# Patient Record
Sex: FEMALE | Race: WHITE | NOT HISPANIC OR LATINO | Employment: OTHER | URBAN - METROPOLITAN AREA
[De-identification: names, ages, dates, MRNs, and addresses within clinical notes are randomized per-mention and may not be internally consistent; named-entity substitution may affect disease eponyms.]

---

## 2017-02-07 ENCOUNTER — ALLSCRIPTS OFFICE VISIT (OUTPATIENT)
Dept: OTHER | Facility: OTHER | Age: 62
End: 2017-02-07

## 2017-02-07 DIAGNOSIS — F25.9 SCHIZOAFFECTIVE DISORDER (HCC): ICD-10-CM

## 2017-02-07 DIAGNOSIS — E03.9 HYPOTHYROIDISM: ICD-10-CM

## 2017-02-07 DIAGNOSIS — F41.8 OTHER SPECIFIED ANXIETY DISORDERS: ICD-10-CM

## 2017-02-07 DIAGNOSIS — E78.5 HYPERLIPIDEMIA: ICD-10-CM

## 2017-02-18 ENCOUNTER — APPOINTMENT (OUTPATIENT)
Dept: LAB | Age: 62
End: 2017-02-18
Payer: MEDICARE

## 2017-02-18 ENCOUNTER — TRANSCRIBE ORDERS (OUTPATIENT)
Dept: ADMINISTRATIVE | Age: 62
End: 2017-02-18

## 2017-02-18 DIAGNOSIS — E78.5 HYPERLIPIDEMIA: ICD-10-CM

## 2017-02-18 DIAGNOSIS — E78.2 MIXED HYPERLIPIDEMIA: Primary | ICD-10-CM

## 2017-02-18 DIAGNOSIS — F25.9 SCHIZOAFFECTIVE DISORDER (HCC): ICD-10-CM

## 2017-02-18 DIAGNOSIS — E03.9 HYPOTHYROIDISM: ICD-10-CM

## 2017-02-18 DIAGNOSIS — F41.8 OTHER SPECIFIED ANXIETY DISORDERS: ICD-10-CM

## 2017-02-18 DIAGNOSIS — E78.2 MIXED HYPERLIPIDEMIA: ICD-10-CM

## 2017-02-18 LAB
ALBUMIN SERPL BCP-MCNC: 3.5 G/DL (ref 3.5–5)
ALP SERPL-CCNC: 62 U/L (ref 46–116)
ALT SERPL W P-5'-P-CCNC: 17 U/L (ref 12–78)
ANION GAP SERPL CALCULATED.3IONS-SCNC: 7 MMOL/L (ref 4–13)
AST SERPL W P-5'-P-CCNC: 10 U/L (ref 5–45)
BILIRUB SERPL-MCNC: 0.27 MG/DL (ref 0.2–1)
BUN SERPL-MCNC: 12 MG/DL (ref 5–25)
CALCIUM SERPL-MCNC: 8.8 MG/DL (ref 8.3–10.1)
CHLORIDE SERPL-SCNC: 99 MMOL/L (ref 100–108)
CHOLEST SERPL-MCNC: 179 MG/DL (ref 50–200)
CO2 SERPL-SCNC: 30 MMOL/L (ref 21–32)
CREAT SERPL-MCNC: 0.86 MG/DL (ref 0.6–1.3)
ERYTHROCYTE [DISTWIDTH] IN BLOOD BY AUTOMATED COUNT: 12.8 % (ref 11.6–15.1)
GFR SERPL CREATININE-BSD FRML MDRD: >60 ML/MIN/1.73SQ M
GLUCOSE SERPL-MCNC: 94 MG/DL (ref 65–140)
HCT VFR BLD AUTO: 38.5 % (ref 34.8–46.1)
HDLC SERPL-MCNC: 83 MG/DL (ref 40–60)
HGB BLD-MCNC: 13.3 G/DL (ref 11.5–15.4)
LDLC SERPL CALC-MCNC: 70 MG/DL (ref 0–100)
MCH RBC QN AUTO: 33.6 PG (ref 26.8–34.3)
MCHC RBC AUTO-ENTMCNC: 34.5 G/DL (ref 31.4–37.4)
MCV RBC AUTO: 97 FL (ref 82–98)
PLATELET # BLD AUTO: 328 THOUSANDS/UL (ref 149–390)
PMV BLD AUTO: 9.8 FL (ref 8.9–12.7)
POTASSIUM SERPL-SCNC: 4.4 MMOL/L (ref 3.5–5.3)
PROT SERPL-MCNC: 7.4 G/DL (ref 6.4–8.2)
RBC # BLD AUTO: 3.96 MILLION/UL (ref 3.81–5.12)
SODIUM SERPL-SCNC: 136 MMOL/L (ref 136–145)
TRIGL SERPL-MCNC: 130 MG/DL
TSH SERPL DL<=0.05 MIU/L-ACNC: 1.6 UIU/ML (ref 0.36–3.74)
WBC # BLD AUTO: 4.72 THOUSAND/UL (ref 4.31–10.16)

## 2017-02-18 PROCEDURE — 85027 COMPLETE CBC AUTOMATED: CPT

## 2017-02-18 PROCEDURE — 84443 ASSAY THYROID STIM HORMONE: CPT

## 2017-02-18 PROCEDURE — 36415 COLL VENOUS BLD VENIPUNCTURE: CPT

## 2017-02-18 PROCEDURE — 80053 COMPREHEN METABOLIC PANEL: CPT

## 2017-02-18 PROCEDURE — 80061 LIPID PANEL: CPT

## 2017-03-07 ENCOUNTER — TRANSCRIBE ORDERS (OUTPATIENT)
Dept: LAB | Facility: HOSPITAL | Age: 62
End: 2017-03-07

## 2017-03-07 ENCOUNTER — APPOINTMENT (OUTPATIENT)
Dept: LAB | Facility: HOSPITAL | Age: 62
End: 2017-03-07
Payer: MEDICARE

## 2017-03-07 DIAGNOSIS — F25.0 SCHIZOAFFECTIVE DISORDER, BIPOLAR TYPE (HCC): Primary | ICD-10-CM

## 2017-03-07 DIAGNOSIS — E03.9 UNSPECIFIED HYPOTHYROIDISM: ICD-10-CM

## 2017-03-07 DIAGNOSIS — Z79.899 ENCOUNTER FOR LONG-TERM (CURRENT) USE OF OTHER MEDICATIONS: ICD-10-CM

## 2017-03-07 DIAGNOSIS — F25.0 SCHIZOAFFECTIVE DISORDER, BIPOLAR TYPE (HCC): ICD-10-CM

## 2017-03-07 LAB
ALBUMIN SERPL BCP-MCNC: 3.5 G/DL (ref 3.5–5)
ALP SERPL-CCNC: 65 U/L (ref 46–116)
ALT SERPL W P-5'-P-CCNC: 18 U/L (ref 12–78)
AMMONIA PLAS-SCNC: 17 UMOL/L (ref 11–35)
ANION GAP SERPL CALCULATED.3IONS-SCNC: 8 MMOL/L (ref 4–13)
AST SERPL W P-5'-P-CCNC: 12 U/L (ref 5–45)
BASOPHILS # BLD AUTO: 0 THOUSANDS/ΜL (ref 0–0.1)
BASOPHILS NFR BLD AUTO: 0 % (ref 0–1)
BILIRUB SERPL-MCNC: 0.28 MG/DL (ref 0.2–1)
BUN SERPL-MCNC: 9 MG/DL (ref 5–25)
CALCIUM SERPL-MCNC: 8.7 MG/DL (ref 8.3–10.1)
CHLORIDE SERPL-SCNC: 97 MMOL/L (ref 100–108)
CHOLEST SERPL-MCNC: 186 MG/DL (ref 50–200)
CO2 SERPL-SCNC: 28 MMOL/L (ref 21–32)
CREAT SERPL-MCNC: 0.92 MG/DL (ref 0.6–1.3)
EOSINOPHIL # BLD AUTO: 0 THOUSAND/ΜL (ref 0–0.61)
EOSINOPHIL NFR BLD AUTO: 0 % (ref 0–6)
ERYTHROCYTE [DISTWIDTH] IN BLOOD BY AUTOMATED COUNT: 12.7 % (ref 11.6–15.1)
EST. AVERAGE GLUCOSE BLD GHB EST-MCNC: 114 MG/DL
GFR SERPL CREATININE-BSD FRML MDRD: >60 ML/MIN/1.73SQ M
GLUCOSE SERPL-MCNC: 98 MG/DL (ref 65–140)
HBA1C MFR BLD: 5.6 % (ref 4.2–6.3)
HCT VFR BLD AUTO: 38.9 % (ref 34.8–46.1)
HDLC SERPL-MCNC: 95 MG/DL (ref 40–60)
HGB BLD-MCNC: 13.4 G/DL (ref 11.5–15.4)
LDLC SERPL CALC-MCNC: 72 MG/DL (ref 0–100)
LYMPHOCYTES # BLD AUTO: 1.64 THOUSANDS/ΜL (ref 0.6–4.47)
LYMPHOCYTES NFR BLD AUTO: 38 % (ref 14–44)
MCH RBC QN AUTO: 33 PG (ref 26.8–34.3)
MCHC RBC AUTO-ENTMCNC: 34.4 G/DL (ref 31.4–37.4)
MCV RBC AUTO: 96 FL (ref 82–98)
MONOCYTES # BLD AUTO: 0.42 THOUSAND/ΜL (ref 0.17–1.22)
MONOCYTES NFR BLD AUTO: 10 % (ref 4–12)
NEUTROPHILS # BLD AUTO: 2.24 THOUSANDS/ΜL (ref 1.85–7.62)
NEUTS SEG NFR BLD AUTO: 52 % (ref 43–75)
NRBC BLD AUTO-RTO: 0 /100 WBCS
PLATELET # BLD AUTO: 243 THOUSANDS/UL (ref 149–390)
PMV BLD AUTO: 9.3 FL (ref 8.9–12.7)
POTASSIUM SERPL-SCNC: 4.3 MMOL/L (ref 3.5–5.3)
PROT SERPL-MCNC: 7.2 G/DL (ref 6.4–8.2)
RBC # BLD AUTO: 4.06 MILLION/UL (ref 3.81–5.12)
SODIUM SERPL-SCNC: 133 MMOL/L (ref 136–145)
T4 FREE SERPL-MCNC: 0.91 NG/DL (ref 0.76–1.46)
TRIGL SERPL-MCNC: 94 MG/DL
TSH SERPL DL<=0.05 MIU/L-ACNC: 1.49 UIU/ML (ref 0.36–3.74)
VALPROATE SERPL-MCNC: 53 UG/ML (ref 50–100)
WBC # BLD AUTO: 4.3 THOUSAND/UL (ref 4.31–10.16)

## 2017-03-07 PROCEDURE — 36415 COLL VENOUS BLD VENIPUNCTURE: CPT

## 2017-03-07 PROCEDURE — 80053 COMPREHEN METABOLIC PANEL: CPT

## 2017-03-07 PROCEDURE — 84439 ASSAY OF FREE THYROXINE: CPT

## 2017-03-07 PROCEDURE — 83036 HEMOGLOBIN GLYCOSYLATED A1C: CPT

## 2017-03-07 PROCEDURE — 84443 ASSAY THYROID STIM HORMONE: CPT

## 2017-03-07 PROCEDURE — 85025 COMPLETE CBC W/AUTO DIFF WBC: CPT

## 2017-03-07 PROCEDURE — 80061 LIPID PANEL: CPT

## 2017-03-07 PROCEDURE — 82140 ASSAY OF AMMONIA: CPT

## 2017-03-07 PROCEDURE — 80164 ASSAY DIPROPYLACETIC ACD TOT: CPT

## 2017-03-20 ENCOUNTER — TRANSCRIBE ORDERS (OUTPATIENT)
Dept: ADMINISTRATIVE | Age: 62
End: 2017-03-20

## 2017-03-20 ENCOUNTER — LAB (OUTPATIENT)
Dept: LAB | Age: 62
End: 2017-03-20
Payer: MEDICARE

## 2017-03-20 DIAGNOSIS — G40.909 EPILEPSY WITHOUT STATUS EPILEPTICUS, NOT INTRACTABLE (HCC): ICD-10-CM

## 2017-03-20 DIAGNOSIS — G40.909 UNSPECIFIED EPILEPSY WITHOUT MENTION OF INTRACTABLE EPILEPSY: Primary | ICD-10-CM

## 2017-03-20 DIAGNOSIS — G40.909 UNSPECIFIED EPILEPSY WITHOUT MENTION OF INTRACTABLE EPILEPSY: ICD-10-CM

## 2017-03-20 LAB
ALBUMIN SERPL BCP-MCNC: 3.7 G/DL (ref 3.5–5)
ALP SERPL-CCNC: 58 U/L (ref 46–116)
ALT SERPL W P-5'-P-CCNC: 24 U/L (ref 12–78)
AMMONIA PLAS-SCNC: 15 UMOL/L (ref 11–35)
ANION GAP SERPL CALCULATED.3IONS-SCNC: 7 MMOL/L (ref 4–13)
AST SERPL W P-5'-P-CCNC: 11 U/L (ref 5–45)
BILIRUB SERPL-MCNC: 0.22 MG/DL (ref 0.2–1)
BUN SERPL-MCNC: 9 MG/DL (ref 5–25)
CALCIUM SERPL-MCNC: 8.6 MG/DL (ref 8.3–10.1)
CHLORIDE SERPL-SCNC: 98 MMOL/L (ref 100–108)
CO2 SERPL-SCNC: 29 MMOL/L (ref 21–32)
CREAT SERPL-MCNC: 0.95 MG/DL (ref 0.6–1.3)
ERYTHROCYTE [DISTWIDTH] IN BLOOD BY AUTOMATED COUNT: 12.9 % (ref 11.6–15.1)
GFR SERPL CREATININE-BSD FRML MDRD: 59.4 ML/MIN/1.73SQ M
GLUCOSE P FAST SERPL-MCNC: 106 MG/DL (ref 65–99)
HCT VFR BLD AUTO: 39.4 % (ref 34.8–46.1)
HGB BLD-MCNC: 13.4 G/DL (ref 11.5–15.4)
MCH RBC QN AUTO: 32.9 PG (ref 26.8–34.3)
MCHC RBC AUTO-ENTMCNC: 34 G/DL (ref 31.4–37.4)
MCV RBC AUTO: 97 FL (ref 82–98)
PHENOBARB SERPL-MCNC: 14.8 UG/ML (ref 15–40)
PLATELET # BLD AUTO: 279 THOUSANDS/UL (ref 149–390)
PMV BLD AUTO: 10.2 FL (ref 8.9–12.7)
POTASSIUM SERPL-SCNC: 4.4 MMOL/L (ref 3.5–5.3)
PROT SERPL-MCNC: 7.3 G/DL (ref 6.4–8.2)
RBC # BLD AUTO: 4.07 MILLION/UL (ref 3.81–5.12)
SODIUM SERPL-SCNC: 134 MMOL/L (ref 136–145)
WBC # BLD AUTO: 4.2 THOUSAND/UL (ref 4.31–10.16)

## 2017-03-20 PROCEDURE — 80184 ASSAY OF PHENOBARBITAL: CPT

## 2017-03-20 PROCEDURE — 80053 COMPREHEN METABOLIC PANEL: CPT

## 2017-03-20 PROCEDURE — 82140 ASSAY OF AMMONIA: CPT

## 2017-03-20 PROCEDURE — 36415 COLL VENOUS BLD VENIPUNCTURE: CPT

## 2017-03-20 PROCEDURE — 85027 COMPLETE CBC AUTOMATED: CPT

## 2017-03-20 PROCEDURE — 80165 DIPROPYLACETIC ACID FREE: CPT

## 2017-03-21 ENCOUNTER — TRANSCRIBE ORDERS (OUTPATIENT)
Dept: ADMINISTRATIVE | Facility: HOSPITAL | Age: 62
End: 2017-03-21

## 2017-03-21 DIAGNOSIS — M81.0 OSTEOPOROSIS: Primary | ICD-10-CM

## 2017-03-22 LAB — VALPROATE FREE SERPL-MCNC: 7.1 UG/ML (ref 6–22)

## 2017-03-28 ENCOUNTER — ALLSCRIPTS OFFICE VISIT (OUTPATIENT)
Dept: OTHER | Facility: OTHER | Age: 62
End: 2017-03-28

## 2017-04-25 ENCOUNTER — TRANSCRIBE ORDERS (OUTPATIENT)
Dept: LAB | Facility: HOSPITAL | Age: 62
End: 2017-04-25

## 2017-04-25 ENCOUNTER — APPOINTMENT (OUTPATIENT)
Dept: LAB | Facility: HOSPITAL | Age: 62
End: 2017-04-25
Payer: MEDICARE

## 2017-04-25 DIAGNOSIS — I10 ESSENTIAL HYPERTENSION, MALIGNANT: ICD-10-CM

## 2017-04-25 DIAGNOSIS — E03.9 UNSPECIFIED HYPOTHYROIDISM: ICD-10-CM

## 2017-04-25 DIAGNOSIS — F25.0 SCHIZOAFFECTIVE DISORDER, BIPOLAR TYPE (HCC): ICD-10-CM

## 2017-04-25 DIAGNOSIS — Z12.31 ENCOUNTER FOR SCREENING MAMMOGRAM FOR MALIGNANT NEOPLASM OF BREAST: ICD-10-CM

## 2017-04-25 DIAGNOSIS — F25.0 SCHIZOAFFECTIVE DISORDER, BIPOLAR TYPE (HCC): Primary | ICD-10-CM

## 2017-04-25 DIAGNOSIS — E66.9 OBESITY, UNSPECIFIED: ICD-10-CM

## 2017-04-25 DIAGNOSIS — Z79.899 ENCOUNTER FOR LONG-TERM (CURRENT) USE OF OTHER MEDICATIONS: ICD-10-CM

## 2017-04-25 DIAGNOSIS — F41.8 OTHER SPECIFIED ANXIETY DISORDERS: ICD-10-CM

## 2017-04-25 LAB
ALBUMIN SERPL BCP-MCNC: 3.6 G/DL (ref 3.5–5)
ALP SERPL-CCNC: 63 U/L (ref 46–116)
ALT SERPL W P-5'-P-CCNC: 21 U/L (ref 12–78)
AMMONIA PLAS-SCNC: 25 UMOL/L (ref 11–35)
ANION GAP SERPL CALCULATED.3IONS-SCNC: 6 MMOL/L (ref 4–13)
AST SERPL W P-5'-P-CCNC: 11 U/L (ref 5–45)
BASOPHILS # BLD AUTO: 0 THOUSANDS/ΜL (ref 0–0.1)
BASOPHILS NFR BLD AUTO: 0 % (ref 0–1)
BILIRUB SERPL-MCNC: 0.23 MG/DL (ref 0.2–1)
BUN SERPL-MCNC: 6 MG/DL (ref 5–25)
CALCIUM SERPL-MCNC: 9.1 MG/DL (ref 8.3–10.1)
CHLORIDE SERPL-SCNC: 100 MMOL/L (ref 100–108)
CHOLEST SERPL-MCNC: 203 MG/DL (ref 50–200)
CO2 SERPL-SCNC: 29 MMOL/L (ref 21–32)
CREAT SERPL-MCNC: 0.85 MG/DL (ref 0.6–1.3)
EOSINOPHIL # BLD AUTO: 0 THOUSAND/ΜL (ref 0–0.61)
EOSINOPHIL NFR BLD AUTO: 0 % (ref 0–6)
ERYTHROCYTE [DISTWIDTH] IN BLOOD BY AUTOMATED COUNT: 12.9 % (ref 11.6–15.1)
EST. AVERAGE GLUCOSE BLD GHB EST-MCNC: 114 MG/DL
GFR SERPL CREATININE-BSD FRML MDRD: >60 ML/MIN/1.73SQ M
GLUCOSE P FAST SERPL-MCNC: 100 MG/DL (ref 65–99)
HBA1C MFR BLD: 5.6 % (ref 4.2–6.3)
HCT VFR BLD AUTO: 40.2 % (ref 34.8–46.1)
HDLC SERPL-MCNC: 95 MG/DL (ref 40–60)
HGB BLD-MCNC: 13.7 G/DL (ref 11.5–15.4)
LDLC SERPL CALC-MCNC: 88 MG/DL (ref 0–100)
LYMPHOCYTES # BLD AUTO: 1.85 THOUSANDS/ΜL (ref 0.6–4.47)
LYMPHOCYTES NFR BLD AUTO: 43 % (ref 14–44)
MCH RBC QN AUTO: 33 PG (ref 26.8–34.3)
MCHC RBC AUTO-ENTMCNC: 34.1 G/DL (ref 31.4–37.4)
MCV RBC AUTO: 97 FL (ref 82–98)
MONOCYTES # BLD AUTO: 0.35 THOUSAND/ΜL (ref 0.17–1.22)
MONOCYTES NFR BLD AUTO: 8 % (ref 4–12)
NEUTROPHILS # BLD AUTO: 2.06 THOUSANDS/ΜL (ref 1.85–7.62)
NEUTS SEG NFR BLD AUTO: 49 % (ref 43–75)
NRBC BLD AUTO-RTO: 0 /100 WBCS
PLATELET # BLD AUTO: 254 THOUSANDS/UL (ref 149–390)
PMV BLD AUTO: 9.5 FL (ref 8.9–12.7)
POTASSIUM SERPL-SCNC: 4.6 MMOL/L (ref 3.5–5.3)
PROT SERPL-MCNC: 7.5 G/DL (ref 6.4–8.2)
RBC # BLD AUTO: 4.15 MILLION/UL (ref 3.81–5.12)
SODIUM SERPL-SCNC: 135 MMOL/L (ref 136–145)
T4 FREE SERPL-MCNC: 0.82 NG/DL (ref 0.76–1.46)
TRIGL SERPL-MCNC: 102 MG/DL
TSH SERPL DL<=0.05 MIU/L-ACNC: 1.97 UIU/ML (ref 0.36–3.74)
WBC # BLD AUTO: 4.27 THOUSAND/UL (ref 4.31–10.16)

## 2017-04-25 PROCEDURE — 83036 HEMOGLOBIN GLYCOSYLATED A1C: CPT

## 2017-04-25 PROCEDURE — 82140 ASSAY OF AMMONIA: CPT

## 2017-04-25 PROCEDURE — 36415 COLL VENOUS BLD VENIPUNCTURE: CPT

## 2017-04-25 PROCEDURE — 80165 DIPROPYLACETIC ACID FREE: CPT

## 2017-04-25 PROCEDURE — 84439 ASSAY OF FREE THYROXINE: CPT

## 2017-04-25 PROCEDURE — 80061 LIPID PANEL: CPT

## 2017-04-25 PROCEDURE — 80053 COMPREHEN METABOLIC PANEL: CPT

## 2017-04-25 PROCEDURE — 85025 COMPLETE CBC W/AUTO DIFF WBC: CPT

## 2017-04-25 PROCEDURE — 84443 ASSAY THYROID STIM HORMONE: CPT

## 2017-04-26 ENCOUNTER — GENERIC CONVERSION - ENCOUNTER (OUTPATIENT)
Dept: OTHER | Facility: OTHER | Age: 62
End: 2017-04-26

## 2017-04-26 ENCOUNTER — HOSPITAL ENCOUNTER (OUTPATIENT)
Dept: RADIOLOGY | Age: 62
Discharge: HOME | End: 2017-04-26
Payer: MEDICARE

## 2017-04-26 DIAGNOSIS — M81.0 OSTEOPOROSIS: ICD-10-CM

## 2017-04-26 PROCEDURE — 77080 DXA BONE DENSITY AXIAL: CPT

## 2017-04-27 LAB — VALPROATE FREE SERPL-MCNC: 9.9 UG/ML (ref 6–22)

## 2017-04-30 ENCOUNTER — GENERIC CONVERSION - ENCOUNTER (OUTPATIENT)
Dept: OTHER | Facility: OTHER | Age: 62
End: 2017-04-30

## 2017-05-01 ENCOUNTER — GENERIC CONVERSION - ENCOUNTER (OUTPATIENT)
Dept: OTHER | Facility: OTHER | Age: 62
End: 2017-05-01

## 2017-05-11 ENCOUNTER — GENERIC CONVERSION - ENCOUNTER (OUTPATIENT)
Dept: OTHER | Facility: OTHER | Age: 62
End: 2017-05-11

## 2017-05-18 ENCOUNTER — GENERIC CONVERSION - ENCOUNTER (OUTPATIENT)
Dept: OTHER | Facility: OTHER | Age: 62
End: 2017-05-18

## 2017-06-26 ENCOUNTER — ALLSCRIPTS OFFICE VISIT (OUTPATIENT)
Dept: OTHER | Facility: OTHER | Age: 62
End: 2017-06-26

## 2017-06-26 DIAGNOSIS — E03.9 HYPOTHYROIDISM: ICD-10-CM

## 2017-06-26 DIAGNOSIS — Z12.31 ENCOUNTER FOR SCREENING MAMMOGRAM FOR MALIGNANT NEOPLASM OF BREAST: ICD-10-CM

## 2017-06-30 ENCOUNTER — GENERIC CONVERSION - ENCOUNTER (OUTPATIENT)
Dept: OTHER | Facility: OTHER | Age: 62
End: 2017-06-30

## 2017-06-30 ENCOUNTER — HOSPITAL ENCOUNTER (OUTPATIENT)
Dept: RADIOLOGY | Age: 62
Discharge: HOME | End: 2017-06-30
Payer: MEDICARE

## 2017-06-30 DIAGNOSIS — Z12.31 ENCOUNTER FOR SCREENING MAMMOGRAM FOR MALIGNANT NEOPLASM OF BREAST: ICD-10-CM

## 2017-06-30 PROCEDURE — G0202 SCR MAMMO BI INCL CAD: HCPCS

## 2017-10-03 ENCOUNTER — TRANSCRIBE ORDERS (OUTPATIENT)
Dept: LAB | Facility: HOSPITAL | Age: 62
End: 2017-10-03

## 2017-10-03 ENCOUNTER — ALLSCRIPTS OFFICE VISIT (OUTPATIENT)
Dept: OTHER | Facility: OTHER | Age: 62
End: 2017-10-03

## 2017-10-03 ENCOUNTER — APPOINTMENT (OUTPATIENT)
Dept: LAB | Facility: HOSPITAL | Age: 62
End: 2017-10-03
Payer: MEDICARE

## 2017-10-03 DIAGNOSIS — Z79.899 ENCOUNTER FOR LONG-TERM (CURRENT) USE OF OTHER MEDICATIONS: ICD-10-CM

## 2017-10-03 DIAGNOSIS — R26.81 UNSTEADINESS ON FEET: ICD-10-CM

## 2017-10-03 DIAGNOSIS — G40.909 EPILEPSY WITHOUT STATUS EPILEPTICUS, NOT INTRACTABLE (HCC): ICD-10-CM

## 2017-10-03 DIAGNOSIS — F25.0 SCHIZOAFFECTIVE DISORDER, BIPOLAR TYPE (HCC): ICD-10-CM

## 2017-10-03 DIAGNOSIS — E03.9 UNSPECIFIED HYPOTHYROIDISM: ICD-10-CM

## 2017-10-03 DIAGNOSIS — F25.0 SCHIZOAFFECTIVE DISORDER, BIPOLAR TYPE (HCC): Primary | ICD-10-CM

## 2017-10-03 LAB
ALBUMIN SERPL BCP-MCNC: 3.5 G/DL (ref 3.5–5)
ALP SERPL-CCNC: 61 U/L (ref 46–116)
ALT SERPL W P-5'-P-CCNC: 21 U/L (ref 12–78)
AMMONIA PLAS-SCNC: 21 UMOL/L (ref 11–35)
ANION GAP SERPL CALCULATED.3IONS-SCNC: 6 MMOL/L (ref 4–13)
AST SERPL W P-5'-P-CCNC: 13 U/L (ref 5–45)
BASOPHILS # BLD AUTO: 0 THOUSANDS/ΜL (ref 0–0.1)
BASOPHILS NFR BLD AUTO: 0 % (ref 0–1)
BILIRUB SERPL-MCNC: 0.24 MG/DL (ref 0.2–1)
BUN SERPL-MCNC: 8 MG/DL (ref 5–25)
CALCIUM SERPL-MCNC: 8.9 MG/DL (ref 8.3–10.1)
CHLORIDE SERPL-SCNC: 100 MMOL/L (ref 100–108)
CO2 SERPL-SCNC: 29 MMOL/L (ref 21–32)
CREAT SERPL-MCNC: 0.91 MG/DL (ref 0.6–1.3)
EOSINOPHIL # BLD AUTO: 0 THOUSAND/ΜL (ref 0–0.61)
EOSINOPHIL NFR BLD AUTO: 0 % (ref 0–6)
ERYTHROCYTE [DISTWIDTH] IN BLOOD BY AUTOMATED COUNT: 12.9 % (ref 11.6–15.1)
GFR SERPL CREATININE-BSD FRML MDRD: 67 ML/MIN/1.73SQ M
GLUCOSE P FAST SERPL-MCNC: 97 MG/DL (ref 65–99)
HCT VFR BLD AUTO: 38.5 % (ref 34.8–46.1)
HGB BLD-MCNC: 13.4 G/DL (ref 11.5–15.4)
LYMPHOCYTES # BLD AUTO: 1.3 THOUSANDS/ΜL (ref 0.6–4.47)
LYMPHOCYTES NFR BLD AUTO: 32 % (ref 14–44)
MCH RBC QN AUTO: 33.5 PG (ref 26.8–34.3)
MCHC RBC AUTO-ENTMCNC: 34.8 G/DL (ref 31.4–37.4)
MCV RBC AUTO: 96 FL (ref 82–98)
MONOCYTES # BLD AUTO: 0.41 THOUSAND/ΜL (ref 0.17–1.22)
MONOCYTES NFR BLD AUTO: 10 % (ref 4–12)
NEUTROPHILS # BLD AUTO: 2.31 THOUSANDS/ΜL (ref 1.85–7.62)
NEUTS SEG NFR BLD AUTO: 58 % (ref 43–75)
NRBC BLD AUTO-RTO: 0 /100 WBCS
PHENOBARB SERPL-MCNC: 21.1 UG/ML (ref 15–40)
PLATELET # BLD AUTO: 265 THOUSANDS/UL (ref 149–390)
PMV BLD AUTO: 9.5 FL (ref 8.9–12.7)
POTASSIUM SERPL-SCNC: 4.5 MMOL/L (ref 3.5–5.3)
PROT SERPL-MCNC: 7.4 G/DL (ref 6.4–8.2)
RBC # BLD AUTO: 4 MILLION/UL (ref 3.81–5.12)
SODIUM SERPL-SCNC: 135 MMOL/L (ref 136–145)
T4 FREE SERPL-MCNC: 0.89 NG/DL (ref 0.76–1.46)
TSH SERPL DL<=0.05 MIU/L-ACNC: 0.9 UIU/ML (ref 0.36–3.74)
VALPROATE SERPL-MCNC: 52 UG/ML (ref 50–100)
WBC # BLD AUTO: 4.03 THOUSAND/UL (ref 4.31–10.16)

## 2017-10-03 PROCEDURE — 84439 ASSAY OF FREE THYROXINE: CPT

## 2017-10-03 PROCEDURE — 36415 COLL VENOUS BLD VENIPUNCTURE: CPT

## 2017-10-03 PROCEDURE — 80184 ASSAY OF PHENOBARBITAL: CPT

## 2017-10-03 PROCEDURE — 80053 COMPREHEN METABOLIC PANEL: CPT

## 2017-10-03 PROCEDURE — 82140 ASSAY OF AMMONIA: CPT

## 2017-10-03 PROCEDURE — 84443 ASSAY THYROID STIM HORMONE: CPT

## 2017-10-03 PROCEDURE — 85025 COMPLETE CBC W/AUTO DIFF WBC: CPT

## 2017-10-03 PROCEDURE — 80164 ASSAY DIPROPYLACETIC ACD TOT: CPT

## 2017-10-06 ENCOUNTER — APPOINTMENT (OUTPATIENT)
Dept: LAB | Age: 62
End: 2017-10-06
Payer: MEDICARE

## 2017-10-06 ENCOUNTER — TRANSCRIBE ORDERS (OUTPATIENT)
Dept: ADMINISTRATIVE | Age: 62
End: 2017-10-06

## 2017-10-06 DIAGNOSIS — G40.909 EPILEPSY WITHOUT STATUS EPILEPTICUS, NOT INTRACTABLE (HCC): ICD-10-CM

## 2017-10-06 LAB
PHENOBARB SERPL-MCNC: 20.6 UG/ML (ref 15–40)
VALPROATE SERPL-MCNC: 111 UG/ML (ref 50–100)

## 2017-10-06 PROCEDURE — 80184 ASSAY OF PHENOBARBITAL: CPT

## 2017-10-06 PROCEDURE — 80164 ASSAY DIPROPYLACETIC ACD TOT: CPT

## 2017-10-06 PROCEDURE — 36415 COLL VENOUS BLD VENIPUNCTURE: CPT

## 2017-10-11 ENCOUNTER — APPOINTMENT (OUTPATIENT)
Dept: PHYSICAL THERAPY | Facility: REHABILITATION | Age: 62
End: 2017-10-11
Payer: MEDICARE

## 2017-10-11 DIAGNOSIS — R26.81 UNSTEADINESS ON FEET: ICD-10-CM

## 2017-10-11 PROCEDURE — 97162 PT EVAL MOD COMPLEX 30 MIN: CPT

## 2017-10-11 PROCEDURE — G8991 OTHER PT/OT GOAL STATUS: HCPCS

## 2017-10-11 PROCEDURE — G8990 OTHER PT/OT CURRENT STATUS: HCPCS

## 2017-10-12 ENCOUNTER — GENERIC CONVERSION - ENCOUNTER (OUTPATIENT)
Dept: OTHER | Facility: OTHER | Age: 62
End: 2017-10-12

## 2017-10-20 ENCOUNTER — APPOINTMENT (OUTPATIENT)
Dept: PHYSICAL THERAPY | Facility: REHABILITATION | Age: 62
End: 2017-10-20
Payer: MEDICARE

## 2017-10-20 PROCEDURE — 97110 THERAPEUTIC EXERCISES: CPT

## 2017-10-20 PROCEDURE — 97112 NEUROMUSCULAR REEDUCATION: CPT

## 2017-10-24 ENCOUNTER — APPOINTMENT (OUTPATIENT)
Dept: PHYSICAL THERAPY | Facility: REHABILITATION | Age: 62
End: 2017-10-24
Payer: MEDICARE

## 2017-10-24 PROCEDURE — 97110 THERAPEUTIC EXERCISES: CPT

## 2017-10-24 PROCEDURE — 97112 NEUROMUSCULAR REEDUCATION: CPT

## 2017-10-25 ENCOUNTER — APPOINTMENT (OUTPATIENT)
Dept: PHYSICAL THERAPY | Facility: REHABILITATION | Age: 62
End: 2017-10-25
Payer: MEDICARE

## 2017-10-27 ENCOUNTER — TRANSCRIBE ORDERS (OUTPATIENT)
Dept: ADMINISTRATIVE | Age: 62
End: 2017-10-27

## 2017-10-27 ENCOUNTER — APPOINTMENT (OUTPATIENT)
Dept: PHYSICAL THERAPY | Facility: REHABILITATION | Age: 62
End: 2017-10-27
Payer: MEDICARE

## 2017-10-27 ENCOUNTER — APPOINTMENT (OUTPATIENT)
Dept: LAB | Age: 62
End: 2017-10-27
Payer: MEDICARE

## 2017-10-27 DIAGNOSIS — I51.9 MYXEDEMA HEART DISEASE: ICD-10-CM

## 2017-10-27 DIAGNOSIS — I51.9 MYXEDEMA HEART DISEASE: Primary | ICD-10-CM

## 2017-10-27 DIAGNOSIS — E03.9 MYXEDEMA HEART DISEASE: ICD-10-CM

## 2017-10-27 DIAGNOSIS — E03.9 HYPOTHYROIDISM: ICD-10-CM

## 2017-10-27 DIAGNOSIS — E03.9 MYXEDEMA HEART DISEASE: Primary | ICD-10-CM

## 2017-10-27 LAB
ALT SERPL W P-5'-P-CCNC: 18 U/L (ref 12–78)
ANION GAP SERPL CALCULATED.3IONS-SCNC: 7 MMOL/L (ref 4–13)
AST SERPL W P-5'-P-CCNC: 13 U/L (ref 5–45)
BUN SERPL-MCNC: 10 MG/DL (ref 5–25)
CALCIUM SERPL-MCNC: 8.9 MG/DL (ref 8.3–10.1)
CHLORIDE SERPL-SCNC: 98 MMOL/L (ref 100–108)
CHOLEST SERPL-MCNC: 174 MG/DL (ref 50–200)
CO2 SERPL-SCNC: 29 MMOL/L (ref 21–32)
CREAT SERPL-MCNC: 0.93 MG/DL (ref 0.6–1.3)
ERYTHROCYTE [DISTWIDTH] IN BLOOD BY AUTOMATED COUNT: 12.6 % (ref 11.6–15.1)
GFR SERPL CREATININE-BSD FRML MDRD: 65 ML/MIN/1.73SQ M
GLUCOSE P FAST SERPL-MCNC: 90 MG/DL (ref 65–99)
HCT VFR BLD AUTO: 38.8 % (ref 34.8–46.1)
HDLC SERPL-MCNC: 89 MG/DL (ref 40–60)
HGB BLD-MCNC: 13.3 G/DL (ref 11.5–15.4)
LDLC SERPL CALC-MCNC: 63 MG/DL (ref 0–100)
MCH RBC QN AUTO: 33 PG (ref 26.8–34.3)
MCHC RBC AUTO-ENTMCNC: 34.3 G/DL (ref 31.4–37.4)
MCV RBC AUTO: 96 FL (ref 82–98)
PLATELET # BLD AUTO: 263 THOUSANDS/UL (ref 149–390)
PMV BLD AUTO: 9.4 FL (ref 8.9–12.7)
POTASSIUM SERPL-SCNC: 4.3 MMOL/L (ref 3.5–5.3)
RBC # BLD AUTO: 4.03 MILLION/UL (ref 3.81–5.12)
SODIUM SERPL-SCNC: 134 MMOL/L (ref 136–145)
TRIGL SERPL-MCNC: 111 MG/DL
TSH SERPL DL<=0.05 MIU/L-ACNC: 0.99 UIU/ML (ref 0.36–3.74)
WBC # BLD AUTO: 4.14 THOUSAND/UL (ref 4.31–10.16)

## 2017-10-27 PROCEDURE — 84450 TRANSFERASE (AST) (SGOT): CPT

## 2017-10-27 PROCEDURE — 36415 COLL VENOUS BLD VENIPUNCTURE: CPT

## 2017-10-27 PROCEDURE — 84443 ASSAY THYROID STIM HORMONE: CPT

## 2017-10-27 PROCEDURE — 80061 LIPID PANEL: CPT

## 2017-10-27 PROCEDURE — 97112 NEUROMUSCULAR REEDUCATION: CPT

## 2017-10-27 PROCEDURE — 84460 ALANINE AMINO (ALT) (SGPT): CPT

## 2017-10-27 PROCEDURE — 80048 BASIC METABOLIC PNL TOTAL CA: CPT

## 2017-10-27 PROCEDURE — 85027 COMPLETE CBC AUTOMATED: CPT

## 2017-10-27 NOTE — PROGRESS NOTES
Assessment  1  Seizure disorder (345 90) (G40 909)   2  Unsteady gait (781 2) (R26 81)   3  Osteoporosis (733 00) (M81 0)   4  Multiple falls (V15 88) (R29 6)   5  Medication side effects (995 20) (T88 7XXA)    Plan  Seizure disorder    · PHENobarbital 64 8 MG Oral Tablet; TAKE 1 TABLET TWICE DAILY FOR  SEIZURES   Rx By: Earlene Ascencio; Dispense: 30 Days ; #:60 Tablet; Refill: 5;For: Seizure disorder; KELTON = N; Print Rx   · (1) PHENOBARBITAL; Status:Active; Requested BZW:[id number];    Perform:[facility]; FTM:35MNZ6316; Ordered; For:Seizure disorder; Ordered By:Deidre Donovan;   · (1) VALPROIC ACID (DEPAKOTE); Status:Active; Requested TKY:[id number];    Perform:[facility]; GRU:08NPF5372; Ordered; For:Seizure disorder; Ordered By:Deidre Donovan;   · Follow-up visit in 6 months Evaluation and Treatment  Follow-up, 30 min  Status: Hold  For - Scheduling  Requested for: 28ZSP0228   Ordered; For: Seizure disorder; Ordered By: Earlene Ascencio Performed:  Due: 40YYL3391  Unsteady gait    · *1 - [facility]  gait problems and falls  multifactorial   Status:  Active  Requested for: 28NWN8647   Ordered; For: Unsteady gait; Ordered By: Earlene Ascencio Performed:  Due: 04JPY1975  Care Summary provided  : Yes    Discussion/Summary  57year old woman with unclear epilepsy syndrome and schizophrenia  She reported 9 years of seizure freedom until in 12/2014 she had an event a few days after stopping phenobarbital  Multiple nights of insomnia preceded the event  The event itself was poorly characterized, but consisted of fear/panic that is apparently typical of her seizures  It is possible she had a focal seizure, but anxiety/psychosis related to sleep deprivation and withdrawal of phenobarbital is another explanation that better fits her prolonged symptoms and associated hallucinations   She has had similar reactions when other medications have been changed suggesting it was most likely related to anxiety  She feels things improved after she went back on phenobarbital  Her EEG in early 2015 was normal  Her osteoporosis may be related to phenobarbital use, but at this point her seizures are controlled and things are going well, so she will continue phenobarbital  She is also on gabapentin  Valproate is prescribed by psychiatry and is not specifically prescribed for seizure  We again discussed bone health today  Ca and vitamin D doses were increased by her PCP after the DEXA  She is now on bisphosphonate therapy  Her most recent DEXA was essentially stable with some improvement at the hip  The event on Saturday was not a seizure and was likely related to anxiety  She was comforted to hear this explanation today and that her phenobarbital dose has not been changed  There have been 2 recent falls without explanation  Her gait is mildly wide and somewhat unsteady  She is impulsive at times  We discussed the need to evaluation/treatment by physical therapy  She may be able to do exercises on her own if shown what to do by physical therapy  She does go to the gym weekly  Labs from this morning are pending  We called the lab to add on a phenobarbital level  Valproate was already pending as is ammonia, cmp and cbc  Discussion Summary:   Today's Plan: Continue seizure medications unchanged  Schedule physical therapy  Use caution when walking  Return in about 6 months  Counseling Documentation With Imm: The patient, patient's caretaker was counseled regarding diagnostic results,-- instructions for management,-- risk factor reductions,-- prognosis,-- patient and family education,-- impressions,-- risks and benefits of treatment options,-- importance of compliance with treatment  Chief Complaint  Chief Complaint Free Text Note Form: patient present for follow up regarding seizures disorder and osteoporosis  History of Present Illness  The patient returns for follow up regarding epilepsy   She arrives with a staff member from her group home  There have been no events concerning for seizures since last visit  She has osteoporosis  She is now on bisphosphonate therapy  Mood has been stable (anxiety, delusions)  She felt faint on saturday night while doing a very hard crossword puzzle and listening to a loud symphony on her headphones  She felt anxious and stressed  She thought it meant a seizure might be coming, but she stopped the music and used her relaxation techniques and symptoms improved  She wonders if someone lowered her phenobarbital      Last attempt at decreasing phenobarbital was unsuccessful - sleeplessness, agitation, anxiety, delusions  Last DEXA scan essentially stable, still shows osteoporosis of the lumbar spine  She has had at least 2 recent falls without explanation  Her gait seems worse lately  3 Hour Video EEG 1/15/15: normal     Current AEDs:  PB 64 8 mg tab, 1 tab bid   mg bid   mg tid (from psychiatry)    Past AEDs:  Carbamazepine - ?stopped, but worked? Lamotrigine - ?stopped, Dr  discontinued, no reason given? Valproate - ?water retention? Other medications include:  Ca/Vit D   Alendronate    SH: Lives in group home  A full 10 system review was performed and is negative except for what is mentioned in the ROS section or in the HPI  Review of Systems  Neurological ROS:   Constitutional: recent weight gain-- and-- appetite changes  HEENT: sinus problems  Hematologic/Lymphatic: a tendency for easy bruising  Neurological General: snoring  Neurological Coordination: balance difficulties  ROS Reviewed:   ROS reviewed  Active Problems  1  Anemia (285 9) (D64 9)   2  Anxiety (300 00) (F41 9)   3  Benign essential HTN (401 1) (I10)   4  Constipation (564 00) (K59 00)   5  Depression with anxiety (300 4) (F41 8)   6  Encounter for screening mammogram for breast cancer (V76 12) (Z12 31)   7  Folic acid deficiency (266 2) (E53 8)   8  Hyperlipidemia (272 4) (E78 5)   9  Hyponatremia (276 1) (E87 1)   10  Hypothyroidism (244 9) (E03 9)   11  Insomnia (780 52) (G47 00)   12  Need for TD vaccine (V06 5) (Z23)   13  Obesity (278 00) (E66 9)   14  Osteoporosis (733 00) (M81 0)   15  Psychosis, bipolar affective (296 80) (F31 9)   16  Schizoaffective disorder (295 70) (F25 9)   17  Seizure disorder (345 90) (G40 909)    Past Medical History  1  History of Benign essential HTN (401 1) (I10)   2  History of Fracture of fifth metatarsal bone of right foot with delayed healing (V54 19)   (S92 351G)   3  History of constipation (V12 79) (Z87 19)   4  History of headache (V13 89) (Z87 898)   5  History of insomnia (V13 89) (Z87 898)   6  History of nasal congestion (V12 69) (Z87 09)   7  History of obesity (V13 89) (Z86 39)   8  History of seizure disorder (V12 49) (Z86 69)   9  Hyperlipidemia (272 4) (E78 5)   10  Hypothyroidism (244 9) (E03 9)   11  Schizoaffective disorder (295 70) (F25 9)  Active Problems And Past Medical History Reviewed: The active problems and past medical history were reviewed and updated today  Surgical History  1  History of Complete Colonoscopy   2  Denied: History Of Prior Surgery    Family History  Mother    1  No pertinent family history  Father    2  No pertinent family history  Other    3  Family history of Mesothelioma    Social History   · Never A Smoker   · No alcohol use   · No illicit drug use  Social History Reviewed: The social history was reviewed and updated today  Current Meds   1  Acetaminophen 325 MG Oral Tablet; TAKE 1 TO 2 TABLETS EVERY 6 HOURS AS   NEEDED FOR PAIN OR FEVER; Therapy: (Recorded:05Jun2017) to Recorded   2  Alendronate Sodium 70 MG Oral Tablet; TAKE 1 TABLET ON FRIDAYS, EMPTY   STOMACH, DON'T LIE DOWN/FOOD/MEDS FOR 30 MIN;   Therapy: (Recorded:05Jun2017) to Recorded   3  Aspirin 81 MG Oral Tablet Chewable; CHEW 1 TABLET DAILY IN THE MORNING;    Therapy: 27STZ2135 to ([facility])  Requested for: 35NVA4269; Last   Rx:05Jun2017 Ordered   4  Atorvastatin Calcium 20 MG Oral Tablet; TAKE 1 TABLET Bedtime for cholesterol; Therapy: 27CKJ7861 to ([facility])  Requested for: 74PRZ6210; Last   Rx:05Jun2017 Ordered   5  BusPIRone HCl - 15 MG Oral Tablet; TAKE 1 TABLET AT 8AM & 4PM FOR ANXIETY; Last   Rx:05Jun2017 Ordered   6  Daily Rahel Oral Tablet; TAKE 1 TABLET BY MOUTH DAILY (VITAMIN SUPPLEMENT); Therapy: 25HUX8833 to (Evaluate:88Cnv9362)  Requested for: 28Mar2017; Last   Rx:27Dxs4051 Ordered   7  Deep Sea Nasal Spray 0 65 % Nasal Solution; AS DIRECTED UP TO 6 TIMES DAILY AS   NEEDED FOR NASAL DRYNESS; Therapy: (Jewelene Devoid) to Recorded   8  Delsym 30 MG/5ML LQCR; GIVE 10 ML TWICE DAILY AS NEEDED FOR COUGH; Therapy: (Jewelene Devoid) to Recorded   9  DiphenhydrAMINE HCl - 50 MG Oral Capsule; TAKE 1 CAPSULE AT BEDTIME AS   NEEDED FOR CONGESTION; Therapy: (Recorded:05Jun2017) to Recorded   10  Divalproex Sodium 500 MG Oral Tablet Delayed Release; TAKE 2 TABLETS (1000 MG)    TWICE DAILY  Requested for: 29JCG4703; Last Rx:05Jun2017 Ordered   11  Docusate Sodium 100 MG Oral Capsule; TAKE 1 CAPSULE TWICE DAILY as needed for    constipation; Therapy: 40TGL9052 to (Jaspreet Guillen)  Requested for: 47HYD5464; Last    Rx:05Jun2017 Ordered   12  Fluticasone Propionate 50 MCG/ACT Nasal Suspension; USE 1 SPRAY IN EACH    NOSTRIL DAILY IN THE MORNING; Therapy: 73QWX0884 to ([facility])  Requested for: 46ZCV0318; Last    Rx:05Jun2017 Ordered   13  Gabapentin 100 MG Oral Capsule; TAKE 2 CAPSULES BY MOUTH TWICE DAILY    (MOOD); Therapy: 05YFI3154 to (HZZFVOCY:66FGY1608)  Requested for: 28Mar2017; Last    Rx:28Mar2017 Ordered   14  Levothyroxine Sodium 100 MCG Oral Tablet; TAKE 1 TABLET DAILY AT 7AM FOR    THYROID;    Therapy: 61Rxq2154 to (Evaluate:01Qqz2824)  Requested for: 83TKR6219; Last    Rx:05Jun2017 Ordered   15   LORazepam 1 MG Oral Tablet; Take 1 tablet twice daily; Last Rx:05Jun2017; Status:    ACTIVE - Renewal Voided Ordered   16  Loxapine Succinate 50 MG Oral Capsule; TAKE 2 CAPSULES (100 MG) TWICE DAILY;    TAKE 1 CAPSULE AT NOON FOR MOOD; Therapy: (Recorded:05Jun2017) to Recorded   17  L-Theanine 100 MG Oral Capsule; TAKE 2 CAPSULES (200 MG) AT 12 NOON & 4PM FOR    RELAXATION; Therapy: (Recorded:05Jun2017) to Recorded   18  Melatonin 5 MG Oral Tablet; Take 1 tablet daily; Therapy: 24PTC6160 to ([facility])  Requested for: 03XFV4605; Last    Rx:05Jun2017 Ordered   19  Oyster Shell Calcium/D 500-200 MG-UNIT Oral Tablet; TAKE 1 TABLET TWICE DAILY    FOR SUPPLEMENT; Therapy: 76OZG7163 to ([facility])  Requested for: 52LJD7291; Last    Rx:05Jun2017 Ordered   20  PHENobarbital 64 8 MG Oral Tablet; TAKE 1 TABLET TWICE DAILY FOR SEIZURES; Last    Rx:05Jun2017 Ordered   21  Polyethylene Glycol 3350 Oral Powder; 1 SCOOP IN GLASS OF WATER IN MORNING     Requested for: 11Aug2017; Last Rx:11Aug2017 Ordered   22  Saphris 10 MG Sublingual Tablet Sublingual; TAKE 1 TABLET UNDER THE TONGUE    TWICE DAILY; Therapy: 45VBP5907 to ([facility])  Requested for: 98RMJ2394; Last    Rx:05Jun2017 Ordered   23  Tetanus-Diphtheria Toxoids Td 2-2 LF/0 5ML Intramuscular Suspension; INJECT 0 5  ML    IM IN ARM OF CHOICE; Therapy: 12GMH7362 to (Evaluate:98Ehf2345); Last CQ:78DBQ1152 Ordered   24  TraZODone HCl - 150 MG Oral Tablet; TAKE 1 TABLET AT BEDTIME; Therapy: 72EQW6858 to ([facility])  Requested for: 56LEO3390; Last    Rx:05Jun2017 Ordered   25  Trihexyphenidyl HCl - 2 MG Oral Tablet; Take 1 tablet twice daily; Therapy: 00MGN2977 to ([facility])  Requested for: 15PEU0480; Last    Rx:05Jun2017 Ordered  Medication List Reviewed: The medication list was reviewed and updated today  Allergies  1  Prolixin TABS   2  Clozaril TABS   3  Haldol SOLN   4  Lithium Carbonate CAPS  5   No Known Environmental Allergies   6  No Known Food Allergies    Physical Exam      GENERAL EXAM  General: well developed, no acute distress  NEUROLOGICAL EXAM  Mental Status: Oriented to date, situation and president  Aware of recent events  Language: fluency and comprehension normal       Cranial Nerves: Pupils equal   Visual fields full to confrontation  Saccadic intrusions  Otherwise, extraocular motions intact with full versions, no nystagmus  Facial strength full and symmetric  Speech clear without notable dysarthria  Motor: Normal tone  No pronator drift  Strength is 5/5 proximally and distally in all 4 extremities  No involuntary movements  Sensory: Sensation intact to light touch distally in the arms  Coordination: Normal finger-nose-finger testing  Station and gait: Casual gait mildly wide based, a bit impulsive at times (walks quickly, leaning forward, attempting to show me how fast she can walk)  Casual walk out of the exam room is mildly wide and slightly unsteady, but does not require assistance  Reflexes: Not tested  Results/Data  * DXA BONE DENSITY SPINE HIP AND PELVIS 26Apr2017 09:48AM EPIC, Provider   Test ordered by: Rolando Rockwell     Test Name Result Flag Reference   DXA BONE DENSITY SPINE HIP AND PELVIS (Report)     CENTRAL DXA SCAN     CLINICAL HISTORY:  59year old post-menopausal  female risk factors include previous smoking  Seizures  Hypothyroidism  TECHNIQUE: Bone densitometry was performed using a H[employer] Horizon A bone densitometer  Regions of interest appear properly placed  There are no obvious fractures or other confounding variables which could limit the study  Degenerative changes of the    lumbar spine and hip  This will falsely elevate the bone mineral densities in these regions        COMPARISON: April 24, 2015     RESULTS:    LUMBAR SPINE: L1-L4:   BMD 0 750 gm/cm2   T-score -2 7   Z-score -1 0       LEFT TOTAL HIP:   BMD 1 0 92 gm/cm2   T-score 1 2   Z-score 2 4     LEFT FEMORAL NECK:   BMD 0 719 gm/cm2   T-score -1 2   Z-score 0 3             IMPRESSION:   1  Based on the [facility] classification, the T-score of -2 7 in the lumbar spine is consistent with osteoporosis  2  When compared to the prior examination, there has been NO SIGNIFICANT CHANGE in the total bone mineral density of the lumbar spine, when allowing for the least significant change  There has however been a 9% INCREASE in the total bone mineral    density of the left hip  3  According to the [address] [place] [address], prescription therapy is recommended with a T-score of -2 5 or less in the spine or hip after appropriate evaluation to exclude secondary causes  4  A daily intake of at least 1200 mg Calcium and 800 to 1000 IU of Vitamin D, as well as weight bearing and muscle strengthening exercise, fall prevention and avoidance of tobacco and excessive alcohol intake as basic preventive measures are    suggested  5  Repeat DXA in 18 - 24 months, on the same machine, as clinically indicated  The 10 year risk of hip fracture is 1%, with the 10 year risk of major osteoporotic fracture being 7%, as calculated by the [facility] fracture risk assessment tool (FRAX)  The current NOF guidelines recommend treating patients with FRAX 10 year risk score of    >3% for hip fracture and >20% for major osteoporotic fracture        WHO CLASSIFICATION:   Normal (a T-score of -1 0 or higher)   Low bone mineral density (a T-score of less than -1 0 but higher than -2 5)   Osteoporosis (a T-score of -2 5 or less)   Severe osteoporosis (a T-score of -2 5 or less with a fragility fracture)             Workstation performed: [id number]     Signed by:   Paty Marks DO   4/26/17     Future Appointments    Date/Time Provider Specialty Site   10/23/2017 10:15 AM Rory Sullivan MD Internal Medicine [address] [place] [address]     Signatures   Electronically signed by : JENNIFER Sifuentes ; Oct  3 2017 11:31AM EST                       (Author)

## 2017-10-31 ENCOUNTER — APPOINTMENT (OUTPATIENT)
Dept: PHYSICAL THERAPY | Facility: REHABILITATION | Age: 62
End: 2017-10-31
Payer: MEDICARE

## 2017-10-31 ENCOUNTER — GENERIC CONVERSION - ENCOUNTER (OUTPATIENT)
Dept: OTHER | Facility: OTHER | Age: 62
End: 2017-10-31

## 2017-11-01 ENCOUNTER — APPOINTMENT (OUTPATIENT)
Dept: PHYSICAL THERAPY | Facility: REHABILITATION | Age: 62
End: 2017-11-01
Payer: MEDICARE

## 2017-11-01 PROCEDURE — 97112 NEUROMUSCULAR REEDUCATION: CPT

## 2017-11-02 ENCOUNTER — APPOINTMENT (OUTPATIENT)
Dept: PHYSICAL THERAPY | Facility: REHABILITATION | Age: 62
End: 2017-11-02
Payer: MEDICARE

## 2017-11-03 ENCOUNTER — APPOINTMENT (OUTPATIENT)
Dept: PHYSICAL THERAPY | Facility: REHABILITATION | Age: 62
End: 2017-11-03
Payer: MEDICARE

## 2017-11-03 PROCEDURE — 97110 THERAPEUTIC EXERCISES: CPT

## 2017-11-03 PROCEDURE — 97112 NEUROMUSCULAR REEDUCATION: CPT

## 2017-11-10 ENCOUNTER — APPOINTMENT (OUTPATIENT)
Dept: PHYSICAL THERAPY | Facility: REHABILITATION | Age: 62
End: 2017-11-10
Payer: MEDICARE

## 2017-11-14 ENCOUNTER — APPOINTMENT (OUTPATIENT)
Dept: PHYSICAL THERAPY | Facility: REHABILITATION | Age: 62
End: 2017-11-14
Payer: MEDICARE

## 2017-11-14 PROCEDURE — 97112 NEUROMUSCULAR REEDUCATION: CPT

## 2017-11-17 ENCOUNTER — APPOINTMENT (OUTPATIENT)
Dept: PHYSICAL THERAPY | Facility: REHABILITATION | Age: 62
End: 2017-11-17
Payer: MEDICARE

## 2017-11-17 PROCEDURE — 97112 NEUROMUSCULAR REEDUCATION: CPT

## 2017-11-27 ENCOUNTER — APPOINTMENT (OUTPATIENT)
Dept: PHYSICAL THERAPY | Facility: REHABILITATION | Age: 62
End: 2017-11-27
Payer: MEDICARE

## 2017-11-27 PROCEDURE — 97112 NEUROMUSCULAR REEDUCATION: CPT

## 2017-11-27 PROCEDURE — 97110 THERAPEUTIC EXERCISES: CPT

## 2017-11-28 ENCOUNTER — ALLSCRIPTS OFFICE VISIT (OUTPATIENT)
Dept: OTHER | Facility: OTHER | Age: 62
End: 2017-11-28

## 2017-11-28 ENCOUNTER — APPOINTMENT (OUTPATIENT)
Dept: PHYSICAL THERAPY | Facility: REHABILITATION | Age: 62
End: 2017-11-28
Payer: MEDICARE

## 2017-11-28 DIAGNOSIS — E03.9 HYPOTHYROIDISM: ICD-10-CM

## 2017-11-28 DIAGNOSIS — F31.9 BIPOLAR DISORDER (HCC): ICD-10-CM

## 2017-11-28 DIAGNOSIS — I10 ESSENTIAL (PRIMARY) HYPERTENSION: ICD-10-CM

## 2017-11-28 DIAGNOSIS — E78.5 HYPERLIPIDEMIA: ICD-10-CM

## 2017-11-28 DIAGNOSIS — F41.8 OTHER SPECIFIED ANXIETY DISORDERS: ICD-10-CM

## 2017-11-28 DIAGNOSIS — E87.1 HYPO-OSMOLALITY AND HYPONATREMIA (CODE): ICD-10-CM

## 2017-11-29 ENCOUNTER — APPOINTMENT (OUTPATIENT)
Dept: LAB | Age: 62
End: 2017-11-29
Payer: MEDICARE

## 2017-11-29 ENCOUNTER — TRANSCRIBE ORDERS (OUTPATIENT)
Dept: ADMINISTRATIVE | Age: 62
End: 2017-11-29

## 2017-11-29 DIAGNOSIS — F41.8 OTHER SPECIFIED ANXIETY DISORDERS: ICD-10-CM

## 2017-11-29 DIAGNOSIS — F31.9 BIPOLAR DISORDER (HCC): ICD-10-CM

## 2017-11-29 DIAGNOSIS — E78.5 HYPERLIPIDEMIA: ICD-10-CM

## 2017-11-29 DIAGNOSIS — E03.9 HYPOTHYROIDISM: ICD-10-CM

## 2017-11-29 DIAGNOSIS — I10 ESSENTIAL (PRIMARY) HYPERTENSION: ICD-10-CM

## 2017-11-29 LAB
ANION GAP SERPL CALCULATED.3IONS-SCNC: 8 MMOL/L (ref 4–13)
BUN SERPL-MCNC: 8 MG/DL (ref 5–25)
CALCIUM SERPL-MCNC: 9 MG/DL (ref 8.3–10.1)
CHLORIDE SERPL-SCNC: 95 MMOL/L (ref 100–108)
CO2 SERPL-SCNC: 26 MMOL/L (ref 21–32)
CREAT SERPL-MCNC: 0.9 MG/DL (ref 0.6–1.3)
GFR SERPL CREATININE-BSD FRML MDRD: 68 ML/MIN/1.73SQ M
GLUCOSE SERPL-MCNC: 108 MG/DL (ref 65–140)
POTASSIUM SERPL-SCNC: 4.3 MMOL/L (ref 3.5–5.3)
SODIUM SERPL-SCNC: 129 MMOL/L (ref 136–145)
TSH SERPL DL<=0.05 MIU/L-ACNC: 1.38 UIU/ML (ref 0.36–3.74)

## 2017-11-29 PROCEDURE — 84443 ASSAY THYROID STIM HORMONE: CPT

## 2017-11-29 PROCEDURE — 80048 BASIC METABOLIC PNL TOTAL CA: CPT

## 2017-11-29 PROCEDURE — 86803 HEPATITIS C AB TEST: CPT

## 2017-11-29 PROCEDURE — 36415 COLL VENOUS BLD VENIPUNCTURE: CPT

## 2017-11-30 LAB — HCV AB SER QL: NORMAL

## 2017-12-01 ENCOUNTER — APPOINTMENT (OUTPATIENT)
Dept: PHYSICAL THERAPY | Facility: REHABILITATION | Age: 62
End: 2017-12-01
Payer: MEDICARE

## 2017-12-01 PROCEDURE — 97112 NEUROMUSCULAR REEDUCATION: CPT

## 2017-12-04 ENCOUNTER — APPOINTMENT (OUTPATIENT)
Dept: PHYSICAL THERAPY | Facility: REHABILITATION | Age: 62
End: 2017-12-04
Payer: MEDICARE

## 2017-12-04 PROCEDURE — 97110 THERAPEUTIC EXERCISES: CPT

## 2017-12-04 PROCEDURE — 97750 PHYSICAL PERFORMANCE TEST: CPT

## 2017-12-04 PROCEDURE — G8991 OTHER PT/OT GOAL STATUS: HCPCS

## 2017-12-04 PROCEDURE — G8990 OTHER PT/OT CURRENT STATUS: HCPCS

## 2017-12-06 NOTE — PROGRESS NOTES
Assessment    1  Benign essential HTN (401 1) (I10)   2  Hyperlipidemia (272 4) (E78 5)   3  Hypothyroidism (244 9) (E03 9)   4  Depression with anxiety (300 4) (F41 8)   5  Psychosis, bipolar affective (296 80) (F31 9)    Plan  Benign essential HTN, Depression with anxiety, Hyperlipidemia, Hypothyroidism, Psychosis, bipolaraffective    · Follow-up visit in 4 Months Evaluation and Treatment  Follow-up  Status: Hold For - Scheduling Requested for: 61PPJ3015  Need for TD vaccine    · Td    Discussion/Summary   Female, Adult 50+: health maintenance visit Currently, she eats an adequate diet  the risks and benefits of cervical cancer screening were discussed cervical cancer screening is current Breast cancer screening: the risks and benefits of breast cancer screening were discussed and mammogram is needed every two years  Colorectal cancer screening: the risks and benefits of colorectal cancer screening were discussed and colorectal cancer screening is current  Osteoporosis screening: the risks and benefits of osteoporosis screening were discussed  Screening lab work includes hemoglobin, glucose, lipid profile and thyroid function testing  The risks and benefits of immunizations were discussed and immunizations are needed  She was advised to be evaluated by an ophthalmologist  Advice and education were given regarding nutrition, weight loss and vitamin D supplements  Hepatitis C Screening:  The patient agrees to Hepatitis C screening  Chief Complaint  Chief Complaint Free Text Note Form: Pt is here for yearly physical       History of Present Illness  Hypothyroidism (Follow-Up): The patient is being seen for follow-up of hypothyroidism of undetermined etiology  The patient reports doing well  Interval symptoms:  new onset of fatigue,-- stable fatigue,-- denies weakness,-- denies constipation-- and-- denies menorrhagia  Associated symptoms: depression, but-- no paresthesias    Medications:  the patient is adherent to her medication regimen, but-- she denies medication side effects  Due for: thyroid stimulating hormone and free T4  Hyperlipidemia (Follow-Up): The patient states her hyperlipidemia has been stable since the last visit  Comorbid Illnesses: hypertension  She has no significant interval events  Symptoms: The patient is currently asymptomatic  The patient is due for a lipid panel  Bipolar Disorder (Brief): The patient is currently asymptomatic  Review of Systems  Complete-Female:  Constitutional: feeling tired  ENT: no earache-- and-- no nasal discharge  Cardiovascular: No complaints of slow heart rate, no fast heart rate, no chest pain, no palpitations, no leg claudication, no lower extremity edema  Respiratory: No complaints of shortness of breath, no wheezing, no cough, no SOB on exertion, no orthopnea, no PND  Gastrointestinal: No complaints of abdominal pain, no constipation, no nausea or vomiting, no diarrhea, no bloody stools  Musculoskeletal: joint stiffness  Neurological: No complaints of headache, no confusion, no convulsions, no numbness, no dizziness or fainting, no tingling, no limb weakness, no difficulty walking  Active Problems  1  Anemia (285 9) (D64 9)   2  Anxiety (300 00) (F41 9)   3  Benign essential HTN (401 1) (I10)   4  Constipation (564 00) (K59 00)   5  Depression with anxiety (300 4) (F41 8)   6  Folic acid deficiency (266 2) (E53 8)   7  Hyperlipidemia (272 4) (E78 5)   8  Hyponatremia (276 1) (E87 1)   9  Hypothyroidism (244 9) (E03 9)   10  Insomnia (780 52) (G47 00)   11  Medication side effects (995 20) (T88 7XXA)   12  Multiple falls (V15 88) (R29 6)   13  Need for TD vaccine (V06 5) (Z23)   14  Obesity (278 00) (E66 9)   15  Osteoporosis (733 00) (M81 0)   16  Psychosis, bipolar affective (296 80) (F31 9)   17  Schizoaffective disorder (295 70) (F25 9)   18  Seizure disorder (345 90) (G40 909)   19   Unsteady gait (781 2) (R26 81)    Past Medical History  1  History of Benign essential HTN (401 1) (I10)   2  History of Fracture of fifth metatarsal bone of right foot with delayed healing (V54 19) (S92 351G)   3  History of constipation (V12 79) (Z87 19)   4  History of headache (V13 89) (Z87 898)   5  History of insomnia (V13 89) (Z87 898)   6  History of nasal congestion (V12 69) (Z87 09)   7  History of obesity (V12 29) (Z86 39)   8  History of seizure disorder (V12 49) (Z86 69)   9  Hyperlipidemia (272 4) (E78 5)   10  Hypothyroidism (244 9) (E03 9)   11  Schizoaffective disorder (295 70) (F25 9)    Surgical History  1  History of Complete Colonoscopy   2  Denied: History Of Prior Surgery    Family History  Mother    1  No pertinent family history  Father    2  No pertinent family history  Other    3  Family history of Mesothelioma    Social History     · Never A Smoker   · No alcohol use   · No illicit drug use    Current Meds   1  Acetaminophen 325 MG Oral Tablet; TAKE 1 TO 2 TABLETS EVERY 6 HOURS AS NEEDED FOR PAIN OR FEVER; Therapy: (Recorded:19Oct2017) to Recorded   2  Alendronate Sodium 70 MG Oral Tablet; TAKE 1 TABLET ON FRIDAYS, EMPTY STOMACH, DON'T LIE DOWN/FOOD/MEDS FOR 30 MIN; Therapy: (Recorded:05Jun2017) to Recorded   3  Aspirin 81 MG Oral Tablet Chewable; CHEW 1 TABLET DAILY IN THE MORNING; Therapy: 01BCE6149 to (Gilberto Kruger)  Requested for: 10WMF6813; Last Rx:05Jun2017 Ordered   4  Atorvastatin Calcium 20 MG Oral Tablet; TAKE 1 TABLET Bedtime for cholesterol; Therapy: 92STC1167 to (Gilberto Kruger)  Requested for: 05UQY5282; Last Rx:05Jun2017 Ordered   5  BusPIRone HCl - 15 MG Oral Tablet; TAKE 1 TABLET AT 8AM & 4PM FOR ANXIETY; Last Rx:05Jun2017 Ordered   6  Daily Rahel Oral Tablet; TAKE 1 TABLET BY MOUTH DAILY (VITAMIN SUPPLEMENT); Therapy: 38AEV1156 to (Evaluate:07Zwm5021)  Requested for: 28Mar2017; Last Rx:33Rux5807 Ordered   7   Deep Sea Nasal Spray 0 65 % Nasal Solution; AS DIRECTED UP TO 6 TIMES DAILY AS NEEDED FOR NASAL DRYNESS; Therapy: (Rudi Dubin) to Recorded   8  Delsym 30 MG/5ML LQCR; GIVE 10 ML TWICE DAILY AS NEEDED FOR COUGH; Therapy: (Rudi Dubin) to Recorded   9  DiphenhydrAMINE HCl - 50 MG Oral Capsule; TAKE 1 CAPSULE AT BEDTIME AS NEEDED FOR CONGESTION; Therapy: (Recorded:05Jun2017) to Recorded   10  Divalproex Sodium 500 MG Oral Tablet Delayed Release; TAKE 2 TABLETS (1000 MG) TWICE DAILY   Requested for: 58VLX7889; Last Rx:05Jun2017 Ordered   11  Docusate Sodium 100 MG Oral Capsule; TAKE 1 CAPSULE TWICE DAILY as needed for constipation; Therapy: 23AMZ5694 to (Chan Kaur)  Requested for: 92HPV7945; Last Rx:05Jun2017  Ordered   12  Fluticasone Propionate 50 MCG/ACT Nasal Suspension; USE 1 SPRAY IN EACH NOSTRIL DAILY IN  THE MORNING; Therapy: 99BQF2306 to (Pacheco Hewitt)  Requested for: 06IFL8718; Last Rx:05Jun2017  Ordered   13  Gabapentin 100 MG Oral Capsule; TAKE 2 CAPSULES BY MOUTH TWICE DAILY (MOOD); Therapy: 45KNC8559 to (OLMHOFOD:52GHS3352)  Requested for: 28Mar2017; Last Rx:28Mar2017  Ordered   14  Levothyroxine Sodium 100 MCG Oral Tablet; TAKE 1 TABLET DAILY AT 7AM FOR THYROID;  Therapy: 08Bgl5915 to (Evaluate:47Tlg3951)  Requested for: 74ILS6975; Last Rx:05Jun2017  Ordered   15  LORazepam 1 MG Oral Tablet; Take 1 tablet twice daily; Last Rx:05Jun2017; Status: ACTIVE -  Renewal Voided Ordered   16  Loxapine Succinate 50 MG Oral Capsule; TAKE 2 CAPSULES (100 MG) TWICE DAILY; TAKE 1  CAPSULE AT NOON FOR MOOD; Therapy: (Recorded:05Jun2017) to Recorded   17  L-Theanine 100 MG Oral Capsule; TAKE 2 CAPSULES (200 MG) AT 12 NOON & 4PM FOR  RELAXATION; Therapy: (Recorded:05Jun2017) to Recorded   18  Melatonin 5 MG Oral Tablet; Take 1 tablet daily; Therapy: 54LMH6346 to (Pacheco Hewitt)  Requested for: 91PZG9775; Last Rx:05Jun2017  Ordered   19  Oyster Shell Calcium/D 500-200 MG-UNIT Oral Tablet; TAKE 1 TABLET TWICE DAILY FOR  SUPPLEMENT;   Therapy: 38OQR7791 to (Pacheco Hewitt) Requested for: 16LCF5707; Last Rx:05Jun2017  Ordered   20  PHENobarbital 64 8 MG Oral Tablet; TAKE 1 TABLET TWICE DAILY FOR SEIZURES; Last  Rx:03Oct2017 Ordered   21  Polyethylene Glycol 3350 Oral Powder; 1 SCOOP IN GLASS OF WATER IN MORNING  Requested for:  11Aug2017; Last Rx:11Aug2017 Ordered   22  Saphris 10 MG Sublingual Tablet Sublingual; TAKE 1 TABLET UNDER THE TONGUE TWICE DAILY; Therapy: 74ZHG2674 to (Lucretia Goncalves)  Requested for: 86AMF7633; Last Rx:05Jun2017  Ordered   23  Tetanus-Diphtheria Toxoids Td 2-2 LF/0 5ML Intramuscular Suspension; INJECT 0 5  ML IM IN ARM OF  CHOICE; Therapy: 00QUD3530 to (Evaluate:04Ixp1592); Last LH:44ELY5800 Ordered   24  TraZODone HCl - 150 MG Oral Tablet; TAKE 1 TABLET AT BEDTIME; Therapy: 58CHA0107 to (Lucretia Goncalves)  Requested for: 68WFI9757; Last Rx:05Jun2017  Ordered   25  Trihexyphenidyl HCl - 2 MG Oral Tablet; Take 1 tablet twice daily; Therapy: 88EXZ6645 to (Lucretia Goncalves)  Requested for: 85EBW6962; Last Rx:05Jun2017  Ordered    Allergies  1  Prolixin TABS   2  Clozaril TABS   3  Haldol SOLN   4  Lithium Carbonate CAPS  5  No Known Environmental Allergies   6  No Known Food Allergies    Vitals  Vital Signs    Recorded: 96FRV2764 02:10PM   Temperature 97 4 F, Oral   Heart Rate 92   Systolic 935, RUE, Sitting   Diastolic 78, RUE, Sitting   BP CUFF SIZE Large   Height 5 ft 5 in   Weight 221 lb    BMI Calculated 36 78   BSA Calculated 2 06   O2 Saturation 98, RA       Physical Exam   Constitutional  General appearance: Abnormal   overweight  Eyes  Conjunctiva and lids: No swelling, erythema or discharge  Ears, Nose, Mouth, and Throat  Otoscopic examination: Tympanic membranes translucent with normal light reflex  Canals patent without erythema  Oropharynx: Normal with no erythema, edema, exudate or lesions  Pulmonary  Auscultation of lungs: Clear to auscultation     Cardiovascular  Auscultation of heart: Normal rate and rhythm, normal S1 and S2, without murmurs  Examination of extremities for edema and/or varicosities: Normal    Carotid pulses: Normal    Abdomen  Abdomen: Non-tender, no masses  Liver and spleen: No hepatomegaly or splenomegaly  Musculoskeletal  Gait and station: Normal    Neurologic  Cranial nerves: Cranial nerves 2-12 intact  Psychiatric  Orientation to person, place, and time: Normal        Constitutional  General appearance: Abnormal   obese  Head and Face  Head and face: Normal    Eyes  Conjunctiva and lids: No swelling, erythema or discharge  Ophthalmoscopic examination: Normal fundi and optic discs  Ears, Nose, Mouth, and Throat  Otoscopic examination: Tympanic membranes translucent with normal light reflex  Canals patent without erythema  Hearing: Normal    Oropharynx: Normal with no erythema, edema, exudate or lesions  Neck  Neck: Supple, symmetric, trachea midline, no masses  Thyroid: Normal, no thyromegaly  Pulmonary  Auscultation of lungs: Clear to auscultation  Cardiovascular  Auscultation of heart: Normal rate and rhythm, normal S1 and S2, no murmurs  Pedal pulses: 2+ bilaterally  Peripheral vascular exam: Normal    Examination of extremities for edema and/or varicosities: Normal    Chest  Chest: Normal    Abdomen  Abdomen: Non-tender, no masses  -- OBESE  Liver and spleen: No hepatomegaly or splenomegaly  Lymphatic  Palpation of lymph nodes in neck: No lymphadenopathy  Musculoskeletal  Gait and station: Normal    Digits and nails: Normal without clubbing or cyanosis  Neurologic  Cranial nerves: Cranial nerves II-XII intact  Psychiatric  Judgment and insight: Normal        Future Appointments    Date/Time Provider Specialty Site   04/17/2018 10:15 AM Keke Mtz MD Internal Medicine [facility]       Signatures   Electronically signed by :  Jed Lundborg, MD; Dec  5 2017  3:16PM EST                       (Author)

## 2017-12-08 ENCOUNTER — APPOINTMENT (OUTPATIENT)
Dept: PHYSICAL THERAPY | Facility: REHABILITATION | Age: 62
End: 2017-12-08
Payer: MEDICARE

## 2017-12-08 PROCEDURE — 97112 NEUROMUSCULAR REEDUCATION: CPT

## 2017-12-11 ENCOUNTER — GENERIC CONVERSION - ENCOUNTER (OUTPATIENT)
Dept: NEUROLOGY | Facility: AMBULATORY SURGERY CENTER | Age: 62
End: 2017-12-11

## 2017-12-11 ENCOUNTER — APPOINTMENT (OUTPATIENT)
Dept: PHYSICAL THERAPY | Facility: REHABILITATION | Age: 62
End: 2017-12-11
Payer: MEDICARE

## 2017-12-11 PROCEDURE — 97112 NEUROMUSCULAR REEDUCATION: CPT

## 2017-12-11 PROCEDURE — 97110 THERAPEUTIC EXERCISES: CPT

## 2017-12-12 ENCOUNTER — TRANSCRIBE ORDERS (OUTPATIENT)
Dept: ADMINISTRATIVE | Age: 62
End: 2017-12-12

## 2017-12-12 ENCOUNTER — APPOINTMENT (OUTPATIENT)
Dept: LAB | Age: 62
End: 2017-12-12
Payer: MEDICARE

## 2017-12-12 DIAGNOSIS — E87.1 HYPO-OSMOLALITY AND HYPONATREMIA (CODE): ICD-10-CM

## 2017-12-12 LAB
ANION GAP SERPL CALCULATED.3IONS-SCNC: 7 MMOL/L (ref 4–13)
BUN SERPL-MCNC: 11 MG/DL (ref 5–25)
CALCIUM SERPL-MCNC: 8.8 MG/DL (ref 8.3–10.1)
CHLORIDE SERPL-SCNC: 96 MMOL/L (ref 100–108)
CO2 SERPL-SCNC: 29 MMOL/L (ref 21–32)
CREAT SERPL-MCNC: 0.88 MG/DL (ref 0.6–1.3)
GFR SERPL CREATININE-BSD FRML MDRD: 70 ML/MIN/1.73SQ M
GLUCOSE SERPL-MCNC: 108 MG/DL (ref 65–140)
OSMOLALITY UR/SERPL-RTO: 283 MMOL/KG (ref 282–298)
POTASSIUM SERPL-SCNC: 4.1 MMOL/L (ref 3.5–5.3)
SODIUM SERPL-SCNC: 132 MMOL/L (ref 136–145)

## 2017-12-12 PROCEDURE — 83930 ASSAY OF BLOOD OSMOLALITY: CPT

## 2017-12-12 PROCEDURE — 80048 BASIC METABOLIC PNL TOTAL CA: CPT

## 2017-12-12 PROCEDURE — 36415 COLL VENOUS BLD VENIPUNCTURE: CPT

## 2017-12-15 ENCOUNTER — APPOINTMENT (OUTPATIENT)
Dept: PHYSICAL THERAPY | Facility: REHABILITATION | Age: 62
End: 2017-12-15
Payer: MEDICARE

## 2017-12-18 ENCOUNTER — APPOINTMENT (OUTPATIENT)
Dept: PHYSICAL THERAPY | Facility: REHABILITATION | Age: 62
End: 2017-12-18
Payer: MEDICARE

## 2017-12-18 PROCEDURE — 97112 NEUROMUSCULAR REEDUCATION: CPT

## 2017-12-18 PROCEDURE — 97110 THERAPEUTIC EXERCISES: CPT

## 2017-12-19 ENCOUNTER — APPOINTMENT (OUTPATIENT)
Dept: LAB | Facility: HOSPITAL | Age: 62
End: 2017-12-19
Payer: MEDICARE

## 2017-12-19 ENCOUNTER — TRANSCRIBE ORDERS (OUTPATIENT)
Dept: LAB | Facility: HOSPITAL | Age: 62
End: 2017-12-19

## 2017-12-19 DIAGNOSIS — F25.0 SCHIZOAFFECTIVE DISORDER, BIPOLAR TYPE (HCC): ICD-10-CM

## 2017-12-19 DIAGNOSIS — E03.9 MYXEDEMA HEART DISEASE: ICD-10-CM

## 2017-12-19 DIAGNOSIS — Z79.899 NEED FOR PROPHYLACTIC CHEMOTHERAPY: ICD-10-CM

## 2017-12-19 DIAGNOSIS — I51.9 MYXEDEMA HEART DISEASE: ICD-10-CM

## 2017-12-19 DIAGNOSIS — E03.9 MYXEDEMA HEART DISEASE: Primary | ICD-10-CM

## 2017-12-19 DIAGNOSIS — I51.9 MYXEDEMA HEART DISEASE: Primary | ICD-10-CM

## 2017-12-19 LAB
ALBUMIN SERPL BCP-MCNC: 3.6 G/DL (ref 3.5–5)
ALP SERPL-CCNC: 54 U/L (ref 46–116)
ALT SERPL W P-5'-P-CCNC: 20 U/L (ref 12–78)
AMMONIA PLAS-SCNC: 15 UMOL/L (ref 11–35)
ANION GAP SERPL CALCULATED.3IONS-SCNC: 5 MMOL/L (ref 4–13)
AST SERPL W P-5'-P-CCNC: 14 U/L (ref 5–45)
BASOPHILS # BLD AUTO: 0 THOUSANDS/ΜL (ref 0–0.1)
BASOPHILS NFR BLD AUTO: 0 % (ref 0–1)
BILIRUB SERPL-MCNC: 0.23 MG/DL (ref 0.2–1)
BUN SERPL-MCNC: 6 MG/DL (ref 5–25)
CALCIUM SERPL-MCNC: 9 MG/DL (ref 8.3–10.1)
CHLORIDE SERPL-SCNC: 98 MMOL/L (ref 100–108)
CHOLEST SERPL-MCNC: 167 MG/DL (ref 50–200)
CO2 SERPL-SCNC: 28 MMOL/L (ref 21–32)
CREAT SERPL-MCNC: 0.86 MG/DL (ref 0.6–1.3)
EOSINOPHIL # BLD AUTO: 0 THOUSAND/ΜL (ref 0–0.61)
EOSINOPHIL NFR BLD AUTO: 0 % (ref 0–6)
ERYTHROCYTE [DISTWIDTH] IN BLOOD BY AUTOMATED COUNT: 12.9 % (ref 11.6–15.1)
GFR SERPL CREATININE-BSD FRML MDRD: 72 ML/MIN/1.73SQ M
GLUCOSE P FAST SERPL-MCNC: 100 MG/DL (ref 65–99)
HCT VFR BLD AUTO: 38.1 % (ref 34.8–46.1)
HDLC SERPL-MCNC: 85 MG/DL (ref 40–60)
HGB BLD-MCNC: 13.1 G/DL (ref 11.5–15.4)
LDLC SERPL CALC-MCNC: 61 MG/DL (ref 0–100)
LYMPHOCYTES # BLD AUTO: 1.2 THOUSANDS/ΜL (ref 0.6–4.47)
LYMPHOCYTES NFR BLD AUTO: 34 % (ref 14–44)
MCH RBC QN AUTO: 33.1 PG (ref 26.8–34.3)
MCHC RBC AUTO-ENTMCNC: 34.4 G/DL (ref 31.4–37.4)
MCV RBC AUTO: 96 FL (ref 82–98)
MONOCYTES # BLD AUTO: 0.41 THOUSAND/ΜL (ref 0.17–1.22)
MONOCYTES NFR BLD AUTO: 12 % (ref 4–12)
NEUTROPHILS # BLD AUTO: 1.93 THOUSANDS/ΜL (ref 1.85–7.62)
NEUTS SEG NFR BLD AUTO: 54 % (ref 43–75)
NRBC BLD AUTO-RTO: 0 /100 WBCS
PLATELET # BLD AUTO: 267 THOUSANDS/UL (ref 149–390)
PMV BLD AUTO: 9.6 FL (ref 8.9–12.7)
POTASSIUM SERPL-SCNC: 4.6 MMOL/L (ref 3.5–5.3)
PROT SERPL-MCNC: 7.3 G/DL (ref 6.4–8.2)
RBC # BLD AUTO: 3.96 MILLION/UL (ref 3.81–5.12)
SODIUM SERPL-SCNC: 131 MMOL/L (ref 136–145)
T4 FREE SERPL-MCNC: 0.92 NG/DL (ref 0.76–1.46)
TRIGL SERPL-MCNC: 104 MG/DL
TSH SERPL DL<=0.05 MIU/L-ACNC: 1.36 UIU/ML (ref 0.36–3.74)
WBC # BLD AUTO: 3.54 THOUSAND/UL (ref 4.31–10.16)

## 2017-12-19 PROCEDURE — 80165 DIPROPYLACETIC ACID FREE: CPT

## 2017-12-19 PROCEDURE — 82140 ASSAY OF AMMONIA: CPT

## 2017-12-19 PROCEDURE — 84439 ASSAY OF FREE THYROXINE: CPT

## 2017-12-19 PROCEDURE — 84443 ASSAY THYROID STIM HORMONE: CPT

## 2017-12-19 PROCEDURE — 80053 COMPREHEN METABOLIC PANEL: CPT

## 2017-12-19 PROCEDURE — 80061 LIPID PANEL: CPT

## 2017-12-19 PROCEDURE — 36415 COLL VENOUS BLD VENIPUNCTURE: CPT

## 2017-12-19 PROCEDURE — 85025 COMPLETE CBC W/AUTO DIFF WBC: CPT

## 2017-12-21 LAB — VALPROATE FREE SERPL-MCNC: 8.5 UG/ML (ref 6–22)

## 2017-12-22 ENCOUNTER — APPOINTMENT (OUTPATIENT)
Dept: PHYSICAL THERAPY | Facility: REHABILITATION | Age: 62
End: 2017-12-22
Payer: MEDICARE

## 2017-12-22 PROCEDURE — 97112 NEUROMUSCULAR REEDUCATION: CPT

## 2017-12-29 ENCOUNTER — APPOINTMENT (OUTPATIENT)
Dept: PHYSICAL THERAPY | Facility: REHABILITATION | Age: 62
End: 2017-12-29
Payer: MEDICARE

## 2017-12-29 PROCEDURE — 97110 THERAPEUTIC EXERCISES: CPT

## 2017-12-29 PROCEDURE — 97112 NEUROMUSCULAR REEDUCATION: CPT

## 2018-01-09 NOTE — RESULT NOTES
Verified Results  * MAMMO SCREENING BILATERAL W CAD 30Jun2017 09:03AM Idania Dexter Order Number: [id number]    - Patient Instructions: To schedule this appointment, please contact Central Scheduling at [phone]  Do not wear any perfume, powder, lotion or deodorant on breast or underarm area  Please bring your doctors order, referral (if needed) and insurance information with you on the day of the test  Failure to bring this information may result in this test being rescheduled  Arrive 15 minutes prior to your appointment time to register  On the day of your test, please bring any prior mammogram or breast studies with you that were not performed at a [facility]  Failure to bring prior exams may result in your test needing to be rescheduled  Test Name Result Flag Reference   MAMMO SCREENING BILATERAL W CAD (Report)     Patient History:   Patient is postmenopausal and is nulliparous  No known family history of cancer  No Hormone Replacement Therapy   Patient has never smoked  Patient's BMI is 35 8  Reason for exam: screening, asymptomatic  Mammo Screening Bilateral W CAD: June 30, 2017 - Check In #:    [de-identified]   Bilateral MLO and CC view(s) were taken  Technologist: NIVIA Ledesma (NIVIA)(M)   Prior study comparison: June 24, 2016, mammo screening bilateral    W CAD performed at [address] [place] [address]  February 11, 2015, bilateral digital screening mammogram, performed at [place] [address]  February 7, 2014,    bilateral digital screening mammogram, performed at [place] [address]  April 12, 2005, bilateral    screening mammogram w/CAD, performed at [address] [place] [address]  March 4, 2004, bilateral mammogram, performed at    [facility]  There are scattered fibroglandular densities       No dominant soft tissue mass, architectural distortion or    suspicious calcifications are noted in either breast  The skin    and nipple contours are within normal limits  No evidence of malignancy  No significant changes when compared with prior studies  ACR BI-RADSï¾® Assessments: BiRad:1 - Negative     Recommendation:   Routine screening mammogram of both breasts in 1 year  A    reminder letter will be scheduled  Analyzed by CAD     The patient is scheduled in a reminder system  8-10% of cancers will be missed on mammography  Management of a    palpable abnormality must be based on clinical grounds  Patients   will be notified of their results via letter from our facility  Accredited by [employer] of [employer] and FDA       Transcription Location: [facility] 98: [id number]     Risk Value(s):   Tyrer-Cuzick 10 Year: 4 000%, Tyrer-Cuzick Lifetime: 8 700%,    Myriad Table: 1 5%, JESUS 5 Year: 1 8%, NCI Lifetime: 7 2%   Signed by:   Madi Arizmendi MD   6/30/17       Signatures   Electronically signed by : Michelle Arizmendi; Jul 5 2017 12:18PM EST                       (Review)

## 2018-01-12 VITALS
HEART RATE: 92 BPM | DIASTOLIC BLOOD PRESSURE: 78 MMHG | HEIGHT: 65 IN | WEIGHT: 221 LBS | SYSTOLIC BLOOD PRESSURE: 112 MMHG | OXYGEN SATURATION: 98 % | BODY MASS INDEX: 36.82 KG/M2 | TEMPERATURE: 97.4 F

## 2018-01-13 VITALS
SYSTOLIC BLOOD PRESSURE: 104 MMHG | DIASTOLIC BLOOD PRESSURE: 78 MMHG | WEIGHT: 219 LBS | HEART RATE: 94 BPM | OXYGEN SATURATION: 97 % | HEIGHT: 65 IN | TEMPERATURE: 98.6 F | BODY MASS INDEX: 36.49 KG/M2

## 2018-01-13 VITALS
BODY MASS INDEX: 35.38 KG/M2 | WEIGHT: 212.38 LBS | SYSTOLIC BLOOD PRESSURE: 108 MMHG | HEIGHT: 65 IN | HEART RATE: 81 BPM | TEMPERATURE: 97.7 F | DIASTOLIC BLOOD PRESSURE: 82 MMHG | OXYGEN SATURATION: 97 %

## 2018-01-14 VITALS
OXYGEN SATURATION: 99 % | HEART RATE: 86 BPM | WEIGHT: 245.56 LBS | DIASTOLIC BLOOD PRESSURE: 72 MMHG | SYSTOLIC BLOOD PRESSURE: 124 MMHG | BODY MASS INDEX: 40.86 KG/M2

## 2018-01-14 NOTE — MISCELLANEOUS
Signatures   Electronically signed by :  Duran Jacobsen MD; May 15 2017  8:49AM EST                       (Author)

## 2018-01-15 NOTE — PROGRESS NOTES
Assessment    1  Hypothyroidism (244 9) (E03 9)   2  Hyperlipidemia (272 4) (E78 5)    Plan  Depression with anxiety, Encounter for screening mammogram for breast cancer    · (1) TSH; Status:Active; Requested YGO:99TRC6359;   Encounter for screening mammogram for breast cancer    · (1) CBC/ PLT (NO DIFF); Status:Active; Requested TEETEE:71GFW7817;    · (1) COMPREHENSIVE METABOLIC PANEL; Status:Active; Requested CHV:16GTO9141;    · Follow-up visit in 4 Months Evaluation and Treatment  Follow-up  Status: Hold For -  Scheduling  Requested for: 18CTC1795  Encounter for screening mammogram for breast cancer, Hyperlipidemia,  Hypothyroidism    · * MAMMO SCREENING BILATERAL W CAD; Status:Hold For - Scheduling; Requested  for:15Jun2016;     Discussion/Summary  Currently, she eats a healthy diet  the risks and benefits of cervical cancer screening were discussed Breast cancer screening: the risks and benefits of breast cancer screening were discussed and mammogram has been ordered  Colorectal cancer screening: the risks and benefits of colorectal cancer screening were discussed and colorectal cancer screening is current  Osteoporosis screening: the risks and benefits of osteoporosis screening were discussed and bone mineral density testing is current  The risks and benefits of immunizations were discussed  MEDS  REVIEWED  LABS ORDERED  MAMMOGRAM ORDERED  The patient was counseled regarding diagnostic results, instructions for management, prognosis, impressions  Chief Complaint  New pt -physical, establishing care  pt  stated no complaints      History of Present Illness  Hypothyroidism (Follow-Up): The patient is being seen for follow-up of hypothyroidism of undetermined etiology  The patient reports no change in the condition  Interval symptoms:  stable fatigue, denies weakness, denies constipation and denies menorrhagia  Associated symptoms: depression, but no paresthesias     Medications:  the patient is adherent to her medication regimen, but she denies medication side effects  Due for: thyroid stimulating hormone  Review of Systems    Constitutional: feeling tired  ENT: no complaints of earache, no loss of hearing, no nose bleeds, no nasal discharge, no sore throat, no hoarseness  Cardiovascular: No complaints of slow heart rate, no fast heart rate, no chest pain, no palpitations, no leg claudication, no lower extremity edema  Respiratory: No complaints of shortness of breath, no wheezing, no cough, no SOB on exertion, no orthopnea, no PND  Gastrointestinal: No complaints of abdominal pain, no constipation, no nausea or vomiting, no diarrhea, no bloody stools  Musculoskeletal: joint stiffness  Neurological: No complaints of headache, no confusion, no convulsions, no numbness, no dizziness or fainting, no tingling, no limb weakness, no difficulty walking  Active Problems    1  Bipolar disorder (manic depression) (296 80) (F31 9)   2  Folic acid deficiency (266 2) (E53 8)   3  Hyperlipidemia (272 4) (E78 5)   4  Hyponatremia (276 1) (E87 1)   5  Hypothyroidism (244 9) (E03 9)   6  Osteoporosis (733 00) (M81 0)   7  Schizoaffective disorder (295 70) (F25 9)   8   Seizure disorder (345 90) (G40 909)    Past Medical History    · History of Benign essential HTN (401 1) (I10)   · History of Fracture of fifth metatarsal bone of right foot with delayed healing (V54 19)  (S92 351G)   · History of bipolar disorder (V11 1) (Z86 59)   · History of constipation (V12 79) (Z87 19)   · History of insomnia (V13 89) (Q75 893)   · History of obesity (V13 89) (Z86 39)   · Hyperlipidemia (272 4) (E78 5)   · Hypothyroidism (244 9) (E03 9)   · History of Nasal congestion (478 19) (R09 81)   · Schizoaffective disorder (295 70) (F25 9)    Surgical History    · History of Complete Colonoscopy   · Denied: History Of Prior Surgery    Social History     · Denied: History of Drug Use   · Never A Smoker    Never Drank Alcohol          Current Meds   1  Artane 5 MG TABS; Therapy: (Riana Sprfaizan) to Recorded   2  Aspirin EC Low Dose 81 MG Oral Tablet Delayed Release; Take 1 tablet daily; Therapy: 56TWT4616 to (Evaluate:57Ilu4478)  Requested for: 33AIY7278; Last   Rx:16Xax6480 Ordered   3  Ativan 1 MG Oral Tablet; Therapy: (Riana Spry) to Recorded   4  Benadryl 25 MG Oral Capsule; TAKE 1 CAPSULE AT BEDTIME; Therapy: (Riana Spry) to Recorded   5  BuSpar 15 MG TABS; Therapy: (Riana Spry) to Recorded   6  Calcium 500 +D 500-400 MG-UNIT Oral Tablet; Take one tablet 3 times daily; Therapy: 19BJX1531 to (Evaluate:65Fol3905)  Requested for: 69CFM9825; Last   Rx:20Jvx2447 Ordered   7  Daily Rahel Oral Tablet; TAKE 1 TABLET BY MOUTH DAILY (VITAMIN SUPPLEMENT); Therapy: 53NJM6830 to (Evaluate:65Dab8887)  Requested for: 78ZLS8059; Last   Rx:83Yjo7734 Ordered   8  Depakote 500 MG Oral Tablet Delayed Release; Therapy: (Smiley Flurry) to Recorded   9  Docusate Sodium 100 MG Oral Capsule; TAKE 1 CAPSULE TWICE DAILY as needed for   constipation; Therapy: 24KOE1739 to (Last Rx:49Eih0221)  Requested for: 75Qqa3275 Ordered   10  Fluticasone Propionate 50 MCG/ACT Nasal Suspension; USE 2 SPRAYS IN EACH    NOSTRIL ONCE DAILY AT 8PM;    Therapy: 79YYB2588 to (Evaluate:66Kor6912)  Requested for: 66Wvy2174; Last    Rx:83Mkw9628 Ordered   11  Gabapentin 100 MG Oral Capsule; TAKE 2 CAPSULES BY MOUTH TWICE DAILY    (MOOD); Therapy: 67UYY2310 to (Last WA:08WDG9455)  Requested for: 39HIO8893 Ordered   12  Levothyroxine Sodium 100 MCG Oral Tablet; TAKE 1 TABLET BY MOUTH ONCE DAILY    AT 7AM (THYROID); Therapy: 05Wef6678 to (Evaluate:75Bld4999)  Requested for: 18GRI2374; Last    Rx:77Zib5277 Ordered   13  Loxapine Succinate 50 MG Oral Capsule; Therapy: (Riana Spry) to Recorded   14  L-Theanine 100 MG Oral Capsule; Therapy: (Riana Hutchins) to Recorded   15   Mapap 325 MG Oral Tablet; TAKE 1 OR 2 TABLETS BY MOUTH EVERY 6 HOURS AS    NEEDED FOR PAIN OR FEVER; Therapy: 77BZD0829 to (Daya Loco)  Requested for: 73DKR9851; Last    Rx:26Feb2016 Ordered   16  Oyster Shell Calcium/D 500-200 MG-UNIT Oral Tablet; TAKE 1 TABLET BY MOUTH    THREE TIMES DAILY (SUPPLEMENT); Therapy: 44SUZ4836 to (Last Rx:12Nov2015)  Requested for: 86LNS4706 Ordered   17  PHENobarbital 64 8 MG Oral Tablet; take one tablet twice daily; Last Rx:23Mar2016    Ordered   18  Polyethylene Glycol 3350 Oral Powder; MIX 1 CAPFUL (17GM) IN 8 OUNCES OF    WATER, JUICE, OR TEA AND DRINK ANTONETTE Y (CONSTIPATION); Therapy: 09KNW7091 to (Last Rx:26Feb2016)  Requested for: 26Feb2016 Ordered   19  Saphris 10 MG Sublingual Tablet Sublingual;    Therapy: (Recorded:21Jan2014) to Recorded   20  Saphris 5 MG Sublingual Tablet Sublingual;    Therapy: (Recorded:21Jan2014) to Recorded   21  Simvastatin 40 MG Oral Tablet; TAKE ONE TABLET BY MOUTH AT BEDTIME    (CHOLESTEROL); Therapy: 30NOJ6742 to (Evaluate:62Hsc4250)  Requested for: 00QYI6786; Last    Rx:26Feb2016 Ordered   22  TraZODone HCl - 300 MG Oral Tablet; Therapy: (Stephanie Litten) to Recorded   23  Tylenol TABS; Therapy: (Byron Parra) to Recorded    Allergies    1  Prolixin TABS   2  Clozaril TABS   3  Haldol SOLN   4  Lithium Carbonate CAPS    Vitals   Recorded: 33BPW0953 01:17PM   Temperature 98 F   Heart Rate 106   Respiration 18   Systolic 839   Diastolic 74   Height 5 ft 5 in   Weight 219 lb    BMI Calculated 36 44   BSA Calculated 2 06   O2 Saturation 97     Physical Exam    Constitutional   General appearance: Abnormal   obese  Head and Face   Head and face: Normal     Eyes   Conjunctiva and lids: No swelling, erythema or discharge  Ophthalmoscopic examination: Normal fundi and optic discs  Ears, Nose, Mouth, and Throat   Otoscopic examination: Tympanic membranes translucent with normal light reflex  Canals patent without erythema      Hearing: Normal  Oropharynx: Normal with no erythema, edema, exudate or lesions  Neck   Neck: Supple, symmetric, trachea midline, no masses  Thyroid: Normal, no thyromegaly  Pulmonary   Auscultation of lungs: Clear to auscultation  Cardiovascular   Auscultation of heart: Normal rate and rhythm, normal S1 and S2, no murmurs  Pedal pulses: 2+ bilaterally  Peripheral vascular exam: Normal     Examination of extremities for edema and/or varicosities: Normal     Chest   Chest: Normal     Abdomen   Abdomen: Non-tender, no masses  OBESE  Liver and spleen: No hepatomegaly or splenomegaly  Musculoskeletal   Gait and station: Normal     Digits and nails: Normal without clubbing or cyanosis  Future Appointments    Date/Time Provider Specialty Site   09/27/2016 10:00 AM JENNIFER Beth  Neurology [facility]     Signatures   Electronically signed by :  Fortino Roy MD; Sandip 15 2016  1:40PM EST                       (Author)

## 2018-01-22 VITALS
HEIGHT: 65 IN | TEMPERATURE: 98.2 F | HEART RATE: 107 BPM | DIASTOLIC BLOOD PRESSURE: 84 MMHG | BODY MASS INDEX: 37.01 KG/M2 | WEIGHT: 222.13 LBS | OXYGEN SATURATION: 97 % | SYSTOLIC BLOOD PRESSURE: 118 MMHG

## 2018-03-12 ENCOUNTER — TELEPHONE (OUTPATIENT)
Dept: NEUROLOGY | Facility: CLINIC | Age: 63
End: 2018-03-12

## 2018-03-12 DIAGNOSIS — R56.9 SEIZURE (HCC): Primary | ICD-10-CM

## 2018-03-12 RX ORDER — PHENOBARBITAL 64.8 MG/1
64.8 TABLET ORAL 2 TIMES DAILY
COMMUNITY
End: 2018-03-12 | Stop reason: SDUPTHER

## 2018-03-12 RX ORDER — PHENOBARBITAL 64.8 MG/1
64.8 TABLET ORAL 2 TIMES DAILY
Qty: 60 TABLET | Refills: 5 | OUTPATIENT
Start: 2018-03-12 | End: 2018-04-30 | Stop reason: SDUPTHER

## 2018-03-12 NOTE — TELEPHONE ENCOUNTER
Last rx 10/3/17 w/ 5 refills  Not due to be refilled until 4/3/18  [facility] pharmacy filled qty of 46 for 23 days in October and then 56 per 28 days for next 5 mo as they run on a 28 day cycle  Pt has a surplus of approx 30 tabs but cannot use them as they are only allowed to fill an rx 6x  Need new rx  If agreeable please enter and we will call in

## 2018-04-16 RX ORDER — BUSPIRONE HYDROCHLORIDE 15 MG/1
15 TABLET ORAL 2 TIMES DAILY
COMMUNITY

## 2018-04-16 RX ORDER — GABAPENTIN 100 MG/1
200 CAPSULE ORAL 2 TIMES DAILY
COMMUNITY
Start: 2011-11-29

## 2018-04-16 RX ORDER — LOXAPINE SUCCINATE 50 MG/1
50 TABLET ORAL 2 TIMES DAILY
COMMUNITY
End: 2021-03-17

## 2018-04-16 RX ORDER — TRIHEXYPHENIDYL HYDROCHLORIDE 5 MG/1
1 TABLET ORAL 2 TIMES DAILY WITH MEALS
COMMUNITY
Start: 2017-06-05 | End: 2018-11-14 | Stop reason: HOSPADM

## 2018-04-16 RX ORDER — FLUTICASONE PROPIONATE 50 MCG
2 SPRAY, SUSPENSION (ML) NASAL DAILY
COMMUNITY
Start: 2017-06-05 | End: 2022-02-25 | Stop reason: SDUPTHER

## 2018-04-16 RX ORDER — LEVOTHYROXINE SODIUM 0.1 MG/1
100 TABLET ORAL DAILY
COMMUNITY
Start: 2012-04-19 | End: 2019-08-07 | Stop reason: SDUPTHER

## 2018-04-16 RX ORDER — CHOLECALCIFEROL (VITAMIN D3) 125 MCG
1 CAPSULE ORAL DAILY
COMMUNITY
Start: 2017-06-05 | End: 2018-11-14 | Stop reason: CLARIF

## 2018-04-16 RX ORDER — DOCUSATE SODIUM 100 MG/1
1 CAPSULE, LIQUID FILLED ORAL 2 TIMES DAILY PRN
COMMUNITY
Start: 2015-03-10 | End: 2018-11-14 | Stop reason: CLARIF

## 2018-04-16 RX ORDER — DEXTROMETHORPHAN POLISTIREX 30 MG/5ML
SUSPENSION ORAL
COMMUNITY
End: 2018-11-14 | Stop reason: CLARIF

## 2018-04-16 RX ORDER — LORAZEPAM 1 MG/1
1 TABLET ORAL 3 TIMES DAILY
COMMUNITY

## 2018-04-16 RX ORDER — ASPIRIN 81 MG/1
81 TABLET, CHEWABLE ORAL DAILY
COMMUNITY
Start: 2017-06-05 | End: 2018-11-14 | Stop reason: CLARIF

## 2018-04-16 RX ORDER — TRAZODONE HYDROCHLORIDE 150 MG/1
1 TABLET ORAL
COMMUNITY
Start: 2017-06-05

## 2018-04-16 RX ORDER — B-COMPLEX WITH VITAMIN C
1 TABLET ORAL 2 TIMES DAILY WITH MEALS
COMMUNITY
Start: 2015-11-12 | End: 2022-01-27 | Stop reason: SDUPTHER

## 2018-04-16 RX ORDER — DIPHENHYDRAMINE HCL 50 MG
CAPSULE ORAL
COMMUNITY
End: 2018-11-14 | Stop reason: CLARIF

## 2018-04-16 RX ORDER — ACETAMINOPHEN 325 MG/1
TABLET ORAL
COMMUNITY
End: 2018-11-14 | Stop reason: ALTCHOICE

## 2018-04-16 RX ORDER — ALENDRONATE SODIUM 70 MG/1
TABLET ORAL
COMMUNITY
End: 2022-02-10 | Stop reason: SDUPTHER

## 2018-04-16 RX ORDER — DIVALPROEX SODIUM 500 MG/1
TABLET, DELAYED RELEASE ORAL
COMMUNITY

## 2018-04-16 RX ORDER — ECHINACEA PURPUREA EXTRACT 125 MG
TABLET ORAL
COMMUNITY
End: 2018-11-14 | Stop reason: CLARIF

## 2018-04-16 RX ORDER — ASENAPINE 10 MG/1
10 TABLET SUBLINGUAL 2 TIMES DAILY
COMMUNITY
Start: 2017-06-05 | End: 2022-02-14 | Stop reason: SDUPTHER

## 2018-04-16 RX ORDER — TETANUS AND DIPHTHERIA TOXOIDS ADSORBED 2; 2 [LF]/.5ML; [LF]/.5ML
INJECTION INTRAMUSCULAR
COMMUNITY
Start: 2017-06-26 | End: 2018-11-14 | Stop reason: CLARIF

## 2018-04-16 RX ORDER — THEANINE 100 MG
200 CAPSULE ORAL 2 TIMES DAILY
COMMUNITY

## 2018-04-16 RX ORDER — ATORVASTATIN CALCIUM 20 MG/1
TABLET, FILM COATED ORAL
Status: ON HOLD | COMMUNITY
Start: 2017-06-05 | End: 2018-08-30 | Stop reason: CLARIF

## 2018-04-17 ENCOUNTER — OFFICE VISIT (OUTPATIENT)
Dept: INTERNAL MEDICINE CLINIC | Facility: CLINIC | Age: 63
End: 2018-04-17
Payer: MEDICARE

## 2018-04-17 VITALS
HEIGHT: 65 IN | BODY MASS INDEX: 37.95 KG/M2 | HEART RATE: 93 BPM | TEMPERATURE: 97.9 F | SYSTOLIC BLOOD PRESSURE: 108 MMHG | OXYGEN SATURATION: 98 % | DIASTOLIC BLOOD PRESSURE: 80 MMHG | WEIGHT: 227.8 LBS

## 2018-04-17 DIAGNOSIS — F41.8 DEPRESSION WITH ANXIETY: ICD-10-CM

## 2018-04-17 DIAGNOSIS — E78.5 HYPERLIPIDEMIA, UNSPECIFIED HYPERLIPIDEMIA TYPE: ICD-10-CM

## 2018-04-17 DIAGNOSIS — Z12.11 SCREENING FOR COLON CANCER: ICD-10-CM

## 2018-04-17 DIAGNOSIS — E87.1 HYPONATREMIA: ICD-10-CM

## 2018-04-17 DIAGNOSIS — E03.9 HYPOTHYROIDISM, UNSPECIFIED TYPE: Primary | ICD-10-CM

## 2018-04-17 PROCEDURE — 99214 OFFICE O/P EST MOD 30 MIN: CPT | Performed by: INTERNAL MEDICINE

## 2018-04-17 NOTE — PROGRESS NOTES
Assessment/Plan:      1  Hypothyroidism   TSH is within normal limit  Will continue same dose of levothyroxine     2  Hyperlipidemia   lipid profile is acceptable  Will continue same dose of simvastatin     3  Mild hyponatremia   stable will continue to monitor   4  Depression with anxiety   being managed by a psychiatrist     Diagnoses and all orders for this visit:    Hypothyroidism, unspecified type    Screening for colon cancer  -     Ambulatory referral to Gastroenterology; Future    Depression with anxiety    Hyperlipidemia, unspecified hyperlipidemia type    Hyponatremia  -     Basic metabolic panel; Future    Other orders  -     acetaminophen (TYLENOL) 325 mg tablet; Take by mouth  -     alendronate (FOSAMAX) 70 mg tablet; Take by mouth  -     aspirin 81 mg chewable tablet; Chew  -     atorvastatin (LIPITOR) 20 mg tablet; Take by mouth  -     busPIRone (BUSPAR) 15 mg tablet; Take by mouth  -     Multiple Vitamins-Iron (DAILY-KACI/IRON) TABS; Take by mouth  -     sodium chloride (DEEP SEA NASAL SPRAY) 0 65 % nasal spray; into each nostril  -     Dextromethorphan Polistirex ER (DELSYM) 30 MG/5ML SUER; Take by mouth  -     diphenhydrAMINE (BENADRYL) 50 mg capsule; Take by mouth  -     divalproex sodium (DEPAKOTE) 500 mg EC tablet; Take by mouth  -     docusate sodium (COLACE) 100 mg capsule; Take 1 capsule by mouth 2 (two) times a day as needed  -     fluticasone (FLONASE) 50 mcg/act nasal spray; into each nostril  -     gabapentin (NEURONTIN) 100 mg capsule; Take by mouth  -     levothyroxine 100 mcg tablet; Take by mouth  -     LORazepam (ATIVAN) 1 mg tablet; Take 1 tablet by mouth 2 (two) times a day  -     loxapine (LOXITANE) 50 MG capsule; Take by mouth  -     L-Theanine 100 MG CAPS; Take by mouth  -     Melatonin 5 MG TABS; Take 1 tablet by mouth daily  -     calcium carbonate-vitamin D (OSCAL-D) 500 mg-200 units per tablet;  Take by mouth  -     Polyethylene Glycol 3350-GRX POWD; Take by mouth  - asenapine (SAPHRIS) 10 mg SL tablet; Place under the tongue  -     tetanus-diphtheria toxoids (DECAVAC 2-2) injection; Inject into the shoulder, thigh, or buttocks  -     traZODone (DESYREL) 150 mg tablet; Take 1 tablet by mouth  -     trihexyphenidyl (ARTANE) 2 mg tablet; Take 1 tablet by mouth 2 (two) times a day          Subjective:          Patient ID: Becky Plummer is a 57 y o  female  Patient is here for regular follow-up in the office  She denies any new complaint  The following portions of the patient's history were reviewed and updated as appropriate: allergies, current medications, past family history, past medical history, past social history, past surgical history and problem list     Review of Systems   Constitutional: Negative for fatigue and fever  HENT: Negative for congestion, ear discharge, ear pain, postnasal drip, sinus pressure, sore throat, tinnitus and trouble swallowing  Eyes: Negative for discharge, itching and visual disturbance  Respiratory: Negative for cough and shortness of breath  Cardiovascular: Negative for chest pain and palpitations  Gastrointestinal: Negative for abdominal pain, diarrhea, nausea and vomiting  Endocrine: Negative for cold intolerance and polyuria  Genitourinary: Negative for difficulty urinating, dysuria and urgency  Musculoskeletal: Negative for arthralgias and neck pain  Skin: Negative for rash  Allergic/Immunologic: Negative for environmental allergies  Neurological: Negative for dizziness, weakness and headaches  Psychiatric/Behavioral: Negative for agitation  The patient is not nervous/anxious            Past Medical History:   Diagnosis Date    Benign essential hypertension     resolved; 06/15/16    Fracture of fifth metatarsal bone of right foot with delayed healing     last assessed 04/12/16; fracture of metatarsal bone, right, closed, initial encounter    Hyperlipidemia     last assessed 11/28/17    Hypothyroidism last assessed 11/28/2017    Insomnia     Obesity     [facility]     last assessed 05/18/2017    [facility] disorder ([facility] )     last assessed 03/28/17         Current Outpatient Prescriptions:     acetaminophen (TYLENOL) 325 mg tablet, Take by mouth, Disp: , Rfl:     alendronate (FOSAMAX) 70 mg tablet, Take by mouth, Disp: , Rfl:     asenapine (SAPHRIS) 10 mg SL tablet, Place under the tongue, Disp: , Rfl:     aspirin 81 mg chewable tablet, Chew, Disp: , Rfl:     atorvastatin (LIPITOR) 20 mg tablet, Take by mouth, Disp: , Rfl:     busPIRone (BUSPAR) 15 mg tablet, Take by mouth, Disp: , Rfl:     calcium carbonate-vitamin D (OSCAL-D) 500 mg-200 units per tablet, Take by mouth, Disp: , Rfl:     Dextromethorphan Polistirex ER (DELSYM) 30 MG/5ML SUER, Take by mouth, Disp: , Rfl:     diphenhydrAMINE (BENADRYL) 50 mg capsule, Take by mouth, Disp: , Rfl:     divalproex sodium (DEPAKOTE) 500 mg EC tablet, Take by mouth, Disp: , Rfl:     docusate sodium (COLACE) 100 mg capsule, Take 1 capsule by mouth 2 (two) times a day as needed, Disp: , Rfl:     fluticasone (FLONASE) 50 mcg/act nasal spray, into each nostril, Disp: , Rfl:     gabapentin (NEURONTIN) 100 mg capsule, Take by mouth, Disp: , Rfl:     L-Theanine 100 MG CAPS, Take by mouth, Disp: , Rfl:     levothyroxine 100 mcg tablet, Take by mouth, Disp: , Rfl:     LORazepam (ATIVAN) 1 mg tablet, Take 1 tablet by mouth 2 (two) times a day, Disp: , Rfl:     loxapine (LOXITANE) 50 MG capsule, Take by mouth, Disp: , Rfl:     Melatonin 5 MG TABS, Take 1 tablet by mouth daily, Disp: , Rfl:     Multiple Vitamins-Iron (DAILY-KACI/IRON) TABS, Take by mouth, Disp: , Rfl:     PHENobarbital 64 8 mg tablet, Take 1 tablet (64 8 mg total) by mouth 2 (two) times a day, Disp: 60 tablet, Rfl: 5    Polyethylene Glycol 3350-GRX POWD, Take by mouth, Disp: , Rfl:     sodium chloride (DEEP SEA NASAL SPRAY) 0 65 % nasal spray, into each nostril, Disp: , Rfl:     tetanus-diphtheria toxoids (DECAVAC 2-2) injection, Inject into the shoulder, thigh, or buttocks, Disp: , Rfl:     traZODone (DESYREL) 150 mg tablet, Take 1 tablet by mouth, Disp: , Rfl:     trihexyphenidyl (ARTANE) 2 mg tablet, Take 1 tablet by mouth 2 (two) times a day, Disp: , Rfl:     Allergies   Allergen Reactions    Clozapine      Other reaction(s): Other (See Comments)  low white blood count    Fluphenazine Hyperactivity     EPS, Hyperactivity    Haloperidol      Other reaction(s): Other (See Comments)  EPS    Lithium Other (See Comments)     Toxicity    Valproic Acid Confusion     "Mental confusion"       Social History   Past Surgical History:   Procedure Laterality Date    COLONOSCOPY      complete     Family History   Problem Relation Age of Onset    No Known Problems Mother     No Known Problems Father     Lung disease Other      mesothelioma       Objective:  /80 (BP Location: Left arm, Patient Position: Sitting, Cuff Size: Standard)   Pulse 93   Temp 97 9 °F (36 6 °C) (Oral)   Ht 5' 5" (1 651 m)   Wt 103 kg (227 lb 12 8 oz)   SpO2 98%   BMI 37 91 kg/m²   Body mass index is 37 91 kg/m²  Physical Exam   Constitutional: She appears well-developed  HENT:   Head: Normocephalic  Right Ear: External ear normal    Left Ear: External ear normal    Mouth/Throat: Oropharynx is clear and moist    Poor dental hygiene   Eyes: Pupils are equal, round, and reactive to light  No scleral icterus  Neck: Normal range of motion  Neck supple  No tracheal deviation present  No thyromegaly present  Cardiovascular: Normal rate, regular rhythm and normal heart sounds  No murmur heard  Pulmonary/Chest: Effort normal and breath sounds normal  No respiratory distress  She exhibits no tenderness  Abdominal: Soft  Bowel sounds are normal  She exhibits no mass  There is no tenderness  Musculoskeletal: Normal range of motion  She exhibits no edema     Lymphadenopathy:     She has no cervical adenopathy  Neurological: She is alert  No cranial nerve deficit  Skin: Skin is warm and dry  Psychiatric: She has a normal mood and affect   Her behavior is normal

## 2018-04-30 ENCOUNTER — OFFICE VISIT (OUTPATIENT)
Dept: NEUROLOGY | Facility: CLINIC | Age: 63
End: 2018-04-30
Payer: MEDICARE

## 2018-04-30 VITALS
SYSTOLIC BLOOD PRESSURE: 123 MMHG | WEIGHT: 225.5 LBS | BODY MASS INDEX: 37.53 KG/M2 | HEART RATE: 90 BPM | DIASTOLIC BLOOD PRESSURE: 69 MMHG

## 2018-04-30 DIAGNOSIS — R56.9 SEIZURE (HCC): ICD-10-CM

## 2018-04-30 PROCEDURE — 99213 OFFICE O/P EST LOW 20 MIN: CPT | Performed by: PSYCHIATRY & NEUROLOGY

## 2018-04-30 RX ORDER — PHENOBARBITAL 64.8 MG/1
64.8 TABLET ORAL 2 TIMES DAILY
Qty: 60 TABLET | Refills: 5 | Status: SHIPPED | OUTPATIENT
Start: 2018-04-30 | End: 2018-10-24 | Stop reason: SDUPTHER

## 2018-04-30 RX ORDER — SIMVASTATIN 40 MG
40 TABLET ORAL
COMMUNITY
End: 2019-08-07 | Stop reason: HOSPADM

## 2018-04-30 NOTE — PROGRESS NOTES
[place] [address] [facility]  Follow Up Visit    Impression/Plan    Ms Shereen Mora is a 57 y o  female with unclear epilepsy syndrome and schizophrenia  She reported 9 years of seizure freedom until in 12/2014 she had an event a few days after stopping phenobarbital  Multiple nights of insomnia preceded the event  The event itself was poorly characterized, but consisted of fear/panic that is apparently typical of her seizures  It is possible she had a focal seizure, but anxiety/psychosis related to sleep deprivation and withdrawal of phenobarbital is another explanation that better fits her prolonged symptoms and associated hallucinations  She has had similar reactions when other medications have been changed suggesting it was most likely related to anxiety  She feels things improved after she went back on phenobarbital  Her EEG in early 2015 was normal  Her osteoporosis may be related to phenobarbital use, but at this point her seizures are controlled and things are going well, so she will continue phenobarbital  She is also on gabapentin  Valproate is prescribed by psychiatry and is not specifically prescribed for seizure  We again discussed bone health today  She should remain on calcium and vitamin-D  She is on bisphosphonate therapy  Her most recent DEXA was essentially stable with some improvement at the hip  Patient Instructions   1  Continue phenobarbital 64 8 mg twice daily  2  Continue divalproex  mg three times daily  3  Continue gabapentin 200 mg twice daily  4  Return in about 6 months  5  Let us know if there are seizures  Diagnoses and all orders for this visit:    Seizure ([address] )  -     PHENobarbital 64 8 mg tablet; Take 1 tablet (64 8 mg total) by mouth 2 (two) times a day    Other orders  -     simvastatin (ZOCOR) 40 mg tablet; Take 40 mg by mouth        Marvel Abraham is returning to the [place] [address] [facility] for follow up   She arrives with a staff member from her group home  She has osteoporosis  She is now on bisphosphonate therapy  Mood has been stable (anxiety, delusions)  Last attempt at decreasing phenobarbital was unsuccessful - sleeplessness, agitation, anxiety, delusions  Last DEXA scan essentially stable, still shows osteoporosis of the lumbar spine  Interval Events:   Seizures since last visit: None  Hospitalizations: no    Current AEDs:  PB 64 8 mg tab, 1 tab bid   mg bid   mg tid (from psychiatry)  Medication side effects: None  Medication adherence: Yes    Event/Seizure semiology:  Unknown    Special Features  Status epilepticus: unknown  Self Injury Seizures: unknown  Precipitating Factors: unknown    Prior AEDs:  Carbamazepine - ?stopped, but worked? Lamotrigine - ?stopped, Dr  discontinued, no reason given? Valproate - ?water retention? Prior Evaluation:  3 Hour Video EEG 1/15/15: normal     History Reviewed: The following were reviewed and updated as appropriate: allergies, current medications, past medical history, past social history and problem list    Psychiatric History:  Schizophrenia     Social History:   Driving: No  Lives Alone: No  Occupation: on permanent disability    ROS:  Review of Systems  Constitutional: Negative  HENT: Negative  Eyes: Negative  Respiratory: Negative  Cardiovascular: Negative  Gastrointestinal: Negative  Endocrine: Negative  Genitourinary: Negative  Musculoskeletal: Negative  Skin: Negative  Allergic/Immunologic: Negative  Neurological: Negative  Hematological: Negative  Psychiatric/Behavioral: Negative  Ten systems were reviewed and negative except for what is documented separately or in the HPI  Objective    /69 (BP Location: Right arm, Patient Position: Sitting, Cuff Size: Standard)   Pulse 90   Wt 102 kg (225 lb 8 oz)   BMI 37 53 kg/m²      General Exam  No acute distress      Neurologic Exam  Mental Status:  Alert and oriented and interactive  Logical though process  Language: normal fluency and comprehension  Gait: Normal casual gait

## 2018-04-30 NOTE — PROGRESS NOTES
Patient ID: Autumn Cunningham is a 57 y o  female  Assessment/Plan:    No problem-specific Assessment & Plan notes found for this encounter  {Assess/PlanSmartLinks:49306}       Subjective:    HPI    {[facility] HPI texts:01788}    {Common ambulatory SmartLinks:24387}         Objective: There were no vitals taken for this visit  Physical Exam    Neurological Exam      ROS:    Review of Systems   Constitutional: Negative  HENT: Negative  Eyes: Negative  Respiratory: Negative  Cardiovascular: Negative  Gastrointestinal: Negative  Endocrine: Negative  Genitourinary: Negative  Musculoskeletal: Negative  Skin: Negative  Allergic/Immunologic: Negative  Neurological: Negative  Hematological: Negative  Psychiatric/Behavioral: Negative

## 2018-04-30 NOTE — PATIENT INSTRUCTIONS
1  Continue phenobarbital 64 8 mg twice daily  2  Continue divalproex  mg three times daily  3  Continue gabapentin 200 mg twice daily  4  Return in about 6 months  5  Let us know if there are seizures

## 2018-05-11 ENCOUNTER — TELEPHONE (OUTPATIENT)
Dept: INTERNAL MEDICINE CLINIC | Facility: CLINIC | Age: 63
End: 2018-05-11

## 2018-05-11 NOTE — TELEPHONE ENCOUNTER
Called number provided  A woman answered with a thick accent and states she is a "recovery counselor "  She was requesting information stating that she handles all of Laine's medical needs  I asked her to have Laine call myself back to review below information as it states Laine called  The woman caller insisted on more information, I explained I would not provide until I spk with Cheyenne Molina as I did not understand the medical dynamic and relating to hippa laws  I informed that it was regarding a nonurgent medical manor that did not apply to recovery or psychiatric concerns  I told her that I would call back and attempt to speak with Cheyenne Molina at a later date  She was not happy with my decision and wanted more information  I asked for her name again and she would not provide  Will re-attempt at later time to spk directly with patient

## 2018-05-11 NOTE — TELEPHONE ENCOUNTER
Patient called and needed an annual hearing test done, she stated that she has gone to [address] before to get this done  She wanted a script written up and put in the chart to get done  If someone can please look into this and let Elin Innocent know, the best phone number to reach her at is [phone], thank you

## 2018-05-14 NOTE — TELEPHONE ENCOUNTER
Return call back to [place] Toribio, a woman with a thick accent  the phone, and asked if this is in regards to  I asked to speak with [place] Toribio was placed on the phone and I asked her if she needed an order for an annual hearing screening, and she stated she really had a hearing screening appointment scheduled at [facility] on Friday  She stated that she gets an annual hearing screening done every year  I asked if she needs a script written and she said she already had one  Then a male with a thick accent came on the phone and asked what this was in regards to, no information was given to him  To protect patient's confidentiality  At this point since patient states she already has an appointment scheduled and has a script no further orders needed to be placed

## 2018-05-16 ENCOUNTER — OFFICE VISIT (OUTPATIENT)
Dept: GASTROENTEROLOGY | Facility: MEDICAL CENTER | Age: 63
End: 2018-05-16
Payer: MEDICARE

## 2018-05-16 VITALS
HEIGHT: 65 IN | BODY MASS INDEX: 37.49 KG/M2 | HEART RATE: 72 BPM | WEIGHT: 225 LBS | DIASTOLIC BLOOD PRESSURE: 70 MMHG | TEMPERATURE: 97.8 F | SYSTOLIC BLOOD PRESSURE: 120 MMHG

## 2018-05-16 DIAGNOSIS — R14.3 FLATULENCE: Primary | ICD-10-CM

## 2018-05-16 DIAGNOSIS — Z12.11 SCREENING FOR COLON CANCER: ICD-10-CM

## 2018-05-16 PROCEDURE — 99203 OFFICE O/P NEW LOW 30 MIN: CPT | Performed by: INTERNAL MEDICINE

## 2018-05-16 NOTE — PROGRESS NOTES
[facility] - Outpatient Consultation  Bowen Ace 57 y o  female MRN: [id number]  Encounter: [id number]          ASSESSMENT AND PLAN:      1  Colon cancer screening  -  Her last colonoscopy was 10-12 years ago, no polyps were found at that time  She also has no family history of colon cancer  I will schedule a routine colonoscopy  - I discussed with her/him the risks of the procedure including bleeding, perforation, and missed polyp detection rate  - Bowel preparation intructions were given  2  Flatulence    - she presents with complaints of gas  She denies constipation and has no alarm symptoms  I gave her information on dietary modifications, including fodmap diet, that can relief her symptoms  - continue taking gasX  - I will order labs to check for H pylori and celiac disease  She will follow up in 2-3 months if symptoms persist    ______________________________________________________________________    HPI:      Bowen Ace is a 57 y o  female who presents with complaint of excessive gas  She takes colace, and has 1 bowel movement daily  Her last colonoscopy was 10-12 years ago, no polyps were found at that time  She denies family history of colon cancer  She denies abdominal pain, change in stool caliber, melena, hematochezia, rectal bleeding, tenesmus, change in appetite, nausea, vomiting, diarrhea, GERD, dysphagia, odynophagia and intentional weight loss  She is a nonsmoker, and denies alcohol use  She reports that her last seizer was 2 years ago  REVIEW OF SYSTEMS:    CONSTITUTIONAL: Denies any fever, chills, rigors, and weight loss  HEENT: No earache or tinnitus  Denies hearing loss or visual disturbances  CARDIOVASCULAR: No chest pain or palpitations  RESPIRATORY: Denies any cough, hemoptysis, shortness of breath or dyspnea on exertion  GASTROINTESTINAL: As noted in the History of Present Illness  GENITOURINARY: No problems with urination  Denies any hematuria or dysuria  NEUROLOGIC: No dizziness or vertigo, denies headaches  MUSCULOSKELETAL: Denies any muscle or joint pain  SKIN: Denies skin rashes or itching  ENDOCRINE: Denies excessive thirst  Denies intolerance to heat or cold  PSYCHOSOCIAL: Denies depression or anxiety  Denies any recent memory loss         Historical Information   Past Medical History:   Diagnosis Date    Benign essential hypertension     resolved; 06/15/16    Fracture of fifth metatarsal bone of right foot with delayed healing     last assessed 04/12/16; fracture of metatarsal bone, right, closed, initial encounter    Hyperlipidemia     last assessed 11/28/17    Hypothyroidism     last assessed 11/28/2017    Insomnia     Obesity     [facility]     last assessed 05/18/2017    [facility]     last assessed 03/28/17     Past Surgical History:   Procedure Laterality Date    COLONOSCOPY      complete     Social History   History   Alcohol Use No     History   Drug Use No     History   Smoking Status    Never Smoker   Smokeless Tobacco    Never Used     Family History   Problem Relation Age of Onset    No Known Problems Mother     No Known Problems Father     Lung disease Other      mesothelioma       Meds/Allergies       Current Outpatient Prescriptions:     acetaminophen (TYLENOL) 325 mg tablet    alendronate (FOSAMAX) 70 mg tablet    asenapine (SAPHRIS) 10 mg SL tablet    aspirin 81 mg chewable tablet    atorvastatin (LIPITOR) 20 mg tablet    busPIRone (BUSPAR) 15 mg tablet    calcium carbonate-vitamin D (OSCAL-D) 500 mg-200 units per tablet    Dextromethorphan Polistirex ER (DELSYM) 30 MG/5ML SUER    diphenhydrAMINE (BENADRYL) 50 mg capsule    divalproex sodium (DEPAKOTE) 500 mg EC tablet    docusate sodium (COLACE) 100 mg capsule    fluticasone (FLONASE) 50 mcg/act nasal spray    gabapentin (NEURONTIN) 100 mg capsule    L-Theanine 100 MG CAPS    levothyroxine 100 mcg tablet    LORazepam (ATIVAN) 1 mg tablet    loxapine (LOXITANE) 50 MG capsule    Melatonin 5 MG TABS    Multiple Vitamins-Iron (DAILY-KACI/IRON) TABS    PHENobarbital 64 8 mg tablet    Polyethylene Glycol 3350-GRX POWD    simvastatin (ZOCOR) 40 mg tablet    sodium chloride (DEEP SEA NASAL SPRAY) 0 65 % nasal spray    tetanus-diphtheria toxoids (DECAVAC 2-2) injection    traZODone (DESYREL) 150 mg tablet    trihexyphenidyl (ARTANE) 2 mg tablet    Allergies   Allergen Reactions    Clozapine      Other reaction(s): Other (See Comments)  low white blood count    Fluphenazine Hyperactivity     EPS, Hyperactivity    Haloperidol      Other reaction(s): Other (See Comments)  EPS    Lithium Other (See Comments)     Toxicity    Valproic Acid Confusion     "Mental confusion"           Objective     Blood pressure 120/70, pulse 72, temperature 97 8 °F (36 6 °C), temperature source Tympanic, height 5' 5" (1 651 m), weight 102 kg (225 lb)  Body mass index is 37 44 kg/m²  PHYSICAL EXAM:      General Appearance:   Alert, cooperative, no distress, obese  HEENT:   Normocephalic, atraumatic, anicteric      Neck:  Supple, symmetrical, trachea midline   Lungs:   Clear to auscultation bilaterally; no rales, rhonchi or wheezing; respirations unlabored    Heart[de-identified]   Regular rate and rhythm; no murmur, rub, or gallop  Abdomen:   Soft, non-tender, non-distended; normal bowel sounds; no masses, no organomegaly    Genitalia:   Deferred    Rectal:   Deferred    Extremities:  No cyanosis, clubbing or edema    Pulses:  2+ and symmetric    Skin:  No jaundice, rashes, or lesions    Lymph nodes:  No palpable cervical lymphadenopathy        Lab Results:   No visits with results within 1 Day(s) from this visit     Latest known visit with results is:   Appointment on 12/19/2017   Component Date Value    Ammonia 12/19/2017 15          Attestation:   By signing my name below, Nandini Ballard, attest that this documentation has been prepared under the direction and in the presence of Bogdan Chinchilla MD  Electronically Signed: Celena Lombard, Scribe  05/16/2018  Jana Mcdaniel, personally performed the services described in this documentation  All medical record entries made by the scribe were at my direction and in my presence  I have reviewed the chart and discharge instructions and agree that the record reflects my personal performance and is accurate and complete  Bogdan Chinchilla MD  05/16/2018

## 2018-05-16 NOTE — LETTER
May 16, 2018     Vernell Capellan MD  [address]  [facility]  [place] [address]    Patient: Sarah Lancaster   YOB: 1955   Date of Visit: 5/16/2018       Dear Dr Brooke Mcdonnell: Thank you for referring Sarah Lancaster to me for evaluation  Below are my notes for this consultation  If you have questions, please do not hesitate to call me  I look forward to following your patient along with you  Sincerely,        Nahomy Cox MD        CC: MD Shanta Stroud Scribe  5/16/2018 12:31 PM  Sign at close encounter  [facility] - Outpatient Consultation  Sarah Lancaster 57 y o  female MRN: [id number]  Encounter: [id number]          ASSESSMENT AND PLAN:      1  Colon cancer screening  -  Her last colonoscopy was 10-12 years ago, no polyps were found at that time  She also has no family history of colon cancer  I will schedule a routine colonoscopy  - I discussed with her/him the risks of the procedure including bleeding, perforation, and missed polyp detection rate  - Bowel preparation intructions were given  2  Flatulence    - she presents with complaints of gas  She denies constipation and has no alarm symptoms  I gave her information on dietary modifications, including fodmap diet, that can relief her symptoms  - continue taking gasX  - I will order labs to check for H pylori and celiac disease  She will follow up in 2-3 months if symptoms persist    ______________________________________________________________________    HPI:      Sarah Lancaster is a 57 y o  female who presents with complaint of excessive gas  She takes colace, and has 1 bowel movement daily  Her last colonoscopy was 10-12 years ago, no polyps were found at that time  She denies family history of colon cancer   She denies abdominal pain, change in stool caliber, melena, hematochezia, rectal bleeding, tenesmus, change in appetite, nausea, vomiting, diarrhea, GERD, dysphagia, odynophagia and intentional weight loss  She is a nonsmoker, and denies alcohol use  She reports that her last seizer was 2 years ago  REVIEW OF SYSTEMS:    CONSTITUTIONAL: Denies any fever, chills, rigors, and weight loss  HEENT: No earache or tinnitus  Denies hearing loss or visual disturbances  CARDIOVASCULAR: No chest pain or palpitations  RESPIRATORY: Denies any cough, hemoptysis, shortness of breath or dyspnea on exertion  GASTROINTESTINAL: As noted in the History of Present Illness  GENITOURINARY: No problems with urination  Denies any hematuria or dysuria  NEUROLOGIC: No dizziness or vertigo, denies headaches  MUSCULOSKELETAL: Denies any muscle or joint pain  SKIN: Denies skin rashes or itching  ENDOCRINE: Denies excessive thirst  Denies intolerance to heat or cold  PSYCHOSOCIAL: Denies depression or anxiety  Denies any recent memory loss         Historical Information   Past Medical History:   Diagnosis Date    Benign essential hypertension     resolved; 06/15/16    Fracture of fifth metatarsal bone of right foot with delayed healing     last assessed 04/12/16; fracture of metatarsal bone, right, closed, initial encounter    Hyperlipidemia     last assessed 11/28/17    Hypothyroidism     last assessed 11/28/2017    Insomnia     Obesity     Sch[facility]oaffective disorder ([facility] )     last assessed 05/18/2017    Seizure disorder ([facility] )     last assessed 03/28/17     Past Surgical History:   Procedure Laterality Date    COLONOSCOPY      complete     Social History   History   Alcohol Use No     History   Drug Use No     History   Smoking Status    Never Smoker   Smokeless Tobacco    Never Used     Family History   Problem Relation Age of Onset    No Known Problems Mother     No Known Problems Father     Lung disease Other      mesothelioma       Meds/Allergies       Current Outpatient Prescriptions:     acetaminophen (TYLENOL) 325 mg tablet    alendronate (FOSAMAX) 70 mg tablet   asenapine (SAPHRIS) 10 mg SL tablet    aspirin 81 mg chewable tablet    atorvastatin (LIPITOR) 20 mg tablet    busPIRone (BUSPAR) 15 mg tablet    calcium carbonate-vitamin D (OSCAL-D) 500 mg-200 units per tablet    Dextromethorphan Polistirex ER (DELSYM) 30 MG/5ML SUER    diphenhydrAMINE (BENADRYL) 50 mg capsule    divalproex sodium (DEPAKOTE) 500 mg EC tablet    docusate sodium (COLACE) 100 mg capsule    fluticasone (FLONASE) 50 mcg/act nasal spray    gabapentin (NEURONTIN) 100 mg capsule    L-Theanine 100 MG CAPS    levothyroxine 100 mcg tablet    LORazepam (ATIVAN) 1 mg tablet    loxapine (LOXITANE) 50 MG capsule    Melatonin 5 MG TABS    Multiple Vitamins-Iron (DAILY-KACI/IRON) TABS    PHENobarbital 64 8 mg tablet    Polyethylene Glycol 3350-GRX POWD    simvastatin (ZOCOR) 40 mg tablet    sodium chloride (DEEP SEA NASAL SPRAY) 0 65 % nasal spray    tetanus-diphtheria toxoids (DECAVAC 2-2) injection    traZODone (DESYREL) 150 mg tablet    trihexyphenidyl (ARTANE) 2 mg tablet    Allergies   Allergen Reactions    Clozapine      Other reaction(s): Other (See Comments)  low white blood count    Fluphenazine Hyperactivity     EPS, Hyperactivity    Haloperidol      Other reaction(s): Other (See Comments)  EPS    Lithium Other (See Comments)     Toxicity    Valproic Acid Confusion     "Mental confusion"           Objective     Blood pressure 120/70, pulse 72, temperature 97 8 °F (36 6 °C), temperature source Tympanic, height 5' 5" (1 651 m), weight 102 kg (225 lb)  Body mass index is 37 44 kg/m²  PHYSICAL EXAM:      General Appearance:   Alert, cooperative, no distress, obese  HEENT:   Normocephalic, atraumatic, anicteric      Neck:  Supple, symmetrical, trachea midline   Lungs:   Clear to auscultation bilaterally; no rales, rhonchi or wheezing; respirations unlabored    Heart[de-identified]   Regular rate and rhythm; no murmur, rub, or gallop     Abdomen:   Soft, non-tender, non-distended; normal bowel sounds; no masses, no organomegaly    Genitalia:   Deferred    Rectal:   Deferred    Extremities:  No cyanosis, clubbing or edema    Pulses:  2+ and symmetric    Skin:  No jaundice, rashes, or lesions    Lymph nodes:  No palpable cervical lymphadenopathy        Lab Results:   No visits with results within 1 Day(s) from this visit  Latest known visit with results is:   Appointment on 12/19/2017   Component Date Value    Ammonia 12/19/2017 15          Attestation:   By signing my name below, Klever Bryant, attest that this documentation has been prepared under the direction and in the presence of Juan Manuel Delcid MD  Electronically Signed: Sylwia Marquez  05/16/2018  Melanie Rodriguez, personally performed the services described in this documentation  All medical record entries made by the sylwia were at my direction and in my presence  I have reviewed the chart and discharge instructions and agree that the record reflects my personal performance and is accurate and complete  Juan Manuel Delcid MD  05/16/2018

## 2018-05-16 NOTE — PATIENT INSTRUCTIONS
Colonoscopy   AMBULATORY CARE:   What you need to know about a colonoscopy:  A colonoscopy is a procedure to examine the inside of your colon (intestine) with a scope  A scope is a flexible tube with a small light and camera on the end  Polyps or tissue growths may be removed during your colonoscopy  What you need to do the week before your colonoscopy: You will need to stop taking medicines that contain aspirin or iron for 7 days before your colonoscopy  If you take anticoagulants, such as warfarin, ask when you should stop taking it  Make plans for someone to drive you home after your procedure  How to prepare for your colonoscopy: Your healthcare provider will have you prepare your bowels before your procedure  Your bowels will need to be empty before your procedure to allow him to clearly see your colon  You will need to do the following the day before your procedure:  · Have only clear liquids  for the entire day before your colonoscopy  Clear liquid diet includes clear fruit juices and broths, clear flavored gelatin, and hard candy  It also includes coffee, tea, carbonated beverages, and clear sports drinks  · Follow your bowel prep as directed  There are many different preparations that can be given before a colonoscopy  Some are given over 2 hours and others over 6 hours  Some are given earlier in the afternoon the day before the colonoscopy  Others are given the day before and then the morning of the colonoscopy  With any bowel prep, stay close to the bathroom  This liquid will cause your bowels to move frequently  · An enema  may be needed  Your healthcare provider may tell you to use an enema to help clean out your bowels  · Do not eat or drink anything after midnight  This will help prevent problems that can happen if you vomit while under anesthesia  What will happen during your colonoscopy:   · You will be given medicine to help you relax   You will lie on your left side and raise one or both knees toward your chest  Your healthcare provider will examine your anus and use a finger to check your rectum  You may need another enema if your bowel is not empty  The scope will be lubricated and gently placed into your anus  It will then be passed through your rectum and into your colon  Water or air will be put into your colon to help clean or expand it  This is done so your healthcare provider can see your colon clearly  · Tissue samples may be taken from the walls of your bowel and sent to a lab for tests  If you have a polyp, your healthcare provider will pass a wire loop through the scope and use it to hold the polyp  The polyp is then burned or cut off the wall of your colon  Removed polyps are sent to a lab for tests  Pictures of your colon may be taken during the procedure  The scope will be removed when the procedure is done  What will happen after your colonoscopy:   · Rest after your procedure  You may feel bloated, have some gas and abdominal discomfort  You may need to lie on your right side with a heating pad on your abdomen  You may need to take short walks to help move the gas out  Eat small meals, if you feel bloated  Do not drive or make important decisions until the day after your procedure  · You may have polyps removed  Do not take aspirin or go on long car trips for 7 days after your procedure  Ask your healthcare provider about any other limits after your procedure  Risks of a colonoscopy: You may have pain or bleeding after the scope or polyps are removed  You may also have a slow heartbeat, decreased blood pressure, or increased sweating  Your colon may tear due to the increased pressure from the scope and other instruments  This may cause bowel contents to leak out of your colon and into your abdomen  If this happens, you will need to stay in the hospital and have surgery on your colon     Seek care immediately if:   · You have a large amount of bright red blood in your bowel movements  · Your abdomen is hard and firm and you have severe pain  · You have sudden trouble breathing  Contact your healthcare provider if:   · You develop a rash or hives  · You have a fever within 24 hours of your procedure  · You have not had a bowel movement for 3 days after your procedure  · You have questions or concerns about your condition or care  Activity:   · Do not lift, strain, or run  for 3 days after your procedure  · Rest after your procedure  You have been given medicine to relax you  Do not  drive or make important decisions until the day after your procedure  Return to your normal activity as directed  · Relieve gas and discomfort from bloating  by lying on your right side with a heating pad on your abdomen  You may need to take short walks to help the gas move out  Eat small meals until bloating is relieved  If you had polyps removed: For 7 days after your procedure:  · Do not  take aspirin  · Do not  go on long car rides  Help prevent constipation:   · Eat a variety of healthy foods  Healthy foods include fruit, vegetables, whole-grain breads, low-fat dairy products, beans, lean meat, and fish  Ask if you need to be on a special diet  Your healthcare provider may recommend that you eat high-fiber foods such as cooked beans  Fiber helps you have regular bowel movements  · Drink liquids as directed  Adults should drink between 9 and 13 eight-ounce cups of liquid every day  Ask what amount is best for you  For most people, good liquids to drink are water, juice, and milk  · Exercise as directed  Talk to your healthcare provider about the best exercise plan for you  Exercise can help prevent constipation, decrease your blood pressure and improve your health  Follow up with your healthcare provider as directed:  Write down your questions so you remember to ask them during your visits     © 2017 [employer] Information is for End User's use only and may not be sold, redistributed or otherwise used for commercial purposes  All illustrations and images included in CareNotes® are the copyrighted property of A D A M , Inc  or Kimani Sorenson  The above information is an  only  It is not intended as medical advice for individual conditions or treatments  Talk to your doctor, nurse or pharmacist before following any medical regimen to see if it is safe and effective for you

## 2018-05-18 ENCOUNTER — OFFICE VISIT (OUTPATIENT)
Dept: AUDIOLOGY | Age: 63
End: 2018-05-18
Payer: MEDICARE

## 2018-05-18 DIAGNOSIS — H90.3 SENSORY HEARING LOSS, BILATERAL: Primary | ICD-10-CM

## 2018-05-18 DIAGNOSIS — H90.3 SENSORINEURAL HEARING LOSS, BILATERAL: Primary | ICD-10-CM

## 2018-05-18 PROCEDURE — 92567 TYMPANOMETRY: CPT | Performed by: AUDIOLOGIST

## 2018-05-18 PROCEDURE — 92557 COMPREHENSIVE HEARING TEST: CPT | Performed by: AUDIOLOGIST

## 2018-05-18 NOTE — LETTER
May 18, 2018     Renata Layton, [address] [facility]    Patient: Rita Ye   YOB: 1955   Date of Visit: 2018       Dear Dr Zackery Kocher: Thank you for referring Rita Ye to me for evaluation  Below are my notes for this consultation  If you have questions, please do not hesitate to call me  I look forward to following your patient along with you  Sincerely,        Severa Liberty        CC: No Recipients  Severbeverly Ashley  2018  1:59 PM  Sign at close encounter  [facility] EVALUATION      Name:  Rita Ye  :  1955  Age:  57 y o  Date of Evaluation: 18     History: Annual HE  Reason for visit: Rita Ye is being seen today at the request of Dr Zackery Kocher for an evaluation of hearing  Patient reports no issues or concerns for hearing sensitivity  Patient denies otalgia, otorrhea, dizziness, fullness, and tinnitus  Patient denies a family history of hearing loss and noise exposure  EVALUATION:    Otoscopic Evaluation:   Right Ear: Occluded, Could not visualize tympanic membrane   Left Ear: Clear and healthy ear canal and tympanic membrane    Tympanometry:   Right: Type C - Negative pressue Volume: 0 7  Pressure: -330  Compliance: 0 5    Left: Type A Volume: 0 9  Pressure: -90  Compliance: 0 8     Audiogram Results:  Pure tone testing revealed a mild sloping to moderate mixed hearing loss in the  right ear and a normal sloping to moderate sensorineural hearing loss in the  left  ear  SRT and PTA are in agreement indicating good test reliability  Word recognition scores were excellent bilaterally  *see attached audiogram      RECOMMENDATIONS:  1 Year Hearing Eval, Consult ENT, Return to [facility]  for F/U, Ear CLeaning and Copy to Patient/Caregiver    PATIENT EDUCATION:   Discussed results and recommendations with patient and caregiver    Questions were addressed and the patient was encouraged to contact our department should concerns arise        Divine Bush CCC-A  Clinical Audiologist

## 2018-05-18 NOTE — PROGRESS NOTES
AUDIOLOGY AUDIOMETRIC EVALUATION      Name:  Douglas Briggs  :  1955  Age:  57 y o  Date of Evaluation: 18     History: Annual HE  Reason for visit: Douglas Briggs is being seen today at the request of Dr Burgess Cevallos for an evaluation of hearing  Patient reports no issues or concerns for hearing sensitivity  Patient denies otalgia, otorrhea, dizziness, fullness, and tinnitus  Patient denies a family history of hearing loss and noise exposure  EVALUATION:    Otoscopic Evaluation:   Right Ear: Occluded, Could not visualize tympanic membrane   Left Ear: Clear and healthy ear canal and tympanic membrane    Tympanometry:   Right: Type C - Negative pressue Volume: 0 7  Pressure: -330  Compliance: 0 5    Left: Type A Volume: 0 9  Pressure: -90  Compliance: 0 8     Audiogram Results:  Pure tone testing revealed a mild sloping to moderate mixed hearing loss in the  right ear and a normal sloping to moderate sensorineural hearing loss in the  left  ear  SRT and PTA are in agreement indicating good test reliability  Word recognition scores were excellent bilaterally  *see attached audiogram      RECOMMENDATIONS:  1 Year Hearing Eval, Consult ENT, Return to [facility]  for F/U, Ear CLeaning and Copy to Patient/Caregiver    PATIENT EDUCATION:   Discussed results and recommendations with patient and caregiver  Questions were addressed and the patient was encouraged to contact our department should concerns arise        Divine Rod , [facility]-A  Clinical Audiologist

## 2018-05-22 ENCOUNTER — APPOINTMENT (OUTPATIENT)
Dept: LAB | Age: 63
End: 2018-05-22
Payer: MEDICARE

## 2018-05-22 DIAGNOSIS — R14.3 FLATULENCE: ICD-10-CM

## 2018-05-22 PROCEDURE — 36415 COLL VENOUS BLD VENIPUNCTURE: CPT

## 2018-05-22 PROCEDURE — 86255 FLUORESCENT ANTIBODY SCREEN: CPT

## 2018-05-22 PROCEDURE — 82784 ASSAY IGA/IGD/IGG/IGM EACH: CPT

## 2018-05-22 PROCEDURE — 83516 IMMUNOASSAY NONANTIBODY: CPT

## 2018-05-23 ENCOUNTER — TRANSCRIBE ORDERS (OUTPATIENT)
Dept: ADMINISTRATIVE | Age: 63
End: 2018-05-23

## 2018-05-23 ENCOUNTER — APPOINTMENT (OUTPATIENT)
Dept: LAB | Age: 63
End: 2018-05-23
Payer: MEDICARE

## 2018-05-23 DIAGNOSIS — R14.3 FLATULENCE: ICD-10-CM

## 2018-05-23 LAB
ENDOMYSIUM IGA SER QL: NEGATIVE
GLIADIN PEPTIDE IGA SER-ACNC: 4 UNITS (ref 0–19)
GLIADIN PEPTIDE IGG SER-ACNC: 3 UNITS (ref 0–19)
IGA SERPL-MCNC: 157 MG/DL (ref 87–352)
TTG IGA SER-ACNC: <2 U/ML (ref 0–3)
TTG IGA SER-ACNC: <2 U/ML (ref 0–3)
TTG IGG SER-ACNC: <2 U/ML (ref 0–5)
TTG IGG SER-ACNC: <2 U/ML (ref 0–5)

## 2018-05-23 PROCEDURE — 87338 HPYLORI STOOL AG IA: CPT

## 2018-05-24 ENCOUNTER — TELEPHONE (OUTPATIENT)
Dept: GASTROENTEROLOGY | Facility: MEDICAL CENTER | Age: 63
End: 2018-05-24

## 2018-05-24 LAB — H PYLORI AG STL QL IA: NEGATIVE

## 2018-06-04 ENCOUNTER — OFFICE VISIT (OUTPATIENT)
Dept: INTERNAL MEDICINE CLINIC | Facility: CLINIC | Age: 63
End: 2018-06-04
Payer: MEDICARE

## 2018-06-04 VITALS
DIASTOLIC BLOOD PRESSURE: 68 MMHG | SYSTOLIC BLOOD PRESSURE: 120 MMHG | HEART RATE: 92 BPM | WEIGHT: 218.4 LBS | BODY MASS INDEX: 36.39 KG/M2 | HEIGHT: 65 IN | OXYGEN SATURATION: 98 % | TEMPERATURE: 99.3 F

## 2018-06-04 DIAGNOSIS — E78.5 HYPERLIPIDEMIA, UNSPECIFIED HYPERLIPIDEMIA TYPE: ICD-10-CM

## 2018-06-04 DIAGNOSIS — H61.21 EXCESSIVE EAR WAX, RIGHT: Primary | ICD-10-CM

## 2018-06-04 PROCEDURE — 99214 OFFICE O/P EST MOD 30 MIN: CPT | Performed by: INTERNAL MEDICINE

## 2018-06-08 ENCOUNTER — OFFICE VISIT (OUTPATIENT)
Dept: INTERNAL MEDICINE CLINIC | Facility: CLINIC | Age: 63
End: 2018-06-08
Payer: MEDICARE

## 2018-06-08 VITALS
HEIGHT: 65 IN | TEMPERATURE: 97.8 F | WEIGHT: 223.4 LBS | HEART RATE: 100 BPM | BODY MASS INDEX: 37.22 KG/M2 | SYSTOLIC BLOOD PRESSURE: 104 MMHG | DIASTOLIC BLOOD PRESSURE: 80 MMHG | OXYGEN SATURATION: 97 %

## 2018-06-08 DIAGNOSIS — H61.21 IMPACTED CERUMEN OF RIGHT EAR: Primary | ICD-10-CM

## 2018-06-08 DIAGNOSIS — L03.311 CELLULITIS OF ABDOMINAL WALL: ICD-10-CM

## 2018-06-08 PROBLEM — L03.90 CELLULITIS: Status: ACTIVE | Noted: 2018-06-08

## 2018-06-08 PROCEDURE — 99213 OFFICE O/P EST LOW 20 MIN: CPT | Performed by: INTERNAL MEDICINE

## 2018-06-08 RX ORDER — CEPHALEXIN 500 MG/1
500 CAPSULE ORAL EVERY 6 HOURS SCHEDULED
Qty: 40 CAPSULE | Refills: 0 | Status: SHIPPED | OUTPATIENT
Start: 2018-06-08 | End: 2018-06-18

## 2018-06-08 NOTE — PROGRESS NOTES
Assessment/Plan:     1  Impacted wax right ear   right ear lavage done with the water irrigation and all of the packs removed  Tympanic membrane visualized and appears normal   Patient tolerated the procedure very well     2  Cellulitis right upper abdominal wall   will start on Keflex 500 mg q I d  For 10 days  Also advised for Neosporin application and a bandage for 5 days     Diagnoses and all orders for this visit:    Impacted cerumen of right ear    Cellulitis of abdominal wall          Subjective:         Patient ID: Ananda Campbell is a 57 y o  female  Patient came to the office with Complaining of excessive wax in right ear and difficulty in hearing  Also noticed redness over the left upper abdominal wall along with some drainage from the area  Denied any fever chills        The following portions of the patient's history were reviewed and updated as appropriate: allergies, current medications, past family history, past medical history, past social history, past surgical history and problem list     Review of Systems   Constitutional: Negative for fatigue and fever  HENT: Positive for hearing loss  Negative for congestion, ear discharge, ear pain, postnasal drip, sinus pressure, sore throat, tinnitus and trouble swallowing  Eyes: Negative for discharge, itching and visual disturbance  Respiratory: Negative for cough and shortness of breath  Cardiovascular: Negative for chest pain and palpitations  Gastrointestinal: Negative for abdominal pain, diarrhea, nausea and vomiting  Endocrine: Negative for cold intolerance and polyuria  Genitourinary: Negative for difficulty urinating, dysuria and urgency  Musculoskeletal: Negative for arthralgias and neck pain  Skin: Positive for rash  Allergic/Immunologic: Negative for environmental allergies  Neurological: Negative for dizziness, weakness and headaches  Psychiatric/Behavioral: Negative for agitation   The patient is not nervous/anxious            Past Medical History:   Diagnosis Date    Benign essential hypertension     resolved; 06/15/16    Fracture of fifth metatarsal bone of right foot with delayed healing     last assessed 04/12/16; fracture of metatarsal bone, right, closed, initial encounter    Hyperlipidemia     last assessed 11/28/17    Hypothyroidism     last assessed 11/28/2017    Insomnia     Obesity     [facility] )     last assessed 05/18/2017    Seizure disorder ([facility] )     last assessed 03/28/17         Current Outpatient Prescriptions:     acetaminophen (TYLENOL) 325 mg tablet, Take by mouth, Disp: , Rfl:     alendronate (FOSAMAX) 70 mg tablet, Take by mouth, Disp: , Rfl:     asenapine (SAPHRIS) 10 mg SL tablet, Place 10 mg under the tongue Take 10 mg tablets BID and 5 MG Tablets BID , Disp: , Rfl:     aspirin 81 mg chewable tablet, Chew, Disp: , Rfl:     atorvastatin (LIPITOR) 20 mg tablet, Take by mouth, Disp: , Rfl:     busPIRone (BUSPAR) 15 mg tablet, Take by mouth, Disp: , Rfl:     calcium carbonate-vitamin D (OSCAL-D) 500 mg-200 units per tablet, Take by mouth, Disp: , Rfl:     carbamide peroxide (DEBROX) 6 5 % otic solution, Administer 10 drops to the right ear 2 (two) times a day, Disp: 15 mL, Rfl: 0    Dextromethorphan Polistirex ER (DELSYM) 30 MG/5ML SUER, Take by mouth, Disp: , Rfl:     diphenhydrAMINE (BENADRYL) 50 mg capsule, Take by mouth, Disp: , Rfl:     divalproex sodium (DEPAKOTE) 500 mg EC tablet, Take by mouth, Disp: , Rfl:     docusate sodium (COLACE) 100 mg capsule, Take 1 capsule by mouth 2 (two) times a day as needed, Disp: , Rfl:     fluticasone (FLONASE) 50 mcg/act nasal spray, into each nostril, Disp: , Rfl:     gabapentin (NEURONTIN) 100 mg capsule, Take by mouth, Disp: , Rfl:     L-Theanine 100 MG CAPS, Take by mouth, Disp: , Rfl:     levothyroxine 100 mcg tablet, Take by mouth, Disp: , Rfl:     LORazepam (ATIVAN) 1 mg tablet, Take 1 tablet by mouth 2 (two) times a day, Disp: , Rfl:     loxapine (LOXITANE) 50 MG capsule, Take by mouth, Disp: , Rfl:     Melatonin 5 MG TABS, Take 1 tablet by mouth daily, Disp: , Rfl:     Multiple Vitamins-Iron (DAILY-KACI/IRON) TABS, Take by mouth, Disp: , Rfl:     PHENobarbital 64 8 mg tablet, Take 1 tablet (64 8 mg total) by mouth 2 (two) times a day, Disp: 60 tablet, Rfl: 5    Polyethylene Glycol 3350-GRX POWD, Take by mouth, Disp: , Rfl:     simvastatin (ZOCOR) 40 mg tablet, Take 40 mg by mouth, Disp: , Rfl:     sodium chloride (DEEP SEA NASAL SPRAY) 0 65 % nasal spray, into each nostril, Disp: , Rfl:     tetanus-diphtheria toxoids (DECAVAC 2-2) injection, Inject into the shoulder, thigh, or buttocks, Disp: , Rfl:     traZODone (DESYREL) 150 mg tablet, Take 1 tablet by mouth, Disp: , Rfl:     trihexyphenidyl (ARTANE) 2 mg tablet, Take 1 tablet by mouth 2 (two) times a day, Disp: , Rfl:     polyethylene glycol (GOLYTELY) 4000 mL solution, Take 4,000 mL by mouth once for 1 dose, Disp: 4000 mL, Rfl: 0    Allergies   Allergen Reactions    Clozapine      Other reaction(s): Other (See Comments)  low white blood count    Fluphenazine Hyperactivity     EPS, Hyperactivity    Haloperidol      Other reaction(s): Other (See Comments)  EPS    Lithium Other (See Comments)     Toxicity    Valproic Acid Confusion     "Mental confusion"       Social History   Past Surgical History:   Procedure Laterality Date    COLONOSCOPY      complete     Family History   Problem Relation Age of Onset    No Known Problems Mother     No Known Problems Father     Lung disease Other      mesothelioma       Objective:  /80 (BP Location: Left arm, Patient Position: Sitting, Cuff Size: Large)   Pulse 100   Temp 97 8 °F (36 6 °C) (Oral)   Ht 5' 5" (1 651 m)   Wt 101 kg (223 lb 6 4 oz)   SpO2 97%   BMI 37 18 kg/m²   Body mass index is 37 18 kg/m²  Physical Exam   Constitutional: She appears well-developed     HENT:   Head: Normocephalic  Mouth/Throat: Oropharynx is clear and moist    Excessive wax noted in right ear   Eyes: Pupils are equal, round, and reactive to light  No scleral icterus  Neck: Normal range of motion  Neck supple  No tracheal deviation present  No thyromegaly present  Cardiovascular: Normal rate, regular rhythm and normal heart sounds  Pulmonary/Chest: Effort normal and breath sounds normal  No respiratory distress  She exhibits no tenderness  Abdominal: Soft  Bowel sounds are normal  She exhibits no mass  There is no tenderness  Musculoskeletal: Normal range of motion  Lymphadenopathy:     She has no cervical adenopathy  Neurological: She is alert  No cranial nerve deficit  Skin: Skin is warm  Rash noted  Small area of hyperemia and induration noted over left upper abdominal wall with mild drainage   Psychiatric: She has a normal mood and affect

## 2018-07-03 ENCOUNTER — ANESTHESIA EVENT (OUTPATIENT)
Dept: GASTROENTEROLOGY | Facility: HOSPITAL | Age: 63
End: 2018-07-03

## 2018-07-05 ENCOUNTER — ANESTHESIA (OUTPATIENT)
Dept: GASTROENTEROLOGY | Facility: HOSPITAL | Age: 63
End: 2018-07-05

## 2018-07-10 ENCOUNTER — TELEPHONE (OUTPATIENT)
Dept: GASTROENTEROLOGY | Facility: MEDICAL CENTER | Age: 63
End: 2018-07-10

## 2018-07-10 PROBLEM — Z12.11 SPECIAL SCREENING FOR MALIGNANT NEOPLASMS, COLON: Status: ACTIVE | Noted: 2018-07-10

## 2018-07-10 NOTE — TELEPHONE ENCOUNTER
Pt's Nurse called to reschedule pt colon with Dr Elle Olea  She is scheduled at [address] [place] [address] on 8/30/18  The nurse is requesting new instructions  They will be mailed out

## 2018-07-17 ENCOUNTER — TELEPHONE (OUTPATIENT)
Dept: INTERNAL MEDICINE CLINIC | Age: 63
End: 2018-07-17

## 2018-07-17 ENCOUNTER — TRANSCRIBE ORDERS (OUTPATIENT)
Dept: ADMINISTRATIVE | Facility: HOSPITAL | Age: 63
End: 2018-07-17

## 2018-07-17 DIAGNOSIS — Z12.39 SCREENING BREAST EXAMINATION: Primary | ICD-10-CM

## 2018-07-20 ENCOUNTER — HOSPITAL ENCOUNTER (OUTPATIENT)
Dept: RADIOLOGY | Age: 63
Discharge: HOME | End: 2018-07-20
Payer: MEDICARE

## 2018-07-20 DIAGNOSIS — Z12.39 SCREENING FOR BREAST CANCER: Primary | ICD-10-CM

## 2018-07-20 DIAGNOSIS — Z12.39 SCREENING BREAST EXAMINATION: ICD-10-CM

## 2018-07-20 DIAGNOSIS — Z12.39 SCREENING FOR BREAST CANCER: ICD-10-CM

## 2018-07-20 PROCEDURE — 77067 SCR MAMMO BI INCL CAD: CPT

## 2018-07-31 ENCOUNTER — OFFICE VISIT (OUTPATIENT)
Dept: INTERNAL MEDICINE CLINIC | Facility: CLINIC | Age: 63
End: 2018-07-31
Payer: MEDICARE

## 2018-07-31 VITALS
TEMPERATURE: 98 F | OXYGEN SATURATION: 95 % | BODY MASS INDEX: 38.39 KG/M2 | WEIGHT: 230.4 LBS | HEIGHT: 65 IN | SYSTOLIC BLOOD PRESSURE: 110 MMHG | DIASTOLIC BLOOD PRESSURE: 62 MMHG | HEART RATE: 90 BPM

## 2018-07-31 DIAGNOSIS — E78.5 HYPERLIPIDEMIA, UNSPECIFIED HYPERLIPIDEMIA TYPE: Primary | ICD-10-CM

## 2018-07-31 DIAGNOSIS — E03.9 HYPOTHYROIDISM, UNSPECIFIED TYPE: ICD-10-CM

## 2018-07-31 PROCEDURE — 99214 OFFICE O/P EST MOD 30 MIN: CPT | Performed by: INTERNAL MEDICINE

## 2018-07-31 NOTE — PROGRESS NOTES
Assessment/Plan:    1  Hypothyroidism   will check TSH before next visit  Continue present dose of levothyroxine     2  Hyperlipidemia   continue with Lipitor  Will check lipid profile before next visit   3  Schizophrenia   being managed by psychiatrist     Diagnoses and all orders for this visit:    Hyperlipidemia, unspecified hyperlipidemia type  -     Basic metabolic panel; Future  -     Lipid Panel with Direct LDL reflex; Future  -     ALT; Future  -     AST; Future    Hypothyroidism, unspecified type  -     CBC; Future  -     Basic metabolic panel; Future  -     TSH, 3rd generation with Free T4 reflex; Future          Subjective:          Patient ID: Verito Dempsey is a 70 y o  female  Patient is here in the office for annual physical   She denied any new complaints  She is being followed by psychiatrist on a regular basis  No hospitalization over the last 1 year  She is up-to-date in her vaccination except pneumonia vaccination   Thyroid Problem   Presents for follow-up visit  Patient reports no anxiety, constipation, diarrhea, fatigue, hair loss, palpitations, weight gain or weight loss  The symptoms have been stable  The following portions of the patient's history were reviewed and updated as appropriate: allergies, current medications, past family history, past medical history, past social history, past surgical history and problem list     Review of Systems   Constitutional: Negative for fatigue, fever, weight gain and weight loss  HENT: Negative for congestion, ear discharge, ear pain, postnasal drip, sinus pressure, sore throat, tinnitus and trouble swallowing  Eyes: Negative for pain, discharge, itching and visual disturbance  Respiratory: Negative for cough and shortness of breath  Cardiovascular: Negative for chest pain and palpitations  Gastrointestinal: Negative for abdominal pain, constipation, diarrhea, nausea and vomiting  Endocrine: Negative for polyuria  Genitourinary: Negative for difficulty urinating, dysuria and urgency  Musculoskeletal: Negative for arthralgias and neck pain  Skin: Negative for rash  Allergic/Immunologic: Negative for environmental allergies  Neurological: Negative for dizziness, weakness and headaches  Psychiatric/Behavioral: Negative for agitation and confusion  The patient is not nervous/anxious            Past Medical History:   Diagnosis Date    Benign essential hypertension     resolved; 06/15/16    Fracture of fifth metatarsal bone of right foot with delayed healing     last assessed 04/12/16; fracture of metatarsal bone, right, closed, initial encounter    Hyperlipidemia     last assessed 11/28/17    Hypothyroidism     last assessed 11/28/2017    Insomnia     Obesity     Schizoaffective disorder ([facility] [address] )     last assessed 05/18/2017    Seizure disorder ([facility] )     last assessed 03/28/17         Current Outpatient Prescriptions:     acetaminophen (TYLENOL) 325 mg tablet, Take by mouth, Disp: , Rfl:     alendronate (FOSAMAX) 70 mg tablet, Take by mouth, Disp: , Rfl:     asenapine (SAPHRIS) 10 mg SL tablet, Place 10 mg under the tongue Take 10 mg tablets BID and 5 MG Tablets BID , Disp: , Rfl:     aspirin 81 mg chewable tablet, Chew, Disp: , Rfl:     atorvastatin (LIPITOR) 20 mg tablet, Take by mouth, Disp: , Rfl:     busPIRone (BUSPAR) 15 mg tablet, Take by mouth, Disp: , Rfl:     calcium carbonate-vitamin D (OSCAL-D) 500 mg-200 units per tablet, Take by mouth, Disp: , Rfl:     carbamide peroxide (DEBROX) 6 5 % otic solution, Administer 10 drops to the right ear 2 (two) times a day, Disp: 15 mL, Rfl: 0    Dextromethorphan Polistirex ER (DELSYM) 30 MG/5ML SUER, Take by mouth, Disp: , Rfl:     diphenhydrAMINE (BENADRYL) 50 mg capsule, Take by mouth, Disp: , Rfl:     divalproex sodium (DEPAKOTE) 500 mg EC tablet, Take by mouth, Disp: , Rfl:     docusate sodium (COLACE) 100 mg capsule, Take 1 capsule by mouth 2 (two) times a day as needed, Disp: , Rfl:     fluticasone (FLONASE) 50 mcg/act nasal spray, into each nostril, Disp: , Rfl:     gabapentin (NEURONTIN) 100 mg capsule, Take by mouth, Disp: , Rfl:     L-Theanine 100 MG CAPS, Take by mouth, Disp: , Rfl:     levothyroxine 100 mcg tablet, Take by mouth, Disp: , Rfl:     LORazepam (ATIVAN) 1 mg tablet, Take 1 tablet by mouth 2 (two) times a day, Disp: , Rfl:     loxapine (LOXITANE) 50 MG capsule, Take by mouth, Disp: , Rfl:     Melatonin 5 MG TABS, Take 1 tablet by mouth daily, Disp: , Rfl:     Multiple Vitamins-Iron (DAILY-KACI/IRON) TABS, Take by mouth, Disp: , Rfl:     PHENobarbital 64 8 mg tablet, Take 1 tablet (64 8 mg total) by mouth 2 (two) times a day, Disp: 60 tablet, Rfl: 5    Polyethylene Glycol 3350-GRX POWD, Take by mouth, Disp: , Rfl:     simvastatin (ZOCOR) 40 mg tablet, Take 40 mg by mouth, Disp: , Rfl:     sodium chloride (DEEP SEA NASAL SPRAY) 0 65 % nasal spray, into each nostril, Disp: , Rfl:     tetanus-diphtheria toxoids (DECAVAC 2-2) injection, Inject into the shoulder, thigh, or buttocks, Disp: , Rfl:     traZODone (DESYREL) 150 mg tablet, Take 1 tablet by mouth, Disp: , Rfl:     trihexyphenidyl (ARTANE) 2 mg tablet, Take 1 tablet by mouth 2 (two) times a day, Disp: , Rfl:     polyethylene glycol (GOLYTELY) 4000 mL solution, Take 4,000 mL by mouth once for 1 dose, Disp: 4000 mL, Rfl: 0    Allergies   Allergen Reactions    Clozapine Other (See Comments)     low white blood count  Other reaction(s): Other (See Comments)  low white blood count    Fluphenazine Hyperactivity and Other (See Comments)     EPS, Hyperactivity  EPS, Hyperactivity    Haloperidol Other (See Comments)     EPS  Other reaction(s):  Other (See Comments)  EPS    Lithium Other (See Comments)     Toxicity    Valproic Acid Confusion and Other (See Comments)     "Mental confusion"  "Mental confusion"       Social History   Past Surgical History:   Procedure Laterality Date    COLONOSCOPY      complete     Family History   Problem Relation Age of Onset    No Known Problems Mother     No Known Problems Father     Lung disease Other         mesothelioma       Objective:  /62 (BP Location: Left arm, Patient Position: Sitting, Cuff Size: Adult)   Pulse 90   Temp 98 °F (36 7 °C) (Oral)   Ht 5' 5" (1 651 m)   Wt 105 kg (230 lb 6 4 oz)   SpO2 95%   BMI 38 34 kg/m²   Body mass index is 38 34 kg/m²  Physical Exam   Constitutional: She appears well-developed  HENT:   Head: Normocephalic  Right Ear: External ear normal    Left Ear: External ear normal    Mouth/Throat: Oropharynx is clear and moist    Eyes: Pupils are equal, round, and reactive to light  No scleral icterus  Neck: Normal range of motion  Neck supple  No tracheal deviation present  No thyromegaly present  Cardiovascular: Normal rate, regular rhythm and normal heart sounds  Pulmonary/Chest: Effort normal and breath sounds normal  No respiratory distress  She exhibits no tenderness  Abdominal: Soft  Bowel sounds are normal  She exhibits no mass  There is no tenderness  Musculoskeletal: Normal range of motion  She exhibits no edema  Lymphadenopathy:     She has no cervical adenopathy  Neurological: She is alert  No cranial nerve deficit  Skin: Skin is warm  No erythema  Psychiatric: She has a normal mood and affect

## 2018-08-07 ENCOUNTER — HOSPITAL ENCOUNTER (OUTPATIENT)
Dept: ULTRASOUND IMAGING | Facility: CLINIC | Age: 63
Discharge: HOME | End: 2018-08-07
Payer: MEDICARE

## 2018-08-07 ENCOUNTER — HOSPITAL ENCOUNTER (OUTPATIENT)
Dept: MAMMOGRAPHY | Facility: CLINIC | Age: 63
Discharge: HOME | End: 2018-08-07
Payer: MEDICARE

## 2018-08-07 DIAGNOSIS — R92.8 ABNORMAL MAMMOGRAM: ICD-10-CM

## 2018-08-07 PROCEDURE — 76642 ULTRASOUND BREAST LIMITED: CPT

## 2018-08-07 PROCEDURE — G0279 TOMOSYNTHESIS, MAMMO: HCPCS

## 2018-08-07 PROCEDURE — 77065 DX MAMMO INCL CAD UNI: CPT

## 2018-08-08 ENCOUNTER — CLINICAL SUPPORT (OUTPATIENT)
Dept: INTERNAL MEDICINE CLINIC | Facility: CLINIC | Age: 63
End: 2018-08-08
Payer: MEDICARE

## 2018-08-08 DIAGNOSIS — Z11.1 SCREENING FOR TUBERCULOSIS: Primary | ICD-10-CM

## 2018-08-08 PROCEDURE — 86580 TB INTRADERMAL TEST: CPT

## 2018-08-10 ENCOUNTER — CLINICAL SUPPORT (OUTPATIENT)
Dept: INTERNAL MEDICINE CLINIC | Facility: CLINIC | Age: 63
End: 2018-08-10

## 2018-08-10 DIAGNOSIS — Z11.1 ENCOUNTER FOR PPD SKIN TEST READING: Primary | ICD-10-CM

## 2018-08-10 LAB
INDURATION: NORMAL MM
TB SKIN TEST: NEGATIVE

## 2018-08-22 ENCOUNTER — TELEPHONE (OUTPATIENT)
Dept: GASTROENTEROLOGY | Facility: CLINIC | Age: 63
End: 2018-08-22

## 2018-08-22 DIAGNOSIS — Z12.11 SPECIAL SCREENING FOR MALIGNANT NEOPLASMS, COLON: Primary | ICD-10-CM

## 2018-08-22 NOTE — TELEPHONE ENCOUNTER
ASIF PATIENT      SHE HAS COLON ON 8-30-18 AND NEEDS HER PREP CALLED INTO [facility]  [phone], [id number] [phone]

## 2018-08-29 ENCOUNTER — ANESTHESIA EVENT (OUTPATIENT)
Dept: GASTROENTEROLOGY | Facility: HOSPITAL | Age: 63
End: 2018-08-29
Payer: MEDICARE

## 2018-08-30 ENCOUNTER — ANESTHESIA (OUTPATIENT)
Dept: GASTROENTEROLOGY | Facility: HOSPITAL | Age: 63
End: 2018-08-30
Payer: MEDICARE

## 2018-08-30 ENCOUNTER — HOSPITAL ENCOUNTER (OUTPATIENT)
Facility: HOSPITAL | Age: 63
Setting detail: OUTPATIENT SURGERY
Discharge: HOME | End: 2018-08-30
Attending: INTERNAL MEDICINE | Admitting: INTERNAL MEDICINE
Payer: MEDICARE

## 2018-08-30 VITALS
WEIGHT: 225 LBS | SYSTOLIC BLOOD PRESSURE: 133 MMHG | BODY MASS INDEX: 38.41 KG/M2 | OXYGEN SATURATION: 99 % | HEART RATE: 77 BPM | RESPIRATION RATE: 18 BRPM | HEIGHT: 64 IN | TEMPERATURE: 99 F | DIASTOLIC BLOOD PRESSURE: 75 MMHG

## 2018-08-30 DIAGNOSIS — Z12.11 SPECIAL SCREENING FOR MALIGNANT NEOPLASMS, COLON: ICD-10-CM

## 2018-08-30 PROCEDURE — 88305 TISSUE EXAM BY PATHOLOGIST: CPT | Performed by: PATHOLOGY

## 2018-08-30 PROCEDURE — 45385 COLONOSCOPY W/LESION REMOVAL: CPT | Performed by: INTERNAL MEDICINE

## 2018-08-30 RX ORDER — MIDAZOLAM HYDROCHLORIDE 1 MG/ML
INJECTION INTRAMUSCULAR; INTRAVENOUS AS NEEDED
Status: DISCONTINUED | OUTPATIENT
Start: 2018-08-30 | End: 2018-08-30 | Stop reason: SURG

## 2018-08-30 RX ORDER — PROPOFOL 10 MG/ML
INJECTION, EMULSION INTRAVENOUS AS NEEDED
Status: DISCONTINUED | OUTPATIENT
Start: 2018-08-30 | End: 2018-08-30 | Stop reason: SURG

## 2018-08-30 RX ORDER — SODIUM CHLORIDE 9 MG/ML
125 INJECTION, SOLUTION INTRAVENOUS CONTINUOUS
Status: DISCONTINUED | OUTPATIENT
Start: 2018-08-30 | End: 2018-08-30 | Stop reason: HOSPADM

## 2018-08-30 RX ADMIN — PROPOFOL 50 MG: 10 INJECTION, EMULSION INTRAVENOUS at 09:23

## 2018-08-30 RX ADMIN — PROPOFOL 30 MG: 10 INJECTION, EMULSION INTRAVENOUS at 09:38

## 2018-08-30 RX ADMIN — MIDAZOLAM 2 MG: 1 INJECTION INTRAMUSCULAR; INTRAVENOUS at 09:18

## 2018-08-30 RX ADMIN — PROPOFOL 150 MG: 10 INJECTION, EMULSION INTRAVENOUS at 09:18

## 2018-08-30 RX ADMIN — PROPOFOL 50 MG: 10 INJECTION, EMULSION INTRAVENOUS at 09:40

## 2018-08-30 RX ADMIN — SODIUM CHLORIDE 125 ML/HR: 0.9 INJECTION, SOLUTION INTRAVENOUS at 09:02

## 2018-08-30 RX ADMIN — LIDOCAINE HYDROCHLORIDE 50 MG: 20 INJECTION, SOLUTION INTRAVENOUS at 09:18

## 2018-08-30 RX ADMIN — PROPOFOL 40 MG: 10 INJECTION, EMULSION INTRAVENOUS at 09:27

## 2018-08-30 RX ADMIN — PROPOFOL 30 MG: 10 INJECTION, EMULSION INTRAVENOUS at 09:33

## 2018-08-30 NOTE — PROGRESS NOTES
D/C instructions given to Pt and caretaker who verbalize understanding  OOB to BR gait steDY DENIES DIZZINESS  Reports voided

## 2018-08-30 NOTE — ANESTHESIA PREPROCEDURE EVALUATION
Review of Systems/Medical History  Patient summary reviewed  Chart reviewed  No history of anesthetic complications     Cardiovascular  Hyperlipidemia, Hypertension controlled,    Pulmonary  Smoker ex-smoker  ,        GI/Hepatic    Bowel prep       Negative  ROS        Endo/Other  History of thyroid disease , hypothyroidism,   Obesity    GYN  Negative gynecology ROS          Hematology  Negative hematology ROS      Musculoskeletal  Negative musculoskeletal ROS        Neurology  Seizures well controlled,     Psychology   Anxiety, Depression , bipolar disorder, Schizophrenia             Physical Exam    Airway    Mallampati score: III  TM Distance: >3 FB  Neck ROM: full     Dental   No notable dental hx     Cardiovascular  Rhythm: regular, Rate: normal, Cardiovascular exam normal    Pulmonary  Pulmonary exam normal Breath sounds clear to auscultation,     Other Findings        Anesthesia Plan  ASA Score- 3     Anesthesia Type- IV sedation with anesthesia with ASA Monitors  Additional Monitors:   Airway Plan:         Plan Factors-Patient not instructed to abstain from smoking on day of procedure  Patient did not smoke on day of surgery  Induction- intravenous  Postoperative Plan-     Informed Consent- Anesthetic plan and risks discussed with patient  I personally reviewed this patient with the CRNA  Discussed and agreed on the Anesthesia Plan with the CRNA  Aylin Griffin

## 2018-08-30 NOTE — DISCHARGE INSTRUCTIONS
Findings:  6-12 mm polyp seen in transverse colon x3 removed with cold snare polypectomy  Colonic polyp measuring 5 mm seen in the transverse colon removed by cold snare polypectomy  Diverticulosis of the descending and sigmoid colon  Melanosis coli throughout the colon  Small internal hemorrhoids           Complications: None; patient tolerated the procedure well      Impression:    Multiple colonic polyps  Diverticulosis  Melanosis coli  Small internal hemorrhoids     Recommendations:  Repeat colonoscopy in 3 years if polyp is an adenoma                            Colorectal Polyps   WHAT YOU NEED TO KNOW:   Colorectal polyps are small growths of tissue in the lining of the colon and rectum  Most polyps are hyperplastic polyps and are usually benign (noncancerous)  Certain types of polyps, called adenomatous polyps, may turn into cancer  DISCHARGE INSTRUCTIONS:   Follow up with your healthcare provider or gastroenterologist as directed: You may need to return for more tests, such as another colonoscopy  Write down your questions so you remember to ask them during your visits  Reduce your risk for colorectal polyps:   · Eat a variety of healthy foods:  Healthy foods include fruit, vegetables, whole-grain breads, low-fat dairy products, beans, lean meat, and fish  Ask if you need to be on a special diet  · Maintain a healthy weight:  Ask your healthcare provider if you need to lose weight and how much you need to lose  Ask for help with a weight loss program     · Exercise:  Begin to exercise slowly and do more as you get stronger  Talk with your healthcare provider before you start an exercise program      · Limit alcohol:  Your risk for polyps increases the more you drink  · Do not smoke: If you smoke, it is never too late to quit  Ask for information about how to stop    For support and more information:   · Sandeep Crawford ([employer])  [address] [place] [address]  Phone: [phone]  Web Address: www digestive  niddk nih gov  Contact your healthcare provider or gastroenterologist if:   · You have a fever  · You have chills, a cough, or feel weak and achy  · You have abdominal pain that does not go away or gets worse after you take medicine  · Your abdomen is swollen  · You are losing weight without trying  · You have questions or concerns about your condition or care  Seek care immediately or call 911 if:   · You have sudden shortness of breath  · You have a fast heart rate, fast breathing, or are too dizzy to stand up  · You have severe abdominal pain  · You see blood in your bowel movement  © 2017 [address] [facility] Information is for End User's use only and may not be sold, redistributed or otherwise used for commercial purposes  All illustrations and images included in CareNotes® are the copyrighted property of A [employer] A M [employer] Inc  or Kimani Sorenson  The above information is an  only  It is not intended as medical advice for individual conditions or treatments  Talk to your doctor, nurse or pharmacist before following any medical regimen to see if it is safe and effective for you  Colonoscopy   WHAT YOU NEED TO KNOW:   A colonoscopy is a procedure to examine the inside of your colon (intestine) with a scope  Polyps or tissue growths may have been removed during your colonoscopy  It is normal to feel bloated and to have some abdominal discomfort  You should be passing gas  If you have hemorrhoids or you had polyps removed, you may have a small amount of bleeding  DISCHARGE INSTRUCTIONS:   Seek care immediately if:   · You have a large amount of bright red blood in your bowel movements  · Your abdomen is hard and firm and you have severe pain  · You have sudden trouble breathing  Contact your healthcare provider if:   · You develop a rash or hives      · You have a fever within 24 hours of your procedure  · You have not had a bowel movement for 3 days after your procedure  · You have questions or concerns about your condition or care  Activity:   · Do not lift, strain, or run  for 3 days after your procedure  · Rest after your procedure  You have been given medicine to relax you  Do not  drive or make important decisions until the day after your procedure  Return to your normal activity as directed  · Relieve gas and discomfort from bloating  by lying on your right side with a heating pad on your abdomen  You may need to take short walks to help the gas move out  Eat small meals until bloating is relieved  If you had polyps removed: For 7 days after your procedure:  · Do not  take aspirin  · Do not  go on long car rides  Help prevent constipation:   · Eat a variety of healthy foods  Healthy foods include fruit, vegetables, whole-grain breads, low-fat dairy products, beans, lean meat, and fish  Ask if you need to be on a special diet  Your healthcare provider may recommend that you eat high-fiber foods such as cooked beans  Fiber helps you have regular bowel movements  · Drink liquids as directed  Adults should drink between 9 and 13 eight-ounce cups of liquid every day  Ask what amount is best for you  For most people, good liquids to drink are water, juice, and milk  · Exercise as directed  Talk to your healthcare provider about the best exercise plan for you  Exercise can help prevent constipation, decrease your blood pressure and improve your health  Follow up with your healthcare provider as directed:  Write down your questions so you remember to ask them during your visits  © 2017 [address] [facility] Information is for End User's use only and may not be sold, redistributed or otherwise used for commercial purposes  All illustrations and images included in CareNotes® are the copyrighted property of A D A M [employer] Inc  or Kimani Sorenson    The above information is an  only  It is not intended as medical advice for individual conditions or treatments  Talk to your doctor, nurse or pharmacist before following any medical regimen to see if it is safe and effective for you  Diverticulosis   WHAT YOU NEED TO KNOW:   Diverticulosis is a condition that causes small pockets called diverticula to form in your intestine  These pockets make it difficult for bowel movements to pass through your digestive system  DISCHARGE INSTRUCTIONS:   Seek care immediately if:   · You have severe pain on the left side of your lower abdomen  · Your bowel movements are bright or dark red  Contact your healthcare provider if:   · You have a fever and chills  · You feel dizzy or lightheaded  · You have nausea, or you are vomiting  · You have a change in your bowel movements  · You have questions or concerns about your condition or care  Medicines:   · Medicines  to soften your bowel movements may be given  You may also need medicines to treat symptoms such as bloating and pain  · Take your medicine as directed  Contact your healthcare provider if you think your medicine is not helping or if you have side effects  Tell him or her if you are allergic to any medicine  Keep a list of the medicines, vitamins, and herbs you take  Include the amounts, and when and why you take them  Bring the list or the pill bottles to follow-up visits  Carry your medicine list with you in case of an emergency  Self-care: The goal of treatment is to manage any symptoms you have and prevent other problems such as diverticulitis  Diverticulitis is swelling or infection of the diverticula  Your healthcare provider may recommend any of the following:  · Eat a variety of high-fiber foods  High-fiber foods help you have regular bowel movements  High-fiber foods include cooked beans, fruits, vegetables, and some cereals   Most adults need 25 to 35 grams of fiber each day  Your healthcare provider may recommend that you have more  Ask your healthcare provider how much fiber you need  Increase fiber slowly  You may have abdominal discomfort, bloating, and gas if you add fiber to your diet too quickly  You may need to take a fiber supplement if you are not getting enough fiber from food  · Drink liquids as directed  You may need to drink 2 to 3 liters (8 to 12 cups) of liquids every day  Ask your healthcare provider how much liquid to drink each day and which liquids are best for you  · Apply heat  on your abdomen for 20 to 30 minutes every 2 hours for as many days as directed  Heat helps decrease pain and muscle spasms  Help prevent diverticulitis or other symptoms: The following may help decrease your risk for diverticulitis or symptoms, such as bleeding  Talk to your provider about these or other things you can do to prevent problems that may occur with diverticulosis  · Exercise regularly  Ask your healthcare provider about the best exercise plan for you  Exercise can help you have regular bowel movements  Get 30 minutes of exercise on most days of the week  · Maintain a healthy weight  Ask your healthcare provider how much you should weigh  Ask him or her to help you create a weight loss plan if you are overweight  · Do not smoke  Nicotine and other chemicals in cigarettes increase your risk for diverticulitis  Ask your healthcare provider for information if you currently smoke and need help to quit  E-cigarettes or smokeless tobacco still contain nicotine  Talk to your healthcare provider before you use these products  · Ask your healthcare provider if it is safe to take NSAIDs  NSAIDs may increase your risk of diverticulitis  Follow up with your healthcare provider as directed:  Write down your questions so you remember to ask them during your visits     © 2017 [address] [employer] Information is for End User's use only and may not be sold, redistributed or otherwise used for commercial purposes  All illustrations and images included in CareNotes® are the copyrighted property of A [employer] A M [employer] Inc  or Kimani Sorenson  The above information is an  only  It is not intended as medical advice for individual conditions or treatments  Talk to your doctor, nurse or pharmacist before following any medical regimen to see if it is safe and effective for you  Diverticulosis Diet   WHAT YOU NEED TO KNOW:   What is a diverticulosis diet? A diverticulosis diet includes high-fiber foods  High-fiber foods help you have regular bowel movements  Extra fiber may decrease your risk of forming new diverticula (small pockets) in your intestine  A high-fiber diet may also help prevent diverticulitis  Diverticulitis is a painful condition that occurs when diverticula become inflamed or infected  You do not need to avoid nuts, seeds, corn, or popcorn while you are on a diverticulosis diet  How much fiber do I need? You may need 25 to 35 grams of fiber each day  Ask your dietitian or healthcare provider how much fiber you should have  Increase your intake of fiber slowly  When you eat more fiber, you may have gas and feel bloated  You may need to take a fiber supplement if you do not get enough fiber from food  Drink plenty of liquids as you increase the fiber in your diet  Your dietitian or healthcare provider may recommend 8 eight-ounce cups or more each day  Ask which liquids are best for you  Which foods are high in fiber?    · Foods with at least 4 grams of fiber per serving:      ¨ ? to ½ cup of high-fiber cereal (check the nutrition label on the box)    ¨ ½ cup of blackberries or raspberries    ¨ 4 dried prunes    ¨ 1 cooked artichoke    ¨ ½ cup of cooked legumes, such as lentils, or red, kidney, and cantrell beans    · Foods with 1 to 3 grams of fiber per serving:      ¨ 1 slice of whole-wheat, pumpernickel, or rye bread    ¨ 4 whole-wheat crackers    ¨ ½ cup of cereal with 1 to 3 grams of fiber per serving (check the nutrition label on the box)    ¨ 1 piece of fruit, such as an apple, banana, pear, kiwi, or orange    ¨ 3 dates    ¨ ½ cup of canned apricots, fruit cocktail, peaches, or pears    ¨ ½ cup of raw or cooked vegetables, such as carrots, cauliflower, cabbage, spinach, squash, or corn  When should I contact my healthcare provider? · You have questions about a high-fiber diet  · You have a change in your bowel movements  · You have an upset stomach  · You have a fever  · You have pain in your lower abdomen on the left side  · You have questions about your condition or care  CARE AGREEMENT:   You have the right to help plan your care  Learn about your health condition and how it may be treated  Discuss treatment options with your caregivers to decide what care you want to receive  You always have the right to refuse treatment  The above information is an  only  It is not intended as medical advice for individual conditions or treatments  Talk to your doctor, nurse or pharmacist before following any medical regimen to see if it is safe and effective for you  © 2017 [facility] Information is for End User's use only and may not be sold, redistributed or otherwise used for commercial purposes  All illustrations and images included in CareNotes® are the copyrighted property of A D A M , Inc  or Kimani Sorenson  Hemorrhoids   WHAT YOU NEED TO KNOW:   Hemorrhoids are swollen blood vessels inside your rectum (internal hemorrhoids) or on your anus (external hemorrhoids)  Sometimes a hemorrhoid may prolapse  This means it extends out of your anus  DISCHARGE INSTRUCTIONS:   Seek care immediately if:   · You have severe pain in your rectum or around your anus  · You have severe pain in your abdomen and you are vomiting       · You have bleeding from your anus that soaks through your underwear  Contact your healthcare provider if:   · You have frequent and painful bowel movements  · Your hemorrhoid looks or feels more swollen than usual      · You do not have a bowel movement for 2 days or more  · You see or feel tissue coming through your anus  · You have questions or concerns about your condition or care  Medicines: You may  need any of the following:  · Medicine  may be given to decrease pain, swelling, and itching  The medicine may come as a pad, cream, or ointment  · Stool softeners  help treat or prevent constipation  · NSAIDs , such as ibuprofen, help decrease swelling, pain, and fever  NSAIDs can cause stomach bleeding or kidney problems in certain people  If you take blood thinner medicine, always ask your healthcare provider if NSAIDs are safe for you  Always read the medicine label and follow directions  · Take your medicine as directed  Contact your healthcare provider if you think your medicine is not helping or if you have side effects  Tell him or her if you are allergic to any medicine  Keep a list of the medicines, vitamins, and herbs you take  Include the amounts, and when and why you take them  Bring the list or the pill bottles to follow-up visits  Carry your medicine list with you in case of an emergency  Manage your symptoms:   · Apply ice on your anus for 15 to 20 minutes every hour or as directed  Use an ice pack, or put crushed ice in a plastic bag  Cover it with a towel before you apply it to your anus  Ice helps prevent tissue damage and decreases swelling and pain  · Take a sitz bath  Fill a bathtub with 4 to 6 inches of warm water  You may also use a sitz bath pan that fits inside a toilet bowl  Sit in the sitz bath for 15 minutes  Do this 3 times a day, and after each bowel movement  The warm water can help decrease pain and swelling  · Keep your anal area clean  Gently wash the area with warm water daily   Soap may irritate the area  After a bowel movement, wipe with moist towelettes or wet toilet paper  Dry toilet paper can irritate the area  Prevent hemorrhoids:   · Do not strain to have a bowel movement  Do not sit on the toilet too long  These actions can increase pressure on the tissues in your rectum and anus  · Drink plenty of liquids  Liquids can help prevent constipation  Ask how much liquid to drink each day and which liquids are best for you  · Eat a variety of high-fiber foods  Examples include fruits, vegetables, and whole grains  Ask your healthcare provider how much fiber you need each day  You may need to take a fiber supplement  · Exercise as directed  Exercise, such as walking, may make it easier to have a bowel movement  Ask your healthcare provider to help you create an exercise plan  · Do not have anal sex  Anal sex can weaken the skin around your rectum and anus  · Avoid heavy lifting  This can cause straining and increase your risk for another hemorrhoid  Follow up with your healthcare provider as directed:  Write down your questions so you remember to ask them during your visits  © 2017 [address] [facility] Information is for End User's use only and may not be sold, redistributed or otherwise used for commercial purposes  All illustrations and images included in CareNotes® are the copyrighted property of A [employer] A M [employer] Inc  or Kimani Sorenson  The above information is an  only  It is not intended as medical advice for individual conditions or treatments  Talk to your doctor, nurse or pharmacist before following any medical regimen to see if it is safe and effective for you  Melanosis Coli   WHAT YOU NEED TO KNOW:   Melanosis coli is a condition that causes tissues in your colon to become darker  The tissues are normally pink  The color change happens after you use certain laxatives for several years to help you have a bowel movement   The change is found during a routine colonoscopy used to check for cancer  It is important to prevent constipation so you will not need to use laxatives to help you have a bowel movement  DISCHARGE INSTRUCTIONS:   Contact your healthcare provider if:   · You have been taking large amounts of laxatives for many years and need help to stop  · You have constipation that does not get better or gets worse  · You have questions or concerns about your condition or care  Prevent constipation:  Your colon may start to depend on laxatives if you have used laxatives for a long time  The following can help prevent constipation so you will not need to use laxatives to have a bowel movement:  · Drink liquids as directed  Adults should drink between 9 and 13 eight-ounce cups of liquid every day  Ask your healthcare provider which liquids to drink and how much to drink each day  · Eat high-fiber foods  This may help decrease constipation by adding bulk to your bowel movements  High-fiber foods include fruit, vegetables, whole-grain breads and cereals, and beans  Your healthcare provider or dietitian can help you create a high-fiber meal plan  · Exercise regularly  Regular physical activity can help stimulate your intestines  Ask which exercises are best for you  · Schedule a time each day to have a bowel movement  This may help train your body to have regular bowel movements  Bend forward while you are on the toilet to help move the bowel movement out  Sit on the toilet for at least 10 minutes, even if you do not have a bowel movement  Follow up with your healthcare provider as directed:  Write down your questions so you remember to ask them during your visits  © 2017 [address] [employer] Information is for End User's use only and may not be sold, redistributed or otherwise used for commercial purposes   All illustrations and images included in CareNotes® are the copyrighted property of A  [employer] M [employer]  or Kimani Sorenson  The above information is an  only  It is not intended as medical advice for individual conditions or treatments  Talk to your doctor, nurse or pharmacist before following any medical regimen to see if it is safe and effective for you

## 2018-08-30 NOTE — PROCEDURES
Colonoscopy Procedure Note    Procedure: Colonoscopy    Sedation: Monitored anesthesia care, check anesthesia records      ASA Class: 3    INDICATIONS:   Screening    POST-OP DIAGNOSIS: See the impression below    Procedure Details     Prior colonoscopy: More than 10 years ago  Informed consent was obtained for the procedure, including sedation  Risks of perforation, hemorrhage, adverse drug reaction and aspiration were discussed  The patient was placed in the left lateral decubitus position  Based on the pre-procedure assessment, including review of the patient's medical history, medications, allergies, and review of systems, she had been deemed to be an appropriate candidate for conscious sedation; she was therefore sedated with the medications listed below  The patient was monitored continuously with telemetry, pulse oximetry, blood pressure monitoring, and direct observations  A rectal examination was performed  The colonoscope was inserted into the rectum and advanced under direct vision to the cecum, which was identified by the ileocecal valve and appendiceal orifice  The quality of the colonic preparation was good  A careful inspection was made as the colonoscope was withdrawn, including a retroflexed view of the rectum; findings and interventions are described below  Findings:  6-12 mm polyp seen in transverse colon x3 removed with cold snare polypectomy  Colonic polyp measuring 5 mm seen in the transverse colon removed by cold snare polypectomy  Diverticulosis of the descending and sigmoid colon  Melanosis coli throughout the colon  Small internal hemorrhoids           Complications: None; patient tolerated the procedure well  Impression:    Multiple colonic polyps  Diverticulosis  Melanosis coli  Small internal hemorrhoids    Recommendations:  Repeat colonoscopy in 3 years if polyp is an adenoma

## 2018-09-12 ENCOUNTER — OFFICE VISIT (OUTPATIENT)
Dept: INTERNAL MEDICINE CLINIC | Facility: CLINIC | Age: 63
End: 2018-09-12
Payer: MEDICARE

## 2018-09-12 VITALS
TEMPERATURE: 99 F | WEIGHT: 231 LBS | HEIGHT: 64 IN | DIASTOLIC BLOOD PRESSURE: 84 MMHG | HEART RATE: 89 BPM | OXYGEN SATURATION: 98 % | SYSTOLIC BLOOD PRESSURE: 126 MMHG | BODY MASS INDEX: 39.44 KG/M2
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
## 2018-09-12 DIAGNOSIS — S89.91XA RIGHT KNEE INJURY, INITIAL ENCOUNTER: ICD-10-CM

## 2018-09-12 DIAGNOSIS — S99.921A INJURY OF RIGHT TOE, INITIAL ENCOUNTER: ICD-10-CM

## 2018-09-12 PROCEDURE — 99213 OFFICE O/P EST LOW 20 MIN: CPT | Performed by: INTERNAL MEDICINE

## 2018-09-12 NOTE — PROGRESS NOTES
Assessment/Plan:    Right knee injury, initial encounter   Will order an x-ray of the right knee for further evaluation  No need for Intra-articular injection at this time  Continue with cold compress as well as as needed Tylenol for pain  Will also refer to physical therapy for evaluation treatment  Injury of right toe, initial encounter   Will obtain x-rays of the right great toe for evaluation of fracture  Diagnoses and all orders for this visit:    Fall, initial encounter  -     XR knee 4+ vw right injury; Future  -     XR toe right great min 2 view; Future  -     Ambulatory referral to Physical Therapy; Future    Right knee injury, initial encounter  -     XR knee 4+ vw right injury; Future  -     Ambulatory referral to Physical Therapy; Future    Injury of right toe, initial encounter  -     XR toe right great min 2 view; Future  -     Ambulatory referral to Physical Therapy; Future          Subjective:      Patient ID: Princess Owusu is a 70 y o  female  Ms Victoria Dumont is in the office today for evaluation of knee pain  She went to  a piece of paper and stubbed her toe on a pair of sneakers, causing her to fall to the ground hitting her right knee  This occurred 3 days ago  She is currently in no pain and says she is able to wear sneakers again because the swelling has decreased  She has been able to ambulate, but states that the pain in the toe made it difficult  There is still a considerable contusion around the right knee and some on the first metatarsal on the right foot  The left leg was not affected at all  The patient stated she never put ice on the knee or toes  She denies any loss of consciousness/syncope  She denies any head trauma          The following portions of the patient's history were reviewed and updated as appropriate: allergies, current medications, past family history, past medical history, past social history, past surgical history and problem list     Review of Systems   Constitutional: Negative for activity change, appetite change, chills, diaphoresis, fatigue and fever  HENT: Negative for congestion, postnasal drip, rhinorrhea, sinus pain, sinus pressure, sneezing and sore throat  Eyes: Negative for visual disturbance  Respiratory: Negative for apnea, cough, choking, chest tightness, shortness of breath and wheezing  Cardiovascular: Negative for chest pain, palpitations and leg swelling  Gastrointestinal: Negative for abdominal distention, abdominal pain, anal bleeding, blood in stool, constipation, diarrhea, nausea and vomiting  Endocrine: Negative for cold intolerance and heat intolerance  Genitourinary: Negative for difficulty urinating, dysuria and hematuria  Musculoskeletal: Positive for arthralgias (Tenderness with ambulation of right great toe )  Skin: Positive for color change (Ecchymosis of right great toe and right knee)  Neurological: Negative for dizziness, weakness, light-headedness, numbness and headaches  Hematological: Negative for adenopathy  Psychiatric/Behavioral: Negative for agitation, sleep disturbance and suicidal ideas  All other systems reviewed and are negative  Objective:      /84 (BP Location: Left arm, Patient Position: Sitting, Cuff Size: Standard)   Pulse 89   Temp 99 °F (37 2 °C) (Oral)   Ht 5' 4" (1 626 m)   Wt 105 kg (231 lb)   SpO2 98%   BMI 39 65 kg/m²          Physical Exam   Constitutional: She is oriented to person, place, and time  She appears well-developed and well-nourished  No distress  HENT:   Head: Normocephalic and atraumatic  Eyes: Conjunctivae and EOM are normal  Pupils are equal, round, and reactive to light  Right eye exhibits no discharge  Left eye exhibits no discharge  Neck: Normal range of motion  Neck supple  No JVD present  No thyromegaly present  Cardiovascular: Normal rate, regular rhythm, normal heart sounds and intact distal pulses    Exam reveals no gallop and no friction rub  No murmur heard  Pulmonary/Chest: Effort normal and breath sounds normal  No respiratory distress  She has no wheezes  She has no rales  She exhibits no tenderness  Abdominal: Soft  She exhibits no distension  There is no tenderness  Musculoskeletal: Normal range of motion  She exhibits no edema, tenderness or deformity  Lymphadenopathy:     She has no cervical adenopathy  Neurological: She is alert and oriented to person, place, and time  No cranial nerve deficit  Coordination normal    Skin: Skin is warm and dry  No rash noted  She is not diaphoretic  No erythema  No pallor  Psychiatric: She has a normal mood and affect  Her behavior is normal  Judgment and thought content normal    Nursing note and vitals reviewed

## 2018-09-12 NOTE — ASSESSMENT & PLAN NOTE
Will order an x-ray of the right knee for further evaluation  No need for Intra-articular injection at this time  Continue with cold compress as well as as needed Tylenol for pain  Will also refer to physical therapy for evaluation treatment

## 2018-09-15 ENCOUNTER — APPOINTMENT (OUTPATIENT)
Dept: RADIOLOGY | Age: 63
End: 2018-09-15
Payer: MEDICARE

## 2018-09-15 DIAGNOSIS — S89.91XA RIGHT KNEE INJURY, INITIAL ENCOUNTER: ICD-10-CM

## 2018-09-15 DIAGNOSIS — S99.921A INJURY OF RIGHT TOE, INITIAL ENCOUNTER: ICD-10-CM
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
## 2018-09-15 PROCEDURE — 73564 X-RAY EXAM KNEE 4 OR MORE: CPT

## 2018-09-15 PROCEDURE — 73660 X-RAY EXAM OF TOE(S): CPT

## 2018-09-20 DIAGNOSIS — S92.311A CLOSED DISPLACED FRACTURE OF FIRST METATARSAL BONE OF RIGHT FOOT, INITIAL ENCOUNTER: Primary | ICD-10-CM

## 2018-09-20 NOTE — PROGRESS NOTES
Contacted patient at nursing facility, spoke to nurse's aide  Informed the nurses aide that the patient has a right 1st metatarsal displaced fracture and she will need further evaluation by Podiatry for treatment  Referral for podiatry submitted  Facility will call [facility] central scheduling to set up appointment

## 2018-10-15 ENCOUNTER — IMMUNIZATION (OUTPATIENT)
Dept: INTERNAL MEDICINE CLINIC | Facility: CLINIC | Age: 63
End: 2018-10-15
Payer: MEDICARE

## 2018-10-15 DIAGNOSIS — Z23 ENCOUNTER FOR IMMUNIZATION: ICD-10-CM

## 2018-10-15 PROCEDURE — G0008 ADMIN INFLUENZA VIRUS VAC: HCPCS

## 2018-10-15 PROCEDURE — 90662 IIV NO PRSV INCREASED AG IM: CPT

## 2018-10-24 ENCOUNTER — OFFICE VISIT (OUTPATIENT)
Dept: NEUROLOGY | Facility: CLINIC | Age: 63
End: 2018-10-24
Payer: MEDICARE

## 2018-10-24 VITALS
WEIGHT: 235.4 LBS | HEIGHT: 64 IN | HEART RATE: 89 BPM | SYSTOLIC BLOOD PRESSURE: 126 MMHG | DIASTOLIC BLOOD PRESSURE: 74 MMHG | BODY MASS INDEX: 40.19 KG/M2

## 2018-10-24 DIAGNOSIS — G40.909 SEIZURE DISORDER (HCC): Primary | ICD-10-CM

## 2018-10-24 DIAGNOSIS — R56.9 SEIZURE (HCC): ICD-10-CM

## 2018-10-24 PROCEDURE — 99214 OFFICE O/P EST MOD 30 MIN: CPT | Performed by: PSYCHIATRY & NEUROLOGY

## 2018-10-24 RX ORDER — PHENOBARBITAL 64.8 MG/1
64.8 TABLET ORAL 2 TIMES DAILY
Qty: 60 TABLET | Refills: 5 | Status: SHIPPED | OUTPATIENT
Start: 2018-10-24 | End: 2019-05-06

## 2018-10-24 NOTE — PROGRESS NOTES
[place] [address] [facility] [address]  Follow Up Visit    Impression/Plan    Ms  Stiven Membreno is a 70 y o  female with unclear epilepsy syndrome and schizophrenia  She reported 9 years of seizure freedom until in 12/2014 she had an event a few days after stopping phenobarbital  Multiple nights of insomnia preceded the event  The event itself was poorly characterized, but consisted of fear/panic that is apparently typical of her seizures  It is possible she had a focal seizure, but anxiety/psychosis related to sleep deprivation and withdrawal of phenobarbital is another explanation that better fits her prolonged symptoms and associated hallucinations  She has had similar reactions when other medications have been changed suggesting it was most likely related to anxiety  She feels things improved after she went back on phenobarbital  Her EEG in early 2015 was normal  Her osteoporosis may be related to phenobarbital use, but at this point her seizures are controlled and things are going well, so she will continue phenobarbital  She is also on gabapentin  Valproate is prescribed by psychiatry and is not specifically prescribed for seizure  She is on bisphosphonate therapy  Her most recent DEXA was essentially stable with some improvement at the hip  Patient Instructions   1  Continue medications unchanged  2  Return in about 1 year  Diagnoses and all orders for this visit:    Seizure ([address] )  -     PHENobarbital 64 8 mg tablet; Take 1 tablet (64 8 mg total) by mouth 2 (two) times a day  -     Phenobarbital level; Future  -     Valproic acid level, total; Future        Subjective    Kenji Free is returning to the [facility] [address] [facility] for follow up  She arrives with a staff member from her group home  She has osteoporosis  She is now on bisphosphonate therapy  Mood has been stable (anxiety, delusions)   Last attempt at decreasing phenobarbital was unsuccessful - sleeplessness, agitation, anxiety, delusions  Last DEXA scan essentially stable, still shows osteoporosis of the lumbar spine  Interval Events:   Seizures since last visit: None  Hospitalizations: no    No new complaints  Arrives with staff from her facility  Mood stable  Current AEDs:  PB 64 8 mg tab, 1 tab bid   mg bid   mg tid (from psychiatry)  Medication side effects: None  Medication adherence: Yes    Event/Seizure semiology:  Unknown    Special Features  Status epilepticus: unknown  Self Injury Seizures: unknown  Precipitating Factors: unknown    Prior AEDs:  Carbamazepine - ?stopped, but worked? Lamotrigine - ?stopped, Dr  discontinued, no reason given? Valproate - ?water retention? Prior Evaluation:  3 Hour Video EEG 1/15/15: normal     History Reviewed: The following were reviewed and updated as appropriate: allergies, current medications, past medical history, past social history and problem list    Psychiatric History:  Schizophrenia     Social History:   Driving: No  Lives Alone: No  Occupation: on permanent disability    ROS:  Review of Systems  Constitutional: Negative  Negative for appetite change and fever  HENT: Negative  Negative for hearing loss, tinnitus, trouble swallowing and voice change  Eyes: Negative  Negative for photophobia and pain  Respiratory: Negative  Negative for shortness of breath  Cardiovascular: Negative  Negative for palpitations  Gastrointestinal: Negative  Negative for nausea and vomiting  Endocrine: Negative  Negative for cold intolerance and heat intolerance  Genitourinary: Negative  Negative for dysuria, frequency and urgency  Musculoskeletal: Negative  Negative for myalgias and neck pain  Skin: Negative  Negative for rash  Neurological: Positive for headaches  Negative for dizziness, tremors, seizures, syncope, facial asymmetry, speech difficulty, weakness, light-headedness and numbness  Hematological: Negative    Does not bruise/bleed easily  Psychiatric/Behavioral: Negative  Negative for confusion, hallucinations and sleep disturbance  Objective    /74 (BP Location: Right arm, Patient Position: Sitting, Cuff Size: Adult)   Pulse 89   Ht 5' 4" (1 626 m)   Wt 107 kg (235 lb 6 4 oz)   BMI 40 41 kg/m²      General Exam  No acute distress  Neurologic Exam  Mental Status:  Alert and oriented and interactive  Logical thought process  Cranial nerves: EOMI, VFFTC, face symmetric  Language: normal fluency and comprehension  Motor: no pronator drift  5/5 t/o  Coordination: FTN with mild action tremor, o/w normal    Gait: Normal casual gait

## 2018-10-25 PROBLEM — G40.909 SEIZURE DISORDER (HCC): Status: ACTIVE | Noted: 2017-05-15

## 2018-11-11 NOTE — TELEPHONE ENCOUNTER
Elyse Marks, care giver at [facility] called  Patient was taking Trihexyphen 2mg PO daily  Patient has no medication left but there is also a question of whether the medication was discontinued or not  [facility] is asking that if patient is to continue medication, to please send over a refill order  If medication is to be discontinued, please send over order discontinuing medication     Fax number to the [facility] is [phone]

## 2018-11-12 ENCOUNTER — TELEPHONE (OUTPATIENT)
Dept: INTERNAL MEDICINE CLINIC | Facility: CLINIC | Age: 63
End: 2018-11-12

## 2018-11-12 NOTE — TELEPHONE ENCOUNTER
11/12/18 Pierre Del Rosario, the care giver is wondering if the patient is still taking the [address]  It was last filled in 2017  What would you like me to do?  Lacy Dunham MA

## 2018-11-14 ENCOUNTER — OFFICE VISIT (OUTPATIENT)
Dept: INTERNAL MEDICINE CLINIC | Facility: CLINIC | Age: 63
End: 2018-11-14
Payer: MEDICARE

## 2018-11-14 VITALS
HEART RATE: 100 BPM | OXYGEN SATURATION: 97 % | WEIGHT: 228.8 LBS | SYSTOLIC BLOOD PRESSURE: 132 MMHG | TEMPERATURE: 98.7 F | BODY MASS INDEX: 39.06 KG/M2 | HEIGHT: 64 IN | DIASTOLIC BLOOD PRESSURE: 88 MMHG

## 2018-11-14 DIAGNOSIS — E03.9 HYPOTHYROIDISM, UNSPECIFIED TYPE: Primary | ICD-10-CM

## 2018-11-14 DIAGNOSIS — E78.5 HYPERLIPIDEMIA, UNSPECIFIED HYPERLIPIDEMIA TYPE: ICD-10-CM

## 2018-11-14 DIAGNOSIS — R73.9 HYPERGLYCEMIA: ICD-10-CM

## 2018-11-14 PROCEDURE — 99214 OFFICE O/P EST MOD 30 MIN: CPT | Performed by: INTERNAL MEDICINE

## 2018-11-14 RX ORDER — ASPIRIN 81 MG/1
81 TABLET ORAL DAILY
COMMUNITY
End: 2020-07-17

## 2018-11-14 RX ORDER — TRIHEXYPHENIDYL HYDROCHLORIDE 5 MG/1
5 TABLET ORAL 2 TIMES DAILY
COMMUNITY

## 2018-11-14 RX ORDER — ASENAPINE 5 MG/1
10 TABLET SUBLINGUAL 2 TIMES DAILY
COMMUNITY
End: 2020-07-17 | Stop reason: SDUPTHER

## 2018-11-14 NOTE — PROGRESS NOTES
Assessment/Plan:    1  Prediabetes  Her last hemoglobin A1c is 5 6  She is on metformin 1000 mg b i d     Will discontinue it  Will check hemoglobin A1c before next visit and then will decide whether she needs any metformin or not  2  Hyperlipidemia  Will continue with simvastatin  Will check lipid profile before next visit  3  Hypothyroidism  Will continue with present dose of levothyroxine  Will check TSH before next visit  4  Seizure disorder  Being managed by a neurologist     Diagnoses and all orders for this visit:    Hypothyroidism, unspecified type  -     TSH, 3rd generation with Free T4 reflex; Future    Hyperlipidemia, unspecified hyperlipidemia type  -     Lipid Panel with Direct LDL reflex; Future    Hyperglycemia  -     CBC; Future  -     Comprehensive metabolic panel; Future  -     Lipid Panel with Direct LDL reflex; Future  -     Hemoglobin A1C; Future  -     Microalbumin / creatinine urine ratio          Subjective:          Patient ID: Latasha Mercedes is a 70 y o  female  Diabetes   She presents for her follow-up diabetic visit  She has type 2 diabetes mellitus  Her disease course has been stable  Hypoglycemia symptoms include mood changes  Pertinent negatives for hypoglycemia include no dizziness, headaches or nervousness/anxiousness  There are no diabetic associated symptoms  Pertinent negatives for diabetes include no chest pain, no fatigue, no polyuria and no weakness  There are no hypoglycemic complications  Symptoms are stable  Risk factors for coronary artery disease include diabetes mellitus, dyslipidemia, obesity and post-menopausal  Current diabetic treatment includes oral agent (monotherapy)  Her weight is stable  She is following a diabetic diet  Meal planning includes avoidance of concentrated sweets  She has not had a previous visit with a dietitian  She never participates in exercise  There is no change in her home blood glucose trend   An ACE inhibitor/angiotensin II receptor blocker is not being taken  She does not see a podiatrist Eye exam is current  The following portions of the patient's history were reviewed and updated as appropriate: allergies, current medications, past family history, past medical history, past social history, past surgical history and problem list     Review of Systems   Constitutional: Negative for fatigue and fever  HENT: Negative for congestion, ear discharge, ear pain, postnasal drip, sinus pressure, sore throat, tinnitus and trouble swallowing  Eyes: Negative for discharge, itching and visual disturbance  Respiratory: Negative for cough and shortness of breath  Cardiovascular: Negative for chest pain and palpitations  Gastrointestinal: Negative for abdominal pain, diarrhea, nausea and vomiting  Endocrine: Negative for cold intolerance and polyuria  Genitourinary: Negative for difficulty urinating, dysuria and urgency  Musculoskeletal: Negative for arthralgias and neck pain  Skin: Negative for rash  Allergic/Immunologic: Negative for environmental allergies  Neurological: Negative for dizziness, weakness and headaches  Psychiatric/Behavioral: Negative for agitation and behavioral problems  The patient is not nervous/anxious            Past Medical History:   Diagnosis Date    Benign essential hypertension     resolved; 06/15/16    Depression with anxiety     Fracture of fifth metatarsal bone of right foot with delayed healing     last assessed 04/12/16; fracture of metatarsal bone, right, closed, initial encounter    Hyperlipidemia     last assessed 11/28/17    Hypothyroidism     last assessed 11/28/2017    Insomnia     Obesity     Schizoaffective disorder ([facility] )     last assessed 05/18/2017    Seizure disorder ([facility] )     last assessed 03/28/17    Seizure disorder ([facility] ) 5/15/2017         Current Outpatient Prescriptions:     acetaminophen (TYLENOL) 325 mg tablet, Take by mouth, Disp: , Rfl:     alendronate (FOSAMAX) 70 mg tablet, Take by mouth TAKE 1 TABLET BY MOUTH DAILY ON FRIDAY , Disp: , Rfl:     asenapine (SAPHRIS) 10 mg SL tablet, Place 10 mg under the tongue Take 10 mg tablets BID and 5 MG Tablets BID , Disp: , Rfl:     aspirin 81 mg chewable tablet, Chew 81 mg daily  , Disp: , Rfl:     busPIRone (BUSPAR) 15 mg tablet, Take 15 mg by mouth 2 (two) times a day  , Disp: , Rfl:     calcium carbonate-vitamin D (OSCAL-D) 500 mg-200 units per tablet, Take 1 tablet by mouth 3 (three) times a day with meals  , Disp: , Rfl:     Dextromethorphan Polistirex ER (DELSYM) 30 MG/5ML SUER, Take by mouth, Disp: , Rfl:     diphenhydrAMINE (BENADRYL) 50 mg capsule, Take by mouth, Disp: , Rfl:     divalproex sodium (DEPAKOTE) 500 mg EC tablet, Take 500 mg by mouth 3 (three) times a day  , Disp: , Rfl:     docusate sodium (COLACE) 100 mg capsule, Take 1 capsule by mouth 2 (two) times a day as needed, Disp: , Rfl:     fluticasone (FLONASE) 50 mcg/act nasal spray, 2 sprays into each nostril daily  , Disp: , Rfl:     gabapentin (NEURONTIN) 100 mg capsule, Take 200 mg by mouth 2 (two) times a day  , Disp: , Rfl:     L-Theanine 100 MG CAPS, Take 200 mg by mouth 2 (two) times a day  , Disp: , Rfl:     levothyroxine 100 mcg tablet, Take 100 mcg by mouth daily  , Disp: , Rfl:     LORazepam (ATIVAN) 1 mg tablet, Take 0 5 mg by mouth 2 (two) times a day  , Disp: , Rfl:     loxapine (LOXITANE) 50 MG capsule, Take 100 mg by mouth 2 (two) times a day  , Disp: , Rfl:     Melatonin 5 MG TABS, Take 1 tablet by mouth daily, Disp: , Rfl:     Multiple Vitamins-Iron (DAILY-KACI/IRON) TABS, Take 1 tablet by mouth daily  , Disp: , Rfl:     PHENobarbital 64 8 mg tablet, Take 1 tablet (64 8 mg total) by mouth 2 (two) times a day, Disp: 60 tablet, Rfl: 5    polyethylene glycol (GOLYTELY) 4000 mL solution, Take 4,000 mL by mouth once for 1 dose (Patient not taking: Reported on 10/24/2018 ), Disp: 4000 mL, Rfl: 0    Polyethylene Glycol 3350-GRX POWD, Take by mouth MIX 1 CAPFUL (17mg) IN 8 OUNCES OF WATER, JUICE OR TEA AND DRINK DAILY , Disp: , Rfl:     simvastatin (ZOCOR) 40 mg tablet, Take 40 mg by mouth daily at bedtime  , Disp: , Rfl:     sodium chloride (DEEP SEA NASAL SPRAY) 0 65 % nasal spray, into each nostril, Disp: , Rfl:     tetanus-diphtheria toxoids (DECAVAC 2-2) injection, Inject into the shoulder, thigh, or buttocks, Disp: , Rfl:     traZODone (DESYREL) 150 mg tablet, Take 2 tablets by mouth daily at bedtime  , Disp: , Rfl:     Allergies   Allergen Reactions    Clozapine Other (See Comments)     low white blood count  Other reaction(s): Other (See Comments)  low white blood count    Fluphenazine Hyperactivity and Other (See Comments)     EPS, Hyperactivity  EPS, Hyperactivity    Haloperidol Other (See Comments)     EPS  Other reaction(s): Other (See Comments)  EPS    Lithium Other (See Comments)     Toxicity    Valproic Acid Confusion and Other (See Comments)     "Mental confusion"  "Mental confusion"       Social History   Past Surgical History:   Procedure Laterality Date    COLONOSCOPY      complete    MT COLONOSCOPY FLX DX W/COLLJ SPEC WHEN PFRMD N/A 8/30/2018    Procedure: COLONOSCOPY with polypectomies;  Surgeon: Sherry Sethi MD;  Location: [facility]; Service: Gastroenterology     Family History   Problem Relation Age of Onset    No Known Problems Mother     No Known Problems Father     Lung disease Other         mesothelioma       Objective:  /88 (BP Location: Left arm, Patient Position: Sitting, Cuff Size: Large)   Pulse 100   Temp 98 7 °F (37 1 °C) (Oral)   Ht 5' 4" (1 626 m)   Wt 104 kg (228 lb 12 8 oz)   SpO2 97%   BMI 39 27 kg/m²   Body mass index is 39 27 kg/m²  Physical Exam   Constitutional: She appears well-developed  HENT:   Head: Normocephalic  Mouth/Throat: Oropharynx is clear and moist    Eyes: Pupils are equal, round, and reactive to light  No scleral icterus  Neck: Normal range of motion  Neck supple  No tracheal deviation present  No thyromegaly present  Cardiovascular: Normal rate, regular rhythm and normal heart sounds  Pulmonary/Chest: Effort normal and breath sounds normal  No respiratory distress  She exhibits no tenderness  Abdominal: Soft  Bowel sounds are normal  She exhibits no mass  There is no tenderness  Musculoskeletal: Normal range of motion  She exhibits no edema  Lymphadenopathy:     She has no cervical adenopathy  Neurological: She is alert  No cranial nerve deficit  Skin: Skin is warm  Psychiatric: She has a normal mood and affect   Her behavior is normal

## 2018-11-15 ENCOUNTER — APPOINTMENT (OUTPATIENT)
Dept: LAB | Age: 63
End: 2018-11-15
Payer: MEDICARE

## 2018-11-15 DIAGNOSIS — E78.5 HYPERLIPIDEMIA, UNSPECIFIED HYPERLIPIDEMIA TYPE: ICD-10-CM

## 2018-11-15 DIAGNOSIS — R73.9 HYPERGLYCEMIA: ICD-10-CM

## 2018-11-15 DIAGNOSIS — E03.9 HYPOTHYROIDISM, UNSPECIFIED TYPE: ICD-10-CM

## 2018-11-15 LAB
ALBUMIN SERPL BCP-MCNC: 4 G/DL (ref 3.5–5)
ALP SERPL-CCNC: 64 U/L (ref 46–116)
ALT SERPL W P-5'-P-CCNC: 22 U/L (ref 12–78)
ANION GAP SERPL CALCULATED.3IONS-SCNC: 7 MMOL/L (ref 4–13)
AST SERPL W P-5'-P-CCNC: 15 U/L (ref 5–45)
BILIRUB SERPL-MCNC: 0.25 MG/DL (ref 0.2–1)
BUN SERPL-MCNC: 7 MG/DL (ref 5–25)
CALCIUM SERPL-MCNC: 8.8 MG/DL (ref 8.3–10.1)
CHLORIDE SERPL-SCNC: 98 MMOL/L (ref 100–108)
CHOLEST SERPL-MCNC: 189 MG/DL (ref 50–200)
CO2 SERPL-SCNC: 28 MMOL/L (ref 21–32)
CREAT SERPL-MCNC: 0.9 MG/DL (ref 0.6–1.3)
CREAT UR-MCNC: 16.1 MG/DL
ERYTHROCYTE [DISTWIDTH] IN BLOOD BY AUTOMATED COUNT: 12.2 % (ref 11.6–15.1)
EST. AVERAGE GLUCOSE BLD GHB EST-MCNC: 126 MG/DL
GFR SERPL CREATININE-BSD FRML MDRD: 67 ML/MIN/1.73SQ M
GLUCOSE P FAST SERPL-MCNC: 116 MG/DL (ref 65–99)
HBA1C MFR BLD: 6 % (ref 4.2–6.3)
HCT VFR BLD AUTO: 41.7 % (ref 34.8–46.1)
HDLC SERPL-MCNC: 94 MG/DL (ref 40–60)
HGB BLD-MCNC: 13.9 G/DL (ref 11.5–15.4)
LDLC SERPL CALC-MCNC: 74 MG/DL (ref 0–100)
MCH RBC QN AUTO: 33.2 PG (ref 26.8–34.3)
MCHC RBC AUTO-ENTMCNC: 33.3 G/DL (ref 31.4–37.4)
MCV RBC AUTO: 100 FL (ref 82–98)
MICROALBUMIN UR-MCNC: 6.3 MG/L (ref 0–20)
MICROALBUMIN/CREAT 24H UR: 39 MG/G CREATININE (ref 0–30)
PLATELET # BLD AUTO: 267 THOUSANDS/UL (ref 149–390)
PMV BLD AUTO: 9.6 FL (ref 8.9–12.7)
POTASSIUM SERPL-SCNC: 4.1 MMOL/L (ref 3.5–5.3)
PROT SERPL-MCNC: 7.7 G/DL (ref 6.4–8.2)
RBC # BLD AUTO: 4.19 MILLION/UL (ref 3.81–5.12)
SODIUM SERPL-SCNC: 133 MMOL/L (ref 136–145)
TRIGL SERPL-MCNC: 104 MG/DL
TSH SERPL DL<=0.05 MIU/L-ACNC: 1.61 UIU/ML (ref 0.36–3.74)
WBC # BLD AUTO: 4.9 THOUSAND/UL (ref 4.31–10.16)

## 2018-11-15 PROCEDURE — 36415 COLL VENOUS BLD VENIPUNCTURE: CPT

## 2018-11-15 PROCEDURE — 83036 HEMOGLOBIN GLYCOSYLATED A1C: CPT

## 2018-11-15 PROCEDURE — 84443 ASSAY THYROID STIM HORMONE: CPT

## 2018-11-15 PROCEDURE — 80053 COMPREHEN METABOLIC PANEL: CPT

## 2018-11-15 PROCEDURE — 85027 COMPLETE CBC AUTOMATED: CPT

## 2018-11-15 PROCEDURE — 82043 UR ALBUMIN QUANTITATIVE: CPT | Performed by: INTERNAL MEDICINE

## 2018-11-15 PROCEDURE — 82570 ASSAY OF URINE CREATININE: CPT | Performed by: INTERNAL MEDICINE

## 2018-11-15 PROCEDURE — 80061 LIPID PANEL: CPT

## 2018-11-16 ENCOUNTER — DOCUMENTATION (OUTPATIENT)
Dept: INTERNAL MEDICINE CLINIC | Facility: CLINIC | Age: 63
End: 2018-11-16

## 2018-11-16 NOTE — TELEPHONE ENCOUNTER
Per Dr Brittany Leija they should find out who prescribed medication to the pt, b/c it was not prescribed by our office  It may have been psychiatry  Thank you!

## 2018-11-16 NOTE — TELEPHONE ENCOUNTER
Saurabh Juarez returned my call and stated that she called [facility] and they told her that the pt psychiatrist was the one who manages and refills the pt's medication  She is now taking it 5 mg qhs      Updated pt's med list to current dose and instructions

## 2018-12-06 ENCOUNTER — OFFICE VISIT (OUTPATIENT)
Dept: INTERNAL MEDICINE CLINIC | Facility: CLINIC | Age: 63
End: 2018-12-06
Payer: MEDICARE

## 2018-12-06 ENCOUNTER — TELEPHONE (OUTPATIENT)
Dept: INTERNAL MEDICINE CLINIC | Facility: CLINIC | Age: 63
End: 2018-12-06

## 2018-12-06 VITALS
HEART RATE: 95 BPM | WEIGHT: 235.4 LBS | DIASTOLIC BLOOD PRESSURE: 72 MMHG | SYSTOLIC BLOOD PRESSURE: 120 MMHG | HEIGHT: 65 IN | TEMPERATURE: 98.8 F | OXYGEN SATURATION: 93 % | BODY MASS INDEX: 39.22 KG/M2

## 2018-12-06 DIAGNOSIS — J06.9 VIRAL UPPER RESPIRATORY TRACT INFECTION: Primary | ICD-10-CM

## 2018-12-06 DIAGNOSIS — R07.89 CHEST TIGHTNESS: ICD-10-CM

## 2018-12-06 DIAGNOSIS — R05.9 COUGH: ICD-10-CM

## 2018-12-06 PROCEDURE — 99213 OFFICE O/P EST LOW 20 MIN: CPT | Performed by: INTERNAL MEDICINE

## 2018-12-06 RX ORDER — FLUTICASONE PROPIONATE 110 UG/1
2 AEROSOL, METERED RESPIRATORY (INHALATION) 2 TIMES DAILY
Qty: 1 INHALER | Refills: 0 | Status: SHIPPED | OUTPATIENT
Start: 2018-12-06 | End: 2019-08-07 | Stop reason: SDUPTHER

## 2018-12-06 NOTE — PATIENT INSTRUCTIONS
1  She should continue using her Fluticasone nasal spray  She should also use Flovent 110 2 puffs BID and she should gargle after use  She should also take Claritin for her URI  2  She can follow up with us as needed

## 2018-12-06 NOTE — PROGRESS NOTES
Assessment/Plan:    1  Viral URI  She woke up yesterday with a dry hacking cough and feeling fatigued  There is no sputum with the cough  She is having chest tightness but no SOB, wheezing  No fevers and chills  She has taken Delsym and that provided little relief  She should continue using her Fluticasone nasal spray  She should also use Flovent 110 2 puffs BID and she should gargle after use  She should also take Claritin for her URI  She can follow up with us as needed  Diagnoses and all orders for this visit:    Viral upper respiratory tract infection  -     fluticasone (FLOVENT HFA) 110 MCG/ACT inhaler; Inhale 2 puffs 2 (two) times a day Rinse mouth after use  -     loratadine (CLARITIN REDITABS) 10 MG dissolvable tablet; Take 1 tablet (10 mg total) by mouth daily    Chest tightness    Cough  -     fluticasone (FLOVENT HFA) 110 MCG/ACT inhaler; Inhale 2 puffs 2 (two) times a day Rinse mouth after use  -     loratadine (CLARITIN REDITABS) 10 MG dissolvable tablet; Take 1 tablet (10 mg total) by mouth daily          Subjective:      Patient ID: Selena Rivera is a 70 y o  female  Selena Rivera is a 70year old female who presents as a same day patient for evaluation of coughing and sneezing  She states she woke up yesterday with a dry hacking cough and feeling fatigued  She notes she had a slight fever but denies sneezing or nosebleeds  There is no sputum with the cough  She is having chest tightness but no SOB, wheezing  She reports she has some rhinorrhea in the morning  She states she has a "dry, hacking cough that comes from the bottom of her gut"  She has been taking Tylenol and Delsym cough syrup for this               The following portions of the patient's history were reviewed and updated as appropriate: allergies, current medications, past family history, past medical history, past social history, past surgical history and problem list     Review of Systems   Constitutional: Positive for fatigue  Negative for appetite change, chills, diaphoresis and fever  HENT: Positive for rhinorrhea  Negative for congestion, drooling, ear discharge, ear pain, facial swelling, hearing loss, mouth sores, nosebleeds, postnasal drip, sinus pain, sinus pressure, sneezing, sore throat, tinnitus, trouble swallowing and voice change  Eyes: Negative for pain, discharge, redness, itching and visual disturbance  Respiratory: Positive for cough and chest tightness  Negative for shortness of breath, wheezing and stridor  Cardiovascular: Negative for chest pain, palpitations and leg swelling  Gastrointestinal: Negative for abdominal pain, constipation, diarrhea, nausea and vomiting  Genitourinary: Negative for difficulty urinating, dysuria, flank pain and urgency  Musculoskeletal: Negative for arthralgias, joint swelling, myalgias, neck pain and neck stiffness  Skin: Negative for rash  Allergic/Immunologic: Negative for environmental allergies and food allergies  Neurological: Negative for dizziness, tremors, seizures, syncope, light-headedness and headaches  Objective:      /72 (BP Location: Left arm, Patient Position: Sitting, Cuff Size: Adult)   Pulse 95   Temp 98 8 °F (37 1 °C) (Oral)   Ht 5' 5" (1 651 m)   Wt 107 kg (235 lb 6 4 oz)   SpO2 93%   BMI 39 17 kg/m²          Physical Exam   Constitutional: She appears well-developed and well-nourished  HENT:   Head: Normocephalic  Right Ear: External ear normal    Left Ear: External ear normal    Eyes: Pupils are equal, round, and reactive to light  Conjunctivae are normal  Right eye exhibits no discharge  Left eye exhibits no discharge  Neck: No thyromegaly present  Cardiovascular: Normal rate, regular rhythm, normal heart sounds and intact distal pulses  Exam reveals no gallop and no friction rub  No murmur heard  Pulmonary/Chest: Effort normal and breath sounds normal  No respiratory distress  She has no wheezes   She has no rales  She exhibits no tenderness  Abdominal: Soft  Bowel sounds are normal  She exhibits no distension  There is no tenderness  There is no rebound  Skin: No rash noted  No pallor  Psychiatric:   Mentally challenged         Scribe Signature and Attestation:  By signing my name below, I, Candia Gowers attest that this documentation has been prepared under the direction and in the presence of Dr Karo Concepcion MD  Electronically signed: Candia Gowers, Scribe  12/6/18    Provider Attestation:  I, Dr Karo Concepcion, personally performed the services described in this documentation  All medical record entries made by the sylwia were at my direction and in my presence  I have reviewed the chart and discharge instructions (if applicable) and agree that the record reflects my personal performance and is accurate and complete  Dr Karo Concepcion MD  12/6/18

## 2018-12-06 NOTE — TELEPHONE ENCOUNTER
This patient was prescribed Claritin dissolvable tablets which the pharmacy does not carry  They need a new script for Claritin for this patient  The phone # to the pharmacy is [phone]

## 2018-12-17 DIAGNOSIS — J06.9 VIRAL UPPER RESPIRATORY TRACT INFECTION: ICD-10-CM

## 2018-12-17 DIAGNOSIS — R05.9 COUGH: ICD-10-CM

## 2018-12-18 ENCOUNTER — OFFICE VISIT (OUTPATIENT)
Dept: INTERNAL MEDICINE CLINIC | Facility: CLINIC | Age: 63
End: 2018-12-18
Payer: MEDICARE

## 2018-12-18 VITALS
HEIGHT: 65 IN | TEMPERATURE: 97.7 F | WEIGHT: 231.6 LBS | DIASTOLIC BLOOD PRESSURE: 62 MMHG | HEART RATE: 92 BPM | OXYGEN SATURATION: 94 % | BODY MASS INDEX: 38.59 KG/M2 | SYSTOLIC BLOOD PRESSURE: 124 MMHG

## 2018-12-18 DIAGNOSIS — K52.9 GASTROENTERITIS: Primary | ICD-10-CM

## 2018-12-18 PROCEDURE — 99213 OFFICE O/P EST LOW 20 MIN: CPT | Performed by: NURSE PRACTITIONER

## 2018-12-18 RX ORDER — LORATADINE 10 MG/1
TABLET ORAL
Qty: 10 TABLET | Refills: 4 | Status: SHIPPED | OUTPATIENT
Start: 2018-12-18 | End: 2019-03-20

## 2018-12-18 RX ORDER — BISMUTH SUBSALICYLATE 262 MG/1
524 TABLET, CHEWABLE ORAL EVERY 6 HOURS PRN
Qty: 30 TABLET | Refills: 0 | Status: SHIPPED | OUTPATIENT
Start: 2018-12-18 | End: 2021-03-17 | Stop reason: SDUPTHER

## 2018-12-18 NOTE — PROGRESS NOTES
Assessment/Plan:     Diagnoses and all orders for this visit:    Gastroenteritis  -     bismuth subsalicylate (PEPTO BISMOL) 262 MG chewable tablet; Chew 2 tablets (524 mg total) every 6 (six) hours as needed for diarrhea    Good handwashing  Stop miralax until diarrhea is resolved and pt is having formed bowel movements  Can give pepto bismol 2 tablets ever 6 hours as needed for upset stomach or diarrhea  I recommend a bland diet, bananas, applesauce, rice and toast until symptoms improve, then advance as tolerated  Avoid dairy for 48-72 hours  Follow up if symptoms worsen or she develops a fever > 100 4   Drink plenty of fluids to stay hydrated    Subjective:      Patient ID: Margarette Gray is a 70 y o  female  Diarrhea    This is a new problem  The current episode started yesterday  The problem occurs 2 to 4 times per day  The problem has been gradually improving  The stool consistency is described as watery  The patient states that diarrhea does not awaken her from sleep  Associated symptoms include abdominal pain (lower abdominal, cramping pain), bloating, headaches, increased flatus and sweats ("a little bit")  Pertinent negatives include no chills, coughing, fever, myalgias, URI (resolved ) or vomiting  Nothing aggravates the symptoms  There are no known risk factors  She has tried nothing (pt is on miralax daily) for the symptoms  There is no history of bowel resection, inflammatory bowel disease, irritable bowel syndrome or a recent abdominal surgery  She has 1 true episode of diarrhea today, then another episode with a small amount  No formed bowel movements in the last 2 days  She has been eating drinking a normal diet  She states the diarrhea came after her coffee this morning  Patient is here today accompanied by her counselor Nadia Mc from her group home  Patient states that today she has fatigue and diarrhea  Onset of symptoms was 24 hours ago  She is getting over a bad chest cold  She was seen in our office on 12/6  She states that after using the flovent and claritin she felt much better  She states that she is "all cured of the chest cold"    The following portions of the patient's history were reviewed and updated as appropriate: allergies, current medications, past family history, past medical history, past social history, past surgical history and problem list     Review of Systems   Constitutional: Positive for fatigue  Negative for chills and fever  Respiratory: Negative for cough  Gastrointestinal: Positive for abdominal pain (lower abdominal, cramping pain), bloating, diarrhea and flatus  Negative for vomiting  Musculoskeletal: Negative for myalgias  Neurological: Positive for headaches           Past Medical History:   Diagnosis Date    Benign essential hypertension     resolved; 06/15/16    Depression with anxiety     Fracture of fifth metatarsal bone of right foot with delayed healing     last assessed 04/12/16; fracture of metatarsal bone, right, closed, initial encounter    Hyperlipidemia     last assessed 11/28/17    Hypothyroidism     last assessed 11/28/2017    Insomnia     Obesity     [facility]     last assessed 05/18/2017    Seizure disorder ([facility] )     last assessed 03/28/17    Seizure disorder ([facility] ) 5/15/2017         Current Outpatient Prescriptions:     alendronate (FOSAMAX) 70 mg tablet, Take by mouth TAKE 1 TABLET BY MOUTH DAILY ON FRIDAY , Disp: , Rfl:     asenapine (SAPHRIS) 10 mg SL tablet, Place 10 mg under the tongue 2 (two) times a day  , Disp: , Rfl:     asenapine (SAPHRIS) SL tablet, Place 5 mg under the tongue 2 (two) times a day, Disp: , Rfl:     aspirin (ECOTRIN LOW STRENGTH) 81 mg EC tablet, Take 81 mg by mouth daily, Disp: , Rfl:     busPIRone (BUSPAR) 15 mg tablet, Take 15 mg by mouth 2 (two) times a day  , Disp: , Rfl:     calcium carbonate-vitamin D (OSCAL-D) 500 mg-200 units per tablet, Take 1 tablet by mouth 3 (three) times a day with meals  , Disp: , Rfl:     divalproex sodium (DEPAKOTE) 500 mg EC tablet, Take 500 mg by mouth 3 (three) times a day  , Disp: , Rfl:     fluticasone (FLONASE) 50 mcg/act nasal spray, 2 sprays into each nostril daily  , Disp: , Rfl:     fluticasone (FLOVENT HFA) 110 MCG/ACT inhaler, Inhale 2 puffs 2 (two) times a day Rinse mouth after use , Disp: 1 Inhaler, Rfl: 0    gabapentin (NEURONTIN) 100 mg capsule, Take 200 mg by mouth 2 (two) times a day  , Disp: , Rfl:     L-Theanine 100 MG CAPS, Take 200 mg by mouth 2 (two) times a day  , Disp: , Rfl:     levothyroxine 100 mcg tablet, Take 100 mcg by mouth daily  , Disp: , Rfl:     loratadine (CLARITIN) 10 mg tablet, TAKE 1 TABLET BY MOUTH DAILY, Disp: 10 tablet, Rfl: 4    LORazepam (ATIVAN) 1 mg tablet, Take 0 5 mg by mouth 2 (two) times a day  , Disp: , Rfl:     loxapine (LOXITANE) 50 MG capsule, Take 2 capsules (100 mg) by mouth twice daily & 1 capsule (50 mg) at noon , Disp: , Rfl:     Multiple Vitamins-Iron (DAILY-KACI/IRON) TABS, Take 1 tablet by mouth daily  , Disp: , Rfl:     PHENobarbital 64 8 mg tablet, Take 1 tablet (64 8 mg total) by mouth 2 (two) times a day, Disp: 60 tablet, Rfl: 5    Polyethylene Glycol 3350-GRX POWD, Take by mouth MIX 1 CAPFUL (17mg) IN 8 OUNCES OF WATER, JUICE OR TEA AND DRINK DAILY , Disp: , Rfl:     simvastatin (ZOCOR) 40 mg tablet, Take 40 mg by mouth daily at bedtime  , Disp: , Rfl:     traZODone (DESYREL) 150 mg tablet, Take 2 tablets by mouth daily at bedtime  , Disp: , Rfl:     trihexyphenidyl (ARTANE) 5 mg tablet, Take 5 mg by mouth daily at bedtime  , Disp: , Rfl:     Allergies   Allergen Reactions    Clozapine Other (See Comments)     low white blood count  Other reaction(s): Other (See Comments)  low white blood count    Fluphenazine Hyperactivity and Other (See Comments)     EPS, Hyperactivity  EPS, Hyperactivity    Haloperidol Other (See Comments)     EPS  Other reaction(s):  Other (See Comments)  EPS    Lithium Other (See Comments)     Toxicity    Valproic Acid Confusion and Other (See Comments)     "Mental confusion"  "Mental confusion"       Social History   Past Surgical History:   Procedure Laterality Date    COLONOSCOPY      complete    MA COLONOSCOPY FLX DX W/COLLJ SPEC WHEN PFRMD N/A 8/30/2018    Procedure: COLONOSCOPY with polypectomies;  Surgeon: Thierry Gomez MD;  Location: [facility]; Service: Gastroenterology     Family History   Problem Relation Age of Onset    No Known Problems Mother     No Known Problems Father     Lung disease Other         mesothelioma       Objective:  /62 (BP Location: Left arm, Patient Position: Sitting, Cuff Size: Adult)   Pulse 92   Temp 97 7 °F (36 5 °C) (Oral)   Ht 5' 5" (1 651 m)   Wt 105 kg (231 lb 9 6 oz)   SpO2 94%   BMI 38 54 kg/m²      Physical Exam   Constitutional: She appears well-developed and well-nourished  No distress  obese   HENT:   Head: Normocephalic and atraumatic  Eyes: Pupils are equal, round, and reactive to light  Conjunctivae and EOM are normal    Neck: Normal range of motion  Neck supple  Cardiovascular: Normal rate, regular rhythm and normal heart sounds  No murmur heard  Pulmonary/Chest: Effort normal and breath sounds normal  No respiratory distress  She has no decreased breath sounds  She has no wheezes  She has no rhonchi  She has no rales  Abdominal: Soft  Normal appearance  She exhibits no distension and no mass  Bowel sounds are increased  There is no tenderness  Neurological: She is alert  Skin: Skin is warm and dry  She is not diaphoretic  Psychiatric: She has a normal mood and affect  Her behavior is normal    Vitals reviewed

## 2018-12-18 NOTE — PATIENT INSTRUCTIONS
Viral gastroenteritis    Good handwashing  Stop miralax until diarrhea is resolved and pt is having formed bowel movements  Can give pepto bismol 2 tablets ever 6 hours as needed for upset stomach or diarrhea  I recommend a bland diet, bananas, applesauce, rice and toast until symptoms improve, then advance as tolerated  Avoid dairy for 48-72 hours       Follow up if symptoms worsen or she develops a fever > 100 4   Drink plenty of fluids to stay hydrated

## 2019-02-20 ENCOUNTER — OFFICE VISIT (OUTPATIENT)
Dept: INTERNAL MEDICINE CLINIC | Facility: CLINIC | Age: 64
End: 2019-02-20
Payer: MEDICARE

## 2019-02-20 VITALS
BODY MASS INDEX: 39.12 KG/M2 | HEIGHT: 65 IN | TEMPERATURE: 98.3 F | DIASTOLIC BLOOD PRESSURE: 80 MMHG | SYSTOLIC BLOOD PRESSURE: 122 MMHG | WEIGHT: 234.8 LBS | OXYGEN SATURATION: 98 % | HEART RATE: 99 BPM

## 2019-02-20 DIAGNOSIS — I10 BENIGN ESSENTIAL HYPERTENSION: ICD-10-CM

## 2019-02-20 DIAGNOSIS — R09.89 CHEST CONGESTION: ICD-10-CM

## 2019-02-20 DIAGNOSIS — G40.909 SEIZURE DISORDER (HCC): ICD-10-CM

## 2019-02-20 DIAGNOSIS — K59.09 OTHER CONSTIPATION: ICD-10-CM

## 2019-02-20 DIAGNOSIS — F25.0 SCHIZOAFFECTIVE DISORDER, BIPOLAR TYPE (HCC): ICD-10-CM

## 2019-02-20 DIAGNOSIS — G44.209 ACUTE NON INTRACTABLE TENSION-TYPE HEADACHE: ICD-10-CM

## 2019-02-20 DIAGNOSIS — R73.03 PRE-DIABETES: Primary | ICD-10-CM

## 2019-02-20 DIAGNOSIS — R56.9 SEIZURE (HCC): ICD-10-CM

## 2019-02-20 DIAGNOSIS — E03.9 ACQUIRED HYPOTHYROIDISM: ICD-10-CM

## 2019-02-20 DIAGNOSIS — M81.0 OSTEOPOROSIS, UNSPECIFIED OSTEOPOROSIS TYPE, UNSPECIFIED PATHOLOGICAL FRACTURE PRESENCE: ICD-10-CM

## 2019-02-20 DIAGNOSIS — G47.00 INSOMNIA, UNSPECIFIED TYPE: ICD-10-CM

## 2019-02-20 DIAGNOSIS — E78.2 MIXED HYPERLIPIDEMIA: ICD-10-CM

## 2019-02-20 PROBLEM — S92.311A CLOSED DISPLACED FRACTURE OF FIRST METATARSAL BONE OF RIGHT FOOT: Status: RESOLVED | Noted: 2018-09-20 | Resolved: 2019-02-20

## 2019-02-20 PROBLEM — S89.91XA RIGHT KNEE INJURY, INITIAL ENCOUNTER: Status: RESOLVED | Noted: 2018-09-12 | Resolved: 2019-02-20

## 2019-02-20 PROBLEM — L03.90 CELLULITIS: Status: RESOLVED | Noted: 2018-06-08 | Resolved: 2019-02-20

## 2019-02-20 PROBLEM — Z12.11 SPECIAL SCREENING FOR MALIGNANT NEOPLASMS, COLON: Status: RESOLVED | Noted: 2018-07-10 | Resolved: 2019-02-20

## 2019-02-20 PROBLEM — H61.21 IMPACTED CERUMEN OF RIGHT EAR: Status: RESOLVED | Noted: 2018-06-08 | Resolved: 2019-02-20

## 2019-02-20 PROBLEM — R26.81 UNSTEADY GAIT: Status: ACTIVE | Noted: 2017-10-03

## 2019-02-20 PROBLEM — K52.9 GASTROENTERITIS: Status: RESOLVED | Noted: 2018-12-18 | Resolved: 2019-02-20

## 2019-02-20 PROBLEM — D64.9 ANEMIA: Status: ACTIVE | Noted: 2017-05-15
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
## 2019-02-20 PROBLEM — Z12.11 SCREENING FOR COLON CANCER: Status: RESOLVED | Noted: 2018-05-16 | Resolved: 2019-02-20

## 2019-02-20 PROCEDURE — 99214 OFFICE O/P EST MOD 30 MIN: CPT | Performed by: NURSE PRACTITIONER

## 2019-02-20 RX ORDER — DOCUSATE SODIUM 100 MG/1
100 CAPSULE, LIQUID FILLED ORAL 2 TIMES DAILY PRN
COMMUNITY
End: 2019-02-20 | Stop reason: SDUPTHER

## 2019-02-20 RX ORDER — DIPHENHYDRAMINE HCL 50 MG
50 CAPSULE ORAL
COMMUNITY
End: 2019-02-20 | Stop reason: SDUPTHER

## 2019-02-20 RX ORDER — ACETAMINOPHEN 325 MG/1
650 TABLET ORAL EVERY 6 HOURS PRN
Qty: 30 TABLET | Refills: 0 | Status: SHIPPED | OUTPATIENT
Start: 2019-02-20 | End: 2019-04-11 | Stop reason: SDUPTHER

## 2019-02-20 RX ORDER — ACETAMINOPHEN 325 MG/1
650 TABLET ORAL EVERY 6 HOURS PRN
COMMUNITY
End: 2019-02-20 | Stop reason: SDUPTHER

## 2019-02-20 RX ORDER — DIPHENHYDRAMINE HCL 25 MG
25 CAPSULE ORAL
Qty: 30 CAPSULE | Refills: 0 | Status: SHIPPED | OUTPATIENT
Start: 2019-02-20 | End: 2019-07-16 | Stop reason: SDUPTHER

## 2019-02-20 RX ORDER — DEXTROMETHORPHAN POLISTIREX 30 MG/5ML
30 SUSPENSION ORAL 2 TIMES DAILY PRN
Qty: 148 ML | Refills: 0 | Status: SHIPPED | OUTPATIENT
Start: 2019-02-20 | End: 2019-09-30 | Stop reason: SDUPTHER

## 2019-02-20 RX ORDER — DEXTROMETHORPHAN POLISTIREX 30 MG/5ML
10 SUSPENSION ORAL 2 TIMES DAILY PRN
COMMUNITY
End: 2019-02-20

## 2019-02-20 RX ORDER — DOCUSATE SODIUM 100 MG/1
100 CAPSULE, LIQUID FILLED ORAL 2 TIMES DAILY PRN
Qty: 30 CAPSULE | Refills: 0 | Status: SHIPPED | OUTPATIENT
Start: 2019-02-20 | End: 2019-08-07 | Stop reason: SDUPTHER

## 2019-02-20 NOTE — PROGRESS NOTES
Assessment/Plan:    DM  - metformin d/c 11/2018 for A1c 5 6  - due for new A1c  - continue with lifestyle modifications - health diet and exercise    HLD  - stable  - continue simvastatin 40mg daily  - last lipids 02/2019    Hypothyroid  - stable  - continue levothyroxine 100mcg  - TSH WNL 02/2019    Osteoporosis  - continue fosamax  - last DEXA 04/2017    Seizure  - followed by neurology    BMI Counseling: Body mass index is 39 07 kg/m²  Discussed the patient's BMI with her  The BMI is above average  BMI counseling and education was provided to the patient  Nutrition recommendations include decreasing overall calorie intake, consuming healthier snacks and moderation in carbohydrate intake  Exercise recommendations include exercising 3-5 times per week  Pt lives in group home - accompanied by staff today  No med changes  Forms completed for group home  Prn OTC meds ordered    Pt to follow-up in 4 months, labs prior to follow-up appt     Diagnoses and all orders for this visit:    Benign essential hypertension    Pre-diabetes  -     CBC and differential; Future  -     Comprehensive metabolic panel; Future  -     Hemoglobin A1C; Future    Mixed hyperlipidemia  -     TSH, 3rd generation with Free T4 reflex; Future    Osteoporosis, unspecified osteoporosis type, unspecified pathological fracture presence    Acute non intractable tension-type headache  -     acetaminophen (TYLENOL) 325 mg tablet; Take 2 tablets (650 mg total) by mouth every 6 (six) hours as needed for mild pain or headaches    Other constipation  -     docusate sodium (COLACE) 100 mg capsule; Take 1 capsule (100 mg total) by mouth 2 (two) times a day as needed for constipation    Insomnia, unspecified type  -     diphenhydrAMINE (BENADRYL) 25 mg capsule;  Take 1 capsule (25 mg total) by mouth daily at bedtime as needed for sleep    Schizoaffective disorder, bipolar type ([facility] [address] )    Seizure (HCC)    Chest congestion  -     Dextromethorphan Polistirex ER 30 MG/5ML SUER; Take 5 mL (30 mg total) by mouth 2 (two) times a day as needed (congsetion)    Acquired hypothyroidism    Seizure disorder ([facility] [address] )    Other orders  -     Discontinue: acetaminophen (TYLENOL) 325 mg tablet; Take 650 mg by mouth every 6 (six) hours as needed  -     Discontinue: docusate sodium (COLACE) 100 mg capsule; Take 100 mg by mouth 2 (two) times a day as needed  -     Discontinue: diphenhydrAMINE (BENADRYL) 50 mg capsule; Take 50 mg by mouth daily at bedtime as needed  -     Discontinue: Dextromethorphan Polistirex ER 30 MG/5ML SUER; Take 10 mL by mouth 2 (two) times a day as needed        Subjective:      Patient ID: Piedad Cockayne is a 70 y o  female  Pt presents for follow-up  She is doing well with no concerns  She does need refills of prn medication - OTC, but she reports her insurance does cover  Pt metformin was stopped 11/2018 with an A1c of 5 6  No new A1c to compare however    Diabetes   She presents for her follow-up diabetic visit  She has type 2 diabetes mellitus  Hypoglycemia symptoms include seizures  Pertinent negatives for hypoglycemia include no dizziness, headaches or nervousness/anxiousness  There are no diabetic associated symptoms  Pertinent negatives for diabetes include no chest pain, no fatigue and no weight loss  Risk factors for coronary artery disease include post-menopausal and dyslipidemia  When asked about current treatments, none were reported  She is following a generally healthy diet  She participates in exercise three times a week  An ACE inhibitor/angiotensin II receptor blocker is not being taken  Thyroid Problem   Presents for follow-up visit  Patient reports no anxiety, cold intolerance, constipation, depressed mood, diarrhea, dry skin, fatigue, hair loss, heat intolerance, palpitations, weight gain or weight loss  The symptoms have been stable  Hyperlipidemia:  Pt is here for follow-up hyperlipidemia  Pt is doing well with no concerns    Pt is currently taking simvastatin 40mg daily  Tolerating well  Side effects of medication include none  Pt diet consists of overall healthy  The pt does exercise on a routine basis - walking  Last lipid panel was 2019: Total 189  T  HDL: 80  LDL:  109  Osteoporosis  Current on fosamax  Last dexa 2017    Seizure  Followed by neurology    The following portions of the patient's history were reviewed and updated as appropriate: allergies, current medications, past family history, past medical history, past social history, past surgical history and problem list     Review of Systems   Constitutional: Negative for activity change, appetite change, chills, fatigue, fever, unexpected weight change, weight gain and weight loss  HENT: Negative for congestion, postnasal drip, sinus pressure, sinus pain and sore throat  Respiratory: Negative for cough, shortness of breath and wheezing  Cardiovascular: Negative for chest pain and palpitations  Gastrointestinal: Negative for abdominal pain, constipation, diarrhea, nausea and vomiting  Endocrine: Negative for cold intolerance and heat intolerance  Musculoskeletal: Negative for arthralgias and back pain  Neurological: Positive for seizures  Negative for dizziness, syncope, light-headedness and headaches  Psychiatric/Behavioral: Negative for dysphoric mood and sleep disturbance  The patient is not nervous/anxious            Past Medical History:   Diagnosis Date    Benign essential hypertension     resolved; 06/15/16    Depression with anxiety     Fracture of fifth metatarsal bone of right foot with delayed healing     last assessed 16; fracture of metatarsal bone, right, closed, initial encounter    Hyperlipidemia     last assessed 17    Hypothyroidism     last assessed 2017    Insomnia     Obesity     [facility]oaffect[facility] disorder ([facility] [address] )     last assessed 2017    [facility]     last assessed 17  Seizure disorder ([facility] [address] ) 5/15/2017         Current Outpatient Medications:     acetaminophen (TYLENOL) 325 mg tablet, Take 2 tablets (650 mg total) by mouth every 6 (six) hours as needed for mild pain or headaches, Disp: 30 tablet, Rfl: 0    alendronate (FOSAMAX) 70 mg tablet, Take by mouth TAKE 1 TABLET BY MOUTH DAILY ON FRIDAY , Disp: , Rfl:     asenapine (SAPHRIS) 10 mg SL tablet, Place 10 mg under the tongue 2 (two) times a day  , Disp: , Rfl:     asenapine (SAPHRIS) SL tablet, Place 5 mg under the tongue 2 (two) times a day, Disp: , Rfl:     aspirin (ECOTRIN LOW STRENGTH) 81 mg EC tablet, Take 81 mg by mouth daily, Disp: , Rfl:     bismuth subsalicylate (PEPTO BISMOL) 262 MG chewable tablet, Chew 2 tablets (524 mg total) every 6 (six) hours as needed for diarrhea, Disp: 30 tablet, Rfl: 0    busPIRone (BUSPAR) 15 mg tablet, Take 15 mg by mouth 2 (two) times a day  , Disp: , Rfl:     calcium carbonate-vitamin D (OSCAL-D) 500 mg-200 units per tablet, Take 1 tablet by mouth 3 (three) times a day with meals  , Disp: , Rfl:     diphenhydrAMINE (BENADRYL) 25 mg capsule, Take 1 capsule (25 mg total) by mouth daily at bedtime as needed for sleep, Disp: 30 capsule, Rfl: 0    divalproex sodium (DEPAKOTE) 500 mg EC tablet, Take 500 mg by mouth 3 (three) times a day  , Disp: , Rfl:     docusate sodium (COLACE) 100 mg capsule, Take 1 capsule (100 mg total) by mouth 2 (two) times a day as needed for constipation, Disp: 30 capsule, Rfl: 0    fluticasone (FLONASE) 50 mcg/act nasal spray, 2 sprays into each nostril daily  , Disp: , Rfl:     fluticasone (FLOVENT HFA) 110 MCG/ACT inhaler, Inhale 2 puffs 2 (two) times a day Rinse mouth after use , Disp: 1 Inhaler, Rfl: 0    gabapentin (NEURONTIN) 100 mg capsule, Take 200 mg by mouth 2 (two) times a day  , Disp: , Rfl:     L-Theanine 100 MG CAPS, Take 200 mg by mouth 2 (two) times a day  , Disp: , Rfl:     levothyroxine 100 mcg tablet, Take 100 mcg by mouth daily , Disp: , Rfl:     loratadine (CLARITIN) 10 mg tablet, TAKE 1 TABLET BY MOUTH DAILY (Patient taking differently: TAKE 1 TABLET BY MOUTH DAILY PRN), Disp: 10 tablet, Rfl: 4    loxapine (LOXITANE) 50 MG capsule, Take 2 capsules (100 mg) by mouth twice daily & 1 capsule (50 mg) at noon , Disp: , Rfl:     Multiple Vitamins-Iron (DAILY-KACI/IRON) TABS, Take 1 tablet by mouth daily  , Disp: , Rfl:     Polyethylene Glycol 3350-GRX POWD, Take by mouth MIX 1 CAPFUL (17mg) IN 8 OUNCES OF WATER, JUICE OR TEA AND DRINK DAILY , Disp: , Rfl:     simvastatin (ZOCOR) 40 mg tablet, Take 40 mg by mouth daily at bedtime  , Disp: , Rfl:     traZODone (DESYREL) 150 mg tablet, Take 2 tablets by mouth daily at bedtime  , Disp: , Rfl:     trihexyphenidyl (ARTANE) 5 mg tablet, Take 5 mg by mouth daily at bedtime  , Disp: , Rfl:     Dextromethorphan Polistirex ER 30 MG/5ML SUER, Take 5 mL (30 mg total) by mouth 2 (two) times a day as needed (congsetion), Disp: 148 mL, Rfl: 0    LORazepam (ATIVAN) 1 mg tablet, Take 0 5 mg by mouth 2 (two) times a day  , Disp: , Rfl:     PHENobarbital 64 8 mg tablet, Take 1 tablet (64 8 mg total) by mouth 2 (two) times a day, Disp: 60 tablet, Rfl: 5    Allergies   Allergen Reactions    Clozapine Other (See Comments)     low white blood count  Other reaction(s): Other (See Comments)  low white blood count    Fluphenazine Hyperactivity and Other (See Comments)     EPS, Hyperactivity  EPS, Hyperactivity    Haloperidol Other (See Comments)     EPS  Other reaction(s): Other (See Comments)  EPS    Lithium Other (See Comments)     Toxicity    Valproic Acid Confusion and Other (See Comments)     "Mental confusion"  "Mental confusion"       Social History   Past Surgical History:   Procedure Laterality Date    COLONOSCOPY      complete    HI COLONOSCOPY FLX DX W/COLLJ SPEC WHEN PFRMD N/A 8/30/2018    Procedure: COLONOSCOPY with polypectomies;  Surgeon: Phani Tavares MD;  Location: [facility];   Service: Gastroenterology     Family History   Problem Relation Age of Onset    No Known Problems Mother     No Known Problems Father     Lung disease Other         mesothelioma       Objective:  /80 (BP Location: Left arm, Patient Position: Sitting, Cuff Size: Large)   Pulse 99   Temp 98 3 °F (36 8 °C) (Oral)   Ht 5' 5" (1 651 m)   Wt 107 kg (234 lb 12 8 oz)   SpO2 98%   BMI 39 07 kg/m²      Physical Exam   Constitutional: She is oriented to person, place, and time  She appears well-developed and well-nourished  No distress  obese   Neck: No thyromegaly present  Cardiovascular: Normal rate, regular rhythm and normal heart sounds  No murmur heard  No pedal edema   Pulmonary/Chest: Effort normal and breath sounds normal  No respiratory distress  She has no wheezes  Lymphadenopathy:     She has no cervical adenopathy  Neurological: She is alert and oriented to person, place, and time  No focal deficits   Skin: Skin is warm and dry  Psychiatric: She has a normal mood and affect  Her behavior is normal  Judgment and thought content normal    Nursing note and vitals reviewed

## 2019-02-20 NOTE — PATIENT INSTRUCTIONS
You are due for blood work prior to next appt  No medication changes    Continue with healthy diet and exercise on routine basis      Follow-u pin 4 months, sooner if needed

## 2019-03-20 ENCOUNTER — OFFICE VISIT (OUTPATIENT)
Dept: INTERNAL MEDICINE CLINIC | Facility: CLINIC | Age: 64
End: 2019-03-20
Payer: MEDICARE

## 2019-03-20 VITALS
DIASTOLIC BLOOD PRESSURE: 74 MMHG | HEIGHT: 65 IN | TEMPERATURE: 98.1 F | OXYGEN SATURATION: 97 % | WEIGHT: 233.8 LBS | HEART RATE: 97 BPM | SYSTOLIC BLOOD PRESSURE: 118 MMHG | BODY MASS INDEX: 38.95 KG/M2

## 2019-03-20 DIAGNOSIS — Z23 ENCOUNTER FOR IMMUNIZATION: ICD-10-CM

## 2019-03-20 DIAGNOSIS — Z00.00 MEDICARE ANNUAL WELLNESS VISIT, INITIAL: ICD-10-CM

## 2019-03-20 PROCEDURE — 90670 PCV13 VACCINE IM: CPT

## 2019-03-20 PROCEDURE — G0009 ADMIN PNEUMOCOCCAL VACCINE: HCPCS

## 2019-03-20 PROCEDURE — G0438 PPPS, INITIAL VISIT: HCPCS | Performed by: NURSE PRACTITIONER

## 2019-03-20 NOTE — PATIENT INSTRUCTIONS
You were given prevnar 13 today  Continue with healthy diet  Continue with exercise    No med changes

## 2019-03-20 NOTE — PROGRESS NOTES
Assessment and Plan:    HM  - mammo:  UTD  - colonoscopy:  UTD  - DEXA:  Scheduled    - influenza:  UTD  - PNA:  Given prevnar 13 today  - shingrix:  D/w pt to get on waiting list @ pharm    - hep c screening:  UTD    No med changes  Forms completed for group home    Problem List Items Addressed This Visit     None      Visit Diagnoses     Medicare annual wellness visit, initial        Encounter for immunization        Relevant Orders    PNEUMOCOCCAL CONJUGATE VACCINE 13-VALENT GREATER THAN 6 MONTHS (Completed)        Health Maintenance Due   Topic Date Due    Medicare Annual Wellness Visit (AWV)  1955    DTaP,Tdap,and Td Vaccines (1 - Tdap) 11/29/2017     HPI:  James Pina is a 70 y o  female here for her Initial Wellness Visit      Patient Active Problem List   Diagnosis    Depression with anxiety    Hyperlipidemia    Hyponatremia    Hypothyroidism    Flatulence    Injury of right toe, initial encounter    Seizure disorder ([facility] [address] )    Hyperglycemia    Benign essential hypertension    Schizoaffective disorder ([facility] [address] )    Unsteady gait    Psychosis, bipolar affective ([facility] [address] )    Osteoporosis    Insomnia    Folic acid deficiency    Anemia    Pre-diabetes     Past Medical History:   Diagnosis Date    Benign essential hypertension     resolved; 06/15/16    Depression with anxiety     Fracture of fifth metatarsal bone of right foot with delayed healing     last assessed 04/12/16; fracture of metatarsal bone, right, closed, initial encounter    Hyperlipidemia     last assessed 11/28/17    Hypothyroidism     last assessed 11/28/2017    Insomnia     Obesity     Schizoaffective disorder ([facility] [address] )     last assessed 05/18/2017    Seizure disorder ([facility] [address] )     last assessed 03/28/17    Seizure disorder ([facility] [address] ) 5/15/2017     Past Surgical History:   Procedure Laterality Date    COLONOSCOPY      complete    OK COLONOSCOPY FLX DX W/COLLJ SPEC WHEN PFRMD N/A 8/30/2018    Procedure: COLONOSCOPY with polypectomies;  Surgeon: Milagro Velasquez MD;  Location: [facility];   Service: Gastroenterology     Family History   Problem Relation Age of Onset    No Known Problems Mother     No Known Problems Father     Lung disease Other         mesothelioma     Social History     Tobacco Use   Smoking Status Former Smoker   Smokeless Tobacco Never Used     Social History     Substance and Sexual Activity   Alcohol Use No      Social History     Substance and Sexual Activity   Drug Use No       Current Outpatient Medications   Medication Sig Dispense Refill    acetaminophen (TYLENOL) 325 mg tablet Take 2 tablets (650 mg total) by mouth every 6 (six) hours as needed for mild pain or headaches 30 tablet 0    alendronate (FOSAMAX) 70 mg tablet Take by mouth TAKE 1 TABLET BY MOUTH DAILY ON FRIDAY       asenapine (SAPHRIS) 10 mg SL tablet Place 10 mg under the tongue 2 (two) times a day        asenapine (SAPHRIS) SL tablet Place 5 mg under the tongue 2 (two) times a day      aspirin (ECOTRIN LOW STRENGTH) 81 mg EC tablet Take 81 mg by mouth daily      bismuth subsalicylate (PEPTO BISMOL) 262 MG chewable tablet Chew 2 tablets (524 mg total) every 6 (six) hours as needed for diarrhea 30 tablet 0    busPIRone (BUSPAR) 15 mg tablet Take 15 mg by mouth 2 (two) times a day        calcium carbonate-vitamin D (OSCAL-D) 500 mg-200 units per tablet Take 1 tablet by mouth 3 (three) times a day with meals        Dextromethorphan Polistirex ER 30 MG/5ML SUER Take 5 mL (30 mg total) by mouth 2 (two) times a day as needed (congsetion) 148 mL 0    diphenhydrAMINE (BENADRYL) 25 mg capsule Take 1 capsule (25 mg total) by mouth daily at bedtime as needed for sleep 30 capsule 0    divalproex sodium (DEPAKOTE) 500 mg EC tablet Take 500 mg by mouth 3 (three) times a day        docusate sodium (COLACE) 100 mg capsule Take 1 capsule (100 mg total) by mouth 2 (two) times a day as needed for constipation 30 capsule 0    fluticasone (FLONASE) 50 mcg/act nasal spray 2 sprays into each nostril daily        gabapentin (NEURONTIN) 100 mg capsule Take 200 mg by mouth 2 (two) times a day        L-Theanine 100 MG CAPS Take 200 mg by mouth 2 (two) times a day        levothyroxine 100 mcg tablet Take 100 mcg by mouth daily        LORazepam (ATIVAN) 1 mg tablet Take 0 5 mg by mouth 2 (two) times a day        loxapine (LOXITANE) 50 MG capsule Take 2 capsules (100 mg) by mouth twice daily & 1 capsule (50 mg) at noon       Multiple Vitamins-Iron (DAILY-KACI/IRON) TABS Take 1 tablet by mouth daily        PHENobarbital 64 8 mg tablet Take 1 tablet (64 8 mg total) by mouth 2 (two) times a day 60 tablet 5    Polyethylene Glycol 3350-GRX POWD Take by mouth MIX 1 CAPFUL (17mg) IN 8 OUNCES OF WATER, JUICE OR TEA AND DRINK DAILY       simvastatin (ZOCOR) 40 mg tablet Take 40 mg by mouth daily at bedtime        traZODone (DESYREL) 150 mg tablet Take 2 tablets by mouth daily at bedtime        trihexyphenidyl (ARTANE) 5 mg tablet Take 5 mg by mouth daily at bedtime        fluticasone (FLOVENT HFA) 110 MCG/ACT inhaler Inhale 2 puffs 2 (two) times a day Rinse mouth after use  (Patient not taking: Reported on 3/20/2019) 1 Inhaler 0     No current facility-administered medications for this visit  Allergies   Allergen Reactions    Clozapine Other (See Comments)     low white blood count  Other reaction(s): Other (See Comments)  low white blood count    Fluphenazine Hyperactivity and Other (See Comments)     EPS, Hyperactivity  EPS, Hyperactivity    Haloperidol Other (See Comments)     EPS  Other reaction(s):  Other (See Comments)  EPS    Lithium Other (See Comments)     Toxicity    Valproic Acid Confusion and Other (See Comments)     "Mental confusion"  "Mental confusion"     Immunization History   Administered Date(s) Administered     Influenza (IM) Preservative Free 10/21/2013    H1N1, All Formulations 01/07/2010    INFLUENZA 09/18/2012, 10/07/2013, 10/12/2016, 10/23/2017    Influenza Quadrivalent Preservative Free 3 years and older IM 10/23/2017    Influenza Quadrivalent, 6-35 Months IM 10/12/2016    Influenza TIV (IM) 10/16/2014    Influenza, high dose seasonal 0 5 mL 10/15/2018    Pneumococcal Conjugate 13-Valent 03/20/2019    Td (adult), adsorbed 11/28/2017    Tuberculin Skin Test-PPD Intradermal 08/08/2018       Patient Care Team:  Carmen Crabtree MD as PCP - General    Medicare Screening Tests and Risk Assessments:  Reena Sheehan is here for her Subsequent Wellness visit  Last Medicare Wellness visit information reviewed, patient interviewed and updates made to the record today  Health Risk Assessment:  Patient rates overall health as good  Patient feels that their physical health rating is Much better  Eyesight was rated as Same  Hearing was rated as Same  Patient feels that their emotional and mental health rating is Much better  Pain experienced by patient in the last 7 days has been None  Patient states that she has experienced no weight loss or gain in last 6 months  Emotional/Mental Health:  Patient has been feeling nervous/anxious  PHQ-9 Depression Screening:    Frequency of the following problems over the past two weeks:      1  Little interest or pleasure in doing things: 0 - not at all      2  Feeling down, depressed, or hopeless: 0 - not at all  PHQ-2 Score: 0          Broken Bones/Falls: Fall Risk Assessment:    In the past year, patient has experienced: No history of falling in past year          Bladder/Bowel:  Patient has not leaked urine accidently in the last six months  Patient reports no loss of bowel control  Immunizations:  Patient has had a flu vaccination within the last year  Patient has received a pneumonia shot  Patient has not received a shingles shot  Home Safety:  Patient does not have trouble with stairs inside or outside of their home     Patient currently reports that there are no safety hazards present in home, working smoke alarms, working carbon monoxide detectors  Preventative Screenings:   Breast cancer screening performed, colon cancer screen completed, cholesterol screen completed, glaucoma eye exam completed,     Nutrition:  Current diet: Regular and No Added Salt with servings of the following:    Medications:  Patient is currently taking over-the-counter supplements  Patient is able to manage medications  Lifestyle Choices:  Patient reports no tobacco use  Patient has not smoked or used tobacco in the past   Patient reports no alcohol use  Patient does not drive a vehicle  Patient wears seat belt  Current level of exercise of physical activity described by patient as: good  Activities of Daily Living:  Can get out of bed by his or her self, able to dress self, able to make own meals, able to do own shopping, able to bathe self, can do own laundry/housekeeping, can manage own money, pay bills and track expenses    Previous Hospitalizations:  No hospitalization or ED visit in past 12 months        Advanced Directives:  Patient has decided on a power of   Patient has spoken to designated power of   Patient has completed advanced directive          Preventative Screening/Counseling:      Cardiovascular:      General: Risks and Benefits Discussed and Screening Current      Counseling: Healthy Weight and Healthy Diet          Diabetes:      General: Risks and Benefits Discussed and Screening Current      Counseling: Healthy Diet and Healthy Weight          Colorectal Cancer:      General: Risks and Benefits Discussed and Screening Current      Counseling: high fiber diet          Breast Cancer:      General: Risks and Benefits Discussed and Screening Current          Cervical Cancer:      General: Risks and Benefits Discussed and Screening Current          Osteoporosis:      General: Risks and Benefits Discussed      Counseling: Calcium and Vitamin D Intake and Regular Weightbearing Exercise      Due for studies: DXA Appendicular      Comments: Has DEX scheduled for June        AAA:      General: Screening Not Indicated          Glaucoma:      General: Risks and Benefits Discussed and Screening Current      Comments: Followed by opthamology        HIV:      General: Screening Not Indicated          Hepatitis C:      General: Risks and Benefits Discussed and Screening Current        Advanced Directives:   Patient has living will for healthcare, has durable POA for healthcare, patient has an advanced directive  Information on ACP and/or AD provided  Additional Comments: POA- sister    Immunizations:      Influenza: Risks & Benefits Discussed and Influenza UTD This Year      Pneumococcal: Risks & Benefits Discussed and Pneumococcal Due Today      Shingrix: Risks & Benefits Discussed and Shingrix Vaccine Needed Today      Hepatitis B (Low risk patients): Series Not Indicated      TD: Td Vaccine UTD      Other Preventative Counseling (Non-Medicare):   Increase physical activity, Nutrition Counseling and Weight reduction discussed

## 2019-03-29 DIAGNOSIS — G44.209 ACUTE NON INTRACTABLE TENSION-TYPE HEADACHE: ICD-10-CM

## 2019-03-29 RX ORDER — ACETAMINOPHEN 325 MG/1
TABLET ORAL
Refills: 4 | OUTPATIENT
Start: 2019-03-29

## 2019-04-11 ENCOUNTER — TELEPHONE (OUTPATIENT)
Dept: INTERNAL MEDICINE CLINIC | Facility: CLINIC | Age: 64
End: 2019-04-11

## 2019-04-11 DIAGNOSIS — G44.209 ACUTE NON INTRACTABLE TENSION-TYPE HEADACHE: ICD-10-CM

## 2019-04-11 RX ORDER — ACETAMINOPHEN 325 MG/1
650 TABLET ORAL EVERY 6 HOURS PRN
Qty: 30 TABLET | Refills: 1 | Status: SHIPPED | OUTPATIENT
Start: 2019-04-11 | End: 2019-08-07 | Stop reason: HOSPADM

## 2019-04-30 ENCOUNTER — OFFICE VISIT (OUTPATIENT)
Dept: INTERNAL MEDICINE CLINIC | Facility: CLINIC | Age: 64
End: 2019-04-30
Payer: MEDICARE

## 2019-04-30 VITALS
SYSTOLIC BLOOD PRESSURE: 120 MMHG | DIASTOLIC BLOOD PRESSURE: 82 MMHG | BODY MASS INDEX: 40.63 KG/M2 | HEIGHT: 64 IN | OXYGEN SATURATION: 97 % | HEART RATE: 98 BPM | WEIGHT: 238 LBS | TEMPERATURE: 99 F

## 2019-04-30 DIAGNOSIS — J30.2 SEASONAL ALLERGIES: Primary | ICD-10-CM

## 2019-04-30 PROCEDURE — 99213 OFFICE O/P EST LOW 20 MIN: CPT | Performed by: NURSE PRACTITIONER

## 2019-04-30 RX ORDER — LORATADINE 10 MG/1
10 TABLET ORAL DAILY
Qty: 30 TABLET | Refills: 1 | Status: SHIPPED | OUTPATIENT
Start: 2019-04-30 | End: 2020-04-13 | Stop reason: SDUPTHER

## 2019-05-05 DIAGNOSIS — R56.9 SEIZURE (HCC): ICD-10-CM

## 2019-05-06 ENCOUNTER — TELEPHONE (OUTPATIENT)
Dept: NEUROLOGY | Facility: CLINIC | Age: 64
End: 2019-05-06

## 2019-05-06 RX ORDER — PHENOBARBITAL 64.8 MG/1
TABLET ORAL
Qty: 34 TABLET | Refills: 4 | Status: SHIPPED | OUTPATIENT
Start: 2019-05-06 | End: 2019-07-29 | Stop reason: SDUPTHER

## 2019-05-21 ENCOUNTER — TELEPHONE (OUTPATIENT)
Dept: INTERNAL MEDICINE CLINIC | Facility: CLINIC | Age: 64
End: 2019-05-21

## 2019-05-22 ENCOUNTER — TELEPHONE (OUTPATIENT)
Dept: INTERNAL MEDICINE CLINIC | Facility: CLINIC | Age: 64
End: 2019-05-22

## 2019-05-23 DIAGNOSIS — G47.00 INSOMNIA, UNSPECIFIED TYPE: Primary | ICD-10-CM

## 2019-05-23 RX ORDER — LANOLIN ALCOHOL/MO/W.PET/CERES
3 CREAM (GRAM) TOPICAL
Qty: 30 TABLET | Refills: 2 | Status: SHIPPED | OUTPATIENT
Start: 2019-05-23

## 2019-05-28 ENCOUNTER — TELEPHONE (OUTPATIENT)
Dept: INTERNAL MEDICINE CLINIC | Facility: CLINIC | Age: 64
End: 2019-05-28

## 2019-05-28 DIAGNOSIS — H91.93 DECREASED HEARING OF BOTH EARS: Primary | ICD-10-CM

## 2019-05-30 DIAGNOSIS — G44.209 ACUTE NON INTRACTABLE TENSION-TYPE HEADACHE: ICD-10-CM

## 2019-05-31 RX ORDER — ACETAMINOPHEN 325 MG/1
TABLET ORAL
Qty: 30 TABLET | Refills: 3 | Status: SHIPPED | OUTPATIENT
Start: 2019-05-31 | End: 2019-08-07 | Stop reason: HOSPADM

## 2019-06-12 ENCOUNTER — TELEPHONE (OUTPATIENT)
Dept: INTERNAL MEDICINE CLINIC | Age: 64
End: 2019-06-12

## 2019-06-12 ENCOUNTER — TRANSCRIBE ORDERS (OUTPATIENT)
Dept: ADMINISTRATIVE | Facility: HOSPITAL | Age: 64
End: 2019-06-12

## 2019-06-12 DIAGNOSIS — Z12.39 BREAST SCREENING, UNSPECIFIED: Primary | ICD-10-CM

## 2019-06-12 DIAGNOSIS — Z12.39 SCREENING FOR MALIGNANT NEOPLASM OF BREAST: Primary | ICD-10-CM

## 2019-07-16 DIAGNOSIS — G47.00 INSOMNIA, UNSPECIFIED TYPE: ICD-10-CM

## 2019-07-16 RX ORDER — DIPHENHYDRAMINE HCL 25 MG
25 CAPSULE ORAL
Qty: 30 CAPSULE | Refills: 5 | Status: SHIPPED | OUTPATIENT
Start: 2019-07-16 | End: 2019-08-07 | Stop reason: SDUPTHER

## 2019-07-16 NOTE — TELEPHONE ENCOUNTER
Caretaker called and needs refills of the following medications to be sent [facility]     diphenhydrAMINE (BENADRYL) 25 mg capsule     Thank you

## 2019-07-26 ENCOUNTER — HOSPITAL ENCOUNTER (OUTPATIENT)
Dept: RADIOLOGY | Age: 64
Discharge: HOME | End: 2019-07-26
Payer: MEDICARE

## 2019-07-26 VITALS — WEIGHT: 238 LBS | BODY MASS INDEX: 40.63 KG/M2 | HEIGHT: 64 IN

## 2019-07-26 DIAGNOSIS — Z12.39 SCREENING FOR MALIGNANT NEOPLASM OF BREAST: ICD-10-CM

## 2019-07-26 PROCEDURE — 77067 SCR MAMMO BI INCL CAD: CPT

## 2019-07-29 DIAGNOSIS — R56.9 SEIZURE (HCC): ICD-10-CM

## 2019-07-29 RX ORDER — PHENOBARBITAL 64.8 MG/1
64.8 TABLET ORAL 2 TIMES DAILY
Qty: 60 TABLET | Refills: 5 | Status: SHIPPED | OUTPATIENT
Start: 2019-07-29 | End: 2019-11-04 | Stop reason: SDUPTHER

## 2019-07-29 NOTE — TELEPHONE ENCOUNTER
Addendum to original message  I also advised her that they can call the on call dr regarding refills if pt is out of meds after 5pm and on weekends

## 2019-07-29 NOTE — TELEPHONE ENCOUNTER
facility called and states that we need to send refill for phenobarbital   last script was sent for qty of 34, pt takes bid  I called pharm and verified that refill was needed  New script entered for auth  They sent out a 3 day supply  But facility says that they did not receive this yet  Her last dose was yesterday morning  The meds may arrive today  Please advise on dosing for today      [phone]

## 2019-07-29 NOTE — TELEPHONE ENCOUNTER
Facility made aware  She is asking if medication is received tonight around 7, should they give am dose and then give 8pm dose  I spoke to dr Sharif Callahan on the phone and he states that it is ok for pt to receive dose later this evening and still get 8pm dose, she may just be sleepy  Facility made aware      I called pharm and she states that med will be delivered tonight between 7p-8p

## 2019-08-07 ENCOUNTER — OFFICE VISIT (OUTPATIENT)
Dept: INTERNAL MEDICINE CLINIC | Age: 64
End: 2019-08-07
Payer: MEDICARE

## 2019-08-07 VITALS
OXYGEN SATURATION: 96 % | HEIGHT: 64 IN | HEART RATE: 95 BPM | WEIGHT: 237.2 LBS | TEMPERATURE: 98.3 F | BODY MASS INDEX: 40.49 KG/M2 | DIASTOLIC BLOOD PRESSURE: 90 MMHG | SYSTOLIC BLOOD PRESSURE: 126 MMHG

## 2019-08-07 DIAGNOSIS — E78.2 MIXED HYPERLIPIDEMIA: ICD-10-CM

## 2019-08-07 DIAGNOSIS — J06.9 VIRAL UPPER RESPIRATORY TRACT INFECTION: ICD-10-CM

## 2019-08-07 DIAGNOSIS — K59.09 OTHER CONSTIPATION: ICD-10-CM

## 2019-08-07 DIAGNOSIS — R73.03 PRE-DIABETES: ICD-10-CM

## 2019-08-07 DIAGNOSIS — M81.0 OSTEOPOROSIS, UNSPECIFIED OSTEOPOROSIS TYPE, UNSPECIFIED PATHOLOGICAL FRACTURE PRESENCE: ICD-10-CM

## 2019-08-07 DIAGNOSIS — G47.00 INSOMNIA, UNSPECIFIED TYPE: ICD-10-CM

## 2019-08-07 DIAGNOSIS — E03.9 ACQUIRED HYPOTHYROIDISM: Primary | ICD-10-CM

## 2019-08-07 DIAGNOSIS — R09.89 CHEST CONGESTION: ICD-10-CM

## 2019-08-07 PROCEDURE — 99214 OFFICE O/P EST MOD 30 MIN: CPT | Performed by: INTERNAL MEDICINE

## 2019-08-07 RX ORDER — ACETAMINOPHEN 500 MG
1000 TABLET ORAL 3 TIMES DAILY PRN
Qty: 90 TABLET | Refills: 3 | Status: SHIPPED | OUTPATIENT
Start: 2019-08-07 | End: 2019-09-30 | Stop reason: SDUPTHER

## 2019-08-07 RX ORDER — ATORVASTATIN CALCIUM 20 MG/1
20 TABLET, FILM COATED ORAL DAILY
Qty: 90 TABLET | Refills: 1 | Status: SHIPPED | OUTPATIENT
Start: 2019-08-07 | End: 2020-11-16 | Stop reason: SDUPTHER

## 2019-08-07 RX ORDER — LEVOTHYROXINE SODIUM 0.1 MG/1
100 TABLET ORAL DAILY
Qty: 90 TABLET | Refills: 1 | Status: SHIPPED | OUTPATIENT
Start: 2019-08-07 | End: 2020-11-16 | Stop reason: SDUPTHER

## 2019-08-07 RX ORDER — DIPHENHYDRAMINE HCL 50 MG
50 CAPSULE ORAL
Qty: 90 CAPSULE | Refills: 2 | Status: SHIPPED | OUTPATIENT
Start: 2019-08-07

## 2019-08-07 RX ORDER — DOCUSATE SODIUM 100 MG/1
100 CAPSULE, LIQUID FILLED ORAL 2 TIMES DAILY PRN
Qty: 180 CAPSULE | Refills: 1 | Status: SHIPPED | OUTPATIENT
Start: 2019-08-07

## 2019-08-07 RX ORDER — ASENAPINE 10 MG/1
10 TABLET SUBLINGUAL 2 TIMES DAILY
Qty: 180 TABLET | Refills: 1 | Status: CANCELLED | OUTPATIENT
Start: 2019-08-07

## 2019-08-07 RX ORDER — FLUTICASONE PROPIONATE 110 UG/1
2 AEROSOL, METERED RESPIRATORY (INHALATION) DAILY
Qty: 12 G | Refills: 5 | Status: SHIPPED | OUTPATIENT
Start: 2019-08-07 | End: 2019-09-30 | Stop reason: SDUPTHER

## 2019-08-07 RX ORDER — DEXTROMETHORPHAN POLISTIREX 30 MG/5ML
30 SUSPENSION ORAL 2 TIMES DAILY PRN
Qty: 148 ML | Refills: 1 | Status: CANCELLED | OUTPATIENT
Start: 2019-08-07

## 2019-08-07 NOTE — PROGRESS NOTES
Assessment/Plan:    BMI Counseling: Body mass index is 40 72 kg/m²  Discussed the patient's BMI with her  The BMI is above average  BMI counseling and education was provided to the patient  Nutrition recommendations include reducing portion sizes, decreasing overall calorie intake, 3-5 servings of fruits/vegetables daily, reducing fast food intake, consuming healthier snacks and decreasing soda and/or juice intake  1  Hyperlipidemia  Lipid profile is in excellent range  Will continue with present dose of statin    2  Hypothyroidism  TSH is within normal range  Will continue with levothyroxine 100 mcg daily    3  Chronic rhinitis  Continue with Flonase    4  Obesity  Advised for weight loss and diet control and exercise as much as possible  5  Prediabetes  Hemoglobin A1c is 5 8  Continue with low sugar/low fat diet  Will check A1c in about 6 months               Diagnoses and all orders for this visit:    Acquired hypothyroidism  -     levothyroxine 100 mcg tablet; Take 1 tablet (100 mcg total) by mouth daily    Viral upper respiratory tract infection  -     fluticasone (FLOVENT HFA) 110 MCG/ACT inhaler; Inhale 2 puffs daily Rinse mouth after use  Other constipation  -     Polyethylene Glycol 3350-GRX POWD; Take by mouth daily MIX 1 CAPFUL (17mg) IN 8 OUNCES OF WATER, JUICE OR TEA AND DRINK DAILY  -     docusate sodium (COLACE) 100 mg capsule; Take 1 capsule (100 mg total) by mouth 2 (two) times a day as needed for constipation    Chest congestion    Mixed hyperlipidemia  -     atorvastatin (LIPITOR) 20 mg tablet; Take 1 tablet (20 mg total) by mouth daily    Pre-diabetes    Insomnia, unspecified type  -     diphenhydrAMINE (BENADRYL) 50 mg capsule; Take 1 capsule (50 mg total) by mouth daily at bedtime as needed for sleep    Osteoporosis, unspecified osteoporosis type, unspecified pathological fracture presence  -     acetaminophen (TYLENOL) 500 mg tablet;  Take 2 tablets (1,000 mg total) by mouth 3 (three) times a day as needed for mild pain    Other orders  -     Cancel: asenapine (SAPHRIS) 10 mg SL tablet; Place 1 tablet (10 mg total) under the tongue 2 (two) times a day  -     Cancel: Dextromethorphan Polistirex ER 30 MG/5ML SUER; Take 5 mL (30 mg total) by mouth 2 (two) times a day as needed (congsetion)          Subjective:          Patient ID: Nadia Lozano is a 75 y o  female  PATIENT IS HERE FOR REGULAR FOLLOW-UP  She does have blood work done yesterday would like to discuss results  Still she complain some headaches off and on  And she thing that 325 mg of Tylenol 2 tablets does not help  The following portions of the patient's history were reviewed and updated as appropriate: allergies, current medications, past family history, past medical history, past social history, past surgical history and problem list     Review of Systems   Constitutional: Negative for fatigue and fever  HENT: Negative for congestion, ear discharge, ear pain, postnasal drip, sinus pressure, sore throat, tinnitus and trouble swallowing  Eyes: Negative for discharge, itching and visual disturbance  Respiratory: Negative for cough and shortness of breath  Cardiovascular: Negative for chest pain and palpitations  Gastrointestinal: Negative for abdominal pain, diarrhea, nausea and vomiting  Endocrine: Negative for cold intolerance and polyuria  Genitourinary: Negative for difficulty urinating, dysuria and urgency  Musculoskeletal: Positive for arthralgias  Negative for neck pain  Skin: Negative for rash  Allergic/Immunologic: Negative for environmental allergies  Neurological: Negative for dizziness, weakness and headaches  Psychiatric/Behavioral: The patient is not nervous/anxious            Past Medical History:   Diagnosis Date    Benign essential hypertension     resolved; 06/15/16    Depression with anxiety     Fracture of fifth metatarsal bone of right foot with delayed healing last assessed 04/12/16; fracture of metatarsal bone, right, closed, initial encounter    Hyperlipidemia     last assessed 11/28/17    Hypothyroidism     last assessed 11/28/2017    Insomnia     Obesity     Schizoaffective disorder ([facility] )     last assessed 05/18/2017    Seizure disorder ([facility] )     last assessed 03/28/17    Seizure disorder ([facility] ) 5/15/2017         Current Outpatient Medications:     alendronate (FOSAMAX) 70 mg tablet, Take by mouth TAKE 1 TABLET BY MOUTH DAILY ON FRIDAY , Disp: , Rfl:     asenapine (SAPHRIS) 10 mg SL tablet, Place 10 mg under the tongue 2 (two) times a day  , Disp: , Rfl:     asenapine (SAPHRIS) SL tablet, Place 5 mg under the tongue 2 (two) times a day, Disp: , Rfl:     aspirin (ECOTRIN LOW STRENGTH) 81 mg EC tablet, Take 81 mg by mouth daily, Disp: , Rfl:     bismuth subsalicylate (PEPTO BISMOL) 262 MG chewable tablet, Chew 2 tablets (524 mg total) every 6 (six) hours as needed for diarrhea, Disp: 30 tablet, Rfl: 0    busPIRone (BUSPAR) 15 mg tablet, Take 15 mg by mouth 2 (two) times a day  , Disp: , Rfl:     calcium carbonate-vitamin D (OSCAL-D) 500 mg-200 units per tablet, Take 1 tablet by mouth 3 (three) times a day with meals  , Disp: , Rfl:     Dextromethorphan Polistirex ER 30 MG/5ML SUER, Take 5 mL (30 mg total) by mouth 2 (two) times a day as needed (congsetion), Disp: 148 mL, Rfl: 0    diphenhydrAMINE (BENADRYL) 50 mg capsule, Take 1 capsule (50 mg total) by mouth daily at bedtime as needed for sleep, Disp: 90 capsule, Rfl: 2    divalproex sodium (DEPAKOTE) 500 mg EC tablet, Take 500 mg by mouth 3 (three) times a day  , Disp: , Rfl:     fluticasone (FLONASE) 50 mcg/act nasal spray, 2 sprays into each nostril daily  , Disp: , Rfl:     gabapentin (NEURONTIN) 100 mg capsule, Take 200 mg by mouth 2 (two) times a day  , Disp: , Rfl:     L-Theanine 100 MG CAPS, Take 200 mg by mouth 2 (two) times a day  , Disp: , Rfl:     loratadine (CLARITIN) 10 mg tablet, Take 1 tablet (10 mg total) by mouth daily, Disp: 30 tablet, Rfl: 1    LORazepam (ATIVAN) 1 mg tablet, Take 0 5 mg by mouth 2 (two) times a day  , Disp: , Rfl:     loxapine (LOXITANE) 50 MG capsule, Take 3 capsules (150 mg) at bedtime and 1 capsule (50 mg) at noon , Disp: , Rfl:     melatonin 3 mg, Take 1 tablet (3 mg total) by mouth daily at bedtime, Disp: 30 tablet, Rfl: 2    Multiple Vitamins-Iron (DAILY-KACI/IRON) TABS, Take 1 tablet by mouth daily  , Disp: , Rfl:     PHENobarbital 64 8 mg tablet, Take 1 tablet (64 8 mg total) by mouth 2 (two) times a day, Disp: 60 tablet, Rfl: 5    traZODone (DESYREL) 150 mg tablet, Take 2 tablets by mouth daily at bedtime  , Disp: , Rfl:     trihexyphenidyl (ARTANE) 5 mg tablet, Take 5 mg by mouth daily at bedtime  , Disp: , Rfl:     acetaminophen (TYLENOL) 500 mg tablet, Take 2 tablets (1,000 mg total) by mouth 3 (three) times a day as needed for mild pain, Disp: 90 tablet, Rfl: 3    atorvastatin (LIPITOR) 20 mg tablet, Take 1 tablet (20 mg total) by mouth daily, Disp: 90 tablet, Rfl: 1    docusate sodium (COLACE) 100 mg capsule, Take 1 capsule (100 mg total) by mouth 2 (two) times a day as needed for constipation, Disp: 180 capsule, Rfl: 1    fluticasone (FLOVENT HFA) 110 MCG/ACT inhaler, Inhale 2 puffs daily Rinse mouth after use , Disp: 12 g, Rfl: 5    levothyroxine 100 mcg tablet, Take 1 tablet (100 mcg total) by mouth daily, Disp: 90 tablet, Rfl: 1    Polyethylene Glycol 3350-GRX POWD, Take by mouth daily MIX 1 CAPFUL (17mg) IN 8 OUNCES OF WATER, JUICE OR TEA AND DRINK DAILY, Disp: 850 g, Rfl: 1    Allergies   Allergen Reactions    Clozapine Other (See Comments)     low white blood count  Other reaction(s): Other (See Comments)  low white blood count    Fluphenazine Hyperactivity and Other (See Comments)     EPS, Hyperactivity  EPS, Hyperactivity    Haloperidol Other (See Comments)     EPS  Other reaction(s):  Other (See Comments)  EPS    Lithium Other (See Comments)     Toxicity    Valproic Acid Confusion and Other (See Comments)     "Mental confusion"  "Mental confusion"       Social History   Past Surgical History:   Procedure Laterality Date    COLONOSCOPY      complete    IA COLONOSCOPY FLX DX W/COLLJ SPEC WHEN PFRMD N/A 8/30/2018    Procedure: COLONOSCOPY with polypectomies;  Surgeon: Ludmila Tyson MD;  Location: [facility]; Service: Gastroenterology     Family History   Problem Relation Age of Onset    No Known Problems Mother     No Known Problems Father     Lung disease Other         mesothelioma    No Known Problems Maternal Grandmother     No Known Problems Maternal Grandfather     No Known Problems Paternal Grandmother     No Known Problems Paternal Grandfather     No Known Problems Sister     No Known Problems Sister        Objective:  /90 (BP Location: Left arm, Patient Position: Sitting, Cuff Size: Large)   Pulse 95   Temp 98 3 °F (36 8 °C) (Oral)   Ht 5' 4" (1 626 m)   Wt 108 kg (237 lb 3 2 oz)   SpO2 96%   BMI 40 72 kg/m²   Body mass index is 40 72 kg/m²  Physical Exam   Constitutional: She appears well-developed  HENT:   Head: Normocephalic  Right Ear: External ear normal    Left Ear: External ear normal    Mouth/Throat: Oropharynx is clear and moist    Eyes: Pupils are equal, round, and reactive to light  No scleral icterus  Neck: Normal range of motion  Neck supple  No tracheal deviation present  No thyromegaly present  Cardiovascular: Normal rate, regular rhythm and normal heart sounds  Pulmonary/Chest: Effort normal and breath sounds normal  No respiratory distress  She exhibits no tenderness  Abdominal: Soft  Bowel sounds are normal  She exhibits no mass  There is no tenderness  Musculoskeletal: Normal range of motion  She exhibits edema  Trace bilateral lower extremity edema present   Lymphadenopathy:     She has no cervical adenopathy  Neurological: She is alert  No cranial nerve deficit     Skin: Skin is warm  Psychiatric: She has a normal mood and affect

## 2019-08-08 ENCOUNTER — TELEPHONE (OUTPATIENT)
Dept: INTERNAL MEDICINE CLINIC | Facility: CLINIC | Age: 64
End: 2019-08-08

## 2019-08-08 NOTE — TELEPHONE ENCOUNTER
Ariadna from [facility] called and needed clarification on a few of Laine's medications such as; Atorvastatin, Flovent and the tylenol  Called [phone] and took care of everything

## 2019-08-09 ENCOUNTER — TELEPHONE (OUTPATIENT)
Dept: INTERNAL MEDICINE CLINIC | Age: 64
End: 2019-08-09

## 2019-08-09 DIAGNOSIS — H91.93 DECREASED HEARING OF BOTH EARS: Primary | ICD-10-CM

## 2019-08-09 NOTE — TELEPHONE ENCOUNTER
Patient needs a referral sent to Dr Deep Gonzalez for annual hearing evaluation  Phone is [phone] [address] [facility] Fax: [phone]

## 2019-09-30 ENCOUNTER — OFFICE VISIT (OUTPATIENT)
Dept: INTERNAL MEDICINE CLINIC | Age: 64
End: 2019-09-30
Payer: MEDICARE

## 2019-09-30 VITALS
TEMPERATURE: 97.9 F | HEART RATE: 92 BPM | DIASTOLIC BLOOD PRESSURE: 76 MMHG | WEIGHT: 238.8 LBS | OXYGEN SATURATION: 96 % | BODY MASS INDEX: 40.77 KG/M2 | HEIGHT: 64 IN | SYSTOLIC BLOOD PRESSURE: 104 MMHG

## 2019-09-30 DIAGNOSIS — J06.9 VIRAL UPPER RESPIRATORY TRACT INFECTION: ICD-10-CM

## 2019-09-30 DIAGNOSIS — M81.0 OSTEOPOROSIS, UNSPECIFIED OSTEOPOROSIS TYPE, UNSPECIFIED PATHOLOGICAL FRACTURE PRESENCE: ICD-10-CM

## 2019-09-30 DIAGNOSIS — R09.89 CHEST CONGESTION: ICD-10-CM

## 2019-09-30 PROCEDURE — 99213 OFFICE O/P EST LOW 20 MIN: CPT | Performed by: NURSE PRACTITIONER

## 2019-09-30 RX ORDER — FLUTICASONE PROPIONATE 110 UG/1
2 AEROSOL, METERED RESPIRATORY (INHALATION) DAILY
Qty: 12 G | Refills: 5 | Status: SHIPPED | OUTPATIENT
Start: 2019-09-30 | End: 2022-05-18 | Stop reason: HOSPADM

## 2019-09-30 RX ORDER — DEXTROMETHORPHAN POLISTIREX 30 MG/5ML
30 SUSPENSION ORAL 2 TIMES DAILY PRN
Qty: 148 ML | Refills: 0 | Status: SHIPPED | OUTPATIENT
Start: 2019-09-30 | End: 2022-01-12

## 2019-09-30 RX ORDER — ACETAMINOPHEN 500 MG
1000 TABLET ORAL 3 TIMES DAILY PRN
Qty: 90 TABLET | Refills: 3 | Status: SHIPPED | OUTPATIENT
Start: 2019-09-30 | End: 2020-11-11

## 2019-09-30 NOTE — ASSESSMENT & PLAN NOTE
Illness appears to be viral in nature  Rest and fluids advised  Educated that the course of this illness could be 2-4 weeks  Discussed symptomatic relief, such as warm steam inhalations, tylenol/ibuprofen for fevers and body aches, rest, and drink plenty of fluids  Warm salt gargles for sore throat  Discussed red flag signs to go to the ER, such as chest pain or shortness of breath  Return to the office for reevaluation if symptoms worsen or do not improve in 1-2 weeks      Patient may continue with Flonase 2 sprays each nostril daily, as well as Claritin, patient also has a cough medicine that she may take, and will advise patient to restart her Ventolin inhaler

## 2019-09-30 NOTE — PROGRESS NOTES
Assessment/Plan:    Viral upper respiratory tract infection  Illness appears to be viral in nature  Rest and fluids advised  Educated that the course of this illness could be 2-4 weeks  Discussed symptomatic relief, such as warm steam inhalations, tylenol/ibuprofen for fevers and body aches, rest, and drink plenty of fluids  Warm salt gargles for sore throat  Discussed red flag signs to go to the ER, such as chest pain or shortness of breath  Return to the office for reevaluation if symptoms worsen or do not improve in 1-2 weeks      Patient may continue with Flonase 2 sprays each nostril daily, as well as Claritin, patient also has a cough medicine that she may take, and will advise patient to restart her Ventolin inhaler  Diagnoses and all orders for this visit:    Chest congestion  -     Dextromethorphan Polistirex ER 30 MG/5ML SUER; Take 5 mL (30 mg total) by mouth 2 (two) times a day as needed (congsetion)    Osteoporosis, unspecified osteoporosis type, unspecified pathological fracture presence  -     acetaminophen (TYLENOL) 500 mg tablet; Take 2 tablets (1,000 mg total) by mouth 3 (three) times a day as needed for mild pain    Viral upper respiratory tract infection  -     fluticasone (FLOVENT HFA) 110 MCG/ACT inhaler; Inhale 2 puffs daily Rinse mouth after use  Subjective:      Patient ID: Jyoti Claudio is a 75 y o  female  Patient presents today with cold-like symptoms,  She presents with a productive cough, mild shortness of breath and sinus congestion  Her symptoms started Friday  Patient has been taking Tylenol with no relief  Using fluticasone but that "does not work at all"  Patient reports she would like Tylenol 500 mg and dextromethorphan  Patient reports diarrhea starting today  Went to the bathroom twice today  Cough   This is a new problem  The current episode started in the past 7 days  The problem has been gradually worsening   The problem occurs every few minutes  The cough is productive of sputum  Associated symptoms include chills, headaches, nasal congestion, rhinorrhea, a sore throat, shortness of breath (mild), sweats and wheezing  Pertinent negatives include no chest pain, ear congestion, ear pain, fever, postnasal drip or rash  Associated symptoms comments: Feels feverish but does not have known temperature  She has tried nothing (cough drops help a little) for the symptoms  The following portions of the patient's history were reviewed and updated as appropriate: allergies, current medications, past family history, past medical history, past social history, past surgical history and problem list     Review of Systems   Constitutional: Positive for appetite change (not hungry), chills and fatigue  Negative for fever and unexpected weight change  HENT: Positive for congestion, rhinorrhea and sore throat  Negative for ear pain, hearing loss, postnasal drip and trouble swallowing  Eyes: Negative for visual disturbance  Respiratory: Positive for cough, shortness of breath (mild) and wheezing  Negative for chest tightness  Cardiovascular: Negative for chest pain, palpitations and leg swelling  Gastrointestinal: Positive for diarrhea  Negative for abdominal pain, constipation, nausea and vomiting  Endocrine: Negative for polydipsia  Genitourinary: Negative for dysuria, frequency and urgency  Musculoskeletal: Positive for arthralgias  Back pain     Skin: Negative for color change and rash  Neurological: Positive for headaches  Negative for dizziness, weakness and numbness  Psychiatric/Behavioral: Negative for dysphoric mood and sleep disturbance  The patient is not nervous/anxious            Objective:      /76 (BP Location: Left arm, Patient Position: Sitting, Cuff Size: Large)   Pulse 92   Temp 97 9 °F (36 6 °C) (Tympanic)   Ht 5' 4" (1 626 m)   Wt 108 kg (238 lb 12 8 oz)   SpO2 96%   BMI 40 99 kg/m² Physical Exam   Constitutional: She is oriented to person, place, and time  She appears well-developed and well-nourished  Non-toxic appearance  She does not appear ill  No distress  HENT:   Right Ear: Hearing, tympanic membrane and ear canal normal    Left Ear: Hearing and ear canal normal  A middle ear effusion is present  Nose: No mucosal edema or rhinorrhea  Right sinus exhibits no maxillary sinus tenderness and no frontal sinus tenderness  Left sinus exhibits no maxillary sinus tenderness and no frontal sinus tenderness  Mouth/Throat: Uvula is midline, oropharynx is clear and moist and mucous membranes are normal  No oral lesions  No oropharyngeal exudate, posterior oropharyngeal edema or posterior oropharyngeal erythema  No tonsillar exudate  Cardiovascular: Normal rate and regular rhythm  Pulmonary/Chest: Effort normal  No respiratory distress  She has wheezes in the right upper field, the right middle field and the right lower field  She has no rhonchi  She has no rales  Lymphadenopathy:     She has no cervical adenopathy  Neurological: She is alert and oriented to person, place, and time  Skin: Skin is warm and dry  Psychiatric: She has a normal mood and affect  Her behavior is normal  Judgment and thought content normal    Nursing note and vitals reviewed

## 2019-10-28 DIAGNOSIS — K59.09 OTHER CONSTIPATION: ICD-10-CM

## 2019-10-31 ENCOUNTER — TELEPHONE (OUTPATIENT)
Dept: NEUROLOGY | Facility: CLINIC | Age: 64
End: 2019-10-31

## 2019-11-01 ENCOUNTER — OFFICE VISIT (OUTPATIENT)
Dept: INTERNAL MEDICINE CLINIC | Facility: CLINIC | Age: 64
End: 2019-11-01
Payer: MEDICARE

## 2019-11-01 ENCOUNTER — TELEPHONE (OUTPATIENT)
Dept: INTERNAL MEDICINE CLINIC | Age: 64
End: 2019-11-01

## 2019-11-01 VITALS
WEIGHT: 240.6 LBS | HEART RATE: 88 BPM | DIASTOLIC BLOOD PRESSURE: 68 MMHG | BODY MASS INDEX: 40.08 KG/M2 | TEMPERATURE: 98.1 F | OXYGEN SATURATION: 98 % | SYSTOLIC BLOOD PRESSURE: 124 MMHG | HEIGHT: 65 IN

## 2019-11-01 DIAGNOSIS — L03.311 CELLULITIS OF ABDOMINAL WALL: Primary | ICD-10-CM

## 2019-11-01 PROCEDURE — 99213 OFFICE O/P EST LOW 20 MIN: CPT | Performed by: INTERNAL MEDICINE

## 2019-11-01 RX ORDER — NAPROXEN 500 MG/1
500 TABLET ORAL 2 TIMES DAILY WITH MEALS
Qty: 20 TABLET | Refills: 0 | Status: SHIPPED | OUTPATIENT
Start: 2019-11-01 | End: 2020-07-17

## 2019-11-01 RX ORDER — CEPHALEXIN 500 MG/1
500 CAPSULE ORAL EVERY 6 HOURS SCHEDULED
Qty: 40 CAPSULE | Refills: 0 | Status: SHIPPED | OUTPATIENT
Start: 2019-11-01 | End: 2019-11-01 | Stop reason: SDUPTHER

## 2019-11-01 RX ORDER — CEPHALEXIN 500 MG/1
500 CAPSULE ORAL EVERY 6 HOURS SCHEDULED
Qty: 40 CAPSULE | Refills: 0 | Status: SHIPPED | OUTPATIENT
Start: 2019-11-01 | End: 2019-11-11

## 2019-11-01 RX ORDER — NAPROXEN 500 MG/1
500 TABLET ORAL 2 TIMES DAILY WITH MEALS
Qty: 20 TABLET | Refills: 0 | Status: SHIPPED | OUTPATIENT
Start: 2019-11-01 | End: 2019-11-01 | Stop reason: SDUPTHER

## 2019-11-01 NOTE — PATIENT INSTRUCTIONS
Cellulitis   AMBULATORY CARE:   Cellulitis  is a skin infection caused by bacteria  Cellulitis usually appears on the legs and feet, arms and hands, or face  Common signs and symptoms include the following:   · A red, warm, swollen area on your skin    · Pain when the area is touched    · Bumps or blisters (abscess) that may drain pus    · Bumpy, raised skin that feels like an orange peel  Call 911 if:   · You have sudden trouble breathing or chest pain  Seek care immediately if:   · Your wound gets larger and more painful  · You feel a crackling under your skin when you touch it  · You have purple dots or bumps on your skin, or you see bleeding under your skin  · You have new swelling and pain in your legs  · The red, warm, swollen area gets larger  · You see red streaks coming from the infected area  Contact your healthcare provider if:   · You have a fever  · Your fever or pain does not go away or gets worse  · The area does not get smaller after 2 days of antibiotics  · Your skin is flaking or peeling off  · You have questions or concerns about your condition or care  Treatment for cellulitis  may decrease symptoms, stop the infection from spreading, and cure the infection  Treatment depends on how severe your cellulitis is  Cellulitis may go away on its own  You may  instead need antibiotics to help treat the bacterial infection and medicines for pain  Your healthcare provider may draw a [place] around the edges of your cellulitis  If your cellulitis spreads, your healthcare provider will see it outside of the [place]  Manage your symptoms:   · Elevate the area above the level of your heart  as often as you can  This will help decrease swelling and pain  Prop the area on pillows or blankets to keep it elevated comfortably  · Clean the area daily until the wound scabs over  Gently wash the area with soap and water  Pat dry  Use dressings as directed       · Place cool or warm, wet cloths on the area as directed  Use clean cloths and clean water  Leave it on the area until the cloth is room temperature  Pat the area dry with a clean, dry cloth  The cloths may help decrease pain  Prevent cellulitis:   · Do not scratch bug bites or areas of injury  You increase your risk for cellulitis by scratching these areas  · Do not share personal items, such as towels, clothing, and razors  · Clean exercise equipment  with germ-killing  before and after you use it  · Wash your hands often  Use soap and water  Wash your hands after you use the bathroom, change a child's diapers, or sneeze  Wash your hands before you prepare or eat food  Use lotion to prevent dry, cracked skin  · Wear pressure stockings as directed  You may be told to wear the stockings if you have peripheral edema  The stockings improve blood flow and decrease swelling  · Treat athlete's foot  This can help prevent the spread of a bacterial skin infection  Follow up with your healthcare provider within 3 days, or as directed: Your healthcare provider will check if your cellulitis is getting better  You may need different medicine  Write down your questions so you remember to ask them during your visits  © 2017 [employer]  Information is for End User's use only and may not be sold, redistributed or otherwise used for commercial purposes  All illustrations and images included in CareNotes® are the copyrighted property of [employer]  or Kimani Sorenson  The above information is an  only  It is not intended as medical advice for individual conditions or treatments  Talk to your doctor, nurse or pharmacist before following any medical regimen to see if it is safe and effective for you

## 2019-11-01 NOTE — PROGRESS NOTES
Assessment/Plan:    Cellulitis with abscess  - with some nonfluctuant areas   -will hold off performing an incision and drainage till patient is covered by antibiotics  - will start patient on Keflex 500 mg every 6 hours for 10 days  -will also start patient on naproxen 500 mg twice daily with meals  -patient will return in 3 days for an incision and drainage  - lesion was covered with gauze and Band-Aid because patient states that the surface is shaffing when it gets in contact with her breast or clothes  - will fax the medications to her pharmacy so that they can be delivered a soon as possible to her group home       Bipolar disorder  - patient does not meet criteria for depression screening because she has  bipolar disorder     Diagnoses and all orders for this visit:    Cellulitis of abdominal wall  -     Discontinue: cephalexin (KEFLEX) 500 mg capsule; Take 1 capsule (500 mg total) by mouth every 6 (six) hours for 10 days  -     Discontinue: naproxen (NAPROSYN) 500 mg tablet; Take 1 tablet (500 mg total) by mouth 2 (two) times a day with meals  -     naproxen (NAPROSYN) 500 mg tablet; Take 1 tablet (500 mg total) by mouth 2 (two) times a day with meals  -     cephalexin (KEFLEX) 500 mg capsule; Take 1 capsule (500 mg total) by mouth every 6 (six) hours for 10 days          Subjective:      Patient ID: Koko Gutiérrez is a 75 y o  female  HPI  Patient presents with her caregiver with complaints of pain and swelling under her left breast for the past 3-4 days  She states that she went to bed with her bra and clothes on for 3 days and subsequently developed swelling and  pain  Pain is described as dull and graded as 9 to 10/10  She denies fever, chills, night sweats, chest pain, shortness of breath palpitations, nausea, vomiting, abdominal pain, diarrhea, constipation  Also of note, patient states that she had similar swelling and lesion in the same area about 2 years ago that needed to be lanced    Of note, patient and has a history of bipolar disorder and is very sad and keeps crying  Of note, patient's caregiver states that because today is Friday, the patient will likely not have access to antibiotics or her medications till Monday  The following portions of the patient's history were reviewed and updated as appropriate:   She  has a past medical history of Benign essential hypertension, Depression with anxiety, Fracture of fifth metatarsal bone of right foot with delayed healing, Hyperlipidemia, Hypothyroidism, Insomnia, Obesity, Schizoaffective disorder ([facility] ), Seizure disorder ([place] [address] ), and Seizure disorder ([facility] ) (5/15/2017)  She   Patient Active Problem List    Diagnosis Date Noted    Cellulitis of abdominal wall 11/01/2019    Viral upper respiratory tract infection 09/30/2019    Decreased hearing of both ears 05/28/2019    Benign essential hypertension 02/20/2019    Pre-diabetes 02/20/2019    Hyperglycemia 11/14/2018    Injury of right toe, initial encounter 09/12/2018    Flatulence 05/16/2018    Unsteady gait 10/03/2017    Seizure disorder ([facility] ) 05/15/2017    Insomnia 05/15/2017    Anemia 05/15/2017    Depression with anxiety 26/87/5229    Folic acid deficiency 33/15/0547    Hyperlipidemia 10/05/2012    Hypothyroidism 10/05/2012    Schizoaffective disorder ([facility] ) 10/05/2012    Psychosis, bipolar affective ([place] [address] ) 10/05/2012    Osteoporosis 10/05/2012     She  has a past surgical history that includes Colonoscopy and pr colonoscopy flx dx w/collj spec when pfrmd (N/A, 8/30/2018)  Her family history includes Lung disease in her other; No Known Problems in her father, maternal grandfather, maternal grandmother, mother, paternal grandfather, paternal grandmother, sister, and sister  She  reports that she has quit smoking  She has never used smokeless tobacco  She reports that she does not drink alcohol or use drugs    Current Outpatient Medications   Medication Sig Dispense Refill    acetaminophen (TYLENOL) 500 mg tablet Take 2 tablets (1,000 mg total) by mouth 3 (three) times a day as needed for mild pain 90 tablet 3    alendronate (FOSAMAX) 70 mg tablet Take by mouth TAKE 1 TABLET BY MOUTH DAILY ON FRIDAY       asenapine (SAPHRIS) 10 mg SL tablet Place 10 mg under the tongue 2 (two) times a day        asenapine (SAPHRIS) SL tablet Place 5 mg under the tongue 2 (two) times a day      aspirin (ECOTRIN LOW STRENGTH) 81 mg EC tablet Take 81 mg by mouth daily      atorvastatin (LIPITOR) 20 mg tablet Take 1 tablet (20 mg total) by mouth daily 90 tablet 1    bismuth subsalicylate (PEPTO BISMOL) 262 MG chewable tablet Chew 2 tablets (524 mg total) every 6 (six) hours as needed for diarrhea 30 tablet 0    busPIRone (BUSPAR) 15 mg tablet Take 15 mg by mouth 2 (two) times a day        calcium carbonate-vitamin D (OSCAL-D) 500 mg-200 units per tablet Take 1 tablet by mouth 3 (three) times a day with meals        Dextromethorphan Polistirex ER 30 MG/5ML SUER Take 5 mL (30 mg total) by mouth 2 (two) times a day as needed (congsetion) 148 mL 0    diphenhydrAMINE (BENADRYL) 50 mg capsule Take 1 capsule (50 mg total) by mouth daily at bedtime as needed for sleep 90 capsule 2    divalproex sodium (DEPAKOTE) 500 mg EC tablet Take 500 mg by mouth 3 (three) times a day        docusate sodium (COLACE) 100 mg capsule Take 1 capsule (100 mg total) by mouth 2 (two) times a day as needed for constipation 180 capsule 1    fluticasone (FLONASE) 50 mcg/act nasal spray 2 sprays into each nostril daily        fluticasone (FLOVENT HFA) 110 MCG/ACT inhaler Inhale 2 puffs daily Rinse mouth after use   12 g 5    gabapentin (NEURONTIN) 100 mg capsule Take 200 mg by mouth 2 (two) times a day        L-Theanine 100 MG CAPS Take 200 mg by mouth 2 (two) times a day        levothyroxine 100 mcg tablet Take 1 tablet (100 mcg total) by mouth daily 90 tablet 1    loratadine (CLARITIN) 10 mg tablet Take 1 tablet (10 mg total) by mouth daily 30 tablet 1    LORazepam (ATIVAN) 1 mg tablet Take 0 5 mg by mouth 2 (two) times a day        loxapine (LOXITANE) 50 MG capsule Take 3 capsules (150 mg) at bedtime and 1 capsule (50 mg) at noon   melatonin 3 mg Take 1 tablet (3 mg total) by mouth daily at bedtime 30 tablet 2    Multiple Vitamins-Iron (DAILY-KACI/IRON) TABS Take 1 tablet by mouth daily        PHENobarbital 64 8 mg tablet Take 1 tablet (64 8 mg total) by mouth 2 (two) times a day 60 tablet 5    Polyethylene Glycol 3350-GRX POWD Take by mouth daily MIX 1 CAPFUL (17mg) IN 8 OUNCES OF WATER, JUICE OR TEA AND DRINK DAILY 850 g 5    traZODone (DESYREL) 150 mg tablet Take 2 tablets by mouth daily at bedtime        trihexyphenidyl (ARTANE) 5 mg tablet Take 5 mg by mouth daily at bedtime        cephalexin (KEFLEX) 500 mg capsule Take 1 capsule (500 mg total) by mouth every 6 (six) hours for 10 days 40 capsule 0    naproxen (NAPROSYN) 500 mg tablet Take 1 tablet (500 mg total) by mouth 2 (two) times a day with meals 20 tablet 0     No current facility-administered medications for this visit        Current Outpatient Medications on File Prior to Visit   Medication Sig    acetaminophen (TYLENOL) 500 mg tablet Take 2 tablets (1,000 mg total) by mouth 3 (three) times a day as needed for mild pain    alendronate (FOSAMAX) 70 mg tablet Take by mouth TAKE 1 TABLET BY MOUTH DAILY ON FRIDAY     asenapine (SAPHRIS) 10 mg SL tablet Place 10 mg under the tongue 2 (two) times a day      asenapine (SAPHRIS) SL tablet Place 5 mg under the tongue 2 (two) times a day    aspirin (ECOTRIN LOW STRENGTH) 81 mg EC tablet Take 81 mg by mouth daily    atorvastatin (LIPITOR) 20 mg tablet Take 1 tablet (20 mg total) by mouth daily    bismuth subsalicylate (PEPTO BISMOL) 262 MG chewable tablet Chew 2 tablets (524 mg total) every 6 (six) hours as needed for diarrhea    busPIRone (BUSPAR) 15 mg tablet Take 15 mg by mouth 2 (two) times a day      calcium carbonate-vitamin D (OSCAL-D) 500 mg-200 units per tablet Take 1 tablet by mouth 3 (three) times a day with meals      Dextromethorphan Polistirex ER 30 MG/5ML SUER Take 5 mL (30 mg total) by mouth 2 (two) times a day as needed (congsetion)    diphenhydrAMINE (BENADRYL) 50 mg capsule Take 1 capsule (50 mg total) by mouth daily at bedtime as needed for sleep    divalproex sodium (DEPAKOTE) 500 mg EC tablet Take 500 mg by mouth 3 (three) times a day      docusate sodium (COLACE) 100 mg capsule Take 1 capsule (100 mg total) by mouth 2 (two) times a day as needed for constipation    fluticasone (FLONASE) 50 mcg/act nasal spray 2 sprays into each nostril daily      fluticasone (FLOVENT HFA) 110 MCG/ACT inhaler Inhale 2 puffs daily Rinse mouth after use   gabapentin (NEURONTIN) 100 mg capsule Take 200 mg by mouth 2 (two) times a day      L-Theanine 100 MG CAPS Take 200 mg by mouth 2 (two) times a day      levothyroxine 100 mcg tablet Take 1 tablet (100 mcg total) by mouth daily    loratadine (CLARITIN) 10 mg tablet Take 1 tablet (10 mg total) by mouth daily    LORazepam (ATIVAN) 1 mg tablet Take 0 5 mg by mouth 2 (two) times a day      loxapine (LOXITANE) 50 MG capsule Take 3 capsules (150 mg) at bedtime and 1 capsule (50 mg) at noon   melatonin 3 mg Take 1 tablet (3 mg total) by mouth daily at bedtime    Multiple Vitamins-Iron (DAILY-KACI/IRON) TABS Take 1 tablet by mouth daily      PHENobarbital 64 8 mg tablet Take 1 tablet (64 8 mg total) by mouth 2 (two) times a day    Polyethylene Glycol 3350-GRX POWD Take by mouth daily MIX 1 CAPFUL (17mg) IN 8 OUNCES OF WATER, JUICE OR TEA AND DRINK DAILY    traZODone (DESYREL) 150 mg tablet Take 2 tablets by mouth daily at bedtime      trihexyphenidyl (ARTANE) 5 mg tablet Take 5 mg by mouth daily at bedtime       No current facility-administered medications on file prior to visit        She is allergic to clozapine; fluphenazine; haloperidol; lithium; and valproic acid       Review of Systems   Constitutional: Negative for activity change, chills, fatigue, fever and unexpected weight change  HENT: Negative for ear pain, postnasal drip, rhinorrhea, sinus pressure and sore throat  Eyes: Negative for pain  Respiratory: Negative for cough, choking, chest tightness, shortness of breath and wheezing  Cardiovascular: Negative for chest pain, palpitations and leg swelling  Gastrointestinal: Negative for abdominal pain, constipation, diarrhea, nausea and vomiting  Genitourinary: Negative for dysuria and hematuria  Musculoskeletal: Negative for arthralgias, back pain, gait problem, joint swelling, myalgias and neck stiffness  Skin: Negative for pallor and rash  Pain and swelling with redness of the abdominal wall   Neurological: Negative for dizziness, tremors, seizures, syncope, light-headedness and headaches  Hematological: Negative for adenopathy  Psychiatric/Behavioral: Negative for behavioral problems           Past Medical History:   Diagnosis Date    Benign essential hypertension     resolved; 06/15/16    Depression with anxiety     Fracture of fifth metatarsal bone of right foot with delayed healing     last assessed 04/12/16; fracture of metatarsal bone, right, closed, initial encounter    Hyperlipidemia     last assessed 11/28/17    Hypothyroidism     last assessed 11/28/2017    Insomnia     Obesity     Schizoaffective disorder ([facility] )     last assessed 05/18/2017    Seizure disorder ([facility] )     last assessed 03/28/17    Seizure disorder ([facility] ) 5/15/2017         Current Outpatient Medications:     acetaminophen (TYLENOL) 500 mg tablet, Take 2 tablets (1,000 mg total) by mouth 3 (three) times a day as needed for mild pain, Disp: 90 tablet, Rfl: 3    alendronate (FOSAMAX) 70 mg tablet, Take by mouth TAKE 1 TABLET BY MOUTH DAILY ON FRIDAY , Disp: , Rfl:     asenapine (SAPHRIS) 10 mg SL tablet, Place 10 mg under the tongue 2 (two) times a day  , Disp: , Rfl:     asenapine (SAPHRIS) SL tablet, Place 5 mg under the tongue 2 (two) times a day, Disp: , Rfl:     aspirin (ECOTRIN LOW STRENGTH) 81 mg EC tablet, Take 81 mg by mouth daily, Disp: , Rfl:     atorvastatin (LIPITOR) 20 mg tablet, Take 1 tablet (20 mg total) by mouth daily, Disp: 90 tablet, Rfl: 1    bismuth subsalicylate (PEPTO BISMOL) 262 MG chewable tablet, Chew 2 tablets (524 mg total) every 6 (six) hours as needed for diarrhea, Disp: 30 tablet, Rfl: 0    busPIRone (BUSPAR) 15 mg tablet, Take 15 mg by mouth 2 (two) times a day  , Disp: , Rfl:     calcium carbonate-vitamin D (OSCAL-D) 500 mg-200 units per tablet, Take 1 tablet by mouth 3 (three) times a day with meals  , Disp: , Rfl:     Dextromethorphan Polistirex ER 30 MG/5ML SUER, Take 5 mL (30 mg total) by mouth 2 (two) times a day as needed (congsetion), Disp: 148 mL, Rfl: 0    diphenhydrAMINE (BENADRYL) 50 mg capsule, Take 1 capsule (50 mg total) by mouth daily at bedtime as needed for sleep, Disp: 90 capsule, Rfl: 2    divalproex sodium (DEPAKOTE) 500 mg EC tablet, Take 500 mg by mouth 3 (three) times a day  , Disp: , Rfl:     docusate sodium (COLACE) 100 mg capsule, Take 1 capsule (100 mg total) by mouth 2 (two) times a day as needed for constipation, Disp: 180 capsule, Rfl: 1    fluticasone (FLONASE) 50 mcg/act nasal spray, 2 sprays into each nostril daily  , Disp: , Rfl:     fluticasone (FLOVENT HFA) 110 MCG/ACT inhaler, Inhale 2 puffs daily Rinse mouth after use , Disp: 12 g, Rfl: 5    gabapentin (NEURONTIN) 100 mg capsule, Take 200 mg by mouth 2 (two) times a day  , Disp: , Rfl:     L-Theanine 100 MG CAPS, Take 200 mg by mouth 2 (two) times a day  , Disp: , Rfl:     levothyroxine 100 mcg tablet, Take 1 tablet (100 mcg total) by mouth daily, Disp: 90 tablet, Rfl: 1    loratadine (CLARITIN) 10 mg tablet, Take 1 tablet (10 mg total) by mouth daily, Disp: 30 tablet, Rfl: 1    LORazepam (ATIVAN) 1 mg tablet, Take 0 5 mg by mouth 2 (two) times a day  , Disp: , Rfl:     loxapine (LOXITANE) 50 MG capsule, Take 3 capsules (150 mg) at bedtime and 1 capsule (50 mg) at noon , Disp: , Rfl:     melatonin 3 mg, Take 1 tablet (3 mg total) by mouth daily at bedtime, Disp: 30 tablet, Rfl: 2    Multiple Vitamins-Iron (DAILY-KACI/IRON) TABS, Take 1 tablet by mouth daily  , Disp: , Rfl:     PHENobarbital 64 8 mg tablet, Take 1 tablet (64 8 mg total) by mouth 2 (two) times a day, Disp: 60 tablet, Rfl: 5    Polyethylene Glycol 3350-GRX POWD, Take by mouth daily MIX 1 CAPFUL (17mg) IN 8 OUNCES OF WATER, JUICE OR TEA AND DRINK DAILY, Disp: 850 g, Rfl: 5    traZODone (DESYREL) 150 mg tablet, Take 2 tablets by mouth daily at bedtime  , Disp: , Rfl:     trihexyphenidyl (ARTANE) 5 mg tablet, Take 5 mg by mouth daily at bedtime  , Disp: , Rfl:     cephalexin (KEFLEX) 500 mg capsule, Take 1 capsule (500 mg total) by mouth every 6 (six) hours for 10 days, Disp: 40 capsule, Rfl: 0    naproxen (NAPROSYN) 500 mg tablet, Take 1 tablet (500 mg total) by mouth 2 (two) times a day with meals, Disp: 20 tablet, Rfl: 0    Allergies   Allergen Reactions    Clozapine Other (See Comments)     low white blood count  Other reaction(s): Other (See Comments)  low white blood count    Fluphenazine Hyperactivity and Other (See Comments)     EPS, Hyperactivity  EPS, Hyperactivity    Haloperidol Other (See Comments)     EPS  Other reaction(s): Other (See Comments)  EPS    Lithium Other (See Comments)     Toxicity    Valproic Acid Confusion and Other (See Comments)     "Mental confusion"  "Mental confusion"       Social History   Past Surgical History:   Procedure Laterality Date    COLONOSCOPY      complete    SD COLONOSCOPY FLX DX W/COLLJ SPEC WHEN PFRMD N/A 8/30/2018    Procedure: COLONOSCOPY with polypectomies;  Surgeon: Tosha Huber MD;  Location: [facility];   Service: Gastroenterology     Family History   Problem Relation Age of Onset    No Known Problems Mother     No Known Problems Father     Lung disease Other         mesothelioma    No Known Problems Maternal Grandmother     No Known Problems Maternal Grandfather     No Known Problems Paternal Grandmother     No Known Problems Paternal Grandfather     No Known Problems Sister     No Known Problems Sister        Objective:  /68 (BP Location: Left arm, Patient Position: Sitting, Cuff Size: Large)   Pulse 88   Temp 98 1 °F (36 7 °C) (Tympanic)   Ht 5' 4 76" (1 645 m) Comment: shoes on  Wt 109 kg (240 lb 9 6 oz) Comment: shoes on  SpO2 98%   BMI 40 33 kg/m²        Physical Exam   Constitutional: She is oriented to person, place, and time  She appears well-developed and well-nourished  No distress  Obese   HENT:   Head: Normocephalic and atraumatic  Right Ear: External ear normal    Left Ear: External ear normal    Nose: Nose normal    Mouth/Throat: Mucous membranes are dry  No oropharyngeal exudate  Eyes: Pupils are equal, round, and reactive to light  Conjunctivae and EOM are normal  Right eye exhibits no discharge  Left eye exhibits no discharge  No scleral icterus  Neck: Normal range of motion  Neck supple  No JVD present  No tracheal deviation present  No thyromegaly present  Cardiovascular: Normal rate, regular rhythm, normal heart sounds and intact distal pulses  Exam reveals no gallop and no friction rub  No murmur heard  Pulmonary/Chest: Effort normal and breath sounds normal  No respiratory distress  She has no wheezes  She has no rales  She exhibits no tenderness  Abdominal: Soft  Bowel sounds are normal  She exhibits no distension and no mass  There is no tenderness  There is no rebound and no guarding  Musculoskeletal: Normal range of motion  She exhibits no edema, tenderness or deformity  Lymphadenopathy:     She has no cervical adenopathy  Neurological: She is alert and oriented to person, place, and time  She has normal reflexes   No cranial nerve deficit  She exhibits normal muscle tone  Coordination normal    Skin: Skin is warm and dry  Lesion (Patient has an erythematous lesion that appears like a nodular protrusion measuring about 1 2-1 5 cm with surrounding erythema significant for cellulitis  The lesion is fluctuant and appears like a collection of blood covered by a thin membrane  Tender) noted  No rash noted  She is not diaphoretic  There is erythema ( the area surrounding the lesion is erythematous and indurated)  No pallor  Psychiatric: She has a normal mood and affect   Her behavior is normal

## 2019-11-01 NOTE — TELEPHONE ENCOUNTER
[facility]  Phone [phone] or [phone]  [facility] [phone]    Spoke to pharmacist and he received the faxed scripts for cephalexin and naproxen

## 2019-11-04 ENCOUNTER — OFFICE VISIT (OUTPATIENT)
Dept: NEUROLOGY | Facility: CLINIC | Age: 64
End: 2019-11-04
Payer: MEDICARE

## 2019-11-04 ENCOUNTER — OFFICE VISIT (OUTPATIENT)
Dept: INTERNAL MEDICINE CLINIC | Age: 64
End: 2019-11-04
Payer: MEDICARE

## 2019-11-04 VITALS
DIASTOLIC BLOOD PRESSURE: 78 MMHG | BODY MASS INDEX: 40.36 KG/M2 | SYSTOLIC BLOOD PRESSURE: 116 MMHG | WEIGHT: 236.4 LBS | HEART RATE: 89 BPM | HEIGHT: 64 IN

## 2019-11-04 VITALS
SYSTOLIC BLOOD PRESSURE: 130 MMHG | DIASTOLIC BLOOD PRESSURE: 90 MMHG | TEMPERATURE: 98.1 F | OXYGEN SATURATION: 99 % | HEART RATE: 91 BPM

## 2019-11-04 DIAGNOSIS — M81.0 OSTEOPOROSIS, UNSPECIFIED OSTEOPOROSIS TYPE, UNSPECIFIED PATHOLOGICAL FRACTURE PRESENCE: ICD-10-CM

## 2019-11-04 DIAGNOSIS — L98.491 ULCER OF ABDOMEN WALL, LIMITED TO BREAKDOWN OF SKIN (HCC): ICD-10-CM

## 2019-11-04 DIAGNOSIS — G40.909 SEIZURE DISORDER (HCC): Primary | ICD-10-CM

## 2019-11-04 DIAGNOSIS — L03.311 CELLULITIS OF ABDOMINAL WALL: Primary | ICD-10-CM

## 2019-11-04 DIAGNOSIS — Z63.4 BEREAVEMENT WITHOUT COMPLICATION: ICD-10-CM

## 2019-11-04 DIAGNOSIS — F25.9 SCHIZOAFFECTIVE DISORDER, UNSPECIFIED TYPE (HCC): ICD-10-CM

## 2019-11-04 PROCEDURE — 99214 OFFICE O/P EST MOD 30 MIN: CPT | Performed by: PSYCHIATRY & NEUROLOGY

## 2019-11-04 PROCEDURE — 99213 OFFICE O/P EST LOW 20 MIN: CPT | Performed by: INTERNAL MEDICINE

## 2019-11-04 RX ORDER — PHENOBARBITAL 64.8 MG/1
64.8 TABLET ORAL 2 TIMES DAILY
Qty: 180 TABLET | Refills: 3 | Status: SHIPPED | OUTPATIENT
Start: 2019-11-04 | End: 2020-05-04

## 2019-11-04 RX ORDER — SIMVASTATIN 40 MG
40 TABLET ORAL
COMMUNITY
End: 2020-07-17

## 2019-11-04 SDOH — SOCIAL STABILITY - SOCIAL INSECURITY: DISSAPEARANCE AND DEATH OF FAMILY MEMBER: Z63.4

## 2019-11-04 NOTE — PATIENT INSTRUCTIONS
1  Continue phenobarbital 64 8 mg twice daily  2  Have lab work done in the next few weeks to check phenobarbital and valproate levels  3  Return in one year  4  Let us know if there are seizures

## 2019-11-04 NOTE — PROGRESS NOTES
Review of Systems   Constitutional: Negative  Negative for appetite change and fever  HENT: Negative  Negative for hearing loss, tinnitus, trouble swallowing and voice change  Eyes: Negative  Negative for photophobia and pain  Respiratory: Negative  Negative for shortness of breath  Cardiovascular: Negative  Negative for palpitations  Gastrointestinal: Negative  Negative for nausea and vomiting  Endocrine: Negative  Negative for cold intolerance and heat intolerance  Genitourinary: Negative  Negative for dysuria, frequency and urgency  Musculoskeletal: Positive for gait problem  Negative for myalgias and neck pain  Skin: Negative  Negative for rash  Neurological: Positive for tremors  Negative for dizziness, seizures, syncope, facial asymmetry, speech difficulty, weakness, light-headedness, numbness and headaches  Hematological: Negative  Does not bruise/bleed easily  Psychiatric/Behavioral: Negative  Negative for confusion, hallucinations and sleep disturbance

## 2019-11-04 NOTE — PATIENT INSTRUCTIONS
Cellulitis   AMBULATORY CARE:   Cellulitis  is a skin infection caused by bacteria  Cellulitis usually appears on the legs and feet, arms and hands, or face  Common signs and symptoms include the following:   · A red, warm, swollen area on your skin    · Pain when the area is touched    · Bumps or blisters (abscess) that may drain pus    · Bumpy, raised skin that feels like an orange peel  Call 911 if:   · You have sudden trouble breathing or chest pain  Seek care immediately if:   · Your wound gets larger and more painful  · You feel a crackling under your skin when you touch it  · You have purple dots or bumps on your skin, or you see bleeding under your skin  · You have new swelling and pain in your legs  · The red, warm, swollen area gets larger  · You see red streaks coming from the infected area  Contact your healthcare provider if:   · You have a fever  · Your fever or pain does not go away or gets worse  · The area does not get smaller after 2 days of antibiotics  · Your skin is flaking or peeling off  · You have questions or concerns about your condition or care  Treatment for cellulitis  may decrease symptoms, stop the infection from spreading, and cure the infection  Treatment depends on how severe your cellulitis is  Cellulitis may go away on its own  You may  instead need antibiotics to help treat the bacterial infection and medicines for pain  Your healthcare provider may draw a [place] around the edges of your cellulitis  If your cellulitis spreads, your healthcare provider will see it outside of the [place]  Manage your symptoms:   · Elevate the area above the level of your heart  as often as you can  This will help decrease swelling and pain  Prop the area on pillows or blankets to keep it elevated comfortably  · Clean the area daily until the wound scabs over  Gently wash the area with soap and water  Pat dry  Use dressings as directed       · Place cool or warm, wet cloths on the area as directed  Use clean cloths and clean water  Leave it on the area until the cloth is room temperature  Pat the area dry with a clean, dry cloth  The cloths may help decrease pain  Prevent cellulitis:   · Do not scratch bug bites or areas of injury  You increase your risk for cellulitis by scratching these areas  · Do not share personal items, such as towels, clothing, and razors  · Clean exercise equipment  with germ-killing  before and after you use it  · Wash your hands often  Use soap and water  Wash your hands after you use the bathroom, change a child's diapers, or sneeze  Wash your hands before you prepare or eat food  Use lotion to prevent dry, cracked skin  · Wear pressure stockings as directed  You may be told to wear the stockings if you have peripheral edema  The stockings improve blood flow and decrease swelling  · Treat athlete's foot  This can help prevent the spread of a bacterial skin infection  Follow up with your healthcare provider within 3 days, or as directed: Your healthcare provider will check if your cellulitis is getting better  You may need different medicine  Write down your questions so you remember to ask them during your visits  © 2017 [employer]  Information is for End User's use only and may not be sold, redistributed or otherwise used for commercial purposes  All illustrations and images included in CareNotes® are the copyrighted property of [employer]  or Kimani Sorenson  The above information is an  only  It is not intended as medical advice for individual conditions or treatments  Talk to your doctor, nurse or pharmacist before following any medical regimen to see if it is safe and effective for you

## 2019-11-04 NOTE — PROGRESS NOTES
[facility]  Follow Up Visit    Impression/Plan    Ms Brandy Browne is a 75 y o  female with unclear epilepsy syndrome and schizophrenia  She reported 9 years of seizure freedom until in 12/2014 she had an event a few days after stopping phenobarbital  Multiple nights of insomnia preceded the event  The event itself was poorly characterized, but consisted of fear/panic that is apparently typical of her seizures  It is possible she had a focal seizure, but anxiety/psychosis related to sleep deprivation and withdrawal of phenobarbital is another explanation that better fits her prolonged symptoms and associated hallucinations  Her EEG in early 2015 was normal  Her osteoporosis may be related to phenobarbital use, but at this point her seizures are controlled and things are going well, so she will continue phenobarbital  She is also on gabapentin  Valproate is prescribed by psychiatry and is not specifically prescribed for seizure  She is on bisphosphonate therapy  Her most recent DEXA was essentially stable with some improvement at the hip  She has not recently had a phenobarbital valproate level drawn  Checking levels to establish recent baseline  Patient Instructions   1  Continue phenobarbital 64 8 mg twice daily  2  Have lab work done in the next few weeks to check phenobarbital and valproate levels  3  Return in one year  4  Let us know if there are seizures  Diagnoses and all orders for this visit:    Seizure disorder ([facility] )  -     PHENobarbital 64 8 mg tablet; Take 1 tablet (64 8 mg total) by mouth 2 (two) times a day  -     Phenobarbital level; Future  -     Valproic acid level, total; Future    Schizoaffective disorder, unspecified type ([facility] )    Osteoporosis, unspecified osteoporosis type, unspecified pathological fracture presence    Bereavement without complication    Other orders  -     simvastatin (ZOCOR) 40 mg tablet;  Take 40 mg by mouth daily at bedtime        Subjective    Jack Herring is returning to the [place] [address] [facility] for follow up  She arrives with a staff member from her group home  She has osteoporosis  She is now on bisphosphonate therapy  Mood has been stable (anxiety, delusions)  Last attempt at decreasing phenobarbital was unsuccessful - sleeplessness, agitation, anxiety, delusions  Last DEXA scan essentially stable, still shows osteoporosis of the lumbar spine  Interval Events:   Seizures since last visit: None  Hospitalizations: no    There been no seizures  She continues on phenobarbital   She feels that this helps with her anxiety as well as her tremor  She ran out of her phenobarbital supply for 3 days and was given Ativan as needed  Brother passed away about month ago  She has been sad at times, but handling it well  Current AEDs:  PB 64 8 mg tab, 1 tab bid   mg bid   mg tid (from psychiatry)  Medication side effects: None  Medication adherence: Yes    Event/Seizure semiology:  Unknown    Special Features  Status epilepticus: unknown  Self Injury Seizures: unknown  Precipitating Factors: unknown    Prior AEDs:  Carbamazepine - ?stopped, but worked? Lamotrigine - ?stopped,   discontinued, no reason given? Valproate - ?water retention? Prior Evaluation:  3 Hour Video EEG 1/15/15: normal     History Reviewed: The following were reviewed and updated as appropriate: allergies, current medications, past medical history, past social history and problem list    Psychiatric History:  Schizophrenia     Social History:   Driving: No  Lives Alone: No  Occupation: on permanent disability    ROS:  Constitutional: Negative  Negative for appetite change and fever  HENT: Negative  Negative for hearing loss, tinnitus, trouble swallowing and voice change  Eyes: Negative  Negative for photophobia and pain  Respiratory: Negative  Negative for shortness of breath  Cardiovascular: Negative  Negative for palpitations  Gastrointestinal: Negative  Negative for nausea and vomiting  Endocrine: Negative  Negative for cold intolerance and heat intolerance  Genitourinary: Negative  Negative for dysuria, frequency and urgency  Musculoskeletal: Positive for gait problem  Negative for myalgias and neck pain  Skin: Negative  Negative for rash  Neurological: Positive for tremors  Negative for dizziness, seizures, syncope, facial asymmetry, speech difficulty, weakness, light-headedness, numbness and headaches  Hematological: Negative  Does not bruise/bleed easily  Psychiatric/Behavioral: Negative  Negative for confusion, hallucinations and sleep disturbance  ROS reviewed and updated as appropriate  Objective    /78 (BP Location: Left arm, Patient Position: Sitting, Cuff Size: Standard)   Pulse 89   Ht 5' 4" (1 626 m)   Wt 107 kg (236 lb 6 4 oz)   BMI 40 58 kg/m²      General Exam  No acute distress  Neurologic Exam  Mental Status:  Alert and oriented and interactive  Logical thought process  Oriented to date, president  Cranial nerves: VFFTC, face symmetric  No dysarthria  Language: normal fluency and comprehension  Normal naming  Motor: no pronator drift  Coordination: FTN normal, no tremor  Gait: Normal casual gait

## 2019-11-04 NOTE — PROGRESS NOTES
Assessment/Plan:    Cellulitis of the abdominal with open ulcer  - the open wound was cleaned and dressed and discharged yellow foul-smelling substance  - patient was counseled to complete her antibiotics as prescribed and to continue with her naproxen for pain  - she was counseled to change the dressing at least once a day and to keep it clean and dry  - she was counseled to return to the office if her symptoms worsen or do not improve     Diagnoses and all orders for this visit:    Cellulitis of abdominal wall    Ulcer of abdomen wall, limited to breakdown of skin ([facility] )          Subjective:      Patient ID: Koko Gutiérrez is a 75 y o  female  HPI  Patient presents for a follow-up visit with her caregiver regarding her cellulitis  She states that the swelling on her left upper abdomen/ abscess came to a head and burst yesterday and released a greenish, smelly discharge  The pain came down to a 5/10  She states that she has been taking her antibiotics as well her pain medications  She denies any fever, chills, night sweats, chest pain, shortness of breath, palpitations, nausea, vomiting, abdominal pain, diarrhea, constipation  The following portions of the patient's history were reviewed and updated as appropriate:   She  has a past medical history of Benign essential hypertension, Depression with anxiety, Fracture of fifth metatarsal bone of right foot with delayed healing, Hyperlipidemia, Hypothyroidism, Insomnia, Obesity, Schizoaffective disorder ([facility] ), Seizure disorder ([facility] ), and Seizure disorder ([facility] ) (5/15/2017)    She   Patient Active Problem List    Diagnosis Date Noted    Ulcer of abdomen wall, limited to breakdown of skin ([facility] ) 11/04/2019    Cellulitis of abdominal wall 11/01/2019    Viral upper respiratory tract infection 09/30/2019    Decreased hearing of both ears 05/28/2019    Benign essential hypertension 02/20/2019    Pre-diabetes 02/20/2019    Hyperglycemia 11/14/2018    Injury of right toe, initial encounter 09/12/2018    Flatulence 05/16/2018    Unsteady gait 10/03/2017    Seizure disorder ([facility] ) 05/15/2017    Insomnia 05/15/2017    Anemia 05/15/2017    Depression with anxiety 28/47/8635    Folic acid deficiency 94/01/7121    Hyperlipidemia 10/05/2012    Hypothyroidism 10/05/2012    Schizoaffective disorder ([facility] ) 10/05/2012    Psychosis, bipolar affective ([place] [address] ) 10/05/2012    Osteoporosis 10/05/2012     She  has a past surgical history that includes Colonoscopy and pr colonoscopy flx dx w/collj spec when pfrmd (N/A, 8/30/2018)  Her family history includes Kidney failure in her brother; Lung disease in her other; No Known Problems in her father, maternal grandfather, maternal grandmother, mother, paternal grandfather, paternal grandmother, sister, and sister  She  reports that she has quit smoking  She has never used smokeless tobacco  She reports that she does not drink alcohol or use drugs    Current Outpatient Medications   Medication Sig Dispense Refill    acetaminophen (TYLENOL) 500 mg tablet Take 2 tablets (1,000 mg total) by mouth 3 (three) times a day as needed for mild pain 90 tablet 3    alendronate (FOSAMAX) 70 mg tablet Take by mouth TAKE 1 TABLET BY MOUTH DAILY ON FRIDAY       asenapine (SAPHRIS) 10 mg SL tablet Place 10 mg under the tongue 2 (two) times a day        asenapine (SAPHRIS) SL tablet Place 5 mg under the tongue 2 (two) times a day      aspirin (ECOTRIN LOW STRENGTH) 81 mg EC tablet Take 81 mg by mouth daily      busPIRone (BUSPAR) 15 mg tablet Take 15 mg by mouth 2 (two) times a day        calcium carbonate-vitamin D (OSCAL-D) 500 mg-200 units per tablet Take 1 tablet by mouth 3 (three) times a day with meals        diphenhydrAMINE (BENADRYL) 50 mg capsule Take 1 capsule (50 mg total) by mouth daily at bedtime as needed for sleep 90 capsule 2    divalproex sodium (DEPAKOTE) 500 mg EC tablet Take 500 mg by mouth 3 (three) times a day        docusate sodium (COLACE) 100 mg capsule Take 1 capsule (100 mg total) by mouth 2 (two) times a day as needed for constipation 180 capsule 1    fluticasone (FLONASE) 50 mcg/act nasal spray 2 sprays into each nostril daily        gabapentin (NEURONTIN) 100 mg capsule Take 200 mg by mouth 2 (two) times a day        L-Theanine 100 MG CAPS Take 200 mg by mouth 2 (two) times a day        levothyroxine 100 mcg tablet Take 1 tablet (100 mcg total) by mouth daily 90 tablet 1    LORazepam (ATIVAN) 1 mg tablet Take 0 5 mg by mouth 2 (two) times a day        loxapine (LOXITANE) 50 MG capsule Take 3 capsules (150 mg) at bedtime and 1 capsule (50 mg) at noon        Multiple Vitamins-Iron (DAILY-KACI/IRON) TABS Take 1 tablet by mouth daily        PHENobarbital 64 8 mg tablet Take 1 tablet (64 8 mg total) by mouth 2 (two) times a day 180 tablet 3    Polyethylene Glycol 3350-GRX POWD Take by mouth daily MIX 1 CAPFUL (17mg) IN 8 OUNCES OF WATER, JUICE OR TEA AND DRINK DAILY 850 g 5    simvastatin (ZOCOR) 40 mg tablet Take 40 mg by mouth daily at bedtime      traZODone (DESYREL) 150 mg tablet Take 2 tablets by mouth daily at bedtime        trihexyphenidyl (ARTANE) 5 mg tablet Take 5 mg by mouth daily at bedtime        atorvastatin (LIPITOR) 20 mg tablet Take 1 tablet (20 mg total) by mouth daily (Patient not taking: Reported on 11/4/2019) 90 tablet 1    bismuth subsalicylate (PEPTO BISMOL) 262 MG chewable tablet Chew 2 tablets (524 mg total) every 6 (six) hours as needed for diarrhea (Patient not taking: Reported on 11/4/2019) 30 tablet 0    cephalexin (KEFLEX) 500 mg capsule Take 1 capsule (500 mg total) by mouth every 6 (six) hours for 10 days (Patient not taking: Reported on 11/4/2019) 40 capsule 0    Dextromethorphan Polistirex ER 30 MG/5ML SUER Take 5 mL (30 mg total) by mouth 2 (two) times a day as needed (congsetion) (Patient not taking: Reported on 11/4/2019) 148 mL 0    fluticasone (FLOVENT HFA) 110 MCG/ACT inhaler Inhale 2 puffs daily Rinse mouth after use  (Patient not taking: Reported on 11/4/2019) 12 g 5    loratadine (CLARITIN) 10 mg tablet Take 1 tablet (10 mg total) by mouth daily (Patient not taking: Reported on 11/4/2019) 30 tablet 1    melatonin 3 mg Take 1 tablet (3 mg total) by mouth daily at bedtime (Patient not taking: Reported on 11/4/2019) 30 tablet 2    naproxen (NAPROSYN) 500 mg tablet Take 1 tablet (500 mg total) by mouth 2 (two) times a day with meals (Patient not taking: Reported on 11/4/2019) 20 tablet 0     No current facility-administered medications for this visit        Current Outpatient Medications on File Prior to Visit   Medication Sig    acetaminophen (TYLENOL) 500 mg tablet Take 2 tablets (1,000 mg total) by mouth 3 (three) times a day as needed for mild pain    alendronate (FOSAMAX) 70 mg tablet Take by mouth TAKE 1 TABLET BY MOUTH DAILY ON FRIDAY     asenapine (SAPHRIS) 10 mg SL tablet Place 10 mg under the tongue 2 (two) times a day      asenapine (SAPHRIS) SL tablet Place 5 mg under the tongue 2 (two) times a day    aspirin (ECOTRIN LOW STRENGTH) 81 mg EC tablet Take 81 mg by mouth daily    busPIRone (BUSPAR) 15 mg tablet Take 15 mg by mouth 2 (two) times a day      calcium carbonate-vitamin D (OSCAL-D) 500 mg-200 units per tablet Take 1 tablet by mouth 3 (three) times a day with meals      diphenhydrAMINE (BENADRYL) 50 mg capsule Take 1 capsule (50 mg total) by mouth daily at bedtime as needed for sleep    divalproex sodium (DEPAKOTE) 500 mg EC tablet Take 500 mg by mouth 3 (three) times a day      docusate sodium (COLACE) 100 mg capsule Take 1 capsule (100 mg total) by mouth 2 (two) times a day as needed for constipation    fluticasone (FLONASE) 50 mcg/act nasal spray 2 sprays into each nostril daily      gabapentin (NEURONTIN) 100 mg capsule Take 200 mg by mouth 2 (two) times a day      L-Theanine 100 MG CAPS Take 200 mg by mouth 2 (two) times a day      levothyroxine 100 mcg tablet Take 1 tablet (100 mcg total) by mouth daily    LORazepam (ATIVAN) 1 mg tablet Take 0 5 mg by mouth 2 (two) times a day      loxapine (LOXITANE) 50 MG capsule Take 3 capsules (150 mg) at bedtime and 1 capsule (50 mg) at noon   Multiple Vitamins-Iron (DAILY-KACI/IRON) TABS Take 1 tablet by mouth daily      PHENobarbital 64 8 mg tablet Take 1 tablet (64 8 mg total) by mouth 2 (two) times a day    Polyethylene Glycol 3350-GRX POWD Take by mouth daily MIX 1 CAPFUL (17mg) IN 8 OUNCES OF WATER, JUICE OR TEA AND DRINK DAILY    simvastatin (ZOCOR) 40 mg tablet Take 40 mg by mouth daily at bedtime    traZODone (DESYREL) 150 mg tablet Take 2 tablets by mouth daily at bedtime      trihexyphenidyl (ARTANE) 5 mg tablet Take 5 mg by mouth daily at bedtime      atorvastatin (LIPITOR) 20 mg tablet Take 1 tablet (20 mg total) by mouth daily (Patient not taking: Reported on 11/4/2019)    bismuth subsalicylate (PEPTO BISMOL) 262 MG chewable tablet Chew 2 tablets (524 mg total) every 6 (six) hours as needed for diarrhea (Patient not taking: Reported on 11/4/2019)    cephalexin (KEFLEX) 500 mg capsule Take 1 capsule (500 mg total) by mouth every 6 (six) hours for 10 days (Patient not taking: Reported on 11/4/2019)    Dextromethorphan Polistirex ER 30 MG/5ML SUER Take 5 mL (30 mg total) by mouth 2 (two) times a day as needed (congsetion) (Patient not taking: Reported on 11/4/2019)    fluticasone (FLOVENT HFA) 110 MCG/ACT inhaler Inhale 2 puffs daily Rinse mouth after use   (Patient not taking: Reported on 11/4/2019)    loratadine (CLARITIN) 10 mg tablet Take 1 tablet (10 mg total) by mouth daily (Patient not taking: Reported on 11/4/2019)    melatonin 3 mg Take 1 tablet (3 mg total) by mouth daily at bedtime (Patient not taking: Reported on 11/4/2019)    naproxen (NAPROSYN) 500 mg tablet Take 1 tablet (500 mg total) by mouth 2 (two) times a day with meals (Patient not taking: Reported on 11/4/2019)    [DISCONTINUED] PHENobarbital 64 8 mg tablet Take 1 tablet (64 8 mg total) by mouth 2 (two) times a day     No current facility-administered medications on file prior to visit  She is allergic to clozapine; fluphenazine; haloperidol; lithium; and valproic acid       Review of Systems   Constitutional: Negative for activity change, chills, fatigue, fever and unexpected weight change  HENT: Negative for ear pain, postnasal drip, rhinorrhea, sinus pressure and sore throat  Eyes: Negative for pain  Respiratory: Negative for cough, choking, chest tightness, shortness of breath and wheezing  Cardiovascular: Negative for chest pain, palpitations and leg swelling  Gastrointestinal: Positive for abdominal pain  Negative for constipation, diarrhea, nausea and vomiting  Genitourinary: Negative for dysuria and hematuria  Musculoskeletal: Negative for arthralgias, back pain, gait problem, joint swelling, myalgias and neck stiffness  Skin: Positive for wound (Swelling of the left side of the abdominal wall with an open wound)  Negative for pallor and rash  Neurological: Negative for dizziness, tremors, seizures, syncope, light-headedness and headaches  Hematological: Negative for adenopathy  Psychiatric/Behavioral: Negative for behavioral problems  Objective:      /90 (BP Location: Left arm, Patient Position: Sitting, Cuff Size: Large)   Pulse 91   Temp 98 1 °F (36 7 °C) (Tympanic)   SpO2 99%          Physical Exam   Constitutional: She is oriented to person, place, and time  She appears well-developed and well-nourished  No distress  HENT:   Head: Normocephalic and atraumatic  Right Ear: External ear normal    Left Ear: External ear normal    Nose: Nose normal    Mouth/Throat: Oropharynx is clear and moist  Mucous membranes are dry (Dry mucous membranes)  No oropharyngeal exudate  Eyes: Pupils are equal, round, and reactive to light   Conjunctivae and EOM are normal  Right eye exhibits no discharge  Left eye exhibits no discharge  No scleral icterus  Neck: Normal range of motion  Neck supple  No JVD present  No tracheal deviation present  No thyromegaly present  Cardiovascular: Normal rate, regular rhythm, normal heart sounds and intact distal pulses  Exam reveals no gallop and no friction rub  No murmur heard  Pulmonary/Chest: Effort normal and breath sounds normal  No respiratory distress  She has no wheezes  She has no rales  She exhibits no tenderness  Abdominal: Soft  Bowel sounds are normal  She exhibits no distension and no mass  There is tenderness (Tenderness of the wall of the left upper abdomen around the swelling and wound  Tenderness is much reduced today compared to 3 days ago  )  There is no rebound and no guarding  Swelling on left side of upper abdominal wall and redness is much reduced  There is a central open ulcer that discharged foul-smelling yellowish pus  Wound was cleaned and dressed  Musculoskeletal: Normal range of motion  She exhibits no edema, tenderness or deformity  Lymphadenopathy:     She has no cervical adenopathy  Neurological: She is alert and oriented to person, place, and time  She has normal reflexes  No cranial nerve deficit  She exhibits normal muscle tone  Coordination normal    Skin: Skin is warm and dry  No rash noted  She is not diaphoretic  No erythema  No pallor  Psychiatric: She has a normal mood and affect   Her behavior is normal          Appointment on 11/15/2018   Component Date Value Ref Range Status    WBC 11/15/2018 4 90  4 31 - 10 16 Thousand/uL Final    RBC 11/15/2018 4 19  3 81 - 5 12 Million/uL Final    Hemoglobin 11/15/2018 13 9  11 5 - 15 4 g/dL Final    Hematocrit 11/15/2018 41 7  34 8 - 46 1 % Final    MCV 11/15/2018 100* 82 - 98 fL Final    MCH 11/15/2018 33 2  26 8 - 34 3 pg Final    MCHC 11/15/2018 33 3  31 4 - 37 4 g/dL Final    RDW 11/15/2018 12 2  11 6 - 15 1 % Final    Platelets 68/59/9595 267  149 - 390 Thousands/uL Final    MPV 11/15/2018 9 6  8 9 - 12 7 fL Final    Sodium 11/15/2018 133* 136 - 145 mmol/L Final    Potassium 11/15/2018 4 1  3 5 - 5 3 mmol/L Final    Chloride 11/15/2018 98* 100 - 108 mmol/L Final    CO2 11/15/2018 28  21 - 32 mmol/L Final    ANION GAP 11/15/2018 7  4 - 13 mmol/L Final    BUN 11/15/2018 7  5 - 25 mg/dL Final    Creatinine 11/15/2018 0 90  0 60 - 1 30 mg/dL Final    Standardized to IDMS reference method    Glucose, Fasting 11/15/2018 116* 65 - 99 mg/dL Final      Specimen collection should occur prior to Sulfasalazine administration due to the potential for falsely depressed results  Specimen collection should occur prior to Sulfapyridine administration due to the potential for falsely elevated results   Calcium 11/15/2018 8 8  8 3 - 10 1 mg/dL Final    AST 11/15/2018 15  5 - 45 U/L Final      Specimen collection should occur prior to Sulfasalazine administration due to the potential for falsely depressed results   ALT 11/15/2018 22  12 - 78 U/L Final      Specimen collection should occur prior to Sulfasalazine and/or Sulfapyridine administration due to the potential for falsely depressed results       Alkaline Phosphatase 11/15/2018 64  46 - 116 U/L Final    Total Protein 11/15/2018 7 7  6 4 - 8 2 g/dL Final    Albumin 11/15/2018 4 0  3 5 - 5 0 g/dL Final    Total Bilirubin 11/15/2018 0 25  0 20 - 1 00 mg/dL Final    eGFR 11/15/2018 67  ml/min/1 73sq m Final    Cholesterol 11/15/2018 189  50 - 200 mg/dL Final      Cholesterol:       Desirable         <200 mg/dl       Borderline         200-239 mg/dl       High              >239           Triglycerides 11/15/2018 104  <=150 mg/dL Final      Triglyceride:     Normal          <150 mg/dl     Borderline High 150-199 mg/dl     High            200-499 mg/dl        Very High       >499 mg/dl    Specimen collection should occur prior to N-Acetylcysteine or Metamizole administration due to the potential for falsely depressed results   HDL, Direct 11/15/2018 94* 40 - 60 mg/dL Final      HDL Cholesterol:       High    >60 mg/dL       Low     <41 mg/dL  Specimen collection should occur prior to Metamizole administration due to the potential for falsley depressed results   LDL Calculated 11/15/2018 74  0 - 100 mg/dL Final      LDL Cholesterol:     Optimal           <100 mg/dl     Near Optimal      100-129 mg/dl     Above Optimal       Borderline High 130-159 mg/dl       High            160-189 mg/dl       Very High       >189 mg/dl         This screening LDL is a calculated result  It does not have the accuracy of the Direct Measured LDL in the monitoring of patients with hyperlipidemia and/or statin therapy  Direct Measure LDL (NTS793) must be ordered separately in these patients   TSH 3RD [facility] 11/15/2018 1 610  0 358 - 3 740 uIU/mL Final    Using supplements with high doses of biotin 20 to more than 300 times greater than the adequate daily intake for adults of 30 mcg/day as established by the [employer], can cause falsely depress results   Hemoglobin A1C 11/15/2018 6 0  4 2 - 6 3 % Final    EAG 11/15/2018 126  mg/dl Final   Office Visit on 11/14/2018   Component Date Value Ref Range Status    Creatinine, Ur 11/15/2018 16 1  mg/dL Final    Microalbum  ,U,Random 11/15/2018 6 3  0 0 - 20 0 mg/L Final    Microalb Creat Ratio 11/15/2018 39* 0 - 30 mg/g creatinine Final

## 2019-11-11 DIAGNOSIS — L98.491 ULCER OF ABDOMEN WALL, LIMITED TO BREAKDOWN OF SKIN (HCC): Primary | ICD-10-CM

## 2019-11-11 NOTE — TELEPHONE ENCOUNTER
Pt has finished her naproxen, her aide would like to know if this is prn or if the pt needs to get a refill  If someone can look into this? [facility]

## 2019-11-12 RX ORDER — BACITRACIN, NEOMYCIN, POLYMYXIN B 400; 3.5; 5 [USP'U]/G; MG/G; [USP'U]/G
OINTMENT TOPICAL 2 TIMES DAILY PRN
Qty: 15 G | Refills: 0 | Status: SHIPPED | OUTPATIENT
Start: 2019-11-12 | End: 2020-07-17

## 2019-11-12 NOTE — TELEPHONE ENCOUNTER
I called and spoke with Yamileth Morrison  Rufus Coelho was in the back ground and I could hear her responses to the questions that I asked Yamileth Prince  She denied any pain  No drainage from the wound under the left breast   The dressing is being changed daily  Rufus Coelho is asking for "a cream to make it heal faster"  I informed Yamileth Morrison that she would not need the refill if she is not having any pain

## 2019-11-12 NOTE — TELEPHONE ENCOUNTER
The patient lives in a facility and they are not permitted to use OTC meds/creams that are not supplied by [facility] pharmacy

## 2019-11-13 ENCOUNTER — CLINICAL SUPPORT (OUTPATIENT)
Dept: INTERNAL MEDICINE CLINIC | Facility: CLINIC | Age: 64
End: 2019-11-13
Payer: MEDICARE

## 2019-11-13 DIAGNOSIS — Z23 NEED FOR INFLUENZA VACCINATION: Primary | ICD-10-CM

## 2019-11-13 PROCEDURE — 90662 IIV NO PRSV INCREASED AG IM: CPT

## 2019-11-13 PROCEDURE — G0008 ADMIN INFLUENZA VIRUS VAC: HCPCS

## 2019-11-18 ENCOUNTER — OFFICE VISIT (OUTPATIENT)
Dept: INTERNAL MEDICINE CLINIC | Age: 64
End: 2019-11-18
Payer: MEDICARE

## 2019-11-18 VITALS
DIASTOLIC BLOOD PRESSURE: 88 MMHG | BODY MASS INDEX: 40.22 KG/M2 | HEART RATE: 92 BPM | HEIGHT: 64 IN | SYSTOLIC BLOOD PRESSURE: 128 MMHG | TEMPERATURE: 97.9 F | OXYGEN SATURATION: 97 % | WEIGHT: 235.6 LBS

## 2019-11-18 DIAGNOSIS — E03.9 ACQUIRED HYPOTHYROIDISM: Primary | ICD-10-CM

## 2019-11-18 DIAGNOSIS — R73.9 HYPERGLYCEMIA: ICD-10-CM

## 2019-11-18 DIAGNOSIS — F25.9 SCHIZOAFFECTIVE DISORDER, UNSPECIFIED TYPE (HCC): ICD-10-CM

## 2019-11-18 DIAGNOSIS — E78.2 MIXED HYPERLIPIDEMIA: ICD-10-CM

## 2019-11-18 DIAGNOSIS — G40.909 SEIZURE DISORDER (HCC): ICD-10-CM

## 2019-11-18 DIAGNOSIS — F41.8 DEPRESSION WITH ANXIETY: ICD-10-CM

## 2019-11-18 PROBLEM — J06.9 VIRAL UPPER RESPIRATORY TRACT INFECTION: Status: RESOLVED | Noted: 2019-09-30 | Resolved: 2019-11-18

## 2019-11-18 PROCEDURE — 99214 OFFICE O/P EST MOD 30 MIN: CPT | Performed by: INTERNAL MEDICINE

## 2019-11-18 NOTE — PROGRESS NOTES
Assessment/Plan:    1  Hypothyroidism  Will continue with present dose of levothyroxine  Will check thyroid function test before next visit    2  Hyperlipidemia  Will continue with the simvastatin 40 mg daily  Will check lipid profile before next visit    3  Seizure disorder  Stable  Being managed by Neurology    4  Schizophrenia  Stable  Being managed by psychiatrist      Diagnoses and all orders for this visit:    Acquired hypothyroidism  -     Comprehensive metabolic panel; Future  -     TSH, 3rd generation with Free T4 reflex; Future    Seizure disorder ([facility] )  -     CBC; Future    Depression with anxiety    Mixed hyperlipidemia  -     Lipid Panel with Direct LDL reflex; Future    Hyperglycemia  -     Hemoglobin A1C; Future  -     Microalbumin / creatinine urine ratio  -     Comprehensive metabolic panel; Future    Schizoaffective disorder, unspecified type ([facility] )               Subjective:          Patient ID: Benji Landa is a 75 y o  female  Patient is here for regular follow-up  No blood work prior to this visit  The following portions of the patient's history were reviewed and updated as appropriate: allergies, current medications, past family history, past medical history, past social history, past surgical history and problem list     Review of Systems   Constitutional: Negative for fatigue and fever  HENT: Negative for congestion, ear discharge, ear pain, postnasal drip, sinus pressure, sore throat, tinnitus and trouble swallowing  Eyes: Negative for discharge, itching and visual disturbance  Respiratory: Negative for cough and shortness of breath  Cardiovascular: Negative for chest pain and palpitations  Gastrointestinal: Negative for abdominal pain, diarrhea, nausea and vomiting  Endocrine: Negative for cold intolerance and polyuria  Genitourinary: Negative for difficulty urinating, dysuria and urgency  Musculoskeletal: Positive for arthralgias  Negative for neck pain  Skin: Negative for rash  Allergic/Immunologic: Negative for environmental allergies  Neurological: Negative for dizziness, weakness and headaches  Psychiatric/Behavioral: Negative for agitation  The patient is not nervous/anxious            Past Medical History:   Diagnosis Date    Benign essential hypertension     resolved; 06/15/16    Depression with anxiety     Fracture of fifth metatarsal bone of right foot with delayed healing     last assessed 04/12/16; fracture of metatarsal bone, right, closed, initial encounter    Hyperlipidemia     last assessed 11/28/17    Hypothyroidism     last assessed 11/28/2017    Insomnia     Obesity     Schizoaffective disorder ([facility] )     last assessed 05/18/2017    Seizure disorder ([facility] )     last assessed 03/28/17    Seizure disorder ([facility] ) 5/15/2017         Current Outpatient Medications:     acetaminophen (TYLENOL) 500 mg tablet, Take 2 tablets (1,000 mg total) by mouth 3 (three) times a day as needed for mild pain, Disp: 90 tablet, Rfl: 3    alendronate (FOSAMAX) 70 mg tablet, Take by mouth TAKE 1 TABLET BY MOUTH DAILY ON FRIDAY , Disp: , Rfl:     asenapine (SAPHRIS) SL tablet, Place 5 mg under the tongue 2 (two) times a day, Disp: , Rfl:     aspirin (ECOTRIN LOW STRENGTH) 81 mg EC tablet, Take 81 mg by mouth daily, Disp: , Rfl:     busPIRone (BUSPAR) 15 mg tablet, Take 15 mg by mouth daily , Disp: , Rfl:     calcium carbonate-vitamin D (OSCAL-D) 500 mg-200 units per tablet, Take 1 tablet by mouth 3 (three) times a day with meals  , Disp: , Rfl:     diphenhydrAMINE (BENADRYL) 50 mg capsule, Take 1 capsule (50 mg total) by mouth daily at bedtime as needed for sleep, Disp: 90 capsule, Rfl: 2    divalproex sodium (DEPAKOTE) 500 mg EC tablet, Take 500 mg by mouth 3 (three) times a day  , Disp: , Rfl:     docusate sodium (COLACE) 100 mg capsule, Take 1 capsule (100 mg total) by mouth 2 (two) times a day as needed for constipation, Disp: 180 capsule, Rfl: 1   fluticasone (FLONASE) 50 mcg/act nasal spray, 2 sprays into each nostril daily  , Disp: , Rfl:     gabapentin (NEURONTIN) 100 mg capsule, Take 200 mg by mouth 2 (two) times a day  , Disp: , Rfl:     L-Theanine 100 MG CAPS, Take 200 mg by mouth 2 (two) times a day  , Disp: , Rfl:     levothyroxine 100 mcg tablet, Take 1 tablet (100 mcg total) by mouth daily, Disp: 90 tablet, Rfl: 1    LORazepam (ATIVAN) 1 mg tablet, Take 0 5 mg by mouth 2 (two) times a day  , Disp: , Rfl:     loxapine (LOXITANE) 50 MG capsule, Take 3 capsules (150 mg) at bedtime and 1 capsule (50 mg) at noon , Disp: , Rfl:     Multiple Vitamins-Iron (DAILY-KACI/IRON) TABS, Take 1 tablet by mouth daily  , Disp: , Rfl:     neomycin-bacitracin-polymyxin b (NEOSPORIN) ointment, Apply topically 2 (two) times a day as needed (until wound is healed), Disp: 15 g, Rfl: 0    PHENobarbital 64 8 mg tablet, Take 1 tablet (64 8 mg total) by mouth 2 (two) times a day, Disp: 180 tablet, Rfl: 3    Polyethylene Glycol 3350-GRX POWD, Take by mouth daily MIX 1 CAPFUL (17mg) IN 8 OUNCES OF WATER, JUICE OR TEA AND DRINK DAILY, Disp: 850 g, Rfl: 5    simvastatin (ZOCOR) 40 mg tablet, Take 40 mg by mouth daily at bedtime, Disp: , Rfl:     traZODone (DESYREL) 150 mg tablet, Take 2 tablets by mouth daily at bedtime  , Disp: , Rfl:     trihexyphenidyl (ARTANE) 5 mg tablet, Take 5 mg by mouth daily at bedtime  , Disp: , Rfl:     asenapine (SAPHRIS) 10 mg SL tablet, Place 10 mg under the tongue 2 (two) times a day  , Disp: , Rfl:     atorvastatin (LIPITOR) 20 mg tablet, Take 1 tablet (20 mg total) by mouth daily (Patient not taking: Reported on 11/4/2019), Disp: 90 tablet, Rfl: 1    bismuth subsalicylate (PEPTO BISMOL) 262 MG chewable tablet, Chew 2 tablets (524 mg total) every 6 (six) hours as needed for diarrhea (Patient not taking: Reported on 11/4/2019), Disp: 30 tablet, Rfl: 0    Dextromethorphan Polistirex ER 30 MG/5ML SUER, Take 5 mL (30 mg total) by mouth 2 (two) times a day as needed (congsetion) (Patient not taking: Reported on 11/4/2019), Disp: 148 mL, Rfl: 0    fluticasone (FLOVENT HFA) 110 MCG/ACT inhaler, Inhale 2 puffs daily Rinse mouth after use  (Patient not taking: Reported on 11/4/2019), Disp: 12 g, Rfl: 5    loratadine (CLARITIN) 10 mg tablet, Take 1 tablet (10 mg total) by mouth daily (Patient not taking: Reported on 11/4/2019), Disp: 30 tablet, Rfl: 1    melatonin 3 mg, Take 1 tablet (3 mg total) by mouth daily at bedtime (Patient not taking: Reported on 11/4/2019), Disp: 30 tablet, Rfl: 2    naproxen (NAPROSYN) 500 mg tablet, Take 1 tablet (500 mg total) by mouth 2 (two) times a day with meals (Patient not taking: Reported on 11/4/2019), Disp: 20 tablet, Rfl: 0    Allergies   Allergen Reactions    Clozapine Other (See Comments)     low white blood count  Other reaction(s): Other (See Comments)  low white blood count    Fluphenazine Hyperactivity and Other (See Comments)     EPS, Hyperactivity  EPS, Hyperactivity    Haloperidol Other (See Comments)     EPS  Other reaction(s): Other (See Comments)  EPS    Lithium Other (See Comments)     Toxicity    Valproic Acid Confusion and Other (See Comments)     "Mental confusion"  "Mental confusion"       Social History   Past Surgical History:   Procedure Laterality Date    COLONOSCOPY      complete    [place] COLONOSCOPY FLX DX W/COLLJ SPEC WHEN PFRMD N/A 8/30/2018    Procedure: COLONOSCOPY with polypectomies;  Surgeon: Jarrett Keen MD;  Location: [facility];   Service: Gastroenterology     Family History   Problem Relation Age of Onset    No Known Problems Mother     No Known Problems Father     Lung disease Other         mesothelioma    No Known Problems Maternal Grandmother     No Known Problems Maternal Grandfather     No Known Problems Paternal Grandmother     No Known Problems Paternal Grandfather     No Known Problems Sister     No Known Problems Sister     Kidney failure Brother Objective:  /88 (BP Location: Left arm, Patient Position: Sitting, Cuff Size: Large)   Pulse 92   Temp 97 9 °F (36 6 °C) (Tympanic)   Ht 5' 4" (1 626 m)   Wt 107 kg (235 lb 9 6 oz)   SpO2 97%   BMI 40 44 kg/m²   Body mass index is 40 44 kg/m²  Physical Exam   Constitutional: She appears well-developed  HENT:   Head: Normocephalic  Right Ear: External ear normal    Left Ear: External ear normal    Mouth/Throat: Oropharynx is clear and moist    Eyes: Pupils are equal, round, and reactive to light  No scleral icterus  Neck: Normal range of motion  Neck supple  No tracheal deviation present  No thyromegaly present  Cardiovascular: Normal rate, regular rhythm and normal heart sounds  Pulmonary/Chest: Effort normal and breath sounds normal  No respiratory distress  She exhibits no tenderness  Abdominal: Soft  Bowel sounds are normal  She exhibits no mass  There is no tenderness  Musculoskeletal: Normal range of motion  Lymphadenopathy:     She has no cervical adenopathy  Neurological: She is alert  No cranial nerve deficit  Coordination normal    Skin: Skin is warm and dry  Psychiatric: She has a normal mood and affect

## 2019-11-23 LAB — HBA1C MFR BLD HPLC: 6.1 %

## 2019-11-25 ENCOUNTER — TELEPHONE (OUTPATIENT)
Dept: NEUROLOGY | Facility: CLINIC | Age: 64
End: 2019-11-25

## 2019-11-25 NOTE — TELEPHONE ENCOUNTER
Call received from [facility]  Requesting we fax lab slips to home [facility]  [place]abs printed and faxed to [address] [place] [address] and [facility] - [phone]  Fax confirmed

## 2019-11-29 ENCOUNTER — APPOINTMENT (OUTPATIENT)
Dept: LAB | Age: 64
End: 2019-11-29
Payer: MEDICARE

## 2019-11-29 ENCOUNTER — TRANSCRIBE ORDERS (OUTPATIENT)
Dept: ADMINISTRATIVE | Age: 64
End: 2019-11-29

## 2019-11-29 DIAGNOSIS — G40.909 SEIZURE DISORDER (HCC): ICD-10-CM

## 2019-11-29 LAB
CREAT UR-MCNC: 17 MG/DL
MICROALBUMIN UR-MCNC: <5 MG/L (ref 0–20)
MICROALBUMIN/CREAT 24H UR: <29 MG/G CREATININE (ref 0–30)
PHENOBARB SERPL-MCNC: 19.1 UG/ML (ref 15–40)
VALPROATE SERPL-MCNC: 71 UG/ML (ref 50–100)

## 2019-11-29 PROCEDURE — 80184 ASSAY OF PHENOBARBITAL: CPT

## 2019-11-29 PROCEDURE — 80164 ASSAY DIPROPYLACETIC ACD TOT: CPT

## 2019-11-29 PROCEDURE — 36415 COLL VENOUS BLD VENIPUNCTURE: CPT

## 2019-11-29 PROCEDURE — 82043 UR ALBUMIN QUANTITATIVE: CPT | Performed by: INTERNAL MEDICINE

## 2019-11-29 PROCEDURE — 82570 ASSAY OF URINE CREATININE: CPT | Performed by: INTERNAL MEDICINE

## 2019-12-12 LAB
CREAT ?TM UR-SCNC: 28.2 UMOL/L
HBA1C MFR BLD HPLC: 5.8 %

## 2019-12-13 ENCOUNTER — APPOINTMENT (OUTPATIENT)
Dept: RADIOLOGY | Age: 64
End: 2019-12-13
Payer: MEDICARE

## 2019-12-13 ENCOUNTER — TRANSCRIBE ORDERS (OUTPATIENT)
Dept: ADMINISTRATIVE | Age: 64
End: 2019-12-13

## 2019-12-13 DIAGNOSIS — M25.552 LEFT HIP PAIN: Primary | ICD-10-CM

## 2019-12-13 DIAGNOSIS — M25.552 LEFT HIP PAIN: ICD-10-CM

## 2019-12-13 PROCEDURE — 73502 X-RAY EXAM HIP UNI 2-3 VIEWS: CPT

## 2019-12-13 PROCEDURE — 72110 X-RAY EXAM L-2 SPINE 4/>VWS: CPT

## 2020-04-13 ENCOUNTER — TELEMEDICINE (OUTPATIENT)
Dept: INTERNAL MEDICINE CLINIC | Age: 65
End: 2020-04-13
Payer: MEDICARE

## 2020-04-13 DIAGNOSIS — E78.2 MIXED HYPERLIPIDEMIA: ICD-10-CM

## 2020-04-13 DIAGNOSIS — J30.2 SEASONAL ALLERGIES: ICD-10-CM

## 2020-04-13 DIAGNOSIS — F41.8 DEPRESSION WITH ANXIETY: ICD-10-CM

## 2020-04-13 DIAGNOSIS — E03.9 ACQUIRED HYPOTHYROIDISM: Primary | ICD-10-CM

## 2020-04-13 DIAGNOSIS — J30.2 SEASONAL ALLERGIC RHINITIS, UNSPECIFIED TRIGGER: ICD-10-CM

## 2020-04-13 DIAGNOSIS — I10 BENIGN ESSENTIAL HYPERTENSION: ICD-10-CM

## 2020-04-13 DIAGNOSIS — R73.03 PRE-DIABETES: ICD-10-CM

## 2020-04-13 DIAGNOSIS — G40.909 SEIZURE DISORDER (HCC): ICD-10-CM

## 2020-04-13 PROCEDURE — 99214 OFFICE O/P EST MOD 30 MIN: CPT | Performed by: INTERNAL MEDICINE

## 2020-04-13 RX ORDER — LORATADINE 10 MG/1
10 TABLET ORAL DAILY
Qty: 30 TABLET | Refills: 2 | Status: SHIPPED | OUTPATIENT
Start: 2020-04-13 | End: 2021-03-17 | Stop reason: HOSPADM

## 2020-05-04 DIAGNOSIS — G40.909 SEIZURE DISORDER (HCC): ICD-10-CM

## 2020-05-04 RX ORDER — PHENOBARBITAL 64.8 MG/1
TABLET ORAL
Qty: 56 TABLET | Refills: 4 | Status: SHIPPED | OUTPATIENT
Start: 2020-05-04 | End: 2020-10-21

## 2020-05-07 ENCOUNTER — TELEPHONE (OUTPATIENT)
Dept: INTERNAL MEDICINE CLINIC | Age: 65
End: 2020-05-07

## 2020-06-04 ENCOUNTER — TRANSCRIBE ORDERS (OUTPATIENT)
Dept: ADMINISTRATIVE | Facility: HOSPITAL | Age: 65
End: 2020-06-04

## 2020-06-04 DIAGNOSIS — Z12.31 SCREENING MAMMOGRAM FOR HIGH-RISK PATIENT: Primary | ICD-10-CM

## 2020-06-05 ENCOUNTER — TELEPHONE (OUTPATIENT)
Dept: ADMINISTRATIVE | Facility: OTHER | Age: 65
End: 2020-06-05

## 2020-07-07 ENCOUNTER — TELEPHONE (OUTPATIENT)
Dept: INTERNAL MEDICINE CLINIC | Facility: CLINIC | Age: 65
End: 2020-07-07

## 2020-07-07 DIAGNOSIS — H91.93 DECREASED HEARING OF BOTH EARS: Primary | ICD-10-CM

## 2020-07-07 NOTE — TELEPHONE ENCOUNTER
Patient needs a  Referral for [facility] for Comprehensive hearing evaluation 07/14/2020

## 2020-07-07 NOTE — TELEPHONE ENCOUNTER
Pt care taker called back and stated that the referral needs to go to   Dr Mahesh Stanley  Fax number [phone]  phone number [phone]

## 2020-07-17 ENCOUNTER — OFFICE VISIT (OUTPATIENT)
Dept: INTERNAL MEDICINE CLINIC | Age: 65
End: 2020-07-17
Payer: MEDICARE

## 2020-07-17 VITALS
OXYGEN SATURATION: 96 % | HEIGHT: 64 IN | TEMPERATURE: 98.5 F | WEIGHT: 235 LBS | DIASTOLIC BLOOD PRESSURE: 78 MMHG | HEART RATE: 88 BPM | BODY MASS INDEX: 40.12 KG/M2 | SYSTOLIC BLOOD PRESSURE: 138 MMHG

## 2020-07-17 DIAGNOSIS — F25.9 SCHIZOAFFECTIVE DISORDER, UNSPECIFIED TYPE (HCC): ICD-10-CM

## 2020-07-17 DIAGNOSIS — R73.9 HYPERGLYCEMIA: ICD-10-CM

## 2020-07-17 DIAGNOSIS — M81.0 OSTEOPOROSIS, UNSPECIFIED OSTEOPOROSIS TYPE, UNSPECIFIED PATHOLOGICAL FRACTURE PRESENCE: ICD-10-CM

## 2020-07-17 DIAGNOSIS — G40.909 SEIZURE DISORDER (HCC): ICD-10-CM

## 2020-07-17 DIAGNOSIS — E78.2 MIXED HYPERLIPIDEMIA: ICD-10-CM

## 2020-07-17 DIAGNOSIS — I10 BENIGN ESSENTIAL HYPERTENSION: ICD-10-CM

## 2020-07-17 DIAGNOSIS — E03.9 ACQUIRED HYPOTHYROIDISM: Primary | ICD-10-CM

## 2020-07-17 DIAGNOSIS — H60.311 ACUTE DIFFUSE OTITIS EXTERNA OF RIGHT EAR: ICD-10-CM

## 2020-07-17 DIAGNOSIS — Z00.00 MEDICARE ANNUAL WELLNESS VISIT, SUBSEQUENT: ICD-10-CM

## 2020-07-17 PROCEDURE — 1123F ACP DISCUSS/DSCN MKR DOCD: CPT | Performed by: INTERNAL MEDICINE

## 2020-07-17 PROCEDURE — 3078F DIAST BP <80 MM HG: CPT | Performed by: INTERNAL MEDICINE

## 2020-07-17 PROCEDURE — 99214 OFFICE O/P EST MOD 30 MIN: CPT | Performed by: INTERNAL MEDICINE

## 2020-07-17 PROCEDURE — 1160F RVW MEDS BY RX/DR IN RCRD: CPT | Performed by: INTERNAL MEDICINE

## 2020-07-17 PROCEDURE — 3008F BODY MASS INDEX DOCD: CPT | Performed by: INTERNAL MEDICINE

## 2020-07-17 PROCEDURE — 4040F PNEUMOC VAC/ADMIN/RCVD: CPT | Performed by: INTERNAL MEDICINE

## 2020-07-17 PROCEDURE — 3075F SYST BP GE 130 - 139MM HG: CPT | Performed by: INTERNAL MEDICINE

## 2020-07-17 PROCEDURE — 1170F FXNL STATUS ASSESSED: CPT | Performed by: INTERNAL MEDICINE

## 2020-07-17 PROCEDURE — 1125F AMNT PAIN NOTED PAIN PRSNT: CPT | Performed by: INTERNAL MEDICINE

## 2020-07-17 PROCEDURE — 1036F TOBACCO NON-USER: CPT | Performed by: INTERNAL MEDICINE

## 2020-07-17 PROCEDURE — G0439 PPPS, SUBSEQ VISIT: HCPCS | Performed by: INTERNAL MEDICINE

## 2020-07-17 RX ORDER — OFLOXACIN 3 MG/ML
5 SOLUTION AURICULAR (OTIC) 2 TIMES DAILY
Qty: 5 ML | Refills: 0 | Status: SHIPPED | OUTPATIENT
Start: 2020-07-17

## 2020-07-17 RX ORDER — LORAZEPAM 0.5 MG/1
0.5 TABLET ORAL EVERY 12 HOURS PRN
COMMUNITY

## 2020-07-17 NOTE — PROGRESS NOTES
Assessment/Plan:    1  Hypothyroidism  Will continue with present dose of levothyroxine  Will check thyroid function test before next visit    2  Hyperlipidemia  Continue with Lipitor 20 mg daily  Will check lipid profile before next visit    3  Seizure disorder  Patient is on Depakote    4  Right otitis externa  Will start patient on Floxin otic solution twice a day for 5 days  Diagnoses and all orders for this visit:    Acquired hypothyroidism  -     TSH, 3rd generation with Free T4 reflex; Future    Benign essential hypertension  -     CBC; Future  -     Basic metabolic panel; Future    Seizure disorder (HCC)    Osteoporosis, unspecified osteoporosis type, unspecified pathological fracture presence    Mixed hyperlipidemia  -     Lipid Panel with Direct LDL reflex; Future    Hyperglycemia  -     Hemoglobin A1C; Future    Schizoaffective disorder, unspecified type ([facility] )    Medicare annual wellness visit, subsequent    Acute diffuse otitis externa of right ear  -     ofloxacin (FLOXIN) 0 3 % otic solution; Administer 5 drops to the right ear 2 (two) times a day    Other orders  -     LORazepam (ATIVAN) 0 5 mg tablet; Take by mouth every 12 (twelve) hours as needed for anxiety          BMI Counseling: Body mass index is 39 88 kg/m²  The BMI is above normal  Nutrition recommendations include decreasing portion sizes, encouraging healthy choices of fruits and vegetables, decreasing fast food intake and consuming healthier snacks  Exercise recommendations include exercising 3-5 times per week  No pharmacotherapy was ordered  Subjective:          Patient ID: Jcarlos Potts is a 77 y o  female  Is here for regular follow-up  No blood work prior to this visit  Complaining of pain in right ear for the last 3 days        The following portions of the patient's history were reviewed and updated as appropriate: allergies, current medications, past family history, past medical history, past social history, past surgical history and problem list     Review of Systems   Constitutional: Negative for fatigue and fever  HENT: Positive for ear pain  Negative for congestion, ear discharge, postnasal drip, sinus pressure, sore throat, tinnitus and trouble swallowing  Eyes: Negative for discharge, itching and visual disturbance  Respiratory: Negative for cough and shortness of breath  Cardiovascular: Negative for chest pain and palpitations  Gastrointestinal: Negative for abdominal pain, diarrhea, nausea and vomiting  Endocrine: Negative for cold intolerance and polyuria  Genitourinary: Negative for difficulty urinating, dysuria and urgency  Musculoskeletal: Negative for arthralgias and neck pain  Skin: Negative for rash  Allergic/Immunologic: Negative for environmental allergies  Neurological: Negative for dizziness, weakness and headaches  Psychiatric/Behavioral: Negative for agitation  The patient is not nervous/anxious            Past Medical History:   Diagnosis Date    Benign essential hypertension     resolved; 06/15/16    Depression with anxiety     Fracture of fifth metatarsal bone of right foot with delayed healing     last assessed 04/12/16; fracture of metatarsal bone, right, closed, initial encounter    Hyperlipidemia     last assessed 11/28/17    Hypothyroidism     last assessed 11/28/2017    Insomnia     Obesity     Schizoaffective disorder ([facility] )     last assessed 05/18/2017    Seizure disorder ([facility] )     last assessed 03/28/17    Seizure disorder ([facility] ) 5/15/2017         Current Outpatient Medications:     acetaminophen (TYLENOL) 500 mg tablet, Take 2 tablets (1,000 mg total) by mouth 3 (three) times a day as needed for mild pain, Disp: 90 tablet, Rfl: 3    alendronate (FOSAMAX) 70 mg tablet, Take by mouth TAKE 1 TABLET BY MOUTH DAILY ON FRIDAY , Disp: , Rfl:     asenapine (SAPHRIS) 10 mg SL tablet, Place 10 mg under the tongue 2 (two) times a day  , Disp: , Rfl:    atorvastatin (LIPITOR) 20 mg tablet, Take 1 tablet (20 mg total) by mouth daily, Disp: 90 tablet, Rfl: 1    bismuth subsalicylate (PEPTO BISMOL) 262 MG chewable tablet, Chew 2 tablets (524 mg total) every 6 (six) hours as needed for diarrhea, Disp: 30 tablet, Rfl: 0    busPIRone (BUSPAR) 15 mg tablet, Take 15 mg by mouth 2 (two) times a day , Disp: , Rfl:     calcium carbonate-vitamin D (OSCAL-D) 500 mg-200 units per tablet, Take 1 tablet by mouth 3 (three) times a day with meals  , Disp: , Rfl:     Dextromethorphan Polistirex ER 30 MG/5ML SUER, Take 5 mL (30 mg total) by mouth 2 (two) times a day as needed (congsetion), Disp: 148 mL, Rfl: 0    diphenhydrAMINE (BENADRYL) 50 mg capsule, Take 1 capsule (50 mg total) by mouth daily at bedtime as needed for sleep, Disp: 90 capsule, Rfl: 2    divalproex sodium (DEPAKOTE) 500 mg EC tablet, 1 tab in the Am, 3 tabs(1500mg) at bedtime, Disp: , Rfl:     docusate sodium (COLACE) 100 mg capsule, Take 1 capsule (100 mg total) by mouth 2 (two) times a day as needed for constipation, Disp: 180 capsule, Rfl: 1    fluticasone (FLONASE) 50 mcg/act nasal spray, 2 sprays into each nostril daily  , Disp: , Rfl:     fluticasone (FLOVENT HFA) 110 MCG/ACT inhaler, Inhale 2 puffs daily Rinse mouth after use , Disp: 12 g, Rfl: 5    gabapentin (NEURONTIN) 100 mg capsule, Take 200 mg by mouth 2 (two) times a day  , Disp: , Rfl:     L-Theanine 100 MG CAPS, Take 200 mg by mouth 2 (two) times a day  , Disp: , Rfl:     levothyroxine 100 mcg tablet, Take 1 tablet (100 mcg total) by mouth daily, Disp: 90 tablet, Rfl: 1    loratadine (Claritin) 10 mg tablet, Take 1 tablet (10 mg total) by mouth daily, Disp: 30 tablet, Rfl: 2    LORazepam (ATIVAN) 0 5 mg tablet, Take by mouth every 12 (twelve) hours as needed for anxiety, Disp: , Rfl:     LORazepam (ATIVAN) 1 mg tablet, Take 1 mg by mouth 2 (two) times a day , Disp: , Rfl:     loxapine (LOXITANE) 50 MG capsule, Take 50 mg by mouth 2 (two) times a day And 150mg at bedtime, Disp: , Rfl:     melatonin 3 mg, Take 1 tablet (3 mg total) by mouth daily at bedtime (Patient taking differently: Take 5 mg by mouth daily at bedtime ), Disp: 30 tablet, Rfl: 2    Multiple Vitamins-Iron (DAILY-KACI/IRON) TABS, Take 1 tablet by mouth daily  , Disp: , Rfl:     PHENobarbital 64 8 mg tablet, TAKE 1 TABLET BY MOUTH TWICE A DAY, Disp: 56 tablet, Rfl: 4    Polyethylene Glycol 3350-GRX POWD, Take by mouth daily MIX 1 CAPFUL (17mg) IN 8 OUNCES OF WATER, JUICE OR TEA AND DRINK DAILY, Disp: 850 g, Rfl: 5    traZODone (DESYREL) 150 mg tablet, Take 1 tablet by mouth daily at bedtime , Disp: , Rfl:     trihexyphenidyl (ARTANE) 5 mg tablet, Take 5 mg by mouth daily at bedtime  , Disp: , Rfl:     ofloxacin (FLOXIN) 0 3 % otic solution, Administer 5 drops to the right ear 2 (two) times a day, Disp: 5 mL, Rfl: 0    Allergies   Allergen Reactions    Clozapine Other (See Comments)     low white blood count  Other reaction(s): Other (See Comments)  low white blood count    Fluphenazine Hyperactivity and Other (See Comments)     EPS, Hyperactivity  EPS, Hyperactivity    Haloperidol Other (See Comments)     EPS  Other reaction(s): Other (See Comments)  EPS    Lithium Other (See Comments)     Toxicity    Valproic Acid Confusion and Other (See Comments)     "Mental confusion"  "Mental confusion"       Social History   Past Surgical History:   Procedure Laterality Date    COLONOSCOPY      complete    [place] COLONOSCOPY FLX DX W/COLLJ SPEC WHEN PFRMD N/A 8/30/2018    Procedure: COLONOSCOPY with polypectomies;  Surgeon: French Conway MD;  Location: [facility];   Service: Gastroenterology     Family History   Problem Relation Age of Onset    No Known Problems Mother     No Known Problems Father     Lung disease Other         mesothelioma    No Known Problems Maternal Grandmother     No Known Problems Maternal Grandfather     No Known Problems Paternal Grandmother     No Known Problems Paternal Grandfather     No Known Problems Sister     No Known Problems Sister     Kidney failure Brother        Objective:  /78 (BP Location: Left arm)   Pulse 88   Temp 98 5 °F (36 9 °C) (Temporal)   Ht 5' 4 37" (1 635 m)   Wt 107 kg (235 lb)   SpO2 96%   BMI 39 88 kg/m²   Body mass index is 39 88 kg/m²  Physical Exam   Constitutional: She appears well-developed  HENT:   Head: Normocephalic  Mouth/Throat: Oropharynx is clear and moist    Right external auditory canal edematous  Tympanic membranes appears normal    Eyes: Pupils are equal, round, and reactive to light  No scleral icterus  Neck: Normal range of motion  Neck supple  No tracheal deviation present  No thyromegaly present  Cardiovascular: Normal rate, regular rhythm and normal heart sounds  Pulmonary/Chest: Effort normal and breath sounds normal  No respiratory distress  She exhibits no tenderness  Abdominal: Soft  Bowel sounds are normal  She exhibits no mass  There is no tenderness  Musculoskeletal: Normal range of motion  Lymphadenopathy:     She has no cervical adenopathy  Neurological: She is alert  No cranial nerve deficit  Skin: Skin is warm  Psychiatric: She has a normal mood and affect

## 2020-07-17 NOTE — PROGRESS NOTES
Assessment and Plan:     Problem List Items Addressed This Visit     None           Preventive health issues were discussed with patient, and age appropriate screening tests were ordered as noted in patient's After Visit Summary  Personalized health advice and appropriate referrals for health education or preventive services given if needed, as noted in patient's After Visit Summary       History of Present Illness:     Patient presents for Medicare Annual Wellness visit    Patient Care Team:  Matias Dozier MD as PCP - General (Internal Medicine)  Jackelin Russo as PCP - Advanced Practitioner (Internal Medicine)  Beth Rangel MD as Endoscopist     Problem List:     Patient Active Problem List   Diagnosis    Depression with anxiety    Hyperlipidemia    Hypothyroidism    Flatulence    Injury of right toe, initial encounter    [facility] [address] [facility]    Hyperglycemia    Benign essential hypertension    Schizoaffective disorder ([facility] [address] [facility]    Unsteady gait    Psychosis, bipolar affective ([facility] [address] [facility]    Osteoporosis    Insomnia    Folic acid deficiency    Anemia    Pre-diabetes    Decreased hearing of both ears    Cellulitis of abdominal wall    Ulcer of abdomen wall, limited to breakdown of skin [facility])      Past Medical and Surgical History:     Past Medical History:   Diagnosis Date    Benign essential hypertension     resolved; 06/15/16    Depression with anxiety     Fracture of fifth metatarsal bone of right foot with delayed healing     last assessed 04/12/16; fracture of metatarsal bone, right, closed, initial encounter    Hyperlipidemia     last assessed 11/28/17    Hypothyroidism     last assessed 11/28/2017    Insomnia     Obesity     Schizoaffective disorder ([facility] [address] [facility]     last assessed 05/18/2017    Seizure disorder ([facility] [address] )     last assessed 03/28/17    Seizure disorder ([facility] [address] ) 5/15/2017     Past Surgical History:   Procedure Laterality Date    COLONOSCOPY      complete    NH COLONOSCOPY FLX DX W/COLLJ SPEC WHEN PFRMD N/A 8/30/2018    Procedure: COLONOSCOPY with polypectomies;  Surgeon: Bri North MD;  Location: [facility];   Service: Gastroenterology      Family History:     Family History   Problem Relation Age of Onset    No Known Problems Mother     No Known Problems Father     Lung disease Other         mesothelioma    No Known Problems Maternal Grandmother     No Known Problems Maternal Grandfather     No Known Problems Paternal Grandmother     No Known Problems Paternal Grandfather     No Known Problems Sister     No Known Problems Sister     Kidney failure Brother       Social History:        Social History     Socioeconomic History    Marital status: /Civil Union     Spouse name: Not on file    Number of children: Not on file    Years of education: Not on file    Highest education level: Not on file   Occupational History    Not on file   Social Needs    Financial resource strain: Not on file    Food insecurity:     Worry: Not on file     Inability: Not on file    Transportation needs:     Medical: Not on file     Non-medical: Not on file   Tobacco Use    Smoking status: Former Smoker     Types: Cigarettes    Smokeless tobacco: Never Used   Substance and Sexual Activity    Alcohol use: No    Drug use: No    Sexual activity: Never   Lifestyle    Physical activity:     Days per week: Not on file     Minutes per session: Not on file    Stress: Not on file   Relationships    Social connections:     Talks on phone: Not on file     Gets together: Not on file     Attends Congregational service: Not on file     Active member of club or organization: Not on file     Attends meetings of clubs or organizations: Not on file     Relationship status: Not on file    Intimate partner violence:     Fear of current or ex partner: Not on file     Emotionally abused: Not on file     Physically abused: Not on file     Forced sexual activity: Not on file   Other Topics Concern    Not on file   Social History Narrative    Not on file      Medications and Allergies:     Current Outpatient Medications   Medication Sig Dispense Refill    acetaminophen (TYLENOL) 500 mg tablet Take 2 tablets (1,000 mg total) by mouth 3 (three) times a day as needed for mild pain 90 tablet 3    alendronate (FOSAMAX) 70 mg tablet Take by mouth TAKE 1 TABLET BY MOUTH DAILY ON FRIDAY       asenapine (SAPHRIS) 10 mg SL tablet Place 10 mg under the tongue 2 (two) times a day        asenapine (SAPHRIS) SL tablet Place 5 mg under the tongue 2 (two) times a day      aspirin (ECOTRIN LOW STRENGTH) 81 mg EC tablet Take 81 mg by mouth daily      atorvastatin (LIPITOR) 20 mg tablet Take 1 tablet (20 mg total) by mouth daily 90 tablet 1    bismuth subsalicylate (PEPTO BISMOL) 262 MG chewable tablet Chew 2 tablets (524 mg total) every 6 (six) hours as needed for diarrhea 30 tablet 0    busPIRone (BUSPAR) 15 mg tablet Take 15 mg by mouth daily       calcium carbonate-vitamin D (OSCAL-D) 500 mg-200 units per tablet Take 1 tablet by mouth 3 (three) times a day with meals        Dextromethorphan Polistirex ER 30 MG/5ML SUER Take 5 mL (30 mg total) by mouth 2 (two) times a day as needed (congsetion) (Patient not taking: Reported on 11/4/2019) 148 mL 0    diphenhydrAMINE (BENADRYL) 50 mg capsule Take 1 capsule (50 mg total) by mouth daily at bedtime as needed for sleep 90 capsule 2    divalproex sodium (DEPAKOTE) 500 mg EC tablet Take 500 mg by mouth 3 (three) times a day        docusate sodium (COLACE) 100 mg capsule Take 1 capsule (100 mg total) by mouth 2 (two) times a day as needed for constipation 180 capsule 1    fluticasone (FLONASE) 50 mcg/act nasal spray 2 sprays into each nostril daily        fluticasone (FLOVENT HFA) 110 MCG/ACT inhaler Inhale 2 puffs daily Rinse mouth after use   12 g 5    gabapentin (NEURONTIN) 100 mg capsule Take 200 mg by mouth 2 (two) times a day        L-Theanine 100 MG CAPS Take 200 mg by mouth 2 (two) times a day        levothyroxine 100 mcg tablet Take 1 tablet (100 mcg total) by mouth daily 90 tablet 1    loratadine (Claritin) 10 mg tablet Take 1 tablet (10 mg total) by mouth daily 30 tablet 2    LORazepam (ATIVAN) 1 mg tablet Take 0 5 mg by mouth 2 (two) times a day        loxapine (LOXITANE) 50 MG capsule Take 3 capsules (150 mg) at bedtime and 1 capsule (50 mg) at noon   melatonin 3 mg Take 1 tablet (3 mg total) by mouth daily at bedtime 30 tablet 2    Multiple Vitamins-Iron (DAILY-KACI/IRON) TABS Take 1 tablet by mouth daily        naproxen (NAPROSYN) 500 mg tablet Take 1 tablet (500 mg total) by mouth 2 (two) times a day with meals (Patient not taking: Reported on 11/4/2019) 20 tablet 0    neomycin-bacitracin-polymyxin b (NEOSPORIN) ointment Apply topically 2 (two) times a day as needed (until wound is healed) 15 g 0    PHENobarbital 64 8 mg tablet TAKE 1 TABLET BY MOUTH TWICE A DAY 56 tablet 4    Polyethylene Glycol 3350-GRX POWD Take by mouth daily MIX 1 CAPFUL (17mg) IN 8 OUNCES OF WATER, JUICE OR TEA AND DRINK DAILY 850 g 5    simvastatin (ZOCOR) 40 mg tablet Take 40 mg by mouth daily at bedtime      traZODone (DESYREL) 150 mg tablet Take 1 tablet by mouth daily at bedtime       trihexyphenidyl (ARTANE) 5 mg tablet Take 5 mg by mouth daily at bedtime         No current facility-administered medications for this visit  Allergies   Allergen Reactions    Clozapine Other (See Comments)     low white blood count  Other reaction(s): Other (See Comments)  low white blood count    Fluphenazine Hyperactivity and Other (See Comments)     EPS, Hyperactivity  EPS, Hyperactivity    Haloperidol Other (See Comments)     EPS  Other reaction(s):  Other (See Comments)  EPS    Lithium Other (See Comments)     Toxicity    Valproic Acid Confusion and Other (See Comments)     "Mental confusion"  "Mental confusion"      Immunizations:     Immunization History   Administered Date(s) Administered     Influenza (IM) Preservative Free 10/21/2013    H1N1, All Formulations 01/07/2010    INFLUENZA 09/18/2012, 10/07/2013, 10/12/2016, 10/23/2017    Influenza Quadrivalent Preservative Free 3 years and older IM 10/23/2017    Influenza Quadrivalent, 6-35 Months IM 10/12/2016    Influenza TIV (IM) 10/16/2014    Influenza, high dose seasonal 0 5 mL 10/15/2018, 11/13/2019    Pneumococcal Conjugate 13-Valent 03/20/2019    Td (adult), adsorbed 11/28/2017    Tuberculin Skin Test-PPD Intradermal 08/08/2018      Health Maintenance:         Topic Date Due    MAMMOGRAM  07/26/2020    Cervical Cancer Screening  12/28/2021    CRC Screening: Colonoscopy  08/30/2028    Hepatitis C Screening  Completed         Topic Date Due    Pneumococcal Vaccine: 65+ Years (2 of 2 - PPSV23) 03/20/2020    Influenza Vaccine  07/01/2020      Medicare Health Risk Assessment:     There were no vitals taken for this visit  Pietroaiden Chandler is here for her Subsequent Wellness visit  Last Medicare Wellness visit information reviewed, patient interviewed and updates made to the record today  Health Risk Assessment:   Patient rates overall health as good  Patient feels that their physical health rating is same  Eyesight was rated as slightly worse  Hearing was rated as same  Patient feels that their emotional and mental health rating is same  Pain experienced in the last 7 days has been none  Patient states that she has experienced no weight loss or gain in last 6 months  Depression Screening:   PHQ-2 Score: 0  PHQ-9 Score: 6      Fall Risk Screening: In the past year, patient has experienced: no history of falling in past year      Urinary Incontinence Screening:   Patient has not leaked urine accidently in the last six months  Home Safety:  Patient does not have trouble with stairs inside or outside of their home  Patient has working smoke alarms and has working carbon monoxide detector   Home safety hazards include: none      Nutrition:   Current diet is Regular and Frequent junk food  Medications:   Patient is currently taking over-the-counter supplements  OTC medications include: see medication list  Patient is not able to manage medications  Activities of Daily Living (ADLs)/Instrumental Activities of Daily Living (IADLs):   Walk and transfer into and out of bed and chair?: Yes  Dress and groom yourself?: Yes    Bathe or shower yourself?: Yes    Feed yourself? Yes  Do your laundry/housekeeping?: Yes  Manage your money, pay your bills and track your expenses?: Yes  Make your own meals?: No    Do your own shopping?: Yes    Previous Hospitalizations:   Any hospitalizations or ED visits within the last 12 months?: No      Advance Care Planning:   Living will: Yes    Durable POA for healthcare: Yes    Advanced directive: Yes    Five wishes given: Yes      Cognitive Screening:   Provider or family/friend/caregiver concerned regarding cognition?: No    PREVENTIVE SCREENINGS      Cardiovascular Screening:    General: Screening Not Indicated and History Lipid Disorder      Diabetes Screening:     General: Screening Current      Colorectal Cancer Screening:     General: Screening Current      Breast Cancer Screening:     General: Screening Current      Cervical Cancer Screening:    General: Screening Not Indicated      Osteoporosis Screening:    General: Screening Not Indicated and History Osteoporosis      Abdominal Aortic Aneurysm (AAA) Screening:        General: Screening Not Indicated      Lung Cancer Screening:     General: Screening Not Indicated      Hepatitis C Screening:    General: Screening Current    Other Counseling Topics:   Alcohol use counseling, car/seat belt/driving safety, skin self-exam, sunscreen and calcium and vitamin D intake and regular weightbearing exercise         Daya Loco MD

## 2020-07-17 NOTE — PATIENT INSTRUCTIONS
Medicare Preventive Visit Patient Instructions  Thank you for completing your Welcome to Medicare Visit or Medicare Annual Wellness Visit today  Your next wellness visit will be due in one year (7/17/2021)  The screening/preventive services that you may require over the next 5-10 years are detailed below  Some tests may not apply to you based off risk factors and/or age  Screening tests ordered at today's visit but not completed yet may show as past due  Also, please note that scanned in results may not display below  Preventive Screenings:  Service Recommendations Previous Testing/Comments   Colorectal Cancer Screening  * Colonoscopy    * Fecal Occult Blood Test (FOBT)/Fecal Immunochemical Test (FIT)  * Fecal DNA/Cologuard Test  * Flexible Sigmoidoscopy Age: 54-65 years old   Colonoscopy: every 10 years (may be performed more frequently if at higher risk)  OR  FOBT/FIT: every 1 year  OR  Cologuard: every 3 years  OR  Sigmoidoscopy: every 5 years  Screening may be recommended earlier than age 48 if at higher risk for colorectal cancer  Also, an individualized decision between you and your healthcare provider will decide whether screening between the ages of 74-80 would be appropriate  Colonoscopy: 08/30/2018  FOBT/FIT: Not on file  Cologuard: Not on file  Sigmoidoscopy: Not on file    Screening Current     Breast Cancer Screening Age: 36 years old  Frequency: every 1-2 years  Not required if history of left and right mastectomy Mammogram: 07/26/2019    Screening Current   Cervical Cancer Screening Between the ages of 21-29, pap smear recommended once every 3 years  Between the ages of 33-67, can perform pap smear with HPV co-testing every 5 years     Recommendations may differ for women with a history of total hysterectomy, cervical cancer, or abnormal pap smears in past  Pap Smear: 12/28/2018    Screening Not Indicated   Hepatitis C Screening Once for adults born between 1945 and 1965  More frequently in patients at high risk for Hepatitis C Hep C Antibody: 11/29/2017    Screening Current   Diabetes Screening 1-2 times per year if you're at risk for diabetes or have pre-diabetes Fasting glucose: 116 mg/dL   A1C: 5 7 %    Screening Current   Cholesterol Screening Once every 5 years if you don't have a lipid disorder  May order more often based on risk factors  Lipid panel: 03/20/2020    Screening Not Indicated  History Lipid Disorder     Other Preventive Screenings Covered by Medicare:  1  Abdominal Aortic Aneurysm (AAA) Screening: covered once if your at risk  You're considered to be at risk if you have a family history of AAA  2  Lung Cancer Screening: covers low dose CT scan once per year if you meet all of the following conditions: (1) Age 50-69; (2) No signs or symptoms of lung cancer; (3) Current smoker or have quit smoking within the last 15 years; (4) You have a tobacco smoking history of at least 30 pack years (packs per day multiplied by number of years you smoked); (5) You get a written order from a healthcare provider  3  Glaucoma Screening: covered annually if you're considered high risk: (1) You have diabetes OR (2) Family history of glaucoma OR (3)  aged 48 and older OR (3)  American aged 72 and older  3  Osteoporosis Screening: covered every 2 years if you meet one of the following conditions: (1) You're estrogen deficient and at risk for osteoporosis based off medical history and other findings; (2) Have a vertebral abnormality; (3) On glucocorticoid therapy for more than 3 months; (4) Have primary hyperparathyroidism; (5) On osteoporosis medications and need to assess response to drug therapy  · Last bone density test (DXA Scan): 04/26/2017  5  HIV Screening: covered annually if you're between the age of 12-76  Also covered annually if you are younger than 13 and older than 72 with risk factors for HIV infection   For pregnant patients, it is covered up to 3 times per pregnancy  Immunizations:  Immunization Recommendations   Influenza Vaccine Annual influenza vaccination during flu season is recommended for all persons aged >= 6 months who do not have contraindications   Pneumococcal Vaccine (Prevnar and Pneumovax)  * Prevnar = PCV13  * Pneumovax = PPSV23   Adults 25-60 years old: 1-3 doses may be recommended based on certain risk factors  Adults 72 years old: Prevnar (PCV13) vaccine recommended followed by Pneumovax (PPSV23) vaccine  If already received PPSV23 since turning 65, then PCV13 recommended at least one year after PPSV23 dose  Hepatitis B Vaccine 3 dose series if at intermediate or high risk (ex: diabetes, end stage renal disease, liver disease)   Tetanus (Td) Vaccine - COST NOT COVERED BY MEDICARE PART B Following completion of primary series, a booster dose should be given every 10 years to maintain immunity against tetanus  Td may also be given as tetanus wound prophylaxis  Tdap Vaccine - COST NOT COVERED BY MEDICARE PART B Recommended at least once for all adults  For pregnant patients, recommended with each pregnancy  Shingles Vaccine (Shingrix) - COST NOT COVERED BY MEDICARE PART B  2 shot series recommended in those aged 48 and above     Health Maintenance Due:      Topic Date Due    MAMMOGRAM  07/26/2020    Cervical Cancer Screening  12/28/2021    CRC Screening: Colonoscopy  08/30/2028    Hepatitis C Screening  Completed     Immunizations Due:      Topic Date Due    Pneumococcal Vaccine: 65+ Years (2 of 2 - PPSV23) 03/20/2020    Influenza Vaccine  07/01/2020     Advance Directives   What are advance directives? Advance directives are legal documents that state your wishes and plans for medical care  These plans are made ahead of time in case you lose your ability to make decisions for yourself  Advance directives can apply to any medical decision, such as the treatments you want, and if you want to donate organs     What are the types of advance directives? There are many types of advance directives, and each state has rules about how to use them  You may choose a combination of any of the following:  · Living will: This is a written record of the treatment you want  You can also choose which treatments you do not want, which to limit, and which to stop at a certain time  This includes surgery, medicine, IV fluid, and tube feedings  · Durable power of  for healthcare [facility]): This is a written record that states who you want to make healthcare choices for you when you are unable to make them for yourself  This person, called a proxy, is usually a family member or a friend  You may choose more than 1 proxy  · Do not resuscitate (DNR) order:  A DNR order is used in case your heart stops beating or you stop breathing  It is a request not to have certain forms of treatment, such as CPR  A DNR order may be included in other types of advance directives  · Medical directive: This covers the care that you want if you are in a coma, near death, or unable to make decisions for yourself  You can list the treatments you want for each condition  Treatment may include pain medicine, surgery, blood transfusions, dialysis, IV or tube feedings, and a ventilator (breathing machine)  · Values history: This document has questions about your views, beliefs, and how you feel and think about life  This information can help others choose the care that you would choose  Why are advance directives important? An advance directive helps you control your care  Although spoken wishes may be used, it is better to have your wishes written down  Spoken wishes can be misunderstood, or not followed  Treatments may be given even if you do not want them  An advance directive may make it easier for your family to make difficult choices about your care     Weight Management   Why it is important to manage your weight:  Being overweight increases your risk of health conditions such as heart disease, high blood pressure, type 2 diabetes, and certain types of cancer  It can also increase your risk for osteoarthritis, sleep apnea, and other respiratory problems  Aim for a slow, steady weight loss  Even a small amount of weight loss can lower your risk of health problems  How to lose weight safely:  A safe and healthy way to lose weight is to eat fewer calories and get regular exercise  You can lose up about 1 pound a week by decreasing the number of calories you eat by 500 calories each day  Healthy meal plan for weight management:  A healthy meal plan includes a variety of foods, contains fewer calories, and helps you stay healthy  A healthy meal plan includes the following:  · Eat whole-grain foods more often  A healthy meal plan should contain fiber  Fiber is the part of grains, fruits, and vegetables that is not broken down by your body  Whole-grain foods are healthy and provide extra fiber in your diet  Some examples of whole-grain foods are whole-wheat breads and pastas, oatmeal, brown rice, and bulgur  · Eat a variety of vegetables every day  Include dark, leafy greens such as spinach, kale, lauren greens, and mustard greens  Eat yellow and orange vegetables such as carrots, sweet potatoes, and winter squash  · Eat a variety of fruits every day  Choose fresh or canned fruit (canned in its own juice or light syrup) instead of juice  Fruit juice has very little or no fiber  · Eat low-fat dairy foods  Drink fat-free (skim) milk or 1% milk  Eat fat-free yogurt and low-fat cottage cheese  Try low-fat cheeses such as mozzarella and other reduced-fat cheeses  · Choose meat and other protein foods that are low in fat  Choose beans or other legumes such as split peas or lentils  Choose fish, skinless poultry (chicken or turkey), or lean cuts of red meat (beef or pork)  Before you cook meat or poultry, cut off any visible fat  · Use less fat and oil    Try baking foods instead of frying them  Add less fat, such as margarine, sour cream, regular salad dressing and mayonnaise to foods  Eat fewer high-fat foods  Some examples of high-fat foods include french fries, doughnuts, ice cream, and cakes  · Eat fewer sweets  Limit foods and drinks that are high in sugar  This includes candy, cookies, regular soda, and sweetened drinks  Exercise:  Exercise at least 30 minutes per day on most days of the week  Some examples of exercise include walking, biking, dancing, and swimming  You can also fit in more physical activity by taking the stairs instead of the elevator or parking farther away from stores  Ask your healthcare provider about the best exercise plan for you  © Copyright [employer] 2018 Information is for End User's use only and may not be sold, redistributed or otherwise used for commercial purposes   All illustrations and images included in CareNotes® are the copyrighted property of A D A M , Inc  or [address] [place] [address]

## 2020-08-11 ENCOUNTER — CLINICAL SUPPORT (OUTPATIENT)
Dept: INTERNAL MEDICINE CLINIC | Age: 65
End: 2020-08-11
Payer: MEDICARE

## 2020-08-11 DIAGNOSIS — Z11.1 SCREENING-PULMONARY TB: Primary | ICD-10-CM

## 2020-08-11 PROCEDURE — 86580 TB INTRADERMAL TEST: CPT

## 2020-08-12 ENCOUNTER — HOSPITAL ENCOUNTER (OUTPATIENT)
Dept: RADIOLOGY | Facility: IMAGING CENTER | Age: 65
Discharge: HOME | End: 2020-08-12
Payer: MEDICARE

## 2020-08-12 ENCOUNTER — TRANSCRIBE ORDERS (OUTPATIENT)
Dept: ADMINISTRATIVE | Facility: HOSPITAL | Age: 65
End: 2020-08-12

## 2020-08-12 VITALS — BODY MASS INDEX: 39.44 KG/M2 | WEIGHT: 231 LBS | HEIGHT: 64 IN

## 2020-08-12 DIAGNOSIS — Z12.31 SCREENING MAMMOGRAM FOR HIGH-RISK PATIENT: ICD-10-CM

## 2020-08-12 DIAGNOSIS — M81.0 OSTEOPOROSIS, UNSPECIFIED OSTEOPOROSIS TYPE, UNSPECIFIED PATHOLOGICAL FRACTURE PRESENCE: Primary | ICD-10-CM

## 2020-08-12 PROCEDURE — 77067 SCR MAMMO BI INCL CAD: CPT

## 2020-08-14 ENCOUNTER — CLINICAL SUPPORT (OUTPATIENT)
Dept: INTERNAL MEDICINE CLINIC | Age: 65
End: 2020-08-14

## 2020-08-14 DIAGNOSIS — Z11.1 ENCOUNTER FOR PPD SKIN TEST READING: Primary | ICD-10-CM

## 2020-08-14 LAB
INDURATION: 0 MM
TB SKIN TEST: NEGATIVE

## 2020-09-19 ENCOUNTER — HOSPITAL ENCOUNTER (OUTPATIENT)
Dept: RADIOLOGY | Age: 65
Discharge: HOME | End: 2020-09-19
Payer: MEDICARE

## 2020-09-19 ENCOUNTER — APPOINTMENT (OUTPATIENT)
Dept: LAB | Age: 65
End: 2020-09-19
Payer: MEDICARE

## 2020-09-19 DIAGNOSIS — I10 BENIGN ESSENTIAL HYPERTENSION: ICD-10-CM

## 2020-09-19 DIAGNOSIS — M81.0 OSTEOPOROSIS, UNSPECIFIED OSTEOPOROSIS TYPE, UNSPECIFIED PATHOLOGICAL FRACTURE PRESENCE: ICD-10-CM

## 2020-09-19 DIAGNOSIS — R73.9 HYPERGLYCEMIA: ICD-10-CM

## 2020-09-19 DIAGNOSIS — E78.2 MIXED HYPERLIPIDEMIA: ICD-10-CM

## 2020-09-19 DIAGNOSIS — E03.9 ACQUIRED HYPOTHYROIDISM: ICD-10-CM

## 2020-09-19 LAB
ANION GAP SERPL CALCULATED.3IONS-SCNC: 6 MMOL/L (ref 4–13)
BUN SERPL-MCNC: 7 MG/DL (ref 5–25)
CALCIUM SERPL-MCNC: 8.9 MG/DL (ref 8.3–10.1)
CHLORIDE SERPL-SCNC: 100 MMOL/L (ref 100–108)
CHOLEST SERPL-MCNC: 192 MG/DL (ref 50–200)
CO2 SERPL-SCNC: 27 MMOL/L (ref 21–32)
CREAT SERPL-MCNC: 0.89 MG/DL (ref 0.6–1.3)
ERYTHROCYTE [DISTWIDTH] IN BLOOD BY AUTOMATED COUNT: 12.4 % (ref 11.6–15.1)
EST. AVERAGE GLUCOSE BLD GHB EST-MCNC: 117 MG/DL
GFR SERPL CREATININE-BSD FRML MDRD: 67 ML/MIN/1.73SQ M
GLUCOSE P FAST SERPL-MCNC: 91 MG/DL (ref 65–99)
HBA1C MFR BLD: 5.7 %
HCT VFR BLD AUTO: 41.1 % (ref 34.8–46.1)
HDLC SERPL-MCNC: 78 MG/DL
HGB BLD-MCNC: 13.4 G/DL (ref 11.5–15.4)
LDLC SERPL CALC-MCNC: 88 MG/DL (ref 0–100)
MCH RBC QN AUTO: 32.6 PG (ref 26.8–34.3)
MCHC RBC AUTO-ENTMCNC: 32.6 G/DL (ref 31.4–37.4)
MCV RBC AUTO: 100 FL (ref 82–98)
PLATELET # BLD AUTO: 261 THOUSANDS/UL (ref 149–390)
PMV BLD AUTO: 10 FL (ref 8.9–12.7)
POTASSIUM SERPL-SCNC: 4.3 MMOL/L (ref 3.5–5.3)
RBC # BLD AUTO: 4.11 MILLION/UL (ref 3.81–5.12)
SODIUM SERPL-SCNC: 133 MMOL/L (ref 136–145)
TRIGL SERPL-MCNC: 129 MG/DL
TSH SERPL DL<=0.05 MIU/L-ACNC: 1.24 UIU/ML (ref 0.36–3.74)
WBC # BLD AUTO: 5.02 THOUSAND/UL (ref 4.31–10.16)

## 2020-09-19 PROCEDURE — 80048 BASIC METABOLIC PNL TOTAL CA: CPT

## 2020-09-19 PROCEDURE — 84443 ASSAY THYROID STIM HORMONE: CPT

## 2020-09-19 PROCEDURE — 77080 DXA BONE DENSITY AXIAL: CPT

## 2020-09-19 PROCEDURE — 85027 COMPLETE CBC AUTOMATED: CPT

## 2020-09-19 PROCEDURE — 83036 HEMOGLOBIN GLYCOSYLATED A1C: CPT

## 2020-09-19 PROCEDURE — 80061 LIPID PANEL: CPT

## 2020-09-19 PROCEDURE — 36415 COLL VENOUS BLD VENIPUNCTURE: CPT

## 2020-09-21 DIAGNOSIS — H60.311 ACUTE DIFFUSE OTITIS EXTERNA OF RIGHT EAR: ICD-10-CM

## 2020-09-21 RX ORDER — OFLOXACIN 3 MG/ML
SOLUTION AURICULAR (OTIC)
Qty: 5 ML | Refills: 4 | OUTPATIENT
Start: 2020-09-21

## 2020-10-02 ENCOUNTER — CLINICAL SUPPORT (OUTPATIENT)
Dept: INTERNAL MEDICINE CLINIC | Age: 65
End: 2020-10-02
Payer: MEDICARE

## 2020-10-02 DIAGNOSIS — Z23 NEED FOR INFLUENZA VACCINATION: Primary | ICD-10-CM

## 2020-10-02 PROCEDURE — 90662 IIV NO PRSV INCREASED AG IM: CPT

## 2020-10-02 PROCEDURE — G0008 ADMIN INFLUENZA VIRUS VAC: HCPCS

## 2020-10-21 DIAGNOSIS — G40.909 SEIZURE DISORDER (HCC): ICD-10-CM

## 2020-10-21 RX ORDER — PHENOBARBITAL 64.8 MG/1
TABLET ORAL
Qty: 56 TABLET | Refills: 4 | Status: SHIPPED | OUTPATIENT
Start: 2020-10-21 | End: 2020-11-11 | Stop reason: SDUPTHER

## 2020-10-22 ENCOUNTER — TELEPHONE (OUTPATIENT)
Dept: NEUROLOGY | Facility: CLINIC | Age: 65
End: 2020-10-22

## 2020-11-11 ENCOUNTER — APPOINTMENT (OUTPATIENT)
Dept: RADIOLOGY | Age: 65
End: 2020-11-11
Payer: MEDICARE

## 2020-11-11 ENCOUNTER — TELEMEDICINE (OUTPATIENT)
Dept: NEUROLOGY | Facility: CLINIC | Age: 65
End: 2020-11-11
Payer: MEDICARE

## 2020-11-11 ENCOUNTER — OFFICE VISIT (OUTPATIENT)
Dept: INTERNAL MEDICINE CLINIC | Age: 65
End: 2020-11-11
Payer: MEDICARE

## 2020-11-11 VITALS
WEIGHT: 232 LBS | BODY MASS INDEX: 39.61 KG/M2 | HEIGHT: 64 IN | TEMPERATURE: 98 F | HEART RATE: 91 BPM | DIASTOLIC BLOOD PRESSURE: 82 MMHG | SYSTOLIC BLOOD PRESSURE: 122 MMHG | OXYGEN SATURATION: 98 %

## 2020-11-11 VITALS — HEIGHT: 64 IN | HEART RATE: 89 BPM | WEIGHT: 230 LBS | TEMPERATURE: 97.7 F | BODY MASS INDEX: 39.27 KG/M2

## 2020-11-11 DIAGNOSIS — G40.909 SEIZURE DISORDER (HCC): ICD-10-CM

## 2020-11-11 DIAGNOSIS — M25.561 ACUTE PAIN OF RIGHT KNEE: ICD-10-CM

## 2020-11-11 DIAGNOSIS — M25.561 ACUTE PAIN OF RIGHT KNEE: Primary | ICD-10-CM

## 2020-11-11 DIAGNOSIS — I10 BENIGN ESSENTIAL HYPERTENSION: ICD-10-CM

## 2020-11-11 DIAGNOSIS — E03.9 ACQUIRED HYPOTHYROIDISM: ICD-10-CM

## 2020-11-11 PROCEDURE — 73564 X-RAY EXAM KNEE 4 OR MORE: CPT

## 2020-11-11 PROCEDURE — 99441 PR PHYS/QHP TELEPHONE EVALUATION 5-10 MIN: CPT | Performed by: PSYCHIATRY & NEUROLOGY

## 2020-11-11 PROCEDURE — 99214 OFFICE O/P EST MOD 30 MIN: CPT | Performed by: INTERNAL MEDICINE

## 2020-11-11 RX ORDER — DIPHENHYDRAMINE HCL 25 MG
25 TABLET ORAL
COMMUNITY
End: 2021-03-17 | Stop reason: HOSPADM

## 2020-11-11 RX ORDER — ACETAMINOPHEN 500 MG
1000 TABLET ORAL 3 TIMES DAILY
COMMUNITY
End: 2020-11-11

## 2020-11-11 RX ORDER — SENNA AND DOCUSATE SODIUM 50; 8.6 MG/1; MG/1
1 TABLET, FILM COATED ORAL DAILY
COMMUNITY

## 2020-11-11 RX ORDER — ACETAMINOPHEN 500 MG
1000 TABLET ORAL 3 TIMES DAILY
Qty: 180 TABLET | Refills: 1 | Status: SHIPPED | OUTPATIENT
Start: 2020-11-11 | End: 2022-03-10 | Stop reason: SDUPTHER

## 2020-11-11 RX ORDER — HYDROCODONE BITARTRATE AND ACETAMINOPHEN 5; 300 MG/1; MG/1
1 TABLET ORAL EVERY 4 HOURS PRN
COMMUNITY

## 2020-11-11 RX ORDER — ASPIRIN 81 MG/1
81 TABLET ORAL DAILY
COMMUNITY
End: 2022-01-04 | Stop reason: SDUPTHER

## 2020-11-11 RX ORDER — PHENOBARBITAL 64.8 MG/1
64.8 TABLET ORAL 2 TIMES DAILY
Qty: 180 TABLET | Refills: 3 | Status: SHIPPED | OUTPATIENT
Start: 2020-11-11 | End: 2021-03-11 | Stop reason: SDUPTHER

## 2020-11-11 RX ORDER — ECHINACEA PURPUREA EXTRACT 125 MG
1 TABLET ORAL AS NEEDED
COMMUNITY

## 2020-11-16 ENCOUNTER — OFFICE VISIT (OUTPATIENT)
Dept: INTERNAL MEDICINE CLINIC | Age: 65
End: 2020-11-16
Payer: MEDICARE

## 2020-11-16 VITALS
OXYGEN SATURATION: 97 % | BODY MASS INDEX: 38.76 KG/M2 | TEMPERATURE: 97.3 F | WEIGHT: 227 LBS | HEART RATE: 85 BPM | HEIGHT: 64 IN | DIASTOLIC BLOOD PRESSURE: 60 MMHG | SYSTOLIC BLOOD PRESSURE: 102 MMHG

## 2020-11-16 DIAGNOSIS — E03.9 ACQUIRED HYPOTHYROIDISM: ICD-10-CM

## 2020-11-16 DIAGNOSIS — E78.2 MIXED HYPERLIPIDEMIA: ICD-10-CM

## 2020-11-16 DIAGNOSIS — G40.909 SEIZURE DISORDER (HCC): Primary | ICD-10-CM

## 2020-11-16 DIAGNOSIS — F25.9 SCHIZOAFFECTIVE DISORDER, UNSPECIFIED TYPE (HCC): ICD-10-CM

## 2020-11-16 DIAGNOSIS — M81.0 OSTEOPOROSIS, UNSPECIFIED OSTEOPOROSIS TYPE, UNSPECIFIED PATHOLOGICAL FRACTURE PRESENCE: ICD-10-CM

## 2020-11-16 DIAGNOSIS — F41.8 DEPRESSION WITH ANXIETY: ICD-10-CM

## 2020-11-16 PROCEDURE — 99214 OFFICE O/P EST MOD 30 MIN: CPT | Performed by: INTERNAL MEDICINE

## 2020-11-16 RX ORDER — ATORVASTATIN CALCIUM 20 MG/1
20 TABLET, FILM COATED ORAL DAILY
Qty: 90 TABLET | Refills: 1 | Status: SHIPPED | OUTPATIENT
Start: 2020-11-16 | End: 2022-01-27 | Stop reason: SDUPTHER

## 2020-11-16 RX ORDER — LEVOTHYROXINE SODIUM 0.1 MG/1
100 TABLET ORAL DAILY
Qty: 90 TABLET | Refills: 1 | Status: SHIPPED | OUTPATIENT
Start: 2020-11-16 | End: 2022-02-04 | Stop reason: SDUPTHER

## 2020-12-01 ENCOUNTER — OFFICE VISIT (OUTPATIENT)
Dept: OBGYN CLINIC | Facility: MEDICAL CENTER | Age: 65
End: 2020-12-01
Payer: MEDICARE

## 2020-12-01 VITALS
SYSTOLIC BLOOD PRESSURE: 134 MMHG | BODY MASS INDEX: 38.76 KG/M2 | TEMPERATURE: 97.9 F | HEART RATE: 82 BPM | DIASTOLIC BLOOD PRESSURE: 84 MMHG | WEIGHT: 227 LBS | HEIGHT: 64 IN

## 2020-12-01 DIAGNOSIS — M25.561 ACUTE PAIN OF RIGHT KNEE: ICD-10-CM

## 2020-12-01 DIAGNOSIS — M17.11 ARTHRITIS OF RIGHT KNEE: Primary | ICD-10-CM

## 2020-12-01 PROCEDURE — 99204 OFFICE O/P NEW MOD 45 MIN: CPT | Performed by: ORTHOPAEDIC SURGERY

## 2020-12-02 DIAGNOSIS — M25.561 ACUTE PAIN OF RIGHT KNEE: ICD-10-CM

## 2020-12-11 ENCOUNTER — EVALUATION (OUTPATIENT)
Dept: PHYSICAL THERAPY | Facility: REHABILITATION | Age: 65
End: 2020-12-11
Payer: MEDICARE

## 2020-12-11 DIAGNOSIS — M17.11 ARTHRITIS OF RIGHT KNEE: Primary | ICD-10-CM

## 2020-12-11 PROCEDURE — 97110 THERAPEUTIC EXERCISES: CPT

## 2020-12-11 PROCEDURE — 97161 PT EVAL LOW COMPLEX 20 MIN: CPT

## 2021-01-07 ENCOUNTER — TELEPHONE (OUTPATIENT)
Dept: INTERNAL MEDICINE CLINIC | Facility: CLINIC | Age: 66
End: 2021-01-07

## 2021-01-07 NOTE — TELEPHONE ENCOUNTER
Received a phone call from a person named Rachael Henao for patient  Rachael Henao is not listed as an approved contact for patient  Rachael Henao was difficult to understand  If sounded as though she was trying to request a COVID vaccine for patient  Stated we were not giving them out and are waiting for the government to contact us with the phased roll out plan  Then she requested the physician order a COVID vaccine for patient  Attempted to explain that we cannot just order that vaccine for patient and that it is being disseminated by following the government plan  Rachael Henao stated patient is in a facility and they need the order  Stated the patient's physician is in the hospital and the most I could do was send him a message requesting the order but that the vaccine is not given that way  Araceli hung up  Unfortunately, I am not sure if I misunderstood Rachael Henao but she is not listed as an approved contact for patient either so I cannot follow up with her for clarification

## 2021-02-04 ENCOUNTER — TELEPHONE (OUTPATIENT)
Dept: INTERNAL MEDICINE CLINIC | Facility: CLINIC | Age: 66
End: 2021-02-04

## 2021-02-04 NOTE — TELEPHONE ENCOUNTER
Paz Cervantes the RN is calling from [employer]  She is calling to ask if this patient is a candidate for getting the COVID vaccine    They are getting a COVID clinic where they are at next week and is calling to find out if she is good to get the vaccine    Please call Bianca Ignacio back at the number provided

## 2021-03-11 DIAGNOSIS — G40.909 SEIZURE DISORDER (HCC): ICD-10-CM

## 2021-03-11 RX ORDER — PHENOBARBITAL 64.8 MG/1
64.8 TABLET ORAL 2 TIMES DAILY
Qty: 180 TABLET | Refills: 3 | Status: SHIPPED | OUTPATIENT
Start: 2021-03-11 | End: 2021-09-14

## 2021-03-17 ENCOUNTER — OFFICE VISIT (OUTPATIENT)
Dept: INTERNAL MEDICINE CLINIC | Age: 66
End: 2021-03-17
Payer: MEDICARE

## 2021-03-17 VITALS
OXYGEN SATURATION: 97 % | TEMPERATURE: 98.5 F | HEART RATE: 77 BPM | BODY MASS INDEX: 39.09 KG/M2 | HEIGHT: 64 IN | SYSTOLIC BLOOD PRESSURE: 112 MMHG | WEIGHT: 229 LBS | DIASTOLIC BLOOD PRESSURE: 80 MMHG

## 2021-03-17 DIAGNOSIS — K52.9 GASTROENTERITIS: ICD-10-CM

## 2021-03-17 DIAGNOSIS — F25.9 SCHIZOAFFECTIVE DISORDER, UNSPECIFIED TYPE (HCC): ICD-10-CM

## 2021-03-17 DIAGNOSIS — H60.311 ACUTE DIFFUSE OTITIS EXTERNA OF RIGHT EAR: ICD-10-CM

## 2021-03-17 DIAGNOSIS — M81.0 OSTEOPOROSIS, UNSPECIFIED OSTEOPOROSIS TYPE, UNSPECIFIED PATHOLOGICAL FRACTURE PRESENCE: ICD-10-CM

## 2021-03-17 DIAGNOSIS — F41.8 DEPRESSION WITH ANXIETY: ICD-10-CM

## 2021-03-17 DIAGNOSIS — J30.2 SEASONAL ALLERGIES: ICD-10-CM

## 2021-03-17 DIAGNOSIS — E66.01 OBESITY, MORBID (HCC): ICD-10-CM

## 2021-03-17 DIAGNOSIS — I10 BENIGN ESSENTIAL HYPERTENSION: ICD-10-CM

## 2021-03-17 DIAGNOSIS — L98.491 ULCER OF ABDOMEN WALL, LIMITED TO BREAKDOWN OF SKIN (HCC): ICD-10-CM

## 2021-03-17 DIAGNOSIS — E03.9 ACQUIRED HYPOTHYROIDISM: Primary | ICD-10-CM

## 2021-03-17 DIAGNOSIS — G40.909 SEIZURE DISORDER (HCC): ICD-10-CM

## 2021-03-17 DIAGNOSIS — E78.2 MIXED HYPERLIPIDEMIA: ICD-10-CM

## 2021-03-17 PROCEDURE — 99214 OFFICE O/P EST MOD 30 MIN: CPT | Performed by: INTERNAL MEDICINE

## 2021-03-17 RX ORDER — LOXAPINE SUCCINATE 25 MG/1
TABLET ORAL
COMMUNITY
Start: 2020-12-15

## 2021-03-17 RX ORDER — HYDROCODONE BITARTRATE AND ACETAMINOPHEN 5; 300 MG/1; MG/1
1 TABLET ORAL EVERY 4 HOURS PRN
Qty: 56 TABLET | Status: CANCELLED | OUTPATIENT
Start: 2021-03-17

## 2021-03-17 RX ORDER — OFLOXACIN 3 MG/ML
5 SOLUTION AURICULAR (OTIC) 2 TIMES DAILY
Qty: 5 ML | Refills: 0 | Status: CANCELLED | OUTPATIENT
Start: 2021-03-17

## 2021-03-17 RX ORDER — CETIRIZINE HYDROCHLORIDE 5 MG/1
5 TABLET ORAL
Qty: 90 TABLET | Refills: 1 | Status: SHIPPED | OUTPATIENT
Start: 2021-03-17 | End: 2021-04-09 | Stop reason: SDUPTHER

## 2021-03-17 RX ORDER — BISMUTH SUBSALICYLATE 262 MG/1
524 TABLET, CHEWABLE ORAL EVERY 6 HOURS PRN
Qty: 30 TABLET | Refills: 0 | Status: SHIPPED | OUTPATIENT
Start: 2021-03-17

## 2021-03-17 NOTE — PROGRESS NOTES
Assessment/Plan:  1  Hyperlipidemia  Continue with present dose of Lipitor  Will check lipid profile before next visit    2  Essential hypertension  Blood pressure is stable on present regimen    3  Osteoporosis  Continue with present regimen of Fosamax calcium and vitamin-D    4  Schizoeffective disorder  Being managed by psychiatrist    5  Prediabetes/hyperglycemia  Advised for low sugar/low carb diet  Will check hemoglobin A1c before next visit  Diagnoses and all orders for this visit:    Acquired hypothyroidism  -     TSH, 3rd generation with Free T4 reflex; Future    Gastroenteritis  -     bismuth subsalicylate (PEPTO BISMOL) 262 MG chewable tablet; Chew 2 tablets (524 mg total) every 6 (six) hours as needed for diarrhea    Acute diffuse otitis externa of right ear    Benign essential hypertension  -     CBC; Future  -     Comprehensive metabolic panel; Future    Seizure disorder (HCC)    Osteoporosis, unspecified osteoporosis type, unspecified pathological fracture presence    Depression with anxiety    Mixed hyperlipidemia  -     Lipid Panel with Direct LDL reflex; Future    Schizoaffective disorder, unspecified type ([facility] [address] )    Ulcer of abdomen wall, limited to breakdown of skin (HCC)    Obesity, morbid (HCC)    Seasonal allergies  -     cetirizine (ZyrTEC) 5 MG tablet; Take 1 tablet (5 mg total) by mouth daily at bedtime    Other orders  -     alendronate-cholecalciferol (FOSAMAX PLUS D)  MG-UNIT per tablet; Take 1 tablet by mouth Once a week  -     loxapine (LOXITANE) 25 mg capsule; TAKE TWO (2) CAPSULES BY MOUTH EVERY MORNING AND SIX (6) CAPSULES AT BEDTIME  -     Cancel: HYDROcodone-acetaminophen (XODOL) 5-300 MG per tablet; Take 1 tablet by mouth every 4 (four) hours as needed for mild painMax Daily Amount: 6 tablets  -     Cancel: ofloxacin (FLOXIN) 0 3 % otic solution; Administer 5 drops to the right ear 2 (two) times a day          BMI Counseling: Body mass index is 39 31 kg/m²   The BMI is above normal  Nutrition recommendations include decreasing portion sizes, encouraging healthy choices of fruits and vegetables, decreasing fast food intake, consuming healthier snacks and limiting drinks that contain sugar  Exercise recommendations include moderate physical activity 150 minutes/week and exercising 3-5 times per week  No pharmacotherapy was ordered  Subjective:          Patient ID: Gurdeep Sanders is a 77 y o  female  Patient is here for regular follow-up  No blood work prior to this visit  The following portions of the patient's history were reviewed and updated as appropriate: allergies, current medications, past family history, past medical history, past social history, past surgical history and problem list     Review of Systems   Constitutional: Negative for fatigue and fever  HENT: Negative for congestion, ear discharge, ear pain, postnasal drip, sinus pressure, sore throat, tinnitus and trouble swallowing  Eyes: Negative for discharge, itching and visual disturbance  Respiratory: Negative for cough and shortness of breath  Cardiovascular: Negative for chest pain and palpitations  Gastrointestinal: Negative for abdominal pain, diarrhea, nausea and vomiting  Endocrine: Negative for cold intolerance and polyuria  Genitourinary: Negative for difficulty urinating, dysuria and urgency  Musculoskeletal: Negative for arthralgias and neck pain  Skin: Negative for rash  Allergic/Immunologic: Negative for environmental allergies  Neurological: Negative for dizziness, weakness and headaches  Psychiatric/Behavioral: Negative for agitation and behavioral problems  The patient is nervous/anxious            Past Medical History:   Diagnosis Date    Benign essential hypertension     resolved; 06/15/16    Depression with anxiety     Fracture of fifth metatarsal bone of right foot with delayed healing     last assessed 04/12/16; fracture of metatarsal bone, right, closed, initial encounter    Hyperlipidemia     last assessed 11/28/17    Hypothyroidism     last assessed 11/28/2017    Insomnia     Obesity     Schizoaffective disorder ([facility] [address] )     last assessed 05/18/2017    Seizure disorder ([facility] [address] )     last assessed 03/28/17         Current Outpatient Medications:     acetaminophen (TYLENOL) 500 mg tablet, Take 2 tablets (1,000 mg total) by mouth 3 (three) times a day, Disp: 180 tablet, Rfl: 1    alendronate-cholecalciferol (FOSAMAX PLUS D)  MG-UNIT per tablet, Take 1 tablet by mouth Once a week, Disp: , Rfl:     aspirin (ECOTRIN LOW STRENGTH) 81 mg EC tablet, Take 81 mg by mouth daily, Disp: , Rfl:     atorvastatin (LIPITOR) 20 mg tablet, Take 1 tablet (20 mg total) by mouth daily, Disp: 90 tablet, Rfl: 1    bismuth subsalicylate (PEPTO BISMOL) 262 MG chewable tablet, Chew 2 tablets (524 mg total) every 6 (six) hours as needed for diarrhea, Disp: 30 tablet, Rfl: 0    busPIRone (BUSPAR) 15 mg tablet, Take 15 mg by mouth 2 (two) times a day , Disp: , Rfl:     calcium carbonate-vitamin D (OSCAL-D) 500 mg-200 units per tablet, Take 1 tablet by mouth 2 (two) times a day with meals , Disp: , Rfl:     Dextromethorphan Polistirex ER 30 MG/5ML SUER, Take 5 mL (30 mg total) by mouth 2 (two) times a day as needed (congsetion), Disp: 148 mL, Rfl: 0    diclofenac sodium (VOLTAREN) 1 %, Apply 2 g topically 4 (four) times a day, Disp: 1 Tube, Rfl: 1    diphenhydrAMINE (BENADRYL) 50 mg capsule, Take 1 capsule (50 mg total) by mouth daily at bedtime as needed for sleep, Disp: 90 capsule, Rfl: 2    divalproex sodium (DEPAKOTE) 500 mg EC tablet, 1 tab in the Am, 3 tabs(1500mg) at bedtime, Disp: , Rfl:     docusate sodium (COLACE) 100 mg capsule, Take 1 capsule (100 mg total) by mouth 2 (two) times a day as needed for constipation, Disp: 180 capsule, Rfl: 1    fluticasone (FLONASE) 50 mcg/act nasal spray, 2 sprays into each nostril daily  , Disp: , Rfl:    fluticasone (FLOVENT HFA) 110 MCG/ACT inhaler, Inhale 2 puffs daily Rinse mouth after use , Disp: 12 g, Rfl: 5    gabapentin (NEURONTIN) 100 mg capsule, Take 200 mg by mouth 2 (two) times a day  , Disp: , Rfl:     HYDROcodone-acetaminophen (XODOL) 5-300 MG per tablet, Take 1 tablet by mouth every 4 (four) hours as needed for mild pain, Disp: , Rfl:     L-Theanine 100 MG CAPS, Take 200 mg by mouth 2 (two) times a day  , Disp: , Rfl:     levothyroxine 100 mcg tablet, Take 1 tablet (100 mcg total) by mouth daily, Disp: 90 tablet, Rfl: 1    LORazepam (ATIVAN) 0 5 mg tablet, Take 0 5 mg by mouth every 12 (twelve) hours as needed for anxiety , Disp: , Rfl:     LORazepam (ATIVAN) 1 mg tablet, Take 1 mg by mouth 2 (two) times a day , Disp: , Rfl:     loxapine (LOXITANE) 25 mg capsule, TAKE TWO (2) CAPSULES BY MOUTH EVERY MORNING AND SIX (6) CAPSULES AT BEDTIME, Disp: , Rfl:     melatonin 3 mg, Take 1 tablet (3 mg total) by mouth daily at bedtime (Patient taking differently: Take 5 mg by mouth daily at bedtime ), Disp: 30 tablet, Rfl: 2    PHENobarbital 64 8 mg tablet, Take 1 tablet (64 8 mg total) by mouth 2 (two) times a day, Disp: 180 tablet, Rfl: 3    Polyethylene Glycol 3350-GRX POWD, Take by mouth daily MIX 1 CAPFUL (17mg) IN 8 OUNCES OF WATER, JUICE OR TEA AND DRINK DAILY, Disp: 850 g, Rfl: 5    senna-docusate sodium (SENOKOT-S) 8 6-50 mg per tablet, Take 1 tablet by mouth daily, Disp: , Rfl:     traZODone (DESYREL) 150 mg tablet, Take 1 tablet by mouth daily at bedtime , Disp: , Rfl:     trihexyphenidyl (ARTANE) 5 mg tablet, Take 5 mg by mouth daily at bedtime  , Disp: , Rfl:     alendronate (FOSAMAX) 70 mg tablet, Take by mouth TAKE 1 TABLET BY MOUTH DAILY ON FRIDAY , Disp: , Rfl:     asenapine (SAPHRIS) 10 mg SL tablet, Place 10 mg under the tongue 2 (two) times a day  , Disp: , Rfl:     cetirizine (ZyrTEC) 5 MG tablet, Take 1 tablet (5 mg total) by mouth daily at bedtime, Disp: 90 tablet, Rfl: 1   Multiple Vitamins-Iron (DAILY-KACI/IRON) TABS, Take 1 tablet by mouth daily  , Disp: , Rfl:     ofloxacin (FLOXIN) 0 3 % otic solution, Administer 5 drops to the right ear 2 (two) times a day, Disp: 5 mL, Rfl: 0    sodium chloride (OCEAN) 0 65 % nasal spray, 1 spray into each nostril as needed for congestion TAKE UP TO 6 TIMES DAILY AS NEEDED, Disp: , Rfl:     Allergies   Allergen Reactions    Clozapine Other (See Comments)     low white blood count  Other reaction(s): Other (See Comments)  low white blood count    Haloperidol Other (See Comments)     EPS  Other reaction(s): Other (See Comments)  EPS    Lithium Other (See Comments)     Toxicity    Prolixin [Fluphenazine] Hyperactivity and Other (See Comments)     EPS, Hyperactivity  EPS, Hyperactivity    Valproic Acid Confusion and Other (See Comments)     "Mental confusion"  "Mental confusion"       Social History   Past Surgical History:   Procedure Laterality Date    COLONOSCOPY      complete    NC COLONOSCOPY FLX DX W/COLLJ SPEC WHEN PFRMD N/A 8/30/2018    Procedure: COLONOSCOPY with polypectomies;  Surgeon: Oumou Meza MD;  Location: [facility];   Service: Gastroenterology     Family History   Problem Relation Age of Onset    No Known Problems Mother     No Known Problems Father     Lung disease Other         mesothelioma    No Known Problems Maternal Grandmother     No Known Problems Maternal Grandfather     No Known Problems Paternal Grandmother     No Known Problems Paternal Grandfather     No Known Problems Sister     No Known Problems Sister     Kidney failure Brother     No Known Problems Maternal Aunt     No Known Problems Maternal Aunt     No Known Problems Maternal Aunt     No Known Problems Maternal Aunt     No Known Problems Paternal Aunt     No Known Problems Paternal Aunt        Objective:  /80 (BP Location: Left arm, Patient Position: Sitting, Cuff Size: Large)   Pulse 77   Temp 98 5 °F (36 9 °C) (Temporal)   Ht 5' 4" (1 626 m)   Wt 104 kg (229 lb)   SpO2 97%   BMI 39 31 kg/m²   Body mass index is 39 31 kg/m²  Physical Exam  Constitutional:       Appearance: She is well-developed  HENT:      Head: Normocephalic  Right Ear: External ear normal       Left Ear: External ear normal    Eyes:      General: No scleral icterus  Pupils: Pupils are equal, round, and reactive to light  Neck:      Musculoskeletal: Normal range of motion and neck supple  Thyroid: No thyromegaly  Trachea: No tracheal deviation  Cardiovascular:      Rate and Rhythm: Normal rate and regular rhythm  Heart sounds: Normal heart sounds  Pulmonary:      Effort: Pulmonary effort is normal  No respiratory distress  Breath sounds: Normal breath sounds  Chest:      Chest wall: No tenderness  Abdominal:      General: Bowel sounds are normal       Palpations: Abdomen is soft  There is no mass  Tenderness: There is no abdominal tenderness  Musculoskeletal: Normal range of motion  Lymphadenopathy:      Cervical: No cervical adenopathy  Skin:     General: Skin is warm  Neurological:      Mental Status: She is alert and oriented to person, place, and time  Cranial Nerves: No cranial nerve deficit     Psychiatric:         Behavior: Behavior normal

## 2021-03-29 ENCOUNTER — TELEPHONE (OUTPATIENT)
Dept: INTERNAL MEDICINE CLINIC | Age: 66
End: 2021-03-29

## 2021-03-29 NOTE — TELEPHONE ENCOUNTER
Please see if they are giving it to her at night or in the morning  If it is noted to be given at night, please switch to AM dosing   If given in the AM, please switch to claritin 10mg q AM

## 2021-03-29 NOTE — TELEPHONE ENCOUNTER
This patient called the assisted living facility and was seen last week by Dr Julianna Smalls and prescribed Zyrtec  She stated that she would like this medication discontinued because at night it make her heart race and she cannot sleep  Please give the facility a call back at [phone] and speak to one of the staff to let them know that will be discontinued

## 2021-03-29 NOTE — TELEPHONE ENCOUNTER
Can we also call [facility] as well if we are going D/C the Zyrtec as well so they don't deliver the medication to them as well

## 2021-03-30 NOTE — TELEPHONE ENCOUNTER
I called and spoke with Kris Bey  She would like the order faxed to her at [phone]  Please change the Zyrtec from being given at bedtime to AM   Give with morning medications  If this change in time does not correct the problem, please call and he medication will be changed to Claritin

## 2021-04-07 ENCOUNTER — TRANSCRIBE ORDERS (OUTPATIENT)
Dept: ADMINISTRATIVE | Facility: HOSPITAL | Age: 66
End: 2021-04-07

## 2021-04-07 DIAGNOSIS — Z12.31 ENCOUNTER FOR SCREENING MAMMOGRAM FOR MALIGNANT NEOPLASM OF BREAST: Primary | ICD-10-CM

## 2021-04-07 NOTE — TELEPHONE ENCOUNTER
Lloyd Onelia called and wanted to discontinue the Zyrtec completely because she was taking it in the morning and this was making her sleepy even though she is not sleeping during the night still  I advised that I would put the message in to get clarification on what she should be taking

## 2021-04-08 NOTE — TELEPHONE ENCOUNTER
I called [facility] the caregiver and spoke with Farhad Andrade also  Farhad Andrade wants the order changed to PRN  She said that when she takes Zyrtec at night, she can't sleep and it makes her feel like a "db rabbit "  She is up all night  When she takes it during the day, it makes her sleepy and she can't stay awake  I tried to explain that this medication does not cause different reactions according to the time of the day  May this med be changed to PRN?

## 2021-04-09 DIAGNOSIS — J30.2 SEASONAL ALLERGIES: ICD-10-CM

## 2021-04-09 RX ORDER — CETIRIZINE HYDROCHLORIDE 5 MG/1
5 TABLET ORAL AS NEEDED
Qty: 90 TABLET | Refills: 1 | Status: SHIPPED | OUTPATIENT
Start: 2021-04-09

## 2021-05-25 ENCOUNTER — OFFICE VISIT (OUTPATIENT)
Dept: INTERNAL MEDICINE CLINIC | Facility: CLINIC | Age: 66
End: 2021-05-25
Payer: MEDICARE

## 2021-05-25 VITALS
HEART RATE: 70 BPM | OXYGEN SATURATION: 94 % | DIASTOLIC BLOOD PRESSURE: 70 MMHG | SYSTOLIC BLOOD PRESSURE: 120 MMHG | TEMPERATURE: 99.8 F

## 2021-05-25 DIAGNOSIS — S92.351A CLOSED DISPLACED FRACTURE OF FIFTH METATARSAL BONE OF RIGHT FOOT, INITIAL ENCOUNTER: ICD-10-CM

## 2021-05-25 DIAGNOSIS — F25.9 SCHIZOAFFECTIVE DISORDER, UNSPECIFIED TYPE (HCC): ICD-10-CM

## 2021-05-25 DIAGNOSIS — I10 BENIGN ESSENTIAL HYPERTENSION: ICD-10-CM

## 2021-05-25 DIAGNOSIS — E03.9 ACQUIRED HYPOTHYROIDISM: Primary | ICD-10-CM

## 2021-05-25 DIAGNOSIS — F41.8 DEPRESSION WITH ANXIETY: ICD-10-CM

## 2021-05-25 DIAGNOSIS — G40.909 SEIZURE DISORDER (HCC): ICD-10-CM

## 2021-05-25 PROCEDURE — 99214 OFFICE O/P EST MOD 30 MIN: CPT | Performed by: INTERNAL MEDICINE

## 2021-05-25 NOTE — PROGRESS NOTES
Assessment/Plan:    1  Status post right 5th metatarsal fracture  Patient is also being followed by orthopedic surgeon  Pain is well controlled  Will continue with present management    2  Hyperlipidemia  Continue with present dose of Lipitor  3  Seizure disorder  Stable on present regimen    4  Anxiety/depression  Being managed by psychiatrist    Diagnoses and all orders for this visit:    Acquired hypothyroidism    Benign essential hypertension    Seizure disorder ([facility] )    Depression with anxiety    Schizoaffective disorder, unspecified type ([facility] )    Closed displaced fracture of fifth metatarsal bone of right foot, initial encounter    Other orders  -     lurasidone (Latuda) 80 mg tablet; Take 40 mg by mouth 2 (two) times a day               Subjective:          Patient ID: Tahir Hdez is a 77 y o  female  Patient is here for follow-up after 5th right metatarsal fracture  She has a was seen by orthopedic surgeon  Doing well  No new complaints  The following portions of the patient's history were reviewed and updated as appropriate: allergies, current medications, past family history, past medical history, past social history, past surgical history and problem list     Review of Systems   Constitutional: Negative for fatigue and fever  HENT: Negative for congestion, ear discharge, ear pain, postnasal drip, sinus pressure, sore throat, tinnitus and trouble swallowing  Eyes: Negative for discharge, itching and visual disturbance  Respiratory: Negative for cough and shortness of breath  Cardiovascular: Negative for chest pain and palpitations  Gastrointestinal: Negative for abdominal pain, diarrhea, nausea and vomiting  Endocrine: Negative for cold intolerance and polyuria  Genitourinary: Negative for difficulty urinating, dysuria and urgency  Musculoskeletal: Positive for arthralgias  Negative for neck pain  Skin: Negative for rash     Allergic/Immunologic: Negative for environmental allergies  Neurological: Negative for dizziness, weakness and headaches  Psychiatric/Behavioral: Positive for dysphoric mood  Negative for agitation  The patient is not nervous/anxious            Past Medical History:   Diagnosis Date    Benign essential hypertension     resolved; 06/15/16    Depression with anxiety     Fracture of fifth metatarsal bone of right foot with delayed healing     last assessed 04/12/16; fracture of metatarsal bone, right, closed, initial encounter    Hyperlipidemia     last assessed 11/28/17    Hypothyroidism     last assessed 11/28/2017    Insomnia     Obesity     Sch[facility]oa[facility]ive disorder ([facility] [address] )     last assessed 05/18/2017    [facility]     last assessed 03/28/17         Current Outpatient Medications:     acetaminophen (TYLENOL) 500 mg tablet, Take 2 tablets (1,000 mg total) by mouth 3 (three) times a day, Disp: 180 tablet, Rfl: 1    alendronate (FOSAMAX) 70 mg tablet, Take by mouth TAKE 1 TABLET BY MOUTH DAILY ON FRIDAY , Disp: , Rfl:     asenapine (SAPHRIS) 10 mg SL tablet, Place 10 mg under the tongue 2 (two) times a day  , Disp: , Rfl:     aspirin (ECOTRIN LOW STRENGTH) 81 mg EC tablet, Take 81 mg by mouth daily, Disp: , Rfl:     atorvastatin (LIPITOR) 20 mg tablet, Take 1 tablet (20 mg total) by mouth daily, Disp: 90 tablet, Rfl: 1    bismuth subsalicylate (PEPTO BISMOL) 262 MG chewable tablet, Chew 2 tablets (524 mg total) every 6 (six) hours as needed for diarrhea, Disp: 30 tablet, Rfl: 0    busPIRone (BUSPAR) 15 mg tablet, Take 15 mg by mouth 2 (two) times a day , Disp: , Rfl:     calcium carbonate-vitamin D (OSCAL-D) 500 mg-200 units per tablet, Take 1 tablet by mouth 2 (two) times a day with meals , Disp: , Rfl:     cetirizine (ZyrTEC) 5 MG tablet, Take 1 tablet (5 mg total) by mouth as needed for allergies (As needed at night p r n  for allergies), Disp: 90 tablet, Rfl: 1    Dextromethorphan Polistirex ER 30 MG/5ML SUER, Take 5 mL (30 mg total) by mouth 2 (two) times a day as needed (congsetion), Disp: 148 mL, Rfl: 0    diclofenac sodium (VOLTAREN) 1 %, Apply 2 g topically 4 (four) times a day, Disp: 1 Tube, Rfl: 1    diphenhydrAMINE (BENADRYL) 50 mg capsule, Take 1 capsule (50 mg total) by mouth daily at bedtime as needed for sleep, Disp: 90 capsule, Rfl: 2    divalproex sodium (DEPAKOTE) 500 mg EC tablet, 1 tab in the Am, 3 tabs(1500mg) at bedtime, Disp: , Rfl:     docusate sodium (COLACE) 100 mg capsule, Take 1 capsule (100 mg total) by mouth 2 (two) times a day as needed for constipation, Disp: 180 capsule, Rfl: 1    fluticasone (FLONASE) 50 mcg/act nasal spray, 2 sprays into each nostril daily  , Disp: , Rfl:     gabapentin (NEURONTIN) 100 mg capsule, Take 200 mg by mouth 2 (two) times a day  , Disp: , Rfl:     HYDROcodone-acetaminophen (XODOL) 5-300 MG per tablet, Take 1 tablet by mouth every 4 (four) hours as needed for mild pain, Disp: , Rfl:     L-Theanine 100 MG CAPS, Take 200 mg by mouth 2 (two) times a day  , Disp: , Rfl:     levothyroxine 100 mcg tablet, Take 1 tablet (100 mcg total) by mouth daily, Disp: 90 tablet, Rfl: 1    LORazepam (ATIVAN) 0 5 mg tablet, Take 0 5 mg by mouth every 12 (twelve) hours as needed for anxiety , Disp: , Rfl:     LORazepam (ATIVAN) 1 mg tablet, Take 1 mg by mouth 2 (two) times a day , Disp: , Rfl:     lurasidone (Latuda) 80 mg tablet, Take 40 mg by mouth 2 (two) times a day, Disp: , Rfl:     melatonin 3 mg, Take 1 tablet (3 mg total) by mouth daily at bedtime (Patient taking differently: Take 5 mg by mouth daily at bedtime ), Disp: 30 tablet, Rfl: 2    Multiple Vitamins-Iron (DAILY-KACI/IRON) TABS, Take 1 tablet by mouth daily  , Disp: , Rfl:     PHENobarbital 64 8 mg tablet, Take 1 tablet (64 8 mg total) by mouth 2 (two) times a day, Disp: 180 tablet, Rfl: 3    Polyethylene Glycol 3350-GRX POWD, Take by mouth daily MIX 1 CAPFUL (17mg) IN 8 OUNCES OF WATER, JUICE OR TEA AND DRINK DAILY, Disp: 850 g, Rfl: 5    senna-docusate sodium (SENOKOT-S) 8 6-50 mg per tablet, Take 1 tablet by mouth daily, Disp: , Rfl:     sodium chloride (OCEAN) 0 65 % nasal spray, 1 spray into each nostril as needed for congestion TAKE UP TO 6 TIMES DAILY AS NEEDED, Disp: , Rfl:     traZODone (DESYREL) 150 mg tablet, Take 1 tablet by mouth daily at bedtime , Disp: , Rfl:     trihexyphenidyl (ARTANE) 5 mg tablet, Take 5 mg by mouth 2 (two) times a day , Disp: , Rfl:     alendronate-cholecalciferol (FOSAMAX PLUS D)  MG-UNIT per tablet, Take 1 tablet by mouth Once a week, Disp: , Rfl:     fluticasone (FLOVENT HFA) 110 MCG/ACT inhaler, Inhale 2 puffs daily Rinse mouth after use  (Patient not taking: Reported on 5/25/2021), Disp: 12 g, Rfl: 5    loxapine (LOXITANE) 25 mg capsule, TAKE TWO (2) CAPSULES BY MOUTH EVERY MORNING AND SIX (6) CAPSULES AT BEDTIME, Disp: , Rfl:     ofloxacin (FLOXIN) 0 3 % otic solution, Administer 5 drops to the right ear 2 (two) times a day (Patient not taking: Reported on 5/25/2021), Disp: 5 mL, Rfl: 0    Allergies   Allergen Reactions    Clozapine Other (See Comments)     low white blood count  Other reaction(s): Other (See Comments)  low white blood count    Haloperidol Other (See Comments)     EPS  Other reaction(s): Other (See Comments)  EPS    Lithium Other (See Comments)     Toxicity    Prolixin [Fluphenazine] Hyperactivity and Other (See Comments)     EPS, Hyperactivity  EPS, Hyperactivity    Valproic Acid Confusion and Other (See Comments)     "Mental confusion"  "Mental confusion"       Social History   Past Surgical History:   Procedure Laterality Date    COLONOSCOPY      complete    CT COLONOSCOPY FLX DX W/COLLJ SPEC WHEN PFRMD N/A 8/30/2018    Procedure: COLONOSCOPY with polypectomies;  Surgeon: Hilario Shaw MD;  Location: [facility];   Service: Gastroenterology     Family History   Problem Relation Age of Onset    No Known Problems Mother     No Known Problems Father     Lung disease Other         mesothelioma    No Known Problems Maternal Grandmother     No Known Problems Maternal Grandfather     No Known Problems Paternal Grandmother     No Known Problems Paternal Grandfather     No Known Problems Sister     No Known Problems Sister     Kidney failure Brother     No Known Problems Maternal Aunt     No Known Problems Maternal Aunt     No Known Problems Maternal Aunt     No Known Problems Maternal Aunt     No Known Problems Paternal Aunt     No Known Problems Paternal Aunt        Objective:  /70 (BP Location: Left arm, Patient Position: Sitting, Cuff Size: Large)   Pulse 70   Temp 99 8 °F (37 7 °C)   SpO2 94%   There is no height or weight on file to calculate BMI  Physical Exam  Constitutional:       Appearance: She is well-developed  HENT:      Head: Normocephalic  Right Ear: External ear normal       Left Ear: External ear normal    Eyes:      General: No scleral icterus  Pupils: Pupils are equal, round, and reactive to light  Neck:      Musculoskeletal: Normal range of motion and neck supple  Thyroid: No thyromegaly  Trachea: No tracheal deviation  Cardiovascular:      Rate and Rhythm: Normal rate and regular rhythm  Heart sounds: Normal heart sounds  Pulmonary:      Effort: Pulmonary effort is normal  No respiratory distress  Breath sounds: Normal breath sounds  Chest:      Chest wall: No tenderness  Abdominal:      General: Bowel sounds are normal       Palpations: Abdomen is soft  There is no mass  Tenderness: There is no abdominal tenderness  Musculoskeletal: Normal range of motion  Right lower leg: No edema  Left lower leg: No edema  Comments: Mild tenderness over right 5th metatarsal noted   Lymphadenopathy:      Cervical: No cervical adenopathy  Skin:     General: Skin is warm  Neurological:      Mental Status: She is alert        Cranial Nerves: No cranial nerve deficit        Gait: Gait abnormal    Psychiatric:         Mood and Affect: Mood normal

## 2021-07-26 ENCOUNTER — OFFICE VISIT (OUTPATIENT)
Dept: INTERNAL MEDICINE CLINIC | Age: 66
End: 2021-07-26
Payer: MEDICARE

## 2021-07-26 VITALS
OXYGEN SATURATION: 97 % | HEART RATE: 72 BPM | HEIGHT: 64 IN | BODY MASS INDEX: 38.84 KG/M2 | DIASTOLIC BLOOD PRESSURE: 72 MMHG | WEIGHT: 227.5 LBS | SYSTOLIC BLOOD PRESSURE: 108 MMHG | TEMPERATURE: 98.2 F

## 2021-07-26 DIAGNOSIS — G40.909 SEIZURE DISORDER (HCC): ICD-10-CM

## 2021-07-26 DIAGNOSIS — Z23 NEED FOR 23-POLYVALENT PNEUMOCOCCAL POLYSACCHARIDE VACCINE: ICD-10-CM

## 2021-07-26 DIAGNOSIS — F41.8 DEPRESSION WITH ANXIETY: ICD-10-CM

## 2021-07-26 DIAGNOSIS — M54.50 CHRONIC MIDLINE LOW BACK PAIN WITHOUT SCIATICA: ICD-10-CM

## 2021-07-26 DIAGNOSIS — E66.01 OBESITY, MORBID (HCC): ICD-10-CM

## 2021-07-26 DIAGNOSIS — Z13.83 ENCOUNTER FOR SCREENING FOR PNEUMONIA: ICD-10-CM

## 2021-07-26 DIAGNOSIS — I10 BENIGN ESSENTIAL HYPERTENSION: Primary | ICD-10-CM

## 2021-07-26 DIAGNOSIS — F25.9 SCHIZOAFFECTIVE DISORDER, UNSPECIFIED TYPE (HCC): ICD-10-CM

## 2021-07-26 DIAGNOSIS — G89.29 CHRONIC MIDLINE LOW BACK PAIN WITHOUT SCIATICA: ICD-10-CM

## 2021-07-26 DIAGNOSIS — R73.03 PRE-DIABETES: ICD-10-CM

## 2021-07-26 DIAGNOSIS — Z00.00 MEDICARE ANNUAL WELLNESS VISIT, SUBSEQUENT: ICD-10-CM

## 2021-07-26 PROCEDURE — 90732 PPSV23 VACC 2 YRS+ SUBQ/IM: CPT

## 2021-07-26 PROCEDURE — G0439 PPPS, SUBSEQ VISIT: HCPCS | Performed by: INTERNAL MEDICINE

## 2021-07-26 PROCEDURE — 99214 OFFICE O/P EST MOD 30 MIN: CPT | Performed by: INTERNAL MEDICINE

## 2021-07-26 PROCEDURE — G0009 ADMIN PNEUMOCOCCAL VACCINE: HCPCS

## 2021-07-26 PROCEDURE — 1123F ACP DISCUSS/DSCN MKR DOCD: CPT | Performed by: INTERNAL MEDICINE

## 2021-07-26 RX ORDER — HYDROCORTISONE 0.5 %
CREAM (GRAM) TOPICAL
Qty: 30 G | Refills: 3 | Status: SHIPPED | OUTPATIENT
Start: 2021-07-26

## 2021-07-26 NOTE — PROGRESS NOTES
Assessment/Plan:    1  Hyperlipidemia  Lipid profile is in acceptable range  Will continue with present dose of atorvastatin    2  Osteoporosis  Continue with Fosamax along with calcium and vitamin-D     3  Chronic lower back pain  Will try Muriel-Agudelo as needed  4  Schizoeffective disorder  Being managed by psychiatrist    5  Obesity  Advised for low calorie diet and exercise  Diagnoses and all orders for this visit:    Benign essential hypertension    Seizure disorder ([facility] )    Depression with anxiety    Schizoaffective disorder, unspecified type ([facility] )    Pre-diabetes    Obesity, morbid ([facility] )    Medicare annual wellness visit, subsequent    Chronic midline low back pain without sciatica  -     Menthol-Methyl Salicylate (MURIEL AGUDELO GREASELESS) 10-15 % greaseless cream; Apply topically daily at bedtime               Subjective:          Patient ID: Douglas Briggs is a 74 y o  female  HPI    The following portions of the patient's history were reviewed and updated as appropriate: allergies, current medications, past family history, past medical history, past social history, past surgical history and problem list     Review of Systems   Constitutional: Negative for fatigue and fever  HENT: Negative for congestion, ear discharge, ear pain, postnasal drip, sinus pressure, sore throat, tinnitus and trouble swallowing  Eyes: Negative for discharge, itching and visual disturbance  Respiratory: Negative for cough and shortness of breath  Cardiovascular: Negative for chest pain and palpitations  Gastrointestinal: Negative for abdominal pain, diarrhea, nausea and vomiting  Endocrine: Negative for cold intolerance and polyuria  Genitourinary: Negative for difficulty urinating, dysuria and urgency  Musculoskeletal: Positive for back pain  Negative for arthralgias and neck pain  Skin: Negative for rash  Allergic/Immunologic: Negative for environmental allergies     Neurological: Negative for dizziness, weakness and headaches  Psychiatric/Behavioral: Negative for agitation and behavioral problems  The patient is nervous/anxious            Past Medical History:   Diagnosis Date    Benign essential hypertension     resolved; 06/15/16    Depression with anxiety     Fracture of fifth metatarsal bone of right foot with delayed healing     last assessed 04/12/16; fracture of metatarsal bone, right, closed, initial encounter    Hyperlipidemia     last assessed 11/28/17    Hypothyroidism     last assessed 11/28/2017    Insomnia     Obesity     [facility]oa[facility] disorder ([facility] )     last assessed 05/18/2017    Seizure disorder ([facility] )     last assessed 03/28/17         Current Outpatient Medications:     acetaminophen (TYLENOL) 500 mg tablet, Take 2 tablets (1,000 mg total) by mouth 3 (three) times a day, Disp: 180 tablet, Rfl: 1    alendronate (FOSAMAX) 70 mg tablet, Take by mouth TAKE 1 TABLET BY MOUTH DAILY ON FRIDAY , Disp: , Rfl:     asenapine (SAPHRIS) 10 mg SL tablet, Place 10 mg under the tongue 2 (two) times a day  , Disp: , Rfl:     aspirin (ECOTRIN LOW STRENGTH) 81 mg EC tablet, Take 81 mg by mouth daily, Disp: , Rfl:     atorvastatin (LIPITOR) 20 mg tablet, Take 1 tablet (20 mg total) by mouth daily, Disp: 90 tablet, Rfl: 1    bismuth subsalicylate (PEPTO BISMOL) 262 MG chewable tablet, Chew 2 tablets (524 mg total) every 6 (six) hours as needed for diarrhea, Disp: 30 tablet, Rfl: 0    busPIRone (BUSPAR) 15 mg tablet, Take 15 mg by mouth 2 (two) times a day , Disp: , Rfl:     calcium carbonate-vitamin D (OSCAL-D) 500 mg-200 units per tablet, Take 1 tablet by mouth 2 (two) times a day with meals , Disp: , Rfl:     cetirizine (ZyrTEC) 5 MG tablet, Take 1 tablet (5 mg total) by mouth as needed for allergies (As needed at night p r n  for allergies), Disp: 90 tablet, Rfl: 1    Dextromethorphan Polistirex ER 30 MG/5ML SUER, Take 5 mL (30 mg total) by mouth 2 (two) times a day as needed (congsetion), Disp: 148 mL, Rfl: 0    diclofenac sodium (VOLTAREN) 1 %, Apply 2 g topically 4 (four) times a day, Disp: 1 Tube, Rfl: 1    diphenhydrAMINE (BENADRYL) 50 mg capsule, Take 1 capsule (50 mg total) by mouth daily at bedtime as needed for sleep, Disp: 90 capsule, Rfl: 2    divalproex sodium (DEPAKOTE) 500 mg EC tablet, 1 tab in the Am, 3 tabs(1500mg) at bedtime, Disp: , Rfl:     docusate sodium (COLACE) 100 mg capsule, Take 1 capsule (100 mg total) by mouth 2 (two) times a day as needed for constipation, Disp: 180 capsule, Rfl: 1    fluticasone (FLONASE) 50 mcg/act nasal spray, 2 sprays into each nostril daily  , Disp: , Rfl:     gabapentin (NEURONTIN) 100 mg capsule, Take 200 mg by mouth 2 (two) times a day  , Disp: , Rfl:     HYDROcodone-acetaminophen (XODOL) 5-300 MG per tablet, Take 1 tablet by mouth every 4 (four) hours as needed for mild pain, Disp: , Rfl:     L-Theanine 100 MG CAPS, Take 200 mg by mouth 2 (two) times a day  , Disp: , Rfl:     levothyroxine 100 mcg tablet, Take 1 tablet (100 mcg total) by mouth daily, Disp: 90 tablet, Rfl: 1    LORazepam (ATIVAN) 0 5 mg tablet, Take 0 5 mg by mouth every 12 (twelve) hours as needed for anxiety , Disp: , Rfl:     LORazepam (ATIVAN) 1 mg tablet, Take 1 mg by mouth 2 (two) times a day , Disp: , Rfl:     melatonin 3 mg, Take 1 tablet (3 mg total) by mouth daily at bedtime (Patient taking differently: Take 5 mg by mouth daily at bedtime ), Disp: 30 tablet, Rfl: 2    Multiple Vitamins-Iron (DAILY-KACI/IRON) TABS, Take 1 tablet by mouth daily  , Disp: , Rfl:     PHENobarbital 64 8 mg tablet, Take 1 tablet (64 8 mg total) by mouth 2 (two) times a day, Disp: 180 tablet, Rfl: 3    Polyethylene Glycol 3350-GRX POWD, Take by mouth daily MIX 1 CAPFUL (17mg) IN 8 OUNCES OF WATER, JUICE OR TEA AND DRINK DAILY, Disp: 850 g, Rfl: 5    senna-docusate sodium (SENOKOT-S) 8 6-50 mg per tablet, Take 1 tablet by mouth daily, Disp: , Rfl:     sodium chloride (OCEAN) 0 65 % nasal spray, 1 spray into each nostril as needed for congestion TAKE UP TO 6 TIMES DAILY AS NEEDED, Disp: , Rfl:     traZODone (DESYREL) 150 mg tablet, Take 1 tablet by mouth daily at bedtime , Disp: , Rfl:     trihexyphenidyl (ARTANE) 5 mg tablet, Take 5 mg by mouth 2 (two) times a day , Disp: , Rfl:     alendronate-cholecalciferol (FOSAMAX PLUS D)  MG-UNIT per tablet, Take 1 tablet by mouth Once a week, Disp: , Rfl:     fluticasone (FLOVENT HFA) 110 MCG/ACT inhaler, Inhale 2 puffs daily Rinse mouth after use  (Patient not taking: Reported on 5/25/2021), Disp: 12 g, Rfl: 5    loxapine (LOXITANE) 25 mg capsule, TAKE TWO (2) CAPSULES BY MOUTH EVERY MORNING AND SIX (6) CAPSULES AT BEDTIME, Disp: , Rfl:     lurasidone (Latuda) 80 mg tablet, Take 40 mg by mouth 2 (two) times a day, Disp: , Rfl:     Menthol-Methyl Salicylate (MURIEL AGUDELO GREASELESS) 10-15 % greaseless cream, Apply topically daily at bedtime, Disp: 30 g, Rfl: 3    ofloxacin (FLOXIN) 0 3 % otic solution, Administer 5 drops to the right ear 2 (two) times a day (Patient not taking: Reported on 5/25/2021), Disp: 5 mL, Rfl: 0    Allergies   Allergen Reactions    Clozapine Other (See Comments)     low white blood count  Other reaction(s): Other (See Comments)  low white blood count    Haloperidol Other (See Comments)     EPS  Other reaction(s): Other (See Comments)  EPS    Lithium Other (See Comments)     Toxicity    Prolixin [Fluphenazine] Hyperactivity and Other (See Comments)     EPS, Hyperactivity  EPS, Hyperactivity    Valproic Acid Confusion and Other (See Comments)     "Mental confusion"  "Mental confusion"       Social History   Past Surgical History:   Procedure Laterality Date    COLONOSCOPY      complete    NV COLONOSCOPY FLX DX W/COLLJ SPEC WHEN PFRMD N/A 8/30/2018    Procedure: COLONOSCOPY with polypectomies;  Surgeon: Emre Bloom MD;  Location: [facility];   Service: Gastroenterology     Family History Problem Relation Age of Onset    No Known Problems Mother     No Known Problems Father     Lung disease Other         mesothelioma    No Known Problems Maternal Grandmother     No Known Problems Maternal Grandfather     No Known Problems Paternal Grandmother     No Known Problems Paternal Grandfather     No Known Problems Sister     No Known Problems Sister     Kidney failure Brother     No Known Problems Maternal Aunt     No Known Problems Maternal Aunt     No Known Problems Maternal Aunt     No Known Problems Maternal Aunt     No Known Problems Paternal Aunt     No Known Problems Paternal Aunt        Objective:  /72 (BP Location: Left arm, Patient Position: Sitting, Cuff Size: Standard)   Pulse 72   Temp 98 2 °F (36 8 °C) (Temporal)   Ht 5' 4" (1 626 m)   Wt 103 kg (227 lb 8 oz)   SpO2 97%   BMI 39 05 kg/m²   Body mass index is 39 05 kg/m²       Physical Exam

## 2021-07-26 NOTE — PROGRESS NOTES
Assessment and Plan:     Problem List Items Addressed This Visit     None           Preventive health issues were discussed with patient, and age appropriate screening tests were ordered as noted in patient's After Visit Summary  Personalized health advice and appropriate referrals for health education or preventive services given if needed, as noted in patient's After Visit Summary       History of Present Illness:     Patient presents for Medicare Annual Wellness visit         Problem List:     Patient Active Problem List   Diagnosis    Depression with anxiety    Hyperlipidemia    Hypothyroidism    Flatulence    Injury of right toe, initial encounter    Se[facility] disorder ([facility] )    Hyperglycemia    Benign essential hypertension    Schizoaffective disorder ([facility] )    Unsteady gait    Psychosis, bipolar affective ([facility] )    Osteoporosis    Insomnia    Folic acid deficiency    Anemia    Pre-diabetes    Decreased hearing of both ears    Cellulitis of abdominal wall    Ulcer of abdomen wall, limited to breakdown of skin ([facility] )    Acute diffuse otitis externa of right ear    Obesity, morbid ([facility] )    Closed displaced fracture of fifth metatarsal bone of right foot      Past Medical and Surgical History:     Past Medical History:   Diagnosis Date    Benign essential hypertension     resolved; 06/15/16    Depression with anxiety     Fracture of fifth metatarsal bone of right foot with delayed healing     last assessed 04/12/16; fracture of metatarsal bone, right, closed, initial encounter    Hyperlipidemia     last assessed 11/28/17    Hypothyroidism     last assessed 11/28/2017    Insomnia     Obesity     [facility]oaffective disorder ([facility] )     last assessed 05/18/2017    [facility] )     last assessed 03/28/17     Past Surgical History:   Procedure Laterality Date    COLONOSCOPY      complete    MI COLONOSCOPY FLX DX W/COLLJ SPEC WHEN PFRMD N/A 8/30/2018    Procedure: COLONOSCOPY with polypectomies;  Surgeon: Connie Weldon MD;  Location: [facility]; Service: Gastroenterology      Family History:     Family History   Problem Relation Age of Onset    No Known Problems Mother     No Known Problems Father     Lung disease Other         mesothelioma    No Known Problems Maternal Grandmother     No Known Problems Maternal Grandfather     No Known Problems Paternal Grandmother     No Known Problems Paternal Grandfather     No Known Problems Sister     No Known Problems Sister     Kidney failure Brother     No Known Problems Maternal Aunt     No Known Problems Maternal Aunt     No Known Problems Maternal Aunt     No Known Problems Maternal Aunt     No Known Problems Paternal Aunt     No Known Problems Paternal Aunt       Social History:     Social History     Socioeconomic History    Marital status: /Civil Union     Spouse name: None    Number of children: None    Years of education: None    Highest education level: None   Occupational History    None   Tobacco Use    Smoking status: Former Smoker     Types: Cigarettes    Smokeless tobacco: Never Used   Vaping Use    Vaping Use: Never used   Substance and Sexual Activity    Alcohol use: No    Drug use: No    Sexual activity: Never   Other Topics Concern    None   Social History Narrative    None     Social Determinants of Health     Financial Resource Strain:     Difficulty of Paying Living Expenses:    Food Insecurity:     Worried About Running Out of Food in the Last Year:     Ran Out of Food in the Last Year:    Transportation Needs:     Lack of Transportation (Medical):      Lack of Transportation (Non-Medical):    Physical Activity:     Days of Exercise per Week:     Minutes of Exercise per Session:    Stress:     Feeling of Stress :    Social Connections:     Frequency of Communication with Friends and Family:     Frequency of Social Gatherings with Friends and Family:     Attends [employer] Services:  Active Member of Clubs or Organizations:     Attends Club or Organization Meetings:     Marital Status:    Intimate Partner Violence:     Fear of Current or Ex-Partner:     Emotionally Abused:     Physically Abused:     Sexually Abused:       Medications and Allergies:     Current Outpatient Medications   Medication Sig Dispense Refill    acetaminophen (TYLENOL) 500 mg tablet Take 2 tablets (1,000 mg total) by mouth 3 (three) times a day 180 tablet 1    alendronate (FOSAMAX) 70 mg tablet Take by mouth TAKE 1 TABLET BY MOUTH DAILY ON FRIDAY       asenapine (SAPHRIS) 10 mg SL tablet Place 10 mg under the tongue 2 (two) times a day        aspirin (ECOTRIN LOW STRENGTH) 81 mg EC tablet Take 81 mg by mouth daily      atorvastatin (LIPITOR) 20 mg tablet Take 1 tablet (20 mg total) by mouth daily 90 tablet 1    bismuth subsalicylate (PEPTO BISMOL) 262 MG chewable tablet Chew 2 tablets (524 mg total) every 6 (six) hours as needed for diarrhea 30 tablet 0    busPIRone (BUSPAR) 15 mg tablet Take 15 mg by mouth 2 (two) times a day       calcium carbonate-vitamin D (OSCAL-D) 500 mg-200 units per tablet Take 1 tablet by mouth 2 (two) times a day with meals       cetirizine (ZyrTEC) 5 MG tablet Take 1 tablet (5 mg total) by mouth as needed for allergies (As needed at night p r n  for allergies) 90 tablet 1    Dextromethorphan Polistirex ER 30 MG/5ML SUER Take 5 mL (30 mg total) by mouth 2 (two) times a day as needed (congsetion) 148 mL 0    diclofenac sodium (VOLTAREN) 1 % Apply 2 g topically 4 (four) times a day 1 Tube 1    diphenhydrAMINE (BENADRYL) 50 mg capsule Take 1 capsule (50 mg total) by mouth daily at bedtime as needed for sleep 90 capsule 2    divalproex sodium (DEPAKOTE) 500 mg EC tablet 1 tab in the Am, 3 tabs(1500mg) at bedtime      docusate sodium (COLACE) 100 mg capsule Take 1 capsule (100 mg total) by mouth 2 (two) times a day as needed for constipation 180 capsule 1    fluticasone (FLONASE) 50 mcg/act nasal spray 2 sprays into each nostril daily        gabapentin (NEURONTIN) 100 mg capsule Take 200 mg by mouth 2 (two) times a day        HYDROcodone-acetaminophen (XODOL) 5-300 MG per tablet Take 1 tablet by mouth every 4 (four) hours as needed for mild pain      L-Theanine 100 MG CAPS Take 200 mg by mouth 2 (two) times a day        levothyroxine 100 mcg tablet Take 1 tablet (100 mcg total) by mouth daily 90 tablet 1    LORazepam (ATIVAN) 0 5 mg tablet Take 0 5 mg by mouth every 12 (twelve) hours as needed for anxiety       LORazepam (ATIVAN) 1 mg tablet Take 1 mg by mouth 2 (two) times a day       melatonin 3 mg Take 1 tablet (3 mg total) by mouth daily at bedtime (Patient taking differently: Take 5 mg by mouth daily at bedtime ) 30 tablet 2    Multiple Vitamins-Iron (DAILY-KACI/IRON) TABS Take 1 tablet by mouth daily        PHENobarbital 64 8 mg tablet Take 1 tablet (64 8 mg total) by mouth 2 (two) times a day 180 tablet 3    Polyethylene Glycol 3350-GRX POWD Take by mouth daily MIX 1 CAPFUL (17mg) IN 8 OUNCES OF WATER, JUICE OR TEA AND DRINK DAILY 850 g 5    senna-docusate sodium (SENOKOT-S) 8 6-50 mg per tablet Take 1 tablet by mouth daily      sodium chloride (OCEAN) 0 65 % nasal spray 1 spray into each nostril as needed for congestion TAKE UP TO 6 TIMES DAILY AS NEEDED      traZODone (DESYREL) 150 mg tablet Take 1 tablet by mouth daily at bedtime       trihexyphenidyl (ARTANE) 5 mg tablet Take 5 mg by mouth 2 (two) times a day       alendronate-cholecalciferol (FOSAMAX PLUS D)  MG-UNIT per tablet Take 1 tablet by mouth Once a week      fluticasone (FLOVENT HFA) 110 MCG/ACT inhaler Inhale 2 puffs daily Rinse mouth after use   (Patient not taking: Reported on 5/25/2021) 12 g 5    loxapine (LOXITANE) 25 mg capsule TAKE TWO (2) CAPSULES BY MOUTH EVERY MORNING AND SIX (6) CAPSULES AT BEDTIME      lurasidone (Latuda) 80 mg tablet Take 40 mg by mouth 2 (two) times a day  ofloxacin (FLOXIN) 0 3 % otic solution Administer 5 drops to the right ear 2 (two) times a day (Patient not taking: Reported on 5/25/2021) 5 mL 0     No current facility-administered medications for this visit  Allergies   Allergen Reactions    Clozapine Other (See Comments)     low white blood count  Other reaction(s): Other (See Comments)  low white blood count    Haloperidol Other (See Comments)     EPS  Other reaction(s):  Other (See Comments)  EPS    Lithium Other (See Comments)     Toxicity    Prolixin [Fluphenazine] Hyperactivity and Other (See Comments)     EPS, Hyperactivity  EPS, Hyperactivity    Valproic Acid Confusion and Other (See Comments)     "Mental confusion"  "Mental confusion"      Immunizations:     Immunization History   Administered Date(s) Administered    H1N1, All Formulations 01/07/2010    INFLUENZA 09/18/2012, 10/07/2013, 10/12/2016, 10/23/2017    Influenza Quadrivalent Preservative Free 3 years and older IM 10/23/2017    Influenza Quadrivalent, 6-35 Months IM 10/12/2016    Influenza, high dose seasonal 0 7 mL 10/15/2018, 11/13/2019, 10/02/2020    Influenza, seasonal, injectable 10/16/2014    Influenza, seasonal, injectable, preservative free 10/21/2013    Pneumococcal Conjugate 13-Valent 03/20/2019    Td (adult), adsorbed 11/28/2017    Tuberculin Skin Test-PPD Intradermal 08/08/2018, 08/11/2020      Health Maintenance:         Topic Date Due    Breast Cancer Screening: Mammogram  08/12/2021    Cervical Cancer Screening  12/28/2021    Colorectal Cancer Screening  08/30/2028    Hepatitis C Screening  Completed         Topic Date Due    COVID-19 Vaccine (1) Never done    DTaP,Tdap,and Td Vaccines (1 - Tdap) 06/21/1974    Pneumococcal Vaccine: 65+ Years (2 of 2 - PPSV23) 03/20/2020    Influenza Vaccine (1) 09/01/2021      Medicare Health Risk Assessment:     /72 (BP Location: Left arm, Patient Position: Sitting, Cuff Size: Standard)   Pulse 72   Temp 98 2 °F (36 8 °C) (Temporal)   Ht 5' 4" (1 626 m)   Wt 103 kg (227 lb 8 oz)   SpO2 97%   BMI 39 05 kg/m²      Fabby Venegas is here for her Subsequent Wellness visit  Last Medicare Wellness visit information reviewed, patient interviewed and updates made to the record today  Health Risk Assessment:   Patient rates overall health as good  Patient feels that their physical health rating is same  Patient is dissatisfied with their life  Eyesight was rated as same  Hearing was rated as slightly worse  Patient feels that their emotional and mental health rating is same  Patients states they are sometimes angry  Patient states they are often unusually tired/fatigued  Pain experienced in the last 7 days has been some  Patient's pain rating has been 8/10  Patient states that she has experienced no weight loss or gain in last 6 months  Depression Screening:   PHQ-2 Score: 1  PHQ-9 Score: 3      Fall Risk Screening: In the past year, patient has experienced: no history of falling in past year      Urinary Incontinence Screening:   Patient has not leaked urine accidently in the last six months  Home Safety:  Patient does not have trouble with stairs inside or outside of their home  Patient has no working smoke alarms and has no working carbon monoxide detector  Home safety hazards include: none  Nutrition:   Current diet is Regular  Medications:   Patient is currently taking over-the-counter supplements  OTC medications include: see medication list  Patient is not able to manage medications  Activities of Daily Living (ADLs)/Instrumental Activities of Daily Living (IADLs):   Walk and transfer into and out of bed and chair?: Yes  Dress and groom yourself?: Yes    Bathe or shower yourself?: Yes    Feed yourself?  Yes  Do your laundry/housekeeping?: Yes  Manage your money, pay your bills and track your expenses?: Yes  Make your own meals?: No    Do your own shopping?: No    Previous Hospitalizations:   Any hospitalizations or ED visits within the last 12 months?: No      Advance Care Planning:   Living will: Yes    Advanced directive: Yes      Cognitive Screening:   Provider or family/friend/caregiver concerned regarding cognition?: No    PREVENTIVE SCREENINGS      Cardiovascular Screening:    General: Screening Not Indicated and History Lipid Disorder      Diabetes Screening:     General: Screening Current      Colorectal Cancer Screening:     General: Screening Current      Breast Cancer Screening:     General: Screening Current      Cervical Cancer Screening:    General: Screening Not Indicated      Osteoporosis Screening:    General: Screening Not Indicated and History Osteoporosis      Abdominal Aortic Aneurysm (AAA) Screening:        General: Screening Not Indicated      Lung Cancer Screening:     General: Screening Not Indicated      Hepatitis C Screening:    General: Screening Current    Screening, Brief Intervention, and Referral to Treatment (SBIRT)    Screening    Typical number of drinks in a week: 0    Single Item Drug Screening:  How often have you used an illegal drug (including marijuana) or a prescription medication for non-medical reasons in the past year? never    Single Item Drug Screen Score: 0  Interpretation: Negative screen for possible drug use disorder    Brief Intervention  Alcohol & drug use screenings were reviewed  No concerns regarding substance use disorder identified  Review of Current Opioid Use  Opioid Risk Tool (ORT) Score: 0  Opioid Risk Tool (ORT) Interpretation: Score 0-3: Low risk for opioid misuse    Other Counseling Topics:   Car/seat belt/driving safety, skin self-exam, sunscreen and calcium and vitamin D intake and regular weightbearing exercise         Estelle El MD

## 2021-07-26 NOTE — PATIENT INSTRUCTIONS
Medicare Preventive Visit Patient Instructions  Thank you for completing your Welcome to Medicare Visit or Medicare Annual Wellness Visit today  Your next wellness visit will be due in one year (7/27/2022)  The screening/preventive services that you may require over the next 5-10 years are detailed below  Some tests may not apply to you based off risk factors and/or age  Screening tests ordered at today's visit but not completed yet may show as past due  Also, please note that scanned in results may not display below  Preventive Screenings:  Service Recommendations Previous Testing/Comments   Colorectal Cancer Screening  * Colonoscopy    * Fecal Occult Blood Test (FOBT)/Fecal Immunochemical Test (FIT)  * Fecal DNA/Cologuard Test  * Flexible Sigmoidoscopy Age: 54-65 years old   Colonoscopy: every 10 years (may be performed more frequently if at higher risk)  OR  FOBT/FIT: every 1 year  OR  Cologuard: every 3 years  OR  Sigmoidoscopy: every 5 years  Screening may be recommended earlier than age 48 if at higher risk for colorectal cancer  Also, an individualized decision between you and your healthcare provider will decide whether screening between the ages of 74-80 would be appropriate  Colonoscopy: 08/30/2018  FOBT/FIT: Not on file  Cologuard: Not on file  Sigmoidoscopy: Not on file    Screening Current     Breast Cancer Screening Age: 36 years old  Frequency: every 1-2 years  Not required if history of left and right mastectomy Mammogram: 08/12/2020    Screening Current   Cervical Cancer Screening Between the ages of 21-29, pap smear recommended once every 3 years  Between the ages of 33-67, can perform pap smear with HPV co-testing every 5 years     Recommendations may differ for women with a history of total hysterectomy, cervical cancer, or abnormal pap smears in past  Pap Smear: 12/28/2018    Screening Not Indicated   Hepatitis C Screening Once for adults born between 1945 and 1965  More frequently in patients at high risk for Hepatitis C Hep C Antibody: 11/29/2017    Screening Current   Diabetes Screening 1-2 times per year if you're at risk for diabetes or have pre-diabetes Fasting glucose: 91 mg/dL   A1C: 5 7 %    Screening Current   Cholesterol Screening Once every 5 years if you don't have a lipid disorder  May order more often based on risk factors  Lipid panel: 09/19/2020    Screening Not Indicated  History Lipid Disorder     Other Preventive Screenings Covered by Medicare:  1  Abdominal Aortic Aneurysm (AAA) Screening: covered once if your at risk  You're considered to be at risk if you have a family history of AAA  2  Lung Cancer Screening: covers low dose CT scan once per year if you meet all of the following conditions: (1) Age 50-69; (2) No signs or symptoms of lung cancer; (3) Current smoker or have quit smoking within the last 15 years; (4) You have a tobacco smoking history of at least 30 pack years (packs per day multiplied by number of years you smoked); (5) You get a written order from a healthcare provider  3  Glaucoma Screening: covered annually if you're considered high risk: (1) You have diabetes OR (2) Family history of glaucoma OR (3)  aged 48 and older OR (3)  American aged 72 and older  3  Osteoporosis Screening: covered every 2 years if you meet one of the following conditions: (1) You're estrogen deficient and at risk for osteoporosis based off medical history and other findings; (2) Have a vertebral abnormality; (3) On glucocorticoid therapy for more than 3 months; (4) Have primary hyperparathyroidism; (5) On osteoporosis medications and need to assess response to drug therapy  · Last bone density test (DXA Scan): 09/19/2020   5  HIV Screening: covered annually if you're between the age of 15-65  Also covered annually if you are younger than 13 and older than 72 with risk factors for HIV infection   For pregnant patients, it is covered up to 3 times per pregnancy  Immunizations:  Immunization Recommendations   Influenza Vaccine Annual influenza vaccination during flu season is recommended for all persons aged >= 6 months who do not have contraindications   Pneumococcal Vaccine (Prevnar and Pneumovax)  * Prevnar = PCV13  * Pneumovax = PPSV23   Adults 25-60 years old: 1-3 doses may be recommended based on certain risk factors  Adults 72 years old: Prevnar (PCV13) vaccine recommended followed by Pneumovax (PPSV23) vaccine  If already received PPSV23 since turning 65, then PCV13 recommended at least one year after PPSV23 dose  Hepatitis B Vaccine 3 dose series if at intermediate or high risk (ex: diabetes, end stage renal disease, liver disease)   Tetanus (Td) Vaccine - COST NOT COVERED BY MEDICARE PART B Following completion of primary series, a booster dose should be given every 10 years to maintain immunity against tetanus  Td may also be given as tetanus wound prophylaxis  Tdap Vaccine - COST NOT COVERED BY MEDICARE PART B Recommended at least once for all adults  For pregnant patients, recommended with each pregnancy  Shingles Vaccine (Shingrix) - COST NOT COVERED BY MEDICARE PART B  2 shot series recommended in those aged 48 and above     Health Maintenance Due:      Topic Date Due    Breast Cancer Screening: Mammogram  08/12/2021    Cervical Cancer Screening  12/28/2021    Colorectal Cancer Screening  08/30/2028    Hepatitis C Screening  Completed     Immunizations Due:      Topic Date Due    COVID-19 Vaccine (1) Never done    DTaP,Tdap,and Td Vaccines (1 - Tdap) 06/[birth year]/1974    Pneumococcal Vaccine: 65+ Years (2 of 2 - PPSV23) 03/20/2020    Influenza Vaccine (1) 09/01/2021     Advance Directives   What are advance directives? Advance directives are legal documents that state your wishes and plans for medical care  These plans are made ahead of time in case you lose your ability to make decisions for yourself   Advance directives can apply to any medical decision, such as the treatments you want, and if you want to donate organs  What are the types of advance directives? There are many types of advance directives, and each state has rules about how to use them  You may choose a combination of any of the following:  · Living will: This is a written record of the treatment you want  You can also choose which treatments you do not want, which to limit, and which to stop at a certain time  This includes surgery, medicine, IV fluid, and tube feedings  · Durable power of  for healthcare [facility]): This is a written record that states who you want to make healthcare choices for you when you are unable to make them for yourself  This person, called a proxy, is usually a family member or a friend  You may choose more than 1 proxy  · Do not resuscitate (DNR) order:  A DNR order is used in case your heart stops beating or you stop breathing  It is a request not to have certain forms of treatment, such as CPR  A DNR order may be included in other types of advance directives  · Medical directive: This covers the care that you want if you are in a coma, near death, or unable to make decisions for yourself  You can list the treatments you want for each condition  Treatment may include pain medicine, surgery, blood transfusions, dialysis, IV or tube feedings, and a ventilator (breathing machine)  · Values history: This document has questions about your views, beliefs, and how you feel and think about life  This information can help others choose the care that you would choose  Why are advance directives important? An advance directive helps you control your care  Although spoken wishes may be used, it is better to have your wishes written down  Spoken wishes can be misunderstood, or not followed  Treatments may be given even if you do not want them  An advance directive may make it easier for your family to make difficult choices about your care  Weight Management   Why it is important to manage your weight:  Being overweight increases your risk of health conditions such as heart disease, high blood pressure, type 2 diabetes, and certain types of cancer  It can also increase your risk for osteoarthritis, sleep apnea, and other respiratory problems  Aim for a slow, steady weight loss  Even a small amount of weight loss can lower your risk of health problems  How to lose weight safely:  A safe and healthy way to lose weight is to eat fewer calories and get regular exercise  You can lose up about 1 pound a week by decreasing the number of calories you eat by 500 calories each day  Healthy meal plan for weight management:  A healthy meal plan includes a variety of foods, contains fewer calories, and helps you stay healthy  A healthy meal plan includes the following:  · Eat whole-grain foods more often  A healthy meal plan should contain fiber  Fiber is the part of grains, fruits, and vegetables that is not broken down by your body  Whole-grain foods are healthy and provide extra fiber in your diet  Some examples of whole-grain foods are whole-wheat breads and pastas, oatmeal, brown rice, and bulgur  · Eat a variety of vegetables every day  Include dark, leafy greens such as spinach, kale, lauren greens, and mustard greens  Eat yellow and orange vegetables such as carrots, sweet potatoes, and winter squash  · Eat a variety of fruits every day  Choose fresh or canned fruit (canned in its own juice or light syrup) instead of juice  Fruit juice has very little or no fiber  · Eat low-fat dairy foods  Drink fat-free (skim) milk or 1% milk  Eat fat-free yogurt and low-fat cottage cheese  Try low-fat cheeses such as mozzarella and other reduced-fat cheeses  · Choose meat and other protein foods that are low in fat  Choose beans or other legumes such as split peas or lentils   Choose fish, skinless poultry (chicken or turkey), or lean cuts of red meat (beef or pork)  Before you cook meat or poultry, cut off any visible fat  · Use less fat and oil  Try baking foods instead of frying them  Add less fat, such as margarine, sour cream, regular salad dressing and mayonnaise to foods  Eat fewer high-fat foods  Some examples of high-fat foods include french fries, doughnuts, ice cream, and cakes  · Eat fewer sweets  Limit foods and drinks that are high in sugar  This includes candy, cookies, regular soda, and sweetened drinks  Exercise:  Exercise at least 30 minutes per day on most days of the week  Some examples of exercise include walking, biking, dancing, and swimming  You can also fit in more physical activity by taking the stairs instead of the elevator or parking farther away from stores  Ask your healthcare provider about the best exercise plan for you  © Copyright [employer] 2018 Information is for End User's use only and may not be sold, redistributed or otherwise used for commercial purposes   All illustrations and images included in CareNotes® are the copyrighted property of [employer]  or [address] [place] [address]

## 2021-09-13 DIAGNOSIS — F41.8 DEPRESSION WITH ANXIETY: Primary | ICD-10-CM

## 2021-09-13 RX ORDER — BUSPIRONE HYDROCHLORIDE 15 MG/1
15 TABLET ORAL 2 TIMES DAILY
Qty: 180 TABLET | Refills: 1 | OUTPATIENT
Start: 2021-09-13

## 2021-09-14 DIAGNOSIS — G40.909 SEIZURE DISORDER (HCC): ICD-10-CM

## 2021-09-14 RX ORDER — PHENOBARBITAL 64.8 MG/1
TABLET ORAL
Qty: 60 TABLET | Refills: 5 | Status: SHIPPED | OUTPATIENT
Start: 2021-09-14 | End: 2022-02-02 | Stop reason: SDUPTHER

## 2021-09-23 ENCOUNTER — OFFICE VISIT (OUTPATIENT)
Dept: INTERNAL MEDICINE CLINIC | Facility: CLINIC | Age: 66
End: 2021-09-23
Payer: MEDICARE

## 2021-09-23 VITALS
WEIGHT: 218.2 LBS | OXYGEN SATURATION: 98 % | HEIGHT: 64 IN | TEMPERATURE: 98.2 F | HEART RATE: 72 BPM | BODY MASS INDEX: 37.25 KG/M2 | SYSTOLIC BLOOD PRESSURE: 134 MMHG | RESPIRATION RATE: 22 BRPM | DIASTOLIC BLOOD PRESSURE: 88 MMHG

## 2021-09-23 DIAGNOSIS — E03.9 ACQUIRED HYPOTHYROIDISM: Primary | ICD-10-CM

## 2021-09-23 DIAGNOSIS — I10 BENIGN ESSENTIAL HYPERTENSION: ICD-10-CM

## 2021-09-23 DIAGNOSIS — E78.2 MIXED HYPERLIPIDEMIA: ICD-10-CM

## 2021-09-23 DIAGNOSIS — R73.03 PRE-DIABETES: ICD-10-CM

## 2021-09-23 DIAGNOSIS — G40.909 SEIZURE DISORDER (HCC): ICD-10-CM

## 2021-09-23 DIAGNOSIS — F25.9 SCHIZOAFFECTIVE DISORDER, UNSPECIFIED TYPE (HCC): ICD-10-CM

## 2021-09-23 DIAGNOSIS — E66.01 OBESITY, MORBID (HCC): ICD-10-CM

## 2021-09-23 PROCEDURE — 99214 OFFICE O/P EST MOD 30 MIN: CPT | Performed by: INTERNAL MEDICINE

## 2021-09-23 NOTE — PROGRESS NOTES
Assessment/Plan:    1  Hyperlipidemia  Continue with present dose of Lipitor  Will check lipid profile before next visit    2  Prediabetes  Continue with low sugar/low carb diet  Will check hemoglobin A1c before next visit    3  Hypothyroidism  Continue with present dose of levothyroxine  Will check thyroid function test before next visit  4  Chronic allergic rhinitis  Continue with present dose of Flonase nasal spray    5  Schizophrenia  Being managed by psychiatrist   Diagnoses and all orders for this visit:    Acquired hypothyroidism  -     TSH, 3rd generation with Free T4 reflex; Future    Benign essential hypertension  -     CBC; Future  -     Comprehensive metabolic panel; Future    Seizure disorder ([facility] [address] )    Mixed hyperlipidemia  -     Lipid Panel with Direct LDL reflex; Future    Pre-diabetes  -     Hemoglobin A1C; Future    Obesity, morbid (HCC)    Schizoaffective disorder, unspecified type ([facility] [address] )               Subjective:          Patient ID: Maurisio Hurt is a 74 y o  female  Patient is here for follow-up  Does have blood test done would like to discuss results  She also fell from bed about 3 weeks ago and suffer with the bruises over right forearm which is healing well  The following portions of the patient's history were reviewed and updated as appropriate: allergies, current medications, past family history, past medical history, past social history, past surgical history and problem list     Review of Systems   Constitutional: Negative for fatigue and fever  HENT: Negative for congestion, ear discharge, ear pain, postnasal drip, sinus pressure, sore throat, tinnitus and trouble swallowing  Eyes: Negative for discharge, itching and visual disturbance  Respiratory: Negative for cough and shortness of breath  Cardiovascular: Negative for chest pain and palpitations  Gastrointestinal: Negative for abdominal pain, diarrhea, nausea and vomiting     Endocrine: Negative for cold intolerance and polyuria  Genitourinary: Negative for difficulty urinating, dysuria and urgency  Musculoskeletal: Positive for arthralgias  Negative for neck pain  Skin: Negative for rash  Allergic/Immunologic: Negative for environmental allergies  Neurological: Negative for dizziness, weakness and headaches  Psychiatric/Behavioral: Negative for agitation  The patient is not nervous/anxious            Past Medical History:   Diagnosis Date    Benign essential hypertension     resolved; 06/15/16    Depression with anxiety     Fracture of fifth metatarsal bone of right foot with delayed healing     last assessed 04/12/16; fracture of metatarsal bone, right, closed, initial encounter    Hyperlipidemia     last assessed 11/28/17    Hypothyroidism     last assessed 11/28/2017    Insomnia     Obesity     Sch[facility]oaffective disorder ([facility] [address] )     last assessed 05/18/2017    [facility]     last assessed 03/28/17         Current Outpatient Medications:     acetaminophen (TYLENOL) 500 mg tablet, Take 2 tablets (1,000 mg total) by mouth 3 (three) times a day, Disp: 180 tablet, Rfl: 1    alendronate (FOSAMAX) 70 mg tablet, Take by mouth TAKE 1 TABLET BY MOUTH DAILY ON FRIDAY , Disp: , Rfl:     asenapine (SAPHRIS) 10 mg SL tablet, Place 10 mg under the tongue 2 (two) times a day  , Disp: , Rfl:     aspirin (ECOTRIN LOW STRENGTH) 81 mg EC tablet, Take 81 mg by mouth daily, Disp: , Rfl:     atorvastatin (LIPITOR) 20 mg tablet, Take 1 tablet (20 mg total) by mouth daily, Disp: 90 tablet, Rfl: 1    bismuth subsalicylate (PEPTO BISMOL) 262 MG chewable tablet, Chew 2 tablets (524 mg total) every 6 (six) hours as needed for diarrhea, Disp: 30 tablet, Rfl: 0    busPIRone (BUSPAR) 15 mg tablet, Take 15 mg by mouth 2 (two) times a day , Disp: , Rfl:     calcium carbonate-vitamin D (OSCAL-D) 500 mg-200 units per tablet, Take 1 tablet by mouth 2 (two) times a day with meals , Disp: , Rfl:     cetirizine (ZyrTEC) 5 MG tablet, Take 1 tablet (5 mg total) by mouth as needed for allergies (As needed at night p r n  for allergies), Disp: 90 tablet, Rfl: 1    Dextromethorphan Polistirex ER 30 MG/5ML SUER, Take 5 mL (30 mg total) by mouth 2 (two) times a day as needed (congsetion), Disp: 148 mL, Rfl: 0    diclofenac sodium (VOLTAREN) 1 %, Apply 2 g topically 4 (four) times a day, Disp: 1 Tube, Rfl: 1    diphenhydrAMINE (BENADRYL) 50 mg capsule, Take 1 capsule (50 mg total) by mouth daily at bedtime as needed for sleep, Disp: 90 capsule, Rfl: 2    divalproex sodium (DEPAKOTE) 500 mg EC tablet, 1 tab in the Am, 3 tabs(1500mg) at bedtime, Disp: , Rfl:     docusate sodium (COLACE) 100 mg capsule, Take 1 capsule (100 mg total) by mouth 2 (two) times a day as needed for constipation, Disp: 180 capsule, Rfl: 1    fluticasone (FLONASE) 50 mcg/act nasal spray, 2 sprays into each nostril daily  , Disp: , Rfl:     gabapentin (NEURONTIN) 100 mg capsule, Take 200 mg by mouth 2 (two) times a day  , Disp: , Rfl:     HYDROcodone-acetaminophen (XODOL) 5-300 MG per tablet, Take 1 tablet by mouth every 4 (four) hours as needed for mild pain, Disp: , Rfl:     L-Theanine 100 MG CAPS, Take 200 mg by mouth 2 (two) times a day  , Disp: , Rfl:     levothyroxine 100 mcg tablet, Take 1 tablet (100 mcg total) by mouth daily, Disp: 90 tablet, Rfl: 1    LORazepam (ATIVAN) 0 5 mg tablet, Take 0 5 mg by mouth every 12 (twelve) hours as needed for anxiety , Disp: , Rfl:     LORazepam (ATIVAN) 1 mg tablet, Take 1 mg by mouth 2 (two) times a day , Disp: , Rfl:     loxapine (LOXITANE) 25 mg capsule, TAKE TWO (2) CAPSULES BY MOUTH EVERY MORNING AND SIX (6) CAPSULES AT BEDTIME, Disp: , Rfl:     lurasidone (Latuda) 80 mg tablet, Take 40 mg by mouth 2 (two) times a day, Disp: , Rfl:     melatonin 3 mg, Take 1 tablet (3 mg total) by mouth daily at bedtime, Disp: 30 tablet, Rfl: 2    Menthol-Methyl Salicylate (MURIEL AGUDELO GREASELESS) 10-15 % greaseless cream, Apply topically daily at bedtime, Disp: 30 g, Rfl: 3    Multiple Vitamins-Iron (DAILY-KACI/IRON) TABS, Take 1 tablet by mouth daily  , Disp: , Rfl:     PHENobarbital 64 8 mg tablet, TAKE 1 TABLET BY MOUTH TWICE A DAY, Disp: 60 tablet, Rfl: 5    Polyethylene Glycol 3350-GRX POWD, Take by mouth daily MIX 1 CAPFUL (17mg) IN 8 OUNCES OF WATER, JUICE OR TEA AND DRINK DAILY, Disp: 850 g, Rfl: 5    senna-docusate sodium (SENOKOT-S) 8 6-50 mg per tablet, Take 1 tablet by mouth daily, Disp: , Rfl:     sodium chloride (OCEAN) 0 65 % nasal spray, 1 spray into each nostril as needed for congestion TAKE UP TO 6 TIMES DAILY AS NEEDED, Disp: , Rfl:     traZODone (DESYREL) 150 mg tablet, Take 1 tablet by mouth daily at bedtime , Disp: , Rfl:     trihexyphenidyl (ARTANE) 5 mg tablet, Take 5 mg by mouth 2 (two) times a day , Disp: , Rfl:     alendronate-cholecalciferol (FOSAMAX PLUS D)  MG-UNIT per tablet, Take 1 tablet by mouth Once a week (Patient not taking: Reported on 9/23/2021), Disp: , Rfl:     fluticasone (FLOVENT HFA) 110 MCG/ACT inhaler, Inhale 2 puffs daily Rinse mouth after use  (Patient not taking: Reported on 5/25/2021), Disp: 12 g, Rfl: 5    ofloxacin (FLOXIN) 0 3 % otic solution, Administer 5 drops to the right ear 2 (two) times a day (Patient not taking: Reported on 5/25/2021), Disp: 5 mL, Rfl: 0    Allergies   Allergen Reactions    Clozapine Other (See Comments)     low white blood count  Other reaction(s): Other (See Comments)  low white blood count    Haloperidol Other (See Comments)     EPS  Other reaction(s):  Other (See Comments)  EPS    Lithium Other (See Comments)     Toxicity    Prolixin [Fluphenazine] Hyperactivity and Other (See Comments)     EPS, Hyperactivity  EPS, Hyperactivity    Valproic Acid Confusion and Other (See Comments)     "Mental confusion"  "Mental confusion"       Social History   Past Surgical History:   Procedure Laterality Date    COLONOSCOPY      complete  PA COLONOSCOPY FLX DX W/COLLJ SPEC WHEN PFRMD N/A 8/30/2018    Procedure: COLONOSCOPY with polypectomies;  Surgeon: Ashley Lares MD;  Location: [facility]; Service: Gastroenterology     Family History   Problem Relation Age of Onset    No Known Problems Mother     No Known Problems Father     Lung disease Other         mesothelioma    No Known Problems Maternal Grandmother     No Known Problems Maternal Grandfather     No Known Problems Paternal Grandmother     No Known Problems Paternal Grandfather     No Known Problems Sister     No Known Problems Sister     Kidney failure Brother     No Known Problems Maternal Aunt     No Known Problems Maternal Aunt     No Known Problems Maternal Aunt     No Known Problems Maternal Aunt     No Known Problems Paternal Aunt     No Known Problems Paternal Aunt        Objective:  /88 (BP Location: Left arm, Patient Position: Sitting, Cuff Size: Large)   Pulse 72   Temp 98 2 °F (36 8 °C) (Temporal)   Resp 22   Ht 5' 4" (1 626 m)   Wt 99 kg (218 lb 3 2 oz)   SpO2 98%   BMI 37 45 kg/m²   Body mass index is 37 45 kg/m²  Physical Exam  Constitutional:       Appearance: She is well-developed  HENT:      Head: Normocephalic  Right Ear: External ear normal       Left Ear: External ear normal       Mouth/Throat:      Pharynx: No posterior oropharyngeal erythema  Eyes:      General: No scleral icterus  Pupils: Pupils are equal, round, and reactive to light  Neck:      Thyroid: No thyromegaly  Trachea: No tracheal deviation  Cardiovascular:      Rate and Rhythm: Normal rate and regular rhythm  Heart sounds: Normal heart sounds  Pulmonary:      Effort: Pulmonary effort is normal  No respiratory distress  Breath sounds: Normal breath sounds  Chest:      Chest wall: No tenderness  Abdominal:      General: Bowel sounds are normal       Palpations: Abdomen is soft  There is no mass  Tenderness:  There is no abdominal tenderness  Musculoskeletal:         General: Normal range of motion  Cervical back: Normal range of motion and neck supple  Right lower leg: No edema  Left lower leg: No edema  Lymphadenopathy:      Cervical: No cervical adenopathy  Skin:     General: Skin is warm  Findings: Bruising present  Comments: Right forearm bruise healing well  Neurological:      Mental Status: She is alert and oriented to person, place, and time  Cranial Nerves: No cranial nerve deficit     Psychiatric:         Mood and Affect: Mood normal          Behavior: Behavior normal

## 2021-10-28 ENCOUNTER — HOSPITAL ENCOUNTER (OUTPATIENT)
Dept: RADIOLOGY | Age: 66
Discharge: HOME | End: 2021-10-28
Payer: MEDICARE

## 2021-10-28 VITALS — HEIGHT: 64 IN | BODY MASS INDEX: 37.22 KG/M2 | WEIGHT: 218 LBS

## 2021-10-28 DIAGNOSIS — Z12.31 ENCOUNTER FOR SCREENING MAMMOGRAM FOR MALIGNANT NEOPLASM OF BREAST: ICD-10-CM

## 2021-10-28 PROCEDURE — 77063 BREAST TOMOSYNTHESIS BI: CPT

## 2021-10-28 PROCEDURE — 77067 SCR MAMMO BI INCL CAD: CPT

## 2021-11-12 ENCOUNTER — APPOINTMENT (OUTPATIENT)
Dept: RADIOLOGY | Facility: MEDICAL CENTER | Age: 66
End: 2021-11-12
Payer: MEDICARE

## 2021-11-12 ENCOUNTER — OFFICE VISIT (OUTPATIENT)
Dept: OBGYN CLINIC | Facility: MEDICAL CENTER | Age: 66
End: 2021-11-12
Payer: MEDICARE

## 2021-11-12 VITALS
BODY MASS INDEX: 40.97 KG/M2 | DIASTOLIC BLOOD PRESSURE: 78 MMHG | SYSTOLIC BLOOD PRESSURE: 134 MMHG | HEIGHT: 64 IN | WEIGHT: 240 LBS | HEART RATE: 71 BPM

## 2021-11-12 DIAGNOSIS — M25.571 PAIN, JOINT, ANKLE AND FOOT, RIGHT: ICD-10-CM

## 2021-11-12 DIAGNOSIS — S93.491A SPRAIN OF ANTERIOR TALOFIBULAR LIGAMENT OF RIGHT ANKLE, INITIAL ENCOUNTER: ICD-10-CM

## 2021-11-12 DIAGNOSIS — M25.571 PAIN, JOINT, ANKLE AND FOOT, RIGHT: Primary | ICD-10-CM

## 2021-11-12 PROCEDURE — 99214 OFFICE O/P EST MOD 30 MIN: CPT | Performed by: EMERGENCY MEDICINE

## 2021-11-12 PROCEDURE — 73610 X-RAY EXAM OF ANKLE: CPT

## 2021-11-12 PROCEDURE — 73630 X-RAY EXAM OF FOOT: CPT

## 2021-11-16 ENCOUNTER — OFFICE VISIT (OUTPATIENT)
Dept: INTERNAL MEDICINE CLINIC | Facility: CLINIC | Age: 66
End: 2021-11-16
Payer: MEDICARE

## 2021-11-16 VITALS
TEMPERATURE: 97.6 F | OXYGEN SATURATION: 97 % | HEART RATE: 90 BPM | SYSTOLIC BLOOD PRESSURE: 112 MMHG | HEIGHT: 64 IN | BODY MASS INDEX: 37.56 KG/M2 | DIASTOLIC BLOOD PRESSURE: 70 MMHG | WEIGHT: 220 LBS

## 2021-11-16 DIAGNOSIS — G40.909 SEIZURE DISORDER (HCC): ICD-10-CM

## 2021-11-16 DIAGNOSIS — E03.9 ACQUIRED HYPOTHYROIDISM: ICD-10-CM

## 2021-11-16 DIAGNOSIS — I10 BENIGN ESSENTIAL HYPERTENSION: ICD-10-CM

## 2021-11-16 DIAGNOSIS — M81.0 OSTEOPOROSIS, UNSPECIFIED OSTEOPOROSIS TYPE, UNSPECIFIED PATHOLOGICAL FRACTURE PRESENCE: ICD-10-CM

## 2021-11-16 DIAGNOSIS — M54.50 CHRONIC MIDLINE LOW BACK PAIN WITHOUT SCIATICA: ICD-10-CM

## 2021-11-16 DIAGNOSIS — G89.29 CHRONIC MIDLINE LOW BACK PAIN WITHOUT SCIATICA: ICD-10-CM

## 2021-11-16 DIAGNOSIS — E78.2 MIXED HYPERLIPIDEMIA: ICD-10-CM

## 2021-11-16 DIAGNOSIS — Z23 NEED FOR INFLUENZA VACCINATION: Primary | ICD-10-CM

## 2021-11-16 PROCEDURE — G0008 ADMIN INFLUENZA VIRUS VAC: HCPCS

## 2021-11-16 PROCEDURE — 90662 IIV NO PRSV INCREASED AG IM: CPT

## 2021-11-16 PROCEDURE — 99214 OFFICE O/P EST MOD 30 MIN: CPT | Performed by: INTERNAL MEDICINE

## 2021-11-19 ENCOUNTER — OFFICE VISIT (OUTPATIENT)
Dept: NEUROLOGY | Facility: CLINIC | Age: 66
End: 2021-11-19
Payer: MEDICARE

## 2021-11-19 VITALS
HEART RATE: 86 BPM | BODY MASS INDEX: 37.56 KG/M2 | DIASTOLIC BLOOD PRESSURE: 88 MMHG | WEIGHT: 220 LBS | HEIGHT: 64 IN | SYSTOLIC BLOOD PRESSURE: 149 MMHG

## 2021-11-19 DIAGNOSIS — F25.9 SCHIZOAFFECTIVE DISORDER, UNSPECIFIED TYPE (HCC): ICD-10-CM

## 2021-11-19 DIAGNOSIS — G40.909 SEIZURE DISORDER (HCC): Primary | ICD-10-CM

## 2021-11-19 DIAGNOSIS — M81.0 OSTEOPOROSIS, UNSPECIFIED OSTEOPOROSIS TYPE, UNSPECIFIED PATHOLOGICAL FRACTURE PRESENCE: ICD-10-CM

## 2021-11-19 PROCEDURE — 99215 OFFICE O/P EST HI 40 MIN: CPT | Performed by: NURSE PRACTITIONER

## 2021-11-24 ENCOUNTER — APPOINTMENT (OUTPATIENT)
Dept: LAB | Age: 66
End: 2021-11-24
Payer: MEDICARE

## 2021-11-24 DIAGNOSIS — R73.03 PRE-DIABETES: ICD-10-CM

## 2021-11-24 DIAGNOSIS — E66.9 OBESITY, UNSPECIFIED CLASSIFICATION, UNSPECIFIED OBESITY TYPE, UNSPECIFIED WHETHER SERIOUS COMORBIDITY PRESENT: ICD-10-CM

## 2021-11-24 DIAGNOSIS — E78.2 MIXED HYPERLIPIDEMIA: ICD-10-CM

## 2021-11-24 DIAGNOSIS — F25.0 SCHIZOAFFECTIVE DISORDER, BIPOLAR TYPE (HCC): ICD-10-CM

## 2021-11-24 DIAGNOSIS — E03.9 MYXEDEMA HEART DISEASE: ICD-10-CM

## 2021-11-24 DIAGNOSIS — I10 BENIGN ESSENTIAL HYPERTENSION: ICD-10-CM

## 2021-11-24 DIAGNOSIS — I51.9 MYXEDEMA HEART DISEASE: ICD-10-CM

## 2021-11-24 DIAGNOSIS — E03.9 ACQUIRED HYPOTHYROIDISM: ICD-10-CM

## 2021-11-24 DIAGNOSIS — Z79.899 ENCOUNTER FOR LONG-TERM (CURRENT) USE OF OTHER MEDICATIONS: ICD-10-CM

## 2021-11-24 LAB
ALBUMIN SERPL BCP-MCNC: 3.1 G/DL (ref 3.5–5)
ALP SERPL-CCNC: 56 U/L (ref 46–116)
ALT SERPL W P-5'-P-CCNC: 25 U/L (ref 12–78)
ANION GAP SERPL CALCULATED.3IONS-SCNC: 5 MMOL/L (ref 4–13)
AST SERPL W P-5'-P-CCNC: 12 U/L (ref 5–45)
BASOPHILS # BLD AUTO: 0 THOUSANDS/ΜL (ref 0–0.1)
BASOPHILS NFR BLD AUTO: 0 % (ref 0–1)
BILIRUB SERPL-MCNC: 0.95 MG/DL (ref 0.2–1)
BUN SERPL-MCNC: 9 MG/DL (ref 5–25)
CALCIUM ALBUM COR SERPL-MCNC: 9.4 MG/DL (ref 8.3–10.1)
CALCIUM SERPL-MCNC: 8.7 MG/DL (ref 8.3–10.1)
CHLORIDE SERPL-SCNC: 97 MMOL/L (ref 100–108)
CHOLEST SERPL-MCNC: 173 MG/DL
CO2 SERPL-SCNC: 28 MMOL/L (ref 21–32)
CREAT SERPL-MCNC: 0.83 MG/DL (ref 0.6–1.3)
EOSINOPHIL # BLD AUTO: 0 THOUSAND/ΜL (ref 0–0.61)
EOSINOPHIL NFR BLD AUTO: 0 % (ref 0–6)
ERYTHROCYTE [DISTWIDTH] IN BLOOD BY AUTOMATED COUNT: 12.7 % (ref 11.6–15.1)
EST. AVERAGE GLUCOSE BLD GHB EST-MCNC: 123 MG/DL
GFR SERPL CREATININE-BSD FRML MDRD: 73 ML/MIN/1.73SQ M
GLUCOSE P FAST SERPL-MCNC: 90 MG/DL (ref 65–99)
HBA1C MFR BLD: 5.9 %
HCT VFR BLD AUTO: 38.1 % (ref 34.8–46.1)
HDLC SERPL-MCNC: 81 MG/DL
HGB BLD-MCNC: 12.9 G/DL (ref 11.5–15.4)
IMM GRANULOCYTES # BLD AUTO: 0.01 THOUSAND/UL (ref 0–0.2)
IMM GRANULOCYTES NFR BLD AUTO: 0 % (ref 0–2)
LDLC SERPL CALC-MCNC: 73 MG/DL (ref 0–100)
LYMPHOCYTES # BLD AUTO: 1.58 THOUSANDS/ΜL (ref 0.6–4.47)
LYMPHOCYTES NFR BLD AUTO: 41 % (ref 14–44)
MCH RBC QN AUTO: 32.8 PG (ref 26.8–34.3)
MCHC RBC AUTO-ENTMCNC: 33.9 G/DL (ref 31.4–37.4)
MCV RBC AUTO: 97 FL (ref 82–98)
MONOCYTES # BLD AUTO: 0.41 THOUSAND/ΜL (ref 0.17–1.22)
MONOCYTES NFR BLD AUTO: 11 % (ref 4–12)
NEUTROPHILS # BLD AUTO: 1.87 THOUSANDS/ΜL (ref 1.85–7.62)
NEUTS SEG NFR BLD AUTO: 48 % (ref 43–75)
NRBC BLD AUTO-RTO: 0 /100 WBCS
PLATELET # BLD AUTO: 219 THOUSANDS/UL (ref 149–390)
PMV BLD AUTO: 9.8 FL (ref 8.9–12.7)
POTASSIUM SERPL-SCNC: 4.6 MMOL/L (ref 3.5–5.3)
PROT SERPL-MCNC: 7 G/DL (ref 6.4–8.2)
RBC # BLD AUTO: 3.93 MILLION/UL (ref 3.81–5.12)
SODIUM SERPL-SCNC: 130 MMOL/L (ref 136–145)
T4 FREE SERPL-MCNC: 0.9 NG/DL (ref 0.76–1.46)
TRIGL SERPL-MCNC: 93 MG/DL
TSH SERPL DL<=0.05 MIU/L-ACNC: 0.97 UIU/ML (ref 0.36–3.74)
WBC # BLD AUTO: 3.87 THOUSAND/UL (ref 4.31–10.16)

## 2021-11-24 PROCEDURE — 84443 ASSAY THYROID STIM HORMONE: CPT

## 2021-11-24 PROCEDURE — 85025 COMPLETE CBC W/AUTO DIFF WBC: CPT

## 2021-11-24 PROCEDURE — 36415 COLL VENOUS BLD VENIPUNCTURE: CPT

## 2021-11-24 PROCEDURE — 80053 COMPREHEN METABOLIC PANEL: CPT

## 2021-11-24 PROCEDURE — 84439 ASSAY OF FREE THYROXINE: CPT

## 2021-11-24 PROCEDURE — 80061 LIPID PANEL: CPT

## 2021-11-24 PROCEDURE — 80165 DIPROPYLACETIC ACID FREE: CPT

## 2021-11-24 PROCEDURE — 83036 HEMOGLOBIN GLYCOSYLATED A1C: CPT

## 2021-11-26 LAB — VALPROATE FREE SERPL-MCNC: 8 UG/ML (ref 6–22)

## 2021-12-01 ENCOUNTER — EVALUATION (OUTPATIENT)
Dept: PHYSICAL THERAPY | Facility: REHABILITATION | Age: 66
End: 2021-12-01
Payer: MEDICARE

## 2021-12-01 DIAGNOSIS — S93.491D SPRAIN OF ANTERIOR TALOFIBULAR LIGAMENT OF RIGHT ANKLE, SUBSEQUENT ENCOUNTER: Primary | ICD-10-CM

## 2021-12-01 PROCEDURE — 97161 PT EVAL LOW COMPLEX 20 MIN: CPT | Performed by: PHYSICAL THERAPIST

## 2021-12-01 PROCEDURE — 97110 THERAPEUTIC EXERCISES: CPT | Performed by: PHYSICAL THERAPIST

## 2021-12-07 ENCOUNTER — OFFICE VISIT (OUTPATIENT)
Dept: PHYSICAL THERAPY | Facility: REHABILITATION | Age: 66
End: 2021-12-07
Payer: MEDICARE

## 2021-12-07 DIAGNOSIS — S93.491D SPRAIN OF ANTERIOR TALOFIBULAR LIGAMENT OF RIGHT ANKLE, SUBSEQUENT ENCOUNTER: Primary | ICD-10-CM

## 2021-12-07 PROCEDURE — 97110 THERAPEUTIC EXERCISES: CPT | Performed by: PHYSICAL THERAPIST

## 2021-12-07 PROCEDURE — 97530 THERAPEUTIC ACTIVITIES: CPT | Performed by: PHYSICAL THERAPIST

## 2021-12-10 ENCOUNTER — OFFICE VISIT (OUTPATIENT)
Dept: PHYSICAL THERAPY | Facility: REHABILITATION | Age: 66
End: 2021-12-10
Payer: MEDICARE

## 2021-12-10 DIAGNOSIS — S93.491D SPRAIN OF ANTERIOR TALOFIBULAR LIGAMENT OF RIGHT ANKLE, SUBSEQUENT ENCOUNTER: Primary | ICD-10-CM

## 2021-12-10 PROCEDURE — 97110 THERAPEUTIC EXERCISES: CPT | Performed by: PHYSICAL THERAPIST

## 2021-12-14 ENCOUNTER — OFFICE VISIT (OUTPATIENT)
Dept: PHYSICAL THERAPY | Facility: REHABILITATION | Age: 66
End: 2021-12-14
Payer: MEDICARE

## 2021-12-14 DIAGNOSIS — S93.491D SPRAIN OF ANTERIOR TALOFIBULAR LIGAMENT OF RIGHT ANKLE, SUBSEQUENT ENCOUNTER: Primary | ICD-10-CM

## 2021-12-14 PROCEDURE — 97110 THERAPEUTIC EXERCISES: CPT

## 2021-12-14 PROCEDURE — 97530 THERAPEUTIC ACTIVITIES: CPT

## 2021-12-17 ENCOUNTER — OFFICE VISIT (OUTPATIENT)
Dept: PHYSICAL THERAPY | Facility: REHABILITATION | Age: 66
End: 2021-12-17
Payer: MEDICARE

## 2021-12-17 DIAGNOSIS — S93.491D SPRAIN OF ANTERIOR TALOFIBULAR LIGAMENT OF RIGHT ANKLE, SUBSEQUENT ENCOUNTER: Primary | ICD-10-CM

## 2021-12-17 PROCEDURE — 97110 THERAPEUTIC EXERCISES: CPT

## 2021-12-17 PROCEDURE — 97530 THERAPEUTIC ACTIVITIES: CPT

## 2021-12-20 ENCOUNTER — OFFICE VISIT (OUTPATIENT)
Dept: PHYSICAL THERAPY | Facility: REHABILITATION | Age: 66
End: 2021-12-20
Payer: MEDICARE

## 2021-12-20 DIAGNOSIS — S93.491D SPRAIN OF ANTERIOR TALOFIBULAR LIGAMENT OF RIGHT ANKLE, SUBSEQUENT ENCOUNTER: Primary | ICD-10-CM

## 2021-12-20 PROCEDURE — 97110 THERAPEUTIC EXERCISES: CPT

## 2021-12-20 PROCEDURE — 97530 THERAPEUTIC ACTIVITIES: CPT

## 2021-12-22 ENCOUNTER — OFFICE VISIT (OUTPATIENT)
Dept: PHYSICAL THERAPY | Facility: REHABILITATION | Age: 66
End: 2021-12-22
Payer: MEDICARE

## 2021-12-22 DIAGNOSIS — S93.491D SPRAIN OF ANTERIOR TALOFIBULAR LIGAMENT OF RIGHT ANKLE, SUBSEQUENT ENCOUNTER: Primary | ICD-10-CM

## 2021-12-22 PROCEDURE — 97110 THERAPEUTIC EXERCISES: CPT

## 2021-12-22 PROCEDURE — 97530 THERAPEUTIC ACTIVITIES: CPT

## 2021-12-24 NOTE — PROGRESS NOTES
Assessment/Plan:     1  Impacted wax and known right ear   on examination weeks appears very hard  We will recommend to use Debrox drops 4 times a day  And ear lavage can be done in 2-3 days     2  Hyperlipidemia   lipid profile is within acceptable range  Will continue with present dose off Lipitor         Diagnoses and all orders for this visit:    Excessive ear wax, right  -     carbamide peroxide (DEBROX) 6 5 % otic solution; Administer 10 drops to the right ear 2 (two) times a day    Hyperlipidemia, unspecified hyperlipidemia type  -     Discontinue: carbamide peroxide (DEBROX) 6 5 % otic solution; Administer 10 drops to the right ear 2 (two) times a day          Subjective:          Patient ID: Alice Subramanian is a 57 y o  female  Patient is here for evaluation of wax in the right ear  On examination she is a very hard wax in the right ear almost entire external auditory canal is blocked        The following portions of the patient's history were reviewed and updated as appropriate: allergies, current medications, past family history, past medical history, past social history, past surgical history and problem list     Review of Systems   Constitutional: Negative for fatigue and fever  HENT: Positive for hearing loss  Negative for congestion, ear discharge, ear pain, postnasal drip, sinus pressure, sore throat, tinnitus and trouble swallowing  Eyes: Negative for discharge, itching and visual disturbance  Respiratory: Negative for cough and shortness of breath  Cardiovascular: Negative for chest pain and palpitations  Gastrointestinal: Negative for abdominal pain, diarrhea, nausea and vomiting  Endocrine: Negative for cold intolerance and polyuria  Genitourinary: Negative for difficulty urinating, dysuria and urgency  Musculoskeletal: Negative for arthralgias and neck pain  Skin: Negative for rash  Allergic/Immunologic: Negative for environmental allergies     Neurological: Negative Major Shift Events: pt is alert and oriented x3, intermittent confusion, sitter at bedside. On 2L O2, weak congested cough, ST -120's, 500 ml LR given this am, Na 146, free water increased to 200 ml q2hr, TF cycled 8pm to 8am. Full liquid diet with poor appetite, calorie count, BS q4hr. Needs a lot of encouragement and redirection to get out of bed and eat. Up in the chair x1. Assist of 1 with transfers. Toes dusky/black. Bilateral groin site intact. BM x1 this shift.     Plan: continue to monitor, transferring to 6b, report given to Sarbjit LOMELI For vital signs and complete assessments, please see documentation flowsheets.    for dizziness, weakness and headaches  Psychiatric/Behavioral: The patient is not nervous/anxious            Past Medical History:   Diagnosis Date    Benign essential hypertension     resolved; 06/15/16    Fracture of fifth metatarsal bone of right foot with delayed healing     last assessed 04/12/16; fracture of metatarsal bone, right, closed, initial encounter    Hyperlipidemia     last assessed 11/28/17    Hypothyroidism     last assessed 11/28/2017    Insomnia     Obesity     Schizoaffective disorder ([facility] )     last assessed 05/18/2017    Seizure disorder ([facility] )     last assessed 03/28/17         Current Outpatient Prescriptions:     acetaminophen (TYLENOL) 325 mg tablet, Take by mouth, Disp: , Rfl:     alendronate (FOSAMAX) 70 mg tablet, Take by mouth, Disp: , Rfl:     asenapine (SAPHRIS) 10 mg SL tablet, Place 10 mg under the tongue Take 10 mg tablets BID and 5 MG Tablets BID , Disp: , Rfl:     aspirin 81 mg chewable tablet, Chew, Disp: , Rfl:     atorvastatin (LIPITOR) 20 mg tablet, Take by mouth, Disp: , Rfl:     busPIRone (BUSPAR) 15 mg tablet, Take by mouth, Disp: , Rfl:     calcium carbonate-vitamin D (OSCAL-D) 500 mg-200 units per tablet, Take by mouth, Disp: , Rfl:     Dextromethorphan Polistirex ER (DELSYM) 30 MG/5ML SUER, Take by mouth, Disp: , Rfl:     diphenhydrAMINE (BENADRYL) 50 mg capsule, Take by mouth, Disp: , Rfl:     divalproex sodium (DEPAKOTE) 500 mg EC tablet, Take by mouth, Disp: , Rfl:     docusate sodium (COLACE) 100 mg capsule, Take 1 capsule by mouth 2 (two) times a day as needed, Disp: , Rfl:     fluticasone (FLONASE) 50 mcg/act nasal spray, into each nostril, Disp: , Rfl:     gabapentin (NEURONTIN) 100 mg capsule, Take by mouth, Disp: , Rfl:     L-Theanine 100 MG CAPS, Take by mouth, Disp: , Rfl:     levothyroxine 100 mcg tablet, Take by mouth, Disp: , Rfl:     LORazepam (ATIVAN) 1 mg tablet, Take 1 tablet by mouth 2 (two) times a day, Disp: , Rfl:    loxapine (LOXITANE) 50 MG capsule, Take by mouth, Disp: , Rfl:     Multiple Vitamins-Iron (DAILY-KACI/IRON) TABS, Take by mouth, Disp: , Rfl:     PHENobarbital 64 8 mg tablet, Take 1 tablet (64 8 mg total) by mouth 2 (two) times a day, Disp: 60 tablet, Rfl: 5    Polyethylene Glycol 3350-GRX POWD, Take by mouth, Disp: , Rfl:     simvastatin (ZOCOR) 40 mg tablet, Take 40 mg by mouth, Disp: , Rfl:     sodium chloride (DEEP SEA NASAL SPRAY) 0 65 % nasal spray, into each nostril, Disp: , Rfl:     traZODone (DESYREL) 150 mg tablet, Take 1 tablet by mouth, Disp: , Rfl:     trihexyphenidyl (ARTANE) 2 mg tablet, Take 1 tablet by mouth 2 (two) times a day, Disp: , Rfl:     carbamide peroxide (DEBROX) 6 5 % otic solution, Administer 10 drops to the right ear 2 (two) times a day, Disp: 15 mL, Rfl: 0    Melatonin 5 MG TABS, Take 1 tablet by mouth daily, Disp: , Rfl:     polyethylene glycol (GOLYTELY) 4000 mL solution, Take 4,000 mL by mouth once for 1 dose, Disp: 4000 mL, Rfl: 0    tetanus-diphtheria toxoids (DECAVAC 2-2) injection, Inject into the shoulder, thigh, or buttocks, Disp: , Rfl:     Allergies   Allergen Reactions    Clozapine      Other reaction(s): Other (See Comments)  low white blood count    Fluphenazine Hyperactivity     EPS, Hyperactivity    Haloperidol      Other reaction(s):  Other (See Comments)  EPS    Lithium Other (See Comments)     Toxicity    Valproic Acid Confusion     "Mental confusion"       Social History   Past Surgical History:   Procedure Laterality Date    COLONOSCOPY      complete     Family History   Problem Relation Age of Onset    No Known Problems Mother     No Known Problems Father     Lung disease Other      mesothelioma       Objective:  /68 (BP Location: Left arm, Patient Position: Sitting, Cuff Size: Large)   Pulse 92   Temp 99 3 °F (37 4 °C) (Oral)   Ht 5' 5" (1 651 m)   Wt 99 1 kg (218 lb 6 4 oz)   SpO2 98%   BMI 36 34 kg/m²   Body mass index is 36 34 kg/m²  Physical Exam   Constitutional: She appears well-developed  HENT:   Head: Normocephalic  Left Ear: External ear normal    Mouth/Throat: Oropharynx is clear and moist    Right external auditory canal is blocked with the hard wax   Eyes: Pupils are equal, round, and reactive to light  No scleral icterus  Neck: Normal range of motion  Neck supple  No tracheal deviation present  No thyromegaly present  Cardiovascular: Normal rate, regular rhythm and normal heart sounds  Pulmonary/Chest: Effort normal and breath sounds normal  No respiratory distress  She exhibits no tenderness  Abdominal: Soft  Bowel sounds are normal  She exhibits no mass  There is no tenderness  Musculoskeletal: Normal range of motion  Lymphadenopathy:     She has no cervical adenopathy  Neurological: She is alert  No cranial nerve deficit  Skin: Skin is warm  Psychiatric: She has a normal mood and affect

## 2021-12-27 ENCOUNTER — OFFICE VISIT (OUTPATIENT)
Dept: OBGYN CLINIC | Facility: MEDICAL CENTER | Age: 66
End: 2021-12-27
Payer: MEDICARE

## 2021-12-27 VITALS
HEART RATE: 72 BPM | SYSTOLIC BLOOD PRESSURE: 128 MMHG | WEIGHT: 220 LBS | BODY MASS INDEX: 37.56 KG/M2 | DIASTOLIC BLOOD PRESSURE: 70 MMHG | TEMPERATURE: 98.8 F | HEIGHT: 64 IN

## 2021-12-27 DIAGNOSIS — M25.571 PAIN, JOINT, ANKLE AND FOOT, RIGHT: ICD-10-CM

## 2021-12-27 DIAGNOSIS — S93.491D SPRAIN OF ANTERIOR TALOFIBULAR LIGAMENT OF RIGHT ANKLE, SUBSEQUENT ENCOUNTER: Primary | ICD-10-CM

## 2021-12-27 PROCEDURE — 99212 OFFICE O/P EST SF 10 MIN: CPT | Performed by: EMERGENCY MEDICINE

## 2021-12-28 ENCOUNTER — OFFICE VISIT (OUTPATIENT)
Dept: PHYSICAL THERAPY | Facility: REHABILITATION | Age: 66
End: 2021-12-28
Payer: MEDICARE

## 2021-12-28 DIAGNOSIS — S93.491D SPRAIN OF ANTERIOR TALOFIBULAR LIGAMENT OF RIGHT ANKLE, SUBSEQUENT ENCOUNTER: Primary | ICD-10-CM

## 2021-12-28 PROCEDURE — 97110 THERAPEUTIC EXERCISES: CPT

## 2021-12-28 PROCEDURE — 97530 THERAPEUTIC ACTIVITIES: CPT

## 2021-12-29 ENCOUNTER — APPOINTMENT (OUTPATIENT)
Dept: PHYSICAL THERAPY | Facility: REHABILITATION | Age: 66
End: 2021-12-29
Payer: MEDICARE

## 2022-01-04 ENCOUNTER — EVALUATION (OUTPATIENT)
Dept: PHYSICAL THERAPY | Facility: REHABILITATION | Age: 67
End: 2022-01-04
Payer: MEDICARE

## 2022-01-04 DIAGNOSIS — I10 BENIGN ESSENTIAL HYPERTENSION: Primary | ICD-10-CM

## 2022-01-04 DIAGNOSIS — S93.491D SPRAIN OF ANTERIOR TALOFIBULAR LIGAMENT OF RIGHT ANKLE, SUBSEQUENT ENCOUNTER: Primary | ICD-10-CM

## 2022-01-04 PROCEDURE — 97110 THERAPEUTIC EXERCISES: CPT | Performed by: PHYSICAL THERAPIST

## 2022-01-04 RX ORDER — ASPIRIN 81 MG/1
81 TABLET ORAL DAILY
Qty: 90 TABLET | Refills: 1 | Status: SHIPPED | OUTPATIENT
Start: 2022-01-04 | End: 2022-07-08 | Stop reason: SDUPTHER

## 2022-01-04 NOTE — PROGRESS NOTES
PT Re-Evaluation     Today's date: 2022  Patient name: Vickie Mack  : 1955  MRN: [id number]  Referring provider: Honey Blancas MD  Dx:   Encounter Diagnosis     ICD-10-CM    1  Sprain of anterior talofibular ligament of right ankle, subsequent encounter  S93 491D            Updated measurements and functional status taken this session  Pt will be d/c to ongoing HEP  Assessment  Assessment details: Pt has progressed very well, demonstrating improvement in ankle ROM, strength, and function with an abolishment of her ankle pain since starting therapy  Pt has met all PT goals and feels ready to be d/c to ongoing HEP at this time  Thank you for the referral    Impairments: abnormal or restricted ROM and impaired balance  Understanding of Dx/Px/POC: good   Prognosis: good    Goals  Short Term:  Pt will report decreased levels of pain by at least 2 subjective ratings in 4 weeks-met  Pt will demonstrate improved DF AROM by at least 5 degrees in 4 weeks-met  Pt will demonstrate improved strength by 1/2 grade MMT in 4 weeks-met  Long Term:   Pt will be independent in their HEP in 8 weeks-met  Pt will be independent with all ADL's-met    Plan  Plan details: Patient was educated in [address] [place] [address] and Plan of Care  All questions were answered to pt's satisfaction  Planned therapy interventions: home exercise program  Treatment plan discussed with: patient and         Subjective Evaluation    History of Present Illness  Mechanism of injury: Pt reports she is no longer experiencing ankle pain or any functional limitations due to her ankle  Pt denies ankle pain with ambulation and steps like she was experiencing previously  Pain  No pain reported    Treatments  Current treatment: physical therapy  Patient Goals  Patient goals for therapy: decreased pain, increased motion, increased strength and independence with ADLs/IADLs          Objective     Tenderness     Right Ankle/Foot   No tenderness  Active Range of Motion     Right Ankle/Foot   Dorsiflexion (ke): 10 degrees   Plantar flexion: WFL  Inversion: WFL  Eversion: WFL    Strength/Myotome Testing     Right Ankle/Foot   Dorsiflexion: 5  Plantar flexion: 5 (seated)  Inversion: 5  Eversion: 5             Precautions: anxiety, depression, obesity, hypothyroidism, schizoaffective disorder, seizure disorder     Manuals 12/7 12/10 12/14 12/17 12/20 12/22 12/28  1/4                                                                                             Neuro Re-Ed                                                                                                                                                                               Ther Ex                     Nu-step L4x10' nv L4x10' L4x10' L4x10' L4x10' L4x10'  np     Ankle tbx4 otb x15ea otb x15ea   otb x15 ea    ytb 15 ea  ytb 15 ea   reviewed     wobbleboard seated a/p, m/l, cw/ccw x15ea w/min assist x20ea w/min assist 20 ea  W/min assist 20 ea  w/assist 20 ea  w/assist 20 ea  w/assist 20 ea  w/assist  np     HR/TR-seated x20ea x20ea 20 ea 20  20 20 20  reviewd     HR/TR standing x10ea x10ea 10 ea  10 10 10 10  reviewed     SLS                      Re-evaluation                TP     Pt education+ HEP instruction                     Ther Activity                     Sit to stand 14 15 10 10 10 10 10  reviewed     Sidestepping 2 laps at window w/CGA nv 3 half laps at mirror with CGA 3 half laps at mirrpr w/cga 3 half laps at mirror w/cga 3 half laps at mirror w/cga 3 half laps at mirror w/cga  np     Step ups 6"x15 nv                 Standing Marching x15ea x15ea 15 ea  15 ea  15 ea  15 ea   15 ea   reviewed                           Gait Training                                                                 Modalities

## 2022-01-12 DIAGNOSIS — R09.89 CHEST CONGESTION: ICD-10-CM

## 2022-01-12 RX ORDER — DEXTROMETHORPHAN 30 MG/5ML
SUSPENSION, EXTENDED RELEASE ORAL
Qty: 89 ML | Refills: 0 | Status: SHIPPED | OUTPATIENT
Start: 2022-01-12 | End: 2022-02-25 | Stop reason: SDUPTHER

## 2022-01-14 DIAGNOSIS — R09.89 CHEST CONGESTION: ICD-10-CM

## 2022-01-14 RX ORDER — DEXTROMETHORPHAN POLISTIREX 30 MG/5ML
60 SUSPENSION ORAL 2 TIMES DAILY PRN
Qty: 89 ML | Refills: 0 | Status: CANCELLED | OUTPATIENT
Start: 2022-01-14

## 2022-01-27 DIAGNOSIS — E78.2 MIXED HYPERLIPIDEMIA: ICD-10-CM

## 2022-01-27 DIAGNOSIS — G40.909 SEIZURE DISORDER (HCC): ICD-10-CM

## 2022-01-27 DIAGNOSIS — M81.0 OSTEOPOROSIS, UNSPECIFIED OSTEOPOROSIS TYPE, UNSPECIFIED PATHOLOGICAL FRACTURE PRESENCE: Primary | ICD-10-CM

## 2022-01-27 RX ORDER — DIPHENOXYLATE HYDROCHLORIDE AND ATROPINE SULFATE 2.5; .025 MG/1; MG/1
1 TABLET ORAL DAILY
Qty: 30 TABLET | Refills: 5 | Status: SHIPPED | OUTPATIENT
Start: 2022-01-27 | End: 2022-07-08 | Stop reason: SDUPTHER

## 2022-01-27 RX ORDER — B-COMPLEX WITH VITAMIN C
1 TABLET ORAL 2 TIMES DAILY WITH MEALS
Qty: 60 TABLET | Refills: 5 | Status: SHIPPED | OUTPATIENT
Start: 2022-01-27 | End: 2022-07-08 | Stop reason: SDUPTHER

## 2022-01-27 RX ORDER — PHENOBARBITAL 64.8 MG/1
64.8 TABLET ORAL 2 TIMES DAILY
Qty: 60 TABLET | Refills: 0 | Status: CANCELLED | OUTPATIENT
Start: 2022-01-27

## 2022-01-27 RX ORDER — ATORVASTATIN CALCIUM 20 MG/1
20 TABLET, FILM COATED ORAL DAILY
Qty: 90 TABLET | Refills: 1 | Status: SHIPPED | OUTPATIENT
Start: 2022-01-27

## 2022-01-27 NOTE — TELEPHONE ENCOUNTER
Last ov 11/16/21  Next ov 2/16/22    Kizzy Wolf at [facility] and told her to call Neurology office for Phenobarbital refill

## 2022-02-02 DIAGNOSIS — G40.909 SEIZURE DISORDER (HCC): ICD-10-CM

## 2022-02-02 RX ORDER — PHENOBARBITAL 64.8 MG/1
64.8 TABLET ORAL 2 TIMES DAILY
Qty: 60 TABLET | Refills: 2 | Status: SHIPPED | OUTPATIENT
Start: 2022-02-02 | End: 2022-04-06

## 2022-02-02 NOTE — TELEPHONE ENCOUNTER
Message left at [facility] pharmacy to cancel script for phenobarb if still active

## 2022-02-02 NOTE — TELEPHONE ENCOUNTER
Fax received from [facility] requesting refill of phenobarb  Last sent to [facility]  Please sign

## 2022-02-03 DIAGNOSIS — K59.09 OTHER CONSTIPATION: Primary | ICD-10-CM

## 2022-02-03 RX ORDER — POLYETHYLENE GLYCOL 3350 17 G/17G
17 POWDER, FOR SOLUTION ORAL DAILY
COMMUNITY
End: 2022-02-03 | Stop reason: SDUPTHER

## 2022-02-03 RX ORDER — POLYETHYLENE GLYCOL 3350 17 G/17G
17 POWDER, FOR SOLUTION ORAL DAILY
Qty: 850 G | Refills: 2 | Status: SHIPPED | OUTPATIENT
Start: 2022-02-03

## 2022-02-04 DIAGNOSIS — E03.9 ACQUIRED HYPOTHYROIDISM: ICD-10-CM

## 2022-02-04 RX ORDER — LEVOTHYROXINE SODIUM 0.1 MG/1
100 TABLET ORAL DAILY
Qty: 90 TABLET | Refills: 1 | Status: SHIPPED | OUTPATIENT
Start: 2022-02-04 | End: 2022-07-08 | Stop reason: SDUPTHER

## 2022-02-09 RX ORDER — ALENDRONATE SODIUM 70 MG/1
TABLET ORAL
Status: CANCELLED | OUTPATIENT
Start: 2022-02-09

## 2022-02-10 ENCOUNTER — RA CDI HCC (OUTPATIENT)
Dept: OTHER | Facility: HOSPITAL | Age: 67
End: 2022-02-10

## 2022-02-10 DIAGNOSIS — M81.0 OSTEOPOROSIS, UNSPECIFIED OSTEOPOROSIS TYPE, UNSPECIFIED PATHOLOGICAL FRACTURE PRESENCE: Primary | ICD-10-CM

## 2022-02-10 RX ORDER — ALENDRONATE SODIUM 70 MG/1
70 TABLET ORAL
Qty: 4 TABLET | Refills: 3 | Status: SHIPPED | OUTPATIENT
Start: 2022-02-10 | End: 2022-02-25 | Stop reason: SDUPTHER

## 2022-02-10 NOTE — PROGRESS NOTES
[facility]  coding opportunities       Chart reviewed, no opportunity found: CHART REVIEWED, NO OPPORTUNITY FOUND                        Patients insurance company: [employer]

## 2022-02-14 DIAGNOSIS — E03.9 ACQUIRED HYPOTHYROIDISM: Primary | ICD-10-CM

## 2022-02-14 RX ORDER — ASENAPINE MALEATE 10 MG/1
10 TABLET SUBLINGUAL 2 TIMES DAILY
Qty: 60 TABLET | Refills: 1 | Status: SHIPPED | OUTPATIENT
Start: 2022-02-14

## 2022-02-14 NOTE — TELEPHONE ENCOUNTER
Patient's [facility] called for a refill for this patient       She needs it Brand Medically Necessary and not the Generic for it    SAPHRIS 10 mg one tablet bid    Send to [address] [place] [address]

## 2022-02-16 ENCOUNTER — OFFICE VISIT (OUTPATIENT)
Dept: INTERNAL MEDICINE CLINIC | Age: 67
End: 2022-02-16
Payer: MEDICARE

## 2022-02-16 VITALS
HEART RATE: 72 BPM | HEIGHT: 64 IN | OXYGEN SATURATION: 96 % | SYSTOLIC BLOOD PRESSURE: 122 MMHG | DIASTOLIC BLOOD PRESSURE: 76 MMHG | WEIGHT: 210 LBS | TEMPERATURE: 98.3 F | BODY MASS INDEX: 35.85 KG/M2

## 2022-02-16 DIAGNOSIS — E03.9 ACQUIRED HYPOTHYROIDISM: Primary | ICD-10-CM

## 2022-02-16 DIAGNOSIS — R73.03 PRE-DIABETES: ICD-10-CM

## 2022-02-16 DIAGNOSIS — F41.8 DEPRESSION WITH ANXIETY: ICD-10-CM

## 2022-02-16 DIAGNOSIS — G40.909 SEIZURE DISORDER (HCC): ICD-10-CM

## 2022-02-16 DIAGNOSIS — M81.0 AGE-RELATED OSTEOPOROSIS WITHOUT CURRENT PATHOLOGICAL FRACTURE: ICD-10-CM

## 2022-02-16 DIAGNOSIS — I10 BENIGN ESSENTIAL HYPERTENSION: ICD-10-CM

## 2022-02-16 DIAGNOSIS — E66.01 OBESITY, MORBID (HCC): ICD-10-CM

## 2022-02-16 DIAGNOSIS — E78.2 MIXED HYPERLIPIDEMIA: ICD-10-CM

## 2022-02-16 DIAGNOSIS — M81.0 OSTEOPOROSIS, UNSPECIFIED OSTEOPOROSIS TYPE, UNSPECIFIED PATHOLOGICAL FRACTURE PRESENCE: ICD-10-CM

## 2022-02-16 DIAGNOSIS — F25.9 SCHIZOAFFECTIVE DISORDER, UNSPECIFIED TYPE (HCC): ICD-10-CM

## 2022-02-16 PROCEDURE — 99214 OFFICE O/P EST MOD 30 MIN: CPT | Performed by: INTERNAL MEDICINE

## 2022-02-16 NOTE — PROGRESS NOTES
Assessment/Plan:    Hypothyroidism  Continue with present dose of levothyroxine  Will check thyroid function test before next visit    Benign essential hypertension  Blood pressure is stable on present regimen  Will continue to monitor    Seizure disorder ([place] [address] )  Stable on present regimen  Being followed by neurologist    Depression with anxiety  Patient appears more apprehensive today  Advised to follow-up with psychiatrist as soon as possible  Pre-diabetes  Continue with low sugar/low carb diet  Will request hemoglobin A1c prior to next visit  Diagnoses and all orders for this visit:    Acquired hypothyroidism  -     TSH, 3rd generation with Free T4 reflex; Future    Benign essential hypertension  -     CBC; Future  -     Lipid Panel with Direct LDL reflex; Future  -     Comprehensive metabolic panel; Future    Seizure disorder ([place] [address] )    Osteoporosis, unspecified osteoporosis type, unspecified pathological fracture presence    Depression with anxiety    Mixed hyperlipidemia    Schizoaffective disorder, unspecified type ([place] [address] )    Obesity, morbid ([place] [address] )    Age-related osteoporosis without current pathological fracture    Pre-diabetes  -     Hemoglobin A1C; Future          BMI Counseling: Body mass index is 36 05 kg/m²  The BMI is above normal  Nutrition recommendations include decreasing portion sizes, encouraging healthy choices of fruits and vegetables, decreasing fast food intake and consuming healthier snacks  Exercise recommendations include moderate physical activity 150 minutes/week and exercising 3-5 times per week  Rationale for BMI follow-up plan is due to patient being overweight or obese  Subjective:          Patient ID: Hilario Sultana is a 74 y o  female  For regular follow-up  No blood work prior to this visit    No new complaints      The following portions of the patient's history were reviewed and updated as appropriate: allergies, current medications, past family history, past medical history, past social history, past surgical history and problem list     Review of Systems   Constitutional: Negative for fatigue and fever  HENT: Negative for congestion, ear discharge, ear pain, postnasal drip, sinus pressure, sore throat, tinnitus and trouble swallowing  Eyes: Negative for discharge, itching and visual disturbance  Respiratory: Negative for cough and shortness of breath  Cardiovascular: Negative for chest pain and palpitations  Gastrointestinal: Negative for abdominal pain, diarrhea, nausea and vomiting  Endocrine: Negative for cold intolerance and polyuria  Genitourinary: Negative for difficulty urinating, dysuria and urgency  Musculoskeletal: Negative for arthralgias and neck pain  Skin: Negative for rash  Allergic/Immunologic: Negative for environmental allergies  Neurological: Negative for dizziness, weakness and headaches  Psychiatric/Behavioral: Negative for agitation  The patient is not nervous/anxious            Past Medical History:   Diagnosis Date    Benign essential hypertension     resolved; 06/15/16    Depression with anxiety     Fracture of fifth metatarsal bone of right foot with delayed healing     last assessed 04/12/16; fracture of metatarsal bone, right, closed, initial encounter    Hyperlipidemia     last assessed 11/28/17    Hypothyroidism     last assessed 11/28/2017    Insomnia     Obesity     [facility]     last assessed 05/18/2017    Seizure disorder ([facility] )     last assessed 03/28/17         Current Outpatient Medications:     acetaminophen (TYLENOL) 500 mg tablet, Take 2 tablets (1,000 mg total) by mouth 3 (three) times a day, Disp: 180 tablet, Rfl: 1    alendronate (FOSAMAX) 70 mg tablet, Take 1 tablet (70 mg total) by mouth every 7 days TAKE 1 TABLET BY MOUTH DAILY ON FRIDAY, Disp: 4 tablet, Rfl: 3    alendronate-cholecalciferol (FOSAMAX PLUS D)  MG-UNIT per tablet, Take 1 tablet by mouth Once a week (Patient not taking: Reported on 12/27/2021 ), Disp: , Rfl:     aspirin (ECOTRIN LOW STRENGTH) 81 mg EC tablet, Take 1 tablet (81 mg total) by mouth daily, Disp: 90 tablet, Rfl: 1    atorvastatin (LIPITOR) 20 mg tablet, Take 1 tablet (20 mg total) by mouth daily, Disp: 90 tablet, Rfl: 1    bismuth subsalicylate (PEPTO BISMOL) 262 MG chewable tablet, Chew 2 tablets (524 mg total) every 6 (six) hours as needed for diarrhea, Disp: 30 tablet, Rfl: 0    busPIRone (BUSPAR) 15 mg tablet, Take 15 mg by mouth 2 (two) times a day , Disp: , Rfl:     calcium carbonate-vitamin D (OSCAL-D) 500 mg-200 units per tablet, Take 1 tablet by mouth 2 (two) times a day with meals, Disp: 60 tablet, Rfl: 5    cetirizine (ZyrTEC) 5 MG tablet, Take 1 tablet (5 mg total) by mouth as needed for allergies (As needed at night p r n  for allergies) (Patient not taking: Reported on 11/19/2021 ), Disp: 90 tablet, Rfl: 1    Delsym 30 MG/5ML SUER, TAKE 1 TESAPOON (5ML) BY MOUTH 2X DAILY AS NEEDED FOR CONGESTION, Disp: 89 mL, Rfl: 0    diclofenac sodium (VOLTAREN) 1 %, Apply 2 g topically 4 (four) times a day, Disp: 1 Tube, Rfl: 1    diphenhydrAMINE (BENADRYL) 50 mg capsule, Take 1 capsule (50 mg total) by mouth daily at bedtime as needed for sleep, Disp: 90 capsule, Rfl: 2    divalproex sodium (DEPAKOTE) 500 mg EC tablet, 1 tab in the Am, 3 tabs(1500mg) at bedtime, Disp: , Rfl:     docusate sodium (COLACE) 100 mg capsule, Take 1 capsule (100 mg total) by mouth 2 (two) times a day as needed for constipation, Disp: 180 capsule, Rfl: 1    fluticasone (FLONASE) 50 mcg/act nasal spray, 2 sprays into each nostril daily  , Disp: , Rfl:     fluticasone (FLOVENT HFA) 110 MCG/ACT inhaler, Inhale 2 puffs daily Rinse mouth after use , Disp: 12 g, Rfl: 5    gabapentin (NEURONTIN) 100 mg capsule, Take 200 mg by mouth 2 (two) times a day  , Disp: , Rfl:     HYDROcodone-acetaminophen (XODOL) 5-300 MG per tablet, Take 1 tablet by mouth every 4 (four) hours as needed for mild pain, Disp: , Rfl:     L-Theanine 100 MG CAPS, Take 200 mg by mouth 2 (two) times a day  , Disp: , Rfl:     levothyroxine 100 mcg tablet, Take 1 tablet (100 mcg total) by mouth daily, Disp: 90 tablet, Rfl: 1    LORazepam (ATIVAN) 0 5 mg tablet, Take 0 5 mg by mouth every 12 (twelve) hours as needed for anxiety , Disp: , Rfl:     LORazepam (ATIVAN) 1 mg tablet, Take 1 mg by mouth 2 (two) times a day , Disp: , Rfl:     loxapine (LOXITANE) 25 mg capsule, TAKE TWO (2) CAPSULES BY MOUTH EVERY MORNING AND SIX (6) CAPSULES AT BEDTIME (Patient not taking: Reported on 11/19/2021 ), Disp: , Rfl:     lurasidone (Latuda) 80 mg tablet, Take 40 mg by mouth 2 (two) times a day  , Disp: , Rfl:     melatonin 3 mg, Take 1 tablet (3 mg total) by mouth daily at bedtime, Disp: 30 tablet, Rfl: 2    Menthol-Methyl Salicylate (MURIEL AGUDELO GREASELESS) 10-15 % greaseless cream, Apply topically daily at bedtime, Disp: 30 g, Rfl: 3    multivitamin (THERAGRAN) TABS, Take 1 tablet by mouth daily, Disp: 30 tablet, Rfl: 5    ofloxacin (FLOXIN) 0 3 % otic solution, Administer 5 drops to the right ear 2 (two) times a day, Disp: 5 mL, Rfl: 0    PHENobarbital 64 8 mg tablet, Take 1 tablet (64 8 mg total) by mouth 2 (two) times a day, Disp: 60 tablet, Rfl: 2    polyethylene glycol (GLYCOLAX) 17 GM/SCOOP powder, Take 17 g by mouth daily, Disp: 850 g, Rfl: 2    Saphris 10 MG SL tablet, Place 1 tablet (10 mg total) under the tongue 2 (two) times a day, Disp: 60 tablet, Rfl: 1    senna-docusate sodium (SENOKOT-S) 8 6-50 mg per tablet, Take 1 tablet by mouth daily, Disp: , Rfl:     sodium chloride (OCEAN) 0 65 % nasal spray, 1 spray into each nostril as needed for congestion TAKE UP TO 6 TIMES DAILY AS NEEDED, Disp: , Rfl:     traZODone (DESYREL) 150 mg tablet, Take 1 tablet by mouth daily at bedtime , Disp: , Rfl:     trihexyphenidyl (ARTANE) 5 mg tablet, Take 5 mg by mouth 2 (two) times a day , Disp: , Rfl: Allergies   Allergen Reactions    Clozapine Other (See Comments)     low white blood count  Other reaction(s): Other (See Comments)  low white blood count    Haloperidol Other (See Comments)     EPS  Other reaction(s): Other (See Comments)  EPS    Lithium Other (See Comments)     Toxicity    Prolixin [Fluphenazine] Hyperactivity and Other (See Comments)     EPS, Hyperactivity  EPS, Hyperactivity    Valproic Acid Confusion and Other (See Comments)     "Mental confusion"  "Mental confusion"       Social History   Past Surgical History:   Procedure Laterality Date    COLONOSCOPY      complete    AR COLONOSCOPY FLX DX W/COLLJ SPEC WHEN PFRMD N/A 8/30/2018    Procedure: COLONOSCOPY with polypectomies;  Surgeon: Sherry Sethi MD;  Location: [facility]; Service: Gastroenterology     Family History   Problem Relation Age of Onset    No Known Problems Mother     No Known Problems Father     Lung disease Other         mesothelioma    No Known Problems Maternal Grandmother     No Known Problems Maternal Grandfather     No Known Problems Paternal Grandmother     No Known Problems Paternal Grandfather     No Known Problems Sister     No Known Problems Sister     Kidney failure Brother     No Known Problems Maternal Aunt     No Known Problems Maternal Aunt     No Known Problems Maternal Aunt     No Known Problems Maternal Aunt     No Known Problems Paternal Aunt     No Known Problems Paternal Aunt        Objective:  /76 (BP Location: Left arm, Patient Position: Sitting, Cuff Size: Large)   Pulse 72   Temp 98 3 °F (36 8 °C) (Temporal)   Ht 5' 4" (1 626 m)   Wt 95 3 kg (210 lb)   SpO2 96%   BMI 36 05 kg/m²   Body mass index is 36 05 kg/m²  Physical Exam  Constitutional:       Appearance: She is well-developed  HENT:      Head: Normocephalic  Right Ear: External ear normal       Left Ear: External ear normal       Nose: No rhinorrhea        Mouth/Throat:      Pharynx: No posterior oropharyngeal erythema  Eyes:      General: No scleral icterus  Pupils: Pupils are equal, round, and reactive to light  Neck:      Thyroid: No thyromegaly  Trachea: No tracheal deviation  Cardiovascular:      Rate and Rhythm: Normal rate and regular rhythm  Heart sounds: Normal heart sounds  No murmur heard  Pulmonary:      Effort: Pulmonary effort is normal  No respiratory distress  Breath sounds: Normal breath sounds  Chest:      Chest wall: No tenderness  Abdominal:      General: Bowel sounds are normal       Palpations: Abdomen is soft  There is no mass  Tenderness: There is no abdominal tenderness  Musculoskeletal:         General: Normal range of motion  Cervical back: Normal range of motion and neck supple  Right lower leg: No edema  Left lower leg: No edema  Lymphadenopathy:      Cervical: No cervical adenopathy  Skin:     General: Skin is warm  Findings: No lesion or rash  Neurological:      Mental Status: She is alert and oriented to person, place, and time  Cranial Nerves: No cranial nerve deficit  Psychiatric:         Mood and Affect: Mood normal       Comments: Appears more apprehensive today

## 2022-02-16 NOTE — PATIENT INSTRUCTIONS
Osteoporosis Education   Osteoporosis  is a long-term medical condition that causes your bones to become weak, brittle, and more likely to fracture  Osteoporosis occurs when your body absorbs more bone than it makes  It is also caused by a lack of calcium and estrogen (female hormone)  Common symptoms include the following: You may not have any signs or symptoms  You may break a bone after a muscle strain, bump, or fall  A break usually occurs in the hip, spine, or wrist  A collapsed vertebra (bone in your spine) may cause severe back pain or loss of height from bent posture  Call your doctor if:   ·  You have severe pain  ·  You have increasing pain after a fall  ·  You have pain when you do your daily activities  ·  You have questions or concerns about your condition or care  Diagnosis of osteoporosis:   · Blood and urine tests  measure your calcium, vitamin D, and estrogen levels  · An x-ray or CT may show thinned bones or a fracture  You may be given contrast liquid to help the bones show up better in the pictures  Tell the healthcare provider if you have ever had an allergic reaction to contrast liquid  Do not enter the MRI room with anything metal  Metal can cause serious injury  Tell the healthcare provider if you have any metal in or on your body  · A bone density test  compares your bone thickness with what is expected for someone of your age, gender, and ethnicity  Treatment for osteoporosis may include medicines to prevent bone loss, build new bone, and increase estrogen  These medicines help prevent fractures and may be given as a pill or injection  Ask your healthcare provider for more information on these medicines  Prevent bone loss:  · Eat healthy foods that are high in calcium  This helps keep your bones strong  Good sources of calcium are milk, cheese, broccoli, tofu, almonds, and canned salmon and sardines  Recommended to get at least 1200mg daily of calcium    · Increase your vitamin D intake  Vitamin D is in fish oils, some vegetables, and fortified milk, cereal, and bread  Vitamin D is also formed in the skin when it is exposed to the sun  Ask your healthcare provider how much sunlight is safe for you  You will require at least 800 units of vitamin D daily taken as a supplement  · Drink liquids as directed  Ask your healthcare provider how much liquid to drink each day and which liquids are best for you  Do not have alcohol or caffeine  They decrease bone mineral density, which can weaken your bones  · Exercise regularly  Ask your healthcare provider about the best exercise plan for you  Weight bearing exercise for 30 minutes, 3 times a week can help build and strengthen bone  · Do not smoke  Nicotine and other chemicals in cigarettes and cigars can cause lung damage  Ask your healthcare provider for information if you currently smoke and need help to quit  E-cigarettes or smokeless tobacco still contain nicotine  Talk to your healthcare provider before you use these products  · Go to physical therapy as directed  A physical therapist teaches you exercises to help improve movement and muscle strength  · Alcohol  It is recommended to avoid heavy alcohol use as increased consumption of alcohol is known to cause bone loss  © Copyright [employer] 2021 Information is for End User's use only and may not be sold, redistributed or otherwise used for commercial purposes   All illustrations and images included in CareNotes® are the copyrighted property of A D A M , Inc  or [address] [place] [address]

## 2022-02-16 NOTE — ASSESSMENT & PLAN NOTE
Patient appears more apprehensive today  Advised to follow-up with psychiatrist as soon as possible

## 2022-02-25 DIAGNOSIS — J30.2 SEASONAL ALLERGIES: ICD-10-CM

## 2022-02-25 DIAGNOSIS — R09.89 CHEST CONGESTION: Primary | ICD-10-CM

## 2022-02-25 DIAGNOSIS — M81.0 OSTEOPOROSIS, UNSPECIFIED OSTEOPOROSIS TYPE, UNSPECIFIED PATHOLOGICAL FRACTURE PRESENCE: ICD-10-CM

## 2022-02-25 RX ORDER — FLUTICASONE PROPIONATE 50 MCG
2 SPRAY, SUSPENSION (ML) NASAL DAILY
Qty: 16 G | Refills: 2 | Status: SHIPPED | OUTPATIENT
Start: 2022-02-25 | End: 2022-06-27 | Stop reason: SDUPTHER

## 2022-02-25 RX ORDER — ALENDRONATE SODIUM 70 MG/1
70 TABLET ORAL
Qty: 4 TABLET | Refills: 3 | Status: SHIPPED | OUTPATIENT
Start: 2022-02-25 | End: 2022-06-17

## 2022-02-25 RX ORDER — DEXTROMETHORPHAN POLISTIREX 30 MG/5ML
5 SUSPENSION ORAL 2 TIMES DAILY PRN
Qty: 89 ML | Refills: 2 | Status: SHIPPED | OUTPATIENT
Start: 2022-02-25 | End: 2022-05-24 | Stop reason: SDUPTHER

## 2022-03-10 ENCOUNTER — TELEPHONE (OUTPATIENT)
Dept: INTERNAL MEDICINE CLINIC | Facility: OTHER | Age: 67
End: 2022-03-10

## 2022-03-10 DIAGNOSIS — M25.561 ACUTE PAIN OF RIGHT KNEE: ICD-10-CM

## 2022-03-10 RX ORDER — ACETAMINOPHEN 500 MG
1000 TABLET ORAL 3 TIMES DAILY
Qty: 180 TABLET | Refills: 1 | Status: SHIPPED | OUTPATIENT
Start: 2022-03-10 | End: 2022-03-17 | Stop reason: SDUPTHER

## 2022-03-14 ENCOUNTER — TELEPHONE (OUTPATIENT)
Dept: INTERNAL MEDICINE CLINIC | Age: 67
End: 2022-03-14

## 2022-03-14 NOTE — TELEPHONE ENCOUNTER
Patient's group home, [facility],  received patient's prescription for acetaminophen  Not prescribed as PRN  Wants to verify if this medication should have been prescribed as PRN for patient? Please advise

## 2022-03-17 ENCOUNTER — TELEPHONE (OUTPATIENT)
Dept: INTERNAL MEDICINE CLINIC | Facility: CLINIC | Age: 67
End: 2022-03-17

## 2022-03-17 DIAGNOSIS — M25.561 ACUTE PAIN OF RIGHT KNEE: ICD-10-CM

## 2022-03-17 RX ORDER — ACETAMINOPHEN 500 MG
1000 TABLET ORAL 3 TIMES DAILY PRN
Qty: 180 TABLET | Refills: 1
Start: 2022-03-17

## 2022-03-17 NOTE — TELEPHONE ENCOUNTER
Patients Prescription for Tylonol 500 mg 2 tablets 3 times a day, should be PRN       Please be noted in her file    There was a message from [facility]

## 2022-03-21 NOTE — TELEPHONE ENCOUNTER
Her home has a question about her tylenol prn   Please call to address, ask for  juan carlos # [phone]

## 2022-03-22 NOTE — TELEPHONE ENCOUNTER
Care giver Ambika stated  That on 3/10/22 they received an order for Tylenol 100 mg< PO three times daily around the clock and they also have a PRN order for Tylenol 1000 mg every 8 hours as needed for mild pain or fever  They are asking for clarification of the order  She does not need the med around the clock at this time  I called [facility] and discontinued the TID order and re started the PRN order  They will inform the group home of the clarification

## 2022-05-11 ENCOUNTER — RA CDI HCC (OUTPATIENT)
Dept: OTHER | Facility: HOSPITAL | Age: 67
End: 2022-05-11

## 2022-05-11 NOTE — PROGRESS NOTES
[address]  coding opportunities       Chart reviewed, no opportunity found: CHART REVIEWED, NO OPPORTUNITY FOUND        Patients Insurance     Medicare Insurance: Medicare

## 2022-05-18 ENCOUNTER — OFFICE VISIT (OUTPATIENT)
Dept: INTERNAL MEDICINE CLINIC | Age: 67
End: 2022-05-18
Payer: MEDICARE

## 2022-05-18 VITALS
BODY MASS INDEX: 35.29 KG/M2 | DIASTOLIC BLOOD PRESSURE: 60 MMHG | TEMPERATURE: 98.3 F | WEIGHT: 206.7 LBS | HEIGHT: 64 IN | SYSTOLIC BLOOD PRESSURE: 94 MMHG | HEART RATE: 72 BPM | OXYGEN SATURATION: 96 %

## 2022-05-18 DIAGNOSIS — F41.8 DEPRESSION WITH ANXIETY: ICD-10-CM

## 2022-05-18 DIAGNOSIS — E78.2 MIXED HYPERLIPIDEMIA: ICD-10-CM

## 2022-05-18 DIAGNOSIS — G40.909 SEIZURE DISORDER (HCC): ICD-10-CM

## 2022-05-18 DIAGNOSIS — F25.9 SCHIZOAFFECTIVE DISORDER, UNSPECIFIED TYPE (HCC): ICD-10-CM

## 2022-05-18 DIAGNOSIS — E03.9 ACQUIRED HYPOTHYROIDISM: Primary | ICD-10-CM

## 2022-05-18 DIAGNOSIS — I10 BENIGN ESSENTIAL HYPERTENSION: ICD-10-CM

## 2022-05-18 DIAGNOSIS — R73.03 PRE-DIABETES: ICD-10-CM

## 2022-05-18 DIAGNOSIS — F11.20 CONTINUOUS OPIOID DEPENDENCE (HCC): ICD-10-CM

## 2022-05-18 DIAGNOSIS — E87.1 HYPONATREMIA: ICD-10-CM

## 2022-05-18 DIAGNOSIS — E66.01 OBESITY, MORBID (HCC): ICD-10-CM

## 2022-05-18 PROCEDURE — 99214 OFFICE O/P EST MOD 30 MIN: CPT | Performed by: INTERNAL MEDICINE

## 2022-05-18 NOTE — PROGRESS NOTES
Assessment/Plan:    1  Hyponatremia  Sodium is 128  Which is chronic  Likely secondary to psych meds  Will repeat electrolytes next week  2  Hypothyroidism  Thyroid function test is in normal range  Will continue with present dose of levothyroxine    3  Hyperlipidemia  Lipid profile is in acceptable range  Continue with atorvastatin 20 mg daily along with low-fat diet    4  Seizure disorder  Patient is seizure-free  Being followed by neurologist    5  Schizophrenia/schizoaffective disorder  Being managed by psychiatrist    6  Prediabetes  Fasting blood sugar in normal range  Previous hemoglobin A1c was about 5 months ago  Will continue to monitor  Diagnoses and all orders for this visit:    Acquired hypothyroidism    Benign essential hypertension    Seizure disorder ([facility] )    Schizoaffective disorder, unspecified type ([facility] )    Mixed hyperlipidemia    Pre-diabetes    Obesity, morbid ([facility] )    Depression with anxiety    Hyponatremia  -     Basic metabolic panel; Future    Continuous opioid dependence ([facility] )               Subjective:          Patient ID: Coni Reyna is a 74 y o  female  Patient is here for regular follow-up  Neck does have blood work done and would like to discuss results  The following portions of the patient's history were reviewed and updated as appropriate: allergies, current medications, past family history, past medical history, past social history, past surgical history and problem list     Review of Systems   Constitutional: Negative for fatigue and fever  HENT: Negative for congestion, ear discharge, ear pain, postnasal drip, sinus pressure, sore throat, tinnitus and trouble swallowing  Eyes: Negative for discharge, itching and visual disturbance  Respiratory: Negative for cough and shortness of breath  Cardiovascular: Negative for chest pain and palpitations  Gastrointestinal: Negative for abdominal pain, diarrhea, nausea and vomiting     Endocrine: Negative for cold intolerance and polyuria  Genitourinary: Negative for difficulty urinating, dysuria and urgency  Musculoskeletal: Negative for arthralgias and neck pain  Skin: Negative for rash  Allergic/Immunologic: Negative for environmental allergies  Neurological: Negative for dizziness, weakness and headaches  Psychiatric/Behavioral: Negative for agitation  The patient is not nervous/anxious            Past Medical History:   Diagnosis Date    Benign essential hypertension     resolved; 06/15/16    Depression with anxiety     Fracture of fifth metatarsal bone of right foot with delayed healing     last assessed 04/12/16; fracture of metatarsal bone, right, closed, initial encounter    Hyperlipidemia     last assessed 11/28/17    Hypothyroidism     last assessed 11/28/2017    Insomnia     Obesity     Schizoaffective disorder ([facility] )     last assessed 05/18/2017    Seizure disorder ([facility] )     last assessed 03/28/17         Current Outpatient Medications:     acetaminophen (TYLENOL) 500 mg tablet, Take 2 tablets (1,000 mg total) by mouth 3 (three) times a day as needed for mild pain or fever, Disp: 180 tablet, Rfl: 1    alendronate (FOSAMAX) 70 mg tablet, Take 1 tablet (70 mg total) by mouth every 7 days TAKE 1 TABLET BY MOUTH DAILY ON FRIDAY, Disp: 4 tablet, Rfl: 3    alendronate-cholecalciferol (FOSAMAX PLUS D)  MG-UNIT per tablet, Take 1 tablet by mouth Once a week (Patient not taking: Reported on 12/27/2021 ), Disp: , Rfl:     aspirin (ECOTRIN LOW STRENGTH) 81 mg EC tablet, Take 1 tablet (81 mg total) by mouth daily, Disp: 90 tablet, Rfl: 1    atorvastatin (LIPITOR) 20 mg tablet, Take 1 tablet (20 mg total) by mouth daily, Disp: 90 tablet, Rfl: 1    bismuth subsalicylate (PEPTO BISMOL) 262 MG chewable tablet, Chew 2 tablets (524 mg total) every 6 (six) hours as needed for diarrhea, Disp: 30 tablet, Rfl: 0    busPIRone (BUSPAR) 15 mg tablet, Take 15 mg by mouth 2 (two) times a day , Disp: , Rfl:     calcium carbonate-vitamin D (OSCAL-D) 500 mg-200 units per tablet, Take 1 tablet by mouth 2 (two) times a day with meals (Patient not taking: Reported on 2/16/2022 ), Disp: 60 tablet, Rfl: 5    cetirizine (ZyrTEC) 5 MG tablet, Take 1 tablet (5 mg total) by mouth as needed for allergies (As needed at night p r n  for allergies), Disp: 90 tablet, Rfl: 1    Dextromethorphan Polistirex ER (Delsym) 30 MG/5ML SUER, Take 5 mL (30 mg total) by mouth 2 (two) times a day as needed (congestion), Disp: 89 mL, Rfl: 2    diclofenac sodium (VOLTAREN) 1 %, Apply 2 g topically 4 (four) times a day, Disp: 1 Tube, Rfl: 1    diphenhydrAMINE (BENADRYL) 50 mg capsule, Take 1 capsule (50 mg total) by mouth daily at bedtime as needed for sleep, Disp: 90 capsule, Rfl: 2    divalproex sodium (DEPAKOTE) 500 mg EC tablet, 1 tab in the Am, 3 tabs(1500mg) at bedtime, Disp: , Rfl:     docusate sodium (COLACE) 100 mg capsule, Take 1 capsule (100 mg total) by mouth 2 (two) times a day as needed for constipation, Disp: 180 capsule, Rfl: 1    fluticasone (FLONASE) 50 mcg/act nasal spray, 2 sprays into each nostril daily, Disp: 16 g, Rfl: 2    gabapentin (NEURONTIN) 100 mg capsule, Take 200 mg by mouth 2 (two) times a day  , Disp: , Rfl:     HYDROcodone-acetaminophen (XODOL) 5-300 MG per tablet, Take 1 tablet by mouth every 4 (four) hours as needed for mild pain, Disp: , Rfl:     L-Theanine 100 MG CAPS, Take 200 mg by mouth 2 (two) times a day  , Disp: , Rfl:     levothyroxine 100 mcg tablet, Take 1 tablet (100 mcg total) by mouth daily, Disp: 90 tablet, Rfl: 1    LORazepam (ATIVAN) 0 5 mg tablet, Take 0 5 mg by mouth every 12 (twelve) hours as needed for anxiety , Disp: , Rfl:     LORazepam (ATIVAN) 1 mg tablet, Take 1 mg by mouth 2 (two) times a day , Disp: , Rfl:     loxapine (LOXITANE) 25 mg capsule, TAKE TWO (2) CAPSULES BY MOUTH EVERY MORNING AND SIX (6) CAPSULES AT BEDTIME (Patient not taking: Reported on 11/19/2021 ), Disp: , Rfl:     lurasidone (Latuda) 80 mg tablet, Take 40 mg by mouth 2 (two) times a day  , Disp: , Rfl:     melatonin 3 mg, Take 1 tablet (3 mg total) by mouth daily at bedtime, Disp: 30 tablet, Rfl: 2    Menthol-Methyl Salicylate (MURIEL AGUDELO GREASELESS) 10-15 % greaseless cream, Apply topically daily at bedtime, Disp: 30 g, Rfl: 3    multivitamin (THERAGRAN) TABS, Take 1 tablet by mouth daily (Patient not taking: Reported on 2/16/2022 ), Disp: 30 tablet, Rfl: 5    ofloxacin (FLOXIN) 0 3 % otic solution, Administer 5 drops to the right ear 2 (two) times a day, Disp: 5 mL, Rfl: 0    PHENobarbital 64 8 mg tablet, TAKE 1 TABLET BY MOUTH 2X DAILY @8AM-8PM(SEIZURES), Disp: 60 tablet, Rfl: 2    polyethylene glycol (GLYCOLAX) 17 GM/SCOOP powder, Take 17 g by mouth daily, Disp: 850 g, Rfl: 2    Saphris 10 MG SL tablet, Place 1 tablet (10 mg total) under the tongue 2 (two) times a day, Disp: 60 tablet, Rfl: 1    senna-docusate sodium (SENOKOT-S) 8 6-50 mg per tablet, Take 1 tablet by mouth daily, Disp: , Rfl:     sodium chloride (OCEAN) 0 65 % nasal spray, 1 spray into each nostril as needed for congestion TAKE UP TO 6 TIMES DAILY AS NEEDED, Disp: , Rfl:     traZODone (DESYREL) 150 mg tablet, Take 1 tablet by mouth daily at bedtime , Disp: , Rfl:     trihexyphenidyl (ARTANE) 5 mg tablet, Take 5 mg by mouth 2 (two) times a day , Disp: , Rfl:     Allergies   Allergen Reactions    Clozapine Other (See Comments)     low white blood count  Other reaction(s): Other (See Comments)  low white blood count    Haloperidol Other (See Comments)     EPS  Other reaction(s):  Other (See Comments)  EPS    Lithium Other (See Comments)     Toxicity    Prolixin [Fluphenazine] Hyperactivity and Other (See Comments)     EPS, Hyperactivity  EPS, Hyperactivity    Valproic Acid Confusion and Other (See Comments)     "Mental confusion"  "Mental confusion"       Social History   Past Surgical History:   Procedure Laterality Date    COLONOSCOPY      complete    AR COLONOSCOPY FLX DX W/COLLJ SPEC WHEN PFRMD N/A 8/30/2018    Procedure: COLONOSCOPY with polypectomies;  Surgeon: Saundra Mccollum MD;  Location: [facility]; Service: Gastroenterology     Family History   Problem Relation Age of Onset    No Known Problems Mother     No Known Problems Father     Lung disease Other         mesothelioma    No Known Problems Maternal Grandmother     No Known Problems Maternal Grandfather     No Known Problems Paternal Grandmother     No Known Problems Paternal Grandfather     No Known Problems Sister     No Known Problems Sister     Kidney failure Brother     No Known Problems Maternal Aunt     No Known Problems Maternal Aunt     No Known Problems Maternal Aunt     No Known Problems Maternal Aunt     No Known Problems Paternal Aunt     No Known Problems Paternal Aunt        Objective:  BP 94/60 (BP Location: Left arm, Patient Position: Sitting, Cuff Size: Large)   Pulse 72   Temp 98 3 °F (36 8 °C) (Temporal)   Ht 5' 4" (1 626 m)   Wt 93 8 kg (206 lb 11 2 oz)   SpO2 96%   BMI 35 48 kg/m²   Body mass index is 35 48 kg/m²  Physical Exam  Constitutional:       Appearance: She is well-developed  HENT:      Head: Normocephalic  Right Ear: External ear normal       Left Ear: External ear normal       Nose: No rhinorrhea  Mouth/Throat:      Pharynx: No posterior oropharyngeal erythema  Eyes:      General: No scleral icterus  Pupils: Pupils are equal, round, and reactive to light  Neck:      Thyroid: No thyromegaly  Trachea: No tracheal deviation  Cardiovascular:      Rate and Rhythm: Normal rate and regular rhythm  Heart sounds: Normal heart sounds  No murmur heard  No gallop  Comments: Trace bilateral lower extremity edema present  Pulmonary:      Effort: Pulmonary effort is normal  No respiratory distress  Breath sounds: Normal breath sounds     Chest:      Chest wall: No tenderness  Abdominal:      General: Bowel sounds are normal       Palpations: Abdomen is soft  There is no mass  Tenderness: There is no abdominal tenderness  Musculoskeletal:         General: Normal range of motion  Cervical back: Normal range of motion and neck supple  Right lower leg: Edema present  Left lower leg: Edema present  Lymphadenopathy:      Cervical: No cervical adenopathy  Skin:     General: Skin is warm  Neurological:      Mental Status: She is alert and oriented to person, place, and time  Cranial Nerves: No cranial nerve deficit  Psychiatric:         Mood and Affect: Mood normal          Behavior: Behavior normal          Thought Content:  Thought content normal

## 2022-05-24 DIAGNOSIS — R09.89 CHEST CONGESTION: ICD-10-CM

## 2022-05-24 RX ORDER — DEXTROMETHORPHAN POLISTIREX 30 MG/5ML
5 SUSPENSION ORAL 2 TIMES DAILY PRN
Qty: 89 ML | Refills: 1 | Status: SHIPPED | OUTPATIENT
Start: 2022-05-24

## 2022-05-24 NOTE — TELEPHONE ENCOUNTER
LOV 5/18/22  NOV 8/25/22      [facility] called and requested refill for cough syrup- Staff states pt asks for cough syrup daily  Last filled 2/25/22-- Will you refill?   Please advise

## 2022-06-13 DIAGNOSIS — M25.561 ACUTE PAIN OF RIGHT KNEE: Primary | ICD-10-CM

## 2022-06-17 DIAGNOSIS — M81.0 OSTEOPOROSIS, UNSPECIFIED OSTEOPOROSIS TYPE, UNSPECIFIED PATHOLOGICAL FRACTURE PRESENCE: ICD-10-CM

## 2022-06-17 RX ORDER — ALENDRONATE SODIUM 70 MG/1
TABLET ORAL
Qty: 4 TABLET | Refills: 0 | Status: SHIPPED | OUTPATIENT
Start: 2022-06-17

## 2022-06-27 DIAGNOSIS — J30.2 SEASONAL ALLERGIES: ICD-10-CM

## 2022-06-27 RX ORDER — FLUTICASONE PROPIONATE 50 MCG
2 SPRAY, SUSPENSION (ML) NASAL DAILY
Qty: 16 G | Refills: 2 | Status: SHIPPED | OUTPATIENT
Start: 2022-06-27

## 2022-06-27 NOTE — TELEPHONE ENCOUNTER
Patient would need to following medication refilled and sent to [address] [place] [address] department    Fluticasone 50 mcg nasal spray  2 sprays each nostril daily    NOV: 08/08/2022

## 2022-07-08 DIAGNOSIS — I10 BENIGN ESSENTIAL HYPERTENSION: ICD-10-CM

## 2022-07-08 DIAGNOSIS — E03.9 ACQUIRED HYPOTHYROIDISM: ICD-10-CM

## 2022-07-08 DIAGNOSIS — M81.0 OSTEOPOROSIS, UNSPECIFIED OSTEOPOROSIS TYPE, UNSPECIFIED PATHOLOGICAL FRACTURE PRESENCE: ICD-10-CM

## 2022-07-08 RX ORDER — ASPIRIN 81 MG/1
81 TABLET ORAL DAILY
Qty: 90 TABLET | Refills: 1 | Status: SHIPPED | OUTPATIENT
Start: 2022-07-08 | End: 2022-09-19 | Stop reason: ALTCHOICE

## 2022-07-08 RX ORDER — DIPHENOXYLATE HYDROCHLORIDE AND ATROPINE SULFATE 2.5; .025 MG/1; MG/1
1 TABLET ORAL DAILY
Qty: 90 TABLET | Refills: 1 | Status: SHIPPED | OUTPATIENT
Start: 2022-07-08

## 2022-07-08 RX ORDER — LEVOTHYROXINE SODIUM 0.1 MG/1
100 TABLET ORAL DAILY
Qty: 90 TABLET | Refills: 1 | Status: SHIPPED | OUTPATIENT
Start: 2022-07-08

## 2022-07-11 ENCOUNTER — TELEPHONE (OUTPATIENT)
Dept: INTERNAL MEDICINE CLINIC | Facility: OTHER | Age: 67
End: 2022-07-11

## 2022-07-13 ENCOUNTER — OFFICE VISIT (OUTPATIENT)
Dept: INTERNAL MEDICINE CLINIC | Age: 67
End: 2022-07-13
Payer: MEDICARE

## 2022-07-13 VITALS
TEMPERATURE: 98.6 F | SYSTOLIC BLOOD PRESSURE: 136 MMHG | WEIGHT: 208.8 LBS | HEART RATE: 92 BPM | HEIGHT: 64 IN | DIASTOLIC BLOOD PRESSURE: 82 MMHG | OXYGEN SATURATION: 98 % | BODY MASS INDEX: 35.65 KG/M2

## 2022-07-13 DIAGNOSIS — M81.0 OSTEOPOROSIS, UNSPECIFIED OSTEOPOROSIS TYPE, UNSPECIFIED PATHOLOGICAL FRACTURE PRESENCE: ICD-10-CM

## 2022-07-13 DIAGNOSIS — E87.1 HYPONATREMIA: ICD-10-CM

## 2022-07-13 DIAGNOSIS — F25.9 SCHIZOAFFECTIVE DISORDER, UNSPECIFIED TYPE (HCC): ICD-10-CM

## 2022-07-13 DIAGNOSIS — F41.8 DEPRESSION WITH ANXIETY: ICD-10-CM

## 2022-07-13 DIAGNOSIS — E03.9 ACQUIRED HYPOTHYROIDISM: Primary | ICD-10-CM

## 2022-07-13 DIAGNOSIS — I10 BENIGN ESSENTIAL HYPERTENSION: ICD-10-CM

## 2022-07-13 DIAGNOSIS — G40.909 SEIZURE DISORDER (HCC): ICD-10-CM

## 2022-07-13 PROCEDURE — 99214 OFFICE O/P EST MOD 30 MIN: CPT | Performed by: INTERNAL MEDICINE

## 2022-07-13 NOTE — PROGRESS NOTES
Assessment/Plan:    1  Hyponatremia  Sodium is 132  Previous was 133  This is secondary to Tegretol  Will continue to monitor    2  Hyperlipidemia  Continue with present dose of Lipitor 20 mg daily along with low-fat diet    3  Seizure disorder  Continue with present regimen  Being managed by neurologist  4  Schizophrenia  Being managed by psychiatrist  5  Prediabetes  Continue with low sugar diet       Diagnoses and all orders for this visit:    Acquired hypothyroidism    Benign essential hypertension    Seizure disorder ([facility] [address] )    Osteoporosis, unspecified osteoporosis type, unspecified pathological fracture presence    Depression with anxiety    Schizoaffective disorder, unspecified type ([facility] [address] )    Hyponatremia  -     Comprehensive metabolic panel; Future            Falls Plan of Care: balance, strength, and gait training instructions were provided  Subjective:          Patient ID: Neva Sandoval is a 69 y o  female  Patient was seen at [facility] due to visual hallucination  Blood work except low sodium was unremarkable  And this morning she was seen by her psychiatrist without any changes to meds  The following portions of the patient's history were reviewed and updated as appropriate: allergies, current medications, past family history, past medical history, past social history, past surgical history and problem list     Review of Systems   Constitutional: Negative for fatigue and fever  HENT: Negative for congestion, ear discharge, ear pain, postnasal drip, sinus pressure, sore throat, tinnitus and trouble swallowing  Eyes: Negative for discharge, itching and visual disturbance  Respiratory: Negative for cough and shortness of breath  Cardiovascular: Negative for chest pain and palpitations  Gastrointestinal: Negative for abdominal pain, diarrhea, nausea and vomiting  Endocrine: Negative for cold intolerance and polyuria     Genitourinary: Negative for difficulty urinating, dysuria and urgency  Musculoskeletal: Negative for arthralgias and neck pain  Skin: Negative for rash  Allergic/Immunologic: Negative for environmental allergies  Neurological: Negative for dizziness, weakness and headaches  Psychiatric/Behavioral: Positive for hallucinations  The patient is not nervous/anxious            Past Medical History:   Diagnosis Date    Benign essential hypertension     resolved; 06/15/16    Depression with anxiety     Fracture of fifth metatarsal bone of right foot with delayed healing     last assessed 04/12/16; fracture of metatarsal bone, right, closed, initial encounter    Hyperlipidemia     last assessed 11/28/17    Hypothyroidism     last assessed 11/28/2017    Insomnia     Obesity     Schizoaffective disorder ([facility] [address] )     last assessed 05/18/2017    Seizure disorder ([facility] )     last assessed 03/28/17         Current Outpatient Medications:     acetaminophen (TYLENOL) 500 mg tablet, Take 2 tablets (1,000 mg total) by mouth 3 (three) times a day as needed for mild pain or fever (Patient taking differently: Take 1,000 mg by mouth as needed in the morning and 1,000 mg as needed in the evening for mild pain or fever ), Disp: 180 tablet, Rfl: 1    alendronate (FOSAMAX) 70 mg tablet, TAKE 1 TABLET BY MOUTH WEEKLY ON FRIDAYS @ 8AM (OSTEOPORPSIS), Disp: 4 tablet, Rfl: 0    alendronate-cholecalciferol (FOSAMAX PLUS D)  MG-UNIT per tablet, Take 1 tablet by mouth Once a week (Patient not taking: No sig reported), Disp: , Rfl:     aspirin (ECOTRIN LOW STRENGTH) 81 mg EC tablet, Take 1 tablet (81 mg total) by mouth daily, Disp: 90 tablet, Rfl: 1    atorvastatin (LIPITOR) 20 mg tablet, Take 1 tablet (20 mg total) by mouth daily, Disp: 90 tablet, Rfl: 1    bismuth subsalicylate (PEPTO BISMOL) 262 MG chewable tablet, Chew 2 tablets (524 mg total) every 6 (six) hours as needed for diarrhea, Disp: 30 tablet, Rfl: 0    busPIRone (BUSPAR) 15 mg tablet, Take 15 mg by mouth 2 (two) times a day , Disp: , Rfl:     calcium carbonate-vitamin D (OSCAL-D) 500 mg-200 units per tablet, Take 1 tablet by mouth 2 (two) times a day with meals, Disp: 180 tablet, Rfl: 1    cetirizine (ZyrTEC) 5 MG tablet, Take 1 tablet (5 mg total) by mouth as needed for allergies (As needed at night p r n  for allergies), Disp: 90 tablet, Rfl: 1    Dextromethorphan Polistirex ER (Delsym) 30 MG/5ML SUER, Take 5 mL (30 mg total) by mouth as needed in the morning and 5 mL (30 mg total) as needed in the evening (congestion)  , Disp: 89 mL, Rfl: 1    diclofenac sodium (VOLTAREN) 1 %, Apply 2 g topically 4 (four) times a day, Disp: 1 Tube, Rfl: 1    Diclofenac Sodium (VOLTAREN) 1 %, APPLY 2 GM TOPICALLY TO (R) KNEE 4X DAILY @[id number](PAIN), Disp: 100 g, Rfl: 0    diphenhydrAMINE (BENADRYL) 50 mg capsule, Take 1 capsule (50 mg total) by mouth daily at bedtime as needed for sleep, Disp: 90 capsule, Rfl: 2    divalproex sodium (DEPAKOTE) 500 mg EC tablet, 1 tab in the Am, 3 tabs(1500mg) at bedtime, Disp: , Rfl:     docusate sodium (COLACE) 100 mg capsule, Take 1 capsule (100 mg total) by mouth 2 (two) times a day as needed for constipation, Disp: 180 capsule, Rfl: 1    fluticasone (FLONASE) 50 mcg/act nasal spray, 2 sprays into each nostril daily, Disp: 16 g, Rfl: 2    gabapentin (NEURONTIN) 100 mg capsule, Take 200 mg by mouth 2 (two) times a day  , Disp: , Rfl:     HYDROcodone-acetaminophen (XODOL) 5-300 MG per tablet, Take 1 tablet by mouth every 4 (four) hours as needed for mild pain, Disp: , Rfl:     L-Theanine 100 MG CAPS, Take 200 mg by mouth 2 (two) times a day  , Disp: , Rfl:     levothyroxine 100 mcg tablet, Take 1 tablet (100 mcg total) by mouth daily, Disp: 90 tablet, Rfl: 1    LORazepam (ATIVAN) 0 5 mg tablet, Take 0 5 mg by mouth every 12 (twelve) hours as needed for anxiety , Disp: , Rfl:     LORazepam (ATIVAN) 1 mg tablet, Take 1 mg by mouth in the morning and 1 mg in the evening and 1 mg before bedtime  , Disp: , Rfl:     loxapine (LOXITANE) 25 mg capsule, TAKE TWO (2) CAPSULES BY MOUTH EVERY MORNING AND SIX (6) CAPSULES AT BEDTIME (Patient not taking: No sig reported), Disp: , Rfl:     lurasidone (LATUDA) 80 mg tablet, Take 40 mg by mouth 2 (two) times a day  , Disp: , Rfl:     melatonin 3 mg, Take 1 tablet (3 mg total) by mouth daily at bedtime, Disp: 30 tablet, Rfl: 2    Menthol-Methyl Salicylate (MURIEL AGUDELO GREASELESS) 10-15 % greaseless cream, Apply topically daily at bedtime, Disp: 30 g, Rfl: 3    multivitamin (THERAGRAN) TABS, Take 1 tablet by mouth daily, Disp: 90 tablet, Rfl: 1    ofloxacin (FLOXIN) 0 3 % otic solution, Administer 5 drops to the right ear 2 (two) times a day, Disp: 5 mL, Rfl: 0    PHENobarbital 64 8 mg tablet, TAKE 1 TABLET BY MOUTH 2X DAILY @8AM-8PM(SEIZURES), Disp: 60 tablet, Rfl: 2    polyethylene glycol (GLYCOLAX) 17 GM/SCOOP powder, Take 17 g by mouth daily, Disp: 850 g, Rfl: 2    Saphris 10 MG SL tablet, Place 1 tablet (10 mg total) under the tongue 2 (two) times a day, Disp: 60 tablet, Rfl: 1    senna-docusate sodium (SENOKOT-S) 8 6-50 mg per tablet, Take 1 tablet by mouth daily, Disp: , Rfl:     sodium chloride (OCEAN) 0 65 % nasal spray, 1 spray into each nostril as needed in the morning for congestion  TAKE UP TO 6 TIMES DAILY AS NEEDED  , Disp: , Rfl:     traZODone (DESYREL) 150 mg tablet, Take 1 tablet by mouth daily at bedtime , Disp: , Rfl:     trihexyphenidyl (ARTANE) 5 mg tablet, Take 5 mg by mouth 2 (two) times a day , Disp: , Rfl:     Allergies   Allergen Reactions    Clozapine Other (See Comments)     low white blood count  Other reaction(s): Other (See Comments)  low white blood count    Haloperidol Other (See Comments)     EPS  Other reaction(s):  Other (See Comments)  EPS    Lithium Other (See Comments)     Toxicity    Prolixin [Fluphenazine] Hyperactivity and Other (See Comments)     EPS, Hyperactivity  EPS, Hyperactivity    Valproic Acid Confusion and Other (See Comments)     "Mental confusion"  "Mental confusion"       Social History   Past Surgical History:   Procedure Laterality Date    COLONOSCOPY      complete    CA COLONOSCOPY FLX DX W/COLLJ SPEC WHEN PFRMD N/A 8/30/2018    Procedure: COLONOSCOPY with polypectomies;  Surgeon: Ghulam Escamilla MD;  Location: [facility]; Service: Gastroenterology     Family History   Problem Relation Age of Onset    No Known Problems Mother     No Known Problems Father     Lung disease Other         mesothelioma    No Known Problems Maternal Grandmother     No Known Problems Maternal Grandfather     No Known Problems Paternal Grandmother     No Known Problems Paternal Grandfather     No Known Problems Sister     No Known Problems Sister     Kidney failure Brother     No Known Problems Maternal Aunt     No Known Problems Maternal Aunt     No Known Problems Maternal Aunt     No Known Problems Maternal Aunt     No Known Problems Paternal Aunt     No Known Problems Paternal Aunt        Objective:  /82 (BP Location: Left arm, Patient Position: Sitting, Cuff Size: Large)   Pulse 92   Temp 98 6 °F (37 °C) (Temporal)   Ht 5' 4" (1 626 m)   Wt 94 7 kg (208 lb 12 8 oz)   SpO2 98%   BMI 35 84 kg/m²   Body mass index is 35 84 kg/m²  Physical Exam  Constitutional:       Appearance: She is well-developed  She is obese  HENT:      Head: Normocephalic  Right Ear: There is no impacted cerumen  Left Ear: There is no impacted cerumen  Mouth/Throat:      Pharynx: No posterior oropharyngeal erythema  Eyes:      General: No scleral icterus  Pupils: Pupils are equal, round, and reactive to light  Neck:      Thyroid: No thyromegaly  Trachea: No tracheal deviation  Cardiovascular:      Rate and Rhythm: Normal rate and regular rhythm  Heart sounds: Normal heart sounds  Pulmonary:      Effort: Pulmonary effort is normal  No respiratory distress  Breath sounds: Normal breath sounds  Chest:      Chest wall: No tenderness  Abdominal:      General: Bowel sounds are normal       Palpations: Abdomen is soft  There is no mass  Tenderness: There is no abdominal tenderness  Musculoskeletal:         General: Normal range of motion  Cervical back: Normal range of motion and neck supple  Right lower leg: No edema  Left lower leg: No edema  Lymphadenopathy:      Cervical: No cervical adenopathy  Skin:     General: Skin is warm  Neurological:      Mental Status: She is alert and oriented to person, place, and time  Cranial Nerves: No cranial nerve deficit

## 2022-07-15 ENCOUNTER — TELEPHONE (OUTPATIENT)
Dept: INTERNAL MEDICINE CLINIC | Facility: OTHER | Age: 67
End: 2022-07-15

## 2022-07-15 NOTE — TELEPHONE ENCOUNTER
Access services called to make aware that her rx hydrocodone and pepto-bismol tablet--  they are looking to see if new one can be sent   [facility] pharmacy       [phone] [id number] from access services

## 2022-07-18 NOTE — TELEPHONE ENCOUNTER
Spoke to access care and let know know this is no longer on medication list and Dr Steve Miller is not the ordering provider

## 2022-07-18 NOTE — TELEPHONE ENCOUNTER
Please advise message form [facility]  I do not see that patient is taking these medications at this time  I also do not see any documentation that medications have been discontinued   Please advise thank you

## 2022-08-04 DIAGNOSIS — G40.909 SEIZURE DISORDER (HCC): ICD-10-CM

## 2022-08-04 RX ORDER — PHENOBARBITAL 64.8 MG/1
TABLET ORAL
Qty: 62 TABLET | Refills: 0 | Status: SHIPPED | OUTPATIENT
Start: 2022-08-04 | End: 2022-08-24 | Stop reason: SDUPTHER

## 2022-08-13 ENCOUNTER — NURSE TRIAGE (OUTPATIENT)
Dept: OTHER | Facility: OTHER | Age: 67
End: 2022-08-13

## 2022-08-13 NOTE — TELEPHONE ENCOUNTER
Spoke with facility who stated there was a med refill placed and they will follow up with the office on Monday  No further questions at this time

## 2022-08-13 NOTE — TELEPHONE ENCOUNTER
Regarding: COUgh medicine   ----- Message from Tc Mcgill sent at 8/13/2022 10:27 AM EDT -----  "My client needs cough medicine and the last time she had delysm "

## 2022-08-15 DIAGNOSIS — R09.89 CHEST CONGESTION: ICD-10-CM

## 2022-08-15 RX ORDER — DEXTROMETHORPHAN POLISTIREX 30 MG/5ML
5 SUSPENSION ORAL 2 TIMES DAILY PRN
Qty: 89 ML | Refills: 1 | Status: SHIPPED | OUTPATIENT
Start: 2022-08-15

## 2022-08-15 NOTE — TELEPHONE ENCOUNTER
Patient continues with cough and is in need of a refill on Delsome prn     Is refill appropriate for patient please advise     7/13/2022 8/25/2022

## 2022-08-16 DIAGNOSIS — M81.0 OSTEOPOROSIS, UNSPECIFIED OSTEOPOROSIS TYPE, UNSPECIFIED PATHOLOGICAL FRACTURE PRESENCE: ICD-10-CM

## 2022-08-16 RX ORDER — ALENDRONATE SODIUM 70 MG/1
TABLET ORAL
Qty: 4 TABLET | Refills: 0 | Status: SHIPPED | OUTPATIENT
Start: 2022-08-16 | End: 2022-09-12 | Stop reason: SDUPTHER

## 2022-08-22 ENCOUNTER — RA CDI HCC (OUTPATIENT)
Dept: OTHER | Facility: HOSPITAL | Age: 67
End: 2022-08-22

## 2022-08-23 DIAGNOSIS — M25.561 ACUTE PAIN OF RIGHT KNEE: ICD-10-CM

## 2022-08-24 ENCOUNTER — OFFICE VISIT (OUTPATIENT)
Dept: NEUROLOGY | Facility: CLINIC | Age: 67
End: 2022-08-24
Payer: MEDICARE

## 2022-08-24 VITALS
OXYGEN SATURATION: 97 % | DIASTOLIC BLOOD PRESSURE: 76 MMHG | SYSTOLIC BLOOD PRESSURE: 122 MMHG | HEIGHT: 64 IN | WEIGHT: 204 LBS | HEART RATE: 78 BPM | TEMPERATURE: 96.4 F | BODY MASS INDEX: 34.83 KG/M2

## 2022-08-24 DIAGNOSIS — G40.909 SEIZURE DISORDER (HCC): Primary | ICD-10-CM

## 2022-08-24 DIAGNOSIS — M81.0 OSTEOPOROSIS, UNSPECIFIED OSTEOPOROSIS TYPE, UNSPECIFIED PATHOLOGICAL FRACTURE PRESENCE: ICD-10-CM

## 2022-08-24 DIAGNOSIS — F25.9 SCHIZOAFFECTIVE DISORDER, UNSPECIFIED TYPE (HCC): ICD-10-CM

## 2022-08-24 PROCEDURE — 99213 OFFICE O/P EST LOW 20 MIN: CPT | Performed by: PSYCHIATRY & NEUROLOGY

## 2022-08-24 RX ORDER — PHENOBARBITAL 64.8 MG/1
64.8 TABLET ORAL 2 TIMES DAILY
Qty: 62 TABLET | Refills: 5 | Status: SHIPPED | OUTPATIENT
Start: 2022-08-24

## 2022-08-24 NOTE — PROGRESS NOTES
[place] [address] [facility] [address] [facility]  Follow Up Visit    Impression/Plan    Ms Finn Hernández is a 69 y o  female with unclear epilepsy syndrome and schizophrenia  She reported 9 years of seizure freedom until in 12/2014 she had an event a few days after stopping phenobarbital  Multiple nights of insomnia preceded the event  The event itself was poorly characterized, but consisted of fear/panic that is apparently typical of her seizures  It is possible she had a focal seizure, but anxiety/psychosis related to sleep deprivation and withdrawal of phenobarbital is another explanation that better fits her prolonged symptoms and associated hallucinations  Her EEG in early 2015 was normal  Her osteoporosis may be related to phenobarbital use, but at this point her seizures are controlled and things are going well, so she will continue phenobarbital  She is also on gabapentin  Valproate is prescribed by psychiatry and is not specifically prescribed for seizure  She is on bisphosphonate therapy  Patient Instructions   1  Continue seizure medication unchanged  2  Return in about 9 months (AP)  Diagnoses and all orders for this visit:    Seizure disorder ([facility] )  -     PHENobarbital 64 8 mg tablet; Take 1 tablet (64 8 mg total) by mouth 2 (two) times a day    Osteoporosis, unspecified osteoporosis type, unspecified pathological fracture presence    Schizoaffective disorder, unspecified type ([facility] )    Other orders  -     Calcium Carb-Cholecalciferol ([place] Calcium w/D) 500-200 MG-UNIT TABS No; Take by mouth 2 (two) times a day        Marvel Avendanobeverly Juan Luisjosé luis is returning to the [place] [address] [facility] for follow up  Interval Events:   Seizures since last visit: None    No events concerning for seizure  Mood and behavior likely stable  Lab data reviewed  Current AEDs:  - depakote 500 mg in the morning and 1500 mg at night - psychiatry  - gabapentin 200 mg BID - psychiatry     - lorazepam 1 mg TID - psychiatry  - phenobarbital 64 8 mg BID - seizures  Other medications as per Epic         Event/Seizure semiology:  Unknown     Special Features  Status epilepticus: unknown  Self Injury Seizures: unknown  Precipitating Factors: unknown     Prior AEDs:  Carbamazepine - ?stopped, but worked? Lamotrigine - ?stopped, Dr  discontinued, no reason given? Valproate - ?water retention?     Prior Evaluation:  3 Hour Video EEG 1/15/15: normal      History Reviewed: The following were reviewed and updated as appropriate: allergies, current medications, past medical history, past social history and problem list     Psychiatric History:  Schizophrenia      Social History:   Driving: No - lives in group home  Lives Alone: No  Occupation: on permanent disability         Objective    /76 (BP Location: Left arm, Patient Position: Sitting, Cuff Size: Standard)   Pulse 78   Temp (!) 96 4 °F (35 8 °C) (Temporal)   Ht 5' 4" (1 626 m)   Wt 92 5 kg (204 lb)   SpO2 97%   BMI 35 02 kg/m²      General Exam  No acute distress  Neurologic Exam  Mental Status:  Alert and oriented x 3  Language: normal fluency and comprehension  Cranial Nerves:  VFFTC  EOMI, no nystagums  Face symmetric  No dysarthria  Motor:  No drift  Strength 5/5 throughout  Coordination: Finger to nose intact  Gait: Slow, antalgic, mildly wide, uses cane

## 2022-08-25 ENCOUNTER — OFFICE VISIT (OUTPATIENT)
Dept: INTERNAL MEDICINE CLINIC | Age: 67
End: 2022-08-25
Payer: MEDICARE

## 2022-08-25 VITALS
BODY MASS INDEX: 34.97 KG/M2 | HEIGHT: 64 IN | OXYGEN SATURATION: 93 % | RESPIRATION RATE: 18 BRPM | SYSTOLIC BLOOD PRESSURE: 110 MMHG | TEMPERATURE: 98.3 F | HEART RATE: 79 BPM | DIASTOLIC BLOOD PRESSURE: 64 MMHG | WEIGHT: 204.8 LBS

## 2022-08-25 DIAGNOSIS — I10 BENIGN ESSENTIAL HYPERTENSION: ICD-10-CM

## 2022-08-25 DIAGNOSIS — M81.0 OSTEOPOROSIS, UNSPECIFIED OSTEOPOROSIS TYPE, UNSPECIFIED PATHOLOGICAL FRACTURE PRESENCE: ICD-10-CM

## 2022-08-25 DIAGNOSIS — E78.2 MIXED HYPERLIPIDEMIA: ICD-10-CM

## 2022-08-25 DIAGNOSIS — R73.03 PRE-DIABETES: ICD-10-CM

## 2022-08-25 DIAGNOSIS — E03.9 ACQUIRED HYPOTHYROIDISM: Primary | ICD-10-CM

## 2022-08-25 DIAGNOSIS — E66.01 OBESITY, MORBID (HCC): ICD-10-CM

## 2022-08-25 DIAGNOSIS — F25.9 SCHIZOAFFECTIVE DISORDER, UNSPECIFIED TYPE (HCC): ICD-10-CM

## 2022-08-25 DIAGNOSIS — F41.8 DEPRESSION WITH ANXIETY: ICD-10-CM

## 2022-08-25 DIAGNOSIS — G40.909 SEIZURE DISORDER (HCC): ICD-10-CM

## 2022-08-25 DIAGNOSIS — Z00.00 MEDICARE ANNUAL WELLNESS VISIT, SUBSEQUENT: ICD-10-CM

## 2022-08-25 PROBLEM — S92.351A CLOSED DISPLACED FRACTURE OF FIFTH METATARSAL BONE OF RIGHT FOOT: Status: RESOLVED | Noted: 2021-05-25 | Resolved: 2022-08-25

## 2022-08-25 PROBLEM — L98.491 ULCER OF ABDOMEN WALL, LIMITED TO BREAKDOWN OF SKIN (HCC): Status: RESOLVED | Noted: 2019-11-04 | Resolved: 2022-08-25

## 2022-08-25 PROBLEM — H60.311 ACUTE DIFFUSE OTITIS EXTERNA OF RIGHT EAR: Status: RESOLVED | Noted: 2020-07-17 | Resolved: 2022-08-25

## 2022-08-25 PROCEDURE — 99214 OFFICE O/P EST MOD 30 MIN: CPT | Performed by: INTERNAL MEDICINE

## 2022-08-25 PROCEDURE — G0439 PPPS, SUBSEQ VISIT: HCPCS | Performed by: INTERNAL MEDICINE

## 2022-08-25 NOTE — ASSESSMENT & PLAN NOTE
Patient is seizure-free for a long time  Continue with present regimen    Also being followed by neurologist

## 2022-08-25 NOTE — PROGRESS NOTES
Assessment and Plan:     Problem List Items Addressed This Visit        Endocrine    Hypothyroidism - Primary     Continue with present dose of levothyroxine  Will check thyroid function test before next visit         Relevant Orders    Comprehensive metabolic panel    TSH, 3rd generation with Free T4 reflex       Cardiovascular and Mediastinum    Benign essential hypertension     Blood pressure is stable on present regimen         Relevant Orders    CBC       Nervous and Auditory    Seizure disorder [facility])     Patient is seizure-free for a long time  Continue with present regimen  Also being followed by neurologist            Musculoskeletal and Integument    Osteoporosis     Continue with calcium, vitamin-D and weight-bearing exercises            Other    Depression with anxiety     Being followed by psychiatrist         Hyperlipidemia    Relevant Orders    Lipid Panel with Direct LDL reflex    Comprehensive metabolic panel    Medicare annual wellness visit, subsequent    [facility]oa[facility]ive disorder ([facility] )     Managed by psychiatrist         Pre-diabetes     Continue with low sugar/low carb diet         Obesity, morbid ([facility] )     Advised for low calorie diet and exercise as much as tolerated               Urinary Incontinence Plan of Care: counseling topics discussed: practice Kegel (pelvic floor strengthening) exercises, use restroom every 2 hours, limiting fluid intake 3-4 hours before bed, weight loss and preventing constipation  Preventive health issues were discussed with patient, and age appropriate screening tests were ordered as noted in patient's After Visit Summary  Personalized health advice and appropriate referrals for health education or preventive services given if needed, as noted in patient's After Visit Summary  History of Present Illness:     Patient presents for a Medicare Wellness Visit    Patient is here for follow-up and also have blood work done    Otherwise no new complaints Patient Care Team:  Rd Villalta MD as PCP - General (Internal Medicine)  Shasta Cornelius as PCP - Advanced Practitioner (Internal Medicine)  Vandana Henry MD as Endoscopist     Review of Systems:     Review of Systems   Constitutional: Negative for fatigue and fever  HENT: Negative for congestion, ear discharge, ear pain, postnasal drip, sinus pressure, sore throat, tinnitus and trouble swallowing  Eyes: Negative for discharge, itching and visual disturbance  Respiratory: Negative for cough and shortness of breath  Cardiovascular: Negative for chest pain and palpitations  Gastrointestinal: Negative for abdominal pain, diarrhea, nausea and vomiting  Endocrine: Negative for cold intolerance and polyuria  Genitourinary: Negative for difficulty urinating, dysuria and urgency  Musculoskeletal: Negative for arthralgias and neck pain  Skin: Negative for rash  Allergic/Immunologic: Negative for environmental allergies  Neurological: Negative for dizziness, weakness and headaches  Psychiatric/Behavioral: Positive for behavioral problems and decreased concentration  Negative for agitation  The patient is not nervous/anxious           Problem List:     Patient Active Problem List   Diagnosis    Depression with anxiety    Hyperlipidemia    Hyponatremia    Hypothyroidism    Flatulence    Medicare annual wellness visit, subsequent    Injury of right toe, initial encounter    Seizure disorder ([address] )    Hyperglycemia    Benign essential hypertension    Schizoaffective disorder ([address] )    Unsteady gait    Psychosis, bipolar affective ([address] )    Osteoporosis    Insomnia    Folic acid deficiency    Anemia    Pre-diabetes    Decreased hearing of both ears    Cellulitis of abdominal wall    Obesity, morbid ([address] )    Chronic midline low back pain without sciatica    Continuous opioid dependence ([address] )      Past Medical and Surgical History:     Past Medical History:   Diagnosis Date    Benign essential hypertension     resolved; 06/15/16    Depression with anxiety     Fracture of fifth metatarsal bone of right foot with delayed healing     last assessed 04/12/16; fracture of metatarsal bone, right, closed, initial encounter    Hyperlipidemia     last assessed 11/28/17    Hypothyroidism     last assessed 11/28/2017    Insomnia     Obesity     [facility] )     last assessed 05/18/2017    Seizure disorder ([facility] )     last assessed 03/28/17     Past Surgical History:   Procedure Laterality Date    COLONOSCOPY      complete    DE COLONOSCOPY FLX DX W/COLLJ SPEC WHEN PFRMD N/A 8/30/2018    Procedure: COLONOSCOPY with polypectomies;  Surgeon: Ashley Muir MD;  Location: [facility];   Service: Gastroenterology      Family History:     Family History   Problem Relation Age of Onset    No Known Problems Mother     No Known Problems Father     Lung disease Other         mesothelioma    No Known Problems Maternal Grandmother     No Known Problems Maternal Grandfather     No Known Problems Paternal Grandmother     No Known Problems Paternal Grandfather     No Known Problems Sister     No Known Problems Sister     Kidney failure Brother     No Known Problems Maternal Aunt     No Known Problems Maternal Aunt     No Known Problems Maternal Aunt     No Known Problems Maternal Aunt     No Known Problems Paternal Aunt     No Known Problems Paternal Aunt       Social History:     Social History     Socioeconomic History    Marital status: /Civil Union     Spouse name: None    Number of children: None    Years of education: None    Highest education level: None   Occupational History    None   Tobacco Use    Smoking status: Former Smoker     Types: Cigarettes    Smokeless tobacco: Never Used   Vaping Use    Vaping Use: Never used   Substance and Sexual Activity    Alcohol use: No    Drug use: No    Sexual activity: Never   Other Topics Concern    None Social History Narrative    None     Social Determinants of Health     Financial Resource Strain: Low Risk     Difficulty of Paying Living Expenses: Not hard at all   Food Insecurity: Not on file   Transportation Needs: No Transportation Needs    Lack of Transportation (Medical): No    Lack of Transportation (Non-Medical):  No   Physical Activity: Not on file   Stress: Not on file   Social Connections: Not on file   Intimate Partner Violence: Not on file   Housing Stability: Not on file      Medications and Allergies:     Current Outpatient Medications   Medication Sig Dispense Refill    acetaminophen (TYLENOL) 500 mg tablet Take 2 tablets (1,000 mg total) by mouth 3 (three) times a day as needed for mild pain or fever (Patient taking differently: Take 1,000 mg by mouth 2 (two) times a day as needed for mild pain or fever) 180 tablet 1    alendronate (FOSAMAX) 70 mg tablet TAKE 1 TABLET BY MOUTH WEEKLY ON FRIDAYS @ 8 AM (OSTEOPOROSIS) 4 tablet 0    aspirin (ECOTRIN LOW STRENGTH) 81 mg EC tablet Take 1 tablet (81 mg total) by mouth daily 90 tablet 1    atorvastatin (LIPITOR) 20 mg tablet Take 1 tablet (20 mg total) by mouth daily 90 tablet 1    bismuth subsalicylate (PEPTO BISMOL) 262 MG chewable tablet Chew 2 tablets (524 mg total) every 6 (six) hours as needed for diarrhea 30 tablet 0    busPIRone (BUSPAR) 15 mg tablet Take 15 mg by mouth 2 (two) times a day       Calcium Carb-Cholecalciferol (Oyster Shell Calcium w/D) 500-200 MG-UNIT TABS No Take by mouth 2 (two) times a day      calcium carbonate-vitamin D (OSCAL-D) 500 mg-200 units per tablet Take 1 tablet by mouth 2 (two) times a day with meals 180 tablet 1    cetirizine (ZyrTEC) 5 MG tablet Take 1 tablet (5 mg total) by mouth as needed for allergies (As needed at night p r n  for allergies) 90 tablet 1    Dextromethorphan Polistirex ER (Delsym) 30 MG/5ML SUER Take 5 mL (30 mg total) by mouth 2 (two) times a day as needed (congestion) 89 mL 1    Diclofenac Sodium (VOLTAREN) 1 % APPLY 2 GM TOPICALLY TO (R) KNEE 4X DAILY @[id number](PAIN) 100 g 5    diphenhydrAMINE (BENADRYL) 50 mg capsule Take 1 capsule (50 mg total) by mouth daily at bedtime as needed for sleep 90 capsule 2    divalproex sodium (DEPAKOTE) 500 mg EC tablet 1 tab in the Am, 3 tabs(1500mg) at bedtime      docusate sodium (COLACE) 100 mg capsule Take 1 capsule (100 mg total) by mouth 2 (two) times a day as needed for constipation 180 capsule 1    fluticasone (FLONASE) 50 mcg/act nasal spray 2 sprays into each nostril daily 16 g 2    gabapentin (NEURONTIN) 100 mg capsule Take 200 mg by mouth 2 (two) times a day        HYDROcodone-acetaminophen (XODOL) 5-300 MG per tablet Take 1 tablet by mouth every 4 (four) hours as needed for mild pain      L-Theanine 100 MG CAPS Take 200 mg by mouth 2 (two) times a day        levothyroxine 100 mcg tablet Take 1 tablet (100 mcg total) by mouth daily 90 tablet 1    LORazepam (ATIVAN) 0 5 mg tablet Take 0 5 mg by mouth every 12 (twelve) hours as needed for anxiety      LORazepam (ATIVAN) 1 mg tablet Take 1 mg by mouth in the morning and 1 mg in the evening and 1 mg before bedtime        loxapine (LOXITANE) 25 mg capsule TAKE TWO (2) CAPSULES BY MOUTH EVERY MORNING AND SIX (6) CAPSULES AT BEDTIME      lurasidone (LATUDA) 80 mg tablet Take 40 mg by mouth 2 (two) times a day        melatonin 3 mg Take 1 tablet (3 mg total) by mouth daily at bedtime 30 tablet 2    Menthol-Methyl Salicylate (MURIEL AGUDELO GREASELESS) 10-15 % greaseless cream Apply topically daily at bedtime 30 g 3    multivitamin (THERAGRAN) TABS Take 1 tablet by mouth daily 90 tablet 1    ofloxacin (FLOXIN) 0 3 % otic solution Administer 5 drops to the right ear 2 (two) times a day 5 mL 0    PHENobarbital 64 8 mg tablet Take 1 tablet (64 8 mg total) by mouth 2 (two) times a day 62 tablet 5    polyethylene glycol (GLYCOLAX) 17 GM/SCOOP powder Take 17 g by mouth daily 850 g 2    Saphris 10 MG SL tablet Place 1 tablet (10 mg total) under the tongue 2 (two) times a day 60 tablet 1    senna-docusate sodium (SENOKOT-S) 8 6-50 mg per tablet Take 1 tablet by mouth daily      sodium chloride (OCEAN) 0 65 % nasal spray 1 spray into each nostril as needed in the morning for congestion  TAKE UP TO 6 TIMES DAILY AS NEEDED    traZODone (DESYREL) 150 mg tablet Take 1 tablet by mouth daily at bedtime       trihexyphenidyl (ARTANE) 5 mg tablet Take 5 mg by mouth 2 (two) times a day       alendronate-cholecalciferol (FOSAMAX PLUS D)  MG-UNIT per tablet Take 1 tablet by mouth Once a week (Patient not taking: No sig reported)       No current facility-administered medications for this visit  Allergies   Allergen Reactions    Clozapine Other (See Comments)     low white blood count  Other reaction(s): Other (See Comments)  low white blood count    Haloperidol Other (See Comments)     EPS  Other reaction(s):  Other (See Comments)  EPS    Lithium Other (See Comments)     Toxicity    Prolixin [Fluphenazine] Hyperactivity and Other (See Comments)     EPS, Hyperactivity  EPS, Hyperactivity    Valproic Acid Confusion and Other (See Comments)     "Mental confusion"  "Mental confusion"      Immunizations:     Immunization History   Administered Date(s) Administered    COVID-19 MODERNA VACC 0 5 ML IM 02/12/2021, 03/12/2021    H1N1, All Formulations 01/07/2010    INFLUENZA 09/18/2012, 10/07/2013, 10/12/2016, 10/23/2017    Influenza Quadrivalent Preservative Free 3 years and older IM 10/23/2017    Influenza Quadrivalent, 6-35 Months IM 10/12/2016    Influenza, high dose seasonal 0 7 mL 10/15/2018, 11/13/2019, 10/02/2020, 11/16/2021    Influenza, seasonal, injectable 10/16/2014    Influenza, seasonal, injectable, preservative free 10/21/2013    Pneumococcal Conjugate 13-Valent 03/20/2019    Pneumococcal Polysaccharide PPV23 07/26/2021    Td (adult), adsorbed 11/28/2017    Tuberculin Skin Test-PPD Intradermal 08/08/2018, 08/11/2020      Health Maintenance:         Topic Date Due    Cervical Cancer Screening  12/28/2021    Breast Cancer Screening: Mammogram  10/28/2022    Colorectal Cancer Screening  08/30/2028    Hepatitis C Screening  Completed         Topic Date Due    COVID-19 Vaccine (3 - Booster for Moderna series) 08/12/2021    Influenza Vaccine (1) 09/01/2022      Medicare Screening Tests and Risk Assessments:     Zia Goldman is here for her Subsequent Wellness visit  Last Medicare Wellness visit information reviewed, patient interviewed and updates made to the record today  Health Risk Assessment:   Patient rates overall health as good  Patient feels that their physical health rating is slightly worse  Patient is satisfied with their life  Eyesight was rated as slightly worse  Hearing was rated as same  Patient feels that their emotional and mental health rating is slightly better  Patients states they are sometimes angry  Patient states they are sometimes unusually tired/fatigued  Pain experienced in the last 7 days has been some  Patient's pain rating has been 5/10  Patient states that she has experienced weight loss or gain in last 6 months  Depression Screening:   PHQ-9 Score: 0      Fall Risk Screening: In the past year, patient has experienced: no history of falling in past year      Urinary Incontinence Screening:   Patient has leaked urine accidently in the last six months  Home Safety:  Patient does not have trouble with stairs inside or outside of their home  Patient has working smoke alarms and has no working carbon monoxide detector  Home safety hazards include: none  Nutrition:   Current diet is Low Cholesterol and Limited junk food  Medications:   Patient is not currently taking any over-the-counter supplements  Patient is able to manage medications       Activities of Daily Living (ADLs)/Instrumental Activities of Daily Living (IADLs):   Walk and transfer into and out of bed and chair?: Yes  Dress and groom yourself?: Yes    Bathe or shower yourself?: Yes    Feed yourself? Yes  Do your laundry/housekeeping?: Yes  Manage your money, pay your bills and track your expenses?: Yes  Make your own meals?: Yes    Do your own shopping?: Yes    Previous Hospitalizations:   Any hospitalizations or ED visits within the last 12 months?: No      Advance Care Planning:   Living will: Yes    Advanced directive: Yes      Cognitive Screening:   Provider or family/friend/caregiver concerned regarding cognition?: No    PREVENTIVE SCREENINGS      Cardiovascular Screening:    General: Screening Not Indicated and History Lipid Disorder      Diabetes Screening:     General: Screening Current      Colorectal Cancer Screening:     General: Screening Current      Breast Cancer Screening:     General: Screening Current      Cervical Cancer Screening:    General: Screening Not Indicated      Osteoporosis Screening:    General: Screening Not Indicated and History Osteoporosis      Abdominal Aortic Aneurysm (AAA) Screening:        General: Screening Not Indicated      Lung Cancer Screening:     General: Screening Not Indicated      Hepatitis C Screening:    General: Screening Current    Screening, Brief Intervention, and Referral to Treatment (SBIRT)    Screening  Typical number of drinks in a day: 0  Typical number of drinks in a week: 0  Interpretation: Low risk drinking behavior  Single Item Drug Screening:  How often have you used an illegal drug (including marijuana) or a prescription medication for non-medical reasons in the past year? never    Single Item Drug Screen Score: 0  Interpretation: Negative screen for possible drug use disorder    Brief Intervention  Alcohol & drug use screenings were reviewed  No concerns regarding substance use disorder identified       Other Counseling Topics:   Car/seat belt/driving safety, skin self-exam, sunscreen and calcium and vitamin D intake and regular weightbearing exercise  No exam data present     Physical Exam:     /64 (BP Location: Left arm, Patient Position: Sitting, Cuff Size: Large)   Pulse 79   Temp 98 3 °F (36 8 °C) (Temporal)   Resp 18   Ht 5' 4" (1 626 m)   Wt 92 9 kg (204 lb 12 8 oz)   SpO2 93%   BMI 35 15 kg/m²     Physical Exam  Vitals and nursing note reviewed  Constitutional:       General: She is not in acute distress  Appearance: She is well-developed  HENT:      Head: Normocephalic and atraumatic  Right Ear: External ear normal       Left Ear: External ear normal       Nose: No rhinorrhea  Mouth/Throat:      Pharynx: No posterior oropharyngeal erythema  Eyes:      Conjunctiva/sclera: Conjunctivae normal    Cardiovascular:      Rate and Rhythm: Normal rate and regular rhythm  Heart sounds: No murmur heard  Pulmonary:      Effort: Pulmonary effort is normal  No respiratory distress  Breath sounds: Normal breath sounds  Abdominal:      Palpations: Abdomen is soft  Tenderness: There is no abdominal tenderness  Musculoskeletal:      Cervical back: Neck supple  Right lower leg: No edema  Left lower leg: No edema  Skin:     General: Skin is warm and dry  Neurological:      Mental Status: She is alert and oriented to person, place, and time     Psychiatric:         Mood and Affect: Mood normal          Behavior: Behavior normal           Abdifatah Reynolds MD

## 2022-08-25 NOTE — PATIENT INSTRUCTIONS
Medicare Preventive Visit Patient Instructions  Thank you for completing your Welcome to Medicare Visit or Medicare Annual Wellness Visit today  Your next wellness visit will be due in one year (8/26/2023)  The screening/preventive services that you may require over the next 5-10 years are detailed below  Some tests may not apply to you based off risk factors and/or age  Screening tests ordered at today's visit but not completed yet may show as past due  Also, please note that scanned in results may not display below  Preventive Screenings:  Service Recommendations Previous Testing/Comments   Colorectal Cancer Screening  * Colonoscopy    * Fecal Occult Blood Test (FOBT)/Fecal Immunochemical Test (FIT)  * Fecal DNA/Cologuard Test  * Flexible Sigmoidoscopy Age: 39-70 years old   Colonoscopy: every 10 years (may be performed more frequently if at higher risk)  OR  FOBT/FIT: every 1 year  OR  Cologuard: every 3 years  OR  Sigmoidoscopy: every 5 years  Screening may be recommended earlier than age 39 if at higher risk for colorectal cancer  Also, an individualized decision between you and your healthcare provider will decide whether screening between the ages of 74-80 would be appropriate  Colonoscopy: 08/30/2018  FOBT/FIT: Not on file  Cologuard: Not on file  Sigmoidoscopy: Not on file    Screening Current     Breast Cancer Screening Age: 36 years old  Frequency: every 1-2 years  Not required if history of left and right mastectomy Mammogram: 10/28/2021    Screening Current   Cervical Cancer Screening Between the ages of 21-29, pap smear recommended once every 3 years  Between the ages of 33-67, can perform pap smear with HPV co-testing every 5 years     Recommendations may differ for women with a history of total hysterectomy, cervical cancer, or abnormal pap smears in past  Pap Smear: 12/28/2018    Screening Not Indicated   Hepatitis C Screening Once for adults born between 1945 and 1965  More frequently in patients at high risk for Hepatitis C Hep C Antibody: 11/29/2017    Screening Current   Diabetes Screening 1-2 times per year if you're at risk for diabetes or have pre-diabetes Fasting glucose: 90 mg/dL (11/24/2021)  A1C: 5 7 % (7/6/2022)  Screening Current   Cholesterol Screening Once every 5 years if you don't have a lipid disorder  May order more often based on risk factors  Lipid panel: 07/06/2022    Screening Not Indicated  History Lipid Disorder     Other Preventive Screenings Covered by Medicare:  1  Abdominal Aortic Aneurysm (AAA) Screening: covered once if your at risk  You're considered to be at risk if you have a family history of AAA  2  Lung Cancer Screening: covers low dose CT scan once per year if you meet all of the following conditions: (1) Age 50-69; (2) No signs or symptoms of lung cancer; (3) Current smoker or have quit smoking within the last 15 years; (4) You have a tobacco smoking history of at least 20 pack years (packs per day multiplied by number of years you smoked); (5) You get a written order from a healthcare provider  3  Glaucoma Screening: covered annually if you're considered high risk: (1) You have diabetes OR (2) Family history of glaucoma OR (3)  aged 48 and older OR (3)  American aged 72 and older  3  Osteoporosis Screening: covered every 2 years if you meet one of the following conditions: (1) You're estrogen deficient and at risk for osteoporosis based off medical history and other findings; (2) Have a vertebral abnormality; (3) On glucocorticoid therapy for more than 3 months; (4) Have primary hyperparathyroidism; (5) On osteoporosis medications and need to assess response to drug therapy  · Last bone density test (DXA Scan): 09/19/2020   5  HIV Screening: covered annually if you're between the age of 15-65  Also covered annually if you are younger than 13 and older than 72 with risk factors for HIV infection   For pregnant patients, it is covered up to 3 times per pregnancy  Immunizations:  Immunization Recommendations   Influenza Vaccine Annual influenza vaccination during flu season is recommended for all persons aged >= 6 months who do not have contraindications   Pneumococcal Vaccine   * Pneumococcal conjugate vaccine = PCV13 (Prevnar 13), PCV15 (Vaxneuvance), PCV20 (Prevnar 20)  * Pneumococcal polysaccharide vaccine = PPSV23 (Pneumovax) Adults 25-60 years old: 1-3 doses may be recommended based on certain risk factors  Adults 72 years old: 1-2 doses may be recommended based off what pneumonia vaccine you previously received   Hepatitis B Vaccine 3 dose series if at intermediate or high risk (ex: diabetes, end stage renal disease, liver disease)   Tetanus (Td) Vaccine - COST NOT COVERED BY MEDICARE PART B Following completion of primary series, a booster dose should be given every 10 years to maintain immunity against tetanus  Td may also be given as tetanus wound prophylaxis  Tdap Vaccine - COST NOT COVERED BY MEDICARE PART B Recommended at least once for all adults  For pregnant patients, recommended with each pregnancy  Shingles Vaccine (Shingrix) - COST NOT COVERED BY MEDICARE PART B  2 shot series recommended in those aged 48 and above     Health Maintenance Due:      Topic Date Due    Cervical Cancer Screening  12/28/2021    Breast Cancer Screening: Mammogram  10/28/2022    Colorectal Cancer Screening  08/30/2028    Hepatitis C Screening  Completed     Immunizations Due:      Topic Date Due    COVID-19 Vaccine (3 - Booster for Moderna series) 08/12/2021    Influenza Vaccine (1) 09/01/2022     Advance Directives   What are advance directives? Advance directives are legal documents that state your wishes and plans for medical care  These plans are made ahead of time in case you lose your ability to make decisions for yourself   Advance directives can apply to any medical decision, such as the treatments you want, and if you want to donate organs  What are the types of advance directives? There are many types of advance directives, and each state has rules about how to use them  You may choose a combination of any of the following:  · Living will: This is a written record of the treatment you want  You can also choose which treatments you do not want, which to limit, and which to stop at a certain time  This includes surgery, medicine, IV fluid, and tube feedings  · Durable power of  for healthcare [facility]): This is a written record that states who you want to make healthcare choices for you when you are unable to make them for yourself  This person, called a proxy, is usually a family member or a friend  You may choose more than 1 proxy  · Do not resuscitate (DNR) order:  A DNR order is used in case your heart stops beating or you stop breathing  It is a request not to have certain forms of treatment, such as CPR  A DNR order may be included in other types of advance directives  · Medical directive: This covers the care that you want if you are in a coma, near death, or unable to make decisions for yourself  You can list the treatments you want for each condition  Treatment may include pain medicine, surgery, blood transfusions, dialysis, IV or tube feedings, and a ventilator (breathing machine)  · Values history: This document has questions about your views, beliefs, and how you feel and think about life  This information can help others choose the care that you would choose  Why are advance directives important? An advance directive helps you control your care  Although spoken wishes may be used, it is better to have your wishes written down  Spoken wishes can be misunderstood, or not followed  Treatments may be given even if you do not want them  An advance directive may make it easier for your family to make difficult choices about your care     Urinary Incontinence   Urinary incontinence (UI)  is when you lose control of your bladder  UI develops because your bladder cannot store or empty urine properly  The 3 most common types of UI are stress incontinence, urge incontinence, or both  Medicines:   · May be given to help strengthen your bladder control  Report any side effects of medication to your healthcare provider  Do pelvic muscle exercises often:  Your pelvic muscles help you stop urinating  Squeeze these muscles tight for 5 seconds, then relax for 5 seconds  Gradually work up to squeezing for 10 seconds  Do 3 sets of 15 repetitions a day, or as directed  This will help strengthen your pelvic muscles and improve bladder control  Train your bladder:  Go to the bathroom at set times, such as every 2 hours, even if you do not feel the urge to go  You can also try to hold your urine when you feel the urge to go  For example, hold your urine for 5 minutes when you feel the urge to go  As that becomes easier, hold your urine for 10 minutes  Self-care:   · Keep a UI record  Write down how often you leak urine and how much you leak  Make a note of what you were doing when you leaked urine  · Drink liquids as directed  You may need to limit the amount of liquid you drink to help control your urine leakage  Do not drink any liquid right before you go to bed  Limit or do not have drinks that contain caffeine or alcohol  · Prevent constipation  Eat a variety of high-fiber foods  Good examples are high-fiber cereals, beans, vegetables, and whole-grain breads  Walking is the best way to trigger your intestines to have a bowel movement  · Exercise regularly and maintain a healthy weight  Weight loss and exercise will decrease pressure on your bladder and help you control your leakage  · Use a catheter as directed  to help empty your bladder  A catheter is a tiny, plastic tube that is put into your bladder to drain your urine  · Go to behavior therapy as directed    Behavior therapy may be used to help you learn to control your urge to urinate  Weight Management   Why it is important to manage your weight:  Being overweight increases your risk of health conditions such as heart disease, high blood pressure, type 2 diabetes, and certain types of cancer  It can also increase your risk for osteoarthritis, sleep apnea, and other respiratory problems  Aim for a slow, steady weight loss  Even a small amount of weight loss can lower your risk of health problems  How to lose weight safely:  A safe and healthy way to lose weight is to eat fewer calories and get regular exercise  You can lose up about 1 pound a week by decreasing the number of calories you eat by 500 calories each day  Healthy meal plan for weight management:  A healthy meal plan includes a variety of foods, contains fewer calories, and helps you stay healthy  A healthy meal plan includes the following:  · Eat whole-grain foods more often  A healthy meal plan should contain fiber  Fiber is the part of grains, fruits, and vegetables that is not broken down by your body  Whole-grain foods are healthy and provide extra fiber in your diet  Some examples of whole-grain foods are whole-wheat breads and pastas, oatmeal, brown rice, and bulgur  · Eat a variety of vegetables every day  Include dark, leafy greens such as spinach, kale, lauren greens, and mustard greens  Eat yellow and orange vegetables such as carrots, sweet potatoes, and winter squash  · Eat a variety of fruits every day  Choose fresh or canned fruit (canned in its own juice or light syrup) instead of juice  Fruit juice has very little or no fiber  · Eat low-fat dairy foods  Drink fat-free (skim) milk or 1% milk  Eat fat-free yogurt and low-fat cottage cheese  Try low-fat cheeses such as mozzarella and other reduced-fat cheeses  · Choose meat and other protein foods that are low in fat  Choose beans or other legumes such as split peas or lentils   Choose fish, skinless poultry (chicken or turkey), or lean cuts of red meat (beef or pork)  Before you cook meat or poultry, cut off any visible fat  · Use less fat and oil  Try baking foods instead of frying them  Add less fat, such as margarine, sour cream, regular salad dressing and mayonnaise to foods  Eat fewer high-fat foods  Some examples of high-fat foods include french fries, doughnuts, ice cream, and cakes  · Eat fewer sweets  Limit foods and drinks that are high in sugar  This includes candy, cookies, regular soda, and sweetened drinks  Exercise:  Exercise at least 30 minutes per day on most days of the week  Some examples of exercise include walking, biking, dancing, and swimming  You can also fit in more physical activity by taking the stairs instead of the elevator or parking farther away from stores  Ask your healthcare provider about the best exercise plan for you  © Copyright [employer] 2018 Information is for End User's use only and may not be sold, redistributed or otherwise used for commercial purposes   All illustrations and images included in CareNotes® are the copyrighted property of [employer]  or [address] [place] [address]

## 2022-08-29 DIAGNOSIS — E78.2 MIXED HYPERLIPIDEMIA: ICD-10-CM

## 2022-08-29 RX ORDER — ATORVASTATIN CALCIUM 20 MG/1
20 TABLET, FILM COATED ORAL DAILY
Qty: 90 TABLET | Refills: 1 | Status: SHIPPED | OUTPATIENT
Start: 2022-08-29

## 2022-09-12 ENCOUNTER — TELEPHONE (OUTPATIENT)
Dept: INTERNAL MEDICINE CLINIC | Age: 67
End: 2022-09-12

## 2022-09-12 DIAGNOSIS — M81.0 OSTEOPOROSIS, UNSPECIFIED OSTEOPOROSIS TYPE, UNSPECIFIED PATHOLOGICAL FRACTURE PRESENCE: ICD-10-CM

## 2022-09-12 RX ORDER — ALENDRONATE SODIUM 70 MG/1
TABLET ORAL
Qty: 4 TABLET | Refills: 5 | Status: SHIPPED | OUTPATIENT
Start: 2022-09-12

## 2022-09-12 NOTE — TELEPHONE ENCOUNTER
Patient's Access Service is calling in for a refill on the Alendronate 70 mg one tablet once a week    Send to [facility] ([facility]) 08/25/2022    NOV: 01/10/23

## 2022-09-19 ENCOUNTER — OFFICE VISIT (OUTPATIENT)
Dept: INTERNAL MEDICINE CLINIC | Age: 67
End: 2022-09-19
Payer: MEDICARE

## 2022-09-19 VITALS
SYSTOLIC BLOOD PRESSURE: 120 MMHG | WEIGHT: 210 LBS | HEIGHT: 64 IN | OXYGEN SATURATION: 95 % | HEART RATE: 84 BPM | BODY MASS INDEX: 35.85 KG/M2 | DIASTOLIC BLOOD PRESSURE: 60 MMHG | TEMPERATURE: 98.7 F

## 2022-09-19 DIAGNOSIS — S99.921A INJURY OF RIGHT TOE, INITIAL ENCOUNTER: ICD-10-CM

## 2022-09-19 DIAGNOSIS — F25.9 SCHIZOAFFECTIVE DISORDER, UNSPECIFIED TYPE (HCC): ICD-10-CM

## 2022-09-19 DIAGNOSIS — E87.1 HYPONATREMIA: Primary | ICD-10-CM

## 2022-09-19 DIAGNOSIS — M81.0 OSTEOPOROSIS, UNSPECIFIED OSTEOPOROSIS TYPE, UNSPECIFIED PATHOLOGICAL FRACTURE PRESENCE: ICD-10-CM

## 2022-09-19 PROCEDURE — 99213 OFFICE O/P EST LOW 20 MIN: CPT | Performed by: STUDENT IN AN ORGANIZED HEALTH CARE EDUCATION/TRAINING PROGRAM

## 2022-09-19 RX ORDER — LIDOCAINE HCL 4 %
LIQUID ROLL-ON (ML) TOPICAL
COMMUNITY
Start: 2022-09-03
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
## 2022-10-05 DIAGNOSIS — K59.09 OTHER CONSTIPATION: ICD-10-CM

## 2022-10-05 RX ORDER — POLYETHYLENE GLYCOL 3350 17 G/17G
POWDER ORAL
Qty: 510 G | Refills: 0 | Status: SHIPPED | OUTPATIENT
Start: 2022-10-05

## 2022-10-06 ENCOUNTER — HOSPITAL ENCOUNTER (OUTPATIENT)
Dept: RADIOLOGY | Age: 67
Discharge: HOME | End: 2022-10-06
Payer: MEDICARE

## 2022-10-06 DIAGNOSIS — H90.3 SENSORINEURAL HEARING LOSS, BILATERAL: ICD-10-CM

## 2022-10-06 DIAGNOSIS — M25.561 ACUTE PAIN OF RIGHT KNEE: ICD-10-CM

## 2022-10-06 PROCEDURE — 70551 MRI BRAIN STEM W/O DYE: CPT

## 2022-10-06 PROCEDURE — G1004 CDSM NDSC: HCPCS

## 2022-10-06 RX ORDER — ACETAMINOPHEN 500 MG
1000 TABLET ORAL 2 TIMES DAILY PRN
Qty: 60 TABLET | Refills: 5 | Status: SHIPPED | OUTPATIENT
Start: 2022-10-06

## 2022-10-12 PROBLEM — Z00.00 MEDICARE ANNUAL WELLNESS VISIT, SUBSEQUENT: Status: RESOLVED | Noted: 2018-07-10 | Resolved: 2022-10-12

## 2022-10-14 ENCOUNTER — TELEPHONE (OUTPATIENT)
Dept: INTERNAL MEDICINE CLINIC | Age: 67
End: 2022-10-14

## 2022-10-14 NOTE — TELEPHONE ENCOUNTER
[facility] is calling regarding the D/C of the Flovent Inhaler  When a medication is D/C they need a note stating that it was D/C  They can't go by the AVS that we print out  They get Audits and they need a note from the practice that the medication Flovent was D/C on 05/18/2022      Can we get this put into the chart and fax it over to them    Fax [phone]    Phone [phone]

## 2022-10-21 NOTE — TELEPHONE ENCOUNTER
Dr Joe Hughes,    Just confirming Flovent inhaler should be discontinued off patient medication list?

## 2022-10-25 DIAGNOSIS — J30.2 SEASONAL ALLERGIES: ICD-10-CM

## 2022-10-25 RX ORDER — CETIRIZINE HYDROCHLORIDE 5 MG/1
5 TABLET ORAL AS NEEDED
Qty: 30 TABLET | Refills: 5 | Status: SHIPPED | OUTPATIENT
Start: 2022-10-25

## 2022-10-31 ENCOUNTER — HOSPITAL ENCOUNTER (OUTPATIENT)
Dept: RADIOLOGY | Age: 67
Discharge: HOME | End: 2022-10-31

## 2022-10-31 VITALS — HEIGHT: 64 IN | WEIGHT: 218 LBS | BODY MASS INDEX: 37.22 KG/M2

## 2022-10-31 DIAGNOSIS — M25.561 ACUTE PAIN OF RIGHT KNEE: ICD-10-CM

## 2022-10-31 DIAGNOSIS — Z12.31 SCREENING MAMMOGRAM FOR HIGH-RISK PATIENT: ICD-10-CM

## 2022-10-31 RX ORDER — PSEUDOEPHED/ACETAMINOPH/DIPHEN 30MG-500MG
TABLET ORAL
Qty: 180 TABLET | Refills: 1 | Status: SHIPPED | OUTPATIENT
Start: 2022-10-31

## 2022-11-09 DIAGNOSIS — R09.89 CHEST CONGESTION: ICD-10-CM

## 2022-11-09 RX ORDER — DEXTROMETHORPHAN 30 MG/5ML
SUSPENSION, EXTENDED RELEASE ORAL
Qty: 89 ML | Refills: 0 | Status: SHIPPED | OUTPATIENT
Start: 2022-11-09

## 2022-12-21 ENCOUNTER — CONSULT (OUTPATIENT)
Dept: INTERNAL MEDICINE CLINIC | Age: 67
End: 2022-12-21

## 2022-12-21 VITALS
BODY MASS INDEX: 34.74 KG/M2 | OXYGEN SATURATION: 96 % | SYSTOLIC BLOOD PRESSURE: 120 MMHG | HEIGHT: 64 IN | DIASTOLIC BLOOD PRESSURE: 82 MMHG | TEMPERATURE: 98.9 F | WEIGHT: 203.5 LBS | HEART RATE: 86 BPM

## 2022-12-21 DIAGNOSIS — H91.93 DECREASED HEARING OF BOTH EARS: ICD-10-CM

## 2022-12-21 DIAGNOSIS — Z01.818 PRE-OP EXAM: Primary | ICD-10-CM

## 2022-12-21 DIAGNOSIS — R94.31 ABNORMAL ECG: ICD-10-CM

## 2022-12-21 DIAGNOSIS — R94.31 T WAVE INVERSION ON ELECTROCARDIOGRAM: ICD-10-CM

## 2022-12-21 RX ORDER — ASPIRIN 81 MG/1
81 TABLET, CHEWABLE ORAL DAILY
Qty: 90 TABLET | Refills: 1 | Status: SHIPPED | OUTPATIENT
Start: 2022-12-21

## 2022-12-21 NOTE — PATIENT INSTRUCTIONS
-start taking you daily baby aspirin  -call to schedule your heart echocardiogram and stress test  -return to clinic after you have completed these two above tests so we can review them with you

## 2022-12-21 NOTE — PROGRESS NOTES
Name: Alonso Quevedo      : 1955      MRN: [id number]  Encounter Provider: Flaco Haywood DO  Encounter Date: 2022   Encounter department: [address] [place] [address]     1  Pre-op exam  -evaluation for MRI brain under anesthesia  -patient without CP or SOB  Ambulates with a cane at baseline but otherwise able to walk around a block and up two flights of stairs without symptoms  -POCT ECG with T wave inversions in lateral leads  No baseline to compare to  QTC acceptable despite psych meds  Given lateral T wave inversions will restart aspirin 81 mg and send patient for nuclear stress test and echo  Will follow-up with patient after completion of these tests prior to her sedated MRI  Pending results of above she may need a referral to cardiology  Patient is otherwise asymptomatic from a cardiac standpoint  -Pending results of Stress test, patient is otherwise currently at low risk for a low risk procedure with an RCRI of 0 pts  -     POCT ECG  -     Echo complete w/ contrast if indicated; Future; Expected date: 2022  -     NM myocardial perfusion spect (rx stress and/or rest); Future; Expected date: 2022  -     aspirin 81 mg chewable tablet; Chew 1 tablet (81 mg total) daily    2  Abnormal ECG  -POCT ECG with T wave inversions in lateral leads  No baseline to compare to  QTC acceptable despite psych meds  Given lateral T wave inversions will restart aspirin 81 mg and send patient for nuclear stress test and echo  -     Echo complete w/ contrast if indicated; Future; Expected date: 2022  -     NM myocardial perfusion spect (rx stress and/or rest); Future; Expected date: 2022  -     aspirin 81 mg chewable tablet; Chew 1 tablet (81 mg total) daily    3  T wave inversion on electrocardiogram  -     Echo complete w/ contrast if indicated; Future; Expected date: 2022  -     NM myocardial perfusion spect (rx stress and/or rest);  Future; Expected date: 12/21/2022  -     aspirin 81 mg chewable tablet; Chew 1 tablet (81 mg total) daily    #  Remote tobacco abuse  -no known underlying lung disease or inhaler dependence    Lung Cancer Screening: patient with remote smoking history, otherwise not qualifying for LDCT screen  Subjective      68 yo F with schizoaffective disorder, bipolar, HLD, allergies, and obesity presenting for pre-op clearance for MRI brain with sedation  Patient has been feeling in her usual well disposition and has no acute complaints  She is a very nervous about the MRI because she thinks doctors are trying to give her cancer through this MRI  Review of Systems   Constitutional: Negative for chills, fatigue and fever  Respiratory: Negative for cough, shortness of breath and wheezing  Cardiovascular: Negative for chest pain, palpitations and leg swelling  Gastrointestinal: Negative for abdominal pain, diarrhea, nausea and vomiting  Psychiatric/Behavioral: Positive for agitation (baseline), dysphoric mood (baseline) and hallucinations (baseline)  The patient is nervous/anxious (baseline)          Current Outpatient Medications on File Prior to Visit   Medication Sig   • Acetaminophen Extra Strength 500 MG tablet TAKE 2 TABLETS(1000MG)BY MOUTH 3 TIMES DAILY AS NEEDED FOR MILD PAIN   • alendronate (FOSAMAX) 70 mg tablet TAKE 1 TABLET BY MOUTH WEEKLY ON FRIDAYS @ 8 AM (OSTEOPOROSIS)   • Aspercreme Lidocaine 4 % CREA    • atorvastatin (LIPITOR) 20 mg tablet Take 1 tablet (20 mg total) by mouth daily   • bismuth subsalicylate (PEPTO BISMOL) 262 MG chewable tablet Chew 2 tablets (524 mg total) every 6 (six) hours as needed for diarrhea (Patient not taking: Reported on 9/19/2022)   • busPIRone (BUSPAR) 15 mg tablet Take 15 mg by mouth 2 (two) times a day    • Calcium Carb-Cholecalciferol (Oyster Shell Calcium w/D) 500-200 MG-UNIT TABS No Take by mouth 2 (two) times a day   • calcium carbonate-vitamin D (OSCAL-D) 500 mg-200 units per tablet Take 1 tablet by mouth 2 (two) times a day with meals   • cetirizine (ZyrTEC) 5 MG tablet Take 1 tablet (5 mg total) by mouth as needed for allergies (As needed at night p r n  for allergies)   • Delsym 30 MG/5ML SUER TAKE 1 TESAPOON (5ML) BY MOUTH 2X DAILY AS NEEDED FOR CONGESTION   • Diclofenac Sodium (VOLTAREN) 1 % APPLY 2 GM TOPICALLY TO (R) KNEE 4X DAILY @8A-98M9H-6Q(PAIN)   • diphenhydrAMINE (BENADRYL) 50 mg capsule Take 1 capsule (50 mg total) by mouth daily at bedtime as needed for sleep   • divalproex sodium (DEPAKOTE) 500 mg EC tablet 1 tab in the Am, 3 tabs(1500mg) at bedtime   • docusate sodium (COLACE) 100 mg capsule Take 1 capsule (100 mg total) by mouth 2 (two) times a day as needed for constipation   • fluticasone (FLONASE) 50 mcg/act nasal spray 2 sprays into each nostril daily   • gabapentin (NEURONTIN) 100 mg capsule Take 200 mg by mouth 2 (two) times a day     • HYDROcodone-acetaminophen (XODOL) 5-300 MG per tablet Take 1 tablet by mouth every 4 (four) hours as needed for mild pain   • L-Theanine 100 MG CAPS Take 200 mg by mouth 2 (two) times a day     • levothyroxine 100 mcg tablet Take 1 tablet (100 mcg total) by mouth daily   • LORazepam (ATIVAN) 0 5 mg tablet Take 0 5 mg by mouth every 12 (twelve) hours as needed for anxiety   • LORazepam (ATIVAN) 1 mg tablet Take 1 mg by mouth in the morning and 1 mg in the evening and 1 mg before bedtime     • loxapine (LOXITANE) 25 mg capsule TAKE TWO (2) CAPSULES BY MOUTH EVERY MORNING AND SIX (6) CAPSULES AT BEDTIME (Patient not taking: Reported on 9/19/2022)   • lurasidone (LATUDA) 80 mg tablet Take 40 mg by mouth 2 (two) times a day     • melatonin 3 mg Take 1 tablet (3 mg total) by mouth daily at bedtime   • Menthol-Methyl Salicylate (MURIEL AGUDELO GREASELESS) 10-15 % greaseless cream Apply topically daily at bedtime   • multivitamin (THERAGRAN) TABS Take 1 tablet by mouth daily   • ofloxacin (FLOXIN) 0 3 % otic solution Administer 5 drops to the right ear 2 (two) times a day   • PHENobarbital 64 8 mg tablet Take 1 tablet (64 8 mg total) by mouth 2 (two) times a day   • polyethylene glycol (GLYCOLAX) 17 GM/SCOOP MIX 1 CAPFULL(17GM) IN 8OZ OF LIQUID AND DRINK DAILY @ 8AM(CONSTIPATION)   • Saphris 10 MG SL tablet Place 1 tablet (10 mg total) under the tongue 2 (two) times a day   • senna-docusate sodium (SENOKOT-S) 8 6-50 mg per tablet Take 1 tablet by mouth daily   • sodium chloride (OCEAN) 0 65 % nasal spray 1 spray into each nostril as needed in the morning for congestion  TAKE UP TO 6 TIMES DAILY AS NEEDED   • traZODone (DESYREL) 150 mg tablet Take 1 tablet by mouth daily at bedtime    • trihexyphenidyl (ARTANE) 5 mg tablet Take 5 mg by mouth 2 (two) times a day        Objective     /82 (BP Location: Left arm, Patient Position: Sitting, Cuff Size: Large)   Pulse 86   Temp 98 9 °F (37 2 °C) (Temporal)   Ht 5' 4" (1 626 m)   Wt 92 3 kg (203 lb 8 oz)   SpO2 96%   BMI 34 93 kg/m²     Physical Exam  Constitutional:       General: She is not in acute distress  Appearance: She is obese  She is not toxic-appearing  Cardiovascular:      Rate and Rhythm: Normal rate and regular rhythm  Pulses: Normal pulses  Pulmonary:      Effort: Pulmonary effort is normal  No respiratory distress  Breath sounds: Normal breath sounds  No wheezing or rales  Abdominal:      General: There is no distension  Palpations: Abdomen is soft  Tenderness: There is no abdominal tenderness  Musculoskeletal:      Right lower leg: No edema  Left lower leg: No edema  Skin:     General: Skin is warm  Neurological:      Mental Status: She is alert  Psychiatric:      Comments: Patient intermittently with outbursts of anger, crying, and hallucinations  Distracted speech and thought   Perseverating on negative thoughts that the MRI is going to give her cancer       Lacy Kilpatrick DO

## 2022-12-26 DIAGNOSIS — K52.9 GASTROENTERITIS: ICD-10-CM

## 2022-12-27 RX ORDER — BISMUTH SUBSALICYLATE 262 MG/1
TABLET, CHEWABLE ORAL
Qty: 120 TABLET | Refills: 0 | Status: SHIPPED | OUTPATIENT
Start: 2022-12-27

## 2022-12-30 ENCOUNTER — APPOINTMENT (OUTPATIENT)
Dept: LAB | Age: 67
End: 2022-12-30

## 2022-12-30 DIAGNOSIS — E03.9 ACQUIRED HYPOTHYROIDISM: ICD-10-CM

## 2022-12-30 DIAGNOSIS — I10 BENIGN ESSENTIAL HYPERTENSION: ICD-10-CM

## 2022-12-30 DIAGNOSIS — E78.2 MIXED HYPERLIPIDEMIA: ICD-10-CM

## 2022-12-30 LAB
ALBUMIN SERPL BCP-MCNC: 3.6 G/DL (ref 3.5–5)
ALP SERPL-CCNC: 86 U/L (ref 46–116)
ALT SERPL W P-5'-P-CCNC: 20 U/L (ref 12–78)
ANION GAP SERPL CALCULATED.3IONS-SCNC: 8 MMOL/L (ref 4–13)
AST SERPL W P-5'-P-CCNC: 11 U/L (ref 5–45)
BILIRUB SERPL-MCNC: 0.23 MG/DL (ref 0.2–1)
BUN SERPL-MCNC: 14 MG/DL (ref 5–25)
CALCIUM SERPL-MCNC: 8.9 MG/DL (ref 8.3–10.1)
CHLORIDE SERPL-SCNC: 101 MMOL/L (ref 96–108)
CHOLEST SERPL-MCNC: 175 MG/DL
CO2 SERPL-SCNC: 26 MMOL/L (ref 21–32)
CREAT SERPL-MCNC: 0.72 MG/DL (ref 0.6–1.3)
ERYTHROCYTE [DISTWIDTH] IN BLOOD BY AUTOMATED COUNT: 12.5 % (ref 11.6–15.1)
GFR SERPL CREATININE-BSD FRML MDRD: 85 ML/MIN/1.73SQ M
GLUCOSE P FAST SERPL-MCNC: 99 MG/DL (ref 65–99)
HCT VFR BLD AUTO: 40.1 % (ref 34.8–46.1)
HDLC SERPL-MCNC: 82 MG/DL
HGB BLD-MCNC: 13.7 G/DL (ref 11.5–15.4)
LDLC SERPL CALC-MCNC: 68 MG/DL (ref 0–100)
MCH RBC QN AUTO: 33.2 PG (ref 26.8–34.3)
MCHC RBC AUTO-ENTMCNC: 34.2 G/DL (ref 31.4–37.4)
MCV RBC AUTO: 97 FL (ref 82–98)
PLATELET # BLD AUTO: 264 THOUSANDS/UL (ref 149–390)
PMV BLD AUTO: 9.6 FL (ref 8.9–12.7)
POTASSIUM SERPL-SCNC: 4.2 MMOL/L (ref 3.5–5.3)
PROT SERPL-MCNC: 7.2 G/DL (ref 6.4–8.4)
RBC # BLD AUTO: 4.13 MILLION/UL (ref 3.81–5.12)
SODIUM SERPL-SCNC: 135 MMOL/L (ref 135–147)
TRIGL SERPL-MCNC: 124 MG/DL
TSH SERPL DL<=0.05 MIU/L-ACNC: 0.85 UIU/ML (ref 0.45–4.5)
WBC # BLD AUTO: 3.61 THOUSAND/UL (ref 4.31–10.16)

## 2023-01-04 ENCOUNTER — HOSPITAL ENCOUNTER (OUTPATIENT)
Dept: RADIOLOGY | Facility: HOSPITAL | Age: 68
Discharge: HOME | End: 2023-01-04

## 2023-01-04 DIAGNOSIS — E03.9 ACQUIRED HYPOTHYROIDISM: ICD-10-CM

## 2023-01-04 DIAGNOSIS — M81.0 OSTEOPOROSIS, UNSPECIFIED OSTEOPOROSIS TYPE, UNSPECIFIED PATHOLOGICAL FRACTURE PRESENCE: ICD-10-CM

## 2023-01-04 RX ORDER — LEVOTHYROXINE SODIUM 0.1 MG/1
TABLET ORAL
Qty: 31 TABLET | Refills: 0 | Status: SHIPPED | OUTPATIENT
Start: 2023-01-04

## 2023-01-04 RX ORDER — MULTIVITAMIN,THERAPEUTIC
TABLET ORAL
Qty: 31 TABLET | Refills: 0 | Status: SHIPPED | OUTPATIENT
Start: 2023-01-04

## 2023-01-05 DIAGNOSIS — J30.2 SEASONAL ALLERGIES: ICD-10-CM

## 2023-01-05 RX ORDER — FLUTICASONE PROPIONATE 50 MCG
SPRAY, SUSPENSION (ML) NASAL
Qty: 16 G | Refills: 3 | Status: SHIPPED | OUTPATIENT
Start: 2023-01-05

## 2023-01-06 ENCOUNTER — RA CDI HCC (OUTPATIENT)
Dept: OTHER | Facility: HOSPITAL | Age: 68
End: 2023-01-06

## 2023-01-10 ENCOUNTER — OFFICE VISIT (OUTPATIENT)
Dept: INTERNAL MEDICINE CLINIC | Age: 68
End: 2023-01-10

## 2023-01-10 VITALS
HEIGHT: 64 IN | BODY MASS INDEX: 34.66 KG/M2 | HEART RATE: 91 BPM | SYSTOLIC BLOOD PRESSURE: 126 MMHG | TEMPERATURE: 98.1 F | DIASTOLIC BLOOD PRESSURE: 82 MMHG | WEIGHT: 203 LBS | OXYGEN SATURATION: 96 %

## 2023-01-10 DIAGNOSIS — F31.77 BIPOLAR DISORDER, IN PARTIAL REMISSION, MOST RECENT EPISODE MIXED (HCC): ICD-10-CM

## 2023-01-10 DIAGNOSIS — E03.9 ACQUIRED HYPOTHYROIDISM: ICD-10-CM

## 2023-01-10 DIAGNOSIS — E78.2 MIXED HYPERLIPIDEMIA: ICD-10-CM

## 2023-01-10 DIAGNOSIS — F25.9 SCHIZOAFFECTIVE DISORDER, UNSPECIFIED TYPE (HCC): ICD-10-CM

## 2023-01-10 DIAGNOSIS — G40.909 SEIZURE DISORDER (HCC): ICD-10-CM

## 2023-01-10 DIAGNOSIS — R73.03 PRE-DIABETES: ICD-10-CM

## 2023-01-10 DIAGNOSIS — I10 BENIGN ESSENTIAL HYPERTENSION: ICD-10-CM

## 2023-01-10 DIAGNOSIS — E66.01 OBESITY, MORBID (HCC): ICD-10-CM

## 2023-01-10 DIAGNOSIS — J30.2 SEASONAL ALLERGIES: Primary | ICD-10-CM

## 2023-01-10 PROBLEM — F11.20 CONTINUOUS OPIOID DEPENDENCE (HCC): Status: RESOLVED | Noted: 2022-05-18 | Resolved: 2023-01-10

## 2023-01-10 RX ORDER — ECHINACEA PURPUREA EXTRACT 125 MG
1 TABLET ORAL AS NEEDED
Qty: 30 ML | Refills: 3 | Status: SHIPPED | OUTPATIENT
Start: 2023-01-10

## 2023-01-10 NOTE — PROGRESS NOTES
Assessment/Plan:    Hypothyroidism  TSH is in normal range  Continue with present dose of levothyroxine    Benign essential hypertension  Stable on present regimen  Continue with low-salt diet    Seizure disorder (HCC)  No recent seizure  Being followed by neurologist    Hyperlipidemia  Stable on present regimen  We will continue to monitor  Continue with low-fat diet    Schizoaffective disorder ([facility] )  Being managed by psychiatrist    Obesity, morbid ([facility] )  Continue with low caloric diet and exercise as much as tolerated       Diagnoses and all orders for this visit:    Seasonal allergies  -     sodium chloride (OCEAN) 0 65 % nasal spray; 1 spray into each nostril as needed for congestion TAKE UP TO 6 TIMES DAILY AS NEEDED    Schizoaffective disorder, unspecified type (HCC)    [facility] mor[facility]    Seizure disorder (HCC)    Acquired hypothyroidism    Benign essential hypertension    Mixed hyperlipidemia    Bipolar disorder, in partial remission, most recent episode mixed ([facility] )    Pre-diabetes          BMI Counseling: Body mass index is 34 84 kg/m²  The BMI is above normal  Nutrition recommendations include decreasing portion sizes, encouraging healthy choices of fruits and vegetables and decreasing fast food intake  Exercise recommendations include vigorous physical activity 75 minutes/week and exercising 3-5 times per week  No pharmacotherapy was ordered  Rationale for BMI follow-up plan is due to patient being overweight or obese  Subjective:          Patient ID: Leopold Days is a 69 y o  female  Patient is here for follow-up  Also have blood work done and would like to discuss blood blood result    Patient is accompanied by  from her facility      The following portions of the patient's history were reviewed and updated as appropriate: allergies, current medications, past family history, past medical history, past social history, past surgical history and problem list     Review of Systems   Constitutional: Negative for fatigue and fever  HENT: Negative for congestion, ear discharge, ear pain, postnasal drip, sinus pressure, sore throat, tinnitus and trouble swallowing  Eyes: Negative for discharge, itching and visual disturbance  Respiratory: Negative for cough and shortness of breath  Cardiovascular: Negative for chest pain and palpitations  Gastrointestinal: Negative for abdominal pain, diarrhea, nausea and vomiting  Endocrine: Negative for cold intolerance and polyuria  Genitourinary: Negative for difficulty urinating, dysuria and urgency  Musculoskeletal: Negative for arthralgias and neck pain  Skin: Negative for rash  Allergic/Immunologic: Negative for environmental allergies  Neurological: Negative for dizziness, weakness and headaches  Psychiatric/Behavioral: Positive for behavioral problems and confusion  The patient is not nervous/anxious            Past Medical History:   Diagnosis Date   • Benign essential hypertension     resolved; 06/15/16   • Depression with anxiety    • Fracture of fifth metatarsal bone of right foot with delayed healing     last assessed 04/12/16; fracture of metatarsal bone, right, closed, initial encounter   • Hyperlipidemia     last assessed 11/28/17   • Hypothyroidism     last assessed 11/28/2017   • Insomnia    • Obesity    • [facility] )     last assessed 05/18/2017   • Seizure disorder ([facility] )     last assessed 03/28/17         Current Outpatient Medications:   •  Acetaminophen Extra Strength 500 MG tablet, TAKE 2 TABLETS(1000MG)BY MOUTH 3 TIMES DAILY AS NEEDED FOR MILD PAIN, Disp: 180 tablet, Rfl: 1  •  alendronate (FOSAMAX) 70 mg tablet, TAKE 1 TABLET BY MOUTH WEEKLY ON FRIDAYS @ 8 AM (OSTEOPOROSIS), Disp: 4 tablet, Rfl: 5  •  Aspercreme Lidocaine 4 % CREA, , Disp: , Rfl:   •  aspirin 81 mg chewable tablet, Chew 1 tablet (81 mg total) daily, Disp: 90 tablet, Rfl: 1  •  atorvastatin (LIPITOR) 20 mg tablet, Take 1 tablet (20 mg total) by mouth daily, Disp: 90 tablet, Rfl: 1  •  busPIRone (BUSPAR) 15 mg tablet, Take 15 mg by mouth 2 (two) times a day , Disp: , Rfl:   •  Calcium Carb-Cholecalciferol (calcium carbonate-vitamin D) 500 mg-5 mcg tablet, TAKE 1 TABLET BY MOUTH 2X DAILY @8AM-8PM (SUPPLEMENT), Disp: 62 tablet, Rfl: 0  •  calcium carbonate-vitamin D (OSCAL-D) 500 mg-200 units per tablet, Take 1 tablet by mouth 2 (two) times a day with meals, Disp: 180 tablet, Rfl: 1  •  cetirizine (ZyrTEC) 5 MG tablet, Take 1 tablet (5 mg total) by mouth as needed for allergies (As needed at night p r n  for allergies), Disp: 30 tablet, Rfl: 5  •  Delsym 30 MG/5ML SUER, TAKE 1 TESAPOON (5ML) BY MOUTH 2X DAILY AS NEEDED FOR CONGESTION, Disp: 89 mL, Rfl: 0  •  Diclofenac Sodium (VOLTAREN) 1 %, APPLY 2 GM TOPICALLY TO (R) KNEE 4X DAILY @[id number](PAIN), Disp: 100 g, Rfl: 5  •  diphenhydrAMINE (BENADRYL) 50 mg capsule, Take 1 capsule (50 mg total) by mouth daily at bedtime as needed for sleep, Disp: 90 capsule, Rfl: 2  •  divalproex sodium (DEPAKOTE) 500 mg EC tablet, 1 tab in the Am, 3 tabs(1500mg) at bedtime, Disp: , Rfl:   •  docusate sodium (COLACE) 100 mg capsule, Take 1 capsule (100 mg total) by mouth 2 (two) times a day as needed for constipation, Disp: 180 capsule, Rfl: 1  •  fluticasone (FLONASE) 50 mcg/act nasal spray, INSTILL 2 SPRAYS IN EACH NOSTRIL DAILY @ 8AM(ALLERGIES), Disp: 16 g, Rfl: 3  •  gabapentin (NEURONTIN) 100 mg capsule, Take 200 mg by mouth 2 (two) times a day  , Disp: , Rfl:   •  L-Theanine 100 MG CAPS, Take 200 mg by mouth 2 (two) times a day  , Disp: , Rfl:   •  levothyroxine 100 mcg tablet, TAKE 1 TABLET BY MOUTH ONCE DAILY @ 6AM(THYROID), Disp: 31 tablet, Rfl: 0  •  LORazepam (ATIVAN) 0 5 mg tablet, Take 0 5 mg by mouth every 12 (twelve) hours as needed for anxiety, Disp: , Rfl:   •  LORazepam (ATIVAN) 1 mg tablet, Take 1 mg by mouth in the morning and 1 mg in the evening and 1 mg before bedtime  , Disp: , Rfl:   •  loxapine (LOXITANE) 25 mg capsule, TAKE TWO (2) CAPSULES BY MOUTH EVERY MORNING AND SIX (6) CAPSULES AT BEDTIME, Disp: , Rfl:   •  melatonin 3 mg, Take 1 tablet (3 mg total) by mouth daily at bedtime, Disp: 30 tablet, Rfl: 2  •  Menthol-Methyl Salicylate (MURIEL AGUDELO GREASELESS) 10-15 % greaseless cream, Apply topically daily at bedtime, Disp: 30 g, Rfl: 3  •  PHENobarbital 64 8 mg tablet, Take 1 tablet (64 8 mg total) by mouth 2 (two) times a day, Disp: 62 tablet, Rfl: 5  •  polyethylene glycol (GLYCOLAX) 17 GM/SCOOP, MIX 1 CAPFULL(17GM) IN 8OZ OF LIQUID AND DRINK DAILY @ 8AM(CONSTIPATION), Disp: 510 g, Rfl: 0  •  Saphris 10 MG SL tablet, Place 1 tablet (10 mg total) under the tongue 2 (two) times a day, Disp: 60 tablet, Rfl: 1  •  senna-docusate sodium (SENOKOT-S) 8 6-50 mg per tablet, Take 1 tablet by mouth daily, Disp: , Rfl:   •  sodium chloride (OCEAN) 0 65 % nasal spray, 1 spray into each nostril as needed for congestion TAKE UP TO 6 TIMES DAILY AS NEEDED, Disp: 30 mL, Rfl: 3  •  Thera (THERA/BETA-CAROTENE), TAKE 1 TABLET BY MOUTH ONCE DAILY @ 8AM,(SUPPLEMENT), Disp: 31 tablet, Rfl: 0  •  traZODone (DESYREL) 150 mg tablet, Take 1 tablet by mouth daily at bedtime , Disp: , Rfl:   •  trihexyphenidyl (ARTANE) 5 mg tablet, Take 5 mg by mouth 2 (two) times a day , Disp: , Rfl:   •  GNP Pink Bismuth 262 MG chewable tablet, CHEW 2 TABLETS(524MG) BY MOUTH EVERY 6 HOURS AS NEEDED FOR DIARRHEA, Disp: 120 tablet, Rfl: 0  •  lurasidone (LATUDA) 80 mg tablet, Take 40 mg by mouth 2 (two) times a day   (Patient not taking: Reported on 1/10/2023), Disp: , Rfl:     Allergies   Allergen Reactions   • Clozapine Other (See Comments)     low white blood count  Other reaction(s): Other (See Comments)  low white blood count   • Haloperidol Other (See Comments)     EPS  Other reaction(s):  Other (See Comments)  EPS   • Lithium Other (See Comments)     Toxicity   • Prolixin [Fluphenazine] Hyperactivity and Other (See Comments)     EPS, Hyperactivity  EPS, Hyperactivity   • Valproic Acid Confusion and Other (See Comments)     "Mental confusion"  "Mental confusion"       Social History   Past Surgical History:   Procedure Laterality Date   • COLONOSCOPY      complete   • [place] COLONOSCOPY FLX DX W/COLLJ SPEC WHEN PFRMD N/A 8/30/2018    Procedure: COLONOSCOPY with polypectomies;  Surgeon: Ml Carl MD;  Location: [facility]; Service: Gastroenterology     Family History   Problem Relation Age of Onset   • No Known Problems Mother    • No Known Problems Father    • Lung disease Other         mesothelioma   • No Known Problems Maternal Grandmother    • No Known Problems Maternal Grandfather    • No Known Problems Paternal Grandmother    • No Known Problems Paternal Grandfather    • No Known Problems Sister    • No Known Problems Sister    • Kidney failure Brother    • No Known Problems Maternal Aunt    • No Known Problems Maternal Aunt    • No Known Problems Maternal Aunt    • No Known Problems Maternal Aunt    • No Known Problems Paternal Aunt    • No Known Problems Paternal Aunt        Objective:  /82 (BP Location: Left arm, Patient Position: Sitting, Cuff Size: Standard)   Pulse 91   Temp 98 1 °F (36 7 °C) (Temporal)   Ht 5' 4" (1 626 m)   Wt 92 1 kg (203 lb)   SpO2 96% Comment: room air  BMI 34 84 kg/m²   Body mass index is 34 84 kg/m²  Physical Exam  Constitutional:       Appearance: She is well-developed  She is obese  HENT:      Head: Normocephalic  Right Ear: External ear normal       Left Ear: External ear normal       Nose: No rhinorrhea  Mouth/Throat:      Pharynx: No posterior oropharyngeal erythema  Eyes:      General: No scleral icterus  Pupils: Pupils are equal, round, and reactive to light  Neck:      Thyroid: No thyromegaly  Trachea: No tracheal deviation  Cardiovascular:      Rate and Rhythm: Normal rate and regular rhythm  Heart sounds: Normal heart sounds   No murmur heard      Comments: Trace bilateral lower extremity edema present  Pulmonary:      Effort: Pulmonary effort is normal  No respiratory distress  Breath sounds: Normal breath sounds  Chest:      Chest wall: No tenderness  Abdominal:      General: Bowel sounds are normal       Palpations: Abdomen is soft  There is no mass  Tenderness: There is no abdominal tenderness  Musculoskeletal:         General: Normal range of motion  Cervical back: Normal range of motion and neck supple  Right lower leg: Edema present  Left lower leg: Edema present  Lymphadenopathy:      Cervical: No cervical adenopathy  Skin:     General: Skin is warm  Neurological:      Mental Status: She is alert and oriented to person, place, and time  Cranial Nerves: No cranial nerve deficit     Psychiatric:         Mood and Affect: Mood normal          Behavior: Behavior normal

## 2023-01-11 ENCOUNTER — HOSPITAL ENCOUNTER (OUTPATIENT)
Dept: NON INVASIVE DIAGNOSTICS | Facility: CLINIC | Age: 68
Discharge: HOME | End: 2023-01-11

## 2023-01-11 VITALS — OXYGEN SATURATION: 98 % | SYSTOLIC BLOOD PRESSURE: 122 MMHG | DIASTOLIC BLOOD PRESSURE: 80 MMHG | HEART RATE: 78 BPM

## 2023-01-11 VITALS
HEART RATE: 77 BPM | HEIGHT: 64 IN | BODY MASS INDEX: 34.66 KG/M2 | WEIGHT: 203 LBS | DIASTOLIC BLOOD PRESSURE: 62 MMHG | SYSTOLIC BLOOD PRESSURE: 128 MMHG

## 2023-01-11 DIAGNOSIS — Z01.818 PRE-OP EXAM: ICD-10-CM

## 2023-01-11 DIAGNOSIS — R94.31 T WAVE INVERSION ON ELECTROCARDIOGRAM: ICD-10-CM

## 2023-01-11 DIAGNOSIS — R94.31 ABNORMAL ECG: ICD-10-CM

## 2023-01-11 LAB
AORTIC ROOT: 3.9 CM
APICAL FOUR CHAMBER EJECTION FRACTION: 52 %
ASCENDING AORTA: 3.5 CM
E WAVE DECELERATION TIME: 230 MS
FRACTIONAL SHORTENING: 33 % (ref 28–44)
INTERVENTRICULAR SEPTUM IN DIASTOLE (PARASTERNAL SHORT AXIS VIEW): 1.1 CM
INTERVENTRICULAR SEPTUM: 1.1 CM (ref 0.6–1.1)
LAAS-AP2: 9.8 CM2
LAAS-AP4: 16.8 CM2
LEFT ATRIUM AREA SYSTOLE SINGLE PLANE A4C: 13.7 CM2
LEFT ATRIUM SIZE: 3.4 CM
LEFT INTERNAL DIMENSION IN SYSTOLE: 2.2 CM (ref 2.1–4)
LEFT VENTRICLE DIASTOLIC VOLUME (MOD BIPLANE): 70 ML
LEFT VENTRICLE SYSTOLIC VOLUME (MOD BIPLANE): 29 ML
LEFT VENTRICULAR INTERNAL DIMENSION IN DIASTOLE: 3.3 CM (ref 3.5–6)
LEFT VENTRICULAR POSTERIOR WALL IN END DIASTOLE: 1.1 CM
LEFT VENTRICULAR STROKE VOLUME: 29 ML
LV EF: 59 %
LVSV (TEICH): 29 ML
MV E'TISSUE VEL-SEP: 5 CM/S
MV PEAK A VEL: 0.83 M/S
MV PEAK E VEL: 48 CM/S
MV STENOSIS PRESSURE HALF TIME: 67 MS
MV VALVE AREA P 1/2 METHOD: 3.28 CM2
NUC STRESS EJECTION FRACTION: 80 %
RATE PRESSURE PRODUCT: 9900
RIGHT ATRIUM AREA SYSTOLE A4C: 13.9 CM2
RIGHT VENTRICLE ID DIMENSION: 3.2 CM
SL CV LEFT ATRIUM LENGTH A2C: 4.1 CM
SL CV LV EF: 60
SL CV PED ECHO LEFT VENTRICLE DIASTOLIC VOLUME (MOD BIPLANE) 2D: 46 ML
SL CV PED ECHO LEFT VENTRICLE SYSTOLIC VOLUME (MOD BIPLANE) 2D: 17 ML
SL CV REST NUCLEAR ISOTOPE DOSE: 10.63 MCI
SL CV STRESS NUCLEAR ISOTOPE DOSE: 32.4 MCI
SL CV STRESS RECOVERY BP: NORMAL MMHG
SL CV STRESS RECOVERY HR: 89 BPM
SL CV STRESS RECOVERY O2 SAT: 98 %
STRESS ANGINA INDEX: 0
STRESS BASELINE BP: NORMAL MMHG
STRESS BASELINE HR: 78 BPM
STRESS O2 SAT REST: 95 %
STRESS PEAK HR: 90 BPM
STRESS POST O2 SAT PEAK: 99 %
STRESS POST PEAK BP: 110 MMHG
STRESS ST DEPRESSION: 0 MM
STRESS/REST PERFUSION RATIO: 1.3

## 2023-01-11 RX ADMIN — REGADENOSON 0.4 MG: 0.08 INJECTION, SOLUTION INTRAVENOUS at 12:39

## 2023-01-12 LAB
CHEST PAIN STATEMENT: NORMAL
MAX DIASTOLIC BP: 80 MMHG
MAX HEART RATE: 93 BPM
MAX PREDICTED HEART RATE: 151 BPM
MAX. SYSTOLIC BP: 122 MMHG
PROTOCOL NAME: NORMAL
REASON FOR TERMINATION: NORMAL
TARGET HR FORMULA: NORMAL
TEST INDICATION: NORMAL
TIME IN EXERCISE PHASE: NORMAL

## 2023-01-17 DIAGNOSIS — R09.89 CHEST CONGESTION: ICD-10-CM

## 2023-01-17 RX ORDER — DEXTROMETHORPHAN 30 MG/5ML
SUSPENSION, EXTENDED RELEASE ORAL
Qty: 89 ML | Refills: 0 | Status: SHIPPED | OUTPATIENT
Start: 2023-01-17

## 2023-01-19 ENCOUNTER — ANESTHESIA EVENT (OUTPATIENT)
Dept: RADIOLOGY | Facility: HOSPITAL | Age: 68
End: 2023-01-19

## 2023-01-24 ENCOUNTER — TELEPHONE (OUTPATIENT)
Dept: INTERNAL MEDICINE CLINIC | Age: 68
End: 2023-01-24

## 2023-01-24 NOTE — PRE-PROCEDURE INSTRUCTIONS
Pre-Surgery Instructions:   Medication Instructions   • Acetaminophen Extra Strength 500 MG tablet Uses PRN- OK to take day of surgery   • alendronate (FOSAMAX) 70 mg tablet Instructions provided by MD   • aspirin 81 mg chewable tablet Take day of surgery  • atorvastatin (LIPITOR) 20 mg tablet Take day of surgery  • busPIRone (BUSPAR) 15 mg tablet Take day of surgery  • calcium carbonate-vitamin D (OSCAL-D) 500 mg-200 units per tablet Stop taking 5 days prior to surgery  • cetirizine (ZyrTEC) 5 MG tablet Uses PRN- OK to take day of surgery   • Diclofenac Sodium (VOLTAREN) 1 % Take day of surgery  • divalproex sodium (DEPAKOTE) 500 mg EC tablet Take day of surgery  • fluticasone (FLONASE) 50 mcg/act nasal spray Take day of surgery  • gabapentin (NEURONTIN) 100 mg capsule Take day of surgery  • L-Theanine 100 MG CAPS Stop taking 5 days prior to surgery  • levothyroxine 100 mcg tablet Take day of surgery  • LORazepam (ATIVAN) 1 mg tablet Take day of surgery  • loxapine (LOXITANE) 25 mg capsule Take night before surgery   • PHENobarbital 64 8 mg tablet Take day of surgery  • polyethylene glycol (GLYCOLAX) 17 GM/SCOOP Hold day of surgery  • Saphris 10 MG SL tablet Take day of surgery  • senna-docusate sodium (SENOKOT-S) 8 6-50 mg per tablet Hold day of surgery  • Thera (THERA/BETA-CAROTENE) Stop taking 5 days prior to surgery  • traZODone (DESYREL) 150 mg tablet Take night before surgery   • trihexyphenidyl (ARTANE) 5 mg tablet Take day of surgery

## 2023-01-24 NOTE — TELEPHONE ENCOUNTER
Pt called the office and stated she doesn't want to take delsym anymore because its too strong for her system  She is asking to send a new prescription of dimetapp to the pharmacy  Please advise

## 2023-01-30 ENCOUNTER — HOSPITAL ENCOUNTER (OUTPATIENT)
Dept: RADIOLOGY | Age: 68
Discharge: HOME | End: 2023-01-30

## 2023-01-30 DIAGNOSIS — M81.0 AGE-RELATED OSTEOPOROSIS WITHOUT CURRENT PATHOLOGICAL FRACTURE: ICD-10-CM

## 2023-02-06 DIAGNOSIS — E78.2 MIXED HYPERLIPIDEMIA: ICD-10-CM

## 2023-02-06 DIAGNOSIS — R09.89 CHEST CONGESTION: Primary | ICD-10-CM

## 2023-02-06 DIAGNOSIS — M81.0 OSTEOPOROSIS, UNSPECIFIED OSTEOPOROSIS TYPE, UNSPECIFIED PATHOLOGICAL FRACTURE PRESENCE: ICD-10-CM

## 2023-02-06 RX ORDER — ATORVASTATIN CALCIUM 20 MG/1
TABLET, FILM COATED ORAL
Qty: 28 TABLET | Refills: 0 | Status: SHIPPED | OUTPATIENT
Start: 2023-02-06

## 2023-02-06 RX ORDER — ALENDRONATE SODIUM 70 MG/1
TABLET ORAL
Qty: 4 TABLET | Refills: 0 | Status: SHIPPED | OUTPATIENT
Start: 2023-02-06

## 2023-02-06 NOTE — TELEPHONE ENCOUNTER
Patient called stating she no longer wants to take Delsym cough syrup but wants to take Dimetapp cough syrup  Spoke with caregiver- they stated she is out of cough syrup and needs some sent to [address] [facility]  Per Dr Minal Beasley on 1/24/23- He stated no new orders needed when he was asked about switching medication from delsym to Dimetapp        Please advise, thank you

## 2023-02-07 NOTE — TELEPHONE ENCOUNTER
Dimetapp was given verbally to pharmacist  [facility] to confirm that what was sent and int he chart is the same

## 2023-02-08 ENCOUNTER — HOSPITAL ENCOUNTER (OUTPATIENT)
Dept: RADIOLOGY | Facility: HOSPITAL | Age: 68
Discharge: HOME | End: 2023-02-08

## 2023-02-08 ENCOUNTER — ANESTHESIA (OUTPATIENT)
Dept: RADIOLOGY | Facility: HOSPITAL | Age: 68
End: 2023-02-08

## 2023-02-08 VITALS
SYSTOLIC BLOOD PRESSURE: 162 MMHG | TEMPERATURE: 97.2 F | RESPIRATION RATE: 18 BRPM | OXYGEN SATURATION: 97 % | WEIGHT: 205 LBS | HEIGHT: 64 IN | BODY MASS INDEX: 35 KG/M2 | DIASTOLIC BLOOD PRESSURE: 111 MMHG | HEART RATE: 79 BPM

## 2023-02-08 DIAGNOSIS — H90.3 SENSORINEURAL HEARING LOSS, BILATERAL: ICD-10-CM

## 2023-02-08 RX ORDER — SODIUM CHLORIDE, SODIUM LACTATE, POTASSIUM CHLORIDE, CALCIUM CHLORIDE 600; 310; 30; 20 MG/100ML; MG/100ML; MG/100ML; MG/100ML
INJECTION, SOLUTION INTRAVENOUS CONTINUOUS PRN
Status: SHIPPED | OUTPATIENT
Start: 2023-02-08

## 2023-02-08 RX ADMIN — SODIUM CHLORIDE, SODIUM LACTATE, POTASSIUM CHLORIDE, AND CALCIUM CHLORIDE: .6; .31; .03; .02 INJECTION, SOLUTION INTRAVENOUS at 14:00

## 2023-02-08 NOTE — QUICK NOTE
Patient from group home with chaperone at bedside  When I went to evaluate the patient preoperatively I found that the chaperone with her was not her POA and was unable to sign consent on her behalf  The chaperone told me that the patient is able to sign for herself  The patient was unable to explain to me what procedure she was having, and intermittently yelling in the hallway incongruent statements, and appeared very confused  She oriented to herself, place, and the year, however she was not able to demonstrate that she had the capacity to adequately understand the procedure, the anesthesia plan, and therefore the benefits and risks associated with proceeding  I contacted the group home listed on her contact form and they needed time before calling back to find that information  Approximately 20 minutes later they called back with the contact for her sister Kati however multiple attempts at this number were unsuccessful, with the call resulting in "This number has been disconnected "    With no way to contact her POA and the patient without capacity, I discussed with the imaging team and in agreement to cancel the case  In addition, the chaperone then notified us that she was not able to stay with the patient and had to leave for her own medical appointment  I would recommend that when patient is set up to reschedule, the group home be notified that we require a working phone number for her POA if they are unable to send her with someone who can sign on her behalf

## 2023-02-08 NOTE — NURSING NOTE
Received message from procedural  Shirley Beaulieu, the caregiver of the patient called today at 9 am to inform us that she drank a cup of water and a cup of black coffee at 8 am  Upon asking EWA [employer]il [employer] she stated that it should be fine  Relayed the message to caregiver and stressed that No more fluids or food should be given till after the MRI

## 2023-02-13 ENCOUNTER — HOSPITAL ENCOUNTER (OUTPATIENT)
Dept: RADIOLOGY | Facility: HOSPITAL | Age: 68
Discharge: HOME | End: 2023-02-13

## 2023-03-02 ENCOUNTER — OFFICE VISIT (OUTPATIENT)
Dept: INTERNAL MEDICINE CLINIC | Facility: OTHER | Age: 68
End: 2023-03-02

## 2023-03-02 VITALS
HEIGHT: 64 IN | WEIGHT: 202 LBS | SYSTOLIC BLOOD PRESSURE: 136 MMHG | HEART RATE: 83 BPM | DIASTOLIC BLOOD PRESSURE: 74 MMHG | TEMPERATURE: 98.1 F | BODY MASS INDEX: 34.49 KG/M2 | OXYGEN SATURATION: 97 %

## 2023-03-02 DIAGNOSIS — G40.909 SEIZURE DISORDER (HCC): ICD-10-CM

## 2023-03-02 DIAGNOSIS — I10 BENIGN ESSENTIAL HYPERTENSION: ICD-10-CM

## 2023-03-02 DIAGNOSIS — E66.01 OBESITY, MORBID (HCC): ICD-10-CM

## 2023-03-02 DIAGNOSIS — L30.9 DERMATITIS: ICD-10-CM

## 2023-03-02 DIAGNOSIS — E03.9 ACQUIRED HYPOTHYROIDISM: ICD-10-CM

## 2023-03-02 DIAGNOSIS — F31.77 BIPOLAR DISORDER, IN PARTIAL REMISSION, MOST RECENT EPISODE MIXED (HCC): ICD-10-CM

## 2023-03-02 DIAGNOSIS — F25.9 SCHIZOAFFECTIVE DISORDER, UNSPECIFIED TYPE (HCC): Primary | ICD-10-CM

## 2023-03-02 RX ORDER — BROMPHENIRAMINE MALEATE, DEXTROMETHORPHAN HBR, PHENYLEPHRINE HCL 2; 10; 5 MG/10ML; MG/10ML; MG/10ML
SOLUTION ORAL
COMMUNITY
Start: 2023-02-07

## 2023-03-02 RX ORDER — TRIAMCINOLONE ACETONIDE 5 MG/G
CREAM TOPICAL 3 TIMES DAILY
Qty: 30 G | Refills: 1 | Status: SHIPPED | OUTPATIENT
Start: 2023-03-02

## 2023-03-02 NOTE — PROGRESS NOTES
Assessment/Plan:    Hypothyroidism  Continue with present dose of levothyroxine    Benign essential hypertension  Blood pressure stable on present regimen  Continue with low-salt diet    Seizure disorder ([place] [address] )  Continue with present regimen as per neurology  Schizoaffective disorder ([place] [address] )  Managed by psychiatrist    Psychosis, bipolar affective ([place] [address] )  Managed by psychiatrist    Obesity, morbid ([place] [address] )  Advised for low caloric diet and exercise as much as tolerated       Diagnoses and all orders for this visit:    Schizoaffective disorder, unspecified type ([place] [address] )    Bipolar disorder, in partial remission, most recent episode mixed ([place] [address] )    Dermatitis  -     triamcinolone (KENALOG) 0 5 % cream; Apply topically 3 (three) times a day    Acquired hypothyroidism    Benign essential hypertension    Seizure disorder ([place] [address] )    Obesity, morbid ([place] [address] )    Other orders  -     Multiple Vitamin (MULTIVITAMIN ADULT PO); Take by mouth daily  -     GNP Cold/Cough Childrens 2 5-1-5 MG/5ML LIQD               Subjective:          Patient ID: Alonso Quevedo is a 69 y o  female  Patient came to office with complaint of rash over anterior chest for the last couple of weeks  Is also pruritic sometimes  She was using bacitracin ointment without any relief  The following portions of the patient's history were reviewed and updated as appropriate: allergies, current medications, past family history, past medical history, past social history, past surgical history and problem list     Review of Systems   Constitutional: Negative for fatigue and fever  HENT: Negative for congestion, ear discharge, ear pain, postnasal drip, sinus pressure, sore throat, tinnitus and trouble swallowing  Eyes: Negative for discharge, itching and visual disturbance  Respiratory: Negative for cough and shortness of breath  Cardiovascular: Negative for chest pain and palpitations     Gastrointestinal: Negative for abdominal pain, diarrhea, nausea and vomiting  Endocrine: Negative for cold intolerance and polyuria  Genitourinary: Negative for difficulty urinating, dysuria and urgency  Musculoskeletal: Negative for arthralgias and neck pain  Skin: Positive for rash  Over anterior chest   Allergic/Immunologic: Negative for environmental allergies  Neurological: Negative for dizziness, weakness and headaches  Psychiatric/Behavioral: Positive for dysphoric mood  Negative for agitation and behavioral problems  The patient is not nervous/anxious            Past Medical History:   Diagnosis Date   • Anxiety    • Benign essential hypertension     resolved; 06/15/16   • Depression    • Depression with anxiety    • Fracture of fifth metatarsal bone of right foot with delayed healing     last assessed 04/12/16; fracture of metatarsal bone, right, closed, initial encounter   • Hyperlipidemia     last assessed 11/28/17   • Hypothyroidism     last assessed 11/28/2017   • Insomnia    • Obesity    • Schiz[facility]ffective disorder ([facility] )     last assessed 05/18/2017   • Seizure disorder ([facility] )     last assessed 03/28/17   • Seizures ([facility] )          Current Outpatient Medications:   •  alendronate (FOSAMAX) 70 mg tablet, TAKE 1 TABLET BY MOUTH WEEKLY ON FRIDAYS @ 8AM (OSTEOPORPSIS), Disp: 4 tablet, Rfl: 0  •  aspirin 81 mg chewable tablet, Chew 1 tablet (81 mg total) daily, Disp: 90 tablet, Rfl: 1  •  atorvastatin (LIPITOR) 20 mg tablet, TAKE 1 TABLET BY MOUTH ONCE DAILY @ 8AM(CHOLSETEROL), Disp: 28 tablet, Rfl: 0  •  busPIRone (BUSPAR) 15 mg tablet, Take 15 mg by mouth 2 (two) times a day , Disp: , Rfl:   •  diphenhydrAMINE (BENADRYL) 50 mg capsule, Take 1 capsule (50 mg total) by mouth daily at bedtime as needed for sleep, Disp: 90 capsule, Rfl: 2  •  divalproex sodium (DEPAKOTE) 500 mg EC tablet, 1 tab in the Am, 3 tabs(1500mg) at bedtime, Disp: , Rfl:   •  fluticasone (FLONASE) 50 mcg/act nasal spray, INSTILL 2 SPRAYS IN EACH NOSTRIL DAILY @ 8AM(ALLERGIES), Disp: 16 g, Rfl: 3  •  gabapentin (NEURONTIN) 100 mg capsule, Take 200 mg by mouth 2 (two) times a day  , Disp: , Rfl:   •  L-Theanine 100 MG CAPS, Take 200 mg by mouth 2 (two) times a day  , Disp: , Rfl:   •  levothyroxine 100 mcg tablet, TAKE 1 TABLET BY MOUTH ONCE DAILY @ 6AM(THYROID), Disp: 31 tablet, Rfl: 0  •  LORazepam (ATIVAN) 1 mg tablet, Take 1 mg by mouth in the morning and 1 mg in the evening and 1 mg before bedtime  , Disp: , Rfl:   •  melatonin 3 mg, Take 1 tablet (3 mg total) by mouth daily at bedtime, Disp: 30 tablet, Rfl: 2  •  Multiple Vitamin (MULTIVITAMIN ADULT PO), Take by mouth daily, Disp: , Rfl:   •  sodium chloride (OCEAN) 0 65 % nasal spray, 1 spray into each nostril as needed for congestion TAKE UP TO 6 TIMES DAILY AS NEEDED, Disp: 30 mL, Rfl: 3  •  triamcinolone (KENALOG) 0 5 % cream, Apply topically 3 (three) times a day, Disp: 30 g, Rfl: 1  •  Acetaminophen Extra Strength 500 MG tablet, TAKE 2 TABLETS(1000MG)BY MOUTH 3 TIMES DAILY AS NEEDED FOR MILD PAIN, Disp: 180 tablet, Rfl: 1  •  Aspercreme Lidocaine 4 % CREA, , Disp: , Rfl:   •  brompheniramine-pseudoephedrine-dextromethorphan (DIMETAPP DM) 15-1-5 MG/5ML ELIX, Take 20 mL by mouth every 4 (four) hours as needed for cough or allergies, Disp: 237 mL, Rfl: 0  •  Calcium Carb-Cholecalciferol (calcium carbonate-vitamin D) 500 mg-5 mcg tablet, TAKE 1 TABLET BY MOUTH 2X DAILY @8AM-8PM (SUPPLEMENT), Disp: 62 tablet, Rfl: 0  •  calcium carbonate-vitamin D (OSCAL-D) 500 mg-200 units per tablet, Take 1 tablet by mouth 2 (two) times a day with meals, Disp: 180 tablet, Rfl: 1  •  cetirizine (ZyrTEC) 5 MG tablet, Take 1 tablet (5 mg total) by mouth as needed for allergies (As needed at night p r n  for allergies), Disp: 30 tablet, Rfl: 5  •  Diclofenac Sodium (VOLTAREN) 1 %, APPLY 2 GM TOPICALLY TO (R) KNEE 4X DAILY @8A-29J6F-5W(PAIN), Disp: 100 g, Rfl: 5  •  docusate sodium (COLACE) 100 mg capsule, Take 1 capsule (100 mg total) by mouth 2 (two) times a day as needed for constipation, Disp: 180 capsule, Rfl: 1  •  GNP Cold/Cough Childrens 2 5-1-5 MG/5ML LIQD, , Disp: , Rfl:   •  GNP Pink Bismuth 262 MG chewable tablet, CHEW 2 TABLETS(524MG) BY MOUTH EVERY 6 HOURS AS NEEDED FOR DIARRHEA, Disp: 120 tablet, Rfl: 0  •  LORazepam (ATIVAN) 0 5 mg tablet, Take 0 5 mg by mouth every 12 (twelve) hours as needed for anxiety, Disp: , Rfl:   •  loxapine (LOXITANE) 25 mg capsule, TAKE TWO (2) CAPSULES BY MOUTH EVERY MORNING AND SIX (6) CAPSULES AT BEDTIME, Disp: , Rfl:   •  lurasidone (LATUDA) 80 mg tablet, Take 40 mg by mouth 2 (two) times a day   (Patient not taking: Reported on 1/10/2023), Disp: , Rfl:   •  Menthol-Methyl Salicylate (MURIEL AGUDELO GREASELESS) 10-15 % greaseless cream, Apply topically daily at bedtime, Disp: 30 g, Rfl: 3  •  PHENobarbital 64 8 mg tablet, Take 1 tablet (64 8 mg total) by mouth 2 (two) times a day, Disp: 62 tablet, Rfl: 5  •  polyethylene glycol (GLYCOLAX) 17 GM/SCOOP, MIX 1 CAPFULL(17GM) IN 8OZ OF LIQUID AND DRINK DAILY @ 8AM(CONSTIPATION), Disp: 510 g, Rfl: 0  •  Saphris 10 MG SL tablet, Place 1 tablet (10 mg total) under the tongue 2 (two) times a day, Disp: 60 tablet, Rfl: 1  •  senna-docusate sodium (SENOKOT-S) 8 6-50 mg per tablet, Take 1 tablet by mouth daily, Disp: , Rfl:   •  Thera (THERA/BETA-CAROTENE), TAKE 1 TABLET BY MOUTH ONCE DAILY @ 8AM,(SUPPLEMENT), Disp: 31 tablet, Rfl: 0  •  traZODone (DESYREL) 150 mg tablet, Take 1 tablet by mouth daily at bedtime , Disp: , Rfl:   •  trihexyphenidyl (ARTANE) 5 mg tablet, Take 5 mg by mouth 2 (two) times a day , Disp: , Rfl:   No current facility-administered medications for this visit  Facility-Administered Medications Ordered in Other Visits:   •  lactated ringers infusion, , Intravenous, Continuous PRN, Marifer Morin, EWA, New Bag at 02/08/23 1400    Allergies   Allergen Reactions   • Clozapine Other (See Comments)     low white blood count  Other reaction(s):  Other (See Comments)  low white blood count   • Haloperidol Other (See Comments)     EPS  Other reaction(s): Other (See Comments)  EPS   • Lithium Other (See Comments)     Toxicity   • Prolixin [Fluphenazine] Hyperactivity and Other (See Comments)     EPS, Hyperactivity  EPS, Hyperactivity   • Valproic Acid Confusion and Other (See Comments)     "Mental confusion"  "Mental confusion"       Social History   Past Surgical History:   Procedure Laterality Date   • COLONOSCOPY      complete   • DE COLONOSCOPY FLX DX W/COLLJ SPEC WHEN PFRMD N/A 8/30/2018    Procedure: COLONOSCOPY with polypectomies;  Surgeon: Beck Smith MD;  Location: [facility]; Service: Gastroenterology     Family History   Problem Relation Age of Onset   • No Known Problems Mother    • No Known Problems Father    • Lung disease Other         mesothelioma   • No Known Problems Maternal Grandmother    • No Known Problems Maternal Grandfather    • No Known Problems Paternal Grandmother    • No Known Problems Paternal Grandfather    • No Known Problems Sister    • No Known Problems Sister    • Kidney failure Brother    • No Known Problems Maternal Aunt    • No Known Problems Maternal Aunt    • No Known Problems Maternal Aunt    • No Known Problems Maternal Aunt    • No Known Problems Paternal Aunt    • No Known Problems Paternal Aunt        Objective:  /74   Pulse 83   Temp 98 1 °F (36 7 °C) (Temporal)   Ht 5' 4" (1 626 m)   Wt 91 6 kg (202 lb)   SpO2 97%   BMI 34 67 kg/m²   Body mass index is 34 67 kg/m²  Physical Exam  Constitutional:       Appearance: She is well-developed  She is obese  HENT:      Head: Normocephalic  Right Ear: External ear normal  There is no impacted cerumen  Left Ear: External ear normal  There is no impacted cerumen  Mouth/Throat:      Pharynx: No posterior oropharyngeal erythema  Eyes:      General: No scleral icterus  Pupils: Pupils are equal, round, and reactive to light  Neck:      Thyroid: No thyromegaly  Trachea: No tracheal deviation  Cardiovascular:      Rate and Rhythm: Normal rate and regular rhythm  Heart sounds: Normal heart sounds  No murmur heard  Comments: Trace bilateral lower extremity edema present  Pulmonary:      Effort: Pulmonary effort is normal  No respiratory distress  Breath sounds: Normal breath sounds  Chest:      Chest wall: No tenderness  Abdominal:      General: Bowel sounds are normal       Palpations: Abdomen is soft  There is no mass  Tenderness: There is no abdominal tenderness  Musculoskeletal:         General: Normal range of motion  Cervical back: Normal range of motion and neck supple  Lymphadenopathy:      Cervical: No cervical adenopathy  Skin:     General: Skin is warm  Findings: Rash present  Comments: Over anterior chest about 3 inch in diameter patch of rash with raised border noted  Neurological:      Mental Status: She is alert and oriented to person, place, and time  Cranial Nerves: No cranial nerve deficit

## 2023-03-06 DIAGNOSIS — M81.0 OSTEOPOROSIS, UNSPECIFIED OSTEOPOROSIS TYPE, UNSPECIFIED PATHOLOGICAL FRACTURE PRESENCE: ICD-10-CM

## 2023-03-06 DIAGNOSIS — E03.9 ACQUIRED HYPOTHYROIDISM: ICD-10-CM

## 2023-03-06 DIAGNOSIS — G40.909 SEIZURE DISORDER (HCC): ICD-10-CM

## 2023-03-06 RX ORDER — LEVOTHYROXINE SODIUM 0.1 MG/1
TABLET ORAL
Qty: 30 TABLET | Refills: 5 | Status: SHIPPED | OUTPATIENT
Start: 2023-03-06

## 2023-03-06 RX ORDER — MULTIVITAMIN,THERAPEUTIC
TABLET ORAL
Qty: 30 TABLET | Refills: 5 | Status: SHIPPED | OUTPATIENT
Start: 2023-03-06

## 2023-03-06 RX ORDER — PHENOBARBITAL 64.8 MG/1
TABLET ORAL
Qty: 62 TABLET | Refills: 1 | Status: SHIPPED | OUTPATIENT
Start: 2023-03-06

## 2023-04-05 ENCOUNTER — OFFICE VISIT (OUTPATIENT)
Dept: INTERNAL MEDICINE CLINIC | Facility: OTHER | Age: 68
End: 2023-04-05

## 2023-04-05 VITALS
HEART RATE: 70 BPM | HEIGHT: 64 IN | DIASTOLIC BLOOD PRESSURE: 62 MMHG | SYSTOLIC BLOOD PRESSURE: 118 MMHG | WEIGHT: 202 LBS | BODY MASS INDEX: 34.49 KG/M2 | TEMPERATURE: 99.2 F | OXYGEN SATURATION: 99 %

## 2023-04-05 DIAGNOSIS — J06.9 UPPER RESPIRATORY TRACT INFECTION, UNSPECIFIED TYPE: Primary | ICD-10-CM

## 2023-04-05 RX ORDER — OFLOXACIN 3 MG/ML
10 SOLUTION AURICULAR (OTIC) 2 TIMES DAILY
COMMUNITY
End: 2023-04-05

## 2023-04-05 RX ORDER — AZITHROMYCIN 250 MG/1
TABLET, FILM COATED ORAL
Qty: 6 TABLET | Refills: 0 | Status: SHIPPED | OUTPATIENT
Start: 2023-04-05 | End: 2023-04-10

## 2023-04-05 RX ORDER — HYDROCODONE BITARTRATE AND ACETAMINOPHEN 5; 325 MG/1; MG/1
1 TABLET ORAL EVERY 6 HOURS PRN
COMMUNITY

## 2023-04-05 NOTE — PROGRESS NOTES
Assessment/Plan:    Problem List Items Addressed This Visit        Respiratory    URI (upper respiratory infection) - Primary     - start zpak as prescribed  - start mucinex DM  - follow up if symptoms worsen or fail to improve          Relevant Medications    azithromycin (Zithromax) 250 mg tablet    dextromethorphan-guaifenesin (MUCINEX DM)  MG per 12 hr tablet         M*Modal software was used to dictate this note  It may contain errors with dictating incorrect words or incorrect spelling  Please contact the provider directly with any questions  Subjective:      Patient ID: Kala Moreira is a 69 y o  female  HPI    Patient presents today accompanied by her caregiver Srinivasan Bowen from her group home  She is concerned for a cough about 2 weeks ago  She states that she is coughing frequently and is productive of yellow phlegm  She states the cough is keeping her up at night  She notes her cough is the worse in the morning  She has associated congestion and rhinorrhea  She states she is not using anything over the counter  The following portions of the patient's history were reviewed and updated as appropriate: allergies, current medications, past family history, past medical history, past social history, past surgical history and problem list     Review of Systems   Constitutional: Negative for chills and fever  HENT: Positive for congestion, postnasal drip and rhinorrhea  Negative for ear discharge, ear pain, sinus pressure, sinus pain and sore throat  Respiratory: Positive for cough  Negative for chest tightness, shortness of breath and wheezing  Musculoskeletal: Negative for myalgias           Past Medical History:   Diagnosis Date   • Anxiety    • Benign essential hypertension     resolved; 06/15/16   • Depression    • Depression with anxiety    • Fracture of fifth metatarsal bone of right foot with delayed healing     last assessed 04/12/16; fracture of metatarsal bone, right, closed, initial encounter   • Hyperlipidemia     last assessed 11/28/17   • Hypothyroidism     last assessed 11/28/2017   • Insomnia    • Obesity    • Schizoaffective disorder ([facility] )     last assessed 05/18/2017   • Seizure disorder ([facility] )     last assessed 03/28/17   • Seizures ([facility] )          Current Outpatient Medications:   •  Acetaminophen Extra Strength 500 MG tablet, TAKE 2 TABLETS(1000MG)BY MOUTH 3 TIMES DAILY AS NEEDED FOR MILD PAIN, Disp: 180 tablet, Rfl: 1  •  alendronate (FOSAMAX) 70 mg tablet, TAKE 1 TABLET BY MOUTH WEEKLY ON FRIDAYS @ 8AM (OSTEOPORPSIS), Disp: 4 tablet, Rfl: 0  •  aspirin 81 mg chewable tablet, Chew 1 tablet (81 mg total) daily, Disp: 90 tablet, Rfl: 1  •  atorvastatin (LIPITOR) 20 mg tablet, TAKE 1 TABLET BY MOUTH ONCE DAILY @ 8AM(CHOLSETEROL), Disp: 28 tablet, Rfl: 0  •  azithromycin (Zithromax) 250 mg tablet, Take 2 tablets (500 mg total) by mouth daily for 1 day, THEN 1 tablet (250 mg total) daily for 4 days  , Disp: 6 tablet, Rfl: 0  •  busPIRone (BUSPAR) 15 mg tablet, Take 15 mg by mouth 2 (two) times a day , Disp: , Rfl:   •  Calcium Carb-Cholecalciferol (calcium carbonate-vitamin D) 500 mg-5 mcg tablet, TAKE 1 TABLET BY MOUTH 2X DAILY @8AM-8PM (SUPPLEMENT) *AHMAD, Disp: 60 tablet, Rfl: 5  •  calcium carbonate-vitamin D (OSCAL-D) 500 mg-200 units per tablet, Take 1 tablet by mouth 2 (two) times a day with meals, Disp: 180 tablet, Rfl: 1  •  cetirizine (ZyrTEC) 5 MG tablet, Take 1 tablet (5 mg total) by mouth as needed for allergies (As needed at night p r n  for allergies), Disp: 30 tablet, Rfl: 5  •  dextromethorphan-guaifenesin (MUCINEX DM)  MG per 12 hr tablet, Take 1 tablet by mouth every 12 (twelve) hours, Disp: 14 tablet, Rfl: 0  •  Diclofenac Sodium (VOLTAREN) 1 %, APPLY 2 GM TOPICALLY TO (R) KNEE 4X DAILY @8A-45V4O-7C(PAIN), Disp: 100 g, Rfl: 5  •  diphenhydrAMINE (BENADRYL) 50 mg capsule, Take 1 capsule (50 mg total) by mouth daily at bedtime as needed for sleep, Disp: 90 capsule, Rfl: 2  •  divalproex sodium (DEPAKOTE) 500 mg EC tablet, 1 tab in the Am, 3 tabs(1500mg) at bedtime, Disp: , Rfl:   •  docusate sodium (COLACE) 100 mg capsule, Take 1 capsule (100 mg total) by mouth 2 (two) times a day as needed for constipation, Disp: 180 capsule, Rfl: 1  •  fluticasone (FLONASE) 50 mcg/act nasal spray, INSTILL 2 SPRAYS IN EACH NOSTRIL DAILY @ 8AM(ALLERGIES), Disp: 16 g, Rfl: 3  •  gabapentin (NEURONTIN) 100 mg capsule, Take 200 mg by mouth 2 (two) times a day  , Disp: , Rfl:   •  GNP Pink Bismuth 262 MG chewable tablet, CHEW 2 TABLETS(524MG) BY MOUTH EVERY 6 HOURS AS NEEDED FOR DIARRHEA, Disp: 120 tablet, Rfl: 0  •  HYDROcodone-acetaminophen (NORCO) 5-325 mg per tablet, Take 1 tablet by mouth every 6 (six) hours as needed for pain, Disp: , Rfl:   •  L-Theanine 100 MG CAPS, Take 200 mg by mouth 2 (two) times a day  , Disp: , Rfl:   •  levothyroxine 100 mcg tablet, TAKE 1 TABLET BY MOUTH ONCE DAILY @ 6AM(THYROID) *AHMAD, Disp: 30 tablet, Rfl: 5  •  LORazepam (ATIVAN) 0 5 mg tablet, Take 0 5 mg by mouth every 12 (twelve) hours as needed for anxiety, Disp: , Rfl:   •  LORazepam (ATIVAN) 1 mg tablet, Take 1 mg by mouth in the morning and 1 mg in the evening and 1 mg before bedtime  , Disp: , Rfl:   •  loxapine (LOXITANE) 25 mg capsule, TAKE TWO (2) CAPSULES BY MOUTH EVERY MORNING AND SIX (6) CAPSULES AT BEDTIME, Disp: , Rfl:   •  melatonin 3 mg, Take 1 tablet (3 mg total) by mouth daily at bedtime, Disp: 30 tablet, Rfl: 2  •  Menthol-Methyl Salicylate (MURIEL AGUDELO GREASELESS) 10-15 % greaseless cream, Apply topically daily at bedtime, Disp: 30 g, Rfl: 3  •  Multiple Vitamin (MULTIVITAMIN ADULT PO), Take by mouth daily, Disp: , Rfl:   •  PHENobarbital 64 8 mg tablet, TAKE 1 TABLET BY MOUTH 2X DAILY @8AM-8PM(SEIZURES) *BERTHA, Disp: 62 tablet, Rfl: 1  •  polyethylene glycol (GLYCOLAX) 17 GM/SCOOP, MIX 1 CAPFULL(17GM) IN 8OZ OF LIQUID AND DRINK DAILY @ 8AM(CONSTIPATION), Disp: 510 g, Rfl: 0  •  Saphris 10 "MG SL tablet, Place 1 tablet (10 mg total) under the tongue 2 (two) times a day, Disp: 60 tablet, Rfl: 1  •  senna-docusate sodium (SENOKOT-S) 8 6-50 mg per tablet, Take 1 tablet by mouth daily, Disp: , Rfl:   •  sodium chloride (OCEAN) 0 65 % nasal spray, 1 spray into each nostril as needed for congestion TAKE UP TO 6 TIMES DAILY AS NEEDED, Disp: 30 mL, Rfl: 3  •  Thera (THERA/BETA-CAROTENE), TAKE 1 TABLET BY MOUTH ONCE DAILY @ 8AM,(SUPPLEMENT) *AHMAD, Disp: 30 tablet, Rfl: 5  •  traZODone (DESYREL) 150 mg tablet, Take 1 tablet by mouth daily at bedtime , Disp: , Rfl:   •  trihexyphenidyl (ARTANE) 5 mg tablet, Take 5 mg by mouth 3 (three) times a day with meals, Disp: , Rfl:   No current facility-administered medications for this visit  Facility-Administered Medications Ordered in Other Visits:   •  lactated ringers infusion, , Intravenous, Continuous PRN, Ollie January, CRNA, New Bag at 02/08/23 1400    Allergies   Allergen Reactions   • Clozapine Other (See Comments)     low white blood count  Other reaction(s): Other (See Comments)  low white blood count   • Haloperidol Other (See Comments)     EPS  Other reaction(s): Other (See Comments)  EPS   • Lithium Other (See Comments)     Toxicity   • Prolixin [Fluphenazine] Hyperactivity and Other (See Comments)     EPS, Hyperactivity  EPS, Hyperactivity   • Valproic Acid Confusion and Other (See Comments)     \"Mental confusion\"  \"Mental confusion\"       Social History   Past Surgical History:   Procedure Laterality Date   • COLONOSCOPY      complete   • AR COLONOSCOPY FLX DX W/COLLJ SPEC WHEN PFRMD N/A 8/30/2018    Procedure: COLONOSCOPY with polypectomies;  Surgeon: Odalis Vasquez MD;  Location: [facility];   Service: Gastroenterology     Family History   Problem Relation Age of Onset   • No Known Problems Mother    • No Known Problems Father    • Lung disease Other         mesothelioma   • No Known Problems Maternal Grandmother    • No Known Problems Maternal " "Grandfather    • No Known Problems Paternal Grandmother    • No Known Problems Paternal Grandfather    • No Known Problems Sister    • No Known Problems Sister    • Kidney failure Brother    • No Known Problems Maternal Aunt    • No Known Problems Maternal Aunt    • No Known Problems Maternal Aunt    • No Known Problems Maternal Aunt    • No Known Problems Paternal Aunt    • No Known Problems Paternal Aunt        Objective:  /62 (BP Location: Left arm, Patient Position: Sitting, Cuff Size: Adult)   Pulse 70   Temp 99 2 °F (37 3 °C) (Temporal)   Ht 5' 4\" (1 626 m)   Wt 91 6 kg (202 lb)   SpO2 99%   BMI 34 67 kg/m²      Physical Exam  Vitals reviewed  Constitutional:       General: She is not in acute distress  Appearance: Normal appearance  She is not diaphoretic  HENT:      Head: Normocephalic and atraumatic  Right Ear: Tympanic membrane and external ear normal       Left Ear: There is impacted cerumen  Nose: Rhinorrhea present  Mouth/Throat:      Mouth: Mucous membranes are moist       Pharynx: Oropharynx is clear  No oropharyngeal exudate or posterior oropharyngeal erythema  Eyes:      Extraocular Movements: Extraocular movements intact  Conjunctiva/sclera: Conjunctivae normal       Pupils: Pupils are equal, round, and reactive to light  Cardiovascular:      Rate and Rhythm: Normal rate and regular rhythm  Heart sounds: Normal heart sounds  No murmur heard  Pulmonary:      Effort: Pulmonary effort is normal  No respiratory distress  Breath sounds: Normal breath sounds  No wheezing, rhonchi or rales  Neurological:      Mental Status: She is alert  Mental status is at baseline             "

## 2023-04-30 DIAGNOSIS — J06.9 UPPER RESPIRATORY TRACT INFECTION, UNSPECIFIED TYPE: ICD-10-CM

## 2023-05-01 DIAGNOSIS — M25.561 ACUTE PAIN OF RIGHT KNEE: ICD-10-CM

## 2023-05-01 RX ORDER — GUAIFENESIN AND DEXTROMETHORPHAN HYDROBROMIDE 600; 30 MG/1; MG/1
TABLET, EXTENDED RELEASE ORAL
Qty: 14 TABLET | Refills: 0 | Status: SHIPPED | OUTPATIENT
Start: 2023-05-01

## 2023-05-01 RX ORDER — ACETAMINOPHEN 500 MG
1000 TABLET ORAL 3 TIMES DAILY PRN
Qty: 180 TABLET | Refills: 1 | Status: SHIPPED | OUTPATIENT
Start: 2023-05-01

## 2023-05-01 NOTE — TELEPHONE ENCOUNTER
LOV 4/5/23  NOV 9/5/23    [facility] are requesting cough syrup for patient      Please advise, thank you

## 2023-05-01 NOTE — TELEPHONE ENCOUNTER
NOV: 9/5/23      Pharmacy asking for a refill on the mucinex but I see Keiko Fay only gave her 1 weeks worth, do you want to continue this?

## 2023-06-05 DIAGNOSIS — G40.909 SEIZURE DISORDER (HCC): ICD-10-CM

## 2023-06-05 RX ORDER — PHENOBARBITAL 64.8 MG/1
TABLET ORAL
Qty: 60 TABLET | Refills: 2 | Status: SHIPPED | OUTPATIENT
Start: 2023-06-05

## 2023-06-08 ENCOUNTER — OFFICE VISIT (OUTPATIENT)
Dept: INTERNAL MEDICINE CLINIC | Age: 68
End: 2023-06-08
Payer: MEDICARE

## 2023-06-08 VITALS
SYSTOLIC BLOOD PRESSURE: 112 MMHG | OXYGEN SATURATION: 96 % | WEIGHT: 203 LBS | BODY MASS INDEX: 34.66 KG/M2 | TEMPERATURE: 98.2 F | HEIGHT: 64 IN | DIASTOLIC BLOOD PRESSURE: 58 MMHG | HEART RATE: 84 BPM

## 2023-06-08 DIAGNOSIS — R94.31 T WAVE INVERSION ON ELECTROCARDIOGRAM: ICD-10-CM

## 2023-06-08 DIAGNOSIS — R05.1 ACUTE COUGH: ICD-10-CM

## 2023-06-08 DIAGNOSIS — L98.9 SKIN LESION OF LEFT LOWER EXTREMITY: Primary | ICD-10-CM

## 2023-06-08 DIAGNOSIS — R21 RASH: ICD-10-CM

## 2023-06-08 DIAGNOSIS — R94.31 ABNORMAL ECG: ICD-10-CM

## 2023-06-08 DIAGNOSIS — M25.561 ACUTE PAIN OF RIGHT KNEE: ICD-10-CM

## 2023-06-08 PROCEDURE — 99214 OFFICE O/P EST MOD 30 MIN: CPT | Performed by: INTERNAL MEDICINE

## 2023-06-08 RX ORDER — CHLORHEXIDINE GLUCONATE 0.12 MG/ML
RINSE ORAL
COMMUNITY
Start: 2023-05-04

## 2023-06-08 RX ORDER — BENZONATATE 200 MG/1
200 CAPSULE ORAL 3 TIMES DAILY PRN
Qty: 30 CAPSULE | Refills: 0 | Status: SHIPPED | OUTPATIENT
Start: 2023-06-08

## 2023-06-08 RX ORDER — ASPIRIN 81 MG/1
81 TABLET, CHEWABLE ORAL DAILY
Qty: 90 TABLET | Refills: 1 | Status: SHIPPED | OUTPATIENT
Start: 2023-06-08

## 2023-06-08 NOTE — PROGRESS NOTES
Assessment/Plan:    Cough  -Currently resolved and likely secondary to patient eating too fast, per caretaker  -Patient was counseled to slow down while eating  -If symptoms continue, we will order a video barium swallow eval  -We will prescribe benzonatate to be taken as needed for her dry cough    Rash  -Currently resolved possibly secondary to contact dermatitis  -Patient was counseled to stop wearing the necklace that causes her to get a rash    Skin lesion of left knee  -Skin lesion is hard and scaly and appears to be more chronic than patient reports  -We will refer her to dermatology for possible excision biopsy to rule out precancerous or cancerous lesion    Abnormal EKG  -Patient would like a refill of her aspirin  -We will order the aspirin for her as requested     Diagnoses and all orders for this visit:    Skin lesion of left lower extremity  -     Ambulatory Referral to Dermatology; Future    Acute pain of right knee  -     Diclofenac Sodium (VOLTAREN) 1 %; Apply 2 g topically 4 (four) times a day 2 g right knee 4 times per day    Abnormal ECG  -     aspirin 81 mg chewable tablet; Chew 1 tablet (81 mg total) daily    T wave inversion on electrocardiogram  -     aspirin 81 mg chewable tablet; Chew 1 tablet (81 mg total) daily    Acute cough  -     benzonatate (TESSALON) 200 MG capsule; Take 1 capsule (200 mg total) by mouth 3 (three) times a day as needed for cough    Rash    Other orders  -     chlorhexidine (PERIDEX) 0 12 % solution             Subjective:      Patient ID: Muna Kathleen is a 69 y o  female  HPI  Patient presents with her caretaker with multiple questions and concerns  She c/o of a lesion on her left knee that has been present for about 2 weeks, not painful   She states that she likes to knee to pray and it bothers her      Would like bacitracin and a band aid but when I discussed with her that she might want to have the lesion excised, she decided that she does not want to have anything done to it anymore  She was concerned about anesthesia but I explained to her that they will likely give her local anesthesia  She also c/o of a rash on her neck when she wears a certain necklace and I encouraged her to avoid wearing the necklace if she has a rash because she might be allergic  She would like a refill of her baby aspriin  She also complains that when she eats she coughs but she and the caretaker agree that she eats fast because she likes her food  She states that the cough lasts for a little while and then it resolves on its own until the next time she eats  She would also like a refill of her diclofenac gel  She denies fever, chills, night sweats, headache, dizziness, chest pain, shortness of breath, palpitations, nausea, vomiting, abdominal pain, diarrhea, constipation  The following portions of the patient's history were reviewed and updated as appropriate:   She  has a past medical history of Anxiety, Benign essential hypertension, Depression, Depression with anxiety, Fracture of fifth metatarsal bone of right foot with delayed healing, Hyperlipidemia, Hypothyroidism, Insomnia, Obesity, Schizoaffective disorder ([address] ), Seizure disorder ([address] ), and Seizures ([facility] )    She   Patient Active Problem List    Diagnosis Date Noted   • Acute cough 06/08/2023   • Skin lesion of left lower extremity 06/08/2023   • T wave inversion on electrocardiogram 06/08/2023   • Rash 06/08/2023   • URI (upper respiratory infection) 04/05/2023   • Dermatitis 03/02/2023   • Chronic midline low back pain without sciatica 07/26/2021   • Obesity, morbid ([address] ) 03/17/2021   • Cellulitis of abdominal wall 11/01/2019   • Decreased hearing of both ears 05/28/2019   • Benign essential hypertension 02/20/2019   • Pre-diabetes 02/20/2019   • Hyperglycemia 11/14/2018   • Injury of right toe, initial encounter 09/12/2018   • Flatulence 05/16/2018   • Unsteady gait 10/03/2017   • Seizure disorder ([address] ) 05/15/2017   • Insomnia 05/15/2017   • Anemia 05/15/2017   • Depression with anxiety 06/15/2016   • Hyponatremia 00/82/1312   • Folic acid deficiency 96/25/4171   • Hyperlipidemia 10/05/2012   • Hypothyroidism 10/05/2012   • Schizoaffective disorder ([facility] ) 10/05/2012   • Psychosis, bipolar affective ([facility] ) 10/05/2012   • Osteoporosis 10/05/2012     She  has a past surgical history that includes Colonoscopy and pr colonoscopy flx dx w/collj spec when pfrmd (N/A, 8/30/2018)  Her family history includes Kidney failure in her brother; Lung disease in her other; No Known Problems in her father, maternal aunt, maternal aunt, maternal aunt, maternal aunt, maternal grandfather, maternal grandmother, mother, paternal aunt, paternal aunt, paternal grandfather, paternal grandmother, sister, and sister  She  reports that she quit smoking about 31 years ago  Her smoking use included cigarettes  She started smoking about 52 years ago  She has a 10 50 pack-year smoking history  She has never used smokeless tobacco  She reports that she does not drink alcohol and does not use drugs    Current Outpatient Medications   Medication Sig Dispense Refill   • acetaminophen (Acetaminophen Extra Strength) 500 mg tablet Take 2 tablets (1,000 mg total) by mouth 3 (three) times a day as needed for mild pain or fever 180 tablet 1   • alendronate (FOSAMAX) 70 mg tablet TAKE 1 TABLET BY MOUTH WEEKLY ON FRIDAYS @ 8AM (OSTEOPORPSIS) 4 tablet 5   • aspirin 81 mg chewable tablet Chew 1 tablet (81 mg total) daily 90 tablet 1   • atorvastatin (LIPITOR) 20 mg tablet Take 1 tablet (20 mg total) by mouth daily 30 tablet 5   • benzonatate (TESSALON) 200 MG capsule Take 1 capsule (200 mg total) by mouth 3 (three) times a day as needed for cough 30 capsule 0   • busPIRone (BUSPAR) 15 mg tablet Take 15 mg by mouth 2 (two) times a day      • Calcium Carb-Cholecalciferol (calcium carbonate-vitamin D) 500 mg-5 mcg tablet TAKE 1 TABLET BY MOUTH 2X DAILY @8AM-8PM (SUPPLEMENT) *AHMAD 60 tablet 5   • calcium carbonate-vitamin D (OSCAL-D) 500 mg-200 units per tablet Take 1 tablet by mouth 2 (two) times a day with meals 180 tablet 1   • cetirizine (ZyrTEC) 5 MG tablet Take 1 tablet (5 mg total) by mouth as needed for allergies (As needed at night p r n  for allergies) 30 tablet 5   • chlorhexidine (PERIDEX) 0 12 % solution      • Diclofenac Sodium (VOLTAREN) 1 % Apply 2 g topically 4 (four) times a day 2 g right knee 4 times per day 100 g 5   • diphenhydrAMINE (BENADRYL) 50 mg capsule Take 1 capsule (50 mg total) by mouth daily at bedtime as needed for sleep 90 capsule 2   • divalproex sodium (DEPAKOTE) 500 mg EC tablet 1 tab in the Am, 3 tabs(1500mg) at bedtime     • docusate sodium (COLACE) 100 mg capsule Take 1 capsule (100 mg total) by mouth 2 (two) times a day as needed for constipation 180 capsule 1   • fluticasone (FLONASE) 50 mcg/act nasal spray INSTILL 2 SPRAYS IN EACH NOSTRIL DAILY @ 8AM(ALLERGIES) 16 g 3   • gabapentin (NEURONTIN) 100 mg capsule Take 200 mg by mouth 2 (two) times a day       • GNP Pink Bismuth 262 MG chewable tablet CHEW 2 TABLETS(524MG) BY MOUTH EVERY 6 HOURS AS NEEDED FOR DIARRHEA 120 tablet 0   • guaiFENesin-dextromethorphan (MUCINEX DM) 600-30 mg 1 TABLET BY MOUTH EVERY 12 HRS @ 8AM & 8PM (COUGH) 14 tablet 0   • L-Theanine 100 MG CAPS Take 200 mg by mouth 2 (two) times a day       • levothyroxine 100 mcg tablet TAKE 1 TABLET BY MOUTH ONCE DAILY @ 6AM(THYROID) *AHMAD 30 tablet 5   • LORazepam (ATIVAN) 0 5 mg tablet Take 0 5 mg by mouth every 12 (twelve) hours as needed for anxiety     • LORazepam (ATIVAN) 1 mg tablet Take 1 mg by mouth in the morning and 1 mg in the evening and 1 mg before bedtime       • loxapine (LOXITANE) 25 mg capsule TAKE TWO (2) CAPSULES BY MOUTH EVERY MORNING AND SIX (6) CAPSULES AT BEDTIME     • Menthol-Methyl Salicylate (MURIEL AGUDELO GREASELESS) 10-15 % greaseless cream Apply topically daily at bedtime 30 g 3   • Multiple Vitamin (MULTIVITAMIN ADULT PO) Take by mouth daily     • PHENobarbital 64 8 mg tablet TAKE 1 TABLET BY MOUTH 2X DAILY @8AM-8PM(SEIZURES) *STONE 60 tablet 2   • polyethylene glycol (GLYCOLAX) 17 GM/SCOOP MIX 1 CAPFULL (17g) IN WITH A  8OZ OF LIQUID AND DRINK DAILY @8AM  (CONSTIPATION) 510 g 0   • Saphris 10 MG SL tablet Place 1 tablet (10 mg total) under the tongue 2 (two) times a day 60 tablet 1   • senna-docusate sodium (SENOKOT-S) 8 6-50 mg per tablet Take 1 tablet by mouth daily     • sodium chloride (OCEAN) 0 65 % nasal spray 1 spray into each nostril as needed for congestion TAKE UP TO 6 TIMES DAILY AS NEEDED 30 mL 3   • Thera (THERA/BETA-CAROTENE) TAKE 1 TABLET BY MOUTH ONCE DAILY @ 8AM,(SUPPLEMENT) *AHMAD 30 tablet 5   • traZODone (DESYREL) 150 mg tablet Take 1 tablet by mouth daily at bedtime      • trihexyphenidyl (ARTANE) 5 mg tablet Take 5 mg by mouth 3 (three) times a day with meals       No current facility-administered medications for this visit       Facility-Administered Medications Ordered in Other Visits   Medication Dose Route Frequency Provider Last Rate Last Admin   • lactated ringers infusion   Intravenous Continuous PRN Celi Teresa Solan, CRNA   New Bag at 02/08/23 1400     Current Outpatient Medications on File Prior to Visit   Medication Sig   • acetaminophen (Acetaminophen Extra Strength) 500 mg tablet Take 2 tablets (1,000 mg total) by mouth 3 (three) times a day as needed for mild pain or fever   • alendronate (FOSAMAX) 70 mg tablet TAKE 1 TABLET BY MOUTH WEEKLY ON FRIDAYS @ 8AM (OSTEOPORPSIS)   • atorvastatin (LIPITOR) 20 mg tablet Take 1 tablet (20 mg total) by mouth daily   • busPIRone (BUSPAR) 15 mg tablet Take 15 mg by mouth 2 (two) times a day    • Calcium Carb-Cholecalciferol (calcium carbonate-vitamin D) 500 mg-5 mcg tablet TAKE 1 TABLET BY MOUTH 2X DAILY @8AM-8PM (SUPPLEMENT) *AHMAD   • calcium carbonate-vitamin D (OSCAL-D) 500 mg-200 units per tablet Take 1 tablet by mouth 2 (two) times a day with meals   • cetirizine (ZyrTEC) 5 MG tablet Take 1 tablet (5 mg total) by mouth as needed for allergies (As needed at night p r n  for allergies)   • chlorhexidine (PERIDEX) 0 12 % solution    • diphenhydrAMINE (BENADRYL) 50 mg capsule Take 1 capsule (50 mg total) by mouth daily at bedtime as needed for sleep   • divalproex sodium (DEPAKOTE) 500 mg EC tablet 1 tab in the Am, 3 tabs(1500mg) at bedtime   • docusate sodium (COLACE) 100 mg capsule Take 1 capsule (100 mg total) by mouth 2 (two) times a day as needed for constipation   • fluticasone (FLONASE) 50 mcg/act nasal spray INSTILL 2 SPRAYS IN EACH NOSTRIL DAILY @ 8AM(ALLERGIES)   • gabapentin (NEURONTIN) 100 mg capsule Take 200 mg by mouth 2 (two) times a day     • GNP Pink Bismuth 262 MG chewable tablet CHEW 2 TABLETS(524MG) BY MOUTH EVERY 6 HOURS AS NEEDED FOR DIARRHEA   • guaiFENesin-dextromethorphan (MUCINEX DM) 600-30 mg 1 TABLET BY MOUTH EVERY 12 HRS @ 8AM & 8PM (COUGH)   • L-Theanine 100 MG CAPS Take 200 mg by mouth 2 (two) times a day     • levothyroxine 100 mcg tablet TAKE 1 TABLET BY MOUTH ONCE DAILY @ 6AM(THYROID) *MICAH   • LORazepam (ATIVAN) 0 5 mg tablet Take 0 5 mg by mouth every 12 (twelve) hours as needed for anxiety   • LORazepam (ATIVAN) 1 mg tablet Take 1 mg by mouth in the morning and 1 mg in the evening and 1 mg before bedtime     • loxapine (LOXITANE) 25 mg capsule TAKE TWO (2) CAPSULES BY MOUTH EVERY MORNING AND SIX (6) CAPSULES AT BEDTIME   • Menthol-Methyl Salicylate (MURIEL AGUDELO GREASELESS) 10-15 % greaseless cream Apply topically daily at bedtime   • Multiple Vitamin (MULTIVITAMIN ADULT PO) Take by mouth daily   • PHENobarbital 64 8 mg tablet TAKE 1 TABLET BY MOUTH 2X DAILY @8AM-8PM(SEIZURES) *BERTHA   • polyethylene glycol (GLYCOLAX) 17 GM/SCOOP MIX 1 CAPFULL (17g) IN WITH A  8OZ OF LIQUID AND DRINK DAILY @8AM  (CONSTIPATION)   • Saphris 10 MG SL tablet Place 1 tablet (10 mg total) under the tongue 2 (two) times a day   • senna-docusate sodium (SENOKOT-S) 8 6-50 mg per tablet Take 1 tablet by mouth daily   • sodium chloride (OCEAN) 0 65 % nasal spray 1 spray into each nostril as needed for congestion TAKE UP TO 6 TIMES DAILY AS NEEDED   • Thera (THERA/BETA-CAROTENE) TAKE 1 TABLET BY MOUTH ONCE DAILY @ 8AM,(SUPPLEMENT) *AHMAD   • traZODone (DESYREL) 150 mg tablet Take 1 tablet by mouth daily at bedtime    • trihexyphenidyl (ARTANE) 5 mg tablet Take 5 mg by mouth 3 (three) times a day with meals   • [DISCONTINUED] aspirin 81 mg chewable tablet Chew 1 tablet (81 mg total) daily   • [DISCONTINUED] Diclofenac Sodium (VOLTAREN) 1 % APPLY 2 GM TOPICALLY TO (R) KNEE 4X DAILY @8A-29E4E-1Q(PAIN)   • [DISCONTINUED] HYDROcodone-acetaminophen (NORCO) 5-325 mg per tablet Take 1 tablet by mouth every 6 (six) hours as needed for pain (Patient not taking: Reported on 6/8/2023)   • [DISCONTINUED] melatonin 3 mg Take 1 tablet (3 mg total) by mouth daily at bedtime (Patient not taking: Reported on 6/8/2023)     Current Facility-Administered Medications on File Prior to Visit   Medication   • lactated ringers infusion     She is allergic to clozapine, haloperidol, lithium, prolixin [fluphenazine], and valproic acid       Review of Systems   Constitutional: Negative for activity change, chills, fatigue, fever and unexpected weight change  HENT: Negative for ear pain, postnasal drip, rhinorrhea, sinus pressure and sore throat  Eyes: Negative for pain  Respiratory: Positive for cough (Occasional dry cough whenever she eats)  Negative for choking, chest tightness, shortness of breath and wheezing  Cardiovascular: Negative for chest pain, palpitations and leg swelling  Gastrointestinal: Negative for abdominal pain, constipation, diarrhea, nausea and vomiting  Genitourinary: Negative for dysuria and hematuria  Musculoskeletal: Positive for gait problem  Negative for arthralgias, back pain, joint swelling, myalgias and neck stiffness  "  Skin: Negative for pallor and rash  Skin lesion   Neurological: Negative for dizziness, tremors, seizures, syncope, light-headedness and headaches  Hematological: Negative for adenopathy  Psychiatric/Behavioral: Negative for behavioral problems  Objective:      /58 (BP Location: Left arm, Patient Position: Sitting, Cuff Size: Standard)   Pulse 84   Temp 98 2 °F (36 8 °C) (Temporal)   Ht 5' 4\" (1 626 m)   Wt 92 1 kg (203 lb)   SpO2 96%   BMI 34 84 kg/m²          Physical Exam  Constitutional:       General: She is not in acute distress  Appearance: She is well-developed  She is not diaphoretic  HENT:      Head: Normocephalic and atraumatic  Right Ear: External ear normal       Left Ear: External ear normal       Nose: Nose normal       Mouth/Throat:      Mouth: Mucous membranes are dry  Pharynx: No oropharyngeal exudate  Eyes:      General: No scleral icterus  Right eye: No discharge  Left eye: No discharge  Conjunctiva/sclera: Conjunctivae normal       Pupils: Pupils are equal, round, and reactive to light  Neck:      Thyroid: No thyromegaly  Vascular: No JVD  Trachea: No tracheal deviation  Cardiovascular:      Rate and Rhythm: Normal rate and regular rhythm  Heart sounds: Normal heart sounds  No murmur heard  No friction rub  No gallop  Pulmonary:      Effort: Pulmonary effort is normal  No respiratory distress  Breath sounds: Normal breath sounds  No wheezing or rales  Chest:      Chest wall: No tenderness  Abdominal:      General: Bowel sounds are normal  There is no distension  Palpations: Abdomen is soft  There is no mass  Tenderness: There is no abdominal tenderness  There is no guarding or rebound  Musculoskeletal:         General: No tenderness or deformity  Normal range of motion  Cervical back: Normal range of motion and neck supple     Lymphadenopathy:      Cervical: No cervical " adenopathy  Skin:     General: Skin is warm and dry  Coloration: Skin is not pale  Findings: Lesion (hard 12 mm lesion on the left knee, with an irregular surface, appears quite chronic) present  No erythema or rash (No rash was seen and patient's neck as reported)  Neurological:      Mental Status: She is alert and oriented to person, place, and time  Cranial Nerves: No cranial nerve deficit  Motor: No abnormal muscle tone  Coordination: Coordination normal       Gait: Gait abnormal (walks with a cane)  Deep Tendon Reflexes: Reflexes are normal and symmetric  Psychiatric:         Behavior: Behavior normal          Thought Content: Thought content is paranoid (She states that she has syphilis because she has a green discharge in her private part) and delusional (She believes that she has antibodies to a poisonous snake in her body, stating that she has been bitten by the snake multiple times but caretaker states that this is not true)  Hospital Outpatient Visit on 01/11/2023   Component Date Value Ref Range Status   • Protocol Name 01/11/2023 MANDA SIT   Final   • Time In Exercise Phase 01/11/2023 00:03:00   Final   • MAX  SYSTOLIC BP 92/33/5557 500  mmHg Final   • Max Diastolic Bp 40/14/5108 80  mmHg Final   • Max Heart Rate 01/11/2023 93  BPM Final   • Max Predicted Heart Rate 01/11/2023 151  BPM Final   • Reason for Termination 01/11/2023 TEST COMPLETE       Final   • Test Indication 01/11/2023 Pre op, abn  EKG   Final   • Target Hr Formular 01/11/2023 (220 - Age)*100%   Final   • Chest Pain Statement 01/11/2023 none   Final   Hospital Outpatient Visit on 01/11/2023   Component Date Value Ref Range Status   • Baseline HR 01/11/2023 78  bpm Final   • Baseline BP 01/11/2023 122/80  mmHg Final   • O2 sat rest 01/11/2023 95  % Final   • Stress peak HR 01/11/2023 90  bpm Final   • Post peak BP 01/11/2023 110  mmHg Final   • Rate Pressure Product 01/11/2023 9,900 0   Final • O2 sat peak 01/11/2023 99  % Final   • Recovery HR 01/11/2023 89  bpm Final   • Recovery BP 01/11/2023 120/76  mmHg Final   • O2 sat recovery 01/11/2023 98  % Final   • Angina Index 01/11/2023 0   Final   • Rest Nuclear Isotope Dose 01/11/2023 10 63  mCi Final   • Stress Nuclear Isotope Dose 01/11/2023 32 40  mCi Final   • ST Depression (mm) 01/11/2023 0  mm Final   • Stress/rest perfusion ratio 01/11/2023 1 30   Final   • EF (%) 01/11/2023 80  % Final   Hospital Outpatient Visit on 01/11/2023   Component Date Value Ref Range Status   • LA size 01/11/2023 3 4  cm Final   • LVPWd 01/11/2023 1 10  cm Final   • Left Atrium Area-systolic Apical T* 38/61/1632 9 8  cm2 Final   • Left Atrium Area-systolic Four Arlet* 06/29/5983 16 8  cm2 Final   • MV E' Tissue Velocity Septal 01/11/2023 5  cm/s Final   • IVSd 01/11/2023 1 10  cm Final   • LV DIASTOLIC VOLUME (MOD BIPLANE) * 01/11/2023 46  mL Final   • LEFT VENTRICLE SYSTOLIC VOLUME (MO* 15/10/5450 17  mL Final   • Left ventricular stroke volume (2D) 01/11/2023 29 00  mL Final   • EF 01/11/2023 59  % Final   • A4C EF 01/11/2023 52  % Final   • LA length (A2C) 01/11/2023 4 10  cm Final   • LVIDd 01/11/2023 3 30  cm Final   • IVS 01/11/2023 1 1  cm Final   • LVIDS 01/11/2023 2 20  cm Final   • FS 01/11/2023 33  28 - 44 % Final   • Asc Ao 01/11/2023 3 5  cm Final   • Ao root 01/11/2023 3 90  cm Final   • RVID d 01/11/2023 3 2  cm Final   • MV valve area p 1/2 method 01/11/2023 3 28  cm2 Final   • E wave deceleration time 01/11/2023 230  ms Final   • MV Peak E Anjum 01/11/2023 48  cm/s Final   • MV Peak A Anjum 01/11/2023 0 83  m/s Final   • LV Systolic Volume (BP) 85/39/1694 29  mL Final   • LV Diastolic Volume (BP) 91/35/9271 70  mL Final   • ANIKA A4C 01/11/2023 13 7  cm2 Final   • RAA A4C 01/11/2023 13 9  cm2 Final   • MV stenosis pressure 1/2 time 01/11/2023 67  ms Final   • LVSV, 2D 01/11/2023 29  mL Final   • LV EF 01/11/2023 60   Final   Appointment on 12/30/2022 Component Date Value Ref Range Status   • WBC 12/30/2022 3 61 (L)  4 31 - 10 16 Thousand/uL Final   • RBC 12/30/2022 4 13  3 81 - 5 12 Million/uL Final   • Hemoglobin 12/30/2022 13 7  11 5 - 15 4 g/dL Final   • Hematocrit 12/30/2022 40 1  34 8 - 46 1 % Final   • MCV 12/30/2022 97  82 - 98 fL Final   • MCH 12/30/2022 33 2  26 8 - 34 3 pg Final   • MCHC 12/30/2022 34 2  31 4 - 37 4 g/dL Final   • RDW 12/30/2022 12 5  11 6 - 15 1 % Final   • Platelets 84/42/4537 264  149 - 390 Thousands/uL Final   • MPV 12/30/2022 9 6  8 9 - 12 7 fL Final   • Cholesterol 12/30/2022 175  See Comment mg/dL Final    Cholesterol:         Pediatric <18 Years        Desirable          <170 mg/dL      Borderline High    170-199 mg/dL      High               >=200 mg/dL        Adult >=18 Years            Desirable         <200 mg/dL      Borderline High   200-239 mg/dL      High              >239 mg/dL     • Triglycerides 12/30/2022 124  See Comment mg/dL Final    Triglyceride:     0-9Y            <75mg/dL     10Y-17Y         <90 mg/dL       >=18Y     Normal          <150 mg/dL     Borderline High 150-199 mg/dL     High            200-499 mg/dL        Very High       >499 mg/dL    Specimen collection should occur prior to N-Acetylcysteine or Metamizole administration due to the potential for falsely depressed results  • HDL, Direct 12/30/2022 82  >=50 mg/dL Final    Specimen collection should occur prior to Metamizole administration due to the potential for falsley depressed results  • LDL Calculated 12/30/2022 68  0 - 100 mg/dL Final    LDL Cholesterol:     Optimal           <100 mg/dl     Near Optimal      100-129 mg/dl     Above Optimal       Borderline High 130-159 mg/dl       High            160-189 mg/dl       Very High       >189 mg/dl         This screening LDL is a calculated result  It does not have the accuracy of the Direct Measured LDL in the monitoring of patients with hyperlipidemia and/or statin therapy     Direct Measure LDL ([id number]) must be ordered separately in these patients  • Sodium 12/30/2022 135  135 - 147 mmol/L Final   • Potassium 12/30/2022 4 2  3 5 - 5 3 mmol/L Final   • Chloride 12/30/2022 101  96 - 108 mmol/L Final   • CO2 12/30/2022 26  21 - 32 mmol/L Final   • ANION GAP 12/30/2022 8  4 - 13 mmol/L Final   • BUN 12/30/2022 14  5 - 25 mg/dL Final   • Creatinine 12/30/2022 0 72  0 60 - 1 30 mg/dL Final    Standardized to IDMS reference method   • Glucose, Fasting 12/30/2022 99  65 - 99 mg/dL Final    Specimen collection should occur prior to Sulfasalazine administration due to the potential for falsely depressed results  Specimen collection should occur prior to Sulfapyridine administration due to the potential for falsely elevated results  • Calcium 12/30/2022 8 9  8 3 - 10 1 mg/dL Final   • AST 12/30/2022 11  5 - 45 U/L Final    Specimen collection should occur prior to Sulfasalazine administration due to the potential for falsely depressed results  • ALT 12/30/2022 20  12 - 78 U/L Final    Specimen collection should occur prior to Sulfasalazine and/or Sulfapyridine administration due to the potential for falsely depressed results  • Alkaline Phosphatase 12/30/2022 86  46 - 116 U/L Final   • Total Protein 12/30/2022 7 2  6 4 - 8 4 g/dL Final   • Albumin 12/30/2022 3 6  3 5 - 5 0 g/dL Final   • Total Bilirubin 12/30/2022 0 23  0 20 - 1 00 mg/dL Final    Use of this assay is not recommended for patients undergoing treatment with eltrombopag due to the potential for falsely elevated results     • eGFR 12/30/2022 85  ml/min/1 73sq m Final   • TSH 3RD GENERATON 12/30/2022 0 854  0 450 - 4 500 uIU/mL Final    The recommended reference ranges for TSH during pregnancy are as follows:   First trimester 0 1 to 2 5 uIU/mL   Second trimester  0 2 to 3 0 uIU/mL   Third trimester 0 3 to 3 0 uIU/m    Note: Normal ranges may not apply to patients who are transgender, non-binary, or whose legal sex, sex at birth, and gender identity differ  Adult TSH (3rd generation) reference range follows the recommended guidelines of the [employer], January, 2020

## 2023-06-09 ENCOUNTER — TELEPHONE (OUTPATIENT)
Dept: DERMATOLOGY | Facility: CLINIC | Age: 68
End: 2023-06-09

## 2023-06-09 NOTE — TELEPHONE ENCOUNTER
Calling to make appt for Pt who has referral   I placed her on [facility] as nothing available for  and [facility]    Emergency contact gave us there fax number also as [phone]

## 2023-06-13 ENCOUNTER — TELEPHONE (OUTPATIENT)
Dept: NEUROLOGY | Facility: CLINIC | Age: 68
End: 2023-06-13

## 2023-06-13 NOTE — TELEPHONE ENCOUNTER
patient called to reschedule because she is having back pain  She asked to be rescheduled for an 11 am appt, I offered her 6-21-23 at 11 am and she accepted

## 2023-06-13 NOTE — TELEPHONE ENCOUNTER
Per group home another patient has an appt the same day and time so had to reschedule pt for later time 6/21/23 @ 130pm

## 2023-06-21 ENCOUNTER — OFFICE VISIT (OUTPATIENT)
Dept: NEUROLOGY | Facility: CLINIC | Age: 68
End: 2023-06-21
Payer: MEDICARE

## 2023-06-21 VITALS
SYSTOLIC BLOOD PRESSURE: 133 MMHG | DIASTOLIC BLOOD PRESSURE: 74 MMHG | HEART RATE: 77 BPM | WEIGHT: 200 LBS | HEIGHT: 64 IN | BODY MASS INDEX: 34.15 KG/M2

## 2023-06-21 DIAGNOSIS — G40.909 SEIZURE DISORDER (HCC): Primary | ICD-10-CM

## 2023-06-21 PROCEDURE — 99215 OFFICE O/P EST HI 40 MIN: CPT | Performed by: NURSE PRACTITIONER

## 2023-06-21 NOTE — PROGRESS NOTES
"Patient ID: Marcellus Sotomayor is a 77 y o  female with unclear epilepsy syndrome and schizophrenia, who is returning to Neurology office for follow up of her seizures  Assessment/Plan:    Seizure disorder [facility])  Patient with unclear epilepsy syndrome maintained on phenobarbital 64 8 mg BID  Prior attempt to discontinue this medication did lead to an event concerning for seizure noted to be \"fear/panic\" which was reportedly typical of her seizures  She is being treated for osteoporosis with fosamax which phenobarbital may contribute to  She is also currently on depakote 500-1500, gabapentin 200 mg BID, and lorazepam 1 mg TID + 0 5 mg PRN BID per psychiatry for her mood disorder  Current medication from our own medication list, facility did not bring complete medication list to office visit  Requested faxing to our office during visit which was not received  They were provided with our fax number to send updated list to ensure she has been getting appropriate medications  She reports that psychiatry would like to take her off of Depakote and start Abilify  Discussed if this is done, she should be weaned off of Depakote slowly and not abruptly stopped but overall this medication is being prescribed by psychiatry for mood and not seizure control  Patient will continue with phenobarbital 64 8 mg BID  Check level in the next few weeks  Recommended discussion low sodium on recent blood work with PCP  Group home will call the office with any episodes concerning for seizure  Follow up in 9 months or sooner if needed with Dr Tammi Grant  She will Return for 9 months with Dr Tammi Grant  Subjective:  Marcellus Sotomayor is a 77 y o  female with unclear epilepsy syndrome and schizophrenia, who is returning to Neurology office for follow up of her seizures  She was last seen int he office by Dr Sheree Ca on 8/24/22   At that time, she was seizure free for 9 years until 12/2014 when there was an event a few days after " stopping phenobarbital, in the setting of multiple night of insomnia prior  The event itself was poorly characterized, but consisted of fear/panic that is apparently typical of her seizures  It is possible she had a focal seizure, but anxiety/psychosis related to sleep deprivation and withdrawal of phenobarbital is another explanation that better fits her prolonged symptoms and associated hallucinations  Her EEG in early 2015 was normal  Her osteoporosis may be related to phenobarbital use, but at that point her seizures were controlled and things were going well, so recommended continuing phenobarbital  She was also on valproate and gabapentin, valproate being prescribed by psychiatry  No changes were made to AED regimen at that time and recommended follow up in 9 months  Since her last visit, she has continued to be seizure free  On phenobarbital 64 8 mg BID  No missed doses of medication  Today is her birthday and she is very excited  She continues to follow with psychiatrist  Reports that they are going to be changing her from Depakote to abilify  She feels like she sleeps too much  Current seizure medications:  - phenobarbital 64 8 mg BID  - Depakote 500 mg in the morning and 1500 mg at night - per psychiatry  - gabapentin 200 mg BID  Other medications as per Epic  Event/Seizure semiology:  Unknown     Special Features  Status epilepticus: unknown  Self Injury Seizures: unknown  Precipitating Factors: unknown     Prior AEDs:  Carbamazepine - ?stopped, but worked? Lamotrigine - ?stopped,   discontinued, no reason given? Valproate - ?water retention?     Prior Evaluation:  3 Hour Video EEG 1/15/15: normal      Psychiatric History:  Schizophrenia      Social History:   Driving: No - lives in group home  Lives Alone: No  Occupation: on permanent disability    Her history was also obtained from her caretaker, who was present at today's visit        I reviewed prior neurology notes, most recent "labs, as documented in Epic/UClass, and summarized above  Objective:    Blood pressure 133/74, pulse 77, height 5' 4\" (1 626 m), weight 90 7 kg (200 lb), not currently breastfeeding  Physical Exam  No apparent distress  Appears well nourished  Mood appropriate for situation     Neurologic Exam  Mental status- alert and oriented to person, place, and time  Speech appropriate for conversation  Good attention and knowledge  Cranial Nerves- PERRL, EOMS normal, facial sensation and muscles symmetric, hearing intact bilaterally to finger rubs, tongue midline, palate rise symmetrical, shoulder shrug symmetrical     Motor- No pronator drift  Appropriate strength  Moves all extremities freely  Sensory-  Intact distally in all extremities to light touch  DTRs- 2+ and symmetric in all extremities  Gait- slow, antalgic gait, slightly wide based  Coordination- FNF intact  ROS:  Review of Systems   Constitutional: Negative for appetite change, fatigue and fever  HENT: Negative  Negative for hearing loss, tinnitus, trouble swallowing and voice change  Eyes: Negative  Negative for photophobia, pain and visual disturbance  Respiratory: Negative  Negative for shortness of breath  Cardiovascular: Negative  Negative for palpitations  Gastrointestinal: Negative  Negative for nausea and vomiting  Endocrine: Negative  Negative for cold intolerance  Genitourinary: Negative  Negative for dysuria, frequency and urgency  Musculoskeletal: Negative for back pain, gait problem, myalgias and neck pain  Skin: Negative  Negative for rash  Allergic/Immunologic: Negative  Neurological: Negative  Negative for dizziness, tremors, seizures, syncope, facial asymmetry, speech difficulty, weakness, light-headedness, numbness and headaches  Hematological: Negative  Does not bruise/bleed easily  Psychiatric/Behavioral: Negative    Negative for confusion, hallucinations and " sleep disturbance  All other systems reviewed and are negative  ROS obtained by MA and reviewed by myself  This note may have been created using voice recognition software  There may be unintentional errors such as grammatical errors, spelling errors, or pronoun errors

## 2023-06-21 NOTE — ASSESSMENT & PLAN NOTE
"Patient with unclear epilepsy syndrome maintained on phenobarbital 64 8 mg BID  Prior attempt to discontinue this medication did lead to an event concerning for seizure noted to be \"fear/panic\" which was reportedly typical of her seizures  She is being treated for osteoporosis with fosamax which phenobarbital may contribute to  She is also currently on depakote 500-1500, gabapentin 200 mg BID, and lorazepam 1 mg TID + 0 5 mg PRN BID per psychiatry for her mood disorder  Current medication from our own medication list, facility did not bring complete medication list to office visit  Requested faxing to our office during visit which was not received  They were provided with our fax number to send updated list to ensure she has been getting appropriate medications  She reports that psychiatry would like to take her off of Depakote and start Abilify  Discussed if this is done, she should be weaned off of Depakote slowly and not abruptly stopped but overall this medication is being prescribed by psychiatry for mood and not seizure control  Patient will continue with phenobarbital 64 8 mg BID  Check level in the next few weeks  Recommended discussion low sodium on recent blood work with PCP  Group home will call the office with any episodes concerning for seizure  Follow up in 9 months or sooner if needed with Dr Chaya Poole     "

## 2023-06-21 NOTE — PATIENT INSTRUCTIONS
- Continue current dose of phenobarbital 64 8 mg twice per day  - Continue following with psychiatry   - if they are considering stopping Depakote, make sure that it is weaned off slowly  - Make sure to bring complete medication list to all appointments (only pages 4 & 6 were included)  - Please call the office with breakthrough seizures or concerns  - Discuss low sodium with PCP  - Have blood work done to check phenobarbital level in the next few weeks or when PCP wants to recheck sodium  - Follow up in 9 months with Dr Dagmar Frey - [phone] attn: Shannon Mcclelland

## 2023-06-21 NOTE — PROGRESS NOTES
Review of Systems   Constitutional: Negative for appetite change, fatigue and fever  HENT: Negative  Negative for hearing loss, tinnitus, trouble swallowing and voice change  Eyes: Negative  Negative for photophobia, pain and visual disturbance  Respiratory: Negative  Negative for shortness of breath  Cardiovascular: Negative  Negative for palpitations  Gastrointestinal: Negative  Negative for nausea and vomiting  Endocrine: Negative  Negative for cold intolerance  Genitourinary: Negative  Negative for dysuria, frequency and urgency  Musculoskeletal: Negative for back pain, gait problem, myalgias and neck pain  Skin: Negative  Negative for rash  Allergic/Immunologic: Negative  Neurological: Negative  Negative for dizziness, tremors, seizures, syncope, facial asymmetry, speech difficulty, weakness, light-headedness, numbness and headaches  Hematological: Negative  Does not bruise/bleed easily  Psychiatric/Behavioral: Negative  Negative for confusion, hallucinations and sleep disturbance  All other systems reviewed and are negative

## 2023-06-24 DIAGNOSIS — K59.09 OTHER CONSTIPATION: ICD-10-CM

## 2023-06-26 RX ORDER — POLYETHYLENE GLYCOL 3350 17 G/17G
POWDER ORAL
Qty: 510 G | Refills: 5 | Status: SHIPPED | OUTPATIENT
Start: 2023-06-26

## 2023-07-17 DIAGNOSIS — J30.2 SEASONAL ALLERGIES: ICD-10-CM

## 2023-07-17 RX ORDER — FLUTICASONE PROPIONATE 50 MCG
2 SPRAY, SUSPENSION (ML) NASAL DAILY
Qty: 16 G | Refills: 3 | Status: SHIPPED | OUTPATIENT
Start: 2023-07-17

## 2023-07-17 RX ORDER — DEXAMETHASONE 4 MG/1
TABLET ORAL
Qty: 12 G | Refills: 0 | OUTPATIENT
Start: 2023-07-17

## 2023-08-05 DIAGNOSIS — J06.9 UPPER RESPIRATORY TRACT INFECTION, UNSPECIFIED TYPE: ICD-10-CM

## 2023-08-07 PROBLEM — R05.1 ACUTE COUGH: Status: RESOLVED | Noted: 2023-06-08 | Resolved: 2023-08-07

## 2023-08-07 RX ORDER — GUAIFENESIN AND DEXTROMETHORPHAN HYDROBROMIDE 600; 30 MG/1; MG/1
TABLET, EXTENDED RELEASE ORAL
Qty: 14 TABLET | Refills: 0 | Status: SHIPPED | OUTPATIENT
Start: 2023-08-07

## 2023-08-25 ENCOUNTER — RA CDI HCC (OUTPATIENT)
Dept: OTHER | Facility: HOSPITAL | Age: 68
End: 2023-08-25

## 2023-08-25 NOTE — PROGRESS NOTES
[address] [facility] coding opportunities       Chart reviewed, no opportunity found: CHART REVIEWED, NO OPPORTUNITY FOUND        Patients Insurance     Medicare Insurance: Medicare

## 2023-09-01 DIAGNOSIS — J06.9 UPPER RESPIRATORY TRACT INFECTION, UNSPECIFIED TYPE: ICD-10-CM

## 2023-09-01 RX ORDER — GUAIFENESIN AND DEXTROMETHORPHAN HYDROBROMIDE 600; 30 MG/1; MG/1
TABLET, EXTENDED RELEASE ORAL
Qty: 14 TABLET | Refills: 0 | OUTPATIENT
Start: 2023-09-01

## 2023-09-05 ENCOUNTER — OFFICE VISIT (OUTPATIENT)
Dept: INTERNAL MEDICINE CLINIC | Age: 68
End: 2023-09-05
Payer: MEDICARE

## 2023-09-05 VITALS
SYSTOLIC BLOOD PRESSURE: 136 MMHG | TEMPERATURE: 98.4 F | HEIGHT: 64 IN | BODY MASS INDEX: 32.95 KG/M2 | HEART RATE: 76 BPM | WEIGHT: 193 LBS | OXYGEN SATURATION: 96 % | DIASTOLIC BLOOD PRESSURE: 70 MMHG

## 2023-09-05 DIAGNOSIS — E66.01 OBESITY, MORBID (HCC): ICD-10-CM

## 2023-09-05 DIAGNOSIS — F25.9 SCHIZOAFFECTIVE DISORDER, UNSPECIFIED TYPE (HCC): ICD-10-CM

## 2023-09-05 DIAGNOSIS — E03.9 ACQUIRED HYPOTHYROIDISM: Primary | ICD-10-CM

## 2023-09-05 DIAGNOSIS — M54.50 CHRONIC MIDLINE LOW BACK PAIN WITHOUT SCIATICA: ICD-10-CM

## 2023-09-05 DIAGNOSIS — I10 BENIGN ESSENTIAL HYPERTENSION: ICD-10-CM

## 2023-09-05 DIAGNOSIS — F31.77 BIPOLAR DISORDER, IN PARTIAL REMISSION, MOST RECENT EPISODE MIXED (HCC): ICD-10-CM

## 2023-09-05 DIAGNOSIS — Z11.1 SCREENING FOR TUBERCULOSIS: ICD-10-CM

## 2023-09-05 DIAGNOSIS — G89.29 CHRONIC MIDLINE LOW BACK PAIN WITHOUT SCIATICA: ICD-10-CM

## 2023-09-05 DIAGNOSIS — R73.03 PREDIABETES: ICD-10-CM

## 2023-09-05 DIAGNOSIS — Z00.00 MEDICARE ANNUAL WELLNESS VISIT, SUBSEQUENT: ICD-10-CM

## 2023-09-05 DIAGNOSIS — G40.909 SEIZURE DISORDER (HCC): ICD-10-CM

## 2023-09-05 DIAGNOSIS — E78.2 MIXED HYPERLIPIDEMIA: ICD-10-CM

## 2023-09-05 DIAGNOSIS — R73.03 PRE-DIABETES: ICD-10-CM

## 2023-09-05 DIAGNOSIS — M81.0 OSTEOPOROSIS, UNSPECIFIED OSTEOPOROSIS TYPE, UNSPECIFIED PATHOLOGICAL FRACTURE PRESENCE: ICD-10-CM

## 2023-09-05 PROBLEM — J06.9 URI (UPPER RESPIRATORY INFECTION): Status: RESOLVED | Noted: 2023-04-05 | Resolved: 2023-09-05

## 2023-09-05 PROBLEM — L98.9 SKIN LESION OF LEFT LOWER EXTREMITY: Status: RESOLVED | Noted: 2023-06-08 | Resolved: 2023-09-05

## 2023-09-05 PROCEDURE — G0439 PPPS, SUBSEQ VISIT: HCPCS | Performed by: INTERNAL MEDICINE

## 2023-09-05 PROCEDURE — 99214 OFFICE O/P EST MOD 30 MIN: CPT | Performed by: INTERNAL MEDICINE

## 2023-09-05 RX ORDER — LOXAPINE SUCCINATE 50 MG/1
TABLET ORAL
COMMUNITY
Start: 2023-08-11

## 2023-09-05 RX ORDER — MENTHOL 1.4 %
1 ADHESIVE PATCH, MEDICATED TOPICAL DAILY
Qty: 30 PATCH | Refills: 3 | Status: SHIPPED | OUTPATIENT
Start: 2023-09-05

## 2023-09-05 NOTE — PATIENT INSTRUCTIONS
1  Continue medications unchanged  2  Return in about 1 year  Spoke with patient to convey INR 3.0, advised to continue taking Warfarin as prescribed (per Dr. Park). Patient verbalized understanding, no further questions.

## 2023-09-05 NOTE — ASSESSMENT & PLAN NOTE
Continue with present dose of levothyroxine.   We will check thyroid function test before next visit Statement Selected

## 2023-09-05 NOTE — PROGRESS NOTES
Assessment and Plan:     Problem List Items Addressed This Visit        Endocrine    Hypothyroidism - Primary     Continue with present dose of levothyroxine. We will check thyroid function test before next visit            Cardiovascular and Mediastinum    Benign essential hypertension     Blood pressure is stable on present regimen. We will continue to monitor         Relevant Orders    CBC       Nervous and Auditory    Seizure disorder ([address])     No seizure activity since last visit. Also managed by neurologist            Musculoskeletal and Integument    Osteoporosis     Continue with Fosamax, vitamin D3, calcium and weightbearing exercises            Other    Hyperlipidemia    Relevant Orders    Lipid Panel with Direct LDL reflex    Comprehensive metabolic panel    Schizoaffective disorder ([address])     Managed by psychiatrist         Relevant Medications    loxapine (LOXITANE) 50 MG capsule    Psychosis, bipolar affective ([address])     Managed by psychiatrist         Relevant Medications    loxapine (LOXITANE) 50 MG capsule    Pre-diabetes     Continue with low sugar/low-carb diet. We will check hemoglobin A1c before next visit         Obesity, morbid ([address])     Advised for low caloric diet and exercise         Chronic midline low back pain without sciatica    Relevant Medications    Menthol, Topical Analgesic, (Bengay Ultra Strength) 5 % PTCH   Other Visit Diagnoses     Medicare annual wellness visit, subsequent        Prediabetes        Relevant Orders    Hemoglobin A1C           Preventive health issues were discussed with patient, and age appropriate screening tests were ordered as noted in patient's After Visit Summary. Personalized health advice and appropriate referrals for health education or preventive services given if needed, as noted in patient's After Visit Summary. History of Present Illness:     Patient presents for a Medicare Wellness Visit    Patient is here for follow-up.   Also have blood work done. Also complaining of lower back pain off and on and would like to have BNK patches. Patient Care Team:  Shayy Geller MD as PCP - General (Internal Medicine)  Sky Almonte as PCP - Advanced Practitioner (Internal Medicine)  Munir Carlton MD as Endoscopist     Review of Systems:     Review of Systems   Constitutional: Negative for fatigue and fever. HENT: Negative for congestion, ear discharge, ear pain, postnasal drip, sinus pressure, sore throat, tinnitus and trouble swallowing. Eyes: Negative for discharge, itching and visual disturbance. Respiratory: Negative for cough and shortness of breath. Cardiovascular: Negative for chest pain and palpitations. Gastrointestinal: Negative for abdominal pain, diarrhea, nausea and vomiting. Endocrine: Negative for cold intolerance and polyuria. Genitourinary: Negative for difficulty urinating, dysuria and urgency. Musculoskeletal: Positive for back pain. Negative for arthralgias and neck pain. Skin: Negative for rash. Allergic/Immunologic: Negative for environmental allergies. Neurological: Negative for dizziness, weakness and headaches. Psychiatric/Behavioral: Negative for agitation. The patient is not nervous/anxious.          Problem List:     Patient Active Problem List   Diagnosis   • Depression with anxiety   • Hyperlipidemia   • Hyponatremia   • Hypothyroidism   • Flatulence   • Injury of right toe, initial encounter   • Seizure disorder (HCC)   • Hyperglycemia   • Benign essential hypertension   • Schizoaffective disorder (HCC)   • Unsteady gait   • Psychosis, bipolar affective (HCC)   • Osteoporosis   • Insomnia   • Folic acid deficiency   • Anemia   • Pre-diabetes   • Decreased hearing of both ears   • Cellulitis of abdominal wall   • Obesity, morbid (HCC)   • Chronic midline low back pain without sciatica   • Dermatitis   • T wave inversion on electrocardiogram   • Rash      Past Medical and Surgical History:     Past Medical History:   Diagnosis Date   • Anxiety    • Benign essential hypertension     resolved; 06/15/16   • Depression    • Depression with anxiety    • Fracture of fifth metatarsal bone of right foot with delayed healing     last assessed 04/12/16; fracture of metatarsal bone, right, closed, initial encounter   • Hyperlipidemia     last assessed 11/28/17   • Hypothyroidism     last assessed 11/28/2017   • Insomnia    • Obesity    • Schizoaffective disorder ([address])     last assessed 05/18/2017   • Seizure disorder ([address])     last assessed 03/28/17   • Seizures ([address])      Past Surgical History:   Procedure Laterality Date   • COLONOSCOPY      complete   • HI COLONOSCOPY FLX DX W/COLLJ SPEC WHEN PFRMD N/A 8/30/2018    Procedure: COLONOSCOPY with polypectomies;  Surgeon: Ramos Arreaga MD;  Location: [facility];   Service: Gastroenterology      Family History:     Family History   Problem Relation Age of Onset   • No Known Problems Mother    • No Known Problems Father    • Lung disease Other         mesothelioma   • No Known Problems Maternal Grandmother    • No Known Problems Maternal Grandfather    • No Known Problems Paternal Grandmother    • No Known Problems Paternal Grandfather    • No Known Problems Sister    • No Known Problems Sister    • Kidney failure Brother    • No Known Problems Maternal Aunt    • No Known Problems Maternal Aunt    • No Known Problems Maternal Aunt    • No Known Problems Maternal Aunt    • No Known Problems Paternal Aunt    • No Known Problems Paternal Aunt       Social History:     Social History     Socioeconomic History   • Marital status: /Civil Union     Spouse name: None   • Number of children: None   • Years of education: None   • Highest education level: None   Occupational History   • None   Tobacco Use   • Smoking status: Former     Packs/day: 0.50     Years: 21.00     Total pack years: 10.50     Types: Cigarettes     Start date: 24     Quit date: 1992     Years since quittin.6   • Smokeless tobacco: Never   Vaping Use   • Vaping Use: Never used   Substance and Sexual Activity   • Alcohol use: No   • Drug use: No   • Sexual activity: Never   Other Topics Concern   • None   Social History Narrative   • None     Social Determinants of Health     Financial Resource Strain: Low Risk  (2023)    Overall Financial Resource Strain (CARDIA)    • Difficulty of Paying Living Expenses: Not hard at all   Food Insecurity: Not on file   Transportation Needs: No Transportation Needs (2023)    PRAPARE - Transportation    • Lack of Transportation (Medical): No    • Lack of Transportation (Non-Medical):  No   Physical Activity: Not on file   Stress: Not on file   Social Connections: Not on file   Intimate Partner Violence: Not on file   Housing Stability: Not on file      Medications and Allergies:     Current Outpatient Medications   Medication Sig Dispense Refill   • acetaminophen (Acetaminophen Extra Strength) 500 mg tablet Take 2 tablets (1,000 mg total) by mouth 3 (three) times a day as needed for mild pain or fever 180 tablet 1   • alendronate (FOSAMAX) 70 mg tablet TAKE 1 TABLET BY MOUTH WEEKLY ON  @ 8AM (OSTEOPORPSIS) 4 tablet 5   • aspirin 81 mg chewable tablet Chew 1 tablet (81 mg total) daily 90 tablet 1   • atorvastatin (LIPITOR) 20 mg tablet Take 1 tablet (20 mg total) by mouth daily 30 tablet 5   • busPIRone (BUSPAR) 15 mg tablet Take 15 mg by mouth 2 (two) times a day      • Calcium Carb-Cholecalciferol (calcium carbonate-vitamin D) 500 mg-5 mcg tablet TAKE 1 TABLET BY MOUTH 2X DAILY @8AM-8PM (SUPPLEMENT) *AHMAD 60 tablet 5   • calcium carbonate-vitamin D (OSCAL-D) 500 mg-200 units per tablet Take 1 tablet by mouth 2 (two) times a day with meals 180 tablet 1   • cetirizine (ZyrTEC) 5 MG tablet Take 1 tablet (5 mg total) by mouth as needed for allergies (As needed at night p.r.n. for allergies) 30 tablet 5   • chlorhexidine (PERIDEX) 0.12 % solution      • Diclofenac Sodium (VOLTAREN) 1 % Apply 2 g topically 4 (four) times a day 2 g right knee 4 times per day 100 g 5   • diphenhydrAMINE (BENADRYL) 50 mg capsule Take 1 capsule (50 mg total) by mouth daily at bedtime as needed for sleep 90 capsule 2   • divalproex sodium (DEPAKOTE) 500 mg EC tablet 1 tab in the Am, 3 tabs(1500mg) at bedtime     • docusate sodium (COLACE) 100 mg capsule Take 1 capsule (100 mg total) by mouth 2 (two) times a day as needed for constipation 180 capsule 1   • fluticasone (FLONASE) 50 mcg/act nasal spray 2 sprays into each nostril daily 16 g 3   • gabapentin (NEURONTIN) 100 mg capsule Take 200 mg by mouth 2 (two) times a day       • GNP Pink Bismuth 262 MG chewable tablet CHEW 2 TABLETS(524MG) BY MOUTH EVERY 6 HOURS AS NEEDED FOR DIARRHEA 120 tablet 0   • guaiFENesin-dextromethorphan (MUCINEX DM) 600-30 mg 1 TABLET BY MOUTH EVERY 12 HRS @ 8AM & 8PM (COUGH) 14 tablet 0   • L-Theanine 100 MG CAPS Take 200 mg by mouth 2 (two) times a day       • levothyroxine 100 mcg tablet TAKE 1 TABLET BY MOUTH ONCE DAILY @ 6AM(THYROID) *AHMAD 30 tablet 5   • LORazepam (ATIVAN) 0.5 mg tablet Take 0.5 mg by mouth every 12 (twelve) hours as needed for anxiety     • LORazepam (ATIVAN) 1 mg tablet Take 1 mg by mouth in the morning and 1 mg in the evening and 1 mg before bedtime. • loxapine (LOXITANE) 25 mg capsule TAKE TWO (2) CAPSULES BY MOUTH EVERY MORNING AND SIX (6) CAPSULES AT BEDTIME     • Menthol, Topical Analgesic, (Bengay Ultra Strength) 5 % PTCH Apply 1 patch topically in the morning Apply 1 patch daily to lower back area.  30 patch 3   • Menthol-Methyl Salicylate (MURIEL AGUDELO GREASELESS) 10-15 % greaseless cream Apply topically daily at bedtime 30 g 3   • Multiple Vitamin (MULTIVITAMIN ADULT PO) Take by mouth daily     • PHENobarbital 64.8 mg tablet TAKE 1 TABLET BY MOUTH 2X DAILY @8AM-8PM(SEIZURES) *STONE 60 tablet 2   • polyethylene glycol (GLYCOLAX) 17 GM/SCOOP MIX 1 CAPFUL(17GM) IN 8OZ OF LIQUID AND DRINK DAILY @ 8AM(CONSTIPATION) *AHMAD 510 g 5   • Saphris 10 MG SL tablet Place 1 tablet (10 mg total) under the tongue 2 (two) times a day 60 tablet 1   • senna-docusate sodium (SENOKOT-S) 8.6-50 mg per tablet Take 1 tablet by mouth daily     • sodium chloride (OCEAN) 0.65 % nasal spray 1 spray into each nostril as needed for congestion TAKE UP TO 6 TIMES DAILY AS NEEDED 30 mL 3   • Thera (THERA/BETA-CAROTENE) TAKE 1 TABLET BY MOUTH ONCE DAILY @ 8AM,(SUPPLEMENT) *AHMAD 30 tablet 5   • traZODone (DESYREL) 150 mg tablet Take 1 tablet by mouth daily at bedtime      • trihexyphenidyl (ARTANE) 5 mg tablet Take 5 mg by mouth 3 (three) times a day with meals     • benzonatate (TESSALON) 200 MG capsule Take 1 capsule (200 mg total) by mouth 3 (three) times a day as needed for cough (Patient not taking: Reported on 9/5/2023) 30 capsule 0   • loxapine (LOXITANE) 50 MG capsule        No current facility-administered medications for this visit. Facility-Administered Medications Ordered in Other Visits   Medication Dose Route Frequency Provider Last Rate Last Admin   • lactated ringers infusion   Intravenous Continuous PRN Celi Antony Birks, CRNA   New Bag at 02/08/23 1400     Allergies   Allergen Reactions   • Clozapine Other (See Comments)     low white blood count  Other reaction(s): Other (See Comments)  low white blood count   • Haloperidol Other (See Comments)     EPS  Other reaction(s):  Other (See Comments)  EPS   • Lithium Other (See Comments)     Toxicity   • Prolixin [Fluphenazine] Hyperactivity and Other (See Comments)     EPS, Hyperactivity  EPS, Hyperactivity   • Valproic Acid Confusion and Other (See Comments)     "Mental confusion"  "Mental confusion"      Immunizations:     Immunization History   Administered Date(s) Administered   • COVID-19 MODERNA VACC 0.5 ML IM 02/12/2021, 03/12/2021   • H1N1, All Formulations 01/07/2010   • INFLUENZA 09/18/2012, 10/07/2013, 10/12/2016, 10/23/2017   • Influenza Quadrivalent Preservative Free 3 years and older IM 10/23/2017   • Influenza Quadrivalent, 6-35 Months IM 10/12/2016   • Influenza, high dose seasonal 0.7 mL 10/15/2018, 11/13/2019, 10/02/2020, 11/16/2021   • Influenza, seasonal, injectable 10/16/2014   • Influenza, seasonal, injectable, preservative free 10/21/2013   • Pneumococcal Conjugate 13-Valent 03/20/2019   • Pneumococcal Polysaccharide PPV23 07/26/2021   • Td (adult), adsorbed 11/28/2017   • Tuberculin Skin Test-PPD Intradermal 08/08/2018, 08/11/2020      Health Maintenance:         Topic Date Due   • Cervical Cancer Screening  12/28/2021   • Breast Cancer Screening: Mammogram  10/31/2023   • Colorectal Cancer Screening  08/30/2028   • Hepatitis C Screening  Completed         Topic Date Due   • COVID-19 Vaccine (3 - Meseret Ridgel series) 05/07/2021   • Influenza Vaccine (1) 09/01/2023      Medicare Screening Tests and Risk Assessments:     Estela Park is here for her Subsequent Wellness visit. Last Medicare Wellness visit information reviewed, patient interviewed and updates made to the record today. Health Risk Assessment:   Patient rates overall health as good. Patient feels that their physical health rating is same. Patient is dissatisfied with their life. Eyesight was rated as same. Hearing was rated as same. Patient feels that their emotional and mental health rating is same. Patients states they are never, rarely angry. Patient states they are sometimes unusually tired/fatigued. Pain experienced in the last 7 days has been a lot. Patient's pain rating has been 7/10. Patient states that she has experienced no weight loss or gain in last 6 months. Depression Screening:   PHQ-9 Score: 4      Fall Risk Screening: In the past year, patient has experienced: no history of falling in past year      Urinary Incontinence Screening:   Patient has not leaked urine accidently in the last six months.      Home Safety:  Patient does not have trouble with stairs inside or outside of their home. Patient has working smoke alarms and has working carbon monoxide detector. Home safety hazards include: none. Nutrition:   Current diet is Regular. Medications:   Patient is not currently taking any over-the-counter supplements. Patient is not able to manage medications. Activities of Daily Living (ADLs)/Instrumental Activities of Daily Living (IADLs):   Walk and transfer into and out of bed and chair?: Yes  Dress and groom yourself?: Yes    Bathe or shower yourself?: Yes    Feed yourself?  Yes  Do your laundry/housekeeping?: Yes  Manage your money, pay your bills and track your expenses?: No  Make your own meals?: No    Do your own shopping?: Yes    Previous Hospitalizations:   Any hospitalizations or ED visits within the last 12 months?: No      Advance Care Planning:   Living will: No    Durable POA for healthcare: No    Advanced directive: No    Advanced directive counseling given: Yes    Five wishes given: Yes      Cognitive Screening:   Provider or family/friend/caregiver concerned regarding cognition?: Yes    PREVENTIVE SCREENINGS      Cardiovascular Screening:    General: Screening Not Indicated and History Lipid Disorder      Diabetes Screening:     General: Screening Current      Colorectal Cancer Screening:     General: Screening Current      Breast Cancer Screening:     General: Screening Current      Cervical Cancer Screening:    General: Screening Not Indicated      Osteoporosis Screening:    General: Screening Not Indicated and History Osteoporosis      Abdominal Aortic Aneurysm (AAA) Screening:        General: Screening Not Indicated      Lung Cancer Screening:     General: Screening Not Indicated      Hepatitis C Screening:    General: Screening Current    Screening, Brief Intervention, and Referral to Treatment (SBIRT)    Screening  Typical number of drinks in a day: 0  Typical number of drinks in a week: 0  Interpretation: Low risk drinking behavior. Single Item Drug Screening:  How often have you used an illegal drug (including marijuana) or a prescription medication for non-medical reasons in the past year? never    Single Item Drug Screen Score: 0  Interpretation: Negative screen for possible drug use disorder    Brief Intervention  Alcohol & drug use screenings were reviewed. No concerns regarding substance use disorder identified. Other Counseling Topics:   Car/seat belt/driving safety, skin self-exam, sunscreen and calcium and vitamin D intake and regular weightbearing exercise. No results found. Physical Exam:     /70 (BP Location: Left arm, Patient Position: Sitting, Cuff Size: Standard)   Pulse 76   Temp 98.4 °F (36.9 °C) (Temporal)   Ht 5' 4" (1.626 m)   Wt 87.5 kg (193 lb)   SpO2 96%   BMI 33.13 kg/m²     Physical Exam  Vitals and nursing note reviewed. Constitutional:       General: She is not in acute distress. Appearance: She is well-developed. She is obese. HENT:      Head: Normocephalic and atraumatic. Right Ear: External ear normal. There is no impacted cerumen. Left Ear: External ear normal. There is no impacted cerumen. Nose: No rhinorrhea. Mouth/Throat:      Pharynx: No posterior oropharyngeal erythema. Eyes:      Conjunctiva/sclera: Conjunctivae normal.   Neck:      Vascular: No carotid bruit. Cardiovascular:      Rate and Rhythm: Normal rate and regular rhythm. Heart sounds: No murmur heard. Pulmonary:      Effort: Pulmonary effort is normal. No respiratory distress. Breath sounds: Normal breath sounds. Abdominal:      Palpations: Abdomen is soft. Tenderness: There is no abdominal tenderness. Musculoskeletal:         General: No swelling. Cervical back: Neck supple. Right lower leg: No edema. Left lower leg: No edema. Skin:     General: Skin is warm and dry. Capillary Refill: Capillary refill takes less than 2 seconds. Neurological:      Mental Status: She is alert and oriented to person, place, and time.           Teofilo Wilson MD

## 2023-09-05 NOTE — PATIENT INSTRUCTIONS
Medicare Preventive Visit Patient Instructions  Thank you for completing your Welcome to Medicare Visit or Medicare Annual Wellness Visit today. Your next wellness visit will be due in one year (9/5/2024). The screening/preventive services that you may require over the next 5-10 years are detailed below. Some tests may not apply to you based off risk factors and/or age. Screening tests ordered at today's visit but not completed yet may show as past due. Also, please note that scanned in results may not display below. Preventive Screenings:  Service Recommendations Previous Testing/Comments   Colorectal Cancer Screening  * Colonoscopy    * Fecal Occult Blood Test (FOBT)/Fecal Immunochemical Test (FIT)  * Fecal DNA/Cologuard Test  * Flexible Sigmoidoscopy Age: 43-73 years old   Colonoscopy: every 10 years (may be performed more frequently if at higher risk)  OR  FOBT/FIT: every 1 year  OR  Cologuard: every 3 years  OR  Sigmoidoscopy: every 5 years  Screening may be recommended earlier than age 39 if at higher risk for colorectal cancer. Also, an individualized decision between you and your healthcare provider will decide whether screening between the ages of 77-80 would be appropriate. Colonoscopy: 08/30/2018  FOBT/FIT: Not on file  Cologuard: Not on file  Sigmoidoscopy: Not on file    Screening Current     Breast Cancer Screening Age: 36 years old  Frequency: every 1-2 years  Not required if history of left and right mastectomy Mammogram: 10/31/2022    Screening Current   Cervical Cancer Screening Between the ages of 21-29, pap smear recommended once every 3 years. Between the ages of 32-69, can perform pap smear with HPV co-testing every 5 years.    Recommendations may differ for women with a history of total hysterectomy, cervical cancer, or abnormal pap smears in past. Pap Smear: 12/28/2018    Screening Not Indicated   Hepatitis C Screening Once for adults born between 1945 and 1965  More frequently in patients at high risk for Hepatitis C Hep C Antibody: 11/29/2017    Screening Current   Diabetes Screening 1-2 times per year if you're at risk for diabetes or have pre-diabetes Fasting glucose: 99 mg/dL (12/30/2022)  A1C: 5.7 % (6/19/2023)  Screening Current   Cholesterol Screening Once every 5 years if you don't have a lipid disorder. May order more often based on risk factors. Lipid panel: 06/19/2023    Screening Not Indicated  History Lipid Disorder     Other Preventive Screenings Covered by Medicare:  1. Abdominal Aortic Aneurysm (AAA) Screening: covered once if your at risk. You're considered to be at risk if you have a family history of AAA. 2. Lung Cancer Screening: covers low dose CT scan once per year if you meet all of the following conditions: (1) Age 48-67; (2) No signs or symptoms of lung cancer; (3) Current smoker or have quit smoking within the last 15 years; (4) You have a tobacco smoking history of at least 20 pack years (packs per day multiplied by number of years you smoked); (5) You get a written order from a healthcare provider. 3. Glaucoma Screening: covered annually if you're considered high risk: (1) You have diabetes OR (2) Family history of glaucoma OR (3)  aged 48 and older OR (3)  American aged 72 and older  3. Osteoporosis Screening: covered every 2 years if you meet one of the following conditions: (1) You're estrogen deficient and at risk for osteoporosis based off medical history and other findings; (2) Have a vertebral abnormality; (3) On glucocorticoid therapy for more than 3 months; (4) Have primary hyperparathyroidism; (5) On osteoporosis medications and need to assess response to drug therapy. · Last bone density test (DXA Scan): 01/30/2023.  5. HIV Screening: covered annually if you're between the age of 15-65. Also covered annually if you are younger than 13 and older than 72 with risk factors for HIV infection.  For pregnant patients, it is covered up to 3 times per pregnancy. Immunizations:  Immunization Recommendations   Influenza Vaccine Annual influenza vaccination during flu season is recommended for all persons aged >= 6 months who do not have contraindications   Pneumococcal Vaccine   * Pneumococcal conjugate vaccine = PCV13 (Prevnar 13), PCV15 (Vaxneuvance), PCV20 (Prevnar 20)  * Pneumococcal polysaccharide vaccine = PPSV23 (Pneumovax) Adults 20-63 years old: 1-3 doses may be recommended based on certain risk factors  Adults 72 years old: 1-2 doses may be recommended based off what pneumonia vaccine you previously received   Hepatitis B Vaccine 3 dose series if at intermediate or high risk (ex: diabetes, end stage renal disease, liver disease)   Tetanus (Td) Vaccine - COST NOT COVERED BY MEDICARE PART B Following completion of primary series, a booster dose should be given every 10 years to maintain immunity against tetanus. Td may also be given as tetanus wound prophylaxis. Tdap Vaccine - COST NOT COVERED BY MEDICARE PART B Recommended at least once for all adults. For pregnant patients, recommended with each pregnancy. Shingles Vaccine (Shingrix) - COST NOT COVERED BY MEDICARE PART B  2 shot series recommended in those aged 48 and above     Health Maintenance Due:      Topic Date Due   • Cervical Cancer Screening  12/28/2021   • Breast Cancer Screening: Mammogram  10/31/2023   • Colorectal Cancer Screening  08/30/2028   • Hepatitis C Screening  Completed     Immunizations Due:      Topic Date Due   • COVID-19 Vaccine (3 - Moderna series) 05/07/2021   • Influenza Vaccine (1) 09/01/2023     Advance Directives   What are advance directives? Advance directives are legal documents that state your wishes and plans for medical care. These plans are made ahead of time in case you lose your ability to make decisions for yourself. Advance directives can apply to any medical decision, such as the treatments you want, and if you want to donate organs. What are the types of advance directives? There are many types of advance directives, and each state has rules about how to use them. You may choose a combination of any of the following:  · Living will: This is a written record of the treatment you want. You can also choose which treatments you do not want, which to limit, and which to stop at a certain time. This includes surgery, medicine, IV fluid, and tube feedings. · Durable power of  for healthcare [facility]): This is a written record that states who you want to make healthcare choices for you when you are unable to make them for yourself. This person, called a proxy, is usually a family member or a friend. You may choose more than 1 proxy. · Do not resuscitate (DNR) order:  A DNR order is used in case your heart stops beating or you stop breathing. It is a request not to have certain forms of treatment, such as CPR. A DNR order may be included in other types of advance directives. · Medical directive: This covers the care that you want if you are in a coma, near death, or unable to make decisions for yourself. You can list the treatments you want for each condition. Treatment may include pain medicine, surgery, blood transfusions, dialysis, IV or tube feedings, and a ventilator (breathing machine). · Values history: This document has questions about your views, beliefs, and how you feel and think about life. This information can help others choose the care that you would choose. Why are advance directives important? An advance directive helps you control your care. Although spoken wishes may be used, it is better to have your wishes written down. Spoken wishes can be misunderstood, or not followed. Treatments may be given even if you do not want them. An advance directive may make it easier for your family to make difficult choices about your care.    Weight Management   Why it is important to manage your weight:  Being overweight increases your risk of health conditions such as heart disease, high blood pressure, type 2 diabetes, and certain types of cancer. It can also increase your risk for osteoarthritis, sleep apnea, and other respiratory problems. Aim for a slow, steady weight loss. Even a small amount of weight loss can lower your risk of health problems. How to lose weight safely:  A safe and healthy way to lose weight is to eat fewer calories and get regular exercise. You can lose up about 1 pound a week by decreasing the number of calories you eat by 500 calories each day. Healthy meal plan for weight management:  A healthy meal plan includes a variety of foods, contains fewer calories, and helps you stay healthy. A healthy meal plan includes the following:  · Eat whole-grain foods more often. A healthy meal plan should contain fiber. Fiber is the part of grains, fruits, and vegetables that is not broken down by your body. Whole-grain foods are healthy and provide extra fiber in your diet. Some examples of whole-grain foods are whole-wheat breads and pastas, oatmeal, brown rice, and bulgur. · Eat a variety of vegetables every day. Include dark, leafy greens such as spinach, kale, lauren greens, and mustard greens. Eat yellow and orange vegetables such as carrots, sweet potatoes, and winter squash. · Eat a variety of fruits every day. Choose fresh or canned fruit (canned in its own juice or light syrup) instead of juice. Fruit juice has very little or no fiber. · Eat low-fat dairy foods. Drink fat-free (skim) milk or 1% milk. Eat fat-free yogurt and low-fat cottage cheese. Try low-fat cheeses such as mozzarella and other reduced-fat cheeses. · Choose meat and other protein foods that are low in fat. Choose beans or other legumes such as split peas or lentils. Choose fish, skinless poultry (chicken or turkey), or lean cuts of red meat (beef or pork). Before you cook meat or poultry, cut off any visible fat.    · Use less fat and oil.  Try baking foods instead of frying them. Add less fat, such as margarine, sour cream, regular salad dressing and mayonnaise to foods. Eat fewer high-fat foods. Some examples of high-fat foods include french fries, doughnuts, ice cream, and cakes. · Eat fewer sweets. Limit foods and drinks that are high in sugar. This includes candy, cookies, regular soda, and sweetened drinks. Exercise:  Exercise at least 30 minutes per day on most days of the week. Some examples of exercise include walking, biking, dancing, and swimming. You can also fit in more physical activity by taking the stairs instead of the elevator or parking farther away from stores. Ask your healthcare provider about the best exercise plan for you. © Copyright [employer] 2018 Information is for End User's use only and may not be sold, redistributed or otherwise used for commercial purposes.  All illustrations and images included in CareNotes® are the copyrighted property of [employer] or [address] [place] [address]

## 2023-09-07 ENCOUNTER — APPOINTMENT (OUTPATIENT)
Dept: LAB | Age: 68
End: 2023-09-07
Payer: MEDICARE

## 2023-09-07 DIAGNOSIS — G40.909 SEIZURE DISORDER (HCC): ICD-10-CM

## 2023-09-07 DIAGNOSIS — I10 BENIGN ESSENTIAL HYPERTENSION: ICD-10-CM

## 2023-09-07 DIAGNOSIS — R73.03 PREDIABETES: ICD-10-CM

## 2023-09-07 DIAGNOSIS — Z11.1 SCREENING FOR TUBERCULOSIS: ICD-10-CM

## 2023-09-07 DIAGNOSIS — E78.2 MIXED HYPERLIPIDEMIA: ICD-10-CM

## 2023-09-07 LAB
ALBUMIN SERPL BCP-MCNC: 4.1 G/DL (ref 3.5–5)
ALP SERPL-CCNC: 59 U/L (ref 34–104)
ALT SERPL W P-5'-P-CCNC: 16 U/L (ref 7–52)
ANION GAP SERPL CALCULATED.3IONS-SCNC: 9 MMOL/L
AST SERPL W P-5'-P-CCNC: 17 U/L (ref 13–39)
BILIRUB SERPL-MCNC: 0.28 MG/DL (ref 0.2–1)
BUN SERPL-MCNC: 11 MG/DL (ref 5–25)
CALCIUM SERPL-MCNC: 9.2 MG/DL (ref 8.4–10.2)
CHLORIDE SERPL-SCNC: 100 MMOL/L (ref 96–108)
CHOLEST SERPL-MCNC: 192 MG/DL
CO2 SERPL-SCNC: 27 MMOL/L (ref 21–32)
CREAT SERPL-MCNC: 0.71 MG/DL (ref 0.6–1.3)
ERYTHROCYTE [DISTWIDTH] IN BLOOD BY AUTOMATED COUNT: 12.5 % (ref 11.6–15.1)
EST. AVERAGE GLUCOSE BLD GHB EST-MCNC: 120 MG/DL
GFR SERPL CREATININE-BSD FRML MDRD: 86 ML/MIN/1.73SQ M
GLUCOSE P FAST SERPL-MCNC: 88 MG/DL (ref 65–99)
HBA1C MFR BLD: 5.8 %
HCT VFR BLD AUTO: 42.1 % (ref 34.8–46.1)
HDLC SERPL-MCNC: 79 MG/DL
HGB BLD-MCNC: 14.3 G/DL (ref 11.5–15.4)
LDLC SERPL CALC-MCNC: 93 MG/DL (ref 0–100)
MCH RBC QN AUTO: 33.9 PG (ref 26.8–34.3)
MCHC RBC AUTO-ENTMCNC: 34 G/DL (ref 31.4–37.4)
MCV RBC AUTO: 100 FL (ref 82–98)
PHENOBARB SERPL-MCNC: 17.2 UG/ML (ref 10–40)
PLATELET # BLD AUTO: 263 THOUSANDS/UL (ref 149–390)
PMV BLD AUTO: 9.6 FL (ref 8.9–12.7)
POTASSIUM SERPL-SCNC: 4.2 MMOL/L (ref 3.5–5.3)
PROT SERPL-MCNC: 7.1 G/DL (ref 6.4–8.4)
RBC # BLD AUTO: 4.22 MILLION/UL (ref 3.81–5.12)
SODIUM SERPL-SCNC: 136 MMOL/L (ref 135–147)
TRIGL SERPL-MCNC: 101 MG/DL
WBC # BLD AUTO: 4.74 THOUSAND/UL (ref 4.31–10.16)

## 2023-09-07 PROCEDURE — 80061 LIPID PANEL: CPT

## 2023-09-07 PROCEDURE — 86480 TB TEST CELL IMMUN MEASURE: CPT

## 2023-09-07 PROCEDURE — 36415 COLL VENOUS BLD VENIPUNCTURE: CPT

## 2023-09-07 PROCEDURE — 80053 COMPREHEN METABOLIC PANEL: CPT

## 2023-09-07 PROCEDURE — 83036 HEMOGLOBIN GLYCOSYLATED A1C: CPT

## 2023-09-07 PROCEDURE — 85027 COMPLETE CBC AUTOMATED: CPT

## 2023-09-07 PROCEDURE — 80184 ASSAY OF PHENOBARBITAL: CPT

## 2023-09-09 LAB
GAMMA INTERFERON BACKGROUND BLD IA-ACNC: 0.02 IU/ML
M TB IFN-G BLD-IMP: NEGATIVE
M TB IFN-G CD4+ BCKGRND COR BLD-ACNC: 0 IU/ML
M TB IFN-G CD4+ BCKGRND COR BLD-ACNC: 0.01 IU/ML
MITOGEN IGNF BCKGRD COR BLD-ACNC: 9.66 IU/ML

## 2023-09-26 ENCOUNTER — TELEPHONE (OUTPATIENT)
Dept: INTERNAL MEDICINE CLINIC | Age: 68
End: 2023-09-26

## 2023-09-26 NOTE — TELEPHONE ENCOUNTER
Would need a Discontinued paper for the Mucinex(Guaifenesin/dextromethorphan) since patient was only to take that for the URI she had.      Please fax it over to [address] [place] [address] facility to take it off her list there

## 2023-09-26 NOTE — TELEPHONE ENCOUNTER
D/C'd in chart. JEREMÍAS or Papi Martinez would have to fax the office a discharged request and then we have the doctor sign it and fax back. We don't have any discharged order forms.

## 2023-10-05 DIAGNOSIS — G40.909 SEIZURE DISORDER (HCC): ICD-10-CM

## 2023-10-05 RX ORDER — PHENOBARBITAL 64.8 MG/1
64.8 TABLET ORAL EVERY 12 HOURS
Qty: 62 TABLET | Refills: 5 | Status: SHIPPED | OUTPATIENT
Start: 2023-10-05

## 2023-10-13 ENCOUNTER — HOSPITAL ENCOUNTER (EMERGENCY)
Facility: HOSPITAL | Age: 68
Discharge: HOME | End: 2023-10-13
Attending: EMERGENCY MEDICINE
Payer: MEDICARE

## 2023-10-13 ENCOUNTER — APPOINTMENT (EMERGENCY)
Dept: RADIOLOGY | Facility: HOSPITAL | Age: 68
End: 2023-10-13
Payer: MEDICARE

## 2023-10-13 VITALS
OXYGEN SATURATION: 94 % | TEMPERATURE: 100.4 F | HEART RATE: 99 BPM | RESPIRATION RATE: 20 BRPM | SYSTOLIC BLOOD PRESSURE: 155 MMHG | DIASTOLIC BLOOD PRESSURE: 70 MMHG

## 2023-10-13 DIAGNOSIS — R11.10 VOMITING: Primary | ICD-10-CM

## 2023-10-13 DIAGNOSIS — R05.9 COUGH: ICD-10-CM

## 2023-10-13 PROCEDURE — 99283 EMERGENCY DEPT VISIT LOW MDM: CPT

## 2023-10-13 PROCEDURE — 99284 EMERGENCY DEPT VISIT MOD MDM: CPT | Performed by: EMERGENCY MEDICINE

## 2023-10-13 RX ORDER — ALBUTEROL SULFATE 2.5 MG/3ML
1 SOLUTION RESPIRATORY (INHALATION) ONCE
Status: COMPLETED | OUTPATIENT
Start: 2023-10-13 | End: 2023-10-13

## 2023-10-13 RX ORDER — IPRATROPIUM BROMIDE AND ALBUTEROL SULFATE .5; 3 MG/3ML; MG/3ML
1 SOLUTION RESPIRATORY (INHALATION) ONCE
Status: COMPLETED | OUTPATIENT
Start: 2023-10-13 | End: 2023-10-13

## 2023-10-13 RX ORDER — GUAIFENESIN/DEXTROMETHORPHAN 100-10MG/5
5 SYRUP ORAL 3 TIMES DAILY PRN
Qty: 100 ML | Refills: 0 | Status: SHIPPED | OUTPATIENT
Start: 2023-10-13

## 2023-10-13 RX ORDER — METHYLPREDNISOLONE SOD SUCC 125 MG
1 VIAL (EA) INJECTION ONCE
Status: COMPLETED | OUTPATIENT
Start: 2023-10-13 | End: 2023-10-13

## 2023-10-13 NOTE — ED PROVIDER NOTES
History  Chief Complaint   Patient presents with    Vomiting     EMS states pt vomited after choking on some rice. She vomited for EMS as well. Pt gave her albuterol, duoneb and 125mg of solumedrol. 41-year-old female patient presents to the ED complaining of vomiting after choking on some rice onset today. Patient states that she was eating [place] food she took on some rice and then she had 3 episodes of vomiting. Patient began to have some mild shortness of breath and was given DuoNeb and Solu-Medrol by EMS. When speaking to patient, patient states that she feels at baseline. Patient states that she has had a "head cold" for couple days with mild cough and congestion. Patient states that now that she feels better, she would like to go home. Denies fever at home, chest pain, shortness of breath, nausea, vomiting, diarrhea, abdominal pain. Patient is repeatedly saying that she needs to be tested for venereal disease although she is not sexually active. Prior to Admission Medications   Prescriptions Last Dose Informant Patient Reported? Taking? Calcium Carb-Cholecalciferol (calcium carbonate-vitamin D) 500 mg-5 mcg tablet   No No   Sig: TAKE 1 TABLET BY MOUTH 2X DAILY @8AM-8PM (SUPPLEMENT) *AHMAD   Diclofenac Sodium (VOLTAREN) 1 %   No No   Sig: Apply 2 g topically 4 (four) times a day 2 g right knee 4 times per day   GNP Pink Bismuth 262 MG chewable tablet  Care Giver No No   Sig: CHEW 2 TABLETS(524MG) BY MOUTH EVERY 6 HOURS AS NEEDED FOR DIARRHEA   L-Theanine 100 MG CAPS  Care Giver Yes No   Sig: Take 200 mg by mouth 2 (two) times a day     LORazepam (ATIVAN) 0.5 mg tablet  Care Giver Yes No   Sig: Take 0.5 mg by mouth every 12 (twelve) hours as needed for anxiety   LORazepam (ATIVAN) 1 mg tablet  Care Giver Yes No   Sig: Take 1 mg by mouth in the morning and 1 mg in the evening and 1 mg before bedtime.    Menthol, Topical Analgesic, (Bengay Ultra Strength) 5 % PTCH   No No   Sig: Apply 1 patch topically in the morning Apply 1 patch daily to lower back area.    Menthol-Methyl Salicylate (MURIEL AGUDELO GREASELESS) 10-15 % greaseless cream  Care Giver No No   Sig: Apply topically daily at bedtime   Multiple Vitamin (MULTIVITAMIN ADULT PO)  Self Yes No   Sig: Take by mouth daily   PHENobarbital 64.8 mg tablet   No No   Sig: Take 1 tablet (64.8 mg total) by mouth every 12 (twelve) hours at 8 AM and 8 PM (SEIZURES)   Saphris 10 MG SL tablet  Care Giver No No   Sig: Place 1 tablet (10 mg total) under the tongue 2 (two) times a day   Thera (THERA/BETA-CAROTENE)   No No   Sig: TAKE 1 TABLET BY MOUTH ONCE DAILY @ 8AM,(SUPPLEMENT) *AHMAD   acetaminophen (Acetaminophen Extra Strength) 500 mg tablet   No No   Sig: Take 2 tablets (1,000 mg total) by mouth 3 (three) times a day as needed for mild pain or fever   alendronate (FOSAMAX) 70 mg tablet   No No   Sig: TAKE 1 TABLET BY MOUTH WEEKLY ON FRIDAYS @ 8AM (OSTEOPORPSIS)   aspirin 81 mg chewable tablet   No No   Sig: Chew 1 tablet (81 mg total) daily   atorvastatin (LIPITOR) 20 mg tablet   No No   Sig: Take 1 tablet (20 mg total) by mouth daily   benzonatate (TESSALON) 200 MG capsule   No No   Sig: Take 1 capsule (200 mg total) by mouth 3 (three) times a day as needed for cough   Patient not taking: Reported on 9/5/2023   busPIRone (BUSPAR) 15 mg tablet  Care Giver Yes No   Sig: Take 15 mg by mouth 2 (two) times a day    calcium carbonate-vitamin D (OSCAL-D) 500 mg-200 units per tablet  Care Giver No No   Sig: Take 1 tablet by mouth 2 (two) times a day with meals   cetirizine (ZyrTEC) 5 MG tablet  Care Giver No No   Sig: Take 1 tablet (5 mg total) by mouth as needed for allergies (As needed at night p.r.n. for allergies)   chlorhexidine (PERIDEX) 0.12 % solution   Yes No   diphenhydrAMINE (BENADRYL) 50 mg capsule  Care Giver No No   Sig: Take 1 capsule (50 mg total) by mouth daily at bedtime as needed for sleep   divalproex sodium (DEPAKOTE) 500 mg EC tablet  Care Giver Yes No   Si tab in the Am, 3 tabs(1500mg) at bedtime   docusate sodium (COLACE) 100 mg capsule  Care Giver No No   Sig: Take 1 capsule (100 mg total) by mouth 2 (two) times a day as needed for constipation   fluticasone (FLONASE) 50 mcg/act nasal spray   No No   Si sprays into each nostril daily   gabapentin (NEURONTIN) 100 mg capsule  Care Giver Yes No   Sig: Take 200 mg by mouth 2 (two) times a day     levothyroxine 100 mcg tablet   No No   Sig: TAKE 1 TABLET BY MOUTH ONCE DAILY @ 6AM(THYROID) *AHMAD   loxapine (LOXITANE) 25 mg capsule  Care Giver Yes No   Sig: TAKE TWO (2) CAPSULES BY MOUTH EVERY MORNING AND SIX (6) CAPSULES AT BEDTIME   loxapine (LOXITANE) 50 MG capsule   Yes No   polyethylene glycol (GLYCOLAX) 17 GM/SCOOP   No No   Sig: MIX 1 CAPFUL(17GM) IN 8OZ OF LIQUID AND DRINK DAILY @ 8AM(CONSTIPATION) *AHMAD   senna-docusate sodium (SENOKOT-S) 8.6-50 mg per tablet  Care Giver Yes No   Sig: Take 1 tablet by mouth daily   sodium chloride (OCEAN) 0.65 % nasal spray   No No   Si spray into each nostril as needed for congestion TAKE UP TO 6 TIMES DAILY AS NEEDED   traZODone (DESYREL) 150 mg tablet  Care Giver Yes No   Sig: Take 1 tablet by mouth daily at bedtime    trihexyphenidyl (ARTANE) 5 mg tablet  Care Giver Yes No   Sig: Take 5 mg by mouth 3 (three) times a day with meals      Facility-Administered Medications: None       Past Medical History:   Diagnosis Date    Anxiety     Benign essential hypertension     resolved; 06/15/16    Depression     Depression with anxiety     Fracture of fifth metatarsal bone of right foot with delayed healing     last assessed 16; fracture of metatarsal bone, right, closed, initial encounter    Hyperlipidemia     last assessed 17    Hypothyroidism     last assessed 2017    Insomnia     Obesity     Schizoaffective disorder ([address])     last assessed 2017    Seizure disorder ([address])     last assessed 17    Seizures ([address])        Past Surgical History:   Procedure Laterality Date    COLONOSCOPY      complete    OR COLONOSCOPY FLX DX W/COLLJ SPEC WHEN PFRMD N/A 2018    Procedure: COLONOSCOPY with polypectomies;  Surgeon: Kelvin Pino MD;  Location: [facility]; Service: Gastroenterology       Family History   Problem Relation Age of Onset    No Known Problems Mother     No Known Problems Father     Lung disease Other         mesothelioma    No Known Problems Maternal Grandmother     No Known Problems Maternal Grandfather     No Known Problems Paternal Grandmother     No Known Problems Paternal Grandfather     No Known Problems Sister     No Known Problems Sister     Kidney failure Brother     No Known Problems Maternal Aunt     No Known Problems Maternal Aunt     No Known Problems Maternal Aunt     No Known Problems Maternal Aunt     No Known Problems Paternal Aunt     No Known Problems Paternal Aunt      I have reviewed and agree with the history as documented. E-Cigarette/Vaping    E-Cigarette Use Never User      E-Cigarette/Vaping Substances    Nicotine No     THC No     CBD No     Flavoring No     Other No     Unknown No      Social History     Tobacco Use    Smoking status: Former     Packs/day: 0.50     Years: 21.00     Total pack years: 10.50     Types: Cigarettes     Start date:      Quit date:      Years since quittin.8    Smokeless tobacco: Never   Vaping Use    Vaping Use: Never used   Substance Use Topics    Alcohol use: No    Drug use: No        Review of Systems   Respiratory:  Positive for cough. Gastrointestinal:  Positive for vomiting. All other systems reviewed and are negative.       Physical Exam  ED Triage Vitals [10/13/23 1841]   Temperature Pulse Respirations Blood Pressure SpO2   100.4 °F (38 °C) 99 20 155/70 94 %      Temp Source Heart Rate Source Patient Position - Orthostatic VS BP Location FiO2 (%)   Oral -- Sitting Right arm --      Pain Score       No Pain             Orthostatic Vital Signs  Vitals: 10/13/23 1841   BP: 155/70   Pulse: 99   Patient Position - Orthostatic VS: Sitting       Physical Exam  Vitals reviewed. Constitutional:       Appearance: Normal appearance. HENT:      Head: Normocephalic and atraumatic. Nose: Nose normal.      Mouth/Throat:      Mouth: Mucous membranes are moist.      Pharynx: Oropharynx is clear. Eyes:      Extraocular Movements: Extraocular movements intact. Conjunctiva/sclera: Conjunctivae normal.   Cardiovascular:      Rate and Rhythm: Normal rate and regular rhythm. Pulses: Normal pulses. Heart sounds: Normal heart sounds. Pulmonary:      Effort: Pulmonary effort is normal.      Breath sounds: Normal breath sounds. Abdominal:      General: Bowel sounds are normal.      Palpations: Abdomen is soft. Tenderness: There is no abdominal tenderness. Musculoskeletal:         General: Normal range of motion. Cervical back: Normal range of motion. Skin:     General: Skin is warm and dry. Neurological:      General: No focal deficit present. Mental Status: She is alert and oriented to person, place, and time. Mental status is at baseline. ED Medications  Medications   albuterol (FOR EMS ONLY) (2.5 mg/3 mL) 0.083 % inhalation solution 2.5 mg (0 mg Does not apply Given to EMS 10/13/23 1903)   methylPREDNISolone sodium succinate (FOR EMS ONLY) (Solu-MEDROL) 125 MG injection 125 mg (0 mg Does not apply Given to EMS 10/13/23 1903)   ipratropium-albuterol (FOR EMS ONLY) (DUO-NEB) 0.5-2.5 mg/3 mL inhalation solution 3 mL (0 mL Does not apply Given to EMS 10/13/23 1904)       Diagnostic Studies  Results Reviewed       None                   No orders to display         Procedures  Procedures      ED Course                                       Medical Decision Making  64-year-old female patient presenting with vomiting after choking on rice today. Patient states that she also has a cough and a "head cold and is having congestion. DDx includes aspiration pneumonitis, pneumonia, viral syndrome. Attempted to get lab work-up and chest x-ray however patient refused and states that she wants to go home. Patient declined a chest x-ray to radiology technicians. Patient understood risks of not getting a chest x-ray however states that she just wanted something for her cough. Likely viral.  Patient stable at this time. Oxygen saturation in the room remained 95 to 98%. Stable for discharge with return precautions given. Follow-up with PCP. Amount and/or Complexity of Data Reviewed  Radiology: ordered. Discussion of management or test interpretation with external provider(s): Follow-up with PCP. Risk  OTC drugs. Prescription drug management. Disposition  Final diagnoses:   Vomiting   Cough     Time reflects when diagnosis was documented in both MDM as applicable and the Disposition within this note       Time User Action Codes Description Comment    10/13/2023  8:15 PM Pao Reddy Add [R11.10] Vomiting     10/13/2023  8:30 PM Oscar Hutchinson Add [R05.9] Cough           ED Disposition       ED Disposition   Discharge    Condition   Stable    Date/Time   Fri Oct 13, 2023  8:15 PM    Comment   Meloine Houser discharge to home/self care.                    Follow-up Information       Follow up With Specialties Details Why Contact Info    Sumit Valencia MD Internal Medicine Schedule an appointment as soon as possible for a visit   [address] [place] [address]  [phone]              Discharge Medication List as of 10/13/2023  8:31 PM        START taking these medications    Details   dextromethorphan-guaiFENesin (ROBITUSSIN DM)  mg/5 mL syrup Take 5 mL by mouth 3 (three) times a day as needed for cough, Starting Fri 10/13/2023, Normal           CONTINUE these medications which have NOT CHANGED    Details   acetaminophen (Acetaminophen Extra Strength) 500 mg tablet Take 2 tablets (1,000 mg total) by mouth 3 (three) times a day as needed for mild pain or fever, Starting Mon 5/1/2023, Normal      alendronate (FOSAMAX) 70 mg tablet TAKE 1 TABLET BY MOUTH WEEKLY ON FRIDAYS @ 8AM (OSTEOPORPSIS), Normal      aspirin 81 mg chewable tablet Chew 1 tablet (81 mg total) daily, Starting Thu 6/8/2023, Normal      atorvastatin (LIPITOR) 20 mg tablet Take 1 tablet (20 mg total) by mouth daily, Starting Tue 4/11/2023, Normal      benzonatate (TESSALON) 200 MG capsule Take 1 capsule (200 mg total) by mouth 3 (three) times a day as needed for cough, Starting Thu 6/8/2023, Normal      busPIRone (BUSPAR) 15 mg tablet Take 15 mg by mouth 2 (two) times a day , Historical Med      Calcium Carb-Cholecalciferol (calcium carbonate-vitamin D) 500 mg-5 mcg tablet TAKE 1 TABLET BY MOUTH 2X DAILY @8AM-8PM (SUPPLEMENT) *AHMAD, Normal      calcium carbonate-vitamin D (OSCAL-D) 500 mg-200 units per tablet Take 1 tablet by mouth 2 (two) times a day with meals, Starting Fri 7/8/2022, Normal      cetirizine (ZyrTEC) 5 MG tablet Take 1 tablet (5 mg total) by mouth as needed for allergies (As needed at night p.r.n. for allergies), Starting Tue 10/25/2022, Normal      chlorhexidine (PERIDEX) 0.12 % solution Starting Thu 5/4/2023, Historical Med      Diclofenac Sodium (VOLTAREN) 1 % Apply 2 g topically 4 (four) times a day 2 g right knee 4 times per day, Starting Thu 6/8/2023, Normal      diphenhydrAMINE (BENADRYL) 50 mg capsule Take 1 capsule (50 mg total) by mouth daily at bedtime as needed for sleep, Starting Wed 8/7/2019, Normal      divalproex sodium (DEPAKOTE) 500 mg EC tablet 1 tab in the Am, 3 tabs(1500mg) at bedtime, Historical Med      docusate sodium (COLACE) 100 mg capsule Take 1 capsule (100 mg total) by mouth 2 (two) times a day as needed for constipation, Starting Wed 8/7/2019, Normal      fluticasone (FLONASE) 50 mcg/act nasal spray 2 sprays into each nostril daily, Starting Mon 7/17/2023, Normal      gabapentin (NEURONTIN) 100 mg capsule Take 200 mg by mouth 2 (two) times a day  , Starting Tue 11/29/2011, Historical Med      GNP Pink Bismuth 262 MG chewable tablet CHEW 2 TABLETS(524MG) BY MOUTH EVERY 6 HOURS AS NEEDED FOR DIARRHEA, Normal      L-Theanine 100 MG CAPS Take 200 mg by mouth 2 (two) times a day  , Historical Med      levothyroxine 100 mcg tablet TAKE 1 TABLET BY MOUTH ONCE DAILY @ 6AM(THYROID) *MICAH, Normal      !! LORazepam (ATIVAN) 0.5 mg tablet Take 0.5 mg by mouth every 12 (twelve) hours as needed for anxiety, Historical Med      !! LORazepam (ATIVAN) 1 mg tablet Take 1 mg by mouth in the morning and 1 mg in the evening and 1 mg before bedtime. , Historical Med      !! loxapine (LOXITANE) 25 mg capsule TAKE TWO (2) CAPSULES BY MOUTH EVERY MORNING AND SIX (6) CAPSULES AT BEDTIME, Starting Tue 12/15/2020, Historical Med      !! loxapine (LOXITANE) 50 MG capsule Starting Fri 8/11/2023, Historical Med      Menthol, Topical Analgesic, (Bengay Ultra Strength) 5 % PTCH Apply 1 patch topically in the morning Apply 1 patch daily to lower back area., Starting Tue 9/5/2023, Normal      Menthol-Methyl Salicylate (MURIEL AGUDELO GREASELESS) 10-15 % greaseless cream Apply topically daily at bedtime, Starting Mon 7/26/2021, Normal      !!  Multiple Vitamin (MULTIVITAMIN ADULT PO) Take by mouth daily, Historical Med      PHENobarbital 64.8 mg tablet Take 1 tablet (64.8 mg total) by mouth every 12 (twelve) hours at 8 AM and 8 PM (SEIZURES), Starting Thu 10/5/2023, Normal      polyethylene glycol (GLYCOLAX) 17 GM/SCOOP MIX 1 CAPFUL(17GM) IN 8OZ OF LIQUID AND DRINK DAILY @ 8AM(CONSTIPATION) *Nazario OBRIEN      Saphris 10 MG SL tablet Place 1 tablet (10 mg total) under the tongue 2 (two) times a day, Starting Mon 2/14/2022, Normal      senna-docusate sodium (SENOKOT-S) 8.6-50 mg per tablet Take 1 tablet by mouth daily, Historical Med      sodium chloride (OCEAN) 0.65 % nasal spray 1 spray into each nostril as needed for congestion TAKE UP TO 6 TIMES DAILY AS NEEDED, Starting Tue 1/10/2023, Normal      !! Thera (THERA/BETA-CAROTENE) TAKE 1 TABLET BY MOUTH ONCE DAILY @ 8AM,(SUPPLEMENT) *BRIEMAD, Normal      traZODone (DESYREL) 150 mg tablet Take 1 tablet by mouth daily at bedtime , Starting Mon 6/5/2017, Historical Med      trihexyphenidyl (ARTANE) 5 mg tablet Take 5 mg by mouth 3 (three) times a day with meals, Historical Med       !! - Potential duplicate medications found. Please discuss with provider. No discharge procedures on file. PDMP Review         Value Time User    PDMP Reviewed  Yes 10/5/2023 10:46 AM Bulmaro Robles MD             ED Provider  Attending physically available and evaluated Gayleen Folds. I managed the patient along with the ED Attending.     Electronically Signed by           Yunior Rasmussen MD  10/13/23 8692

## 2023-10-14 NOTE — DISCHARGE INSTRUCTIONS
You were seen in the ED for vomiting. Return to the ED for any worsening symptoms or new symptoms. Follow up with your primary care doctor as soon as possible.

## 2023-10-16 DIAGNOSIS — R05.1 ACUTE COUGH: ICD-10-CM

## 2023-10-16 RX ORDER — BENZONATATE 200 MG/1
200 CAPSULE ORAL 3 TIMES DAILY PRN
Qty: 30 CAPSULE | Refills: 0 | Status: SHIPPED | OUTPATIENT
Start: 2023-10-16

## 2023-10-31 DIAGNOSIS — E78.2 MIXED HYPERLIPIDEMIA: ICD-10-CM

## 2023-10-31 DIAGNOSIS — M81.0 OSTEOPOROSIS, UNSPECIFIED OSTEOPOROSIS TYPE, UNSPECIFIED PATHOLOGICAL FRACTURE PRESENCE: ICD-10-CM

## 2023-10-31 DIAGNOSIS — E03.9 ACQUIRED HYPOTHYROIDISM: ICD-10-CM

## 2023-10-31 RX ORDER — MULTIVITAMIN,THERAPEUTIC
1 TABLET ORAL DAILY
Qty: 30 TABLET | Refills: 5 | Status: SHIPPED | OUTPATIENT
Start: 2023-10-31

## 2023-10-31 RX ORDER — ALENDRONATE SODIUM 70 MG/1
TABLET ORAL
Qty: 4 TABLET | Refills: 5 | Status: SHIPPED | OUTPATIENT
Start: 2023-10-31

## 2023-10-31 RX ORDER — ATORVASTATIN CALCIUM 20 MG/1
20 TABLET, FILM COATED ORAL DAILY
Qty: 30 TABLET | Refills: 5 | Status: SHIPPED | OUTPATIENT
Start: 2023-10-31

## 2023-10-31 RX ORDER — LEVOTHYROXINE SODIUM 0.1 MG/1
100 TABLET ORAL DAILY
Qty: 30 TABLET | Refills: 5 | Status: SHIPPED | OUTPATIENT
Start: 2023-10-31

## 2023-11-05 NOTE — ED ATTENDING ATTESTATION
10/13/2023  IHerrera MD, saw and evaluated the patient. I have discussed the patient with the resident/non-physician practitioner and agree with the resident's/non-physician practitioner's findings, Plan of Care, and MDM as documented in the resident's/non-physician practitioner's note, except where noted. All available labs and Radiology studies were reviewed. I was present for key portions of any procedure(s) performed by the resident/non-physician practitioner and I was immediately available to provide assistance. At this point I agree with the current assessment done in the Emergency Department. I have conducted an independent evaluation of this patient a history and physical is as follows:    ED Course     Patient presents for evaluation after having several episodes of vomiting after choking on rice today. She reports having some shortness of breath and being treated with steroids and nebs by EMS. Currently symptoms have resolved. No additional complaints. Exam: AAOX3, NAD, CTA. A/P: Choking episode. Offered blood work and CXR but patient refused. Would like to be discharged.      Critical Care Time  Procedures

## 2023-11-15 DIAGNOSIS — J30.2 SEASONAL ALLERGIES: ICD-10-CM

## 2023-11-15 DIAGNOSIS — R05.1 ACUTE COUGH: ICD-10-CM

## 2023-11-15 RX ORDER — CETIRIZINE HYDROCHLORIDE 5 MG/1
TABLET ORAL
Qty: 30 TABLET | Refills: 0 | Status: SHIPPED | OUTPATIENT
Start: 2023-11-15

## 2023-11-15 RX ORDER — BENZONATATE 200 MG/1
CAPSULE ORAL
Qty: 30 CAPSULE | Refills: 0 | Status: SHIPPED | OUTPATIENT
Start: 2023-11-15

## 2023-11-30 ENCOUNTER — TELEPHONE (OUTPATIENT)
Age: 68
End: 2023-11-30

## 2023-11-30 NOTE — TELEPHONE ENCOUNTER
Patient lives in a group home and they called and said patient has been complaining and crying of back pain, hasn't been sleeping at night and they are asking if we are able to order an xray to have it checked without having to come in for an appointment?      Please advise

## 2023-12-01 NOTE — TELEPHONE ENCOUNTER
Patient needs to be seen this could be a lot of things, I would recommend if she is having that much pain that she is seen  ASAP.

## 2023-12-08 ENCOUNTER — RA CDI HCC (OUTPATIENT)
Dept: OTHER | Facility: HOSPITAL | Age: 68
End: 2023-12-08

## 2023-12-18 ENCOUNTER — OFFICE VISIT (OUTPATIENT)
Dept: INTERNAL MEDICINE CLINIC | Age: 68
End: 2023-12-18
Payer: MEDICARE

## 2023-12-18 VITALS
DIASTOLIC BLOOD PRESSURE: 72 MMHG | WEIGHT: 191 LBS | OXYGEN SATURATION: 97 % | HEART RATE: 81 BPM | TEMPERATURE: 97.3 F | BODY MASS INDEX: 32.61 KG/M2 | HEIGHT: 64 IN | SYSTOLIC BLOOD PRESSURE: 116 MMHG

## 2023-12-18 DIAGNOSIS — M54.50 CHRONIC MIDLINE LOW BACK PAIN WITHOUT SCIATICA: ICD-10-CM

## 2023-12-18 DIAGNOSIS — Z23 ENCOUNTER FOR IMMUNIZATION: Primary | ICD-10-CM

## 2023-12-18 DIAGNOSIS — F31.77 BIPOLAR DISORDER, IN PARTIAL REMISSION, MOST RECENT EPISODE MIXED (HCC): ICD-10-CM

## 2023-12-18 DIAGNOSIS — F25.9 SCHIZOAFFECTIVE DISORDER, UNSPECIFIED TYPE (HCC): ICD-10-CM

## 2023-12-18 DIAGNOSIS — G89.29 CHRONIC MIDLINE LOW BACK PAIN WITHOUT SCIATICA: ICD-10-CM

## 2023-12-18 DIAGNOSIS — I10 BENIGN ESSENTIAL HYPERTENSION: ICD-10-CM

## 2023-12-18 DIAGNOSIS — Z23 NEED FOR INFLUENZA VACCINATION: ICD-10-CM

## 2023-12-18 DIAGNOSIS — E66.01 OBESITY, MORBID (HCC): ICD-10-CM

## 2023-12-18 DIAGNOSIS — E03.9 ACQUIRED HYPOTHYROIDISM: ICD-10-CM

## 2023-12-18 DIAGNOSIS — E78.2 MIXED HYPERLIPIDEMIA: ICD-10-CM

## 2023-12-18 DIAGNOSIS — G40.909 SEIZURE DISORDER (HCC): ICD-10-CM

## 2023-12-18 PROCEDURE — 90662 IIV NO PRSV INCREASED AG IM: CPT

## 2023-12-18 PROCEDURE — G0008 ADMIN INFLUENZA VIRUS VAC: HCPCS

## 2023-12-18 PROCEDURE — 99214 OFFICE O/P EST MOD 30 MIN: CPT | Performed by: INTERNAL MEDICINE

## 2023-12-18 RX ORDER — BENZONATATE 200 MG/1
CAPSULE ORAL
Qty: 30 CAPSULE | Refills: 0 | Status: CANCELLED | OUTPATIENT
Start: 2023-12-18

## 2023-12-18 NOTE — ASSESSMENT & PLAN NOTE
Please let patient know or leave message that the rf was sent, thanks Presently under the care of chiropractor.  And he requested x-ray lumbar spine.    I will request x-ray lumbar spine.  Continue with chiropractor.

## 2023-12-18 NOTE — PROGRESS NOTES
Assessment/Plan:    Hypothyroidism  Continue with present dose of levothyroxine.    Benign essential hypertension  Blood pressure is stable on present regimen.    Seizure disorder (HCC)  Continue with present regimen.  Also managed by neurologist    Psychosis, bipolar affective (HCC)  Managed by psychiatrist    Schizoaffective disorder (HCC)  Managed by psychiatrist    Chronic midline low back pain without sciatica  Presently under the care of chiropractor.  And he requested x-ray lumbar spine.    I will request x-ray lumbar spine.  Continue with chiropractor.       Diagnoses and all orders for this visit:    Encounter for immunization  -     influenza vaccine, high-dose, PF 0.7 mL (FLUZONE HIGH-DOSE)    Need for influenza vaccination  -     influenza vaccine, high-dose, PF 0.7 mL (FLUZONE HIGH-DOSE)    Acquired hypothyroidism    Benign essential hypertension  -     CBC; Future  -     Comprehensive metabolic panel; Future    Seizure disorder (HCC)    Mixed hyperlipidemia    Schizoaffective disorder, unspecified type (HCC)    Bipolar disorder, in partial remission, most recent episode mixed (HCC)    Obesity, morbid (HCC)    Chronic midline low back pain without sciatica  -     XR spine lumbar minimum 4 views non injury; Future               Subjective:          Patient ID: Laine Tse is a 68 y.o. female.    Patient is complaining of lower back pain over the last few months.  Presently she is under the care of chiropractor and he suggested to have x-ray lumbar spine done.  She denied any radiation to the lower extremity    Back Pain  Pertinent negatives include no abdominal pain, chest pain, dysuria, fever, headaches or weakness.       The following portions of the patient's history were reviewed and updated as appropriate: allergies, current medications, past family history, past medical history, past social history, past surgical history, and problem list.    Review of Systems   Constitutional:  Negative for  fatigue and fever.   HENT:  Negative for congestion, ear discharge, ear pain, postnasal drip, sinus pressure, sore throat, tinnitus and trouble swallowing.    Eyes:  Negative for discharge, itching and visual disturbance.   Respiratory:  Negative for cough and shortness of breath.    Cardiovascular:  Negative for chest pain and palpitations.   Gastrointestinal:  Negative for abdominal pain, diarrhea, nausea and vomiting.   Endocrine: Negative for cold intolerance and polyuria.   Genitourinary:  Negative for difficulty urinating, dysuria and urgency.   Musculoskeletal:  Positive for back pain. Negative for arthralgias and neck pain.   Skin:  Negative for rash.   Allergic/Immunologic: Negative for environmental allergies.   Neurological:  Negative for dizziness, weakness and headaches.   Psychiatric/Behavioral:  Positive for confusion. Negative for agitation and behavioral problems. The patient is not nervous/anxious.          Past Medical History:   Diagnosis Date    Anxiety     Benign essential hypertension     resolved; 06/15/16    Depression     Depression with anxiety     Fracture of fifth metatarsal bone of right foot with delayed healing     last assessed 04/12/16; fracture of metatarsal bone, right, closed, initial encounter    Hyperlipidemia     last assessed 11/28/17    Hypothyroidism     last assessed 11/28/2017    Insomnia     Obesity     Schizoaffective disorder (HCC)     last assessed 05/18/2017    Seizure disorder ([facility])     last assessed 03/28/17    Seizures ([facility])          Current Outpatient Medications:     acetaminophen (Acetaminophen Extra Strength) 500 mg tablet, Take 2 tablets (1,000 mg total) by mouth 3 (three) times a day as needed for mild pain or fever, Disp: 180 tablet, Rfl: 1    alendronate (FOSAMAX) 70 mg tablet, TAKE 1 TABLET BY MOUTH WEEKLY ON FRIDAYS @ 8AM (OSTEOPORPSIS), Disp: 4 tablet, Rfl: 5    aspirin 81 mg chewable tablet, Chew 1 tablet (81 mg total) daily, Disp: 90 tablet, Rfl: 1     atorvastatin (LIPITOR) 20 mg tablet, Take 1 tablet (20 mg total) by mouth daily, Disp: 30 tablet, Rfl: 5    benzonatate (TESSALON) 200 MG capsule, TAKE 1 CAPSULE BY MOUTH 3 TIMES DAILY AS NEEDED FOR COUGH*AHMAD, Disp: 30 capsule, Rfl: 0    busPIRone (BUSPAR) 15 mg tablet, Take 15 mg by mouth 2 (two) times a day , Disp: , Rfl:     calcium carbonate-vitamin D (OSCAL-D) 500 mg-200 units per tablet, Take 1 tablet by mouth 2 (two) times a day with meals, Disp: 180 tablet, Rfl: 1    cetirizine (ZyrTEC) 5 MG tablet, TAKE 1 TABLET BY MOUTH AT BEDTIME AS NEEDED FOR ALLERGIES *AHMAD, Disp: 30 tablet, Rfl: 0    chlorhexidine (PERIDEX) 0.12 % solution, , Disp: , Rfl:     dextromethorphan-guaiFENesin (ROBITUSSIN DM)  mg/5 mL syrup, Take 5 mL by mouth 3 (three) times a day as needed for cough, Disp: 100 mL, Rfl: 0    Diclofenac Sodium (VOLTAREN) 1 %, Apply 2 g topically 4 (four) times a day 2 g right knee 4 times per day, Disp: 100 g, Rfl: 5    diphenhydrAMINE (BENADRYL) 50 mg capsule, Take 1 capsule (50 mg total) by mouth daily at bedtime as needed for sleep, Disp: 90 capsule, Rfl: 2    divalproex sodium (DEPAKOTE) 500 mg EC tablet, 3 tabs(1500mg) at bedtime, Disp: , Rfl:     docusate sodium (COLACE) 100 mg capsule, Take 1 capsule (100 mg total) by mouth 2 (two) times a day as needed for constipation, Disp: 180 capsule, Rfl: 1    fluticasone (FLONASE) 50 mcg/act nasal spray, 2 sprays into each nostril daily, Disp: 16 g, Rfl: 3    gabapentin (NEURONTIN) 100 mg capsule, Take 200 mg by mouth 2 (two) times a day  , Disp: , Rfl:     GNP Pink Bismuth 262 MG chewable tablet, CHEW 2 TABLETS(524MG) BY MOUTH EVERY 6 HOURS AS NEEDED FOR DIARRHEA, Disp: 120 tablet, Rfl: 0    L-Theanine 100 MG CAPS, Take 200 mg by mouth 2 (two) times a day  , Disp: , Rfl:     levothyroxine 100 mcg tablet, Take 1 tablet (100 mcg total) by mouth daily One daily at 6 AM- thyroid, Disp: 30 tablet, Rfl: 5    LORazepam (ATIVAN) 0.5 mg tablet, Take 0.5 mg by  mouth every 12 (twelve) hours as needed for anxiety, Disp: , Rfl:     LORazepam (ATIVAN) 1 mg tablet, Take 1 mg by mouth in the morning and 1 mg in the evening and 1 mg before bedtime., Disp: , Rfl:     loxapine (LOXITANE) 25 mg capsule, TAKE TWO (2) CAPSULES BY MOUTH EVERY MORNING AND SIX (6) CAPSULES AT BEDTIME, Disp: , Rfl:     loxapine (LOXITANE) 50 MG capsule, Take 75 mg by mouth daily at bedtime, Disp: , Rfl:     Menthol, Topical Analgesic, (Bengay Ultra Strength) 5 % PTCH, Apply 1 patch topically in the morning Apply 1 patch daily to lower back area., Disp: 30 patch, Rfl: 3    Menthol-Methyl Salicylate (MURIEL AGUDELO GREASELESS) 10-15 % greaseless cream, Apply topically daily at bedtime, Disp: 30 g, Rfl: 3    Multiple Vitamin (MULTIVITAMIN ADULT PO), Take by mouth daily, Disp: , Rfl:     PHENobarbital 64.8 mg tablet, Take 1 tablet (64.8 mg total) by mouth every 12 (twelve) hours at 8 AM and 8 PM (SEIZURES), Disp: 62 tablet, Rfl: 5    polyethylene glycol (GLYCOLAX) 17 GM/SCOOP, MIX 1 CAPFUL(17GM) IN 8OZ OF LIQUID AND DRINK DAILY @ 8AM(CONSTIPATION) *MICAH, Disp: 510 g, Rfl: 5    Saphris 10 MG SL tablet, Place 1 tablet (10 mg total) under the tongue 2 (two) times a day, Disp: 60 tablet, Rfl: 1    senna-docusate sodium (SENOKOT-S) 8.6-50 mg per tablet, Take 1 tablet by mouth daily, Disp: , Rfl:     sodium chloride (OCEAN) 0.65 % nasal spray, 1 spray into each nostril as needed for congestion TAKE UP TO 6 TIMES DAILY AS NEEDED, Disp: 30 mL, Rfl: 3    Thera (THERA/BETA-CAROTENE), Take 1 tablet by mouth daily, Disp: 30 tablet, Rfl: 5    traZODone (DESYREL) 150 mg tablet, Take 1 tablet by mouth daily at bedtime , Disp: , Rfl:     trihexyphenidyl (ARTANE) 5 mg tablet, Take 5 mg by mouth 3 (three) times a day with meals, Disp: , Rfl:     Calcium Carb-Cholecalciferol (calcium carbonate-vitamin D) 500 mg-5 mcg tablet, Take 1 tablet by mouth 2 (two) times a day, Disp: 60 tablet, Rfl: 5  No current facility-administered  "medications for this visit.    Facility-Administered Medications Ordered in Other Visits:     lactated ringers infusion, , Intravenous, Continuous PRN, Celi Preston, CRNA, New Bag at 02/08/23 1400    Allergies   Allergen Reactions    Clozapine Other (See Comments)     low white blood count  Other reaction(s): Other (See Comments)  low white blood count    Haloperidol Other (See Comments)     EPS  Other reaction(s): Other (See Comments)  EPS    Lithium Other (See Comments)     Toxicity    Prolixin [Fluphenazine] Hyperactivity and Other (See Comments)     EPS, Hyperactivity  EPS, Hyperactivity    Valproic Acid Confusion and Other (See Comments)     \"Mental confusion\"  \"Mental confusion\"       Social History   Past Surgical History:   Procedure Laterality Date    COLONOSCOPY      complete    IA COLONOSCOPY FLX DX W/COLLJ SPEC WHEN PFRMD N/A 8/30/2018    Procedure: COLONOSCOPY with polypectomies;  Surgeon: Andriy Lopez MD;  Location: [facility];  Service: Gastroenterology     Family History   Problem Relation Age of Onset    No Known Problems Mother     No Known Problems Father     Lung disease Other         mesothelioma    No Known Problems Maternal Grandmother     No Known Problems Maternal Grandfather     No Known Problems Paternal Grandmother     No Known Problems Paternal Grandfather     No Known Problems Sister     No Known Problems Sister     Kidney failure Brother     No Known Problems Maternal Aunt     No Known Problems Maternal Aunt     No Known Problems Maternal Aunt     No Known Problems Maternal Aunt     No Known Problems Paternal Aunt     No Known Problems Paternal Aunt        Objective:  /72 (BP Location: Left arm, Patient Position: Sitting, Cuff Size: Large)   Pulse 81   Temp (!) 97.3 °F (36.3 °C) (Temporal)   Ht 5' 4\" (1.626 m)   Wt 86.6 kg (191 lb)   SpO2 97% Comment: room air  BMI 32.79 kg/m²   Body mass index is 32.79 kg/m².     Physical Exam  Constitutional:       General: She " is not in acute distress.     Appearance: She is well-developed.   HENT:      Head: Normocephalic.      Right Ear: External ear normal.      Left Ear: External ear normal.      Nose: No rhinorrhea.      Mouth/Throat:      Pharynx: No posterior oropharyngeal erythema.   Eyes:      General: No scleral icterus.     Pupils: Pupils are equal, round, and reactive to light.   Neck:      Thyroid: No thyromegaly.      Trachea: No tracheal deviation.   Cardiovascular:      Rate and Rhythm: Normal rate and regular rhythm.      Heart sounds: Normal heart sounds. No murmur heard.  Pulmonary:      Effort: Pulmonary effort is normal. No respiratory distress.      Breath sounds: Normal breath sounds.   Chest:      Chest wall: No tenderness.   Abdominal:      General: Bowel sounds are normal.      Palpations: Abdomen is soft. There is no mass.      Tenderness: There is no abdominal tenderness.   Musculoskeletal:         General: Normal range of motion.      Cervical back: Normal range of motion and neck supple. No rigidity.      Right lower leg: No edema.      Left lower leg: No edema.   Lymphadenopathy:      Cervical: No cervical adenopathy.   Skin:     General: Skin is warm.      Findings: No erythema or rash.   Neurological:      Mental Status: She is alert.      Cranial Nerves: No cranial nerve deficit.

## 2023-12-22 DIAGNOSIS — E87.1 HYPONATREMIA: Primary | ICD-10-CM

## 2023-12-29 DIAGNOSIS — R94.31 T WAVE INVERSION ON ELECTROCARDIOGRAM: ICD-10-CM

## 2023-12-29 DIAGNOSIS — R94.31 ABNORMAL ECG: ICD-10-CM

## 2023-12-29 RX ORDER — ASPIRIN 81 MG/1
81 TABLET, CHEWABLE ORAL DAILY
Qty: 90 TABLET | Refills: 1 | Status: SHIPPED | OUTPATIENT
Start: 2023-12-29

## 2024-01-04 ENCOUNTER — TELEPHONE (OUTPATIENT)
Dept: INTERNAL MEDICINE CLINIC | Age: 69
End: 2024-01-04

## 2024-01-04 DIAGNOSIS — G89.29 CHRONIC MIDLINE LOW BACK PAIN WITHOUT SCIATICA: ICD-10-CM

## 2024-01-04 DIAGNOSIS — M54.50 CHRONIC MIDLINE LOW BACK PAIN WITHOUT SCIATICA: ICD-10-CM

## 2024-01-04 RX ORDER — MENTHOL 1.4 %
1 ADHESIVE PATCH, MEDICATED TOPICAL DAILY
Qty: 30 PATCH | Refills: 3 | Status: SHIPPED | OUTPATIENT
Start: 2024-01-04

## 2024-01-10 ENCOUNTER — OFFICE VISIT (OUTPATIENT)
Dept: INTERNAL MEDICINE CLINIC | Age: 69
End: 2024-01-10
Payer: MEDICARE

## 2024-01-10 VITALS
SYSTOLIC BLOOD PRESSURE: 118 MMHG | WEIGHT: 189 LBS | HEIGHT: 64 IN | TEMPERATURE: 98.5 F | HEART RATE: 88 BPM | OXYGEN SATURATION: 94 % | BODY MASS INDEX: 32.27 KG/M2 | DIASTOLIC BLOOD PRESSURE: 72 MMHG

## 2024-01-10 DIAGNOSIS — G40.909 SEIZURE DISORDER (HCC): ICD-10-CM

## 2024-01-10 DIAGNOSIS — E78.2 MIXED HYPERLIPIDEMIA: ICD-10-CM

## 2024-01-10 DIAGNOSIS — G89.29 CHRONIC MIDLINE LOW BACK PAIN WITHOUT SCIATICA: ICD-10-CM

## 2024-01-10 DIAGNOSIS — F25.9 SCHIZOAFFECTIVE DISORDER, UNSPECIFIED TYPE (HCC): ICD-10-CM

## 2024-01-10 DIAGNOSIS — E03.9 ACQUIRED HYPOTHYROIDISM: ICD-10-CM

## 2024-01-10 DIAGNOSIS — M54.50 CHRONIC MIDLINE LOW BACK PAIN WITHOUT SCIATICA: ICD-10-CM

## 2024-01-10 DIAGNOSIS — L23.89 ALLERGIC CONTACT DERMATITIS DUE TO OTHER AGENTS: Primary | ICD-10-CM

## 2024-01-10 PROCEDURE — 99214 OFFICE O/P EST MOD 30 MIN: CPT | Performed by: STUDENT IN AN ORGANIZED HEALTH CARE EDUCATION/TRAINING PROGRAM

## 2024-01-10 RX ORDER — TRIAMCINOLONE ACETONIDE 5 MG/G
CREAM TOPICAL 3 TIMES DAILY
Qty: 15 G | Refills: 0 | Status: SHIPPED | OUTPATIENT
Start: 2024-01-10 | End: 2024-01-20

## 2024-01-10 RX ORDER — LIDOCAINE 40 MG/G
CREAM TOPICAL AS NEEDED
Qty: 28 G | Refills: 5 | Status: CANCELLED | OUTPATIENT
Start: 2024-01-10

## 2024-01-10 RX ORDER — LIDOCAINE 40 MG/G
CREAM TOPICAL AS NEEDED
COMMUNITY
End: 2024-01-10 | Stop reason: ALTCHOICE

## 2024-01-10 RX ORDER — LIDOCAINE 4 G/G
1 PATCH TOPICAL DAILY PRN
Qty: 30 PATCH | Refills: 2 | Status: SHIPPED | OUTPATIENT
Start: 2024-01-10 | End: 2024-02-09

## 2024-01-10 RX ORDER — TRAZODONE HYDROCHLORIDE 100 MG/1
200 TABLET ORAL
COMMUNITY
Start: 2023-12-20

## 2024-01-10 NOTE — PROGRESS NOTES
"[facility]  INTERNAL MEDICINE OFFICE VISIT     PATIENT INFORMATION     Laine Tse   68 y.o. female   MRN: [id number]    ASSESSMENT/PLAN     Diagnoses and all orders for this visit:    Allergic contact dermatitis due to other agents  Red rash appeared around patient's neck after she wore a \"cheap\" necklace today.  No fever, chills.  Rash is slightly tender to touch but no rash seen on shoulders.  No concern for anaphylaxis.    Triamcinolone cream 3 times daily for 7 to 10 days  Advised patient to avoid wearing necklaces    -     triamcinolone (KENALOG) 0.5 % cream; Apply topically 3 (three) times a day for 10 days    Chronic midline low back pain without sciatica  Refilled lidocaine patch at patient's request  Continue Voltaren gel    -     Lidocaine (HM Lidocaine Patch) 4 % PTCH; Apply 1 patch topically daily as needed (back pain)    Acquired hypothyroidism  TSH 0.8 in October 2023.    Continue levothyroxine 100 mcg  Repeat CBC, TSH    -     CBC and differential; Future  -     TSH, 3rd generation with Free T4 reflex; Future    Mixed hyperlipidemia  Lipid panel 10/27/2023: , , HDL 76, LDL 93.    Continue atorvastatin 20 mg  Repeat lipid panel, CMP    -     Comprehensive metabolic panel; Future  -     Lipid panel; Future    Schizoaffective disorder, unspecified type (HCC)  Managed by psychiatry    Seizure disorder (HCC)  Managed by neurology    Other orders  -     traZODone (DESYREL) 100 mg tablet; Take 200 mg by mouth daily at bedtime  -     Discontinue: lidocaine (LMX) 4 % cream; Apply topically as needed for mild pain        Follow-up in 4 months or sooner if needed.    HEALTH MAINTENANCE     Immunization History   Administered Date(s) Administered    COVID-19 MODERNA VACC 0.5 ML IM 02/12/2021, 03/12/2021    H1N1, All Formulations 01/07/2010    INFLUENZA 09/18/2012, 10/07/2013, 10/12/2016, 10/23/2017    Influenza Quadrivalent Preservative Free 3 years and older IM 10/23/2017    Influenza " "Quadrivalent, 6-35 Months IM 10/12/2016    Influenza, high dose seasonal 0.7 mL 10/15/2018, 11/13/2019, 10/02/2020, 11/16/2021, 12/18/2023    Influenza, seasonal, injectable 10/16/2014    Influenza, seasonal, injectable, preservative free 10/21/2013    Pneumococcal Conjugate 13-Valent 03/20/2019    Pneumococcal Polysaccharide PPV23 07/26/2021    Td (adult), adsorbed 11/28/2017    Tuberculin Skin Test-PPD Intradermal 08/08/2018, 08/11/2020     CHIEF COMPLAINT     Chief Complaint   Patient presents with    Follow-up     4 m f/u visit for hypothyroidism. Review labs from 12/21/23.  Mammogram is scheduled 2/9/24.    Rash     She c/o a red, itchy rash on her neck after wearing a gold plated necklace.  She has a scab on her left knee.      HISTORY OF PRESENT ILLNESS     Laine Tse is a 68 y.o. female with a history of HT, hypothyroidism, HLD, prediabetes, schizoaffective disorder, seizure here for follow up of chronic conditions.  Patient was on 7 days of antibiotic for possible skin infection of her right cheek.  Patient notes redness and itching around her neck after she wore a \"cheap\" necklace.  Has chronic back pain that she uses topical lidocaine and Voltaren gel.  Accompanied by .    REVIEW OF SYSTEMS     Review of Systems   Constitutional:  Negative for chills and fever.   HENT:  Positive for congestion. Negative for sore throat.    Respiratory:  Negative for cough and shortness of breath.    Cardiovascular:  Negative for chest pain.   Gastrointestinal:  Negative for abdominal pain, diarrhea, nausea and vomiting.   Genitourinary:  Negative for difficulty urinating and dysuria.   Musculoskeletal:  Positive for back pain.   Skin:  Positive for rash.   Neurological:  Negative for weakness, light-headedness, numbness and headaches.     OBJECTIVE     Vitals:    01/10/24 1534   BP: 118/72   BP Location: Left arm   Patient Position: Sitting   Cuff Size: Standard   Pulse: 88   Temp: 98.5 °F (36.9 °C) " "  TempSrc: Temporal   SpO2: 94%   Weight: 85.7 kg (189 lb)   Height: 5' 4\" (1.626 m)     Physical Exam  Vitals reviewed.   Constitutional:       General: She is not in acute distress.  HENT:      Head: Normocephalic and atraumatic.      Nose: No rhinorrhea.   Eyes:      General: No scleral icterus.  Cardiovascular:      Rate and Rhythm: Normal rate and regular rhythm.      Heart sounds: Normal heart sounds. No murmur heard.  Pulmonary:      Effort: Pulmonary effort is normal. No respiratory distress.      Breath sounds: Normal breath sounds. No wheezing, rhonchi or rales.   Musculoskeletal:      Right lower leg: Edema (Trace) present.      Left lower leg: No edema.   Skin:     General: Skin is warm and dry.      Coloration: Skin is not jaundiced.      Findings: Erythema (Bright red rash in a circular distribution around her neck that is slightly tender to touch) present.   Neurological:      Mental Status: She is alert.      Cranial Nerves: No dysarthria.      Comments: CN 2-12 grossly intact, moves all 4 extremities   Psychiatric:         Mood and Affect: Mood normal.         Behavior: Behavior normal.       CURRENT MEDICATIONS     Current Outpatient Medications:     acetaminophen (Acetaminophen Extra Strength) 500 mg tablet, Take 2 tablets (1,000 mg total) by mouth 3 (three) times a day as needed for mild pain or fever, Disp: 180 tablet, Rfl: 1    alendronate (FOSAMAX) 70 mg tablet, TAKE 1 TABLET BY MOUTH WEEKLY ON FRIDAYS @ 8AM (OSTEOPORPSIS), Disp: 4 tablet, Rfl: 5    aspirin 81 mg chewable tablet, Chew 1 tablet (81 mg total) daily, Disp: 90 tablet, Rfl: 1    atorvastatin (LIPITOR) 20 mg tablet, Take 1 tablet (20 mg total) by mouth daily, Disp: 30 tablet, Rfl: 5    benzonatate (TESSALON) 200 MG capsule, TAKE 1 CAPSULE BY MOUTH 3 TIMES DAILY AS NEEDED FOR COUGH*AHMAD, Disp: 30 capsule, Rfl: 0    busPIRone (BUSPAR) 15 mg tablet, Take 15 mg by mouth 2 (two) times a day , Disp: , Rfl:     calcium carbonate-vitamin D " (OSCAL-D) 500 mg-200 units per tablet, Take 1 tablet by mouth 2 (two) times a day with meals, Disp: 180 tablet, Rfl: 1    cetirizine (ZyrTEC) 5 MG tablet, TAKE 1 TABLET BY MOUTH AT BEDTIME AS NEEDED FOR ALLERGIES *AHMAD, Disp: 30 tablet, Rfl: 0    chlorhexidine (PERIDEX) 0.12 % solution, 1 tbsp by mouth 2 times daily, swish and spit, Disp: , Rfl:     dextromethorphan-guaiFENesin (ROBITUSSIN DM)  mg/5 mL syrup, Take 5 mL by mouth 3 (three) times a day as needed for cough, Disp: 100 mL, Rfl: 0    Diclofenac Sodium (VOLTAREN) 1 %, Apply 2 g topically 4 (four) times a day 2 g right knee 4 times per day, Disp: 100 g, Rfl: 5    diphenhydrAMINE (BENADRYL) 50 mg capsule, Take 1 capsule (50 mg total) by mouth daily at bedtime as needed for sleep, Disp: 90 capsule, Rfl: 2    divalproex sodium (DEPAKOTE) 500 mg EC tablet, 3 tabs(1500mg) at bedtime, Disp: , Rfl:     docusate sodium (COLACE) 100 mg capsule, Take 1 capsule (100 mg total) by mouth 2 (two) times a day as needed for constipation, Disp: 180 capsule, Rfl: 1    fluticasone (FLONASE) 50 mcg/act nasal spray, 2 sprays into each nostril daily, Disp: 16 g, Rfl: 3    gabapentin (NEURONTIN) 100 mg capsule, Take 200 mg by mouth 2 (two) times a day  , Disp: , Rfl:     GNP Pink Bismuth 262 MG chewable tablet, CHEW 2 TABLETS(524MG) BY MOUTH EVERY 6 HOURS AS NEEDED FOR DIARRHEA, Disp: 120 tablet, Rfl: 0    L-Theanine 100 MG CAPS, Take 200 mg by mouth 2 (two) times a day  , Disp: , Rfl:     levothyroxine 100 mcg tablet, Take 1 tablet (100 mcg total) by mouth daily One daily at 6 AM- thyroid, Disp: 30 tablet, Rfl: 5    lidocaine (LMX) 4 % cream, Apply topically as needed for mild pain, Disp: , Rfl:     LORazepam (ATIVAN) 0.5 mg tablet, Take 0.5 mg by mouth every 12 (twelve) hours as needed for anxiety, Disp: , Rfl:     LORazepam (ATIVAN) 1 mg tablet, Take 1 mg by mouth in the morning and 1 mg in the evening and 1 mg before bedtime., Disp: , Rfl:     loxapine (LOXITANE) 50 MG  capsule, Take 75 mg by mouth daily at bedtime, Disp: , Rfl:     PHENobarbital 64.8 mg tablet, Take 1 tablet (64.8 mg total) by mouth every 12 (twelve) hours at 8 AM and 8 PM (SEIZURES), Disp: 62 tablet, Rfl: 5    polyethylene glycol (GLYCOLAX) 17 GM/SCOOP, MIX 1 CAPFUL(17GM) IN 8OZ OF LIQUID AND DRINK DAILY @ 8AM(CONSTIPATION) *MICAH, Disp: 510 g, Rfl: 5    Saphris 10 MG SL tablet, Place 1 tablet (10 mg total) under the tongue 2 (two) times a day, Disp: 60 tablet, Rfl: 1    senna-docusate sodium (SENOKOT-S) 8.6-50 mg per tablet, Take 1 tablet by mouth daily, Disp: , Rfl:     sodium chloride (OCEAN) 0.65 % nasal spray, 1 spray into each nostril as needed for congestion TAKE UP TO 6 TIMES DAILY AS NEEDED, Disp: 30 mL, Rfl: 3    Thera (THERA/BETA-CAROTENE), Take 1 tablet by mouth daily, Disp: 30 tablet, Rfl: 5    traZODone (DESYREL) 100 mg tablet, Take 200 mg by mouth daily at bedtime, Disp: , Rfl:     trihexyphenidyl (ARTANE) 5 mg tablet, Take 5 mg by mouth 3 (three) times a day with meals, Disp: , Rfl:     Calcium Carb-Cholecalciferol (calcium carbonate-vitamin D) 500 mg-5 mcg tablet, Take 1 tablet by mouth 2 (two) times a day, Disp: 60 tablet, Rfl: 5    loxapine (LOXITANE) 25 mg capsule, TAKE TWO (2) CAPSULES BY MOUTH EVERY MORNING AND SIX (6) CAPSULES AT BEDTIME, Disp: , Rfl:     Menthol, Topical Analgesic, (Bengay Ultra Strength) 5 % PTCH, Apply 1 patch topically in the morning Apply 1 patch daily to lower back area., Disp: 30 patch, Rfl: 3    Menthol-Methyl Salicylate (MURIEL AGUDELO GREASELESS) 10-15 % greaseless cream, Apply topically daily at bedtime, Disp: 30 g, Rfl: 3    Multiple Vitamin (MULTIVITAMIN ADULT PO), Take by mouth daily, Disp: , Rfl:   No current facility-administered medications for this visit.    Facility-Administered Medications Ordered in Other Visits:     lactated ringers infusion, , Intravenous, Continuous PRN, Celi Preston CRNA, New Bag at 02/08/23 1400    PAST MEDICAL & SURGICAL  HISTORY     Past Medical History:   Diagnosis Date    Anxiety     Benign essential hypertension     resolved; 06/15/16    Depression     Depression with anxiety     Fracture of fifth metatarsal bone of right foot with delayed healing     last assessed 16; fracture of metatarsal bone, right, closed, initial encounter    Hyperlipidemia     last assessed 17    Hypothyroidism     last assessed 2017    Insomnia     Obesity     Schizoaffective disorder (HCC)     last assessed 2017    Seizure disorder (HCC)     last assessed 17    Seizures (HCC)      Past Surgical History:   Procedure Laterality Date    COLONOSCOPY      complete    [place] COLONOSCOPY FLX DX W/COLLJ SPEC WHEN PFRMD N/A 2018    Procedure: COLONOSCOPY with polypectomies;  Surgeon: Andriy Lopez MD;  Location: [facility];  Service: Gastroenterology     SOCIAL & FAMILY HISTORY     Social History     Socioeconomic History    Marital status: /Civil Union     Spouse name: Not on file    Number of children: Not on file    Years of education: Not on file    Highest education level: Not on file   Occupational History    Not on file   Tobacco Use    Smoking status: Former     Current packs/day: 0.00     Average packs/day: 0.5 packs/day for 21.0 years (10.5 ttl pk-yrs)     Types: Cigarettes     Start date:      Quit date:      Years since quittin.0    Smokeless tobacco: Never   Vaping Use    Vaping status: Never Used   Substance and Sexual Activity    Alcohol use: No    Drug use: No    Sexual activity: Never   Other Topics Concern    Not on file   Social History Narrative    Not on file     Social Determinants of Health     Financial Resource Strain: Low Risk  (2023)    Overall Financial Resource Strain (CARDIA)     Difficulty of Paying Living Expenses: Not hard at all   Food Insecurity: Not on file   Transportation Needs: No Transportation Needs (2023)    PRAPARE - Transportation     Lack of Transportation  (Medical): No     Lack of Transportation (Non-Medical): No   Physical Activity: Not on file   Stress: Not on file   Social Connections: Not on file   Intimate Partner Violence: Not on file   Housing Stability: Not on file     Social History     Substance and Sexual Activity   Alcohol Use No       Social History     Substance and Sexual Activity   Drug Use No     Social History     Tobacco Use   Smoking Status Former    Current packs/day: 0.00    Average packs/day: 0.5 packs/day for 21.0 years (10.5 ttl pk-yrs)    Types: Cigarettes    Start date:     Quit date:     Years since quittin.0   Smokeless Tobacco Never     Family History   Problem Relation Age of Onset    No Known Problems Mother     No Known Problems Father     Lung disease Other         mesothelioma    No Known Problems Maternal Grandmother     No Known Problems Maternal Grandfather     No Known Problems Paternal Grandmother     No Known Problems Paternal Grandfather     No Known Problems Sister     No Known Problems Sister     Kidney failure Brother     No Known Problems Maternal Aunt     No Known Problems Maternal Aunt     No Known Problems Maternal Aunt     No Known Problems Maternal Aunt     No Known Problems Paternal Aunt     No Known Problems Paternal Aunt          ==  Imani Willoughby DO  Internal Medicine Resident, PGY-3  [facility] Internal Medicine Residency

## 2024-02-16 DIAGNOSIS — M25.561 ACUTE PAIN OF RIGHT KNEE: ICD-10-CM

## 2024-02-19 RX ORDER — ACETAMINOPHEN 500 MG
1000 TABLET ORAL 3 TIMES DAILY PRN
Qty: 180 TABLET | Refills: 1 | Status: SHIPPED | OUTPATIENT
Start: 2024-02-19

## 2024-02-26 ENCOUNTER — APPOINTMENT (OUTPATIENT)
Dept: RADIOLOGY | Age: 69
End: 2024-02-26
Payer: MEDICARE

## 2024-02-26 ENCOUNTER — OFFICE VISIT (OUTPATIENT)
Dept: INTERNAL MEDICINE CLINIC | Age: 69
End: 2024-02-26
Payer: MEDICARE

## 2024-02-26 VITALS
TEMPERATURE: 97.5 F | OXYGEN SATURATION: 98 % | SYSTOLIC BLOOD PRESSURE: 118 MMHG | HEIGHT: 64 IN | WEIGHT: 188 LBS | BODY MASS INDEX: 32.1 KG/M2 | HEART RATE: 76 BPM | DIASTOLIC BLOOD PRESSURE: 72 MMHG

## 2024-02-26 DIAGNOSIS — M54.50 CHRONIC MIDLINE LOW BACK PAIN WITHOUT SCIATICA: ICD-10-CM

## 2024-02-26 DIAGNOSIS — G89.29 CHRONIC MIDLINE LOW BACK PAIN WITHOUT SCIATICA: ICD-10-CM

## 2024-02-26 DIAGNOSIS — L85.3 DRY SKIN: ICD-10-CM

## 2024-02-26 DIAGNOSIS — Z12.31 ENCOUNTER FOR SCREENING MAMMOGRAM FOR MALIGNANT NEOPLASM OF BREAST: ICD-10-CM

## 2024-02-26 DIAGNOSIS — R05.9 COUGH: ICD-10-CM

## 2024-02-26 DIAGNOSIS — L98.9 SKIN LESION OF LEFT LOWER EXTREMITY: Primary | ICD-10-CM

## 2024-02-26 PROBLEM — E66.811 CLASS 1 OBESITY WITH BODY MASS INDEX (BMI) OF 32.0 TO 32.9 IN ADULT: Status: ACTIVE | Noted: 2021-03-17

## 2024-02-26 PROBLEM — E66.9 CLASS 1 OBESITY WITH BODY MASS INDEX (BMI) OF 32.0 TO 32.9 IN ADULT: Status: ACTIVE | Noted: 2021-03-17

## 2024-02-26 PROCEDURE — 99213 OFFICE O/P EST LOW 20 MIN: CPT

## 2024-02-26 PROCEDURE — 72110 X-RAY EXAM L-2 SPINE 4/>VWS: CPT

## 2024-02-26 PROCEDURE — G2211 COMPLEX E/M VISIT ADD ON: HCPCS

## 2024-02-26 RX ORDER — VIT E ACET/GLY/DIMETH/WATER
LOTION (ML) TOPICAL AS NEEDED
Qty: 237 ML | Refills: 0 | Status: SHIPPED | OUTPATIENT
Start: 2024-02-26

## 2024-02-26 RX ORDER — LOXAPINE SUCCINATE 25 MG/1
25 TABLET ORAL
COMMUNITY

## 2024-02-26 RX ORDER — GUAIFENESIN/DEXTROMETHORPHAN 100-10MG/5
5 SYRUP ORAL 2 TIMES DAILY PRN
Qty: 100 ML | Refills: 0 | Status: SHIPPED | OUTPATIENT
Start: 2024-02-26

## 2024-02-26 NOTE — ASSESSMENT & PLAN NOTE
Continued chronic low back pain  Currently seeing chiropractor who requested lumbar x-ray  Lumbar x-ray was ordered in December 2023 by Dr. Kothari, patient's PCP  Patient has not had x-ray completed at this time  Recommend that patient has x-ray  Continue Tylenol as needed for pain

## 2024-02-26 NOTE — ASSESSMENT & PLAN NOTE
Robitussin DM to be refilled if patient is experiencing a cough related to upper respiratory type symptoms  Caregiver is noting coughing after eating.  Patient advised to eat slowly, chew food well, take pauses between bites.  Also advised adequate hydration  Lungs clear to auscultation today  Call if symptoms worsen

## 2024-02-26 NOTE — PROGRESS NOTES
Assessment/Plan:    1. Skin lesion of left lower extremity  Assessment & Plan:  Skin lesion noted to the lower left extremity  Not erythematous, draining, painful to palpation today  No signs of acute infection  Patient was also seen in June 2023 for the same lesion  At that time she was recommended to proceed to dermatology for removal.  Patient does not want to have it removed.  Referral is still in chart  Recommend that patient that patient should schedule an appointment if she would like to have the lesion removed  Red flag symptoms include purulent drainage, erythema surrounding site, increased tenderness or warmth in which case she should call office      2. Cough  Assessment & Plan:  Robitussin DM to be refilled if patient is experiencing a cough related to upper respiratory type symptoms  Caregiver is noting coughing after eating.  Patient advised to eat slowly, chew food well, take pauses between bites.  Also advised adequate hydration  Lungs clear to auscultation today  Call if symptoms worsen    Orders:  -     dextromethorphan-guaiFENesin (ROBITUSSIN DM)  mg/5 mL syrup; Take 5 mL by mouth 2 (two) times a day as needed for cough    3. Dry skin  -     cetaphil (CETAPHIL) lotion; Apply topically as needed for dry skin    4. Chronic midline low back pain without sciatica  Assessment & Plan:  Continued chronic low back pain  Currently seeing chiropractor who requested lumbar x-ray  Lumbar x-ray was ordered in December 2023 by Dr. Kothari, patient's PCP  Patient has not had x-ray completed at this time  Recommend that patient has x-ray  Continue Tylenol as needed for pain      5. Encounter for screening mammogram for malignant neoplasm of breast  Comments:  Missed her last appointment.  Will resend order  Orders:  -     Mammo screening bilateral w 3d & cad; Future       Dermatology referral already in patient's chart.  Red flag symptoms given       [employer] software was used to dictate this note.  It may  "contain errors with dictating incorrect words or incorrect spelling. Please contact the provider directly with any questions.    Subjective:      Patient ID: Laine Tse is a 68 y.o. female.    Patient is a 68-year-old female presenting to the office for multiple complaints. She presents with her caregiver today, Benjamin.  Patient does reside in a group home    Lesion on left knee  She was also seen in June 2023 for lesion on her left knee, where she was recommended to see dermatology to have it removed.  She did not do this at that time  She notes she kneels on the floor at lot and her bathroom is \"dirty \"  Denies increase in size, warmth, drainage, fevers, chills  Patient strongly stating that she does not want to have it excised that she is worried about pain    She is also noting chronic low back pain  She is currently seeing a chiropractor  Chiropractor did request x-ray of lumbar spine, this order was placed in December 2023, however never completed  Patient stated the \"[facility] does not want me to get them\"  She is currently taking Tylenol as needed for pain    Chronic coughing  Patient has a history of chronic cough, requesting Robitussin DM  She is also taking Flonase and Zyrtec  Denies any upper respiratory symptoms today  Caregiver notes that this is often after eating, as patient eats food too quickly          The following portions of the patient's history were reviewed and updated as appropriate: allergies, current medications, past family history, past medical history, past social history, past surgical history, and problem list.    Review of Systems   Constitutional:  Negative for chills, fatigue and fever.   HENT:  Negative for congestion, ear pain, postnasal drip, rhinorrhea, sinus pressure, sore throat and trouble swallowing.    Eyes:  Negative for pain and visual disturbance.   Respiratory:  Positive for cough. Negative for shortness of breath and wheezing.    Cardiovascular:  Negative for " chest pain, palpitations and leg swelling.   Gastrointestinal:  Negative for abdominal pain, nausea and vomiting.   Musculoskeletal:  Positive for back pain. Negative for arthralgias.   Skin:  Positive for wound.   Neurological:  Negative for dizziness, weakness, light-headedness, numbness and headaches.   All other systems reviewed and are negative.        Past Medical History:   Diagnosis Date    Anxiety     Benign essential hypertension     resolved; 06/15/16    Depression     Depression with anxiety     Fracture of fifth metatarsal bone of right foot with delayed healing     last assessed 04/12/16; fracture of metatarsal bone, right, closed, initial encounter    Hyperlipidemia     last assessed 11/28/17    Hypothyroidism     last assessed 11/28/2017    Insomnia     Obesity     Schizoaffective disorder (HCC)     last assessed 05/18/2017    Seizure disorder ([facility])     last assessed 03/28/17    Seizures ([facility])          Current Outpatient Medications:     acetaminophen (TYLENOL) 500 mg tablet, Take 2 tablets (1,000 mg total) by mouth 3 (three) times a day as needed for mild pain or fever, Disp: 180 tablet, Rfl: 1    alendronate (FOSAMAX) 70 mg tablet, TAKE 1 TABLET BY MOUTH WEEKLY ON FRIDAYS @ 8AM (OSTEOPORPSIS), Disp: 4 tablet, Rfl: 5    aspirin 81 mg chewable tablet, Chew 1 tablet (81 mg total) daily, Disp: 90 tablet, Rfl: 1    atorvastatin (LIPITOR) 20 mg tablet, Take 1 tablet (20 mg total) by mouth daily, Disp: 30 tablet, Rfl: 5    benzonatate (TESSALON) 200 MG capsule, TAKE 1 CAPSULE BY MOUTH 3 TIMES DAILY AS NEEDED FOR COUGH*AHMAD, Disp: 30 capsule, Rfl: 0    busPIRone (BUSPAR) 15 mg tablet, Take 15 mg by mouth 2 (two) times a day , Disp: , Rfl:     calcium carbonate-vitamin D (OSCAL-D) 500 mg-200 units per tablet, Take 1 tablet by mouth 2 (two) times a day with meals, Disp: 180 tablet, Rfl: 1    cetaphil (CETAPHIL) lotion, Apply topically as needed for dry skin, Disp: 237 mL, Rfl: 0    cetirizine (ZyrTEC) 5 MG  tablet, TAKE 1 TABLET BY MOUTH AT BEDTIME AS NEEDED FOR ALLERGIES *AHMAD, Disp: 30 tablet, Rfl: 0    chlorhexidine (PERIDEX) 0.12 % solution, 1 tbsp by mouth 2 times daily, swish and spit, Disp: , Rfl:     dextromethorphan-guaiFENesin (ROBITUSSIN DM)  mg/5 mL syrup, Take 5 mL by mouth 2 (two) times a day as needed for cough, Disp: 100 mL, Rfl: 0    Diclofenac Sodium (VOLTAREN) 1 %, Apply 2 g topically 4 (four) times a day 2 g right knee 4 times per day, Disp: 100 g, Rfl: 5    diphenhydrAMINE (BENADRYL) 50 mg capsule, Take 1 capsule (50 mg total) by mouth daily at bedtime as needed for sleep, Disp: 90 capsule, Rfl: 2    divalproex sodium (DEPAKOTE) 500 mg EC tablet, 3 tabs(1500mg) at bedtime, Disp: , Rfl:     docusate sodium (COLACE) 100 mg capsule, Take 1 capsule (100 mg total) by mouth 2 (two) times a day as needed for constipation, Disp: 180 capsule, Rfl: 1    fluticasone (FLONASE) 50 mcg/act nasal spray, 2 sprays into each nostril daily, Disp: 16 g, Rfl: 3    gabapentin (NEURONTIN) 100 mg capsule, Take 200 mg by mouth 2 (two) times a day  , Disp: , Rfl:     GNP Pink Bismuth 262 MG chewable tablet, CHEW 2 TABLETS(524MG) BY MOUTH EVERY 6 HOURS AS NEEDED FOR DIARRHEA, Disp: 120 tablet, Rfl: 0    L-Theanine 100 MG CAPS, Take 200 mg by mouth 2 (two) times a day  , Disp: , Rfl:     levothyroxine 100 mcg tablet, Take 1 tablet (100 mcg total) by mouth daily One daily at 6 AM- thyroid, Disp: 30 tablet, Rfl: 5    Lidocaine (HM Lidocaine Patch) 4 % PTCH, Apply 1 patch topically daily as needed (back pain), Disp: 30 patch, Rfl: 2    LORazepam (ATIVAN) 0.5 mg tablet, Take 0.5 mg by mouth every 12 (twelve) hours as needed for anxiety, Disp: , Rfl:     LORazepam (ATIVAN) 1 mg tablet, Take 1 mg by mouth in the morning and 1 mg in the evening and 1 mg before bedtime., Disp: , Rfl:     loxapine (LOXITANE) 25 mg capsule, Take 25 mg by mouth daily at bedtime Take with 50 mg dose = 75 mg, Disp: , Rfl:     loxapine (LOXITANE) 50  "MG capsule, Take 75 mg by mouth daily at bedtime, Disp: , Rfl:     PHENobarbital 64.8 mg tablet, Take 1 tablet (64.8 mg total) by mouth every 12 (twelve) hours at 8 AM and 8 PM (SEIZURES), Disp: 62 tablet, Rfl: 5    polyethylene glycol (GLYCOLAX) 17 GM/SCOOP, MIX 1 CAPFUL(17GM) IN 8OZ OF LIQUID AND DRINK DAILY @ 8AM(CONSTIPATION) *FRANCOD, Disp: 510 g, Rfl: 5    Saphris 10 MG SL tablet, Place 1 tablet (10 mg total) under the tongue 2 (two) times a day, Disp: 60 tablet, Rfl: 1    senna-docusate sodium (SENOKOT-S) 8.6-50 mg per tablet, Take 1 tablet by mouth daily, Disp: , Rfl:     sodium chloride (OCEAN) 0.65 % nasal spray, 1 spray into each nostril as needed for congestion TAKE UP TO 6 TIMES DAILY AS NEEDED, Disp: 30 mL, Rfl: 3    Thera (THERA/BETA-CAROTENE), Take 1 tablet by mouth daily, Disp: 30 tablet, Rfl: 5    traZODone (DESYREL) 100 mg tablet, Take 200 mg by mouth daily at bedtime, Disp: , Rfl:     triamcinolone (KENALOG) 0.5 % cream, Apply topically 3 (three) times a day for 10 days, Disp: 15 g, Rfl: 0    trihexyphenidyl (ARTANE) 5 mg tablet, Take 5 mg by mouth 3 (three) times a day with meals, Disp: , Rfl:   No current facility-administered medications for this visit.    Facility-Administered Medications Ordered in Other Visits:     lactated ringers infusion, , Intravenous, Continuous PRN, Celi Preston, CRNA, New Bag at 02/08/23 1400    Allergies   Allergen Reactions    Clozapine Other (See Comments)     low white blood count  Other reaction(s): Other (See Comments)  low white blood count    Haloperidol Other (See Comments)     EPS  Other reaction(s): Other (See Comments)  EPS    Lithium Other (See Comments)     Toxicity    Prolixin [Fluphenazine] Hyperactivity and Other (See Comments)     EPS, Hyperactivity  EPS, Hyperactivity    Valproic Acid Confusion and Other (See Comments)     \"Mental confusion\"  \"Mental confusion\"       Social History   Past Surgical History:   Procedure Laterality Date    " "COLONOSCOPY      complete    CO COLONOSCOPY FLX DX W/COLLJ SPEC WHEN PFRMD N/A 8/30/2018    Procedure: COLONOSCOPY with polypectomies;  Surgeon: Andriy Lopez MD;  Location: [facility];  Service: Gastroenterology     Family History   Problem Relation Age of Onset    No Known Problems Mother     No Known Problems Father     Lung disease Other         mesothelioma    No Known Problems Maternal Grandmother     No Known Problems Maternal Grandfather     No Known Problems Paternal Grandmother     No Known Problems Paternal Grandfather     No Known Problems Sister     No Known Problems Sister     Kidney failure Brother     No Known Problems Maternal Aunt     No Known Problems Maternal Aunt     No Known Problems Maternal Aunt     No Known Problems Maternal Aunt     No Known Problems Paternal Aunt     No Known Problems Paternal Aunt        Objective:  /72 (BP Location: Left arm, Patient Position: Sitting, Cuff Size: Large)   Pulse 76   Temp 97.5 °F (36.4 °C) (Temporal)   Ht 5' 4\" (1.626 m)   Wt 85.3 kg (188 lb)   SpO2 98%   BMI 32.27 kg/m²      Physical Exam  Vitals and nursing note reviewed.   Constitutional:       General: She is not in acute distress.     Appearance: Normal appearance. She is not ill-appearing.   HENT:      Head: Normocephalic and atraumatic.      Right Ear: External ear normal.      Left Ear: External ear normal.      Nose: Nose normal.      Mouth/Throat:      Mouth: Mucous membranes are moist.      Pharynx: Oropharynx is clear.   Eyes:      General: No scleral icterus.        Right eye: No discharge.         Left eye: No discharge.      Conjunctiva/sclera: Conjunctivae normal.      Pupils: Pupils are equal, round, and reactive to light.   Cardiovascular:      Rate and Rhythm: Normal rate and regular rhythm.      Heart sounds: Normal heart sounds. No murmur heard.     No friction rub. No gallop.   Pulmonary:      Effort: Pulmonary effort is normal. No respiratory distress.      Breath sounds: " Normal breath sounds. No wheezing, rhonchi or rales.      Comments: Of note, patient did not cough during exam  Chest:      Chest wall: No tenderness.   Skin:     General: Skin is warm and dry.      Comments: Lesion as pictured below  No drainage, warmth, erythema, tenderness   Neurological:      Mental Status: She is alert. Mental status is at baseline.   Psychiatric:         Behavior: Behavior normal.

## 2024-02-26 NOTE — ASSESSMENT & PLAN NOTE
Skin lesion noted to the lower left extremity  Not erythematous, draining, painful to palpation today  No signs of acute infection  Patient was also seen in June 2023 for the same lesion  At that time she was recommended to proceed to dermatology for removal.  Patient does not want to have it removed.  Referral is still in chart  Recommend that patient that patient should schedule an appointment if she would like to have the lesion removed  Red flag symptoms include purulent drainage, erythema surrounding site, increased tenderness or warmth in which case she should call office

## 2024-03-04 DIAGNOSIS — R05.1 ACUTE COUGH: ICD-10-CM

## 2024-03-04 DIAGNOSIS — J30.2 SEASONAL ALLERGIES: ICD-10-CM

## 2024-03-04 RX ORDER — FLUTICASONE PROPIONATE 50 MCG
SPRAY, SUSPENSION (ML) NASAL
Qty: 16 G | Refills: 3 | Status: SHIPPED | OUTPATIENT
Start: 2024-03-04

## 2024-03-05 RX ORDER — BENZONATATE 200 MG/1
CAPSULE ORAL
Qty: 30 CAPSULE | Refills: 0 | Status: SHIPPED | OUTPATIENT
Start: 2024-03-05

## 2024-03-28 ENCOUNTER — OFFICE VISIT (OUTPATIENT)
Dept: DERMATOLOGY | Facility: CLINIC | Age: 69
End: 2024-03-28
Payer: MEDICARE

## 2024-03-28 VITALS — TEMPERATURE: 97.5 F | WEIGHT: 185 LBS | HEIGHT: 64 IN | BODY MASS INDEX: 31.58 KG/M2

## 2024-03-28 DIAGNOSIS — B07.9 VERRUCA VULGARIS: Primary | ICD-10-CM

## 2024-03-28 PROCEDURE — 88305 TISSUE EXAM BY PATHOLOGIST: CPT | Performed by: STUDENT IN AN ORGANIZED HEALTH CARE EDUCATION/TRAINING PROGRAM

## 2024-03-28 PROCEDURE — 11102 TANGNTL BX SKIN SINGLE LES: CPT | Performed by: DERMATOLOGY

## 2024-03-28 PROCEDURE — 88312 SPECIAL STAINS GROUP 1: CPT | Performed by: STUDENT IN AN ORGANIZED HEALTH CARE EDUCATION/TRAINING PROGRAM

## 2024-03-28 PROCEDURE — 99204 OFFICE O/P NEW MOD 45 MIN: CPT | Performed by: DERMATOLOGY

## 2024-03-28 PROCEDURE — 88342 IMHCHEM/IMCYTCHM 1ST ANTB: CPT | Performed by: STUDENT IN AN ORGANIZED HEALTH CARE EDUCATION/TRAINING PROGRAM

## 2024-03-28 NOTE — PATIENT INSTRUCTIONS
"    VERRUCA VULGARIS (\"Common Warts\")      Assessment and Plan:  Based on a thorough discussion of this condition and the management approach to it (including a comprehensive discussion of the known risks, side effects and potential benefits of treatment), the patient (family) agrees to implement the following specific plan:  PROCEDURE TANGENTIAL (SHAVE) BIOPSY NOTE:        INFORMED CONSENT DISCUSSION AND POST-OPERATIVE INSTRUCTIONS FOR PATIENT    I.  RATIONALE FOR PROCEDURE  I understand that a skin biopsy allows the Dermatologist to test a lesion or rash under the microscope to obtain a diagnosis.  It usually involves numbing the area with numbing medication and removing a small piece of skin; sometimes the area will be closed with sutures. In this specific procedure, sutures are not usually needed.  If any sutures are placed, then they are usually need to be removed in 2 weeks or less.    I understand that my Dermatologist recommends that a skin \"shave\" biopsy be performed today.  A local anesthetic, similar to the kind that a dentist uses when filling a cavity, will be injected with a very small needle into the skin area to be sampled.  The injected skin and tissue underneath \"will go to sleep” and become numb so no pain should be felt afterwards.  An instrument shaped like a tiny \"razor blade\" (shave biopsy instrument) will be used to cut a small piece of tissue and skin from the area so that a sample of tissue can be taken and examined more closely under the microscope.  A slight amount of bleeding will occur, but it will be stopped with direct pressure and a pressure bandage and any other appropriate methods.  I understands that a scar will form where the wound was created.  Surgical ointment will be applied to help protect the wound.  Sutures are not usually needed.    II.  RISKS AND POTENTIAL COMPLICATIONS   I understand the risks and potential complications of a skin biopsy include but are not limited to " "the following:  Bleeding  Infection  Pain  Scar/keloid  Skin discoloration  Incomplete Removal  Recurrence  Nerve Damage/Numbness/Loss of Function  Allergic Reaction to Anesthesia  Biopsies are diagnostic procedures and based on findings additional treatment or evaluation may be required  Loss or destruction of specimen resulting in no additional findings    My Dermatologist has explained to me the nature of the condition, the nature of the procedure, and the benefits to be reasonably expected compared with alternative approaches.  My Dermatologist has discussed the likelihood of major risks or complications of this procedure including the specific risks listed above, such as bleeding, infection, and scarring/keloid.  I understand that a scar is expected after this procedure.  I understand that my physician cannot predict if the scar will form a \"keloid,\" which extends beyond the borders of the wound that is created.  A keloid is a thick, painful, and bumpy scar.  A keloid can be difficult to treat, as it does not always respond well to therapy, which includes injecting cortisone directly into the keloid every few weeks.  While this usually reduces the pain and size of the scar, it does not eliminate it.      I understand that photographs may be taken before and after the procedure.  These will be maintained as part of the medical providers confidential records and may not be made available to me.  I further authorize the medical provider to use the photographs for teaching purposes or to illustrate scientific papers, books, or lectures if in his/her judgment, medical research, education, or science may benefit from its use.    I have had an opportunity to fully inquire about the risks and benefits of this procedure and its alternatives.   I have been given ample time and opportunity to ask questions and to seek a second opinion if I wished to do so.  I acknowledge that there have specifically been no guarantees as " "to the cosmetic results from the procedure.  I am aware that with any procedure there is always the possibility of an unexpected complication.    III. POST-PROCEDURAL CARE (WHAT YOU WILL NEED TO DO \"AFTER THE BIOPSY\" TO OPTIMIZE HEALING)    Keep the area clean and dry.  Try NOT to remove the bandage or get it wet for the first 24 hours.    Gently clean the area and apply surgical ointment (such as Vaseline petrolatum ointment, which is available \"over the counter\" and not a prescription) to the biopsy site for up to 2 weeks straight.  This acts to protect the wound from the outside world.      Sutures are not usually placed in this procedure.  If any sutures were placed, return for suture removal as instructed (generally 1 week for the face, 2 weeks for the body).      Take Acetaminophen (Tylenol) for discomfort, if no contraindications.  Ibuprofen or aspirin could make bleeding worse.    Call our office immediately for signs of infection: fever, chills, increased redness, warmth, tenderness, discomfort/pain, or pus or foul smell coming from the wound.    WHAT TO DO IF THERE IS ANY BLEEDING?  If a small amount of bleeding is noticed, place a clean cloth over the area and apply firm pressure for ten minutes.  Check the wound after 10 minutes of direct pressure.  If bleeding persists, try one more time for an additional 10 minutes of direct pressure on the area.  If the bleeding becomes heavier or does not stop after the second attempt, or if you have any other questions about this procedure, then please call your [facility]'s Dermatologist by calling [phone] (SKIN).     I hereby acknowledge that I have reviewed and verified the site with my Dermatologist and have requested and authorized my Dermatologist to proceed with the procedure.            Verruca Vulgaris  A verruca is a common growth of the skin caused by infection by human papilloma virus (HPV). There are many strains of the virus that cause different " "types of warts on the body. The virus infects the most superficial layers of the skin, causing increased production of skin cells and thickening. Warts can be spread through direct contact with infected skin and may spread to other parts of the body if scratched or picked.     A verruca is more commonly called a \"wart.\" Warts are particularly common in school-aged children but can arise at any age. Patients who have a history of eczema are especially prone due to impaired skin barrier. Those taking immunosuppressive drugs or with HIV infections may experience prolonged symptoms despite treatment.     Warts generally have a rough surface with a tiny black dot sometimes observed in the middle of each scaly spot. They can range in size from a small bump to large scaly growths.  Common warts are often found on the backs of fingers or toes, around the nails, and on the knees. Plantar warts can grow inwardly on the soles of the feet causing pain.    There are many possible ways to treat warts and sometimes several different treatments are needed to get the warts to go away completely. There is no single perfect treatment for warts, and successful treatment can take many months.     In-office treatments usually require multiple visits, and include:  Cryotherapy. a cold spray with liquid nitrogen will destroy the infected cells but may lead to discomfort and blistering. It may also leave a permanent white kiersten or scar.      Electrosurgery (curettage and cautery) can be used for large resistant warts which involves shaving the growth down and burning the base. It is performed under local anesthesia and may leave a permanent scar    Candida (“yeast”) antigen injections. These are extracts of the common yeast (Candida) that cannot cause an infection. The medication is injected into/under the wart. It is thought to stimulate the immune system to recognize the wart virus and attack it. Multiple injections are often needed about " one month apart.    There are also several at-home wart treatments:    Soak the warts in warm water for 5 minutes every night followed by gentle filing with a nail file or pumice stone.    Topical salicylic acid or similar compounds work by removing the dead surface skin cells  Apply the medicine directly to the wart, wait for it to dry completely, then cover with duct tape overnight   Repeat until the wart is gone, which can take 2-4 months  Do not use on the face or groin area   If the wart paint makes the skin sore, stop treatment until the discomfort has settled, then recommence as above.  Take care to keep the chemical off normal skin.    Podophyllin is a cytotoxic agent used in some products and must not be used in pregnancy or women considering pregnancy.    Some prescription medications include   Topical retinoids (adapalene, tretinoin, tazarotene), 5-fluorouracil (Efudex) or imiquimod (Aldara) creams are sometimes used to treat flat warts or warts on the face and other sensitive anatomical areas. They are usually applied directly to the warts once a day for 2-4 months and can be irritating.  These treatments should only be used as directed by your health care provider.  Systemic treatment with oral cimetidine (Tagamet) may help boost the immune system against the wart virus in patients, some of the time.  Initiation of cimetidine therapy should ONLY be done under the supervision of your health care provider, who can discuss possible side effects and drug-to-drug interactions of this specific treatment.

## 2024-03-28 NOTE — PROGRESS NOTES
"[facility] Note     Patient Name: Laine Tse  Encounter Date: 3/28/24     Have you been cared for by a [facility] Dermatologist in the last 3 years and, if so, which description applies to you?    NO.   I am considered a \"new\" patient and must complete all patient intake questions. I am FEMALE/of child-bearing potential.    REVIEW OF SYSTEMS:  Have you recently had or currently have any of the following? Recent fever or chills? No  Any non-healing wound? No  Are you pregnant or planning to become pregnant? No  Are you currently or planning to be nursing or breast feeding? No   PAST MEDICAL HISTORY:  Have you personally ever had or currently have any of the following?  If \"YES,\" then please provide more detail. Skin cancer (such as Melanoma, Basal Cell Carcinoma, Squamous Cell Carcinoma?  No  Tuberculosis, HIV/AIDS, Hepatitis B or C: No  Radiation Treatment No   HISTORY OF IMMUNOSUPPRESSION:   Do you have a history of any of the following:  Systemic Immunosuppression such as Diabetes, Biologic or Immunotherapy, Chemotherapy, Organ Transplantation, Bone Marrow Transplantation?  No    Answering \"YES\" requires the addition of the dotphrase \"IMMUNOSUPPRESSED\" as the first diagnosis of the patient's visit.   FAMILY HISTORY:  Any \"first degree relatives\" (parent, brother, sister, or child) with the following?    Skin Cancer, Pancreatic or Other Cancer? YES, sister-lung   PATIENT EXPERIENCE:    Do you want the Dermatologist to perform a COMPLETE skin exam today including a clinical examination under the \"bra and underwear\" areas?  NO  If necessary, do we have your permission to call and leave a detailed message on your Preferred Phone number that includes your specific medical information?  Yes      Allergies   Allergen Reactions    Clozapine Other (See Comments)     low white blood count  Other reaction(s): Other (See Comments)  low white blood count    Haloperidol Other (See Comments)     " "EPS  Other reaction(s): Other (See Comments)  EPS    Lithium Other (See Comments)     Toxicity    Prolixin [Fluphenazine] Hyperactivity and Other (See Comments)     EPS, Hyperactivity  EPS, Hyperactivity    Valproic Acid Confusion and Other (See Comments)     \"Mental confusion\"  \"Mental confusion\"      Current Outpatient Medications:     acetaminophen (TYLENOL) 500 mg tablet, Take 2 tablets (1,000 mg total) by mouth 3 (three) times a day as needed for mild pain or fever, Disp: 180 tablet, Rfl: 1    alendronate (FOSAMAX) 70 mg tablet, TAKE 1 TABLET BY MOUTH WEEKLY ON FRIDAYS @ 8AM (OSTEOPORPSIS), Disp: 4 tablet, Rfl: 5    aspirin 81 mg chewable tablet, Chew 1 tablet (81 mg total) daily, Disp: 90 tablet, Rfl: 1    atorvastatin (LIPITOR) 20 mg tablet, Take 1 tablet (20 mg total) by mouth daily, Disp: 30 tablet, Rfl: 5    benzonatate (TESSALON) 200 MG capsule, TAKE 1 CAPSULE BY MOUTH 3 TIMES DAILY AS NEEDED FOR COUGH*AHMAD, Disp: 30 capsule, Rfl: 0    busPIRone (BUSPAR) 15 mg tablet, Take 15 mg by mouth 2 (two) times a day , Disp: , Rfl:     calcium carbonate-vitamin D (OSCAL-D) 500 mg-200 units per tablet, Take 1 tablet by mouth 2 (two) times a day with meals, Disp: 180 tablet, Rfl: 1    cetaphil (CETAPHIL) lotion, Apply topically as needed for dry skin, Disp: 237 mL, Rfl: 0    cetirizine (ZyrTEC) 5 MG tablet, TAKE 1 TABLET BY MOUTH AT BEDTIME AS NEEDED FOR ALLERGIES *AHMAD, Disp: 30 tablet, Rfl: 0    chlorhexidine (PERIDEX) 0.12 % solution, 1 tbsp by mouth 2 times daily, swish and spit, Disp: , Rfl:     dextromethorphan-guaiFENesin (ROBITUSSIN DM)  mg/5 mL syrup, Take 5 mL by mouth 2 (two) times a day as needed for cough, Disp: 100 mL, Rfl: 0    Diclofenac Sodium (VOLTAREN) 1 %, Apply 2 g topically 4 (four) times a day 2 g right knee 4 times per day, Disp: 100 g, Rfl: 5    diphenhydrAMINE (BENADRYL) 50 mg capsule, Take 1 capsule (50 mg total) by mouth daily at bedtime as needed for sleep, Disp: 90 capsule, Rfl: " 2    divalproex sodium (DEPAKOTE) 500 mg EC tablet, 3 tabs(1500mg) at bedtime, Disp: , Rfl:     docusate sodium (COLACE) 100 mg capsule, Take 1 capsule (100 mg total) by mouth 2 (two) times a day as needed for constipation, Disp: 180 capsule, Rfl: 1    fluticasone (FLONASE) 50 mcg/act nasal spray, INSTILL 2 SPRAYS IN EACH NOSTRIL DAILY @ 8AM(ALLERGIES) *AHMAD, Disp: 16 g, Rfl: 3    gabapentin (NEURONTIN) 100 mg capsule, Take 200 mg by mouth 2 (two) times a day  , Disp: , Rfl:     GNP Pink Bismuth 262 MG chewable tablet, CHEW 2 TABLETS(524MG) BY MOUTH EVERY 6 HOURS AS NEEDED FOR DIARRHEA, Disp: 120 tablet, Rfl: 0    L-Theanine 100 MG CAPS, Take 200 mg by mouth 2 (two) times a day  , Disp: , Rfl:     levothyroxine 100 mcg tablet, Take 1 tablet (100 mcg total) by mouth daily One daily at 6 AM- thyroid, Disp: 30 tablet, Rfl: 5    Lidocaine (HM Lidocaine Patch) 4 % PTCH, Apply 1 patch topically daily as needed (back pain), Disp: 30 patch, Rfl: 2    LORazepam (ATIVAN) 0.5 mg tablet, Take 0.5 mg by mouth every 12 (twelve) hours as needed for anxiety, Disp: , Rfl:     LORazepam (ATIVAN) 1 mg tablet, Take 1 mg by mouth in the morning and 1 mg in the evening and 1 mg before bedtime., Disp: , Rfl:     loxapine (LOXITANE) 25 mg capsule, Take 25 mg by mouth daily at bedtime Take with 50 mg dose = 75 mg, Disp: , Rfl:     loxapine (LOXITANE) 50 MG capsule, Take 75 mg by mouth daily at bedtime, Disp: , Rfl:     PHENobarbital 64.8 mg tablet, Take 1 tablet (64.8 mg total) by mouth every 12 (twelve) hours at 8 AM and 8 PM (SEIZURES), Disp: 62 tablet, Rfl: 5    polyethylene glycol (GLYCOLAX) 17 GM/SCOOP, MIX 1 CAPFUL(17GM) IN 8OZ OF LIQUID AND DRINK DAILY @ 8AM(CONSTIPATION) *AHMAD, Disp: 510 g, Rfl: 5    Saphris 10 MG SL tablet, Place 1 tablet (10 mg total) under the tongue 2 (two) times a day, Disp: 60 tablet, Rfl: 1    senna-docusate sodium (SENOKOT-S) 8.6-50 mg per tablet, Take 1 tablet by mouth daily, Disp: , Rfl:     sodium  "chloride (OCEAN) 0.65 % nasal spray, 1 spray into each nostril as needed for congestion TAKE UP TO 6 TIMES DAILY AS NEEDED, Disp: 30 mL, Rfl: 3    Thera (THERA/BETA-CAROTENE), Take 1 tablet by mouth daily, Disp: 30 tablet, Rfl: 5    traZODone (DESYREL) 100 mg tablet, Take 200 mg by mouth daily at bedtime, Disp: , Rfl:     triamcinolone (KENALOG) 0.5 % cream, Apply topically 3 (three) times a day for 10 days, Disp: 15 g, Rfl: 0    trihexyphenidyl (ARTANE) 5 mg tablet, Take 5 mg by mouth 3 (three) times a day with meals, Disp: , Rfl:   No current facility-administered medications for this visit.    Facility-Administered Medications Ordered in Other Visits:     lactated ringers infusion, , Intravenous, Continuous PRN, Celi Preston, CRNA, New Bag at 02/08/23 1400      SOC present for 4 months     Whom besides the patient is providing clinical information about today's encounter?   Caretaker provided history (patient is institutionalized)    Physical Exam and Assessment/Plan by Diagnosis:      VERRUCA VULGARIS (\"Common Warts\")    Physical Exam:  Anatomic Location Affected:  Left knee  Morphological Description:  skin colored warty papule      Additional History of Present Condition:  SOC present for 4 months on the left knee. Patient said it would bleed for her when she prays, and now she's not able to kneel anymore. States that she also picked at the site.     Assessment and Plan:  Based on a thorough discussion of this condition and the management approach to it (including a comprehensive discussion of the known risks, side effects and potential benefits of treatment), the patient (family) agrees to implement the following specific plan:  PROCEDURE TANGENTIAL (SHAVE) BIOPSY NOTE:    Performing Physician:  Dr. Araya  Anatomic Location; Clinical Description with size (cm); Pre-Op Diagnosis:   Specimen A: 68 year old female; skin; shave; left knee; 2 cm x 2 cm skin colored verrucous plaque; diff DDX: " "Verruca  Post-op diagnosis: Same     Local anesthesia: 3:1 1% xylocaine with epi and 1-100,000 buffered     Topical anesthesia: None    Hemostasis: Electrocautery       After obtaining informed consent  at which time there was a discussion about the purpose of biopsy  and low risks of infection and bleeding.  The area was prepped and draped in the usual fashion. Anesthesia was obtained with 1% lidocaine with epinephrine. A shave biopsy to an appropriate sampling depth was obtained by Shave (Dermablade or 15 blade) The resulting wound was covered with surgical ointment and bandaged appropriately.     The patient tolerated the procedure well without complications and was without signs of functional compromise.      Specimen has been sent for review by Dermatopathology.    Standard post-procedure care has been explained and has been included in written form within the patient's copy of Informed Consent.    INFORMED CONSENT DISCUSSION AND POST-OPERATIVE INSTRUCTIONS FOR PATIENT    I.  RATIONALE FOR PROCEDURE  I understand that a skin biopsy allows the Dermatologist to test a lesion or rash under the microscope to obtain a diagnosis.  It usually involves numbing the area with numbing medication and removing a small piece of skin; sometimes the area will be closed with sutures. In this specific procedure, sutures are not usually needed.  If any sutures are placed, then they are usually need to be removed in 2 weeks or less.    I understand that my Dermatologist recommends that a skin \"shave\" biopsy be performed today.  A local anesthetic, similar to the kind that a dentist uses when filling a cavity, will be injected with a very small needle into the skin area to be sampled.  The injected skin and tissue underneath \"will go to sleep” and become numb so no pain should be felt afterwards.  An instrument shaped like a tiny \"razor blade\" (shave biopsy instrument) will be used to cut a small piece of tissue and skin from the " "area so that a sample of tissue can be taken and examined more closely under the microscope.  A slight amount of bleeding will occur, but it will be stopped with direct pressure and a pressure bandage and any other appropriate methods.  I understands that a scar will form where the wound was created.  Surgical ointment will be applied to help protect the wound.  Sutures are not usually needed.    II.  RISKS AND POTENTIAL COMPLICATIONS   I understand the risks and potential complications of a skin biopsy include but are not limited to the following:  Bleeding  Infection  Pain  Scar/keloid  Skin discoloration  Incomplete Removal  Recurrence  Nerve Damage/Numbness/Loss of Function  Allergic Reaction to Anesthesia  Biopsies are diagnostic procedures and based on findings additional treatment or evaluation may be required  Loss or destruction of specimen resulting in no additional findings    My Dermatologist has explained to me the nature of the condition, the nature of the procedure, and the benefits to be reasonably expected compared with alternative approaches.  My Dermatologist has discussed the likelihood of major risks or complications of this procedure including the specific risks listed above, such as bleeding, infection, and scarring/keloid.  I understand that a scar is expected after this procedure.  I understand that my physician cannot predict if the scar will form a \"keloid,\" which extends beyond the borders of the wound that is created.  A keloid is a thick, painful, and bumpy scar.  A keloid can be difficult to treat, as it does not always respond well to therapy, which includes injecting cortisone directly into the keloid every few weeks.  While this usually reduces the pain and size of the scar, it does not eliminate it.      I understand that photographs may be taken before and after the procedure.  These will be maintained as part of the medical providers confidential records and may not be made " "available to me.  I further authorize the medical provider to use the photographs for teaching purposes or to illustrate scientific papers, books, or lectures if in his/her judgment, medical research, education, or science may benefit from its use.    I have had an opportunity to fully inquire about the risks and benefits of this procedure and its alternatives.   I have been given ample time and opportunity to ask questions and to seek a second opinion if I wished to do so.  I acknowledge that there have specifically been no guarantees as to the cosmetic results from the procedure.  I am aware that with any procedure there is always the possibility of an unexpected complication.    III. POST-PROCEDURAL CARE (WHAT YOU WILL NEED TO DO \"AFTER THE BIOPSY\" TO OPTIMIZE HEALING)    Keep the area clean and dry.  Try NOT to remove the bandage or get it wet for the first 24 hours.    Gently clean the area and apply surgical ointment (such as Vaseline petrolatum ointment, which is available \"over the counter\" and not a prescription) to the biopsy site for up to 2 weeks straight.  This acts to protect the wound from the outside world.      Sutures are not usually placed in this procedure.  If any sutures were placed, return for suture removal as instructed (generally 1 week for the face, 2 weeks for the body).      Take Acetaminophen (Tylenol) for discomfort, if no contraindications.  Ibuprofen or aspirin could make bleeding worse.    Call our office immediately for signs of infection: fever, chills, increased redness, warmth, tenderness, discomfort/pain, or pus or foul smell coming from the wound.    WHAT TO DO IF THERE IS ANY BLEEDING?  If a small amount of bleeding is noticed, place a clean cloth over the area and apply firm pressure for ten minutes.  Check the wound after 10 minutes of direct pressure.  If bleeding persists, try one more time for an additional 10 minutes of direct pressure on the area.  If the bleeding " "becomes heavier or does not stop after the second attempt, or if you have any other questions about this procedure, then please call your [facility] Dermatologist by calling [phone] (SKIN).     I hereby acknowledge that I have reviewed and verified the site with my Dermatologist and have requested and authorized my Dermatologist to proceed with the procedure.            Verruca Vulgaris  A verruca is a common growth of the skin caused by infection by human papilloma virus (HPV). There are many strains of the virus that cause different types of warts on the body. The virus infects the most superficial layers of the skin, causing increased production of skin cells and thickening. Warts can be spread through direct contact with infected skin and may spread to other parts of the body if scratched or picked.     A verruca is more commonly called a \"wart.\" Warts are particularly common in school-aged children but can arise at any age. Patients who have a history of eczema are especially prone due to impaired skin barrier. Those taking immunosuppressive drugs or with HIV infections may experience prolonged symptoms despite treatment.     Warts generally have a rough surface with a tiny black dot sometimes observed in the middle of each scaly spot. They can range in size from a small bump to large scaly growths.  Common warts are often found on the backs of fingers or toes, around the nails, and on the knees. Plantar warts can grow inwardly on the soles of the feet causing pain.    There are many possible ways to treat warts and sometimes several different treatments are needed to get the warts to go away completely. There is no single perfect treatment for warts, and successful treatment can take many months.     In-office treatments usually require multiple visits, and include:  Cryotherapy. a cold spray with liquid nitrogen will destroy the infected cells but may lead to discomfort and blistering. It may also leave " a permanent white kiersten or scar.      Electrosurgery (curettage and cautery) can be used for large resistant warts which involves shaving the growth down and burning the base. It is performed under local anesthesia and may leave a permanent scar    Candida (“yeast”) antigen injections. These are extracts of the common yeast (Candida) that cannot cause an infection. The medication is injected into/under the wart. It is thought to stimulate the immune system to recognize the wart virus and attack it. Multiple injections are often needed about one month apart.    There are also several at-home wart treatments:    Soak the warts in warm water for 5 minutes every night followed by gentle filing with a nail file or pumice stone.    Topical salicylic acid or similar compounds work by removing the dead surface skin cells  Apply the medicine directly to the wart, wait for it to dry completely, then cover with duct tape overnight   Repeat until the wart is gone, which can take 2-4 months  Do not use on the face or groin area   If the wart paint makes the skin sore, stop treatment until the discomfort has settled, then recommence as above.  Take care to keep the chemical off normal skin.    Podophyllin is a cytotoxic agent used in some products and must not be used in pregnancy or women considering pregnancy.    Some prescription medications include   Topical retinoids (adapalene, tretinoin, tazarotene), 5-fluorouracil (Efudex) or imiquimod (Aldara) creams are sometimes used to treat flat warts or warts on the face and other sensitive anatomical areas. They are usually applied directly to the warts once a day for 2-4 months and can be irritating.  These treatments should only be used as directed by your health care provider.  Systemic treatment with oral cimetidine (Tagamet) may help boost the immune system against the wart virus in patients, some of the time.  Initiation of cimetidine therapy should ONLY be done under the  supervision of your health care provider, who can discuss possible side effects and drug-to-drug interactions of this specific treatment.       Scribe Attestation      I,:  Carolynn Fleming am acting as a scribe while in the presence of the attending physician.:       I,:  Christiano Tanner MD personally performed the services described in this documentation    as scribed in my presence.:

## 2024-04-02 PROCEDURE — 88342 IMHCHEM/IMCYTCHM 1ST ANTB: CPT | Performed by: STUDENT IN AN ORGANIZED HEALTH CARE EDUCATION/TRAINING PROGRAM

## 2024-04-02 PROCEDURE — 88312 SPECIAL STAINS GROUP 1: CPT | Performed by: STUDENT IN AN ORGANIZED HEALTH CARE EDUCATION/TRAINING PROGRAM

## 2024-04-02 PROCEDURE — 88305 TISSUE EXAM BY PATHOLOGIST: CPT | Performed by: STUDENT IN AN ORGANIZED HEALTH CARE EDUCATION/TRAINING PROGRAM

## 2024-04-03 ENCOUNTER — OFFICE VISIT (OUTPATIENT)
Dept: INTERNAL MEDICINE CLINIC | Age: 69
End: 2024-04-03
Payer: MEDICARE

## 2024-04-03 VITALS
OXYGEN SATURATION: 99 % | HEART RATE: 85 BPM | TEMPERATURE: 98.5 F | BODY MASS INDEX: 30.73 KG/M2 | SYSTOLIC BLOOD PRESSURE: 118 MMHG | DIASTOLIC BLOOD PRESSURE: 74 MMHG | HEIGHT: 64 IN | WEIGHT: 180 LBS

## 2024-04-03 DIAGNOSIS — Z20.2 POSSIBLE EXPOSURE TO STI: Primary | ICD-10-CM

## 2024-04-03 DIAGNOSIS — F25.9 SCHIZOAFFECTIVE DISORDER, UNSPECIFIED TYPE (HCC): ICD-10-CM

## 2024-04-03 DIAGNOSIS — G40.909 SEIZURE DISORDER (HCC): ICD-10-CM

## 2024-04-03 DIAGNOSIS — L98.9 SKIN LESION: ICD-10-CM

## 2024-04-03 DIAGNOSIS — R30.0 DYSURIA: ICD-10-CM

## 2024-04-03 DIAGNOSIS — E78.2 MIXED HYPERLIPIDEMIA: ICD-10-CM

## 2024-04-03 DIAGNOSIS — E03.9 ACQUIRED HYPOTHYROIDISM: ICD-10-CM

## 2024-04-03 LAB
SL AMB  POCT GLUCOSE, UA: NEGATIVE
SL AMB LEUKOCYTE ESTERASE,UA: NEGATIVE
SL AMB POCT BILIRUBIN,UA: NEGATIVE
SL AMB POCT BLOOD,UA: NEGATIVE
SL AMB POCT CLARITY,UA: CLEAR
SL AMB POCT COLOR,UA: YELLOW
SL AMB POCT KETONES,UA: NEGATIVE
SL AMB POCT NITRITE,UA: NEGATIVE
SL AMB POCT PH,UA: 7
SL AMB POCT SPECIFIC GRAVITY,UA: 1.01
SL AMB POCT URINE PROTEIN: NEGATIVE
SL AMB POCT UROBILINOGEN: NORMAL

## 2024-04-03 PROCEDURE — 87591 N.GONORRHOEAE DNA AMP PROB: CPT | Performed by: PHYSICIAN ASSISTANT

## 2024-04-03 PROCEDURE — 99214 OFFICE O/P EST MOD 30 MIN: CPT | Performed by: PHYSICIAN ASSISTANT

## 2024-04-03 PROCEDURE — 87491 CHLMYD TRACH DNA AMP PROBE: CPT | Performed by: PHYSICIAN ASSISTANT

## 2024-04-03 PROCEDURE — 81003 URINALYSIS AUTO W/O SCOPE: CPT | Performed by: PHYSICIAN ASSISTANT

## 2024-04-03 RX ORDER — CASTOR OIL
OIL (ML) ORAL 2 TIMES DAILY
Qty: 100 EACH | Refills: 0 | Status: SHIPPED | OUTPATIENT
Start: 2024-04-03

## 2024-04-03 RX ORDER — PHENOBARBITAL 64.8 MG/1
TABLET ORAL
Qty: 60 TABLET | Refills: 5 | Status: SHIPPED | OUTPATIENT
Start: 2024-04-03

## 2024-04-04 LAB
C TRACH DNA SPEC QL NAA+PROBE: NEGATIVE
N GONORRHOEA DNA SPEC QL NAA+PROBE: NEGATIVE

## 2024-04-05 ENCOUNTER — APPOINTMENT (OUTPATIENT)
Dept: LAB | Age: 69
End: 2024-04-05
Payer: MEDICARE

## 2024-04-05 DIAGNOSIS — E03.9 ACQUIRED HYPOTHYROIDISM: ICD-10-CM

## 2024-04-05 DIAGNOSIS — Z20.2 POSSIBLE EXPOSURE TO STI: ICD-10-CM

## 2024-04-05 DIAGNOSIS — F25.9 SCHIZOAFFECTIVE DISORDER, UNSPECIFIED TYPE (HCC): ICD-10-CM

## 2024-04-05 DIAGNOSIS — E78.2 MIXED HYPERLIPIDEMIA: ICD-10-CM

## 2024-04-05 LAB
ALBUMIN SERPL BCP-MCNC: 4.2 G/DL (ref 3.5–5)
ALP SERPL-CCNC: 55 U/L (ref 34–104)
ALT SERPL W P-5'-P-CCNC: 20 U/L (ref 7–52)
ANION GAP SERPL CALCULATED.3IONS-SCNC: 12 MMOL/L (ref 4–13)
AST SERPL W P-5'-P-CCNC: 21 U/L (ref 13–39)
BASOPHILS # BLD AUTO: 0 THOUSANDS/ÂΜL (ref 0–0.1)
BASOPHILS NFR BLD AUTO: 0 % (ref 0–1)
BILIRUB SERPL-MCNC: 0.26 MG/DL (ref 0.2–1)
BUN SERPL-MCNC: 12 MG/DL (ref 5–25)
CALCIUM SERPL-MCNC: 9.3 MG/DL (ref 8.4–10.2)
CHLORIDE SERPL-SCNC: 99 MMOL/L (ref 96–108)
CO2 SERPL-SCNC: 27 MMOL/L (ref 21–32)
CREAT SERPL-MCNC: 0.77 MG/DL (ref 0.6–1.3)
EOSINOPHIL # BLD AUTO: 0 THOUSAND/ÂΜL (ref 0–0.61)
EOSINOPHIL NFR BLD AUTO: 0 % (ref 0–6)
ERYTHROCYTE [DISTWIDTH] IN BLOOD BY AUTOMATED COUNT: 12.3 % (ref 11.6–15.1)
GFR SERPL CREATININE-BSD FRML MDRD: 78 ML/MIN/1.73SQ M
GLUCOSE P FAST SERPL-MCNC: 103 MG/DL (ref 65–99)
HCT VFR BLD AUTO: 41.6 % (ref 34.8–46.1)
HGB BLD-MCNC: 14 G/DL (ref 11.5–15.4)
HIV 1+2 AB+HIV1 P24 AG SERPL QL IA: NORMAL
HIV 2 AB SERPL QL IA: NORMAL
HIV1 AB SERPL QL IA: NORMAL
HIV1 P24 AG SERPL QL IA: NORMAL
IMM GRANULOCYTES # BLD AUTO: 0.01 THOUSAND/UL (ref 0–0.2)
IMM GRANULOCYTES NFR BLD AUTO: 0 % (ref 0–2)
LYMPHOCYTES # BLD AUTO: 1.37 THOUSANDS/ÂΜL (ref 0.6–4.47)
LYMPHOCYTES NFR BLD AUTO: 34 % (ref 14–44)
MCH RBC QN AUTO: 33.1 PG (ref 26.8–34.3)
MCHC RBC AUTO-ENTMCNC: 33.7 G/DL (ref 31.4–37.4)
MCV RBC AUTO: 98 FL (ref 82–98)
MONOCYTES # BLD AUTO: 0.29 THOUSAND/ÂΜL (ref 0.17–1.22)
MONOCYTES NFR BLD AUTO: 7 % (ref 4–12)
NEUTROPHILS # BLD AUTO: 2.32 THOUSANDS/ÂΜL (ref 1.85–7.62)
NEUTS SEG NFR BLD AUTO: 59 % (ref 43–75)
NRBC BLD AUTO-RTO: 0 /100 WBCS
PLATELET # BLD AUTO: 253 THOUSANDS/UL (ref 149–390)
PMV BLD AUTO: 9.9 FL (ref 8.9–12.7)
POTASSIUM SERPL-SCNC: 4.1 MMOL/L (ref 3.5–5.3)
PROT SERPL-MCNC: 7.4 G/DL (ref 6.4–8.4)
RBC # BLD AUTO: 4.23 MILLION/UL (ref 3.81–5.12)
SODIUM SERPL-SCNC: 138 MMOL/L (ref 135–147)
TREPONEMA PALLIDUM IGG+IGM AB [PRESENCE] IN SERUM OR PLASMA BY IMMUNOASSAY: NORMAL
TSH SERPL DL<=0.05 MIU/L-ACNC: 1.23 UIU/ML (ref 0.45–4.5)
WBC # BLD AUTO: 3.99 THOUSAND/UL (ref 4.31–10.16)

## 2024-04-05 PROCEDURE — 80053 COMPREHEN METABOLIC PANEL: CPT

## 2024-04-05 PROCEDURE — 86780 TREPONEMA PALLIDUM: CPT

## 2024-04-05 PROCEDURE — 87389 HIV-1 AG W/HIV-1&-2 AB AG IA: CPT

## 2024-04-05 PROCEDURE — 36415 COLL VENOUS BLD VENIPUNCTURE: CPT

## 2024-04-05 PROCEDURE — 84443 ASSAY THYROID STIM HORMONE: CPT

## 2024-04-05 PROCEDURE — 85025 COMPLETE CBC W/AUTO DIFF WBC: CPT

## 2024-04-12 DIAGNOSIS — M81.0 OSTEOPOROSIS, UNSPECIFIED OSTEOPOROSIS TYPE, UNSPECIFIED PATHOLOGICAL FRACTURE PRESENCE: ICD-10-CM

## 2024-04-12 DIAGNOSIS — E78.2 MIXED HYPERLIPIDEMIA: ICD-10-CM

## 2024-04-12 DIAGNOSIS — E03.9 ACQUIRED HYPOTHYROIDISM: ICD-10-CM

## 2024-04-12 RX ORDER — B-COMPLEX WITH VITAMIN C
1 TABLET ORAL 2 TIMES DAILY WITH MEALS
Qty: 60 TABLET | Refills: 5 | Status: SHIPPED | OUTPATIENT
Start: 2024-04-12

## 2024-04-12 RX ORDER — MULTIVITAMIN,THERAPEUTIC
1 TABLET ORAL DAILY
Qty: 30 TABLET | Refills: 5 | Status: SHIPPED | OUTPATIENT
Start: 2024-04-12

## 2024-04-12 RX ORDER — LEVOTHYROXINE SODIUM 0.1 MG/1
100 TABLET ORAL DAILY
Qty: 30 TABLET | Refills: 5 | Status: SHIPPED | OUTPATIENT
Start: 2024-04-12

## 2024-04-12 RX ORDER — ALENDRONATE SODIUM 70 MG/1
TABLET ORAL
Qty: 4 TABLET | Refills: 5 | Status: SHIPPED | OUTPATIENT
Start: 2024-04-12

## 2024-04-12 RX ORDER — ATORVASTATIN CALCIUM 20 MG/1
20 TABLET, FILM COATED ORAL DAILY
Qty: 30 TABLET | Refills: 5 | Status: SHIPPED | OUTPATIENT
Start: 2024-04-12

## 2024-04-15 ENCOUNTER — TELEPHONE (OUTPATIENT)
Dept: NEUROLOGY | Facility: CLINIC | Age: 69
End: 2024-04-15

## 2024-04-17 ENCOUNTER — OFFICE VISIT (OUTPATIENT)
Dept: NEUROLOGY | Facility: CLINIC | Age: 69
End: 2024-04-17
Payer: MEDICARE

## 2024-04-17 VITALS
HEART RATE: 82 BPM | TEMPERATURE: 98.1 F | BODY MASS INDEX: 31.89 KG/M2 | SYSTOLIC BLOOD PRESSURE: 144 MMHG | WEIGHT: 186.8 LBS | HEIGHT: 64 IN | DIASTOLIC BLOOD PRESSURE: 71 MMHG

## 2024-04-17 DIAGNOSIS — G40.909 SEIZURE DISORDER (HCC): Primary | ICD-10-CM

## 2024-04-17 DIAGNOSIS — F31.77 BIPOLAR DISORDER, IN PARTIAL REMISSION, MOST RECENT EPISODE MIXED (HCC): ICD-10-CM

## 2024-04-17 PROCEDURE — 99214 OFFICE O/P EST MOD 30 MIN: CPT | Performed by: NURSE PRACTITIONER

## 2024-04-17 NOTE — PATIENT INSTRUCTIONS
- Continue current medications  - See psychiatrist this afternoon - let them know about her behaviors  - Call the office with seizures or concerns  - Have blood work in a few weeks or so to check phenobarbital level  - Follow up in 9 months with Dr Donovan
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
## 2024-04-17 NOTE — ASSESSMENT & PLAN NOTE
"Patient with unclear epilepsy syndrome maintained on phenobarbital 64.8 mg BID. Prior attempt to discontinue this medication did lead to an event concerning for seizure noted to be \"fear/panic\" which was reportedly typical of her seizures. PCP managing osteoporosis. She is also currently on depakote 1500 HS, gabapentin 200 mg BID, and lorazepam 1 mg TID + 0.5 mg PRN BID per psychiatry for her mood disorder. Has a follow up with psychiatry later today.     Patient will continue with phenobarbital 64.8 mg BID. Check level in the next few weeks. Group home will call the office with any episodes concerning for seizure. Follow up in 9 months or sooner if needed with Dr Donovan.   "

## 2024-04-17 NOTE — ASSESSMENT & PLAN NOTE
Patients mood fluctuates frequently throughout visit. Caretaker reports that there may be hallucinations but does not notice anything out of the ordinary recently. Recommended discussing her mood with psychiatry at her follow up later today.

## 2024-04-24 ENCOUNTER — TELEPHONE (OUTPATIENT)
Age: 69
End: 2024-04-24

## 2024-04-24 NOTE — TELEPHONE ENCOUNTER
James Jones from [facility] called for a form MA51 to be filled out for Laine and faxed to [phone]. Reason for form is that Laine will be needing nursing home care in a few month. If you have any questions call James Jones [phone]

## 2024-04-25 NOTE — TELEPHONE ENCOUNTER
Patient needs an appointment for MA-51 forms to be completed. They are similar to physical forms.     She has an appointment coming up on 05/13. She will address at her appointment with PCP. Appointment note updated.

## 2024-04-26 PROBLEM — R05.9 COUGH: Status: RESOLVED | Noted: 2023-06-08 | Resolved: 2024-04-26

## 2024-04-30 ENCOUNTER — TELEPHONE (OUTPATIENT)
Age: 69
End: 2024-04-30

## 2024-04-30 NOTE — TELEPHONE ENCOUNTER
Nataliya from [facility] called to check the status of the form that was faxed over for pts compression stockings. I informed him that I did not see it in the system and that it takes a few days to be uploaded. He faxed it on 4/26 and 4/29. He re-faxed it today. I did confirm that he had the correct fax number.     Thank you.

## 2024-05-10 NOTE — TELEPHONE ENCOUNTER
Forms have been received and were sent to the [facility] for Dr. Yip to sign next week when he is back in the office.

## 2024-05-10 NOTE — TELEPHONE ENCOUNTER
Nataliya from [facility] [employer], had called in regards to paperwork that has been sent over for compression stockings, and has not received any response at this moment.     Nataliya had sent these forms multiple times, and verified fax number, as well.     Please advise back to Nataliya at [phone] for any additional questions.     Fax number to fax the forms: [phone].

## 2024-05-13 ENCOUNTER — OFFICE VISIT (OUTPATIENT)
Dept: INTERNAL MEDICINE CLINIC | Age: 69
End: 2024-05-13
Payer: MEDICARE

## 2024-05-13 VITALS
DIASTOLIC BLOOD PRESSURE: 78 MMHG | OXYGEN SATURATION: 96 % | TEMPERATURE: 96.7 F | BODY MASS INDEX: 31.92 KG/M2 | HEIGHT: 64 IN | HEART RATE: 84 BPM | SYSTOLIC BLOOD PRESSURE: 124 MMHG | WEIGHT: 187 LBS

## 2024-05-13 DIAGNOSIS — E03.9 ACQUIRED HYPOTHYROIDISM: ICD-10-CM

## 2024-05-13 DIAGNOSIS — E78.2 MIXED HYPERLIPIDEMIA: ICD-10-CM

## 2024-05-13 DIAGNOSIS — F25.9 SCHIZOAFFECTIVE DISORDER, UNSPECIFIED TYPE (HCC): ICD-10-CM

## 2024-05-13 DIAGNOSIS — E66.09 CLASS 1 OBESITY DUE TO EXCESS CALORIES WITHOUT SERIOUS COMORBIDITY WITH BODY MASS INDEX (BMI) OF 32.0 TO 32.9 IN ADULT: ICD-10-CM

## 2024-05-13 DIAGNOSIS — G40.909 SEIZURE DISORDER (HCC): ICD-10-CM

## 2024-05-13 DIAGNOSIS — I10 BENIGN ESSENTIAL HYPERTENSION: Primary | ICD-10-CM

## 2024-05-13 DIAGNOSIS — R73.03 PRE-DIABETES: ICD-10-CM

## 2024-05-13 DIAGNOSIS — F31.77 BIPOLAR DISORDER, IN PARTIAL REMISSION, MOST RECENT EPISODE MIXED (HCC): ICD-10-CM

## 2024-05-13 PROCEDURE — G2211 COMPLEX E/M VISIT ADD ON: HCPCS | Performed by: INTERNAL MEDICINE

## 2024-05-13 PROCEDURE — 99214 OFFICE O/P EST MOD 30 MIN: CPT | Performed by: INTERNAL MEDICINE

## 2024-05-13 RX ORDER — DENOSUMAB 60 MG/ML
60 INJECTION SUBCUTANEOUS ONCE
COMMUNITY
Start: 2024-04-09

## 2024-05-13 NOTE — PROGRESS NOTES
Assessment/Plan:    No problem-specific Assessment & Plan notes found for this encounter.       Diagnoses and all orders for this visit:    Benign essential hypertension  -     CBC; Future  -     Comprehensive metabolic panel; Future  -     CBC  -     Comprehensive metabolic panel    Mixed hyperlipidemia  -     Lipid Panel with Direct LDL reflex; Future  -     Lipid Panel with Direct LDL reflex    Acquired hypothyroidism  -     TSH, 3rd generation with Free T4 reflex; Future  -     TSH, 3rd generation with Free T4 reflex    Other orders  -     Prolia 60 MG/ML; Inject 60 mg under the skin once               Subjective:          Patient ID: Laine Tse is a 68 y.o. female.    Patient is here for follow-up and also MA 51 form to be filled.  Also had blood work done couple of weeks ago would like to discuss results.  Otherwise no new complaints.        The following portions of the patient's history were reviewed and updated as appropriate: allergies, current medications, past family history, past medical history, past social history, past surgical history, and problem list.    Review of Systems   Constitutional:  Negative for fatigue and fever.   HENT:  Negative for congestion, ear discharge, ear pain, postnasal drip, sinus pressure, sore throat, tinnitus and trouble swallowing.    Eyes:  Negative for discharge, itching and visual disturbance.   Respiratory:  Negative for cough and shortness of breath.    Cardiovascular:  Negative for chest pain and palpitations.   Gastrointestinal:  Negative for abdominal pain, diarrhea, nausea and vomiting.   Endocrine: Negative for cold intolerance and polyuria.   Genitourinary:  Negative for difficulty urinating, dysuria and urgency.   Musculoskeletal:  Positive for arthralgias. Negative for neck pain.   Skin:  Negative for rash.   Allergic/Immunologic: Negative for environmental allergies.   Neurological:  Negative for dizziness, weakness and headaches.  "  Psychiatric/Behavioral:  Positive for hallucinations. Negative for agitation. The patient is not nervous/anxious.          Past Medical History:   Diagnosis Date    Anxiety     Benign essential hypertension     resolved; 06/15/16    Depression     Depression with anxiety     Fracture of fifth metatarsal bone of right foot with delayed healing     last assessed 04/12/16; fracture of metatarsal bone, right, closed, initial encounter    Hyperlipidemia     last assessed 11/28/17    Hypothyroidism     last assessed 11/28/2017    Insomnia     Obesity     Schizoaffective disorder (HCC)     last assessed 05/18/2017    Seizure disorder ([facility])     last assessed 03/28/17    Seizures ([facility])          Current Outpatient Medications:     Adhesive Bandages (Bandages Fabric Strips 3/4\") MISC, Use 2 (two) times a day, Disp: 100 each, Rfl: 0    alendronate (FOSAMAX) 70 mg tablet, TAKE 1 TABLET BY MOUTH WEEKLY ON FRIDAYS @ 8AM (OSTEOPORPSIS), Disp: 4 tablet, Rfl: 5    aspirin 81 mg chewable tablet, Chew 1 tablet (81 mg total) daily, Disp: 90 tablet, Rfl: 1    atorvastatin (LIPITOR) 20 mg tablet, Take 1 tablet (20 mg total) by mouth daily, Disp: 30 tablet, Rfl: 5    bismuth subsalicylate (GNP Pink Bismuth) 262 MG chewable tablet, CHEW 2 TABLETS(524MG) BY MOUTH EVERY 6 HOURS AS NEEDED FOR DIARRHEA *AHMAD, Disp: 120 tablet, Rfl: 1    busPIRone (BUSPAR) 15 mg tablet, Take 15 mg by mouth 2 (two) times a day , Disp: , Rfl:     calcium carbonate-vitamin D 500 mg-5 mcg per tablet, Take 1 tablet by mouth 2 (two) times a day with meals, Disp: 60 tablet, Rfl: 5    cetaphil (CETAPHIL) lotion, Apply topically as needed for dry skin, Disp: 237 mL, Rfl: 0    cetirizine (ZyrTEC) 5 MG tablet, TAKE 1 TABLET BY MOUTH AT BEDTIME AS NEEDED FOR ALLERGIES *AHMAD, Disp: 30 tablet, Rfl: 0    chlorhexidine (PERIDEX) 0.12 % solution, 1 tbsp by mouth 2 times daily, swish and spit, Disp: , Rfl:     dextromethorphan-guaiFENesin (ROBITUSSIN DM)  mg/5 mL syrup, " Take 5 mL by mouth 2 (two) times a day as needed for cough, Disp: 100 mL, Rfl: 0    Diclofenac Sodium (VOLTAREN) 1 %, Apply 2 g topically 4 (four) times a day 2 g right knee 4 times per day, Disp: 100 g, Rfl: 5    diphenhydrAMINE (BENADRYL) 50 mg capsule, Take 1 capsule (50 mg total) by mouth daily at bedtime as needed for sleep, Disp: 90 capsule, Rfl: 2    divalproex sodium (DEPAKOTE) 500 mg EC tablet, 3 tabs(1500mg) at bedtime, Disp: , Rfl:     docusate sodium (Stool Softener) 100 mg capsule, TAKE 1 CAPSULE BY MOUTH 2X DAILY AS NEEDED (CONSTIPATION) *AHMAD, Disp: 60 capsule, Rfl: 1    fluticasone (FLONASE) 50 mcg/act nasal spray, INSTILL 2 SPRAYS IN EACH NOSTRIL DAILY @ 8AM(ALLERGIES) *AHMAD, Disp: 16 g, Rfl: 3    gabapentin (NEURONTIN) 100 mg capsule, Take 200 mg by mouth 2 (two) times a day  , Disp: , Rfl:     L-Theanine 100 MG CAPS, Take 200 mg by mouth 2 (two) times a day  , Disp: , Rfl:     levothyroxine 100 mcg tablet, Take 1 tablet (100 mcg total) by mouth daily One daily at 6 AM- thyroid, Disp: 30 tablet, Rfl: 5    Lidocaine (HM Lidocaine Patch) 4 % PTCH, Apply 1 patch topically daily as needed (back pain), Disp: 30 patch, Rfl: 2    LORazepam (ATIVAN) 1 mg tablet, Take 1 mg by mouth in the morning and 1 mg in the evening and 1 mg before bedtime., Disp: , Rfl:     loxapine (LOXITANE) 25 mg capsule, Take 25 mg by mouth daily at bedtime Take with 50 mg dose = 75 mg, Disp: , Rfl:     loxapine (LOXITANE) 50 MG capsule, Take 75 mg by mouth daily at bedtime, Disp: , Rfl:     PHENobarbital 64.8 mg tablet, TAKE 1 TABLET BY MOUTH 2X DAILY @8AM-8PM(SEIZURES) *BERTHA, Disp: 60 tablet, Rfl: 5    polyethylene glycol (GLYCOLAX) 17 GM/SCOOP, MIX 1 CAPFUL(17GM) IN 8OZ OF LIQUID AND DRINK DAILY @ 8AM(CONSTIPATION) *MICAH, Disp: 510 g, Rfl: 5    Prolia 60 MG/ML, Inject 60 mg under the skin once, Disp: , Rfl:     Saphris 10 MG SL tablet, Place 1 tablet (10 mg total) under the tongue 2 (two) times a day, Disp: 60 tablet,  Rfl: 1    senna-docusate sodium (SENOKOT-S) 8.6-50 mg per tablet, Take 1 tablet by mouth daily, Disp: , Rfl:     sodium chloride (OCEAN) 0.65 % nasal spray, 1 spray into each nostril as needed for congestion TAKE UP TO 6 TIMES DAILY AS NEEDED, Disp: 30 mL, Rfl: 3    Thera (THERA/BETA-CAROTENE), Take 1 tablet by mouth daily, Disp: 30 tablet, Rfl: 5    traZODone (DESYREL) 100 mg tablet, Take 200 mg by mouth daily at bedtime, Disp: , Rfl:     triamcinolone (KENALOG) 0.5 % cream, Apply topically 3 (three) times a day for 10 days, Disp: 15 g, Rfl: 0    trihexyphenidyl (ARTANE) 5 mg tablet, Take 5 mg by mouth 3 (three) times a day with meals, Disp: , Rfl:     acetaminophen (TYLENOL) 500 mg tablet, Take 2 tablets (1,000 mg total) by mouth 3 (three) times a day as needed for mild pain or fever (Patient not taking: Reported on 4/17/2024), Disp: 180 tablet, Rfl: 1    benzonatate (TESSALON) 200 MG capsule, TAKE 1 CAPSULE BY MOUTH 3 TIMES DAILY AS NEEDED FOR COUGH*AHMAD (Patient not taking: Reported on 4/17/2024), Disp: 30 capsule, Rfl: 0    LORazepam (ATIVAN) 0.5 mg tablet, Take 0.5 mg by mouth every 12 (twelve) hours as needed for anxiety (Patient not taking: Reported on 4/17/2024), Disp: , Rfl:     mupirocin (BACTROBAN) 2 % ointment, Apply topically 2 (two) times a day (Patient not taking: Reported on 5/13/2024), Disp: 50 g, Rfl: 0  No current facility-administered medications for this visit.    Facility-Administered Medications Ordered in Other Visits:     lactated ringers infusion, , Intravenous, Continuous PRN, Celi Preston, CRNA, New Bag at 02/08/23 1400    Allergies   Allergen Reactions    Clozapine Other (See Comments)     low white blood count  Other reaction(s): Other (See Comments)  low white blood count    Haloperidol Other (See Comments)     EPS  Other reaction(s): Other (See Comments)  EPS    Lithium Other (See Comments)     Toxicity    Prolixin [Fluphenazine] Hyperactivity and Other (See Comments)      "EPS, Hyperactivity  EPS, Hyperactivity    Valproic Acid Confusion and Other (See Comments)     \"Mental confusion\"  \"Mental confusion\"       Social History   Past Surgical History:   Procedure Laterality Date    COLONOSCOPY      complete    ND COLONOSCOPY FLX DX W/COLLJ SPEC WHEN PFRMD N/A 8/30/2018    Procedure: COLONOSCOPY with polypectomies;  Surgeon: Andriy Lopez MD;  Location: [facility];  Service: Gastroenterology     Family History   Problem Relation Age of Onset    No Known Problems Mother     No Known Problems Father     Lung disease Other         mesothelioma    No Known Problems Maternal Grandmother     No Known Problems Maternal Grandfather     No Known Problems Paternal Grandmother     No Known Problems Paternal Grandfather     No Known Problems Sister     No Known Problems Sister     Kidney failure Brother     No Known Problems Maternal Aunt     No Known Problems Maternal Aunt     No Known Problems Maternal Aunt     No Known Problems Maternal Aunt     No Known Problems Paternal Aunt     No Known Problems Paternal Aunt        Objective:  /78   Pulse 84   Temp (!) 96.7 °F (35.9 °C) (Temporal)   Ht 5' 4.21\" (1.631 m)   Wt 84.8 kg (187 lb)   SpO2 96%   BMI 31.89 kg/m²   Body mass index is 31.89 kg/m².     Physical Exam  Constitutional:       General: She is not in acute distress.     Appearance: She is well-developed. She is obese.   HENT:      Head: Normocephalic.      Right Ear: External ear normal. There is no impacted cerumen.      Left Ear: External ear normal. There is no impacted cerumen.      Nose: No congestion or rhinorrhea.      Mouth/Throat:      Pharynx: No oropharyngeal exudate or posterior oropharyngeal erythema.   Eyes:      General: No scleral icterus.     Pupils: Pupils are equal, round, and reactive to light.   Neck:      Thyroid: No thyromegaly.      Trachea: No tracheal deviation.   Cardiovascular:      Rate and Rhythm: Normal rate and regular rhythm.      Heart sounds: " Normal heart sounds. No murmur heard.  Pulmonary:      Effort: Pulmonary effort is normal. No respiratory distress.      Breath sounds: Normal breath sounds.   Chest:      Chest wall: No tenderness.   Abdominal:      General: Bowel sounds are normal.      Palpations: Abdomen is soft. There is no mass.      Tenderness: There is no abdominal tenderness.   Musculoskeletal:         General: Normal range of motion.      Cervical back: Normal range of motion and neck supple. No rigidity.      Right lower leg: Edema present.      Left lower leg: Edema present.      Comments: Trace bilateral lower extremity edema present   Lymphadenopathy:      Cervical: No cervical adenopathy.   Skin:     General: Skin is warm.      Findings: No erythema or rash.   Neurological:      Mental Status: She is alert and oriented to person, place, and time.      Cranial Nerves: No cranial nerve deficit.   Psychiatric:         Mood and Affect: Mood normal.         Behavior: Behavior normal.

## 2024-05-13 NOTE — ASSESSMENT & PLAN NOTE
Recent TSH is in normal range.  Will continue to monitor.  Continue with present dose of levothyroxine

## 2024-05-22 LAB
ALBUMIN SERPL-MCNC: 4 G/DL (ref 3.5–5.7)
ALP SERPL-CCNC: 45 U/L (ref 35–120)
ALT SERPL-CCNC: 13 U/L
ANION GAP SERPL CALCULATED.3IONS-SCNC: 9 MMOL/L (ref 3–11)
AST SERPL-CCNC: 14 U/L
BILIRUB SERPL-MCNC: 0.3 MG/DL (ref 0.2–1)
BUN SERPL-MCNC: 12 MG/DL (ref 7–25)
CALCIUM SERPL-MCNC: 9 MG/DL (ref 8.5–10.1)
CHLORIDE SERPL-SCNC: 100 MMOL/L (ref 100–109)
CHOLEST SERPL-MCNC: 172 MG/DL
CHOLEST/HDLC SERPL: 2.3 {RATIO}
CO2 SERPL-SCNC: 29 MMOL/L (ref 21–31)
CREAT SERPL-MCNC: 0.83 MG/DL (ref 0.4–1.1)
CYTOLOGY CMNT CVX/VAG CYTO-IMP: ABNORMAL
ERYTHROCYTE [DISTWIDTH] IN BLOOD BY AUTOMATED COUNT: 13 % (ref 12–16)
GFR/BSA.PRED SERPLBLD CYS-BASED-ARV: 75 ML/MIN/{1.73_M2}
GLUCOSE SERPL-MCNC: 106 MG/DL (ref 65–99)
HCT VFR BLD AUTO: 38.3 % (ref 35–43)
HDLC SERPL-MCNC: 75 MG/DL (ref 23–92)
HGB BLD-MCNC: 13.3 G/DL (ref 11.5–14.5)
LDLC SERPL CALC-MCNC: 75 MG/DL
MCH RBC QN AUTO: 33.2 PG (ref 26–34)
MCHC RBC AUTO-ENTMCNC: 34.8 G/DL (ref 32–37)
MCV RBC AUTO: 96 FL (ref 80–100)
NONHDLC SERPL-MCNC: 97 MG/DL
PLATELET # BLD AUTO: 225 THOU/CMM (ref 140–350)
PMV BLD REES-ECKER: 8.2 FL (ref 7.5–11.3)
POTASSIUM SERPL-SCNC: 4.1 MMOL/L (ref 3.5–5.2)
PROT SERPL-MCNC: 6.6 G/DL (ref 6.3–8.3)
RBC # BLD AUTO: 4.01 MILL/CMM (ref 3.7–4.7)
SODIUM SERPL-SCNC: 138 MMOL/L (ref 135–145)
TRIGL SERPL-MCNC: 108 MG/DL
TSH SERPL-ACNC: 0.84 UIU/ML (ref 0.45–5.33)
WBC # BLD AUTO: 4.5 THOU/CMM (ref 4–10)

## 2024-06-04 ENCOUNTER — HOSPITAL ENCOUNTER (OUTPATIENT)
Dept: RADIOLOGY | Age: 69
Discharge: HOME | End: 2024-06-04
Payer: MEDICARE

## 2024-06-04 VITALS — BODY MASS INDEX: 31.58 KG/M2 | WEIGHT: 185 LBS | HEIGHT: 64 IN

## 2024-06-04 DIAGNOSIS — R94.31 ABNORMAL ECG: ICD-10-CM

## 2024-06-04 DIAGNOSIS — Z12.31 ENCOUNTER FOR SCREENING MAMMOGRAM FOR MALIGNANT NEOPLASM OF BREAST: ICD-10-CM

## 2024-06-04 DIAGNOSIS — R94.31 T WAVE INVERSION ON ELECTROCARDIOGRAM: ICD-10-CM

## 2024-06-04 PROCEDURE — 77067 SCR MAMMO BI INCL CAD: CPT

## 2024-06-04 PROCEDURE — 77063 BREAST TOMOSYNTHESIS BI: CPT

## 2024-06-04 RX ORDER — ASPIRIN 81 MG
TABLET,CHEWABLE ORAL
Qty: 90 TABLET | Refills: 1 | Status: SHIPPED | OUTPATIENT
Start: 2024-06-04

## 2024-06-13 ENCOUNTER — TELEPHONE (OUTPATIENT)
Dept: INTERNAL MEDICINE CLINIC | Facility: OTHER | Age: 69
End: 2024-06-13

## 2024-06-13 NOTE — TELEPHONE ENCOUNTER
Received lab coding error from [facility].    Form scanned into patients chart and emailed to Rufus to complete.

## 2024-06-14 ENCOUNTER — TELEPHONE (OUTPATIENT)
Age: 69
End: 2024-06-14

## 2024-06-14 DIAGNOSIS — N39.498 OTHER URINARY INCONTINENCE: Primary | ICD-10-CM

## 2024-06-14 NOTE — TELEPHONE ENCOUNTER
Reason for call:   [x] Refill   [] Prior Auth  [x] Other: Staff member called requesting diapers for the pt. Can you place order and send order to [facility] pharmacy. Thank you    Office:   [x] PCP/Provider -   Adilson Yip MD  PCP - General  [] Specialty/Provider -       Pharmacy: [facility] - [place], [place] - [address]      Does the patient have enough for 3 days?   [] Yes   [x] No - Send as HP to POD

## 2024-06-17 NOTE — TELEPHONE ENCOUNTER
Patient is requesting disposable briefs to be ordered and sent to [facility] for urinary incontinence         Please advise, thank you

## 2024-06-18 NOTE — TELEPHONE ENCOUNTER
Denied because lipid done too frequently . Linked to mixed HLD.  I added R75.6 and R78.89.  Faxed back to [facility].

## 2024-06-20 ENCOUNTER — RA CDI HCC (OUTPATIENT)
Dept: OTHER | Facility: HOSPITAL | Age: 69
End: 2024-06-20

## 2024-06-27 ENCOUNTER — OFFICE VISIT (OUTPATIENT)
Dept: INTERNAL MEDICINE CLINIC | Age: 69
End: 2024-06-27
Payer: MEDICARE

## 2024-06-27 VITALS
HEART RATE: 83 BPM | TEMPERATURE: 98 F | BODY MASS INDEX: 32.27 KG/M2 | OXYGEN SATURATION: 96 % | WEIGHT: 189 LBS | SYSTOLIC BLOOD PRESSURE: 120 MMHG | HEIGHT: 64 IN | DIASTOLIC BLOOD PRESSURE: 70 MMHG

## 2024-06-27 DIAGNOSIS — G89.29 CHRONIC MIDLINE LOW BACK PAIN WITHOUT SCIATICA: ICD-10-CM

## 2024-06-27 DIAGNOSIS — E03.9 ACQUIRED HYPOTHYROIDISM: ICD-10-CM

## 2024-06-27 DIAGNOSIS — M81.0 OSTEOPOROSIS, UNSPECIFIED OSTEOPOROSIS TYPE, UNSPECIFIED PATHOLOGICAL FRACTURE PRESENCE: ICD-10-CM

## 2024-06-27 DIAGNOSIS — E78.2 MIXED HYPERLIPIDEMIA: ICD-10-CM

## 2024-06-27 DIAGNOSIS — F25.9 SCHIZOAFFECTIVE DISORDER, UNSPECIFIED TYPE (HCC): ICD-10-CM

## 2024-06-27 DIAGNOSIS — F31.77 BIPOLAR DISORDER, IN PARTIAL REMISSION, MOST RECENT EPISODE MIXED (HCC): ICD-10-CM

## 2024-06-27 DIAGNOSIS — I10 BENIGN ESSENTIAL HYPERTENSION: Primary | ICD-10-CM

## 2024-06-27 DIAGNOSIS — G40.909 SEIZURE DISORDER (HCC): ICD-10-CM

## 2024-06-27 DIAGNOSIS — M54.50 CHRONIC MIDLINE LOW BACK PAIN WITHOUT SCIATICA: ICD-10-CM

## 2024-06-27 PROCEDURE — G2211 COMPLEX E/M VISIT ADD ON: HCPCS | Performed by: INTERNAL MEDICINE

## 2024-06-27 PROCEDURE — 99214 OFFICE O/P EST MOD 30 MIN: CPT | Performed by: INTERNAL MEDICINE

## 2024-06-27 RX ORDER — GABAPENTIN 300 MG/1
CAPSULE ORAL
COMMUNITY
Start: 2024-06-16

## 2024-06-27 RX ORDER — TRAZODONE HYDROCHLORIDE 150 MG/1
TABLET ORAL
COMMUNITY
Start: 2024-06-16

## 2024-06-27 NOTE — PROGRESS NOTES
Assessment/Plan:    Benign essential hypertension  Stable on present regimen.  Will continue to monitor    Hypothyroidism  Continue with present dose of levothyroxine.    Seizure disorder (HCC)  Continue with present regimen.  Managed by neurologist    Osteoporosis  Managed by rheumatologist Dr. Fitzgerald    Psychosis, bipolar affective (HCC)  Managed by psychiatrist    Schizoaffective disorder ([facility])  Stable.  Managed by psychiatrist    Hyperlipidemia  Lipid profile is acceptable on present regimen.  Continue with low-fat diet    Chronic midline low back pain without sciatica  On patient is requesting referral to chiropractor.       Diagnoses and all orders for this visit:    Benign essential hypertension    Seizure disorder (HCC)    Bipolar disorder, in partial remission, most recent episode mixed (HCC)    Schizoaffective disorder, unspecified type (HCC)    Mixed hyperlipidemia    Chronic midline low back pain without sciatica  -     Ambulatory Referral to Chiropractic; Future    Acquired hypothyroidism    Osteoporosis, unspecified osteoporosis type, unspecified pathological fracture presence    Other orders  -     gabapentin (NEURONTIN) 300 mg capsule  -     traZODone (DESYREL) 150 mg tablet  -     Calcium Carb-Cholecalciferol (calcium carbonate-vitamin D) 500 mg-5 mcg tablet               Subjective:          Patient ID: Laine Tse is a 69 y.o. female.    Patient is here for follow-up.  Also had blood work done would like to discuss results.  Also complaining of lower back pain off and on and would like to be referred to chiropractor        The following portions of the patient's history were reviewed and updated as appropriate: allergies, current medications, past family history, past medical history, past social history, past surgical history, and problem list.    Review of Systems   Constitutional:  Negative for fatigue and fever.   HENT:  Negative for congestion, ear discharge, ear pain, postnasal drip,  "sinus pressure, sore throat, tinnitus and trouble swallowing.    Eyes:  Negative for discharge, itching and visual disturbance.   Respiratory:  Negative for cough and shortness of breath.    Cardiovascular:  Negative for chest pain and palpitations.   Gastrointestinal:  Negative for abdominal pain, diarrhea, nausea and vomiting.   Endocrine: Negative for cold intolerance and polyuria.   Genitourinary:  Negative for difficulty urinating, dysuria and urgency.   Musculoskeletal:  Negative for arthralgias and neck pain.   Skin:  Negative for rash.   Allergic/Immunologic: Negative for environmental allergies.   Neurological:  Negative for dizziness, weakness and headaches.   Psychiatric/Behavioral:  Negative for agitation. The patient is not nervous/anxious.          Past Medical History:   Diagnosis Date    Anxiety     Benign essential hypertension     resolved; 06/15/16    Depression     Depression with anxiety     Fracture of fifth metatarsal bone of right foot with delayed healing     last assessed 04/12/16; fracture of metatarsal bone, right, closed, initial encounter    Hyperlipidemia     last assessed 11/28/17    Hypothyroidism     last assessed 11/28/2017    Insomnia     Obesity     Schizoaffective disorder (HCC)     last assessed 05/18/2017    Seizure disorder ([facility])     last assessed 03/28/17    Seizures ([facility])          Current Outpatient Medications:     Adhesive Bandages (Bandages Fabric Strips 3/4\") MISC, Use 2 (two) times a day, Disp: 100 each, Rfl: 0    alendronate (FOSAMAX) 70 mg tablet, TAKE 1 TABLET BY MOUTH WEEKLY ON FRIDAYS @ 8AM (OSTEOPORPSIS), Disp: 4 tablet, Rfl: 5    aspirin (Aspirin Low Dose) 81 mg chewable tablet, CHEW 1 TABLET BY MOUTH DAILY @ 8AM (BLOOD THINNER) *BRIEMAD, Disp: 90 tablet, Rfl: 1    atorvastatin (LIPITOR) 20 mg tablet, Take 1 tablet (20 mg total) by mouth daily, Disp: 30 tablet, Rfl: 5    bismuth subsalicylate (GNP Pink Bismuth) 262 MG chewable tablet, CHEW 2 TABLETS(524MG) BY " MOUTH EVERY 6 HOURS AS NEEDED FOR DIARRHEA *AHMAD, Disp: 120 tablet, Rfl: 1    busPIRone (BUSPAR) 15 mg tablet, Take 15 mg by mouth 2 (two) times a day , Disp: , Rfl:     Calcium Carb-Cholecalciferol (calcium carbonate-vitamin D) 500 mg-5 mcg tablet, , Disp: , Rfl:     calcium carbonate-vitamin D 500 mg-5 mcg per tablet, Take 1 tablet by mouth 2 (two) times a day with meals, Disp: 60 tablet, Rfl: 5    cetaphil (CETAPHIL) lotion, Apply topically as needed for dry skin, Disp: 237 mL, Rfl: 0    cetirizine (ZyrTEC) 5 MG tablet, TAKE 1 TABLET BY MOUTH AT BEDTIME AS NEEDED FOR ALLERGIES *AHMAD, Disp: 30 tablet, Rfl: 0    chlorhexidine (PERIDEX) 0.12 % solution, 1 tbsp by mouth 2 times daily, swish and spit, Disp: , Rfl:     dextromethorphan-guaiFENesin (ROBITUSSIN DM)  mg/5 mL syrup, Take 5 mL by mouth 2 (two) times a day as needed for cough, Disp: 100 mL, Rfl: 0    Diclofenac Sodium (VOLTAREN) 1 %, Apply 2 g topically 4 (four) times a day 2 g right knee 4 times per day, Disp: 100 g, Rfl: 5    diphenhydrAMINE (BENADRYL) 50 mg capsule, Take 1 capsule (50 mg total) by mouth daily at bedtime as needed for sleep, Disp: 90 capsule, Rfl: 2    divalproex sodium (DEPAKOTE) 500 mg EC tablet, 3 tabs(1500mg) at bedtime, Disp: , Rfl:     docusate sodium (Stool Softener) 100 mg capsule, TAKE 1 CAPSULE BY MOUTH 2X DAILY AS NEEDED (CONSTIPATION) *AHMAD, Disp: 60 capsule, Rfl: 1    fluticasone (FLONASE) 50 mcg/act nasal spray, INSTILL 2 SPRAYS IN EACH NOSTRIL DAILY @ 8AM(ALLERGIES) *AHMAD, Disp: 16 g, Rfl: 3    gabapentin (NEURONTIN) 100 mg capsule, Take 200 mg by mouth 2 (two) times a day  , Disp: , Rfl:     gabapentin (NEURONTIN) 300 mg capsule, , Disp: , Rfl:     Incontinence Supplies MISC, Use if needed (incontinence) SIZE XL PULL UP DISPOSABLE BRIEFS, Disp: 100 each, Rfl: 1    L-Theanine 100 MG CAPS, Take 200 mg by mouth 2 (two) times a day  , Disp: , Rfl:     levothyroxine 100 mcg tablet, Take 1 tablet (100 mcg total) by  mouth daily One daily at 6 AM- thyroid, Disp: 30 tablet, Rfl: 5    LORazepam (ATIVAN) 1 mg tablet, Take 1 mg by mouth in the morning and 1 mg in the evening and 1 mg before bedtime., Disp: , Rfl:     loxapine (LOXITANE) 25 mg capsule, Take 25 mg by mouth daily at bedtime Take with 50 mg dose = 75 mg, Disp: , Rfl:     loxapine (LOXITANE) 50 MG capsule, Take 75 mg by mouth daily at bedtime, Disp: , Rfl:     PHENobarbital 64.8 mg tablet, TAKE 1 TABLET BY MOUTH 2X DAILY @8AM-8PM(SEIZURES) *STONE, Disp: 60 tablet, Rfl: 5    polyethylene glycol (GLYCOLAX) 17 GM/SCOOP, MIX 1 CAPFUL(17GM) IN 8OZ OF LIQUID AND DRINK DAILY @ 8AM(CONSTIPATION) *AHMAD, Disp: 510 g, Rfl: 5    Prolia 60 MG/ML, Inject 60 mg under the skin once, Disp: , Rfl:     Saphris 10 MG SL tablet, Place 1 tablet (10 mg total) under the tongue 2 (two) times a day, Disp: 60 tablet, Rfl: 1    senna-docusate sodium (SENOKOT-S) 8.6-50 mg per tablet, Take 1 tablet by mouth daily, Disp: , Rfl:     sodium chloride (OCEAN) 0.65 % nasal spray, 1 spray into each nostril as needed for congestion TAKE UP TO 6 TIMES DAILY AS NEEDED, Disp: 30 mL, Rfl: 3    Thera (THERA/BETA-CAROTENE), Take 1 tablet by mouth daily, Disp: 30 tablet, Rfl: 5    traZODone (DESYREL) 100 mg tablet, Take 200 mg by mouth daily at bedtime, Disp: , Rfl:     traZODone (DESYREL) 150 mg tablet, , Disp: , Rfl:     triamcinolone (KENALOG) 0.5 % cream, Apply topically 3 (three) times a day for 10 days, Disp: 15 g, Rfl: 0    trihexyphenidyl (ARTANE) 5 mg tablet, Take 5 mg by mouth 3 (three) times a day with meals, Disp: , Rfl:     acetaminophen (TYLENOL) 500 mg tablet, Take 2 tablets (1,000 mg total) by mouth 3 (three) times a day as needed for mild pain or fever (Patient not taking: Reported on 4/17/2024), Disp: 180 tablet, Rfl: 1    benzonatate (TESSALON) 200 MG capsule, TAKE 1 CAPSULE BY MOUTH 3 TIMES DAILY AS NEEDED FOR COUGH*AHMAD (Patient not taking: Reported on 4/17/2024), Disp: 30 capsule, Rfl:  "0    Lidocaine (HM Lidocaine Patch) 4 % PTCH, Apply 1 patch topically daily as needed (back pain), Disp: 30 patch, Rfl: 2    LORazepam (ATIVAN) 0.5 mg tablet, Take 0.5 mg by mouth every 12 (twelve) hours as needed for anxiety (Patient not taking: Reported on 6/27/2024), Disp: , Rfl:     mupirocin (BACTROBAN) 2 % ointment, Apply topically 2 (two) times a day (Patient not taking: Reported on 5/13/2024), Disp: 50 g, Rfl: 0  No current facility-administered medications for this visit.    Facility-Administered Medications Ordered in Other Visits:     lactated ringers infusion, , Intravenous, Continuous PRN, Celi Preston, CRNA, New Bag at 02/08/23 1400    Allergies   Allergen Reactions    Clozapine Other (See Comments)     low white blood count  Other reaction(s): Other (See Comments)  low white blood count    Haloperidol Other (See Comments)     EPS  Other reaction(s): Other (See Comments)  EPS    Lithium Other (See Comments)     Toxicity    Prolixin [Fluphenazine] Hyperactivity and Other (See Comments)     EPS, Hyperactivity  EPS, Hyperactivity    Valproic Acid Confusion and Other (See Comments)     \"Mental confusion\"  \"Mental confusion\"       Social History   Past Surgical History:   Procedure Laterality Date    COLONOSCOPY      complete    NE COLONOSCOPY FLX DX W/COLLJ SPEC WHEN PFRMD N/A 8/30/2018    Procedure: COLONOSCOPY with polypectomies;  Surgeon: Andriy Lopez MD;  Location: [facility];  Service: Gastroenterology     Family History   Problem Relation Age of Onset    No Known Problems Mother     No Known Problems Father     Lung disease Other         mesothelioma    No Known Problems Maternal Grandmother     No Known Problems Maternal Grandfather     No Known Problems Paternal Grandmother     No Known Problems Paternal Grandfather     No Known Problems Sister     No Known Problems Sister     Kidney failure Brother     No Known Problems Maternal Aunt     No Known Problems Maternal Aunt     No Known " "Problems Maternal Aunt     No Known Problems Maternal Aunt     No Known Problems Paternal Aunt     No Known Problems Paternal Aunt        Objective:  /70 (BP Location: Left arm, Patient Position: Sitting, Cuff Size: Large)   Pulse 83   Temp 98 °F (36.7 °C) (Temporal)   Ht 5' 4.21\" (1.631 m)   Wt 85.7 kg (189 lb)   SpO2 96%   BMI 32.23 kg/m²   Body mass index is 32.23 kg/m².     Physical Exam  Constitutional:       General: She is not in acute distress.     Appearance: She is well-developed.   HENT:      Head: Normocephalic.      Right Ear: External ear normal. There is no impacted cerumen.      Left Ear: External ear normal. There is no impacted cerumen.      Nose: No rhinorrhea.      Mouth/Throat:      Pharynx: No posterior oropharyngeal erythema.   Eyes:      General: No scleral icterus.     Pupils: Pupils are equal, round, and reactive to light.   Neck:      Thyroid: No thyromegaly.      Trachea: No tracheal deviation.   Cardiovascular:      Rate and Rhythm: Normal rate and regular rhythm.      Heart sounds: Normal heart sounds. No murmur heard.  Pulmonary:      Effort: Pulmonary effort is normal. No respiratory distress.      Breath sounds: Normal breath sounds.   Chest:      Chest wall: No tenderness.   Abdominal:      General: Bowel sounds are normal.      Palpations: Abdomen is soft. There is no mass.      Tenderness: There is no abdominal tenderness.   Musculoskeletal:         General: Normal range of motion.      Cervical back: Normal range of motion and neck supple. No rigidity.      Right lower leg: No edema.      Left lower leg: No edema.   Lymphadenopathy:      Cervical: No cervical adenopathy.   Skin:     General: Skin is warm.      Findings: No erythema or rash.   Neurological:      Mental Status: She is alert and oriented to person, place, and time.      Cranial Nerves: No cranial nerve deficit.                   "

## 2024-07-11 ENCOUNTER — TELEPHONE (OUTPATIENT)
Age: 69
End: 2024-07-11

## 2024-07-17 ENCOUNTER — TELEPHONE (OUTPATIENT)
Age: 69
End: 2024-07-17

## 2024-07-17 NOTE — TELEPHONE ENCOUNTER
Patient had AWV last year on 9/5/23. Patient is due after 9/6/24. Called and spoke with Benjamin. Patient is rescheduled for AWV on 9/10/24

## 2024-07-17 NOTE — TELEPHONE ENCOUNTER
Patient is scheduled for AWV for 9/04/24. Her last visit was in 2022. Betty is requesting earlier appointment this month or next month. Please reach out to her and advise her accordingly. Tried reaching out to the office as well.  Thank you!!

## 2024-07-18 ENCOUNTER — TELEPHONE (OUTPATIENT)
Age: 69
End: 2024-07-18

## 2024-07-18 DIAGNOSIS — R05.1 ACUTE COUGH: ICD-10-CM

## 2024-07-18 RX ORDER — BENZONATATE 200 MG/1
200 CAPSULE ORAL 2 TIMES DAILY PRN
Qty: 30 CAPSULE | Refills: 0 | Status: SHIPPED | OUTPATIENT
Start: 2024-07-18

## 2024-07-18 NOTE — TELEPHONE ENCOUNTER
Clayton Jones  with access service, called in requesting medical records for Laine. [facility] is requesting the medical records for up to 5 years. The fax number is [phone]. His contact number is [phone].

## 2024-07-18 NOTE — TELEPHONE ENCOUNTER
Medication: benzonatate (TESSALON) 200 MG capsule     Dose/Frequency: TAKE 1 CAPSULE BY MOUTH 3 TIMES DAILY AS NEEDED FOR COUGH     Quantity: 30    Pharmacy: [facility] [place] - [address] [facility]     Office:   [x] PCP/Provider - Adilson Yip MD   [] Speciality/Provider -     Does the patient have enough for 3 days?   [] Yes   [x] No - Send as HP to POD

## 2024-07-22 NOTE — TELEPHONE ENCOUNTER
A blank copy of the Medical Records Release form has been mailed to patient, as she would have to sign on the bottom.

## 2024-07-24 ENCOUNTER — TELEPHONE (OUTPATIENT)
Age: 69
End: 2024-07-24

## 2024-07-25 NOTE — TELEPHONE ENCOUNTER
Spoke with patients caregiver she states its when she has dentures in it can become hard to understand her.

## 2024-08-24 DIAGNOSIS — M25.561 ACUTE PAIN OF RIGHT KNEE: ICD-10-CM

## 2024-09-10 ENCOUNTER — OFFICE VISIT (OUTPATIENT)
Dept: INTERNAL MEDICINE CLINIC | Age: 69
End: 2024-09-10
Payer: MEDICARE

## 2024-09-10 VITALS
HEART RATE: 87 BPM | BODY MASS INDEX: 32.44 KG/M2 | OXYGEN SATURATION: 96 % | TEMPERATURE: 99 F | DIASTOLIC BLOOD PRESSURE: 64 MMHG | HEIGHT: 64 IN | WEIGHT: 190 LBS | SYSTOLIC BLOOD PRESSURE: 130 MMHG

## 2024-09-10 DIAGNOSIS — M54.50 ACUTE BILATERAL LOW BACK PAIN WITHOUT SCIATICA: Primary | ICD-10-CM

## 2024-09-10 DIAGNOSIS — I10 BENIGN ESSENTIAL HYPERTENSION: ICD-10-CM

## 2024-09-10 DIAGNOSIS — Z11.1 PPD SCREENING TEST: ICD-10-CM

## 2024-09-10 DIAGNOSIS — F25.9 SCHIZOAFFECTIVE DISORDER, UNSPECIFIED TYPE (HCC): ICD-10-CM

## 2024-09-10 DIAGNOSIS — Z00.00 ENCOUNTER FOR ANNUAL WELLNESS VISIT (AWV) IN MEDICARE PATIENT: ICD-10-CM

## 2024-09-10 PROCEDURE — 99213 OFFICE O/P EST LOW 20 MIN: CPT | Performed by: PHYSICIAN ASSISTANT

## 2024-09-10 PROCEDURE — 86580 TB INTRADERMAL TEST: CPT

## 2024-09-10 PROCEDURE — G0439 PPPS, SUBSEQ VISIT: HCPCS | Performed by: PHYSICIAN ASSISTANT

## 2024-09-10 RX ORDER — TIZANIDINE 2 MG/1
2 TABLET ORAL DAILY
COMMUNITY
Start: 2024-07-29

## 2024-09-10 RX ORDER — MENTHOL 1.4 %
1 ADHESIVE PATCH, MEDICATED TOPICAL DAILY
Qty: 30 PATCH | Refills: 2 | Status: SHIPPED | OUTPATIENT
Start: 2024-09-10

## 2024-09-10 NOTE — PROGRESS NOTES
"Ambulatory Visit  Name: Laine Tse      : 1955      MRN: [id number]  Encounter Provider: Erin Caldera PA-C  Encounter Date: 9/10/2024   Encounter department: [facility]    Assessment & Plan  Acute bilateral low back pain without sciatica    Orders:    Menthol, Topical Analgesic, (Bengay Ultra Strength) 5 % PTCH; Apply 1 patch topically in the morning    Ambulatory Referral to Physical Therapy; Future    PPD screening test    Orders:    TB Skin Test    Schizoaffective disorder, unspecified type (HCC)         Benign essential hypertension         Encounter for annual wellness visit (AWV) in Medicare patient            Preventive health issues were discussed with patient, and age appropriate screening tests were ordered as noted in patient's After Visit Summary. Personalized health advice and appropriate referrals for health education or preventive services given if needed, as noted in patient's After Visit Summary.    History of Present Illness     70 y/o female with hx of schizoaffective d/o, seizure d/o, htn, hld presents for AWV and annual physical to be completed, pt brought form and will need PPD today as she does not want to return to the lab again - she reports she just had labs this am    Pt states she didn't sleep well last night - states because of this \"her blood pressure should be very high\"   It is well controlled today           Patient Care Team:  Adilson Yip MD as PCP - General (Internal Medicine)  CHRISTOPHER Stokes as PCP - Advanced Practitioner (Internal Medicine)  Andriy Lopez MD as Endoscopist    Review of Systems   Constitutional:  Negative for activity change, chills and fever.   HENT:  Negative for congestion and sore throat.    Eyes:  Negative for redness.   Respiratory:  Positive for cough (occasional). Negative for shortness of breath and wheezing.    Cardiovascular:  Negative for chest pain and leg swelling.   Gastrointestinal:  " Negative for abdominal pain, constipation and diarrhea.   Musculoskeletal:  Positive for back pain and gait problem.   Skin:  Negative for rash.   Neurological:  Negative for dizziness, light-headedness and headaches.   Psychiatric/Behavioral:  Positive for hallucinations and sleep disturbance.      Medical History Reviewed by provider this encounter:       Annual Wellness Visit Questionnaire   Laine is here for her Subsequent Wellness visit.     Health Risk Assessment:   Patient rates overall health as fair. Patient feels that their physical health rating is same. Patient is dissatisfied with their life. Eyesight was rated as same. Hearing was rated as same. Patient feels that their emotional and mental health rating is same. Patients states they are sometimes angry. Patient states they are sometimes unusually tired/fatigued. Patient states that she has experienced no weight loss or gain in last 6 months.     Depression Screening:   PHQ-9 Score: 8      Fall Risk Screening:   In the past year, patient has experienced: no history of falling in past year      Urinary Incontinence Screening:   Patient has not leaked urine accidently in the last six months.     Home Safety:  Patient has trouble with stairs inside or outside of their home. Patient has working smoke alarms and has working carbon monoxide detector. Home safety hazards include: none.     Nutrition:   Current diet is Regular.     Medications:   Patient is not currently taking any over-the-counter supplements. Patient is not able to manage medications.     Activities of Daily Living (ADLs)/Instrumental Activities of Daily Living (IADLs):   Walk and transfer into and out of bed and chair?: Yes  Dress and groom yourself?: Yes    Bathe or shower yourself?: Yes    Feed yourself? Yes  Do your laundry/housekeeping?: No  Manage your money, pay your bills and track your expenses?: No  Make your own meals?: No    Do your own shopping?: No    Previous  Hospitalizations:   Any hospitalizations or ED visits within the last 12 months?: No      Advance Care Planning:   Living will: No      Cognitive Screening:   Provider or family/friend/caregiver concerned regarding cognition?: No    PREVENTIVE SCREENINGS      Cardiovascular Screening:    General: Screening Not Indicated and History Lipid Disorder      Diabetes Screening:     General: Screening Current      Colorectal Cancer Screening:     General: Screening Current      Breast Cancer Screening:     General: Screening Current      Cervical Cancer Screening:    General: Screening Not Indicated      Osteoporosis Screening:    General: Screening Not Indicated and History Osteoporosis      Abdominal Aortic Aneurysm (AAA) Screening:        General: Screening Not Indicated      Lung Cancer Screening:     General: Screening Not Indicated      Hepatitis C Screening:    General: Screening Current    Screening, Brief Intervention, and Referral to Treatment (SBIRT)    Screening      Single Item Drug Screening:  How often have you used an illegal drug (including marijuana) or a prescription medication for non-medical reasons in the past year? never    Single Item Drug Screen Score: 0  Interpretation: Negative screen for possible drug use disorder    Social Determinants of Health     Financial Resource Strain: Low Risk  (9/5/2023)    Overall Financial Resource Strain (CARDIA)     Difficulty of Paying Living Expenses: Not hard at all   Food Insecurity: No Food Insecurity (9/10/2024)    Hunger Vital Sign     Worried About Running Out of Food in the Last Year: Never true     Ran Out of Food in the Last Year: Never true   Transportation Needs: No Transportation Needs (9/10/2024)    PRAPARE - Transportation     Lack of Transportation (Medical): No     Lack of Transportation (Non-Medical): No   Housing Stability: Unknown (9/10/2024)    Housing Stability Vital Sign     Unable to Pay for Housing in the Last Year: No     Homeless in the  "Last Year: No   Utilities: Not At Risk (9/10/2024)    [facility] Utilities     Threatened with loss of utilities: No     No results found.    Objective     /64 (BP Location: Left arm, Patient Position: Sitting, Cuff Size: Standard)   Pulse 87   Temp 99 °F (37.2 °C) (Temporal)   Ht 5' 4.21\" (1.631 m)   Wt 86.2 kg (190 lb)   SpO2 96%   BMI 32.40 kg/m²     Physical Exam  Vitals reviewed.   Constitutional:       General: She is not in acute distress.  HENT:      Head: Normocephalic and atraumatic.      Nose: Nose normal.      Mouth/Throat:      Mouth: Mucous membranes are moist.   Eyes:      General:         Right eye: No discharge.         Left eye: No discharge.      Conjunctiva/sclera: Conjunctivae normal.      Pupils: Pupils are equal, round, and reactive to light.   Cardiovascular:      Rate and Rhythm: Normal rate and regular rhythm.   Pulmonary:      Effort: Pulmonary effort is normal. No respiratory distress.      Breath sounds: Normal breath sounds. No wheezing or rales.   Abdominal:      General: Bowel sounds are normal.   Musculoskeletal:      Cervical back: Normal range of motion. No rigidity.      Right lower leg: Edema (mild edema around ankles - sock line) present.      Left lower leg: Edema present.   Skin:     Findings: No rash.   Neurological:      General: No focal deficit present.       Administrative Statements   I have spent a total time of 34 minutes in caring for this patient on the day of the visit/encounter including Documenting in the medical record, Reviewing / ordering tests, medicine, procedures  , and Obtaining or reviewing history  .  "

## 2024-09-10 NOTE — PATIENT INSTRUCTIONS
Medicare Preventive Visit Patient Instructions  Thank you for completing your Welcome to Medicare Visit or Medicare Annual Wellness Visit today. Your next wellness visit will be due in one year (9/11/2025).  The screening/preventive services that you may require over the next 5-10 years are detailed below. Some tests may not apply to you based off risk factors and/or age. Screening tests ordered at today's visit but not completed yet may show as past due. Also, please note that scanned in results may not display below.  Preventive Screenings:  Service Recommendations Previous Testing/Comments   Colorectal Cancer Screening  * Colonoscopy    * Fecal Occult Blood Test (FOBT)/Fecal Immunochemical Test (FIT)  * Fecal DNA/Cologuard Test  * Flexible Sigmoidoscopy Age: 45-75 years old   Colonoscopy: every 10 years (may be performed more frequently if at higher risk)  OR  FOBT/FIT: every 1 year  OR  Cologuard: every 3 years  OR  Sigmoidoscopy: every 5 years  Screening may be recommended earlier than age 45 if at higher risk for colorectal cancer. Also, an individualized decision between you and your healthcare provider will decide whether screening between the ages of 76-85 would be appropriate. Colonoscopy: 08/30/2018  FOBT/FIT: Not on file  Cologuard: Not on file  Sigmoidoscopy: Not on file    Screening Current     Breast Cancer Screening Age: 40+ years old  Frequency: every 1-2 years  Not required if history of left and right mastectomy Mammogram: 06/04/2024    Screening Current   Cervical Cancer Screening Between the ages of 21-29, pap smear recommended once every 3 years.   Between the ages of 30-65, can perform pap smear with HPV co-testing every 5 years.   Recommendations may differ for women with a history of total hysterectomy, cervical cancer, or abnormal pap smears in past. Pap Smear: 12/28/2018    Screening Not Indicated   Hepatitis C Screening Once for adults born between 1945 and 1965  More frequently in  patients at high risk for Hepatitis C Hep C Antibody: 11/29/2017    Screening Current   Diabetes Screening 1-2 times per year if you're at risk for diabetes or have pre-diabetes Fasting glucose: 103 mg/dL (4/5/2024)  A1C: 5.5 % (4/26/2024)  Screening Current   Cholesterol Screening Once every 5 years if you don't have a lipid disorder. May order more often based on risk factors. Lipid panel: 08/14/2024    Screening Not Indicated  History Lipid Disorder     Other Preventive Screenings Covered by Medicare:  Abdominal Aortic Aneurysm (AAA) Screening: covered once if your at risk. You're considered to be at risk if you have a family history of AAA.  Lung Cancer Screening: covers low dose CT scan once per year if you meet all of the following conditions: (1) Age 55-77; (2) No signs or symptoms of lung cancer; (3) Current smoker or have quit smoking within the last 15 years; (4) You have a tobacco smoking history of at least 20 pack years (packs per day multiplied by number of years you smoked); (5) You get a written order from a healthcare provider.  Glaucoma Screening: covered annually if you're considered high risk: (1) You have diabetes OR (2) Family history of glaucoma OR (3)  aged 50 and older OR (4)  American aged 65 and older  Osteoporosis Screening: covered every 2 years if you meet one of the following conditions: (1) You're estrogen deficient and at risk for osteoporosis based off medical history and other findings; (2) Have a vertebral abnormality; (3) On glucocorticoid therapy for more than 3 months; (4) Have primary hyperparathyroidism; (5) On osteoporosis medications and need to assess response to drug therapy.   Last bone density test (DXA Scan): 01/30/2023.  HIV Screening: covered annually if you're between the age of 15-65. Also covered annually if you are younger than 15 and older than 65 with risk factors for HIV infection. For pregnant patients, it is covered up to 3 times per  pregnancy.    Immunizations:  Immunization Recommendations   Influenza Vaccine Annual influenza vaccination during flu season is recommended for all persons aged >= 6 months who do not have contraindications   Pneumococcal Vaccine   * Pneumococcal conjugate vaccine = PCV13 (Prevnar 13), PCV15 (Vaxneuvance), PCV20 (Prevnar 20)  * Pneumococcal polysaccharide vaccine = PPSV23 (Pneumovax) Adults 19-63 yo with certain risk factors or if 65+ yo  If never received any pneumonia vaccine: recommend Prevnar 20 (PCV20)  Give PCV20 if previously received 1 dose of PCV13 or PPSV23   Hepatitis B Vaccine 3 dose series if at intermediate or high risk (ex: diabetes, end stage renal disease, liver disease)   Respiratory syncytial virus (RSV) Vaccine - COVERED BY MEDICARE PART D  * RSVPreF3 (Arexvy) CDC recommends that adults 60 years of age and older may receive a single dose of RSV vaccine using shared clinical decision-making (SCDM)   Tetanus (Td) Vaccine - COST NOT COVERED BY MEDICARE PART B Following completion of primary series, a booster dose should be given every 10 years to maintain immunity against tetanus. Td may also be given as tetanus wound prophylaxis.   Tdap Vaccine - COST NOT COVERED BY MEDICARE PART B Recommended at least once for all adults. For pregnant patients, recommended with each pregnancy.   Shingles Vaccine (Shingrix) - COST NOT COVERED BY MEDICARE PART B  2 shot series recommended in those 19 years and older who have or will have weakened immune systems or those 50 years and older     Health Maintenance Due:      Topic Date Due   • Breast Cancer Screening: Mammogram  06/04/2025   • Colorectal Cancer Screening  08/30/2028   • Hepatitis C Screening  Completed   • Cervical Cancer Screening  Discontinued     Immunizations Due:      Topic Date Due   • COVID-19 Vaccine (3 - 2023-24 season) 09/01/2024   • Influenza Vaccine (1) 09/01/2024     Advance Directives   What are advance directives?  Advance directives are  legal documents that state your wishes and plans for medical care. These plans are made ahead of time in case you lose your ability to make decisions for yourself. Advance directives can apply to any medical decision, such as the treatments you want, and if you want to donate organs.   What are the types of advance directives?  There are many types of advance directives, and each state has rules about how to use them. You may choose a combination of any of the following:  Living will:  This is a written record of the treatment you want. You can also choose which treatments you do not want, which to limit, and which to stop at a certain time. This includes surgery, medicine, IV fluid, and tube feedings.   Durable power of  for healthcare (DPAHC):  This is a written record that states who you want to make healthcare choices for you when you are unable to make them for yourself. This person, called a proxy, is usually a family member or a friend. You may choose more than 1 proxy.  Do not resuscitate (DNR) order:  A DNR order is used in case your heart stops beating or you stop breathing. It is a request not to have certain forms of treatment, such as CPR. A DNR order may be included in other types of advance directives.  Medical directive:  This covers the care that you want if you are in a coma, near death, or unable to make decisions for yourself. You can list the treatments you want for each condition. Treatment may include pain medicine, surgery, blood transfusions, dialysis, IV or tube feedings, and a ventilator (breathing machine).  Values history:  This document has questions about your views, beliefs, and how you feel and think about life. This information can help others choose the care that you would choose.  Why are advance directives important?  An advance directive helps you control your care. Although spoken wishes may be used, it is better to have your wishes written down. Spoken wishes can be  misunderstood, or not followed. Treatments may be given even if you do not want them. An advance directive may make it easier for your family to make difficult choices about your care.   Weight Management   Why it is important to manage your weight:  Being overweight increases your risk of health conditions such as heart disease, high blood pressure, type 2 diabetes, and certain types of cancer. It can also increase your risk for osteoarthritis, sleep apnea, and other respiratory problems. Aim for a slow, steady weight loss. Even a small amount of weight loss can lower your risk of health problems.  How to lose weight safely:  A safe and healthy way to lose weight is to eat fewer calories and get regular exercise. You can lose up about 1 pound a week by decreasing the number of calories you eat by 500 calories each day.   Healthy meal plan for weight management:  A healthy meal plan includes a variety of foods, contains fewer calories, and helps you stay healthy. A healthy meal plan includes the following:  Eat whole-grain foods more often.  A healthy meal plan should contain fiber. Fiber is the part of grains, fruits, and vegetables that is not broken down by your body. Whole-grain foods are healthy and provide extra fiber in your diet. Some examples of whole-grain foods are whole-wheat breads and pastas, oatmeal, brown rice, and bulgur.  Eat a variety of vegetables every day.  Include dark, leafy greens such as spinach, kale, lauren greens, and mustard greens. Eat yellow and orange vegetables such as carrots, sweet potatoes, and winter squash.   Eat a variety of fruits every day.  Choose fresh or canned fruit (canned in its own juice or light syrup) instead of juice. Fruit juice has very little or no fiber.  Eat low-fat dairy foods.  Drink fat-free (skim) milk or 1% milk. Eat fat-free yogurt and low-fat cottage cheese. Try low-fat cheeses such as mozzarella and other reduced-fat cheeses.  Choose meat and other  protein foods that are low in fat.  Choose beans or other legumes such as split peas or lentils. Choose fish, skinless poultry (chicken or turkey), or lean cuts of red meat (beef or pork). Before you cook meat or poultry, cut off any visible fat.   Use less fat and oil.  Try baking foods instead of frying them. Add less fat, such as margarine, sour cream, regular salad dressing and mayonnaise to foods. Eat fewer high-fat foods. Some examples of high-fat foods include french fries, doughnuts, ice cream, and cakes.  Eat fewer sweets.  Limit foods and drinks that are high in sugar. This includes candy, cookies, regular soda, and sweetened drinks.  Exercise:  Exercise at least 30 minutes per day on most days of the week. Some examples of exercise include walking, biking, dancing, and swimming. You can also fit in more physical activity by taking the stairs instead of the elevator or parking farther away from stores. Ask your healthcare provider about the best exercise plan for you.      © Copyright [employer] 2018 Information is for End User's use only and may not be sold, redistributed or otherwise used for commercial purposes. All illustrations and images included in CareNotes® are the copyrighted property of [employer]., [employer] or [employer]

## 2024-09-12 ENCOUNTER — CLINICAL SUPPORT (OUTPATIENT)
Dept: INTERNAL MEDICINE CLINIC | Age: 69
End: 2024-09-12

## 2024-09-12 DIAGNOSIS — Z11.1 ENCOUNTER FOR PPD SKIN TEST READING: Primary | ICD-10-CM

## 2024-09-12 LAB
INDURATION: 0 MM
TB SKIN TEST: NEGATIVE

## 2024-09-16 DIAGNOSIS — R05.1 ACUTE COUGH: ICD-10-CM

## 2024-09-16 DIAGNOSIS — K59.09 OTHER CONSTIPATION: ICD-10-CM

## 2024-09-17 RX ORDER — POLYETHYLENE GLYCOL 3350 17 G/17G
POWDER ORAL
Qty: 510 G | Refills: 1 | Status: SHIPPED | OUTPATIENT
Start: 2024-09-17

## 2024-09-17 RX ORDER — BENZONATATE 200 MG/1
CAPSULE ORAL
Qty: 30 CAPSULE | Refills: 5 | Status: SHIPPED | OUTPATIENT
Start: 2024-09-17

## 2024-09-23 ENCOUNTER — TELEPHONE (OUTPATIENT)
Age: 69
End: 2024-09-23

## 2024-09-23 NOTE — TELEPHONE ENCOUNTER
edmond karimi from [facility] , asking if Dr Yip could fill out an MA51 form for the pt. [facility] is asking for a more recent form.

## 2024-09-23 NOTE — TELEPHONE ENCOUNTER
Called and spoke with James- He will fax form in the AM for it to be completed.  Dr. Yip will sign

## 2024-09-25 ENCOUNTER — TELEPHONE (OUTPATIENT)
Dept: INTERNAL MEDICINE CLINIC | Age: 69
End: 2024-09-25

## 2024-09-25 ENCOUNTER — EVALUATION (OUTPATIENT)
Dept: PHYSICAL THERAPY | Facility: REHABILITATION | Age: 69
End: 2024-09-25
Payer: MEDICARE

## 2024-09-25 DIAGNOSIS — M54.50 ACUTE BILATERAL LOW BACK PAIN WITHOUT SCIATICA: Primary | ICD-10-CM

## 2024-09-25 PROCEDURE — 97161 PT EVAL LOW COMPLEX 20 MIN: CPT | Performed by: PHYSICAL THERAPIST

## 2024-09-25 PROCEDURE — 97112 NEUROMUSCULAR REEDUCATION: CPT | Performed by: PHYSICAL THERAPIST

## 2024-09-25 NOTE — PROGRESS NOTES
PT Evaluation     Today's date: 2024  Patient name: Laine Tse  : 1955  MRN: [id number]  Referring provider: Erin Caldera PA-C  Dx:   Encounter Diagnosis     ICD-10-CM    1. Acute bilateral low back pain without sciatica  M54.50 Ambulatory Referral to Physical Therapy    pt requests PT referral   wants script sent for bengay patches                     Assessment  Impairments: abnormal or restricted ROM, abnormal movement, activity intolerance, impaired physical strength, lacks appropriate home exercise program, pain with function and poor posture   Symptom irritability: moderate    Assessment details: Laine Tse is a pleasant 69 y.o. female who presents with 5 month hx of central low back pain without radicular symptoms.  Physical exam is consistent with lumbar spine stabilization treatment group. Patient has balance issues that limit her ability to perform standing repeated movements testing. Patient becomes lightheaded with change of position and is unable to tolerate repeated movements testing in prone or supine. 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 Patient demonstrates overall poor tolerance for exercise due to apparent deconditioning. I discussed an initial HEP consisting of exercises patient can do at home independently. Patient demonstrates appropriate understanding. I reviewed these again with her care taker that provided transportation. There is staff at her group home that will be able to assist her with exercises 3 times per week. Assess response next visit, attempt progression in exercise selection as appropriate. No further referral appears necessary at this time based upon examination results. Pt. will benefit from skilled PT services that includes manual therapy techniques to enhance tissue extensibility, neuromuscular re-education to facilitate motor control, therapeutic exercise to increase functional mobility, and modalities prn to reduce  pain and inflammation.            Understanding of Dx/Px/POC: good     Prognosis: good    Goals  Impairment based goals  Patient will achieve 50% improvement in VAS score at worst.   Patient will achieve 50% improvement in lumbar spine AROM.     Function based goals  Patient will be independent in comprehensive HEP upon discharge.  Patient will achieve goal FOTO score upon discharge.      Plan  Patient would benefit from: skilled physical therapy    Planned therapy interventions: activity modification, manual therapy, motor coordination training, neuromuscular re-education, patient education, self care, therapeutic activities, therapeutic exercise, graded activity, home exercise program, graded exercise, functional ROM exercises and strengthening    Frequency: 1x week  Duration in weeks: 4  Plan of Care expiration date: 10/25/2024  Treatment plan discussed with: patient        Subjective    HPI: Patient reports 4-5 month hx of low back pain. She reports no specific mechanism of injury. Denies radicular symptoms.    Pain Location: 7/10 current; 10/10 worst; B/L lower back   Aggravating Factors: walking   Alleviating Factors: rest; Bengay cream   Goals: improve her ability to walk without pain      Objective      Postural Observation   Sitting: kyphotic  Standing: kyphotic    Myotomes  Strength WNL bilaterally.    Dermatomes  Sensation intact bilaterally    Reflexes  R Patellar Reflex: (2+)  L Patellar Reflex: (2+)    Neurodynamic Testing  SLR: neg     Lumbar Active Range of Motion  Movement Loss Symptoms Drew Mod Min Nil Symptoms   Flexion  x   pain   Extension x    pain   R SG x    pain   L SG x    pain   Other          Elvis Assessment: not performed           POC expires Unit limit Auth Expiration date PT/OT + Visit Limit?   10/25/24 N/a N/a BOMN              Pertinent Findings:      POC End Date: 10/25/24                                                                                          Test / Measure   9/25/2024   FOTO (Predicted np) np   VAS score at worst 10/10   Lumbar spine AROM Mod limitation flexion; Drew limitation extension           Precautions: n/a      Manuals 9/25                                                                Neuro Re-Ed                                                                                                        Ther Ex             LTR HEP            SLR HEP            Seated trunk flexion/extension HEP            Seated trunk rotation             Banded scapular retraction             Seated marches             Standing hip abd                          Ther Activity                                       Gait Training                                       Modalities

## 2024-09-25 NOTE — TELEPHONE ENCOUNTER
Patient came into the office today and wanted to know if a order can be place she should like to get a hearing test done    Please advise

## 2024-09-25 NOTE — TELEPHONE ENCOUNTER
Called and Left message on machine for edmond. Will need to know which type of placement patient is going (either skilled, personal Care, etc)

## 2024-09-26 ENCOUNTER — TELEPHONE (OUTPATIENT)
Dept: INTERNAL MEDICINE CLINIC | Age: 69
End: 2024-09-26

## 2024-10-02 DIAGNOSIS — E03.9 ACQUIRED HYPOTHYROIDISM: ICD-10-CM

## 2024-10-02 DIAGNOSIS — E78.2 MIXED HYPERLIPIDEMIA: ICD-10-CM

## 2024-10-02 DIAGNOSIS — G40.909 SEIZURE DISORDER (HCC): ICD-10-CM

## 2024-10-02 DIAGNOSIS — M81.0 OSTEOPOROSIS, UNSPECIFIED OSTEOPOROSIS TYPE, UNSPECIFIED PATHOLOGICAL FRACTURE PRESENCE: Primary | ICD-10-CM

## 2024-10-02 RX ORDER — LEVOTHYROXINE SODIUM 100 UG/1
TABLET ORAL
Qty: 31 TABLET | Refills: 0 | Status: SHIPPED | OUTPATIENT
Start: 2024-10-02

## 2024-10-02 RX ORDER — ATORVASTATIN CALCIUM 20 MG/1
TABLET, FILM COATED ORAL
Qty: 31 TABLET | Refills: 0 | Status: SHIPPED | OUTPATIENT
Start: 2024-10-02

## 2024-10-02 RX ORDER — PHENOBARBITAL 64.8 MG/1
TABLET ORAL
Qty: 62 TABLET | Refills: 5 | Status: SHIPPED | OUTPATIENT
Start: 2024-10-02

## 2024-10-02 NOTE — TELEPHONE ENCOUNTER
Medication:PHENobarbital  PDMP  09/12/2024 09/12/2024 L[facility]azep[facility] (Tablet) 90.0 30 1 [facility] 1 [place]   1 [id number] 09/12/2024 04/03/2024 PHENobarbital (Tablet) 60.0 30 64.8 MG NA LUIS FELIPE [facility] Commercial Insurance 5 / 5 [place]   1 [id number] 08/15/2024 08/07/2024 LORazepam (Tablet) 90.0 30 1 MG NA [facility] Commercial Insurance 0 / 1 [place]   1 [id number] 08/15/2024 04/03/2024 PHENobarbital 648 MG TABLET (non-liquid) 60.0 30 64.8 MG NA [facility] Insurance 4 / 5 [place]  Active agreement on file -No      Refill must be reviewed and completed by the office or provider. The refill is unable to be approved or denied by the medication management team.

## 2024-10-03 ENCOUNTER — OFFICE VISIT (OUTPATIENT)
Dept: PHYSICAL THERAPY | Facility: REHABILITATION | Age: 69
End: 2024-10-03
Payer: MEDICARE

## 2024-10-03 DIAGNOSIS — M54.50 ACUTE BILATERAL LOW BACK PAIN WITHOUT SCIATICA: Primary | ICD-10-CM

## 2024-10-03 PROCEDURE — 97110 THERAPEUTIC EXERCISES: CPT | Performed by: PHYSICAL THERAPIST

## 2024-10-03 NOTE — PROGRESS NOTES
Daily Note     Today's date: 10/3/2024  Patient name: Laine Tse  : 1955  MRN: [id number]  Referring provider: Erin Caldera PA-C  Dx:   Encounter Diagnosis     ICD-10-CM    1. Acute bilateral low back pain without sciatica  M54.50                      Subjective: Patient reports feeling better after last tx. She was unsure about how to perform exercises on her own at home.       Objective: See treatment diary below      Assessment: New exercises performed; patient preferred not to perform bridge exercise, it made her feel uncomfortable. Patient reports overall improvement in symptoms at the end of tx. She continues to require max cueing and constant supervision with exercises. A new handout was provided to patient with HEP. Patient states that she will try to perform exercises this week, if she has someone to help her. Patient would benefit from continued PT.       Plan: Continue per plan of care.      POC expires Unit limit Auth Expiration date PT/OT + Visit Limit?   10/25/24 N/a N/a BOMN              Pertinent Findings:      POC End Date: 10/25/24                                                                                          Test / Measure  2024   FOTO (Predicted np) np   VAS score at worst 10/10   Lumbar spine AROM Mod limitation flexion; Drew limitation extension           Precautions: n/a      Manuals 9/25 10/3                                                               Neuro Re-Ed             Bridge  10x                                                                                         Ther Ex             LTR HEP 10x ea           SLR HEP 10x ea           Seated trunk flexion/extension HEP 10x ea           Seated trunk rotation  10x ea; HEP           Banded scapular retraction             Hooklying marches  10x ea           Standing hip abd                          Ther Activity                                       Gait Training                                        Modalities

## 2024-10-10 ENCOUNTER — OFFICE VISIT (OUTPATIENT)
Dept: PHYSICAL THERAPY | Facility: REHABILITATION | Age: 69
End: 2024-10-10
Payer: MEDICARE

## 2024-10-10 ENCOUNTER — TELEPHONE (OUTPATIENT)
Age: 69
End: 2024-10-10

## 2024-10-10 DIAGNOSIS — M54.50 ACUTE BILATERAL LOW BACK PAIN WITHOUT SCIATICA: Primary | ICD-10-CM

## 2024-10-10 DIAGNOSIS — H91.93 BILATERAL HEARING LOSS, UNSPECIFIED HEARING LOSS TYPE: Primary | ICD-10-CM

## 2024-10-10 PROCEDURE — 97110 THERAPEUTIC EXERCISES: CPT | Performed by: PHYSICAL THERAPIST

## 2024-10-10 NOTE — TELEPHONE ENCOUNTER
Patient called to request a referral to audiology for [facility]. Please advise. She needs a hearing aid

## 2024-10-10 NOTE — PROGRESS NOTES
Daily Note     Today's date: 10/10/2024  Patient name: Laine Tse  : 1955  MRN: [id number]  Referring provider: Erin Caldera PA-C  Dx:   Encounter Diagnosis     ICD-10-CM    1. Acute bilateral low back pain without sciatica  M54.50                        Subjective: Patient reports feeling improvement with physical therapy. She did seated stretches this week.       Objective: See treatment diary below      Assessment: Patient tolerates tx well. Progressions in treatment are tolerated without increase in pain. Patient is more fatigued than previous visits today, which she attributes to the time of day. Next visit attempt exercise bike. Patient would benefit from continued PT.       Plan: Continue per plan of care.      POC expires Unit limit Auth Expiration date PT/OT + Visit Limit?   10/25/24 N/a N/a BOMN              Pertinent Findings:      POC End Date: 10/25/24                                                                                          Test / Measure  2024   FOTO (Predicted np) np   VAS score at worst 10/10   Lumbar spine AROM Mod limitation flexion; Drew limitation extension           Precautions: n/a      Manuals 9/25 10/3 10/10                                     Neuro Re-Ed                                                        Ther Ex        Nu-step    nv    LTR HEP 10x ea 20x ea     SLR HEP 10x ea 20x ea     Seated trunk flexion/extension HEP 10x ea 10x ea     Seated trunk rotation  10x ea; HEP 2x10 ea; 2kg med ball     Banded hor abd   10x; gtb     Hooklying marches  10x ea 20x ea     Hooklying clamshell   20x ea; btb     Standing hip abd                Ther Activity                        Gait Training                        Modalities

## 2024-10-11 DIAGNOSIS — M81.0 OSTEOPOROSIS, UNSPECIFIED OSTEOPOROSIS TYPE, UNSPECIFIED PATHOLOGICAL FRACTURE PRESENCE: ICD-10-CM

## 2024-10-11 RX ORDER — MULTIVITAMIN,THERAPEUTIC
1 TABLET ORAL DAILY
Qty: 30 TABLET | Refills: 5 | Status: SHIPPED | OUTPATIENT
Start: 2024-10-11

## 2024-10-22 ENCOUNTER — APPOINTMENT (OUTPATIENT)
Dept: AUDIOLOGY | Age: 69
End: 2024-10-22
Payer: MEDICARE

## 2024-10-22 ENCOUNTER — OFFICE VISIT (OUTPATIENT)
Dept: AUDIOLOGY | Age: 69
End: 2024-10-22
Payer: MEDICARE

## 2024-10-22 ENCOUNTER — APPOINTMENT (OUTPATIENT)
Dept: LAB | Age: 69
End: 2024-10-22
Payer: MEDICARE

## 2024-10-22 DIAGNOSIS — H91.93 BILATERAL HEARING LOSS, UNSPECIFIED HEARING LOSS TYPE: ICD-10-CM

## 2024-10-22 PROCEDURE — 92567 TYMPANOMETRY: CPT | Performed by: AUDIOLOGIST

## 2024-10-22 NOTE — PROGRESS NOTES
Diagnostic Hearing Evaluation    Name:  Laine Tse  :  1955  Age:  69 y.o.   MRN:  [id number]  Date of Evaluation: 10/22/24     HISTORY:     Reason for visit: Annual Hearing Test    Laine Tse is being seen today at the request of Dr. Yip for an annual evaluation of hearing. The patient reports issues hearing and understanding. The patient  denies otalgia and tinnitus.     EVALUATION:    Otoscopic Evaluation:   Right Ear: Non-occluding cerumen, TM view obscured    Left Ear: Non-occluding cerumen, TM view obscured     Tympanometry:   Right Ear: Type C; significant negative middle ear pressure in the presence of normal static compliance, consistent with Eustachian tube dysfunction or middle ear pathology.    Left Ear: Type C; significant negative middle ear pressure in the presence of normal static compliance, consistent with Eustachian tube dysfunction or middle ear pathology.     Pure Tone Audiometry:    Testing not completed due to wax build up and unhealthy middle ear function.     *see attached audiogram      RECOMMENDATIONS:  1 month hearing eval, Return to [facility]. for F/U, Ear Cleaning, and Copy to Patient/Caregiver    PATIENT EDUCATION:   The results of today's results and recommendations were reviewed with the patient and caregiver and her hearing thresholds were explained at length. Treatment options, including amplification and communication strategies, were discussed as appropriate. The patient and caregiver voiced understanding of her test results. Questions were addressed and the patient was encouraged to contact our department should concerns arise.      Sushil Goldman, CCC-A  Clinical Audiologist  [facility]  [address] [place] [address] [address]  [place] [address]

## 2024-10-23 ENCOUNTER — OFFICE VISIT (OUTPATIENT)
Dept: PHYSICAL THERAPY | Facility: REHABILITATION | Age: 69
End: 2024-10-23
Payer: MEDICARE

## 2024-10-23 DIAGNOSIS — M54.50 ACUTE BILATERAL LOW BACK PAIN WITHOUT SCIATICA: Primary | ICD-10-CM

## 2024-10-23 PROCEDURE — 97164 PT RE-EVAL EST PLAN CARE: CPT | Performed by: PHYSICAL THERAPIST

## 2024-10-23 PROCEDURE — 97110 THERAPEUTIC EXERCISES: CPT | Performed by: PHYSICAL THERAPIST

## 2024-10-23 NOTE — PROGRESS NOTES
PT Re-Evaluation    Today's date: 10/23/2024  Patient name: Laine Tse  : 1955  MRN: [id number]  Referring provider: Erin Caldera PA-C  Dx:   Encounter Diagnosis     ICD-10-CM    1. Acute bilateral low back pain without sciatica  M54.50                        Subjective: Patient reports that she had a lot of pain last week and was unable to come to her appointment. Patient reports overall reduction in pain since beginning physical therapy. She would like to continue with physical therapy to help improve pain and help improve her ability to do exercises independently. Reports 5/10 pain upon arrival. 6/10 pain at worst.       Objective: See treatment diary below      Assessment: Patient is progressing well with physical therapy. She is reporting improvement in pain with home exercises, and is experiencing less pain on a daily basis. She has been attempting to do more exercise during the day. FOTO score was not performed on initial evaluation and is not able to be tracked. She is able to achieve greater AROM of the lumbar spine compared to initial evaluation. Progressions in exercise volume have been made each visit and patient is doing well. I feel patient would benefit from additional physical therapy to addressing remaining pain. I do feel that patient is doing to struggle with adhering to a regular HEP due to cognitive deficits.       Goals  Impairment based goals  Patient will achieve 50% improvement in VAS score at worst. Progressing  Patient will achieve 50% improvement in lumbar spine AROM. Progressing    Function based goals  Patient will be independent in comprehensive HEP upon discharge. Progressing  Patient will achieve goal FOTO score upon discharge. N/a    Plan:   Patient would benefit from: skilled physical therapy    Planned therapy interventions: activity modification, manual therapy, motor coordination training, neuromuscular re-education, patient education, self care, therapeutic  activities, therapeutic exercise, graded activity, home exercise program, graded exercise, functional ROM exercises and strengthening    Frequency: 1x week  Duration in weeks: 4  Plan of Care expiration date: 11/22/2024  Treatment plan discussed with: patient       POC expires Unit limit Auth Expiration date PT/OT + Visit Limit?   11/22/24 N/a N/a BOMN              Pertinent Findings:      POC End Date: 11/22/24                                                                                          Test / Measure  9/25/2024 10/23/24   FOTO (Predicted np) np    VAS score at worst 10/10 6/10   Lumbar spine AROM Mod limitation flexion; Drew limitation extension Mod limitation flexion; mod limitation extension            Precautions: n/a      Manuals 9/25 10/3 10/10 10/23                                    Neuro Re-Ed                                                        Ther Ex        Nu-step    5 min; lvl 5    LTR HEP 10x ea 20x ea 20x ea    SLR HEP 10x ea 20x ea 20x ea    Seated trunk flexion/extension HEP 10x ea 10x ea     Seated trunk rotation  10x ea; HEP 2x10 ea; 2kg med ball 2x10 ea; 2kg med ball    Banded hor abd   10x; gtb     Hooklying marches  10x ea 20x ea 20x ea    Hooklying clamshell   20x ea; btb 20x ea; mtb    Standing hip abd                Ther Activity                        Gait Training                        Modalities

## 2024-10-30 ENCOUNTER — OFFICE VISIT (OUTPATIENT)
Dept: INTERNAL MEDICINE CLINIC | Age: 69
End: 2024-10-30
Payer: MEDICARE

## 2024-10-30 VITALS
HEART RATE: 80 BPM | BODY MASS INDEX: 32.4 KG/M2 | OXYGEN SATURATION: 98 % | WEIGHT: 190 LBS | TEMPERATURE: 98.5 F | SYSTOLIC BLOOD PRESSURE: 120 MMHG | DIASTOLIC BLOOD PRESSURE: 64 MMHG

## 2024-10-30 DIAGNOSIS — I10 BENIGN ESSENTIAL HYPERTENSION: ICD-10-CM

## 2024-10-30 DIAGNOSIS — Z23 ENCOUNTER FOR IMMUNIZATION: ICD-10-CM

## 2024-10-30 DIAGNOSIS — E78.2 MIXED HYPERLIPIDEMIA: ICD-10-CM

## 2024-10-30 DIAGNOSIS — H61.23 EXCESSIVE EAR WAX, BILATERAL: Primary | ICD-10-CM

## 2024-10-30 PROCEDURE — 99213 OFFICE O/P EST LOW 20 MIN: CPT

## 2024-10-30 PROCEDURE — 90662 IIV NO PRSV INCREASED AG IM: CPT

## 2024-10-30 PROCEDURE — G0008 ADMIN INFLUENZA VIRUS VAC: HCPCS

## 2024-10-30 NOTE — PROGRESS NOTES
Ambulatory Visit  Name: Laine Tse      : 1955      MRN: [id number]  Encounter Provider: Robbie Corrigan MD  Encounter Date: 10/30/2024   Encounter department: [facility]    Assessment & Plan  Excessive ear wax, bilateral  Patient unable to have hearing aids placed recently as she had excessive cerumen. Patient reports increased hearing loss over that time period as well requiring frequent repetition and louder volumes.   On exam patient with excessive cerumen bilaterally obscuring tympanic membrane. Unable to wash out with cleaning solution.  Orders:    Ambulatory Referral to Otolaryngology; Future    Encounter for immunization    Orders:    influenza vaccine, high-dose, PF 0.5 mL (Fluzone High Dose)    Benign essential hypertension  BP well controlled at today's visit.       Mixed hyperlipidemia  Continue atorvastatin 20 mg daily.             History of Present Illness     Ear Fullness   There is pain in both ears. This is a new problem. The current episode started more than 1 month ago. The problem occurs constantly. The problem has been gradually worsening. There has been no fever. The pain is at a severity of 0/10. The patient is experiencing no pain. Associated symptoms include ear discharge and hearing loss. Pertinent negatives include no abdominal pain, coughing, rash, sore throat or vomiting. She has tried nothing for the symptoms. Her past medical history is significant for hearing loss.       History obtained from : patient  Review of Systems   Constitutional:  Negative for chills and fever.   HENT:  Positive for ear discharge and hearing loss. Negative for ear pain and sore throat.    Eyes:  Negative for pain and visual disturbance.   Respiratory:  Negative for cough and shortness of breath.    Cardiovascular:  Negative for chest pain and palpitations.   Gastrointestinal:  Negative for abdominal pain and vomiting.   Genitourinary:  Negative for dysuria  and hematuria.   Musculoskeletal:  Negative for arthralgias and back pain.   Skin:  Negative for color change and rash.   Neurological:  Negative for syncope.   All other systems reviewed and are negative.          Objective     /64 (BP Location: Left arm, Patient Position: Sitting, Cuff Size: Standard)   Pulse 80   Temp 98.5 °F (36.9 °C) (Temporal)   Wt 86.2 kg (190 lb)   SpO2 98%   BMI 32.40 kg/m²     Physical Exam  Vitals and nursing note reviewed.   Constitutional:       General: She is not in acute distress.     Appearance: She is well-developed. She is obese.   HENT:      Head: Normocephalic and atraumatic.      Right Ear: External ear normal. There is impacted cerumen.      Left Ear: External ear normal. There is impacted cerumen.   Eyes:      Conjunctiva/sclera: Conjunctivae normal.   Cardiovascular:      Rate and Rhythm: Normal rate and regular rhythm.      Heart sounds: No murmur heard.  Pulmonary:      Effort: Pulmonary effort is normal. No respiratory distress.      Breath sounds: Normal breath sounds.   Abdominal:      Palpations: Abdomen is soft.      Tenderness: There is no abdominal tenderness.   Musculoskeletal:         General: No swelling.      Cervical back: Neck supple.   Skin:     General: Skin is warm and dry.      Capillary Refill: Capillary refill takes less than 2 seconds.   Neurological:      Mental Status: She is alert.   Psychiatric:         Mood and Affect: Mood normal.

## 2024-11-06 ENCOUNTER — OFFICE VISIT (OUTPATIENT)
Dept: PHYSICAL THERAPY | Facility: REHABILITATION | Age: 69
End: 2024-11-06
Payer: MEDICARE

## 2024-11-06 DIAGNOSIS — M54.50 ACUTE BILATERAL LOW BACK PAIN WITHOUT SCIATICA: Primary | ICD-10-CM

## 2024-11-06 PROCEDURE — 97110 THERAPEUTIC EXERCISES: CPT | Performed by: PHYSICAL THERAPIST

## 2024-11-06 NOTE — PROGRESS NOTES
Daily Note    Today's date: 2024  Patient name: Laine Tse  : 1955  MRN: [id number]  Referring provider: Erin Caldera PA-C  Dx:   Encounter Diagnosis     ICD-10-CM    1. Acute bilateral low back pain without sciatica  M54.50                        Subjective: Patient reports that her pain better since beginning physical therapy. She has been trying to do exercises every day.       Objective: See treatment diary below      Assessment: Patient tolerates tx well. She is able to complete 5 minutes on the bike, does require a rest break long-term though. Blood pressure is taken 2x throughout tx (118/80 mmHg both times). Exercise tolerance is improving. Patient has been feeling better with HEP performance. Plan to D/C to HEP next visit.       Plan: Continue per POC.        POC expires Unit limit Auth Expiration date PT/OT + Visit Limit?   24 N/a N/a BOMN              Pertinent Findings:      POC End Date: 24                                                                                          Test / Measure  2024 10/23/24   FOTO (Predicted np) np    VAS score at worst 10/10 6/10   Lumbar spine AROM Mod limitation flexion; Drew limitation extension Mod limitation flexion; mod limitation extension            Precautions: n/a      Manuals 9/25 10/3 10/10 10/23 11/6                                   Neuro Re-Ed                                                        Ther Ex        Nu-step    5 min; lvl 5 5 min; lvl 5   LTR HEP 10x ea 20x ea 20x ea 20x ea   SLR HEP 10x ea 20x ea 20x ea 20x ea   Seated trunk flexion/extension HEP 10x ea 10x ea     Seated trunk rotation  10x ea; HEP 2x10 ea; 2kg med ball 2x10 ea; 2kg med ball 2x10 ea; 2kg med ball   Banded hor abd   10x; gtb  20x; gtb   Hooklying marches  10x ea 20x ea 20x ea 20x ea   Hooklying clamshell   20x ea; btb 20x ea; mtb Patient deferred   Standing hip abd                Ther Activity                        Gait Training                         Modalities

## 2024-11-13 ENCOUNTER — OFFICE VISIT (OUTPATIENT)
Dept: PHYSICAL THERAPY | Facility: REHABILITATION | Age: 69
End: 2024-11-13
Payer: MEDICARE

## 2024-11-13 DIAGNOSIS — M54.50 ACUTE BILATERAL LOW BACK PAIN WITHOUT SCIATICA: Primary | ICD-10-CM

## 2024-11-13 PROCEDURE — 97110 THERAPEUTIC EXERCISES: CPT | Performed by: PHYSICAL THERAPIST

## 2024-11-13 NOTE — PROGRESS NOTES
PT Discharge    Today's date: 2024  Patient name: Laine Tse  : 1955  MRN: [id number]  Referring provider: Erin Caldera PA-C  Dx:   Encounter Diagnosis     ICD-10-CM    1. Acute bilateral low back pain without sciatica  M54.50                          Subjective: Patient reports that she has been doing stretches at home and feels better doing them. She reports that her back hurts today and feels off balance.       Objective: See treatment diary below      Assessment:Laine Tse has been compliant with attending PT and home exercise program since initial eval.  Laine  has made improvements in objective data since initial evalulation and is experiencing much less pain. Patient provided with updated Home Exercise Program, all questions answered, verbalized understanding and agreement to plan of care. Thus it was mutually decided to discontinue this episode of care and transition to Home Exercise Program.    Plan: D/C to HEP.        POC expires Unit limit Auth Expiration date PT/OT + Visit Limit?   24 N/a N/a BOMN              Pertinent Findings:      POC End Date: 24                                                                                          Test / Measure  2024 10/23/24   FOTO (Predicted np) np    VAS score at worst 10/10 6/10   Lumbar spine AROM Mod limitation flexion; Drew limitation extension Mod limitation flexion; mod limitation extension            Precautions: n/a      Manuals 9/25 10/3 10/10 10/23 11/6 11/13                                       Neuro Re-Ed                                                               Ther Ex         Nu-step    5 min; lvl 5 5 min; lvl 5 5 min; lvl 5   LTR HEP 10x ea 20x ea 20x ea 20x ea 20x ea   SLR HEP 10x ea 20x ea 20x ea 20x ea 20x ea   Seated trunk flexion/extension HEP 10x ea 10x ea      Seated trunk rotation  10x ea; HEP 2x10 ea; 2kg med ball 2x10 ea; 2kg med ball 2x10 ea; 2kg med ball 2x10 ea; 2kg med ball    Banded hor abd   10x; gtb  20x; gtb 20x; gtb   Hooklying marches  10x ea 20x ea 20x ea 20x ea 20x ea   Hooklying clamshell   20x ea; btb 20x ea; mtb Patient deferred Patient deferred   Standing hip abd                  Ther Activity                           Gait Training                           Modalities

## 2024-11-21 ENCOUNTER — OFFICE VISIT (OUTPATIENT)
Dept: INTERNAL MEDICINE CLINIC | Age: 69
End: 2024-11-21
Payer: MEDICARE

## 2024-11-21 VITALS
OXYGEN SATURATION: 96 % | WEIGHT: 181 LBS | SYSTOLIC BLOOD PRESSURE: 118 MMHG | TEMPERATURE: 97.2 F | DIASTOLIC BLOOD PRESSURE: 74 MMHG | BODY MASS INDEX: 30.9 KG/M2 | HEIGHT: 64 IN | HEART RATE: 78 BPM

## 2024-11-21 DIAGNOSIS — M54.50 CHRONIC MIDLINE LOW BACK PAIN WITHOUT SCIATICA: Primary | ICD-10-CM

## 2024-11-21 DIAGNOSIS — R05.9 COUGH: ICD-10-CM

## 2024-11-21 DIAGNOSIS — G89.29 CHRONIC MIDLINE LOW BACK PAIN WITHOUT SCIATICA: Primary | ICD-10-CM

## 2024-11-21 DIAGNOSIS — M54.50 ACUTE BILATERAL LOW BACK PAIN WITHOUT SCIATICA: ICD-10-CM

## 2024-11-21 DIAGNOSIS — H91.93 BILATERAL HEARING LOSS, UNSPECIFIED HEARING LOSS TYPE: ICD-10-CM

## 2024-11-21 DIAGNOSIS — N39.3 STRESS INCONTINENCE OF URINE: ICD-10-CM

## 2024-11-21 DIAGNOSIS — L60.8 TOENAIL DEFORMITY: ICD-10-CM

## 2024-11-21 DIAGNOSIS — F25.9 SCHIZOAFFECTIVE DISORDER, UNSPECIFIED TYPE (HCC): ICD-10-CM

## 2024-11-21 PROCEDURE — 99214 OFFICE O/P EST MOD 30 MIN: CPT | Performed by: PHYSICIAN ASSISTANT

## 2024-11-21 RX ORDER — GUAIFENESIN/DEXTROMETHORPHAN 100-10MG/5
5 SYRUP ORAL 2 TIMES DAILY PRN
Qty: 100 ML | Refills: 0 | Status: SHIPPED | OUTPATIENT
Start: 2024-11-21

## 2024-11-21 RX ORDER — MENTHOL 1.4 %
1 ADHESIVE PATCH, MEDICATED TOPICAL DAILY
Qty: 30 PATCH | Refills: 2 | Status: SHIPPED | OUTPATIENT
Start: 2024-11-21

## 2024-11-21 RX ORDER — DIAPER,BRIEF,ADULT, DISPOSABLE
EACH MISCELLANEOUS EVERY 12 HOURS
Qty: 36 EACH | Refills: 6 | Status: SHIPPED | OUTPATIENT
Start: 2024-11-21

## 2024-11-21 NOTE — ASSESSMENT & PLAN NOTE
Pt requests to resume PT   Uses Bengay patches prn, reordered   Orders:    Ambulatory Referral to Physical Therapy; Future     Carac Counseling:  I discussed with the patient the risks of Carac including but not limited to erythema, scaling, itching, weeping, crusting, and pain.

## 2024-11-21 NOTE — PROGRESS NOTES
Name: Laine Tse      : 1955      MRN: [id number]  Encounter Provider: Erin Caldera PA-C  Encounter Date: 2024   Encounter department: [facility]  :  Assessment & Plan  Cough  Pt request refill of robitussin   Orders:    dextromethorphan-guaiFENesin (ROBITUSSIN DM)  mg/5 mL syrup; Take 5 mL by mouth 2 (two) times a day as needed for cough    Acute bilateral low back pain without sciatica    Orders:    Menthol, Topical Analgesic, (Bengay Ultra Strength) 5 % PTCH; Apply 1 patch topically in the morning    Chronic midline low back pain without sciatica  Pt requests to resume PT   Uses Bengay patches prn, reordered   Orders:    Ambulatory Referral to Physical Therapy; Future    Bilateral hearing loss, unspecified hearing loss type  Cerumen removed by ENT  Reschedule audiology exam   Orders:    Ambulatory Referral to Audiology; Future    Schizoaffective disorder, unspecified type (HCC)         Toenail deformity  [facility]  Pt aid reports they have scheduled a podiatry f/u for the same  Pt denies pain in toenails or toes        Stress incontinence of urine    Orders:    Incontinence Supply Disposable (Disposable Brief Large) MISC; Use every 12 (twelve) hours           History of Present Illness     68 y/o female with hx of schizoaffective d/o, seizure d/o, htn, hld, chronic back pain   Presents to office with aid   Pt would like to resume PT for the her low back pain   Uses bengay patches and requests refill of this    Went to urgent care - [facility] due to toenail pain in R foot  Nail trimmed - pain resolved  Pt states she has not seen podiatry recently but apt was scheduled following this problem    Pt needs hearing test, will schedule with audiology   Cerumen removed by ENT      Review of Systems   Constitutional:  Negative for activity change, appetite change, chills and fever.   HENT:  Negative for congestion and sore throat.   "  Eyes:  Negative for pain and redness.   Respiratory:  Positive for cough. Negative for shortness of breath and wheezing.    Cardiovascular:  Negative for chest pain and leg swelling.   Gastrointestinal:  Negative for abdominal pain, constipation and diarrhea.   Musculoskeletal:  Positive for back pain and gait problem. Negative for arthralgias.   Skin:  Negative for rash and wound.   Neurological:  Negative for dizziness and headaches.   Psychiatric/Behavioral:  Negative for sleep disturbance. The patient is nervous/anxious.      Medical History Reviewed by provider this encounter:  Tobacco  Allergies  Meds  Problems  Med Hx  Surg Hx  Fam Hx     .     Objective   /74 (BP Location: Left arm, Patient Position: Sitting, Cuff Size: Large)   Pulse 78   Temp (!) 97.2 °F (36.2 °C) (Temporal)   Ht 5' 4\" (1.626 m)   Wt 82.1 kg (181 lb)   SpO2 96%   BMI 31.07 kg/m²      Physical Exam  Vitals reviewed.   HENT:      Head: Normocephalic.      Nose: Nose normal.   Eyes:      Conjunctiva/sclera: Conjunctivae normal.   Cardiovascular:      Rate and Rhythm: Normal rate and regular rhythm.   Pulmonary:      Effort: Pulmonary effort is normal. No respiratory distress.      Breath sounds: Normal breath sounds. No wheezing, rhonchi or rales.   Musculoskeletal:         General: No deformity.      Right lower leg: No edema.      Left lower leg: No edema.      Comments: Toenail R foot - 2nd toe with thickened brittle nail - no associated redness or paronychia  No pain in nails or cuticles    Skin:     Findings: No erythema or rash.   Neurological:      Mental Status: She is alert. Mental status is at baseline.       Administrative Statements   I have spent a total time of 16 minutes in caring for this patient on the day of the visit/encounter including Instructions for management, Documenting in the medical record, Reviewing / ordering tests, medicine, procedures  , and Obtaining or reviewing history  . Topics " discussed with the patient / family include symptom assessment and management and medication review.

## 2024-11-25 ENCOUNTER — TELEPHONE (OUTPATIENT)
Age: 69
End: 2024-11-25

## 2024-11-25 DIAGNOSIS — G89.29 CHRONIC MIDLINE LOW BACK PAIN WITHOUT SCIATICA: Primary | ICD-10-CM

## 2024-11-25 DIAGNOSIS — M54.50 CHRONIC MIDLINE LOW BACK PAIN WITHOUT SCIATICA: Primary | ICD-10-CM

## 2024-11-25 NOTE — TELEPHONE ENCOUNTER
Physical therapy [facility] had called in, and stated that the patient was requesting to have [facility] physical therapy.     They had mentioned the script would need to be adjusted to state [facility]a therapy. Please advise out to patient once the script was able to be switched, or if there is any additional information needed.

## 2024-12-03 DIAGNOSIS — E03.9 ACQUIRED HYPOTHYROIDISM: ICD-10-CM

## 2024-12-03 DIAGNOSIS — E78.2 MIXED HYPERLIPIDEMIA: ICD-10-CM

## 2024-12-04 RX ORDER — ATORVASTATIN CALCIUM 20 MG/1
TABLET, FILM COATED ORAL
Qty: 31 TABLET | Refills: 5 | Status: SHIPPED | OUTPATIENT
Start: 2024-12-04

## 2024-12-04 RX ORDER — LEVOTHYROXINE SODIUM 100 UG/1
TABLET ORAL
Qty: 31 TABLET | Refills: 5 | Status: SHIPPED | OUTPATIENT
Start: 2024-12-04

## 2024-12-05 ENCOUNTER — OFFICE VISIT (OUTPATIENT)
Dept: AUDIOLOGY | Age: 69
End: 2024-12-05
Payer: MEDICARE

## 2024-12-05 DIAGNOSIS — H91.93 BILATERAL HEARING LOSS, UNSPECIFIED HEARING LOSS TYPE: ICD-10-CM

## 2024-12-05 DIAGNOSIS — H90.3 SENSORY HEARING LOSS, BILATERAL: Primary | ICD-10-CM

## 2024-12-05 PROCEDURE — 92556 SPEECH AUDIOMETRY COMPLETE: CPT

## 2024-12-05 PROCEDURE — 92552 PURE TONE AUDIOMETRY AIR: CPT

## 2024-12-05 PROCEDURE — 92567 TYMPANOMETRY: CPT

## 2024-12-05 NOTE — PROGRESS NOTES
Diagnostic Hearing Evaluation    Name:  Laine Tse  :  1955  Age:  69 y.o.   MRN:  [id number]  Date of Evaluation: 24     HISTORY:     Reason for visit: Known Hearing Loss binaurally    Laine Tse is being seen today at the request of Dr. Caldera for an annual evaluation of hearing. The Patient had wax removed. She has a known normal to moderate mixed hearing loss    EVALUATION:    Otoscopic Evaluation:   Right Ear: Unremarkable, canal clear   Left Ear: Unremarkable, canal clear    Tympanometry:   Right Ear: Type As, normal middle ear pressure with decreased static compliance, consistent with a hypomobile tympanic membrane.    Left Ear: Type As, normal middle ear pressure with decreased static compliance, consistent with a hypomobile tympanic membrane.     Speech Audiometry:  Speech Reception (SRT)    Right Ear: 40 dB HL    Left Ear: 35 dB HL    Word Recognition Scores (WRS):  Right Ear: fair (6/10 % correct)     Left Ear: fair (6/10 % correct)    Stimuli: [id number]    Pure Tone Audiometry:  Conventional pure tone audiometry from 250 - 8000 Hz  was obtained with fair reliability and revealed the following:     Right Ear: normal to moderately severe likely sensorineural hearing loss   Left Ear: normal to moderate likely sensorineural hearing loss     Patient fatigued before bone could be performed.    *see attached audiogram      RECOMMENDATIONS:  Annual hearing eval, Return to [facility]. for F/U, Hearing Aid Evaluation, and Copy to Patient/Caregiver    PATIENT EDUCATION:   The results of today's results and recommendations were reviewed with the patient and her hearing thresholds were explained at length. Treatment options, including amplification and communication strategies, were discussed as appropriate. The patient voiced understanding of her test results. Questions were addressed and the patient was encouraged to contact our department should concerns arise.    Patient medicaid is inactive. She was  advised to start her medicaid again and pick a plan with an insurance benefit. It was explained that hearing out of pocket would start at $3,000.00. Laine was very frustrated today and wanted me to speak to her representative. Caregiver advised her to call her representative when she got home.    Christiano Reese., [facility]  Clinical Audiologist  [facility]  [address] [id number]

## 2024-12-06 DIAGNOSIS — G47.00 INSOMNIA, UNSPECIFIED TYPE: ICD-10-CM

## 2024-12-06 DIAGNOSIS — J30.2 SEASONAL ALLERGIES: ICD-10-CM

## 2024-12-06 DIAGNOSIS — L85.3 DRY SKIN: ICD-10-CM

## 2024-12-06 RX ORDER — CETIRIZINE HYDROCHLORIDE 5 MG/1
TABLET ORAL
Qty: 30 TABLET | Refills: 5 | Status: SHIPPED | OUTPATIENT
Start: 2024-12-06

## 2024-12-09 RX ORDER — DIPHENHYDRAMINE HCL 50 MG
CAPSULE ORAL
Qty: 30 CAPSULE | Refills: 1 | Status: SHIPPED | OUTPATIENT
Start: 2024-12-09

## 2024-12-09 RX ORDER — VIT E ACET/GLY/DIMETH/WATER
LOTION (ML) TOPICAL AS NEEDED
Qty: 237 ML | Refills: 0 | Status: SHIPPED | OUTPATIENT
Start: 2024-12-09

## 2024-12-09 RX ORDER — VIT E ACET/GLY/DIMETH/WATER
LOTION (ML) TOPICAL
Qty: 237 ML | Refills: 1 | Status: SHIPPED | OUTPATIENT
Start: 2024-12-09

## 2024-12-10 ENCOUNTER — EVALUATION (OUTPATIENT)
Dept: PHYSICAL THERAPY | Age: 69
End: 2024-12-10
Payer: MEDICARE

## 2024-12-10 DIAGNOSIS — M54.50 ACUTE BILATERAL LOW BACK PAIN WITHOUT SCIATICA: Primary | ICD-10-CM

## 2024-12-10 DIAGNOSIS — G89.29 CHRONIC MIDLINE LOW BACK PAIN WITHOUT SCIATICA: ICD-10-CM

## 2024-12-10 DIAGNOSIS — M54.50 CHRONIC MIDLINE LOW BACK PAIN WITHOUT SCIATICA: ICD-10-CM

## 2024-12-10 PROCEDURE — 97162 PT EVAL MOD COMPLEX 30 MIN: CPT | Performed by: PHYSICAL THERAPIST

## 2024-12-10 PROCEDURE — 97110 THERAPEUTIC EXERCISES: CPT | Performed by: PHYSICAL THERAPIST

## 2024-12-10 NOTE — PROGRESS NOTES
"PT Evaluation     Today's date: 12/10/2024  Patient name: Laine Tse  : 1955  MRN: [id number]  Referring provider: Erin Caldera PA-C  Dx:   Encounter Diagnosis     ICD-10-CM    1. Acute bilateral low back pain without sciatica  M54.50                      Assessment  Impairments: abnormal gait, abnormal or restricted ROM, abnormal movement, activity intolerance, impaired physical strength, lacks appropriate home exercise program and pain with function    Assessment details: PT IE: 12/10/24  Patient reported she has constant bilateral lumbar spine pain without sciatica symptoms.  Patient noted she was very active her younger life.  Patient noted the following deficits that persist due to lumbar spine region pain: exercise, balance, standing, walking, stair climbing and transfers.  Patient noted she is very stiff as well as lumbar spine.  Patient noted stiffness in bilateral lumbar spine region  Patient noted she uses spc for balance and stability.  Patient denies bilateral lower extremity paresthesias.  Patient noted her back is weak.  Patient noted use of lumbar spine exercises seated with lumbar spine flexion and rotation as pain reducing. Patient denies shoe donning and doffing as pain limiting.  Patient noted walking, and stair climbing is pain aggravating.  Patient noted sleeping on a hard surface is better. Patient noted sleep is deprived due to lumbar spine pain.  Patient noted \" she does not mind standing activities as long as she has energy\".  Patient noted activities that takes her mid off of her pain is pain reducing, as well as yoga stretching.       Jennifer Purvis attended Laine to PT IE.  Understanding of Dx/Px/POC: excellent     Prognosis: good  Prognosis details: Patient is a 69 y.o. year old female seen for outpatient PT evaluation with pain, mobility and functional deficits due to Acute bilateral low back pain without sciatica [M54.50] . Patient presents to PT IE with the following " problems, concerns, deficits and impairments: lumbar spine region pain, decreased lumbar spine region range of motion, decreased bilateral lower extremity mobility and strength, gait and transfer dysfunctions, stair dysfunction, functional limitations and decreased tolerance to activity.  Patient would benefit from skilled PT services under the following PT treatment plan to address the above noted deficits: therapeutic exercises and activities to facilitate lumbar spine mobility and bilateral lower extremity mobility and strength, modalities, manual therapy techniques, postural reeducation and strengthening, DLS and abdominal strengthening, balance and proprioception activities, IASTM techniques, gait and stair training, transfer training, traction and a hep.  Thank you for the referral.     Goals  Short Term goals 4 - 6 weeks  1.  Patient will be independent HEP.   2.  Patient will report a 25 - 50% decrease in pain complaints.  3.  Increase strength 1/2 grade.  4.  Increase ROM 5-10 degrees.    Long Term goals 8 - 12 weeks  1.  Patient will report elimination of pain complaints.  2.  Patient will return to all recreational activities without restriction.  3.  ROM WFL.  4.  Strength 5/5.  5.  Patient will report ability to walk in her home without lumbar spine pain aggravation.  6.  Patient will report she is able to perform stairs to get in and out of the pool without lumbar spine pain aggravation.  7.  Patient will report ability to perform self iadls in standing without lumbar spine pain aggravation.  8.  Patient will report ability to perform all transfers without lumbar spine pain aggravation.  9.  Patient will report ability to perform house hold exercises without lumbar spine pain aggravation.          Plan  Patient would benefit from: skilled physical therapy and PT eval  Planned modality interventions: low level laser therapy, manual electrical stimulation, TENS, thermotherapy: hydrocollator packs,  ultrasound, unattended electrical stimulation, cryotherapy and electrical stimulation/Russian stimulation    Planned therapy interventions: IASTM, joint mobilization, kinesiology taping, manual therapy, massage, balance, balance/weight bearing training, neuromuscular re-education, postural training, body mechanics training, self care, compression, strengthening, stretching, therapeutic activities, therapeutic exercise, therapeutic training, transfer training, flexibility, functional ROM exercises, gait training, graded activity, graded exercise, graded motor, home exercise program, IADL retraining and aquatic therapy    Frequency: 2x week  Duration in weeks: 12  Treatment plan discussed with: patient      Subjective Evaluation    History of Present Illness  Mechanism of injury: Patient's PMHx is remarkable for HTN, Hypothyroid, Seizure Disorder, Osteoporosis, Anemia, Unsteady gait, Insomnia, and Folic Acid Deficiency.        LUMBAR SPINE     INDICATION:   Low back pain, unspecified. Other chronic pain.      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      There is no evidence of acute fracture or destructive osseous lesion.     Grade I spondylolithesis L4 on L5.      Age-appropriate lumbar degenerative changes are seen.     The pedicles appear intact.     Soft tissues are unremarkable.     IMPRESSION:        No acute osseous abnormality.       Degenerative changes as described.         Patient Goals  Patient goals for therapy: decreased pain, increased motion, increased strength, independence with ADLs/IADLs, return to sport/leisure activities and improved balance    Pain  At best pain ratin  At worst pain rating: 10  Location: lumbar spine        Objective     Active Range of Motion   Cervical/Thoracic Spine       Thoracic    Flexion: 68 degrees   Extension: 16 degrees       Left Hip   Flexion: 64 degrees   Abduction: 18 degrees     Right Hip   Flexion: 66 degrees   Abduction: 22 degrees     Strength/Myotome Testing     Left Hip   Planes of Motion   Flexion: 3+  Extension: 3+  Abduction: 3+  Adduction: 4-    Right Hip   Planes of Motion   Flexion: 3+  Extension: 3+  Abduction: 4-  Adduction: 4-    Left Knee   Flexion: 4-  Extension: 4-    Right Knee   Flexion: 4-  Extension: 4    Left Ankle/Foot   Dorsiflexion: 4-  Plantar flexion: 4    Right Ankle/Foot   Dorsiflexion: 4-  Plantar flexion: 4    Ambulation     Ambulation: Level Surfaces   Ambulation with assistive device: independent  Ambulation without assistive device: contact guard assist    Additional Level Surfaces Ambulation Details  Patient ambulates with spc with bilateral antalgic gait pattern with decrease in left stance phase in right swing phase.    Ambulation: Stairs   Ascend stairs: independent  Pattern: non-reciprocal  Railings: two rails  Descend stairs: independent  Pattern: non-reciprocal  Railings: two rails             Precautions: Patient's PMHx is remarkable for HTN, Hypothyroid, Seizure Disorder, Osteoporosis, Anemia, Unsteady gait, Insomnia, and Folic Acid Deficiency.        Manuals 12/10                                                                Neuro Re-Ed                          Repeated seated lumbar spine flexion  2 x hep                                                                             Ther Ex                          Water walking             Seated heel raises and toe raises:B:             LAQ:B:             Seated hip flexion:B:             Seated postural correction slouch over correct             Seated knee flexion on top step                          Standing slr x 3:B:             Standing hamstring curls:B:             Standing HR and TR:B:             Mini squats             Lunges:B:             Side stepping and tandem walking             Forward step ups:B:                                                                                                                                                                          Ther Activity                                       Gait Training                                       Modalities

## 2025-01-02 DIAGNOSIS — J30.2 SEASONAL ALLERGIES: ICD-10-CM

## 2025-01-02 RX ORDER — FLUTICASONE PROPIONATE 50 MCG
2 SPRAY, SUSPENSION (ML) NASAL DAILY
Qty: 16 G | Refills: 3 | Status: SHIPPED | OUTPATIENT
Start: 2025-01-02

## 2025-01-02 NOTE — TELEPHONE ENCOUNTER
Reason for call:   [x] Refill   [] Prior Auth  [] Other:     Office:   [x] PCP/Provider - [facility]  Authorized By: Adilson Yip MD    [] Specialty/Provider -     Medication: fluticasone (FLONASE) 50 mcg/act nasal spray    Dose/Frequency: INSTILL 2 SPRAYS IN EACH NOSTRIL DAILY @ 8AM(ALLERGIES)     Quantity: 16 g    Pharmacy: [place], [place] - [address]     Does the patient have enough for 3 days?   [] Yes   [x] No - Send as HP to POD

## 2025-01-15 ENCOUNTER — RA CDI HCC (OUTPATIENT)
Dept: OTHER | Facility: HOSPITAL | Age: 70
End: 2025-01-15

## 2025-01-15 DIAGNOSIS — R05.9 COUGH: ICD-10-CM

## 2025-01-16 RX ORDER — GUAIFENESIN/DEXTROMETHORPHAN 100-10MG/5
5 SYRUP ORAL 2 TIMES DAILY PRN
Qty: 100 ML | Refills: 0 | Status: SHIPPED | OUTPATIENT
Start: 2025-01-16

## 2025-01-22 ENCOUNTER — OFFICE VISIT (OUTPATIENT)
Dept: INTERNAL MEDICINE CLINIC | Age: 70
End: 2025-01-22
Payer: MEDICARE

## 2025-01-22 VITALS
HEART RATE: 72 BPM | SYSTOLIC BLOOD PRESSURE: 122 MMHG | DIASTOLIC BLOOD PRESSURE: 80 MMHG | HEIGHT: 64 IN | OXYGEN SATURATION: 97 % | WEIGHT: 182 LBS | TEMPERATURE: 97.8 F | BODY MASS INDEX: 31.07 KG/M2

## 2025-01-22 DIAGNOSIS — J20.9 ACUTE BRONCHITIS, UNSPECIFIED ORGANISM: ICD-10-CM

## 2025-01-22 DIAGNOSIS — F25.9 SCHIZOAFFECTIVE DISORDER, UNSPECIFIED TYPE (HCC): ICD-10-CM

## 2025-01-22 DIAGNOSIS — G40.909 SEIZURE DISORDER (HCC): ICD-10-CM

## 2025-01-22 DIAGNOSIS — I10 BENIGN ESSENTIAL HYPERTENSION: Primary | ICD-10-CM

## 2025-01-22 DIAGNOSIS — E03.9 ACQUIRED HYPOTHYROIDISM: ICD-10-CM

## 2025-01-22 DIAGNOSIS — F31.77 BIPOLAR DISORDER, IN PARTIAL REMISSION, MOST RECENT EPISODE MIXED (HCC): ICD-10-CM

## 2025-01-22 DIAGNOSIS — E78.2 MIXED HYPERLIPIDEMIA: ICD-10-CM

## 2025-01-22 PROCEDURE — G2211 COMPLEX E/M VISIT ADD ON: HCPCS | Performed by: INTERNAL MEDICINE

## 2025-01-22 PROCEDURE — 99214 OFFICE O/P EST MOD 30 MIN: CPT | Performed by: INTERNAL MEDICINE

## 2025-01-22 RX ORDER — AZITHROMYCIN 250 MG/1
TABLET, FILM COATED ORAL
Qty: 6 TABLET | Refills: 0 | Status: SHIPPED | OUTPATIENT
Start: 2025-01-22 | End: 2025-01-26

## 2025-01-22 RX ORDER — DEXTROMETHORPHAN HYDROBROMIDE, GUAIFENESIN 20; 200 MG/10ML; MG/10ML
SOLUTION ORAL
COMMUNITY
Start: 2025-01-16

## 2025-01-22 NOTE — ASSESSMENT & PLAN NOTE
Continue with present dose of levothyroxine.  Will check thyroid function test before next visit  Orders:    TSH, 3rd generation with Free T4 reflex; Future

## 2025-01-22 NOTE — ASSESSMENT & PLAN NOTE
Blood pressure is stable on present regimen.  Orders:    CBC; Future    Comprehensive metabolic panel; Future

## 2025-01-22 NOTE — ASSESSMENT & PLAN NOTE
Continue with present dose of statin along with low-fat diet.  Will check lipid profile before next visit  Orders:    Lipid Panel with Direct LDL reflex; Future

## 2025-01-22 NOTE — PROGRESS NOTES
Name: Laine Tse      : 1955      MRN: [id number]  Encounter Provider: Adilson Yip MD  Encounter Date: 2025   Encounter department: [facility]  :  Assessment & Plan  Benign essential hypertension  Blood pressure is stable on present regimen.  Orders:    CBC; Future    Comprehensive metabolic panel; Future    Acquired hypothyroidism  Continue with present dose of levothyroxine.  Will check thyroid function test before next visit  Orders:    TSH, 3rd generation with Free T4 reflex; Future    Seizure disorder (HCC)  Continue with present regimen       Schizoaffective disorder, unspecified type (HCC)  Managed by psychiatrist       Bipolar disorder, in partial remission, most recent episode mixed (HCC)  Managed by psychiatrist       Mixed hyperlipidemia  Continue with present dose of statin along with low-fat diet.  Will check lipid profile before next visit  Orders:    Lipid Panel with Direct LDL reflex; Future    Acute bronchitis, unspecified organism  Z-Lalo prescribed and can take Robitussin cough syrup as needed for cough  Orders:    azithromycin (ZITHROMAX) 250 mg tablet; Take 2 tablets today then 1 tablet daily x 4 days          Urinary Incontinence Plan of Care: counseling topics discussed: practice Kegel (pelvic floor strengthening) exercises, limiting fluid intake 3-4 hours before bed and preventing constipation.       History of Present Illness   Patient is here for follow-up.  Also had blood work done last month and would like to discuss.  Complaining of cough with yellowish-green phlegm over the last 4 to 5 days.  Gradually getting worse.  No fever or chills    Cough  Pertinent negatives include no chest pain, ear pain, fever, headaches, postnasal drip, rash, sore throat or shortness of breath. There is no history of environmental allergies.     Review of Systems   Constitutional:  Negative for fatigue and fever.   HENT:  Positive for congestion.  "Negative for ear discharge, ear pain, postnasal drip, sinus pressure, sore throat, tinnitus and trouble swallowing.    Eyes:  Negative for discharge, itching and visual disturbance.   Respiratory:  Positive for cough. Negative for shortness of breath.    Cardiovascular:  Negative for chest pain and palpitations.   Gastrointestinal:  Negative for abdominal pain, diarrhea, nausea and vomiting.   Endocrine: Negative for cold intolerance and polyuria.   Genitourinary:  Negative for difficulty urinating, dysuria and urgency.   Musculoskeletal:  Negative for arthralgias and neck pain.   Skin:  Negative for rash.   Allergic/Immunologic: Negative for environmental allergies.   Neurological:  Negative for dizziness, weakness and headaches.   Psychiatric/Behavioral:  The patient is not nervous/anxious.        Objective   /80 (BP Location: Left arm, Patient Position: Sitting, Cuff Size: Standard)   Pulse 72   Temp 97.8 °F (36.6 °C) (Temporal)   Ht 5' 4\" (1.626 m)   Wt 82.6 kg (182 lb)   SpO2 97%   BMI 31.24 kg/m²      Physical Exam  Constitutional:       General: She is not in acute distress.     Appearance: She is well-developed. She is obese.   HENT:      Head: Normocephalic.      Right Ear: External ear normal.      Left Ear: External ear normal.      Nose: No rhinorrhea.      Mouth/Throat:      Pharynx: No oropharyngeal exudate or posterior oropharyngeal erythema.   Eyes:      General: No scleral icterus.     Pupils: Pupils are equal, round, and reactive to light.   Neck:      Thyroid: No thyromegaly.      Trachea: No tracheal deviation.   Cardiovascular:      Rate and Rhythm: Normal rate and regular rhythm.      Heart sounds: Normal heart sounds.   Pulmonary:      Effort: Pulmonary effort is normal. No respiratory distress.      Breath sounds: Normal breath sounds. No rhonchi.   Chest:      Chest wall: No tenderness.   Abdominal:      General: Bowel sounds are normal.      Palpations: Abdomen is soft. There is " no mass.      Tenderness: There is no abdominal tenderness.   Musculoskeletal:         General: Normal range of motion.      Cervical back: Normal range of motion and neck supple. No rigidity.      Right lower leg: No edema.      Left lower leg: No edema.   Lymphadenopathy:      Cervical: No cervical adenopathy.   Skin:     General: Skin is warm.      Findings: No erythema or rash.   Neurological:      Mental Status: She is alert.      Cranial Nerves: No cranial nerve deficit.

## 2025-01-23 ENCOUNTER — OFFICE VISIT (OUTPATIENT)
Dept: AUDIOLOGY | Age: 70
End: 2025-01-23

## 2025-01-23 DIAGNOSIS — H90.3 SENSORY HEARING LOSS, BILATERAL: Primary | ICD-10-CM

## 2025-01-23 NOTE — PROGRESS NOTES
Progress Note    Name:  Laine Tse  :  1955  Age:  69 y.o.  MRN:  [id number]  Date of Evaluation: 25     Patient arrived believing her hearing aids were here. After it was explained that we did not pick out a hearing aid yet, the patient became very frustrated and claimed that people are lying about her money.    An Intent 4 mini[employer] was recommended. Patient was advised that we are unable to fit her with the hearing aid until it is paid for. She was quoted $3,000.00.    Daniel, who handles her finances was contacted. A voicemail was left. [phone].      Christiano Reese., [facility]  Clinical Audiologist

## 2025-01-24 LAB
ALBUMIN SERPL-MCNC: 3.9 G/DL (ref 3.5–5.7)
ALP SERPL-CCNC: 45 U/L (ref 35–120)
ALT SERPL-CCNC: 15 U/L
ANION GAP SERPL CALCULATED.3IONS-SCNC: 10 MMOL/L (ref 3–11)
AST SERPL-CCNC: 14 U/L
BILIRUB SERPL-MCNC: 0.2 MG/DL (ref 0.2–1)
BUN SERPL-MCNC: 13 MG/DL (ref 7–25)
CALCIUM SERPL-MCNC: 9.2 MG/DL (ref 8.5–10.5)
CHLORIDE SERPL-SCNC: 99 MMOL/L (ref 100–109)
CHOLEST SERPL-MCNC: 195 MG/DL
CHOLEST/HDLC SERPL: 2.8 {RATIO}
CO2 SERPL-SCNC: 27 MMOL/L (ref 21–31)
CREAT SERPL-MCNC: 0.69 MG/DL (ref 0.4–1.1)
CYTOLOGY CMNT CVX/VAG CYTO-IMP: ABNORMAL
ERYTHROCYTE [DISTWIDTH] IN BLOOD BY AUTOMATED COUNT: 13.3 % (ref 12–16)
GFR/BSA.PRED SERPLBLD CYS-BASED-ARV: 92 ML/MIN/{1.73_M2}
GLUCOSE SERPL-MCNC: 103 MG/DL (ref 65–99)
HCT VFR BLD AUTO: 40 % (ref 35–43)
HDLC SERPL-MCNC: 70 MG/DL (ref 23–92)
HGB BLD-MCNC: 13.8 G/DL (ref 11.5–14.5)
LDLC SERPL CALC-MCNC: 108 MG/DL
MCH RBC QN AUTO: 33.4 PG (ref 26–34)
MCHC RBC AUTO-ENTMCNC: 34.5 G/DL (ref 32–37)
MCV RBC AUTO: 97 FL (ref 80–100)
NONHDLC SERPL-MCNC: 125 MG/DL
PLATELET # BLD AUTO: 378 THOU/CMM (ref 140–350)
PMV BLD REES-ECKER: 7.5 FL (ref 7.5–11.3)
POTASSIUM SERPL-SCNC: 4.5 MMOL/L (ref 3.5–5.2)
PROT SERPL-MCNC: 6.8 G/DL (ref 6.3–8.3)
RBC # BLD AUTO: 4.13 MILL/CMM (ref 3.7–4.7)
SODIUM SERPL-SCNC: 136 MMOL/L (ref 135–145)
TRIGL SERPL-MCNC: 83 MG/DL
TSH SERPL-ACNC: 1.2 UIU/ML (ref 0.45–5.33)
WBC # BLD AUTO: 5.3 THOU/CMM (ref 4–10)

## 2025-02-03 ENCOUNTER — OFFICE VISIT (OUTPATIENT)
Dept: AUDIOLOGY | Age: 70
End: 2025-02-03
Payer: COMMERCIAL

## 2025-02-03 DIAGNOSIS — H90.3 SENSORY HEARING LOSS, BILATERAL: Primary | ICD-10-CM

## 2025-02-03 PROCEDURE — V5160 DISPENSING FEE BINAURAL: HCPCS

## 2025-02-03 PROCEDURE — V5261 HEARING AID, DIGIT, BIN, BTE: HCPCS

## 2025-02-03 NOTE — PROGRESS NOTES
Progress Note    Name:  Laine Tse  :  1955  Age:  69 y.o.  MRN:  [id number]  Date of Evaluation: 25     Payment made. Hearing aids ordered.    HAP when hearing aids arrived.      Christiano Reese., [facility]  Clinical Audiologist

## 2025-02-06 ENCOUNTER — TELEMEDICINE (OUTPATIENT)
Dept: INTERNAL MEDICINE CLINIC | Age: 70
End: 2025-02-06
Payer: MEDICARE

## 2025-02-06 ENCOUNTER — TELEPHONE (OUTPATIENT)
Age: 70
End: 2025-02-06

## 2025-02-06 DIAGNOSIS — F25.9 SCHIZOAFFECTIVE DISORDER, UNSPECIFIED TYPE (HCC): ICD-10-CM

## 2025-02-06 DIAGNOSIS — E03.9 ACQUIRED HYPOTHYROIDISM: ICD-10-CM

## 2025-02-06 DIAGNOSIS — F31.77 BIPOLAR DISORDER, IN PARTIAL REMISSION, MOST RECENT EPISODE MIXED (HCC): ICD-10-CM

## 2025-02-06 DIAGNOSIS — L03.031 CELLULITIS AND ABSCESS OF TOE OF RIGHT FOOT: Primary | ICD-10-CM

## 2025-02-06 DIAGNOSIS — I10 BENIGN ESSENTIAL HYPERTENSION: ICD-10-CM

## 2025-02-06 DIAGNOSIS — L02.611 CELLULITIS AND ABSCESS OF TOE OF RIGHT FOOT: Primary | ICD-10-CM

## 2025-02-06 PROCEDURE — G2211 COMPLEX E/M VISIT ADD ON: HCPCS | Performed by: INTERNAL MEDICINE

## 2025-02-06 PROCEDURE — 99213 OFFICE O/P EST LOW 20 MIN: CPT | Performed by: INTERNAL MEDICINE

## 2025-02-06 RX ORDER — CEPHALEXIN 500 MG/1
500 CAPSULE ORAL 3 TIMES DAILY
Qty: 21 CAPSULE | Refills: 0 | Status: ON HOLD | OUTPATIENT
Start: 2025-02-06 | End: 2025-02-13

## 2025-02-06 RX ORDER — CHLORHEXIDINE GLUCONATE ORAL RINSE 1.2 MG/ML
SOLUTION DENTAL
Status: ON HOLD | COMMUNITY
Start: 2025-01-28

## 2025-02-06 NOTE — TELEPHONE ENCOUNTER
Hello,    Please advise if a forced appointment can be accommodated for the patient:    Call back #: [phone]    Insurance: ME    Reason for appointment: ingrown nail infected/red/painful/group home calling to have her seen ASAP in [place] office only. PCP gave ABX.  I have nothing to offer. Please call back and advise.     Requested doctor and/or location: Dr Barragan/[facility]      Thank you.

## 2025-02-06 NOTE — PROGRESS NOTES
Virtual Regular Visit  Name: Laine Tse      : 1955      MRN: [id number]  Encounter Provider: Adilson Yip MD  Encounter Date: 2025   Encounter department: [facility]      Verification of patient location:  Patient is located at Home in the following state in which I hold an active license [place] :  Assessment & Plan  Cellulitis and abscess of toe of right foot  Will start on cephalexin 500 mg p.o. 3 times daily for 1 week.  And also advised to make appointment with her podiatrist to be seen as soon as possible.  Orders:    cephalexin (KEFLEX) 500 mg capsule; Take 1 capsule (500 mg total) by mouth 3 (three) times a day for 7 days    Benign essential hypertension  Continue with present regimen.       Acquired hypothyroidism  Continue with present dose of levothyroxine       Schizoaffective disorder, unspecified type (HCC)  Continue with present regimen.  Managed by psychiatrist       Bipolar disorder, in partial remission, most recent episode mixed (HCC)  Continue with present regimen.  Managed by psychiatrist           Encounter provider Adilson Yip MD    The patient was identified by name and date of birth. Laine Tse was informed that this is a telemedicine visit and that the visit is being conducted through the Epic Embedded platform. She agrees to proceed..  My office door was closed. No one else was in the room.  She acknowledged consent and understanding of privacy and security of the video platform. The patient has agreed to participate and understands they can discontinue the visit at any time.    Patient is aware this is a billable service.     History of Present Illness     Patient complaining of redness of second toe of right foot for the last few days.  Denied any fever or chills.      Review of Systems   Constitutional:  Negative for fatigue and fever.   HENT:  Negative for congestion, ear discharge, ear pain, postnasal drip, sinus pressure,  sore throat, tinnitus and trouble swallowing.    Eyes:  Negative for discharge, itching and visual disturbance.   Respiratory:  Negative for cough and shortness of breath.    Cardiovascular:  Negative for chest pain and palpitations.   Gastrointestinal:  Negative for abdominal pain, diarrhea, nausea and vomiting.   Endocrine: Negative for cold intolerance and polyuria.   Genitourinary:  Negative for difficulty urinating, dysuria and urgency.   Musculoskeletal:  Positive for arthralgias. Negative for neck pain.   Skin:  Positive for color change. Negative for rash.   Allergic/Immunologic: Negative for environmental allergies.   Neurological:  Negative for dizziness, weakness and headaches.   Psychiatric/Behavioral:  The patient is not nervous/anxious.        Objective   There were no vitals taken for this visit.    Physical Exam  Constitutional:       Appearance: She is obese. She is not ill-appearing.   HENT:      Right Ear: External ear normal.      Left Ear: External ear normal.      Nose: No rhinorrhea.      Mouth/Throat:      Pharynx: No posterior oropharyngeal erythema.   Eyes:      Extraocular Movements: Extraocular movements intact.   Pulmonary:      Effort: No respiratory distress.   Abdominal:      General: There is no distension.   Musculoskeletal:      Cervical back: Normal range of motion.      Right lower leg: No edema.      Left lower leg: No edema.   Skin:     Findings: Erythema present.      Comments: Slight redness of right second toe are noted.   Neurological:      Mental Status: She is oriented to person, place, and time.         Visit Time  Total Visit Duration: 15 minutes

## 2025-02-10 ENCOUNTER — TELEPHONE (OUTPATIENT)
Age: 70
End: 2025-02-10

## 2025-02-10 NOTE — PROGRESS NOTES
Progress Note    Name:  Laine Tse  :  1955  Age:  69 y.o.  MRN:  [id number]  Date of Evaluation: 02/10/25     HISTORY:    The patient's hearing aids arrived.     Intent 4 Mini [employer]  Right [id number]  Left [id number]  Warranty 3/6/28   [id number]      ACTION/ADJUSTMENTS:    Will need HAP with Ally.    Sushil Goldman, CCC-A  Clinical Audiologist

## 2025-02-10 NOTE — TELEPHONE ENCOUNTER
Scheduled patient with Dr Donovan 2/10 @ 330 pm in [facility].  Appointment confirmed.

## 2025-02-11 ENCOUNTER — OFFICE VISIT (OUTPATIENT)
Dept: NEUROLOGY | Facility: CLINIC | Age: 70
End: 2025-02-11
Payer: MEDICARE

## 2025-02-11 VITALS
TEMPERATURE: 98.2 F | HEIGHT: 64 IN | WEIGHT: 185.2 LBS | HEART RATE: 72 BPM | OXYGEN SATURATION: 97 % | BODY MASS INDEX: 31.62 KG/M2

## 2025-02-11 DIAGNOSIS — G40.909 SEIZURE DISORDER (HCC): ICD-10-CM

## 2025-02-11 DIAGNOSIS — R05.1 ACUTE COUGH: Primary | ICD-10-CM

## 2025-02-11 PROCEDURE — G2211 COMPLEX E/M VISIT ADD ON: HCPCS | Performed by: PSYCHIATRY & NEUROLOGY

## 2025-02-11 PROCEDURE — 99213 OFFICE O/P EST LOW 20 MIN: CPT | Performed by: PSYCHIATRY & NEUROLOGY

## 2025-02-11 RX ORDER — LIDOCAINE 40 MG/G
CREAM TOPICAL AS NEEDED
Status: ON HOLD | COMMUNITY

## 2025-02-11 RX ORDER — PHENOBARBITAL 64.8 MG/1
64.8 TABLET ORAL EVERY 12 HOURS
Qty: 62 TABLET | Refills: 5 | Status: ON HOLD | OUTPATIENT
Start: 2025-02-11

## 2025-02-11 RX ORDER — OYSTER SHELL CALCIUM WITH VITAMIN D 500; 200 MG/1; [IU]/1
1 TABLET, FILM COATED ORAL
Status: ON HOLD | COMMUNITY

## 2025-02-11 RX ORDER — MUPIROCIN 20 MG/G
OINTMENT TOPICAL 2 TIMES DAILY
Status: ON HOLD | COMMUNITY

## 2025-02-11 NOTE — PATIENT INSTRUCTIONS
Continue phenobarbital unchanged.   Have blood work done in the next month (check phenobarbital level).   Return in one year.   Let us know if there are seizures.

## 2025-02-11 NOTE — ASSESSMENT & PLAN NOTE
Laine Tse is a 69 y.o. female with unclear epilepsy syndrome and schizophrenia. She reported 9 years of seizure freedom until in 12/2014 she had an event a few days after stopping phenobarbital. Multiple nights of insomnia preceded the event. The event itself was poorly characterized, but consisted of fear/panic that is apparently typical of her seizures. It is possible she had a focal seizure, but anxiety/psychosis related to sleep deprivation and withdrawal of phenobarbital is another explanation that better fits her prolonged symptoms and associated hallucinations. Her EEG in early 2015 was normal. Her osteoporosis may be related to phenobarbital use, but at this point her seizures are controlled and things are going well, so she will continue phenobarbital. She is also on gabapentin. Valproate is prescribed by psychiatry and is not specifically prescribed for seizure. She has been on bisphosphonate therapy, a DXA is scheduled, she is taking vitamin D and Ca.      Orders:    PHENobarbital 64.8 mg tablet; Take 1 tablet (64.8 mg total) by mouth every 12 (twelve) hours    Phenobarbital level; Future

## 2025-02-11 NOTE — PROGRESS NOTES
[facility]  Follow Up Visit    Impression/Plan        Assessment & Plan  Seizure disorder (HCC)  Laine Tse is a 69 y.o. female with unclear epilepsy syndrome and schizophrenia. She reported 9 years of seizure freedom until in 12/2014 she had an event a few days after stopping phenobarbital. Multiple nights of insomnia preceded the event. The event itself was poorly characterized, but consisted of fear/panic that is apparently typical of her seizures. It is possible she had a focal seizure, but anxiety/psychosis related to sleep deprivation and withdrawal of phenobarbital is another explanation that better fits her prolonged symptoms and associated hallucinations. Her EEG in early 2015 was normal. Her osteoporosis may be related to phenobarbital use, but at this point her seizures are controlled and things are going well, so she will continue phenobarbital. She is also on gabapentin. Valproate is prescribed by psychiatry and is not specifically prescribed for seizure. She has been on bisphosphonate therapy, a DXA is scheduled, she is taking vitamin D and Ca.      Orders:    PHENobarbital 64.8 mg tablet; Take 1 tablet (64.8 mg total) by mouth every 12 (twelve) hours    Phenobarbital level; Future         Patient Instructions   Continue phenobarbital unchanged.   Have blood work done in the next month (check phenobarbital level).   Return in one year.   Let us know if there are seizures.         Subjective    Laine Tse is returning to the [facility] for follow up.     Interval Events:   Seizures since last visit: None  Hospitalizations: no    Last seen 4/2024. Arrives with staff from her facility. No seizures. Behavior likely unchanged.     Current AEDs:  - phenobarbital 64.8 mg BID  - Depakote 1500 mg at night - per psychiatry  - gabapentin 300 mg BID     Event/Seizure semiology:  Unknown     Special Features  Status epilepticus:  "unknown  Self Injury Seizures: unknown  Precipitating Factors: unknown     Prior AEDs:  Carbamazepine - ?stopped, but worked?  Lamotrigine - ?stopped,  discontinued, no reason given?  Valproate - ?water retention?     Prior Evaluation:  3 Hour Video EEG 1/15/15: normal.     Psychiatric History:  Schizophrenia      Social History:   Driving: No - lives in group home  Lives Alone: No  Occupation: on permanent disability      Objective    Pulse 72   Temp 98.2 °F (36.8 °C) (Temporal)   Ht 5' 4\" (1.626 m)   Wt 84 kg (185 lb 3.2 oz)   SpO2 97%   BMI 31.79 kg/m²      General Exam  No acute distress.    Neurologic Exam  Mental Status:  Alert and oriented x 3. Some abnormal thought process.   Language: normal fluency and comprehension.  Cranial Nerves:   Face symmetric. No dysarthria.  Coordination: Finger to nose intact.  Gait: Steady casual gait.         "

## 2025-02-12 RX ORDER — DEXTROMETHORPHAN HYDROBROMIDE, GUAIFENESIN 20; 200 MG/10ML; MG/10ML
SOLUTION ORAL
Qty: 118 ML | Refills: 0 | Status: ON HOLD | OUTPATIENT
Start: 2025-02-12

## 2025-02-13 ENCOUNTER — HOSPITAL ENCOUNTER (EMERGENCY)
Facility: HOSPITAL | Age: 70
DRG: 885 | End: 2025-02-14
Attending: EMERGENCY MEDICINE
Payer: MEDICARE

## 2025-02-13 ENCOUNTER — OFFICE VISIT (OUTPATIENT)
Dept: AUDIOLOGY | Age: 70
End: 2025-02-13

## 2025-02-13 VITALS
RESPIRATION RATE: 16 BRPM | DIASTOLIC BLOOD PRESSURE: 101 MMHG | HEART RATE: 73 BPM | OXYGEN SATURATION: 95 % | TEMPERATURE: 98.6 F | SYSTOLIC BLOOD PRESSURE: 162 MMHG

## 2025-02-13 DIAGNOSIS — H90.3 SENSORY HEARING LOSS, BILATERAL: Primary | ICD-10-CM

## 2025-02-13 DIAGNOSIS — F22 DELUSIONS (HCC): Primary | ICD-10-CM

## 2025-02-13 LAB
ALBUMIN SERPL BCG-MCNC: 4.1 G/DL (ref 3.5–5)
ALP SERPL-CCNC: 45 U/L (ref 34–104)
ALT SERPL W P-5'-P-CCNC: 20 U/L (ref 7–52)
AMPHETAMINES SERPL QL SCN: NEGATIVE
ANION GAP SERPL CALCULATED.3IONS-SCNC: 6 MMOL/L (ref 4–13)
AST SERPL W P-5'-P-CCNC: 22 U/L (ref 13–39)
BACTERIA UR QL AUTO: ABNORMAL /HPF
BARBITURATES UR QL: POSITIVE
BASOPHILS # BLD AUTO: 0 THOUSANDS/ÂΜL (ref 0–0.1)
BASOPHILS NFR BLD AUTO: 0 % (ref 0–1)
BENZODIAZ UR QL: NEGATIVE
BILIRUB SERPL-MCNC: 0.28 MG/DL (ref 0.2–1)
BILIRUB UR QL STRIP: NEGATIVE
BUN SERPL-MCNC: 8 MG/DL (ref 5–25)
CALCIUM SERPL-MCNC: 9.3 MG/DL (ref 8.4–10.2)
CHLORIDE SERPL-SCNC: 94 MMOL/L (ref 96–108)
CLARITY UR: CLEAR
CO2 SERPL-SCNC: 29 MMOL/L (ref 21–32)
COCAINE UR QL: NEGATIVE
COLOR UR: ABNORMAL
CREAT SERPL-MCNC: 0.6 MG/DL (ref 0.6–1.3)
EOSINOPHIL # BLD AUTO: 0.01 THOUSAND/ÂΜL (ref 0–0.61)
EOSINOPHIL NFR BLD AUTO: 0 % (ref 0–6)
ERYTHROCYTE [DISTWIDTH] IN BLOOD BY AUTOMATED COUNT: 12.2 % (ref 11.6–15.1)
ETHANOL EXG-MCNC: 0 MG/DL
FENTANYL UR QL SCN: NEGATIVE
GFR SERPL CREATININE-BSD FRML MDRD: 91 ML/MIN/1.73SQ M
GLUCOSE SERPL-MCNC: 97 MG/DL (ref 65–140)
GLUCOSE UR STRIP-MCNC: NEGATIVE MG/DL
HCT VFR BLD AUTO: 37.4 % (ref 34.8–46.1)
HGB BLD-MCNC: 13.2 G/DL (ref 11.5–15.4)
HGB UR QL STRIP.AUTO: NEGATIVE
HYDROCODONE UR QL SCN: NEGATIVE
IMM GRANULOCYTES # BLD AUTO: 0 THOUSAND/UL (ref 0–0.2)
IMM GRANULOCYTES NFR BLD AUTO: 0 % (ref 0–2)
KETONES UR STRIP-MCNC: ABNORMAL MG/DL
LEUKOCYTE ESTERASE UR QL STRIP: NEGATIVE
LYMPHOCYTES # BLD AUTO: 2.01 THOUSANDS/ÂΜL (ref 0.6–4.47)
LYMPHOCYTES NFR BLD AUTO: 46 % (ref 14–44)
MCH RBC QN AUTO: 33.4 PG (ref 26.8–34.3)
MCHC RBC AUTO-ENTMCNC: 35.3 G/DL (ref 31.4–37.4)
MCV RBC AUTO: 95 FL (ref 82–98)
METHADONE UR QL: NEGATIVE
MONOCYTES # BLD AUTO: 0.42 THOUSAND/ÂΜL (ref 0.17–1.22)
MONOCYTES NFR BLD AUTO: 10 % (ref 4–12)
NEUTROPHILS # BLD AUTO: 1.94 THOUSANDS/ÂΜL (ref 1.85–7.62)
NEUTS SEG NFR BLD AUTO: 44 % (ref 43–75)
NITRITE UR QL STRIP: NEGATIVE
NON-SQ EPI CELLS URNS QL MICRO: ABNORMAL /HPF
NRBC BLD AUTO-RTO: 0 /100 WBCS
OPIATES UR QL SCN: NEGATIVE
OXYCODONE+OXYMORPHONE UR QL SCN: NEGATIVE
PCP UR QL: NEGATIVE
PH UR STRIP.AUTO: 7 [PH]
PLATELET # BLD AUTO: 232 THOUSANDS/UL (ref 149–390)
PMV BLD AUTO: 9.3 FL (ref 8.9–12.7)
POTASSIUM SERPL-SCNC: 4.1 MMOL/L (ref 3.5–5.3)
PROT SERPL-MCNC: 6.7 G/DL (ref 6.4–8.4)
PROT UR STRIP-MCNC: NEGATIVE MG/DL
RBC # BLD AUTO: 3.95 MILLION/UL (ref 3.81–5.12)
RBC #/AREA URNS AUTO: ABNORMAL /HPF
SODIUM SERPL-SCNC: 129 MMOL/L (ref 135–147)
SP GR UR STRIP.AUTO: 1.01 (ref 1–1.03)
THC UR QL: NEGATIVE
TSH SERPL DL<=0.05 MIU/L-ACNC: 0.92 UIU/ML (ref 0.45–4.5)
UROBILINOGEN UR STRIP-ACNC: <2 MG/DL
WBC # BLD AUTO: 4.38 THOUSAND/UL (ref 4.31–10.16)
WBC #/AREA URNS AUTO: ABNORMAL /HPF

## 2025-02-13 PROCEDURE — 80053 COMPREHEN METABOLIC PANEL: CPT

## 2025-02-13 PROCEDURE — 96360 HYDRATION IV INFUSION INIT: CPT

## 2025-02-13 PROCEDURE — 36415 COLL VENOUS BLD VENIPUNCTURE: CPT

## 2025-02-13 PROCEDURE — 99285 EMERGENCY DEPT VISIT HI MDM: CPT | Performed by: EMERGENCY MEDICINE

## 2025-02-13 PROCEDURE — 93005 ELECTROCARDIOGRAM TRACING: CPT

## 2025-02-13 PROCEDURE — 99285 EMERGENCY DEPT VISIT HI MDM: CPT

## 2025-02-13 PROCEDURE — 85025 COMPLETE CBC W/AUTO DIFF WBC: CPT

## 2025-02-13 PROCEDURE — 82075 ASSAY OF BREATH ETHANOL: CPT

## 2025-02-13 PROCEDURE — 81001 URINALYSIS AUTO W/SCOPE: CPT

## 2025-02-13 PROCEDURE — 80307 DRUG TEST PRSMV CHEM ANLYZR: CPT

## 2025-02-13 PROCEDURE — 84443 ASSAY THYROID STIM HORMONE: CPT

## 2025-02-13 RX ORDER — PHENOBARBITAL 64.8 MG/1
64.8 TABLET ORAL EVERY 12 HOURS
Status: DISCONTINUED | OUTPATIENT
Start: 2025-02-13 | End: 2025-02-14 | Stop reason: HOSPADM

## 2025-02-13 RX ORDER — TRAZODONE HYDROCHLORIDE 50 MG/1
150 TABLET ORAL
Status: DISCONTINUED | OUTPATIENT
Start: 2025-02-13 | End: 2025-02-14 | Stop reason: HOSPADM

## 2025-02-13 RX ORDER — ASENAPINE 10 MG/1
10 TABLET SUBLINGUAL 2 TIMES DAILY
Status: DISCONTINUED | OUTPATIENT
Start: 2025-02-13 | End: 2025-02-13

## 2025-02-13 RX ORDER — LEVOTHYROXINE SODIUM 100 UG/1
100 TABLET ORAL
Status: DISCONTINUED | OUTPATIENT
Start: 2025-02-14 | End: 2025-02-14 | Stop reason: HOSPADM

## 2025-02-13 RX ORDER — LOXAPINE SUCCINATE 25 MG/1
25 TABLET ORAL
Status: DISCONTINUED | OUTPATIENT
Start: 2025-02-13 | End: 2025-02-14 | Stop reason: HOSPADM

## 2025-02-13 RX ORDER — ATORVASTATIN CALCIUM 20 MG/1
20 TABLET, FILM COATED ORAL
Status: DISCONTINUED | OUTPATIENT
Start: 2025-02-14 | End: 2025-02-14 | Stop reason: HOSPADM

## 2025-02-13 RX ORDER — LORAZEPAM 1 MG/1
1 TABLET ORAL 3 TIMES DAILY
Status: DISCONTINUED | OUTPATIENT
Start: 2025-02-13 | End: 2025-02-14 | Stop reason: HOSPADM

## 2025-02-13 RX ORDER — OLANZAPINE 5 MG/1
5 TABLET, ORALLY DISINTEGRATING ORAL ONCE
Status: COMPLETED | OUTPATIENT
Start: 2025-02-13 | End: 2025-02-13

## 2025-02-13 RX ORDER — DIVALPROEX SODIUM 500 MG/1
1500 TABLET, FILM COATED, EXTENDED RELEASE ORAL DAILY
Status: DISCONTINUED | OUTPATIENT
Start: 2025-02-14 | End: 2025-02-14

## 2025-02-13 RX ORDER — ASENAPINE 10 MG/1
10 TABLET SUBLINGUAL 2 TIMES DAILY
Status: DISCONTINUED | OUTPATIENT
Start: 2025-02-13 | End: 2025-02-14 | Stop reason: HOSPADM

## 2025-02-13 RX ORDER — ASPIRIN 81 MG/1
81 TABLET, CHEWABLE ORAL DAILY
Status: DISCONTINUED | OUTPATIENT
Start: 2025-02-14 | End: 2025-02-14 | Stop reason: HOSPADM

## 2025-02-13 RX ORDER — CHLORHEXIDINE GLUCONATE ORAL RINSE 1.2 MG/ML
15 SOLUTION DENTAL EVERY 12 HOURS SCHEDULED
Status: DISCONTINUED | OUTPATIENT
Start: 2025-02-13 | End: 2025-02-14 | Stop reason: HOSPADM

## 2025-02-13 RX ORDER — GABAPENTIN 300 MG/1
300 CAPSULE ORAL DAILY
Status: DISCONTINUED | OUTPATIENT
Start: 2025-02-14 | End: 2025-02-14 | Stop reason: HOSPADM

## 2025-02-13 RX ORDER — VALPROIC ACID 250 MG/1
1500 CAPSULE ORAL ONCE
Status: DISCONTINUED | OUTPATIENT
Start: 2025-02-13 | End: 2025-02-13

## 2025-02-13 RX ORDER — DIVALPROEX SODIUM 500 MG/1
1500 TABLET, FILM COATED, EXTENDED RELEASE ORAL DAILY
Status: DISCONTINUED | OUTPATIENT
Start: 2025-02-13 | End: 2025-02-14 | Stop reason: HOSPADM

## 2025-02-13 RX ORDER — TRIHEXYPHENIDYL HYDROCHLORIDE 5 MG/1
5 TABLET ORAL
Status: DISCONTINUED | OUTPATIENT
Start: 2025-02-14 | End: 2025-02-14 | Stop reason: HOSPADM

## 2025-02-13 RX ADMIN — SODIUM CHLORIDE 1000 ML: 0.9 INJECTION, SOLUTION INTRAVENOUS at 23:05

## 2025-02-13 RX ADMIN — TRAZODONE HYDROCHLORIDE 150 MG: 50 TABLET ORAL at 21:35

## 2025-02-13 RX ADMIN — CHLORHEXIDINE GLUCONATE 15 ML: 1.2 SOLUTION ORAL at 21:35

## 2025-02-13 RX ADMIN — ASENAPINE MALEATE 10 MG: 10 TABLET SUBLINGUAL at 21:34

## 2025-02-13 RX ADMIN — DOCUSATE SODIUM 50 MG: 50 CAPSULE, LIQUID FILLED ORAL at 23:14

## 2025-02-13 RX ADMIN — OLANZAPINE 5 MG: 5 TABLET, ORALLY DISINTEGRATING ORAL at 18:17

## 2025-02-13 RX ADMIN — PHENOBARBITAL 64.8 MG: 64.8 TABLET ORAL at 21:36

## 2025-02-13 RX ADMIN — LORAZEPAM 1 MG: 1 TABLET ORAL at 21:36

## 2025-02-13 RX ADMIN — DIVALPROEX SODIUM 1500 MG: 500 TABLET, EXTENDED RELEASE ORAL at 21:39

## 2025-02-13 RX ADMIN — LOXAPINE 25 MG: 25 CAPSULE ORAL at 21:40

## 2025-02-13 NOTE — ED PROVIDER NOTES
Time reflects when diagnosis was documented in both MDM as applicable and the Disposition within this note       Time User Action Codes Description Comment    2/13/2025  9:11 PM Lynnette Lemus Add [F22] Delusions (HCC)           ED Disposition       ED Disposition   Transfer to Behavioral Health Condition   --    Date/Time   Thu Feb 13, 2025  9:11 PM    Comment   Laine Tse should be transferred out to behavioral health unit and has been medically cleared.                Assessment & Plan       Medical Decision Making  Patient is a 69 y.o. female with PMH of schizoaffective disorder who presents to the ED with concern the group home instructing her.    Vital signs unremarkable. On exam well-appearing, not diaphoretic, evidence of tar dive dyskinesia, tangential speech however redirectable, visual hallucinations.    History and physical exam most consistent with exacerbation of chronic mental illness. However, differential diagnosis included but not limited to organic causes such as electrolyte abnormalities, infection, or arrhythmias, hypothyroidism, drug intoxication.     Plan: CBC, CMP, UA,TSH, BAT, UDS    Called patient's group home to determine patient's baseline.   states she is typically delusional but today has been more severe.  She refused her 4 PM meds which is not typical.  Discussed with patient's , Jessika, who confirmed patient's at home prescriptions including dosing and timing.    All bloodwork unremarkable, UDS positive for prescribed medications, No evidence of infection on UA.     View ED course above for further discussion on patient workup.     On review of previous records previous emergency room visits for similar symptoms.    All labs reviewed and utilized in the medical decision making process  All radiology studies independently viewed by me and interpreted by the radiologist.  I reviewed all testing with the patient.     Upon re-evaluation patient's  "mental status remains the same.  She would like to sign 201.  Confirmed medications with .  Will prescribe all at home medications.  Patient agrees with plan.  All questions answered..    Disposition: 201 signed, will transfer when bed available    Portions of the record may have been created with voice recognition software. Occasional wrong word or \"sound a like\" substitutions may have occurred due to the inherent limitations of voice recognition software. Read the chart carefully and recognize, using context, where substitutions have occurred.      Amount and/or Complexity of Data Reviewed  Labs: ordered.    Risk  Prescription drug management.  Decision regarding hospitalization.        ED Course as of 02/15/25 1325   Thu Feb 13, 2025 2056 SO: Patient signed 201, no grounds for 302, , Jessika, [phone] would like a phone call when they figure out when and where she is leaving, home meds ordered       Medications   OLANZapine (ZyPREXA ZYDIS) dispersible tablet 5 mg (5 mg Oral Given 2/13/25 1817)   sodium chloride 0.9 % bolus 1,000 mL (0 mL Intravenous Stopped 2/14/25 0110)   OLANZapine (ZyPREXA ZYDIS) dispersible tablet 5 mg (5 mg Oral Given 2/14/25 0406)       ED Risk Strat Scores                          SBIRT 22yo+      Flowsheet Row Most Recent Value   Initial Alcohol Screen: US AUDIT-C     1. How often do you have a drink containing alcohol? 0 Filed at: 02/13/2025 1724   Audit-C Score 0 Filed at: 02/13/2025 1724                            History of Present Illness       Chief Complaint   Patient presents with    Medical Problem     Pt has long psych hx. Pt says she believes her group home is giving her acid because one of her pills is 'red now instead of orange'.       Past Medical History:   Diagnosis Date    Anxiety     Benign essential hypertension     resolved; 06/15/16    Depression     Depression with anxiety     Fracture of fifth metatarsal bone of right foot with " delayed healing     last assessed 16; fracture of metatarsal bone, right, closed, initial encounter    Hyperlipidemia     last assessed 17    Hypothyroidism     last assessed 2017    Insomnia     Obesity     Schizoaffective disorder (HCC)     last assessed 2017    Seizure disorder (HCC)     last assessed 17    Seizures (HCC)       Past Surgical History:   Procedure Laterality Date    COLONOSCOPY      complete    IN COLONOSCOPY FLX DX W/COLLJ SPEC WHEN PFRMD N/A 2018    Procedure: COLONOSCOPY with polypectomies;  Surgeon: Andriy Lopez MD;  Location: [facility];  Service: Gastroenterology      Family History   Problem Relation Age of Onset    No Known Problems Mother     No Known Problems Father     Lung disease Other         mesothelioma    No Known Problems Maternal Grandmother     No Known Problems Maternal Grandfather     No Known Problems Paternal Grandmother     No Known Problems Paternal Grandfather     No Known Problems Sister     No Known Problems Sister     Kidney failure Brother     No Known Problems Maternal Aunt     No Known Problems Maternal Aunt     No Known Problems Maternal Aunt     No Known Problems Maternal Aunt     No Known Problems Paternal Aunt     No Known Problems Paternal Aunt       Social History     Tobacco Use    Smoking status: Former     Current packs/day: 0.00     Average packs/day: 0.5 packs/day for 21.0 years (10.5 ttl pk-yrs)     Types: Cigarettes     Start date:      Quit date:      Years since quittin.1    Smokeless tobacco: Never   Vaping Use    Vaping status: Never Used   Substance Use Topics    Alcohol use: No    Drug use: No      E-Cigarette/Vaping    E-Cigarette Use Never User       E-Cigarette/Vaping Substances    Nicotine No     THC No     CBD No     Flavoring No     Other No     Unknown No       I have reviewed and agree with the history as documented.     69-year-old female with past medical history of depression,  hypothyroidism, hypertension, schizoaffective disorder, psychosis presents from a group home after thinking she is being drugged.  She thinks that people are harming her and switching out her medications including the group home, doctor, pharmacist.  She states that she is hallucinating rats.  She does not have any other complaints.  Denies chest pain, shortness of breath, belly pain, headaches.  She states she would like to be admitted for psychiatric reasons.  She denies suicidal or homicidal ideations.  talked to the group home and he stated that she is more delusional than usual (not her normal.        Medical Problem      Review of Systems        Objective       ED Triage Vitals   Temperature Pulse Blood Pressure Respirations SpO2 Patient Position - Orthostatic VS   02/13/25 1722 02/13/25 1722 02/13/25 1722 02/13/25 1722 02/13/25 1722 02/13/25 2126   98.6 °F (37 °C) 72 169/85 14 98 % Sitting      Temp Source Heart Rate Source BP Location FiO2 (%) Pain Score    02/13/25 1722 02/13/25 1722 02/13/25 1722 -- 02/13/25 1722    Oral Monitor Right arm  No Pain      Vitals      Date and Time Temp Pulse SpO2 Resp BP Pain Score FACES Pain Rating User   02/13/25 2126 -- 73 95 % 16 162/101 -- -- ED   02/13/25 1924 -- -- -- -- -- No Pain -- ED   02/13/25 1722 98.6 °F (37 °C) 72 98 % 14 169/85 No Pain -- JK            Physical Exam  Vitals and nursing note reviewed.   Constitutional:       General: She is not in acute distress.     Appearance: She is well-developed.   HENT:      Head: Normocephalic and atraumatic.   Eyes:      Conjunctiva/sclera: Conjunctivae normal.   Cardiovascular:      Rate and Rhythm: Normal rate and regular rhythm.      Heart sounds: No murmur heard.  Pulmonary:      Effort: Pulmonary effort is normal. No respiratory distress.      Breath sounds: Normal breath sounds.   Abdominal:      Palpations: Abdomen is soft.      Tenderness: There is no abdominal tenderness.   Musculoskeletal:         General: No  swelling.      Cervical back: Neck supple.   Skin:     General: Skin is warm and dry.      Capillary Refill: Capillary refill takes less than 2 seconds.   Neurological:      Mental Status: She is alert and oriented to person, place, and time.   Psychiatric:         Attention and Perception: She perceives visual hallucinations.         Mood and Affect: Mood normal.         Speech: Speech is tangential.         Behavior: Behavior normal.         Thought Content: Thought content is paranoid and delusional.      Comments: States she is seeing rats in the hallway, tongue smacking consistent with tardive dyskinesia         Results Reviewed       Procedure Component Value Units Date/Time    Rapid drug screen, urine [[id number]]  (Abnormal) Collected: 02/13/25 1830    Lab Status: Final result Specimen: Urine, Clean Catch Updated: 02/13/25 1901     Amph/Meth UR Negative     Barbiturate Ur Positive     Benzodiazepine Urine Negative     Cocaine Urine Negative     Methadone Urine Negative     Opiate Urine Negative     PCP Ur Negative     THC Urine Negative     Oxycodone Urine Negative     Fentanyl Urine Negative     HYDROCODONE URINE Negative    Narrative:      Presumptive report. If requested, specimen will be sent to reference lab for confirmation.  FOR MEDICAL PURPOSES ONLY.   IF CONFIRMATION NEEDED PLEASE CONTACT THE LAB WITHIN 5 DAYS.    Drug Screen Cutoff Levels:  AMPHETAMINE/METHAMPHETAMINES  1000 ng/mL  BARBITURATES     200 ng/mL  BENZODIAZEPINES     200 ng/mL  COCAINE      300 ng/mL  METHADONE      300 ng/mL  OPIATES      300 ng/mL  PHENCYCLIDINE     25 ng/mL  THC       50 ng/mL  OXYCODONE      100 ng/mL  FENTANYL      5 ng/mL  HYDROCODONE     300 ng/mL    Urinalysis with microscopic [[id number]]  (Abnormal) Collected: 02/13/25 1830    Lab Status: Final result Specimen: Urine, Clean Catch Updated: 02/13/25 1858     Color, UA Light Yellow     Clarity, UA Clear     Specific Gravity, UA 1.007     pH, UA 7.0     Leukocytes,  UA Negative     Nitrite, UA Negative     Protein, UA Negative mg/dl      Glucose, UA Negative mg/dl      Ketones, UA 10 (1+) mg/dl      Urobilinogen, UA <2.0 mg/dl      Bilirubin, UA Negative     Occult Blood, UA Negative     RBC, UA None Seen /hpf      WBC, UA None Seen /hpf      Epithelial Cells Occasional /hpf      Bacteria, UA None Seen /hpf     TSH [[id number]]  (Normal) Collected: 02/13/25 1811    Lab Status: Final result Specimen: Blood from Arm, Right Updated: 02/13/25 1854     TSH 3RD GENERATON 0.920 uIU/mL     Comprehensive metabolic panel [[id number]]  (Abnormal) Collected: 02/13/25 1811    Lab Status: Final result Specimen: Blood from Arm, Right Updated: 02/13/25 1839     Sodium 129 mmol/L      Potassium 4.1 mmol/L      Chloride 94 mmol/L      CO2 29 mmol/L      ANION GAP 6 mmol/L      BUN 8 mg/dL      Creatinine 0.60 mg/dL      Glucose 97 mg/dL      Calcium 9.3 mg/dL      AST 22 U/L      ALT 20 U/L      Alkaline Phosphatase 45 U/L      Total Protein 6.7 g/dL      Albumin 4.1 g/dL      Total Bilirubin 0.28 mg/dL      eGFR 91 ml/min/1.73sq m     Narrative:      National Kid[employer] Foundation guidelines for Chronic Kidney Disease (CKD):     Stage 1 with normal or high GFR (GFR > 90 mL/min/1.73 square meters)    Stage 2 Mild CKD (GFR = 60-89 mL/min/1.73 square meters)    Stage 3A Moderate CKD (GFR = 45-59 mL/min/1.73 square meters)    Stage 3B Moderate CKD (GFR = 30-44 mL/min/1.73 square meters)    Stage 4 Severe CKD (GFR = 15-29 mL/min/1.73 square meters)    Stage 5 End Stage CKD (GFR <15 mL/min/1.73 square meters)  Note: GFR calculation is accurate only with a steady state creatinine    CBC and differential [[id number]]  (Abnormal) Collected: 02/13/25 1811    Lab Status: Final result Specimen: Blood from Arm, Right Updated: 02/13/25 1818     WBC 4.38 Thousand/uL      RBC 3.95 Million/uL      Hemoglobin 13.2 g/dL      Hematocrit 37.4 %      MCV 95 fL      MCH 33.4 pg      MCHC 35.3 g/dL      RDW 12.2 %       MPV 9.3 fL      Platelets 232 Thousands/uL      nRBC 0 /100 WBCs      Segmented % 44 %      Immature Grans % 0 %      Lymphocytes % 46 %      Monocytes % 10 %      Eosinophils Relative 0 %      Basophils Relative 0 %      Absolute Neutrophils 1.94 Thousands/µL      Absolute Immature Grans 0.00 Thousand/uL      Absolute Lymphocytes 2.01 Thousands/µL      Absolute Monocytes 0.42 Thousand/µL      Eosinophils Absolute 0.01 Thousand/µL      Basophils Absolute 0.00 Thousands/µL     POCT alcohol breath test [[id number]]  (Normal) Resulted: 02/13/25 1757    Lab Status: Final result Updated: 02/13/25 1757     EXTBreath Alcohol 0.000            No orders to display       Procedures    ED Medication and Procedure Management   Prior to Admission Medications   Prescriptions Last Dose Informant Patient Reported? Taking?   Calcium Carb-Cholecalciferol (calcium carbonate-vitamin D) 500 mg-5 mcg tablet  Outside Facility (Specify), Care Giver, Self No No   Sig: TAKE 1 TABLET BY MOUTH 2X DAILY @8AM-8PM (SUPPLEMENT) *[facility]   Patient not taking: No sig reported   Diclofenac Sodium (VOLTAREN) 1 %  Care Giver, Self Yes No   Sig: Apply 2 g topically 4 (four) times a day   GNP Tussin DM  MG/10ML oral liquid  Self No No   Sig: TAKE 1 TSP(5ML) BY MOUTH 2X DAILY AS NEEDED FOR COUGH.*[facility]   Patient not taking: Reported on 2/14/2025   Incontinence Supplies MISC  Outside Facility (Specify), Care Giver, Self No No   Sig: Use if needed (incontinence) SIZE XL PULL UP DISPOSABLE BRIEFS   Incontinence Supply Disposable (Disposable Brief Large) MISC  Care Giver, Outside Facility (Specify), Self No No   Sig: Use every 12 (twelve) hours   Patient not taking: Reported on 2/14/2025   L-Theanine 100 MG CAPS  Outside Facility (Specify), Care Giver, Self Yes No   Sig: Take 200 mg by mouth 2 (two) times a day     LORazepam (ATIVAN) 0.5 mg tablet  Outside Facility (Specify), Care Giver, Self Yes No   Sig: Take 0.5 mg by mouth every 12 (twelve) hours  as needed for anxiety   LORazepam (ATIVAN) 1 mg tablet  Outside Facility (Specify), Care Giver, Self Yes No   Sig: Take 1 mg by mouth in the morning and 1 mg in the evening and 1 mg before bedtime.   Lidocaine (HM Lidocaine Patch) 4 % PTCH  Care Giver, Outside Facility (Specify), Self No No   Sig: Apply 1 patch topically daily as needed (back pain)   Menthol, Topical Analgesic, (Bengay Ultra Strength) 5 % PTCH  Care Giver, Outside Facility (Specify), Self No No   Sig: Apply 1 patch topically in the morning   PHENobarbital 64.8 mg tablet  Care Giver, Self No No   Sig: Take 1 tablet (64.8 mg total) by mouth every 12 (twelve) hours   Prolia 60 MG/ML  Outside Facility (Specify), Care Giver, Self Yes No   Sig: Inject 60 mg under the skin once   Patient not taking: Reported on 2/11/2025   Saphris 10 MG SL tablet  Outside Facility (Specify), Care Giver, Self No No   Sig: Place 1 tablet (10 mg total) under the tongue 2 (two) times a day   Thera (THERA/BETA-CAROTENE)  Outside Facility (Specify), Care Giver, Self No No   Sig: Take 1 tablet by mouth daily   acetaminophen (TYLENOL) 500 mg tablet  Outside Facility (Specify), Care Giver, Self No No   Sig: Take 2 tablets (1,000 mg total) by mouth 3 (three) times a day as needed for mild pain or fever   aspirin (Aspirin Low Dose) 81 mg chewable tablet  Outside Facility (Specify), Care Giver, Self No No   Sig: CHEW 1 TABLET BY MOUTH DAILY @ 8AM (BLOOD THINNER) *[facility]   atorvastatin (LIPITOR) 20 mg tablet  Care Giver, Outside Facility (Specify), Self No No   Sig: TAKE 1 TABLET BY MOUTH ONCE DAILY @ 8AM(CHOLSETEROL) *[facility]   benzonatate (TESSALON) 200 MG capsule  Outside Facility (Specify), Care Giver, Self No No   Sig: TAKE 1 CAPSULE BY MOUTH 2X DAILY AS NEEDED FOR COUGH   Patient not taking: Reported on 2/14/2025   bismuth subsalicylate (GNP Pink Bismuth) 262 MG chewable tablet  Outside Facility (Specify), Care Giver, Self No No   Sig: CHEW 2 TABLETS(524MG) BY MOUTH EVERY 6 HOURS AS  NEEDED FOR DIARRHEA *[facility]   busPIRone (BUSPAR) 15 mg tablet  Outside Facility (Specify), Care Giver, Self Yes No   Sig: Take 15 mg by mouth 2 (two) times a day    calcium carbonate-vitamin D 500 mg-5 mcg per tablet  Care Giver, Self Yes No   Sig: Take 1 tablet by mouth daily with breakfast   cephalexin (KEFLEX) 500 mg capsule  Care Giver No No   Sig: Take 1 capsule (500 mg total) by mouth 3 (three) times a day for 7 days   Patient not taking: Reported on 2025   cetaphil (CETAPHIL) lotion  Care Giver, Outside Facility (Specify), Self No No   Sig: Apply topically as needed for dry skin   Patient not taking: Reported on 2025   cetaphil (CETAPHIL) lotion  Care Giver, Outside Facility (Specify), Self No No   Sig: APPLY TOPICALLY TO AFFECTED AREA(S) AS NEEDED FOR DRY SKIN *[facility]   Patient not taking: Reported on 2025   cetirizine (ZyrTEC) 5 MG tablet  Care Giver, Outside Facility (Specify), Self No No   Sig: TAKE 1 TABLET BY MOUTH AT BEDTIME AS NEEDED FOR ALLERGIES *[facility]   chlorhexidine (PERIDEX) 0.12 % solution  Care Giver, Self Yes No   dextromethorphan-guaiFENesin (ROBITUSSIN DM)  mg/5 mL syrup  Care Giver, Outside Facility (Specify), Self No No   Sig: Take 5 mL by mouth 2 (two) times a day as needed for cough   Patient not taking: Reported on 2025   diphenhydrAMINE (BENADRYL) 50 mg capsule  Care Giver, Outside Facility (Specify), Self No No   Sig: TAKE 1 CAPSULE BY MOUTH AT BEDTIME AS NEEDED FOR SLEEP *[facility]   Patient not taking: Reported on 2025   divalproex sodium (DEPAKOTE) 500 mg EC tablet  Outside Facility (Specify), Care Giver, Self Yes No   Sig: 3 tabs(1500mg) at bedtime   docusate sodium (Stool Softener) 100 mg capsule  Outside Facility (Specify), Care Giver, Self No No   Sig: TAKE 1 CAPSULE BY MOUTH 2X DAILY AS NEEDED (CONSTIPATION) *[facility]   fluticasone (FLONASE) 50 mcg/act nasal spray  Care Giver, Outside Facility (Specify), Self No No   Si sprays into each nostril daily    gabapentin (NEURONTIN) 100 mg capsule  Outside Facility (Specify), Care Giver, Self Yes No   Sig: Take 200 mg by mouth 2 (two) times a day     Patient not taking: Reported on 2025   gabapentin (NEURONTIN) 300 mg capsule  Outside Facility (Specify), Care Giver, Self Yes No   Si (two) times a day   levothyroxine 100 mcg tablet  Care Giver, Outside Facility (Specify), Self No No   Sig: TAKE 1 TABLET BY MOUTH ONCE DAILY @ 6AM(THYROID) *[facility]   lidocaine (LMX) 4 % cream  Care Giver, Self Yes No   Sig: Apply topically as needed for mild pain   Patient not taking: Reported on 2025   loxapine (LOXITANE) 25 mg capsule  Outside Facility (Specify), Care Giver, Self Yes No   Sig: Take 25 mg by mouth daily at bedtime Take with 50 mg dose = 75 mg   loxapine (LOXITANE) 50 MG capsule  Outside Facility (Specify), Care Giver, Self Yes No   Sig: Take 75 mg by mouth daily at bedtime   Patient not taking: Reported on 2025   mupirocin (BACTROBAN) 2 % ointment  Care Giver, Self Yes No   Sig: Apply topically 2 (two) times a day   polyethylene glycol (GLYCOLAX) 17 GM/SCOOP  Outside Facility (Specify), Care Giver, Self No No   Sig: MIX 1 CAPFUL(17GM) IN 8OZ OF LIQUID AND DRINK DAILY @ 8AM(CONSTIPATION) *[facility]   senna-docusate sodium (SENOKOT-S) 8.6-50 mg per tablet  Outside Facility (Specify), Care Giver, Self Yes No   Sig: Take 1 tablet by mouth daily   sodium chloride (OCEAN) 0.65 % nasal spray  Outside Facility (Specify), Care Giver, Self No No   Si spray into each nostril as needed for congestion TAKE UP TO 6 TIMES DAILY AS NEEDED   tiZANidine (ZANAFLEX) 2 mg tablet  Outside Facility (Specify), Care Giver, Self Yes No   Sig: Take 2 mg by mouth daily   traZODone (DESYREL) 150 mg tablet  Outside Facility (Specify), Care Giver, Self Yes No   Sig: Take 300 mg by mouth daily at bedtime   trihexyphenidyl (ARTANE) 5 mg tablet  Outside Facility (Specify), Care Giver, Self Yes No   Sig: Take 5 mg by mouth 3 (three) times  a day with meals      Facility-Administered Medications: None     Discharge Medication List as of 2/14/2025  8:41 AM        CONTINUE these medications which have NOT CHANGED    Details   acetaminophen (TYLENOL) 500 mg tablet Take 2 tablets (1,000 mg total) by mouth 3 (three) times a day as needed for mild pain or fever, Starting Mon 2/19/2024, Normal      aspirin (Aspirin Low Dose) 81 mg chewable tablet CHEW 1 TABLET BY MOUTH DAILY @ 8AM (BLOOD THINNER) *[facility], Normal      atorvastatin (LIPITOR) 20 mg tablet TAKE 1 TABLET BY MOUTH ONCE DAILY @ 8AM(CHOLSETEROL) *[facility], Normal      benzonatate (TESSALON) 200 MG capsule TAKE 1 CAPSULE BY MOUTH 2X DAILY AS NEEDED FOR COUGH, Normal      bismuth subsalicylate (GNP Pink Bismuth) 262 MG chewable tablet CHEW 2 TABLETS(524MG) BY MOUTH EVERY 6 HOURS AS NEEDED FOR DIARRHEA *[facility], Normal      busPIRone (BUSPAR) 15 mg tablet Take 15 mg by mouth 2 (two) times a day , Historical Med      Calcium Carb-Cholecalciferol (calcium carbonate-vitamin D) 500 mg-5 mcg tablet TAKE 1 TABLET BY MOUTH 2X DAILY @8AM-8PM (SUPPLEMENT) *[facility], Normal      calcium carbonate-vitamin D 500 mg-5 mcg per tablet Take 1 tablet by mouth daily with breakfast, Historical Med      !! cetaphil (CETAPHIL) lotion Apply topically as needed for dry skin, Starting Mon 12/9/2024, Normal      !! cetaphil (CETAPHIL) lotion APPLY TOPICALLY TO AFFECTED AREA(S) AS NEEDED FOR DRY SKIN *[facility], Normal      cetirizine (ZyrTEC) 5 MG tablet TAKE 1 TABLET BY MOUTH AT BEDTIME AS NEEDED FOR ALLERGIES *[facility], Normal      chlorhexidine (PERIDEX) 0.12 % solution Historical Med      dextromethorphan-guaiFENesin (ROBITUSSIN DM)  mg/5 mL syrup Take 5 mL by mouth 2 (two) times a day as needed for cough, Starting Thu 1/16/2025, Normal      Diclofenac Sodium (VOLTAREN) 1 % Apply 2 g topically 4 (four) times a day, Historical Med      diphenhydrAMINE (BENADRYL) 50 mg capsule TAKE 1 CAPSULE BY MOUTH AT BEDTIME AS NEEDED FOR SLEEP  *[facility], Normal      divalproex sodium (DEPAKOTE) 500 mg EC tablet 3 tabs(1500mg) at bedtime, Historical Med      docusate sodium (Stool Softener) 100 mg capsule TAKE 1 CAPSULE BY MOUTH 2X DAILY AS NEEDED (CONSTIPATION) *[facility], Normal      fluticasone (FLONASE) 50 mcg/act nasal spray 2 sprays into each nostril daily, Starting Thu 1/2/2025, Normal      !! gabapentin (NEURONTIN) 100 mg capsule Take 200 mg by mouth 2 (two) times a day  , Starting Tue 11/29/2011, Historical Med      !! gabapentin (NEURONTIN) 300 mg capsule Historical Med      GNP Tussin DM  MG/10ML oral liquid TAKE 1 TSP(5ML) BY MOUTH 2X DAILY AS NEEDED FOR COUGH.*[facility], Normal      Incontinence Supplies MISC Use if needed (incontinence) SIZE XL PULL UP DISPOSABLE BRIEFS, Starting Tue 6/18/2024, Normal      Incontinence Supply Disposable (Disposable Brief Large) MISC Use every 12 (twelve) hours, Starting Thu 11/21/2024, Normal      L-Theanine 100 MG CAPS Take 200 mg by mouth 2 (two) times a day  , Historical Med      levothyroxine 100 mcg tablet TAKE 1 TABLET BY MOUTH ONCE DAILY @ 6AM(THYROID) *[facility], Normal      Lidocaine (HM Lidocaine Patch) 4 % PTCH Apply 1 patch topically daily as needed (back pain), Starting Wed 1/10/2024, Until Tue 2/11/2025 at 2359, Normal      lidocaine (LMX) 4 % cream Apply topically as needed for mild pain, Historical Med      !! LORazepam (ATIVAN) 0.5 mg tablet Take 0.5 mg by mouth every 12 (twelve) hours as needed for anxiety, Historical Med      !! LORazepam (ATIVAN) 1 mg tablet Take 1 mg by mouth in the morning and 1 mg in the evening and 1 mg before bedtime., Historical Med      !! loxapine (LOXITANE) 25 mg capsule Take 25 mg by mouth daily at bedtime Take with 50 mg dose = 75 mg, Historical Med      !! loxapine (LOXITANE) 50 MG capsule Take 75 mg by mouth daily at bedtime, Starting Fri 8/11/2023, Historical Med      Menthol, Topical Analgesic, (Bengay Ultra Strength) 5 % PT[facility] Apply 1 patch topically in the morning,  Starting Thu 11/21/2024, Normal      mupirocin (BACTROBAN) 2 % ointment Apply topically 2 (two) times a day, Historical Med      PHENobarbital 64.8 mg tablet Take 1 tablet (64.8 mg total) by mouth every 12 (twelve) hours, Starting Tue 2/11/2025, Normal      polyethylene glycol (GLYCOLAX) 17 GM/SCOOP MIX 1 CAPFUL(17GM) IN 8OZ OF LIQUID AND DRINK DAILY @ 8AM(CONSTIPATION) *[facility], Normal      Prolia 60 MG/ML Inject 60 mg under the skin once, Starting Tue 4/9/2024, Historical Med      Saphris 10 MG SL tablet Place 1 tablet (10 mg total) under the tongue 2 (two) times a day, Starting Mon 2/14/2022, Normal      senna-docusate sodium (SENOKOT-S) 8.6-50 mg per tablet Take 1 tablet by mouth daily, Historical Med      sodium chloride (OCEAN) 0.65 % nasal spray 1 spray into each nostril as needed for congestion TAKE UP TO 6 TIMES DAILY AS NEEDED, Starting Tue 1/10/2023, Normal      Thera (THERA/BETA-CAROTENE) Take 1 tablet by mouth daily, Starting Fri 10/11/2024, Normal      tiZANidine (ZANAFLEX) 2 mg tablet Take 2 mg by mouth daily, Starting Mon 7/29/2024, Historical Med      traZODone (DESYREL) 150 mg tablet Take 300 mg by mouth daily at bedtime, Starting Sun 6/16/2024, Historical Med      trihexyphenidyl (ARTANE) 5 mg tablet Take 5 mg by mouth 3 (three) times a day with meals, Historical Med       !! - Potential duplicate medications found. Please discuss with provider.        STOP taking these medications       cephalexin (KEFLEX) 500 mg capsule Comments:   Reason for Stopping:             No discharge procedures on file.  ED SEPSIS DOCUMENTATION   Time reflects when diagnosis was documented in both MDM as applicable and the Disposition within this note       Time User Action Codes Description Comment    2/13/2025  9:11 PM Lynnette Lemus Add [F22] Delusions (HCC)                  Lynnette Lemus DO  02/15/25 [id number]
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
## 2025-02-14 ENCOUNTER — HOSPITAL ENCOUNTER (INPATIENT)
Facility: HOSPITAL | Age: 70
LOS: 24 days | Discharge: HOME | DRG: 885 | End: 2025-03-10
Attending: PSYCHIATRY & NEUROLOGY | Admitting: PSYCHIATRY & NEUROLOGY
Payer: MEDICARE

## 2025-02-14 DIAGNOSIS — F22 DELUSIONS (HCC): ICD-10-CM

## 2025-02-14 DIAGNOSIS — J30.2 SEASONAL ALLERGIES: ICD-10-CM

## 2025-02-14 DIAGNOSIS — R94.31 ABNORMAL ECG: ICD-10-CM

## 2025-02-14 DIAGNOSIS — F25.9 SCHIZOAFFECTIVE DISORDER, UNSPECIFIED TYPE (HCC): Primary | ICD-10-CM

## 2025-02-14 DIAGNOSIS — R94.31 T WAVE INVERSION ON ELECTROCARDIOGRAM: ICD-10-CM

## 2025-02-14 DIAGNOSIS — K59.00 CONSTIPATION: ICD-10-CM

## 2025-02-14 DIAGNOSIS — E03.9 ACQUIRED HYPOTHYROIDISM: ICD-10-CM

## 2025-02-14 DIAGNOSIS — R45.1 AGITATION: ICD-10-CM

## 2025-02-14 DIAGNOSIS — L85.3 DRY SKIN: ICD-10-CM

## 2025-02-14 DIAGNOSIS — E78.2 MIXED HYPERLIPIDEMIA: ICD-10-CM

## 2025-02-14 DIAGNOSIS — G40.909 SEIZURE DISORDER (HCC): ICD-10-CM

## 2025-02-14 DIAGNOSIS — G47.00 INSOMNIA: ICD-10-CM

## 2025-02-14 DIAGNOSIS — G20.C PARKINSONISM (HCC): ICD-10-CM

## 2025-02-14 PROCEDURE — 99223 1ST HOSP IP/OBS HIGH 75: CPT | Performed by: PSYCHIATRY & NEUROLOGY

## 2025-02-14 PROCEDURE — 96361 HYDRATE IV INFUSION ADD-ON: CPT

## 2025-02-14 RX ORDER — ECHINACEA PURPUREA EXTRACT 125 MG
2 TABLET ORAL
Status: DISCONTINUED | OUTPATIENT
Start: 2025-02-14 | End: 2025-03-10 | Stop reason: HOSPADM

## 2025-02-14 RX ORDER — GABAPENTIN 100 MG/1
100 CAPSULE ORAL 3 TIMES DAILY
Status: DISCONTINUED | OUTPATIENT
Start: 2025-02-14 | End: 2025-02-17

## 2025-02-14 RX ORDER — MAGNESIUM HYDROXIDE/ALUMINUM HYDROXICE/SIMETHICONE 120; 1200; 1200 MG/30ML; MG/30ML; MG/30ML
30 SUSPENSION ORAL EVERY 4 HOURS PRN
Status: CANCELLED | OUTPATIENT
Start: 2025-02-13

## 2025-02-14 RX ORDER — POLYETHYLENE GLYCOL 3350 17 G/17G
17 POWDER, FOR SOLUTION ORAL DAILY PRN
Status: DISCONTINUED | OUTPATIENT
Start: 2025-02-14 | End: 2025-03-10 | Stop reason: HOSPADM

## 2025-02-14 RX ORDER — ACETAMINOPHEN 325 MG/1
650 TABLET ORAL EVERY 4 HOURS PRN
Status: DISCONTINUED | OUTPATIENT
Start: 2025-02-14 | End: 2025-03-10 | Stop reason: HOSPADM

## 2025-02-14 RX ORDER — TRIHEXYPHENIDYL HYDROCHLORIDE 2 MG/1
2 TABLET ORAL 2 TIMES DAILY
Status: DISCONTINUED | OUTPATIENT
Start: 2025-02-14 | End: 2025-03-10 | Stop reason: HOSPADM

## 2025-02-14 RX ORDER — PROPRANOLOL HYDROCHLORIDE 10 MG/1
5 TABLET ORAL EVERY 8 HOURS PRN
Status: DISCONTINUED | OUTPATIENT
Start: 2025-02-14 | End: 2025-03-10 | Stop reason: HOSPADM

## 2025-02-14 RX ORDER — RISPERIDONE 1 MG/1
1 TABLET ORAL
Status: CANCELLED | OUTPATIENT
Start: 2025-02-13

## 2025-02-14 RX ORDER — MAGNESIUM HYDROXIDE/ALUMINUM HYDROXICE/SIMETHICONE 120; 1200; 1200 MG/30ML; MG/30ML; MG/30ML
30 SUSPENSION ORAL EVERY 4 HOURS PRN
Status: DISCONTINUED | OUTPATIENT
Start: 2025-02-14 | End: 2025-03-10 | Stop reason: HOSPADM

## 2025-02-14 RX ORDER — RISPERIDONE 0.5 MG/1
0.5 TABLET ORAL
Status: DISCONTINUED | OUTPATIENT
Start: 2025-02-14 | End: 2025-02-26

## 2025-02-14 RX ORDER — LIDOCAINE 50 MG/G
1 PATCH TOPICAL DAILY PRN
Status: DISCONTINUED | OUTPATIENT
Start: 2025-02-14 | End: 2025-03-10 | Stop reason: HOSPADM

## 2025-02-14 RX ORDER — LORAZEPAM 1 MG/1
1 TABLET ORAL 2 TIMES DAILY
Status: DISCONTINUED | OUTPATIENT
Start: 2025-02-14 | End: 2025-02-19

## 2025-02-14 RX ORDER — PROPRANOLOL HYDROCHLORIDE 10 MG/1
5 TABLET ORAL EVERY 8 HOURS PRN
Status: CANCELLED | OUTPATIENT
Start: 2025-02-13

## 2025-02-14 RX ORDER — ACETAMINOPHEN 325 MG/1
650 TABLET ORAL EVERY 4 HOURS PRN
Status: CANCELLED | OUTPATIENT
Start: 2025-02-13

## 2025-02-14 RX ORDER — TRAZODONE HYDROCHLORIDE 50 MG/1
50 TABLET ORAL
Status: DISCONTINUED | OUTPATIENT
Start: 2025-02-14 | End: 2025-03-10 | Stop reason: HOSPADM

## 2025-02-14 RX ORDER — ASPIRIN 81 MG/1
81 TABLET, CHEWABLE ORAL DAILY
Status: DISCONTINUED | OUTPATIENT
Start: 2025-02-15 | End: 2025-03-10 | Stop reason: HOSPADM

## 2025-02-14 RX ORDER — LANOLIN ALCOHOL/MO/W.PET/CERES
1 CREAM (GRAM) TOPICAL
Status: DISCONTINUED | OUTPATIENT
Start: 2025-02-15 | End: 2025-03-10 | Stop reason: HOSPADM

## 2025-02-14 RX ORDER — HYDROXYZINE HYDROCHLORIDE 50 MG/1
50 TABLET, FILM COATED ORAL
Status: DISCONTINUED | OUTPATIENT
Start: 2025-02-14 | End: 2025-03-10 | Stop reason: HOSPADM

## 2025-02-14 RX ORDER — RISPERIDONE 0.25 MG/1
0.5 TABLET ORAL
Status: CANCELLED | OUTPATIENT
Start: 2025-02-13

## 2025-02-14 RX ORDER — HYDROXYZINE HYDROCHLORIDE 25 MG/1
25 TABLET, FILM COATED ORAL
Status: DISCONTINUED | OUTPATIENT
Start: 2025-02-14 | End: 2025-03-10 | Stop reason: HOSPADM

## 2025-02-14 RX ORDER — LORATADINE 10 MG/1
10 TABLET ORAL DAILY PRN
Status: DISCONTINUED | OUTPATIENT
Start: 2025-02-14 | End: 2025-03-10 | Stop reason: HOSPADM

## 2025-02-14 RX ORDER — PHENOBARBITAL 32.4 MG/1
64.8 TABLET ORAL EVERY 12 HOURS
Status: DISCONTINUED | OUTPATIENT
Start: 2025-02-14 | End: 2025-03-10 | Stop reason: HOSPADM

## 2025-02-14 RX ORDER — CHLORHEXIDINE GLUCONATE ORAL RINSE 1.2 MG/ML
15 SOLUTION DENTAL EVERY 12 HOURS SCHEDULED
Status: DISCONTINUED | OUTPATIENT
Start: 2025-02-14 | End: 2025-03-10 | Stop reason: HOSPADM

## 2025-02-14 RX ORDER — HYDROXYZINE HYDROCHLORIDE 25 MG/1
25 TABLET, FILM COATED ORAL
Status: CANCELLED | OUTPATIENT
Start: 2025-02-13

## 2025-02-14 RX ORDER — OLANZAPINE 5 MG/1
5 TABLET, ORALLY DISINTEGRATING ORAL
Status: DISCONTINUED | OUTPATIENT
Start: 2025-02-14 | End: 2025-02-16

## 2025-02-14 RX ORDER — HYDRALAZINE HYDROCHLORIDE 25 MG/1
25 TABLET, FILM COATED ORAL EVERY 8 HOURS PRN
Status: DISCONTINUED | OUTPATIENT
Start: 2025-02-14 | End: 2025-03-10 | Stop reason: HOSPADM

## 2025-02-14 RX ORDER — AMOXICILLIN 250 MG
1 CAPSULE ORAL
Status: DISCONTINUED | OUTPATIENT
Start: 2025-02-14 | End: 2025-03-10 | Stop reason: HOSPADM

## 2025-02-14 RX ORDER — TRAZODONE HYDROCHLORIDE 50 MG/1
50 TABLET ORAL
Status: CANCELLED | OUTPATIENT
Start: 2025-02-13

## 2025-02-14 RX ORDER — FLUTICASONE PROPIONATE 50 MCG
2 SPRAY, SUSPENSION (ML) NASAL DAILY
Status: DISCONTINUED | OUTPATIENT
Start: 2025-02-15 | End: 2025-03-10 | Stop reason: HOSPADM

## 2025-02-14 RX ORDER — ALENDRONATE SODIUM 70 MG/1
70 TABLET ORAL
COMMUNITY
Start: 2025-02-14 | End: 2025-04-21 | Stop reason: SDUPTHER

## 2025-02-14 RX ORDER — HYDROXYZINE HYDROCHLORIDE 25 MG/1
50 TABLET, FILM COATED ORAL
Status: CANCELLED | OUTPATIENT
Start: 2025-02-13

## 2025-02-14 RX ORDER — ACETAMINOPHEN 325 MG/1
975 TABLET ORAL EVERY 6 HOURS PRN
Status: DISCONTINUED | OUTPATIENT
Start: 2025-02-14 | End: 2025-03-10 | Stop reason: HOSPADM

## 2025-02-14 RX ORDER — OLANZAPINE 5 MG/1
5 TABLET, ORALLY DISINTEGRATING ORAL ONCE
Status: COMPLETED | OUTPATIENT
Start: 2025-02-14 | End: 2025-02-14

## 2025-02-14 RX ORDER — ATORVASTATIN CALCIUM 20 MG/1
20 TABLET, FILM COATED ORAL
Status: DISCONTINUED | OUTPATIENT
Start: 2025-02-14 | End: 2025-03-10 | Stop reason: HOSPADM

## 2025-02-14 RX ORDER — RISPERIDONE 0.25 MG/1
0.25 TABLET ORAL
Status: DISCONTINUED | OUTPATIENT
Start: 2025-02-14 | End: 2025-02-26

## 2025-02-14 RX ORDER — LEVOTHYROXINE SODIUM 100 UG/1
100 TABLET ORAL
Status: DISCONTINUED | OUTPATIENT
Start: 2025-02-15 | End: 2025-03-10 | Stop reason: HOSPADM

## 2025-02-14 RX ORDER — RISPERIDONE 1 MG/1
1 TABLET ORAL
Status: DISCONTINUED | OUTPATIENT
Start: 2025-02-14 | End: 2025-02-26

## 2025-02-14 RX ORDER — RISPERIDONE 0.25 MG/1
0.25 TABLET ORAL
Status: CANCELLED | OUTPATIENT
Start: 2025-02-13

## 2025-02-14 RX ORDER — ACETAMINOPHEN 325 MG/1
975 TABLET ORAL EVERY 6 HOURS PRN
Status: CANCELLED | OUTPATIENT
Start: 2025-02-13

## 2025-02-14 RX ADMIN — PHENOBARBITAL 64.8 MG: 32.4 TABLET ORAL at 21:21

## 2025-02-14 RX ADMIN — GABAPENTIN 100 MG: 100 CAPSULE ORAL at 21:22

## 2025-02-14 RX ADMIN — SENNOSIDES AND DOCUSATE SODIUM 1 TABLET: 50; 8.6 TABLET ORAL at 21:22

## 2025-02-14 RX ADMIN — ACETAMINOPHEN 975 MG: 325 TABLET ORAL at 23:08

## 2025-02-14 RX ADMIN — ACETAMINOPHEN 650 MG: 325 TABLET ORAL at 17:02

## 2025-02-14 RX ADMIN — LORAZEPAM 1 MG: 1 TABLET ORAL at 17:48

## 2025-02-14 RX ADMIN — ATORVASTATIN CALCIUM 20 MG: 20 TABLET, FILM COATED ORAL at 17:02

## 2025-02-14 RX ADMIN — GABAPENTIN 100 MG: 100 CAPSULE ORAL at 17:02

## 2025-02-14 RX ADMIN — LEVOTHYROXINE SODIUM 100 MCG: 100 TABLET ORAL at 06:37

## 2025-02-14 RX ADMIN — TRIHEXYPHENIDYL HYDROCHLORIDE 2 MG: 2 TABLET ORAL at 21:25

## 2025-02-14 RX ADMIN — LORAZEPAM 1 MG: 1 TABLET ORAL at 13:15

## 2025-02-14 RX ADMIN — CHLORHEXIDINE GLUCONATE 15 ML: 1.2 SOLUTION ORAL at 21:22

## 2025-02-14 RX ADMIN — TRIHEXYPHENIDYL HYDROCHLORIDE 2 MG: 2 TABLET ORAL at 13:15

## 2025-02-14 RX ADMIN — OLANZAPINE 5 MG: 5 TABLET, ORALLY DISINTEGRATING ORAL at 04:06

## 2025-02-14 RX ADMIN — OLANZAPINE 5 MG: 5 TABLET, ORALLY DISINTEGRATING ORAL at 21:22

## 2025-02-14 RX ADMIN — Medication 3 MG: at 21:22

## 2025-02-14 NOTE — ED NOTES
Per EVS, patients MA is FFS and patient has Medicare A&B.     Hanna Polo, [facility]  02/13/25     [id number]

## 2025-02-14 NOTE — PROGRESS NOTES
02/13/25 1900   Clinical Encounter Type   Visited With Patient   Referral From Nurse   Mormonism Encounters   Mormonism Needs Mormonism articles

## 2025-02-14 NOTE — NURSING NOTE
Clari Brewer supervisor from protective services unit. Visited patient she is from the area office on aging. Contact information for  provided to her. Clari's phone number [phone] ext [phone].

## 2025-02-14 NOTE — ED NOTES
Patient is accepted at [facility]  Patient is accepted by Dr. Priscilla Montague in Intake.     Transportation is arranged with [facility].     Transportation is scheduled for TBD.   Patient may go to the floor after 8a.          *Nurse report is to be called to [phone] prior to patient transfer.*        Hanna Polo, [facility]ITL  02/13/25    [phone]
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
## 2025-02-14 NOTE — H&P
"                              Esvin Tse#  :[id number] F  MRN:[id number]    CSN:[id number]  Adm Date: 2025  9:29 AM   ATT PHY: Papito An Md  [facility]         Chief Complaint: paranoia      History of Presenting Illness: Laine Tse is a(n) 69 y.o. year old female who is admitted to [facility] on voluntary 201 commitment basis.  Patient originally presented to [facility] ED on 25 for paranoia.     Patient examined at bedside.  Patient complaining of a headache.  She reports having a headache \"every day\" for the past few years.  Takes Tylenol as needed with relief.  Denies any other complaints at this time.     Labs :  Sodium 129.  TSH, UA, CBC without significant abnormalities.   Admission labs waiting to be collected.     Allergies   Allergen Reactions    Clozapine Other (See Comments)     low white blood count  Other reaction(s): Other (See Comments)  low white blood count    Haloperidol Other (See Comments)     EPS  Other reaction(s): Other (See Comments)  EPS    Lithium Other (See Comments)     Toxicity    Prolixin [Fluphenazine] Hyperactivity and Other (See Comments)     EPS, Hyperactivity  EPS, Hyperactivity    Valproic Acid Confusion and Other (See Comments)     \"Mental confusion\"  \"Mental confusion\"     Current Facility-Administered Medications on File Prior to Encounter   Medication Dose Route Frequency Provider Last Rate Last Admin    lactated ringers infusion   Intravenous Continuous PRN Celi Preston CRNA   New Bag at 23 1400    [COMPLETED] OLANZapine (ZyPREXA ZYDIS) dispersible tablet 5 mg  5 mg Oral Once Fan Pérez MD   5 mg at 25 1817    [COMPLETED] OLANZapine (ZyPREXA ZYDIS) dispersible tablet 5 mg  5 mg Oral Once Maximiliano Art DO   5 mg at 25 0406    [COMPLETED] sodium chloride 0.9 % bolus 1,000 mL  1,000 mL Intravenous Once Maximiliano Art DO   Stopped at 25 0110    " [DISCONTINUED] asenapine (SAPHRIS) SL tablet 10 mg  10 mg Sublingual BID Lynnette Lemus DO   10 mg at 02/13/25 2134    [DISCONTINUED] asenapine (SAPHRIS) SL tablet 10 mg  10 mg Sublingual BID Lynnette Lemus DO        [DISCONTINUED] aspirin chewable tablet 81 mg  81 mg Oral Daily Lynnette Lemus DO        [DISCONTINUED] atorvastatin (LIPITOR) tablet 20 mg  20 mg Oral Daily With Dinner Lynnette Lemus DO        [DISCONTINUED] busPIRone (BUSPAR) tablet 15 mg  15 mg Oral BID Lynnette Lemus DO        [DISCONTINUED] chlorhexidine (PERIDEX) 0.12 % oral rinse 15 mL  15 mL Swish & Spit Q12H JANNIE Lynnette Lemus DO   15 mL at 02/13/25 2135    [DISCONTINUED] divalproex sodium (DEPAKOTE ER) 24 hr tablet 1,500 mg  1,500 mg Oral Daily Lynnette Lemus DO        [DISCONTINUED] divalproex sodium (DEPAKOTE ER) 24 hr tablet 1,500 mg  1,500 mg Oral Daily Lynnette Lemus DO   1,500 mg at 02/13/25 2139    [DISCONTINUED] docusate sodium (COLACE) capsule 50 mg  50 mg Oral BID PRN Lynnette Lemus DO   50 mg at 02/13/25 2314    [DISCONTINUED] fluticasone (ARNUITY ELLIPTA) 100 MCG/ACT inhaler 1 puff  1 puff Inhalation Daily Lynnette Lemus DO        [DISCONTINUED] gabapentin (NEURONTIN) capsule 300 mg  300 mg Oral Daily Lynnette Lemus DO        [DISCONTINUED] levothyroxine tablet 100 mcg  100 mcg Oral Early Morning Lynnette Lemus DO   100 mcg at 02/14/25 0637    [DISCONTINUED] LORazepam (ATIVAN) tablet 1 mg  1 mg Oral TID Lynnette Lemus DO   1 mg at 02/13/25 2136    [DISCONTINUED] loxapine (LOXITANE) capsule 25 mg  25 mg Oral HS Lynnette Lemus DO   25 mg at 02/13/25 2140    [DISCONTINUED] PHENobarbital tablet 64.8 mg  64.8 mg Oral Q12H Lynnette Lemus DO   64.8 mg at 02/13/25 2136    [DISCONTINUED] traZODone (DESYREL) tablet 150 mg  150 mg Oral HS Lynnette Lemus DO   150 mg at 02/13/25 2135    [DISCONTINUED] trihexyphenidyl (ARTANE) tablet 5 mg  5 mg Oral TID With Meals Lynnette Lemus DO        [DISCONTINUED] valproic acid (DEPAKENE)  capsule 1,500 mg  1,500 mg Oral Once Lynnette Lemus,          Current Outpatient Medications on File Prior to Encounter   Medication Sig Dispense Refill    acetaminophen (TYLENOL) 500 mg tablet Take 2 tablets (1,000 mg total) by mouth 3 (three) times a day as needed for mild pain or fever 180 tablet 1    alendronate (FOSAMAX) 70 mg tablet Take 70 mg by mouth every 7 days Every 7 days on Fridays      aspirin (Aspirin Low Dose) 81 mg chewable tablet CHEW 1 TABLET BY MOUTH DAILY @ 8AM (BLOOD THINNER) *AHMAD 90 tablet 1    atorvastatin (LIPITOR) 20 mg tablet TAKE 1 TABLET BY MOUTH ONCE DAILY @ 8AM(CHOLSETEROL) *AHMAD 31 tablet 5    bismuth subsalicylate (GNP Pink Bismuth) 262 MG chewable tablet CHEW 2 TABLETS(524MG) BY MOUTH EVERY 6 HOURS AS NEEDED FOR DIARRHEA *AHMAD 120 tablet 1    busPIRone (BUSPAR) 15 mg tablet Take 15 mg by mouth 2 (two) times a day       calcium carbonate-vitamin D 250 mg-3.125 mcg per tablet Take 1 tablet by mouth daily      calcium carbonate-vitamin D 500 mg-5 mcg per tablet Take 1 tablet by mouth daily with breakfast      cetirizine (ZyrTEC) 5 MG tablet TAKE 1 TABLET BY MOUTH AT BEDTIME AS NEEDED FOR ALLERGIES *AHMAD 30 tablet 5    chlorhexidine (PERIDEX) 0.12 % solution       Diclofenac Sodium (VOLTAREN) 1 % Apply 2 g topically 4 (four) times a day      divalproex sodium (DEPAKOTE) 500 mg EC tablet 3 tabs(1500mg) at bedtime      docusate sodium (Stool Softener) 100 mg capsule TAKE 1 CAPSULE BY MOUTH 2X DAILY AS NEEDED (CONSTIPATION) *AHMAD 60 capsule 1    fluticasone (FLONASE) 50 mcg/act nasal spray 2 sprays into each nostril daily 16 g 3    gabapentin (NEURONTIN) 300 mg capsule 2 (two) times a day      L-Theanine 100 MG CAPS Take 200 mg by mouth 2 (two) times a day        levothyroxine 100 mcg tablet TAKE 1 TABLET BY MOUTH ONCE DAILY @ 6AM(THYROID) *AHMAD 31 tablet 5    LORazepam (ATIVAN) 1 mg tablet Take 1 mg by mouth in the morning and 1 mg in the evening and 1 mg before bedtime.       loxapine (LOXITANE) 25 mg capsule Take 25 mg by mouth daily at bedtime Take with 50 mg dose = 75 mg      PHENobarbital 64.8 mg tablet Take 1 tablet (64.8 mg total) by mouth every 12 (twelve) hours 62 tablet 5    polyethylene glycol (GLYCOLAX) 17 GM/SCOOP MIX 1 CAPFUL(17GM) IN 8OZ OF LIQUID AND DRINK DAILY @ 8AM(CONSTIPATION) *AHMAD 510 g 1    Saphris 10 MG SL tablet Place 1 tablet (10 mg total) under the tongue 2 (two) times a day 60 tablet 1    senna-docusate sodium (SENOKOT-S) 8.6-50 mg per tablet Take 1 tablet by mouth daily      sodium chloride (OCEAN) 0.65 % nasal spray 1 spray into each nostril as needed for congestion TAKE UP TO 6 TIMES DAILY AS NEEDED 30 mL 3    Thera (THERA/BETA-CAROTENE) Take 1 tablet by mouth daily 30 tablet 5    traZODone (DESYREL) 150 mg tablet Take 300 mg by mouth daily at bedtime      trihexyphenidyl (ARTANE) 5 mg tablet Take 5 mg by mouth 3 (three) times a day with meals      benzonatate (TESSALON) 200 MG capsule TAKE 1 CAPSULE BY MOUTH 2X DAILY AS NEEDED FOR COUGH (Patient not taking: Reported on 2025) 30 capsule 5    Calcium Carb-Cholecalciferol (calcium carbonate-vitamin D) 500 mg-5 mcg tablet TAKE 1 TABLET BY MOUTH 2X DAILY @8AM-8PM (SUPPLEMENT) *AHMAD (Patient not taking: No sig reported) 62 tablet 5    [] cephalexin (KEFLEX) 500 mg capsule Take 1 capsule (500 mg total) by mouth 3 (three) times a day for 7 days (Patient not taking: Reported on 2025) 21 capsule 0    cetaphil (CETAPHIL) lotion Apply topically as needed for dry skin (Patient not taking: Reported on 2025) 237 mL 0    cetaphil (CETAPHIL) lotion APPLY TOPICALLY TO AFFECTED AREA(S) AS NEEDED FOR DRY SKIN *MYLA (Patient not taking: Reported on 2025) 237 mL 1    dextromethorphan-guaiFENesin (ROBITUSSIN DM)  mg/5 mL syrup Take 5 mL by mouth 2 (two) times a day as needed for cough (Patient not taking: Reported on 2025) 100 mL 0    diphenhydrAMINE (BENADRYL) 50 mg capsule TAKE 1  CAPSULE BY MOUTH AT BEDTIME AS NEEDED FOR SLEEP *AHMAD (Patient not taking: Reported on 2/14/2025) 30 capsule 1    gabapentin (NEURONTIN) 100 mg capsule Take 200 mg by mouth 2 (two) times a day   (Patient not taking: Reported on 2/11/2025)      GNP Tussin DM  MG/10ML oral liquid TAKE 1 TSP(5ML) BY MOUTH 2X DAILY AS NEEDED FOR COUGH.*AHMAD (Patient not taking: Reported on 2/14/2025) 118 mL 0    Incontinence Supplies MISC Use if needed (incontinence) SIZE XL PULL UP DISPOSABLE BRIEFS 100 each 1    Incontinence Supply Disposable (Disposable Brief Large) MISC Use every 12 (twelve) hours (Patient not taking: Reported on 2/14/2025) 36 each 6    Lidocaine (HM Lidocaine Patch) 4 % PTCH Apply 1 patch topically daily as needed (back pain) 30 patch 2    lidocaine (LMX) 4 % cream Apply topically as needed for mild pain (Patient not taking: Reported on 2/14/2025)      LORazepam (ATIVAN) 0.5 mg tablet Take 0.5 mg by mouth every 12 (twelve) hours as needed for anxiety      loxapine (LOXITANE) 50 MG capsule Take 75 mg by mouth daily at bedtime (Patient not taking: Reported on 2/14/2025)      Menthol, Topical Analgesic, (Bengay Ultra Strength) 5 % PTCH Apply 1 patch topically in the morning 30 patch 2    mupirocin (BACTROBAN) 2 % ointment Apply topically 2 (two) times a day      Prolia 60 MG/ML Inject 60 mg under the skin once (Patient not taking: Reported on 2/11/2025)      tiZANidine (ZANAFLEX) 2 mg tablet Take 2 mg by mouth daily       Active Ambulatory Problems     Diagnosis Date Noted    Depression with anxiety 06/15/2016    Hyperlipidemia 10/05/2012    Hyponatremia 07/24/2015    Hypothyroidism 10/05/2012    Flatulence 05/16/2018    Injury of right toe, initial encounter 09/12/2018    Seizure disorder (HCC) 05/15/2017    Hyperglycemia 11/14/2018    Benign essential hypertension 02/20/2019    Schizoaffective disorder (HCC) 10/05/2012    Unsteady gait 10/03/2017    Psychosis, bipolar affective ([facility]) 10/05/2012     Osteoporosis 10/05/2012    Insomnia 05/15/2017    Folic acid deficiency 10/15/2012    Anemia 05/15/2017    Pre-diabetes 02/20/2019    Decreased hearing of both ears 05/28/2019    Cellulitis of abdominal wall 11/01/2019    Class 1 obesity with body mass index (BMI) of 32.0 to 32.9 in adult 03/17/2021    Chronic midline low back pain without sciatica 07/26/2021    Dermatitis 03/02/2023    T wave inversion on electrocardiogram 06/08/2023    Rash 06/08/2023    Skin lesion of left lower extremity 02/26/2024     Resolved Ambulatory Problems     Diagnosis Date Noted    Screening for colon cancer 05/16/2018    Impacted cerumen of right ear 06/08/2018    Cellulitis 06/08/2018    Medicare annual wellness visit, subsequent 07/10/2018    Fall 09/12/2018    Right knee injury, initial encounter 09/12/2018    Closed displaced fracture of first metatarsal bone of right foot 09/20/2018    Gastroenteritis 12/18/2018    Viral upper respiratory tract infection 09/30/2019    Ulcer of abdomen wall, limited to breakdown of skin (HCC) 11/04/2019    Acute diffuse otitis externa of right ear 07/17/2020    Closed displaced fracture of fifth metatarsal bone of right foot 05/25/2021    Continuous opioid dependence (HCC) 05/18/2022    URI (upper respiratory infection) 04/05/2023    Cough 06/08/2023    Skin lesion of left lower extremity 06/08/2023     Past Medical History:   Diagnosis Date    Anxiety     Depression     Fracture of fifth metatarsal bone of right foot with delayed healing     Obesity     Seizures (HCC)      Past Surgical History:   Procedure Laterality Date    COLONOSCOPY      complete    WA COLONOSCOPY FLX DX W/COLLJ SPEC WHEN PFRMD N/A 8/30/2018    Procedure: COLONOSCOPY with polypectomies;  Surgeon: Andriy Lopez MD;  Location: [facility];  Service: Gastroenterology     Social History:   Social History     Socioeconomic History    Marital status: /Civil Union     Spouse name: Not on file    Number of children: Not on file     Years of education: Not on file    Highest education level: Not on file   Occupational History    Not on file   Tobacco Use    Smoking status: Former     Current packs/day: 0.00     Average packs/day: 0.5 packs/day for 21.0 years (10.5 ttl pk-yrs)     Types: Cigarettes     Start date:      Quit date:      Years since quittin.1    Smokeless tobacco: Never   Vaping Use    Vaping status: Never Used   Substance and Sexual Activity    Alcohol use: No    Drug use: No    Sexual activity: Never   Other Topics Concern    Not on file   Social History Narrative    Not on file     Social Drivers of Health     Financial Resource Strain: Low Risk  (2023)    Overall Financial Resource Strain (CARDIA)     Difficulty of Paying Living Expenses: Not hard at all   Food Insecurity: No Food Insecurity (2025)    Nursing - Inadequate Food Risk Classification     Worried About Running Out of Food in the Last Year: Never true     Ran Out of Food in the Last Year: Never true     Ran Out of Food in the Last Year: Never true   Transportation Needs: No Transportation Needs (2025)    Nursing - Transportation Risk Classification     Lack of Transportation: Not on file     Lack of Transportation: No   Physical Activity: Not on file   Stress: Not on file   Social Connections: Not on file   Intimate Partner Violence: Unknown (2025)    Nursing IPS     Feels Physically and Emotionally Safe: Not on file     Physically Hurt by Someone: Not on file     Humiliated or Emotionally Abused by Someone: Not on file     Physically Hurt by Someone: No     Hurt or Threatened by Someone: No   Housing Stability: Unknown (2025)    Nursing: Inadequate Housing Risk Classification     Has Housing: Not on file     Worried About Losing Housing: Not on file     Unable to Get Utilities: Not on file     Unable to Pay for Housing in the Last Year: No     Has Housin     Family History:   Family History   Problem Relation Age of Onset  "   No Known Problems Mother     No Known Problems Father     Lung disease Other         mesothelioma    No Known Problems Maternal Grandmother     No Known Problems Maternal Grandfather     No Known Problems Paternal Grandmother     No Known Problems Paternal Grandfather     No Known Problems Sister     No Known Problems Sister     Kidney failure Brother     No Known Problems Maternal Aunt     No Known Problems Maternal Aunt     No Known Problems Maternal Aunt     No Known Problems Maternal Aunt     No Known Problems Paternal Aunt     No Known Problems Paternal Aunt      Review of Systems   Constitutional:  Negative for chills and fever.   HENT: Negative.     Eyes: Negative.    Respiratory:  Negative for cough and shortness of breath.    Cardiovascular:  Negative for chest pain and palpitations.   Gastrointestinal:  Negative for abdominal pain, constipation, diarrhea, nausea and vomiting.   Genitourinary:  Negative for dysuria and hematuria.   Musculoskeletal:  Positive for back pain.   Skin: Negative.    Neurological:  Positive for seizures and headaches. Negative for syncope.   Psychiatric/Behavioral:  The patient is nervous/anxious.    All other systems reviewed and are negative.    Physical Exam   Vitals: Blood pressure (!) 143/112, pulse 91, temperature 97.6 °F (36.4 °C), temperature source Temporal, resp. rate 19, height 5' 4\" (1.626 m), weight 79.7 kg (175 lb 11.3 oz), SpO2 99%, not currently breastfeeding.,Body mass index is 30.16 kg/m².  Constitutional: Awake, alert, in no acute distress.  Head: Normocephalic and atraumatic.   Mouth/Throat: Oropharynx is clear and moist.    Eyes: Conjunctivae and EOM are normal.   Neck: Neck supple.   Cardiovascular: Normal rate, regular rhythm.  Pulmonary/Chest: Effort normal and breath sounds normal.   Abdominal: Soft. Bowel sounds are normal. There is no tenderness. There is no rebound and no guarding.   Neurological: No focal deficits.   Skin: Skin is warm and dry. "     Assessment     Laine Tse is a(n) 69 y.o. female with schizoaffective disorder.     Cardiac with hx of HTN, HLD.  Continue atorvastatin 20 mg daily, ASA 81 mg daily.  Not on antihypertensive meds prehospital.  Hydralazine 25 mg as needed for SBP >170 or DBP >110.  Cont to monitor vitals.    Allergic rhinitis.  Flonase nasal spray daily.  Claritin 10 mg daily as needed (Zyrtec nonform).   Bilateral hearing loss.  Supportive measures.   Osteoporosis.  Continue calcium/vitamin D supplement.  Resume Fosamax on discharge.  DJD/OA/HA/chronic low back pain.  Tylenol, Zanaflex, lidocaine patch as needed.  Hypothyroidism.  Patient is on levothyroxine 100 mcg daily.  TSH normal on 2/13/25.  Seizure disorder.  Continue phenobarbital 64.8 mg q12h.  Pt follows with [facility] neurology (last saw 2/11/25).  Chronic constipation.  Continue Senokot S nightly.  Miralax as needed.  Hyponatremia.  Level 129 on 2/13.  Repeat CMP.  Schizoaffective disorder.  This is being managed by the psych team.       Prognosis: Fair.    Discharge Plan: In progress.    Advanced Directives: I have discussed in detail with the patient the advanced directives. The patient reports she does not have an appointed POA but her legal guardian is James Jones whose number is [phone].  She denies having a living will. When discussing cardiac and pulmonary resuscitation efforts with the patient, the patient wishes to be full code.    I have spent more than 50 minutes gathering data, doing physical examination, and discussing the advanced directives, which was witnessed by caring staff.    The patient was discussed with Dr. Wise and he is in agreement with the above note.

## 2025-02-14 NOTE — PLAN OF CARE
Problem: Ineffective Coping  Goal: Participates in unit activities  Description: Interventions:  - Provide therapeutic environment   - Provide required programming   - Redirect inappropriate behaviors   Outcome: Not Progressing   Patient just admitted to unit will encourage groups daily.

## 2025-02-14 NOTE — TREATMENT PLAN
History  Chief Complaint   Patient presents with    Fever - 9 weeks to 74 years    Leg Swelling     right leg     Patient presents to emergency room with 3 day history of right lower extremity redness and swelling  He states he developed an open area on the base of his right big toe from cracked skin  He states he is diabetic and developed cracked skin easily even though he lotions up  He states over the past 3 days he has developed fever as high as 102 at home with chills no nausea vomiting  No treatment attempted prior to arrival   On a chronic basis patient uses compression stockings on his bilateral lower extremities and has chronic edema bilaterally  Patient follows with Dr Stephanie Sánchez from Podiatry on outpatient basis  History provided by:  Patient and relative   used: No    Fever - 9 weeks to 74 years   Max temp prior to arrival:  102  Temp source:  Oral  Severity:  Moderate  Onset quality:  Sudden  Duration:  2 days  Timing:  Constant  Progression:  Worsening  Chronicity:  New  Relieved by:  Nothing  Worsened by:  Nothing  Ineffective treatments:  None tried  Associated symptoms: chills    Associated symptoms: no chest pain, no congestion, no cough, no diarrhea, no dysuria, no ear pain, no headaches, no myalgias, no nausea, no rash, no rhinorrhea, no sore throat and no vomiting        No Known Allergies    Prior to Admission Medications   Prescriptions Last Dose Informant Patient Reported? Taking?    apixaban (ELIQUIS) 5 mg 7/4/2019 at Unknown time  No Yes   Sig: Take 1 tablet (5 mg total) by mouth 2 (two) times a day   atorvastatin (LIPITOR) 40 mg tablet 7/4/2019 at Unknown time  Yes Yes   Sig: Take 40 mg by mouth daily   furosemide (LASIX) 40 mg tablet Not Taking at Unknown time  No No   Sig: Take 1 tablet (40 mg total) by mouth daily   Patient not taking: Reported on 7/4/2019   furosemide (LASIX) 80 mg tablet 7/4/2019 at Unknown time  Yes Yes   Sig: Take 80 mg by mouth daily TREATMENT PLAN REVIEW - Behavioral Health Laine Tse 69 y.o. 1955 female MRN: [id number]    [facility] Room / Bed: [facility] 209/[facility] 209-01 Encounter: [id number]          Admit Date/Time:  2/14/2025  9:29 AM    Treatment Team:   MD Tonja Fuller RN Nicole L Saas Wendy Godshall, RN Denise Rehrig, NATTY Larry    Diagnosis: Principal Problem:    Schizoaffective disorder (HCC)  Active Problems:    Hyponatremia    Hypothyroidism    Seizure disorder (HCC)    Insomnia      Patient Strengths/Assets: negotiates basic needs, patient is on a voluntary commitment    Patient Barriers/Limitations: chronic mental illness, poor insight, poor reasoning ability, self-care deficit    Short Term Goals: decrease in anxiety symptoms, decrease in paranoid thoughts, decrease in psychotic symptoms, decrease in level of agitation, ability to stay safe on the unit, ability to stay free of restraints, improvement in reasoning ability, sleep improvement, improvement in appetite, mood stabilization, acceptance of need for psychiatric treatment, acceptance of psychiatric medications    Long Term Goals: improvement in anxiety, stabilization of mood, resolution of psychotic symptoms, improvement in reality testing, improvement in reasoning ability, improved insight, acceptance of need for psychiatric medications, acceptance of need for psychiatric treatment, adequate self care, adequate sleep, adequate oral intake, appropriate interaction with peers    Progress Towards Goals: starting psychiatric medications as prescribed    Recommended Treatment: medication management, patient medication education, group therapy, milieu therapy, continued Behavioral Health psychiatric evaluation/assessment process, medical follow up with medical team    Treatment Frequency: daily medication monitoring, group and milieu therapy daily, monitoring through  glipiZIDE (GLUCOTROL) 5 mg tablet 7/4/2019 at Unknown time  Yes Yes   Sig: Take 5 mg by mouth daily   lisinopril (ZESTRIL) 10 mg tablet 7/4/2019 at Unknown time  No Yes   Sig: Take 1 tablet (10 mg total) by mouth daily   metFORMIN (GLUCOPHAGE) 1000 MG tablet 7/4/2019 at Unknown time  Yes Yes   Sig: Take 1,000 mg by mouth 2 (two) times a day with meals   metoprolol succinate (TOPROL-XL) 50 mg 24 hr tablet 7/4/2019 at Unknown time  Yes Yes   Sig: Take 25 mg by mouth daily      Facility-Administered Medications: None       Past Medical History:   Diagnosis Date    Bilateral edema of lower extremity     Chronic a-fib (HCC)     Chronic pain     Diabetes mellitus ([facility] [address] )     Hyperlipidemia     Hypertension     Lymphedema     DIAMOND (obstructive sleep apnea)     Stroke [facility])        Past Surgical History:   Procedure Laterality Date    HERNIA REPAIR         Family History   Problem Relation Age of Onset    Heart failure Mother     Heart failure Father      I have reviewed and agree with the history as documented  Social History     Tobacco Use    Smoking status: Never Smoker    Smokeless tobacco: Never Used   Substance Use Topics    Alcohol use: Not Currently     Comment: social    Drug use: No        Review of Systems   Constitutional: Positive for chills and fever  Negative for diaphoresis and fatigue  HENT: Negative for congestion, ear pain, rhinorrhea, sneezing and sore throat  Respiratory: Negative for cough, shortness of breath, wheezing and stridor  Cardiovascular: Positive for leg swelling  Negative for chest pain and palpitations  Gastrointestinal: Negative for abdominal pain, constipation, diarrhea, nausea and vomiting  Genitourinary: Negative for difficulty urinating, dysuria, frequency, hematuria and urgency  Musculoskeletal: Negative for gait problem, myalgias and neck pain  Skin: Positive for color change and wound (Base of right foot is noted in HPI)  Negative for rash  interdisciplinary rounds, monitoring through weekly patient care conferences    Expected Discharge Date: 10 midnights     Discharge Plan: will be determined    Treatment Plan Created/Updated By: Papito An MD       Neurological: Negative for dizziness, syncope, weakness, light-headedness and headaches  All other systems reviewed and are negative  Physical Exam  Physical Exam   Constitutional: He is oriented to person, place, and time  He appears well-developed and well-nourished  HENT:   Head: Normocephalic and atraumatic  Eyes: Pupils are equal, round, and reactive to light  Conjunctivae and EOM are normal    Neck: Normal range of motion  Neck supple  No tracheal deviation present  Cardiovascular: Normal rate, regular rhythm, normal heart sounds and intact distal pulses  Pulmonary/Chest: Effort normal and breath sounds normal  No respiratory distress  He has no wheezes  Abdominal: Soft  Bowel sounds are normal  He exhibits no distension  There is no tenderness  Musculoskeletal: Normal range of motion  He exhibits edema and tenderness  Neurological: He is alert and oriented to person, place, and time  Skin: Skin is warm and dry  No rash noted  There is erythema  Extensive erythema of the right lower extremity extending proximally to the mid thigh more medially than laterally  Extensive erythema of the distal foot extending to the knee tender to palpation  Patient has an open area of cracked skin on the sole of his right great toe scabbed blood is noted significant fungal appearance of bilateral toenails  Pitting edema +1 bilateral lower extremities present  Nursing note and vitals reviewed        Vital Signs  ED Triage Vitals   Temperature Pulse Respirations Blood Pressure SpO2   07/04/19 1750 07/04/19 1750 07/04/19 1750 07/04/19 1750 07/04/19 1750   (!) 103 4 °F (39 7 °C) (!) 130 16 (!) 187/93 98 %      Temp Source Heart Rate Source Patient Position - Orthostatic VS BP Location FiO2 (%)   07/04/19 1750 07/04/19 1750 07/04/19 1845 07/04/19 1845 --   Tympanic Monitor Sitting Left arm       Pain Score       07/04/19 1750       8           Vitals:    07/04/19 1900 07/04/19 1915 07/04/19 1930 07/04/19 1945   BP: 168/81  163/69    Pulse: (!) 133 (!) 138 (!) 145 (!) 146   Patient Position - Orthostatic VS: Sitting            Visual Acuity      ED Medications  Medications   vancomycin (VANCOCIN) 1,500 mg in sodium chloride 0 9 % 250 mL IVPB (1,500 mg Intravenous New Bag 7/4/19 1840)   sodium chloride 0 9 % bolus 1,000 mL (has no administration in time range)   sodium chloride 0 9 % bolus 1,000 mL (1,000 mL Intravenous New Bag 7/4/19 1825)   acetaminophen (TYLENOL) tablet 650 mg (650 mg Oral Given 7/4/19 1923)   morphine injection 2 mg (2 mg Intravenous Given 7/4/19 1925)       Diagnostic Studies  Results Reviewed     Procedure Component Value Units Date/Time    Lactic acid, plasma x2 [[id number]]  (Abnormal) Collected:  07/04/19 1827    Lab Status:  Final result Specimen:  Blood from Arm, Right Updated:  07/04/19 1859     LACTIC ACID 2 1 mmol/L     Narrative:       Result may be elevated if tourniquet was used during collection  Result may be elevated if tourniquet was used during collection      Comprehensive metabolic panel [[id number]]  (Abnormal) Collected:  07/04/19 1827    Lab Status:  Final result Specimen:  Blood from Arm, Right Updated:  07/04/19 1858     Sodium 130 mmol/L      Potassium 3 7 mmol/L      Chloride 96 mmol/L      CO2 25 mmol/L      ANION GAP 9 mmol/L      BUN 14 mg/dL      Creatinine 0 87 mg/dL      Glucose 113 mg/dL      Calcium 9 4 mg/dL      AST 19 U/L      ALT 18 U/L      Alkaline Phosphatase 78 U/L      Total Protein 8 4 g/dL      Albumin 3 8 g/dL      Total Bilirubin 1 80 mg/dL      eGFR 90 ml/min/1 73sq m     Narrative:       [place] guidelines for Chronic Kidney Disease (CKD):     Stage 1 with normal or high GFR (GFR > 90 mL/min/1 73 square meters)    Stage 2 Mild CKD (GFR = 60-89 mL/min/1 73 square meters)    Stage 3A Moderate CKD (GFR = 45-59 mL/min/1 73 square meters)    Stage 3B Moderate CKD (GFR = 30-44 mL/min/1 73 square meters)    Stage 4 Severe CKD (GFR = 15-29 mL/min/1 73 square meters)    Stage 5 End Stage CKD (GFR <15 mL/min/1 73 square meters)  Note: GFR calculation is accurate only with a steady state creatinine    Protime-INR [[id number]]  (Abnormal) Collected:  07/04/19 1827    Lab Status:  Final result Specimen:  Blood from Arm, Right Updated:  07/04/19 1848     Protime 20 5 seconds      INR 1 76    APTT [[id number]]  (Abnormal) Collected:  07/04/19 1827    Lab Status:  Final result Specimen:  Blood from Arm, Right Updated:  07/04/19 1848     PTT 40 seconds     Urine Microscopic [[id number]]  (Abnormal) Collected:  07/04/19 1831    Lab Status:  Final result Specimen:  Urine, Clean Catch Updated:  07/04/19 1848     RBC, UA 0-1 /hpf      WBC, UA 4-10 /hpf      Epithelial Cells Occasional /hpf      Bacteria, UA None Seen /hpf      MUCUS THREADS Occasional    CBC and differential [[id number]]  (Abnormal) Collected:  07/04/19 1827    Lab Status:  Final result Specimen:  Blood from Arm, Right Updated:  07/04/19 1840     WBC 23 20 Thousand/uL      RBC 3 97 Million/uL      Hemoglobin 13 6 g/dL      Hematocrit 38 7 %      MCV 98 fL      MCH 34 4 pg      MCHC 35 2 g/dL      RDW 13 4 %      MPV 8 3 fL      Platelets 385 Thousands/uL     UA w Reflex to Microscopic w Reflex to Culture [[id number]]  (Abnormal) Collected:  07/04/19 1831    Lab Status:  Final result Specimen:  Urine, Clean Catch Updated:  07/04/19 1839     Color, UA Yellow     Clarity, UA Clear     Specific Gravity, UA 1 025     pH, UA 6 0     Leukocytes, UA Negative     Nitrite, UA Negative     Protein, UA 1+ mg/dl      Glucose, UA Negative mg/dl      Ketones, UA Negative mg/dl      Urobilinogen, UA 1 0 E U /dl      Bilirubin, UA Negative     Blood, UA Trace-Intact    Blood culture #2 [[id number]] Collected:  07/04/19 1827    Lab Status: In process Specimen:  Blood from Arm, Right Updated:  07/04/19 1835    Blood culture #1 [[id number]] Collected:  07/04/19 1807    Lab Status:   In process Specimen:  Blood from Arm, Right Updated:  07/04/19 1835    Lactic acid, plasma x2 [[id number]]     Lab Status:  No result Specimen:  Blood     Fingerstick Glucose (POCT) [[id number]]  (Normal) Collected:  07/04/19 1805    Lab Status:  Final result Updated:  07/04/19 1806     POC Glucose 122 mg/dl                  CTA lower extremity right w wo contrast    (Results Pending)              Procedures  CriticalCare Time  Performed by: Jordan Charles PA-C  Authorized by: Jordan Charles PA-C     Critical care provider statement:     Critical care time (minutes):  75    Critical care start time:  7/4/2019 5:50 PM    Critical care time was exclusive of:  Separately billable procedures and treating other patients    Critical care was necessary to treat or prevent imminent or life-threatening deterioration of the following conditions:  Dehydration, shock and sepsis    Critical care was time spent personally by me on the following activities:  Obtaining history from patient or surrogate, development of treatment plan with patient or surrogate, evaluation of patient's response to treatment, examination of patient, ordering and performing treatments and interventions, ordering and review of laboratory studies, ordering and review of radiographic studies, re-evaluation of patient's condition and review of old charts    I assumed direction of critical care for this patient from another provider in my specialty: no    Comments:      Patient presents with acute right lower extremity cellulitis with elevated temp up to 102 at home and rigors  Patient is septic requiring IV fluids IV vancomycin and workup blood cultures repeat lactate due to initial lactate elevated  ED Course  ED Course as of Jul 04 1958   Thu Jul 04, 2019 1922 Call placed to hospitalist [facility], discussed inpatient admission will follow inpatient with consult to Podiatry                        Initial Sepsis Screening     Row Name 07/04/19 1924 Is the patient's history suggestive of a new or worsening infection?         Suspected source of infection  soft tissue  -KW        Are two or more of the following signs & symptoms of infection both present and new to the patient? (!) Yes (Proceed)  -KW        Indicate SIRS criteria  Hyperthemia > 38 3C (100 9F); Tachycardia > 90 bpm;Leukocytosis (WBC > 74770 IJL)  -KW        If the answer is yes to both questions, suspicion of sepsis is present          If severe sepsis is present AND tissue hypoperfusion perists in the hour after fluid resuscitation or lactate > 4, the patient meets criteria for SEPTIC SHOCK          Are any of the following organ dysfunction criteria present within 6 hours of suspected infection and SIRS criteria that are NOT considered to be chronic conditions?         Organ dysfunction          Date of presentation of severe sepsis          Time of presentation of severe sepsis          Tissue hypoperfusion persists in the hour after crystalloid fluid administration, evidenced, by either:          Was hypotension present within one hour of the conclusion of crystalloid fluid administration?         Date of presentation of septic shock          Time of presentation of septic shock            User Key  (r) = Recorded By, (t) = Taken By, (c) = Cosigned By    Initials Name Provider Type    Yolande Owens PA-C Physician Assistant                  MDM  Number of Diagnoses or Management Options  Anemia:   Cellulitis of right lower extremity: new and requires workup  Hypertension:   Hyponatremia:   Non-insulin dependent type 2 diabetes mellitus ([facility] [address] ):    On continuous oral anticoagulation:   Sepsis [facility]): new and requires workup     Amount and/or Complexity of Data Reviewed  Clinical lab tests: ordered and reviewed  Review and summarize past medical records: yes  Discuss the patient with other providers: yes    Risk of Complications, Morbidity, and/or Mortality  Presenting problems: moderate  Diagnostic procedures: moderate  Management options: moderate    Patient Progress  Patient progress: stable      Disposition  Final diagnoses:   Sepsis ([facility] [address] )   Cellulitis of right lower extremity   Hyponatremia   Anemia   On continuous oral anticoagulation   Hypertension   Non-insulin dependent type 2 diabetes mellitus ([facility] [address] )     Time reflects when diagnosis was documented in both MDM as applicable and the Disposition within this note     Time User Action Codes Description Comment    7/4/2019  7:28 PM Elizabeth Mile Add [A41 9] Sepsis ([facility] [address] )     7/4/2019  7:28 PM Elizabeth Mile Add [S32 413] Cellulitis of right lower extremity     7/4/2019  7:30 PM Elizabeth Mile Add [R79 1] Elevated INR     7/4/2019  7:30 PM Elizabeth Mile Add [E87 1] Hyponatremia     7/4/2019  7:31 PM Elizabeth Mile Add [D64 9] Anemia     7/4/2019  7:35 PM Earmon Shant [R79 1] Elevated INR     7/4/2019  7:36 PM Elizabeth Mile Add [Z79 01] On continuous oral anticoagulation     7/4/2019  7:36 PM Elizabeth Mile Add [I10] Hypertension     7/4/2019  7:38 PM Elizabeth Mile Add [E11 9] Non-insulin dependent type 2 diabetes mellitus [facility])       ED Disposition     ED Disposition Condition Date/Time Comment    Admit Stable Thu Jul 4, 2019  7:27 PM Case was discussed with hospitalist [facility] and the patient's admission status was agreed to be Admission Status: inpatient status to the service of Dr Claude Rajas   Follow-up Information    None         Patient's Medications   Discharge Prescriptions    No medications on file     No discharge procedures on file      ED Provider  Electronically Signed by           Rhona Junior PA-C  07/04/19 1958

## 2025-02-14 NOTE — PROGRESS NOTES
02/13/25 1900   Y[employer] Information   [employer] Affiliation Baptism   Psychosocial   Patient Behaviors/Mood Anxious   Coping Responses   Patient Coping Open/discussion

## 2025-02-14 NOTE — EMTALA/ACUTE CARE TRANSFER
[facility] EMERGENCY DEPARTMENT  [address] [address]  Dept: [phone]      EMTALA TRANSFER CONSENT    NAME Laine Tse                                         1955                              MRN [id number]    I have been informed of my rights regarding examination, treatment, and transfer   by Dr. Fan Pérez MD    Benefits: Other benefits (Include comment)_______________________ (Inpt MH tx)    Risks: Potential for delay in receiving treatment      Consent for Transfer:  I acknowledge that my medical condition has been evaluated and explained to me by the emergency department physician or other qualified medical person and/or my attending physician, who has recommended that I be transferred to the service of  Accepting Physician: Dr. Wells at Accepting Facility Name, City & State : [facility]. The above potential benefits of such transfer, the potential risks associated with such transfer, and the probable risks of not being transferred have been explained to me, and I fully understand them.  The doctor has explained that, in my case, the benefits of transfer outweigh the risks.  I agree to be transferred.    I authorize the performance of emergency medical procedures and treatments upon me in both transit and upon arrival at the receiving facility.  Additionally, I authorize the release of any and all medical records to the receiving facility and request they be transported with me, if possible.  I understand that the safest mode of transportation during a medical emergency is an ambulance and that the Hospital advocates the use of this mode of transport. Risks of traveling to the receiving facility by car, including absence of medical control, life sustaining equipment, such as oxygen, and medical personnel has been explained to me and I fully understand them.    (DAMARIS CORRECT BOX BELOW)  [X]  I consent to the stated transfer and to be transported  by ambulance/helicopter.  [  ]  I consent to the stated transfer, but refuse transportation by ambulance and accept full responsibility for my transportation by car.  I understand the risks of non-ambulance transfers and I exonerate the Hospital and its staff from any deterioration in my condition that results from this refusal.    X___________________________________________    DATE  25  TIME________  Signature of patient or legally responsible individual signing on patient behalf           RELATIONSHIP TO PATIENT_________________________          Provider Certification    NAME Laine Tse                                         1955                              MRN [id number]    A medical screening exam was performed on the above named patient.  Based on the examination:    Condition Necessitating Transfer The encounter diagnosis was Delusions (HCC).    Patient Condition: The patient has been stabilized such that within reasonable medical probability, no material deterioration of the patient condition or the condition of the unborn child(jaqueline) is likely to result from the transfer    Reason for Transfer: Level of Care needed not available at this facility    Transfer Requirements: [facility]   Space available and qualified personnel available for treatment as acknowledged by Hanna Polo LCSW  Agreed to accept transfer and to provide appropriate medical treatment as acknowledged by       Dr. Wells  Appropriate medical records of the examination and treatment of the patient are provided at the time of transfer   STAFF INITIAL WHEN COMPLETED _______  Transfer will be performed by qualified personnel from [facility]  and appropriate transfer equipment as required, including the use of necessary and appropriate life support measures.    Provider Certification: I have examined the patient and explained the following risks and benefits of being transferred/refusing transfer to the  patient/family:  General risk, such as traffic hazards, adverse weather conditions, rough terrain or turbulence, possible failure of equipment (including vehicle or aircraft), or consequences of actions of persons outside the control of the transport personnel      Based on these reasonable risks and benefits to the patient and/or the unborn child(jaqueline), and based upon the information available at the time of the patient’s examination, I certify that the medical benefits reasonably to be expected from the provision of appropriate medical treatments at another medical facility outweigh the increasing risks, if any, to the individual’s medical condition, and in the case of labor to the unborn child, from effecting the transfer.    X____________________________________________ DATE 02/14/25        TIME_______      ORIGINAL - SEND TO MEDICAL RECORDS   COPY - SEND WITH PATIENT DURING TRANSFER

## 2025-02-14 NOTE — ED NOTES
"A BMP re check was completed at 0116 and the pt tolerated the blood draw, but lab stated the specimen came back hemolyzed.  At 0230, another BMP re check was attempted to be drawn from the pts IV.  The pt became combative and uncooperative, requiring several nurses and security officers to hold her down to obtain the blood specimen.  The pt began yelling \"You are giving me cancer\" when attempting to draw the blood.  Education about the necessity for the blood specimen was provided, pt demonstrated no understanding. At 03:15, the lab stated the specimen was again hemolyzed.  Dr. Maximiliano Art and ED crisis worker were consulted, and together stated that another attempt to draw another BMP re-check was not necessary as pt already received the necessary treatment of 1,000 ml 0.9 normal saline bolus.     Helen Hewitt RN  02/14/25 0327    " Patient is requesting a call back to discuss some questions she has about the results of her recent procedure. Please call patient back to discuss.

## 2025-02-14 NOTE — ED NOTES
"Pt is a 69 y.o. female who was brought to the ED due to not being able to handle her group home, and paranoia. Patient states that her home is \"driving her nuts\" and are poisoning her. Patient states it has been really bad recently, and all of a sudden; she doesn't know how long this has been happening or where the poison is coming from. Patient reports the group home is putting LSD in her medications and giving it to her. She says she doesn't trust the people at her group home, and only trusts Dr. Apple. She believes the group home has been making up her medication list, and she just can't hack it anymore. Patient states that she might be homeless because the orange labels on her medications are good, and some people are giving her rotten meds. Patient knows she is being poisoned because she started to see images of monsters and things. She says when she goes to sleep she sees scary things because \"they want her to trip on acid\". Patient denies suicidal ideations, but has experienced them in the past. She reports a previous suicide attempt via stabbing herself with a knife but \"it was too bloody and [facility] made her stop\". Patient denies auditory hallucinations and homicidal ideations.     Patient has a history of inpatient admissions, but cannot recall any details. She has outpatient services, but isn't sure who she sees or how often. Patient has a , named Jessika, but doesn't know where she is from. Patient reports decreased appetite because she has been too upset to eat. She states she hasn't been sleeping at all because of the monsters pop in whenever she closes her eyes. Patient believes she has cholera and believes she should get medication for a venereal disease. Patient doesn't feel she can go back to her home because of being posioned. After discussing inpatient treatment, patient was agreeable to sign a 201.     Chief Complaint   Patient presents with    Medical Problem     Pt has long " psych hx. Pt says she believes her group home is giving her acid because one of her pills is 'red now instead of orange'.     Intake Assessment completed, Safety risk Assessment completed    Hanna Polo, XOCHITL  02/13/25     [id number]

## 2025-02-14 NOTE — NURSING NOTE
Jessika from patients [facility] team ([phone]) called to confirm patient was here. She will fax over PTA med list shortly.

## 2025-02-14 NOTE — H&P
Psychiatric Evaluation - Behavioral Health   Name: Laine Tse 69 y.o. female I MRN: [id number]  Unit/Bed#: [facility] 209-01 I Date of Admission: 2/14/2025   Date of Service: 2/14/2025 I Hospital Day: 0    Assessment & Plan  Seizure disorder (HCC)    Schizoaffective disorder (HCC)    Insomnia    Hypothyroidism    Hyponatremia      Planned Treatment and Medication Changes: All current active medications have been reviewed  Encourage group therapy, milieu therapy and occupational therapy  Behavioral Health checks for safety monitoring  Legal status:201  Medical follow up with Dr. Wise/[facility].  Artane 2 mg po bid.   Ativan 1 mg po bid.  Zydis 5 mg po hs.   Melatonin 3 mg po hs    Phenobarbital 64.8 mg q 12 hours for seizure disorder.  Monitor behavior and fall risk.     Current Facility-Administered Medications   Medication Dose Route Frequency Provider Last Rate    acetaminophen  650 mg Oral Q4H PRN Mima Wells MD      acetaminophen  650 mg Oral Q4H PRN Mima Wells MD      acetaminophen  975 mg Oral Q6H PRN Mima Wells MD      aluminum-magnesium hydroxide-simethicone  30 mL Oral Q4H PRN Mima Wells MD      [START ON 2/15/2025] aspirin  81 mg Oral Daily Marion Javier PA-C      atorvastatin  20 mg Oral Daily With Dinner Marion Javier PA-C      chlorhexidine  15 mL Swish & Spit Q12H JANNIE Marion Javier PA-C      gabapentin  100 mg Oral TID Papito An MD      hydrOXYzine HCL  25 mg Oral Q6H PRN Max 4/day Mima Wells MD      hydrOXYzine HCL  50 mg Oral Q6H PRN Max 4/day Mima Wells MD      [START ON 2/15/2025] levothyroxine  100 mcg Oral Early Morning Marion Javier PA-C      LORazepam  1 mg Oral BID Papito An MD      melatonin  3 mg Oral HS Mima Wells MD      nicotine polacrilex  4 mg Oral Q2H PRN Mima Wells MD      OLANZapine  5 mg Oral HS Papito An MD      PHENobarbital  64.8 mg Oral Q12H Marion Javier PA-C      propranolol  5 mg Oral  "Q8H PRN Mima Wells MD      risperiDONE  0.25 mg Oral Q4H PRN Max 6/day Mima Wells MD      risperiDONE  0.5 mg Oral Q4H PRN Max 3/day Mima Wells MD      risperiDONE  1 mg Oral Q2H PRN Max 3/day Mima Wells MD      traZODone  50 mg Oral Q6H PRN Max 3/day Mima Wells MD      trihexyphenidyl  2 mg Oral BID Papito An MD       Facility-Administered Medications Ordered in Other Encounters   Medication Dose Route Frequency Provider Last Rate    lactated ringers   Intravenous Continuous PRN Celi Preston CRNA       Risks / Benefits of Treatment:    Risks, benefits, and possible side effects of medications explained to patient and patient verbalizes understanding and agreement for treatment.      Chief Complaint: anxiety, unstable mood, psychotic symptoms, delusional thoughts, agitation, and inability to care for self    History of Present Illness       Laine Tse is a 69 y.o. female with a history of Schizoaffective Disorder and anxiety who was admitted to the inpatient psychiatric unit on a voluntary 201 commitment basis due to anxiety, unstable mood, delusional thoughts, paranoid ideation, confusion, and inability to care for self.  As per crisis worker note: Pt was brought to the ED due to not being able to handle at her group home with worsening of paranoia. Patient states that her home is \"driving her nuts\" and are poisoning her. Patient states it has been really bad recently, and all of a sudden; she doesn't know how long this has been happening or where the poison is coming from. Patient reports the group home is putting LSD in her medications and giving it to her. She says she doesn't trust the people at her group home and believes the group home has been making up her medication list, and she just can't take it anymore. . Patient knows she is being poisoned because she started to see images of monsters and things. She says when she goes to sleep she sees scary " "things because \"they want her to trip on acid\".   On initial evaluation after admission to the inpatient psychiatric unit Laine presents restless, anxious, poor historian and her speech is high tone and volume which appears to be at her baseline. She is able to answer simple question in goal directed way. Patient continues perseverating about being poisoned with multiple medication and LSD at her group home. She states \"I don't trust anyone but Dr. Apple. Continues verbalizing paranoid delusion and appear internally preoccupied. She admits she was taking multiple medications but she cannot name any of them at this time. Patient denies current suicidal ideations, but has experienced them in the past. She reports a previous suicide attempt via stabbing herself with a knife but \"it was too bloody and [facility] made her stop\". Patient denies auditory hallucinations and homicidal ideations. Denies alcohol or illicit drug use.  No acute focal neurological deficit or change of mental status noted presently.     Psychiatric Review Of Systems:    Sleep changes: decreased  Appetite changes: no  Weight changes: unknown  Energy/anergy: no  Interest/pleasure/anhedonia: no  Somatic symptoms: no  Anxiety/panic: yes  Brit: history of mood swings  Guilty/hopeless: no  Self injurious behavior/risky behavior: not recently  Suicidal ideation: no  Homicidal ideation: no  Auditory hallucinations: appears responding to internal stimuli  Visual hallucinations: yes, visual hallucinations  Other hallucinations: no  Delusional thinking: paranoid delusions  Eating disorder history: no  Obsessive/compulsive symptoms: no    Historical Information       Past Psychiatric History:     Past Inpatient Psychiatric Treatment:   Several past inpatient psychiatric admissions  Past Outpatient Psychiatric Treatment:    Received psychiatric care at her group home and has a CM as per record.  Past Suicide Attempts: yes, by stabbing self  Past Violent " Behavior: denies, increase agitation as per GH  Past Psychiatric Medication Trials: patient does not remember and multiple psychiatric medication trials     Substance Abuse History:    Social History       Tobacco History       Smoking Status  Former Smoking Start Date  1971 Quit Date  1992 Average Packs/Day  0.5 packs/day for 21.0 years (10.5 ttl pk-yrs) Smoking Tobacco Type  Cigarettes from 1971 to 1992   Pack Year History     Packs/Day From To Years    0 1992  33.1    0.5 1971 1992 21.0      Smokeless Tobacco Use  Never              Alcohol History       Alcohol Use Status  No              Drug Use       Drug Use Status  No              Sexual Activity       Sexually Active  Never              Other Factors    Not Asked                   I have assessed this patient for substance use within the past 12 months    Alcohol use: denies use  Recreational drug use: denies    Family Psychiatric History: unknown    Social History:    Marital History:   Children: 2 adult children  Living Arrangement: lives in a group home  Occupational History: on permanent disability  Functioning Relationships: limited support system  Legal History: denies    History: None      Past Medical History:    History of Seizures: yes  History of Head injury with loss of consciousness: no    Medical History Review: I have reviewed the patient's PMH, PSH, Social History, Family History, Meds, and Allergies     Past Medical History:   Diagnosis Date    Anxiety     Benign essential hypertension     resolved; 06/15/16    Depression     Depression with anxiety     Fracture of fifth metatarsal bone of right foot with delayed healing     last assessed 04/12/16; fracture of metatarsal bone, right, closed, initial encounter    Hyperlipidemia     last assessed 11/28/17    Hypothyroidism     last assessed 11/28/2017    Insomnia     Obesity     Schizoaffective disorder (HCC)     last assessed 05/18/2017    Seizure disorder (HCC)     last  "assessed 03/28/17    Seizures (HCC)      Past Surgical History:   Procedure Laterality Date    COLONOSCOPY      complete    PA COLONOSCOPY FLX DX W/COLLJ SPEC WHEN PFRMD N/A 8/30/2018    Procedure: COLONOSCOPY with polypectomies;  Surgeon: Andriy Lopez MD;  Location: [facility];  Service: Gastroenterology       Medical Review Of Systems:    Pertinent items are noted in HPI.    Allergies:    Allergies   Allergen Reactions    Clozapine Other (See Comments)     low white blood count  Other reaction(s): Other (See Comments)  low white blood count    Haloperidol Other (See Comments)     EPS  Other reaction(s): Other (See Comments)  EPS    Lithium Other (See Comments)     Toxicity    Prolixin [Fluphenazine] Hyperactivity and Other (See Comments)     EPS, Hyperactivity  EPS, Hyperactivity    Valproic Acid Confusion and Other (See Comments)     \"Mental confusion\"  \"Mental confusion\"       Medications:     All current active medications have been reviewed.    OBJECTIVE:    Vital signs in last 24 hours:    Temp:  [97.5 °F (36.4 °C)-98.6 °F (37 °C)] 97.5 °F (36.4 °C)  HR:  [72-81] 81  BP: (162-180)/() 180/96  Resp:  [14-16] 16  SpO2:  [95 %-99 %] 99 %  O2 Device: None (Room air)    No intake or output data in the 24 hours ending 02/14/25 1405     Mental Status Evaluation:    Appearance:  disheveled   Behavior:  restless, responds to redirection   Speech:  decreased rate, poverty of speech   Mood:  dysphoric, anxious   Affect:  labile   Language: naming objects   Thought Process:  concrete   Associations: perseveration   Thought Content:  paranoid delusions   Perceptual Disturbances: appears responding to internal stimuli   Risk Potential: Suicidal ideation - None at present  Homicidal ideation - None at present  Potential for aggression - Yes, due to agitation   Sensorium:  oriented to person and place   Memory:  recent and remote memory: unable to assess due to lack of cooperation   Consciousness:  alert and awake "   Attention/Concentration: attention span and concentration appear shorter than expected for age   Intellect: below average   Fund of Knowledge: awareness of current events: limited   Insight:  poor   Judgment: limited   Muscle Strength:   Muscle Tone:   not examined   Gait/Station: in bed   Motor Activity: abnormal movement noted: dyskinetic face movements present         Laboratory Results: I have personally reviewed all pertinent laboratory/tests results    Most Recent Labs:   Lab Results   Component Value Date    WBC 4.38 02/13/2025    RBC 3.95 02/13/2025    HGB 13.2 02/13/2025    HCT 37.4 02/13/2025     02/13/2025    RDW 12.2 02/13/2025    NEUTROABS 1.94 02/13/2025    SODIUM 129 (L) 02/13/2025    K 4.1 02/13/2025    CL 94 (L) 02/13/2025    CO2 29 02/13/2025    BUN 8 02/13/2025    CREATININE 0.60 02/13/2025    GLUC 97 02/13/2025    CALCIUM 9.3 02/13/2025    AST 22 02/13/2025    ALT 20 02/13/2025    ALKPHOS 45 02/13/2025    TP 6.7 02/13/2025    ALB 4.1 02/13/2025    TBILI 0.28 02/13/2025    CHOLESTEROL 195 01/24/2025    HDL 70 01/24/2025    TRIG 83 01/24/2025    LDLCALC 108 01/24/2025    VALPROICTOT 71 11/29/2019    AMMONIA 15 12/19/2017    [id number] 0.920 02/13/2025    FREET4 0.94 08/14/2024    SYPHILISAB Non-reactive 04/05/2024    HGBA1C 5.5 04/26/2024     04/26/2024       Imaging Studies: No results found.    Code Status: No Order  Advance Directive and Living Will: <no information>    Suicide/Homicide Risk Assessment:    Risk of Harm to Self:   Based on today's assessment, Laine presents the following risk of harm to self: low    Risk of Harm to Others:  Based on today's assessment, Laine presents the following risk of harm to others: low    The following interventions are recommended: Behavioral Health checks for safety monitoring, continued hospitalization on locked unit     Patient Strengths: negotiates basic needs, patient is on a voluntary commitment     Patient Barriers: chronic  mental illness, poor insight, poor reasoning ability, poor self-care    Counseling / Coordination of Care:    Patient's presentation on admission and proposed treatment plan discussed with treatment team.  Diagnosis, medication changes and treatment plan reviewed with patient.  Events leading to admission reviewed with patient.    Inpatient Psychiatric Certification:    Estimated length of stay: 10 midnights    Based upon physical, mental and social evaluations, I certify that inpatient psychiatric services are medically necessary for this patient for a duration of 10 midnights for the treatment of Schizoaffective disorder (HCC)    Papito An MD 02/14/25

## 2025-02-15 LAB
ATRIAL RATE: 70 BPM
P AXIS: 49 DEGREES
PR INTERVAL: 160 MS
QRS AXIS: 3 DEGREES
QRSD INTERVAL: 82 MS
QT INTERVAL: 374 MS
QTC INTERVAL: 403 MS
T WAVE AXIS: 141 DEGREES
VENTRICULAR RATE: 70 BPM

## 2025-02-15 PROCEDURE — 99232 SBSQ HOSP IP/OBS MODERATE 35: CPT | Performed by: PHYSICIAN ASSISTANT

## 2025-02-15 PROCEDURE — 93010 ELECTROCARDIOGRAM REPORT: CPT | Performed by: INTERNAL MEDICINE

## 2025-02-15 RX ORDER — LISINOPRIL 5 MG/1
5 TABLET ORAL DAILY
Status: DISCONTINUED | OUTPATIENT
Start: 2025-02-15 | End: 2025-03-09

## 2025-02-15 RX ADMIN — ATORVASTATIN CALCIUM 20 MG: 20 TABLET, FILM COATED ORAL at 17:22

## 2025-02-15 RX ADMIN — HYDROXYZINE HYDROCHLORIDE 50 MG: 50 TABLET, FILM COATED ORAL at 14:39

## 2025-02-15 RX ADMIN — Medication 1 TABLET: at 09:55

## 2025-02-15 RX ADMIN — RISPERIDONE 1 MG: 1 TABLET, FILM COATED ORAL at 23:52

## 2025-02-15 RX ADMIN — OLANZAPINE 5 MG: 5 TABLET, ORALLY DISINTEGRATING ORAL at 21:16

## 2025-02-15 RX ADMIN — TRIHEXYPHENIDYL HYDROCHLORIDE 2 MG: 2 TABLET ORAL at 09:55

## 2025-02-15 RX ADMIN — TRIHEXYPHENIDYL HYDROCHLORIDE 2 MG: 2 TABLET ORAL at 21:16

## 2025-02-15 RX ADMIN — LORAZEPAM 1 MG: 1 TABLET ORAL at 17:22

## 2025-02-15 RX ADMIN — GABAPENTIN 100 MG: 100 CAPSULE ORAL at 21:16

## 2025-02-15 RX ADMIN — LEVOTHYROXINE SODIUM 100 MCG: 100 TABLET ORAL at 05:05

## 2025-02-15 RX ADMIN — LORAZEPAM 1 MG: 1 TABLET ORAL at 09:55

## 2025-02-15 RX ADMIN — ASPIRIN 81 MG CHEWABLE TABLET 81 MG: 81 TABLET CHEWABLE at 09:55

## 2025-02-15 RX ADMIN — FLUTICASONE PROPIONATE 2 SPRAY: 50 SPRAY, METERED NASAL at 09:54

## 2025-02-15 RX ADMIN — CHLORHEXIDINE GLUCONATE 15 ML: 1.2 SOLUTION ORAL at 21:16

## 2025-02-15 RX ADMIN — CHLORHEXIDINE GLUCONATE 15 ML: 1.2 SOLUTION ORAL at 10:02

## 2025-02-15 RX ADMIN — TRAZODONE HYDROCHLORIDE 50 MG: 50 TABLET ORAL at 23:52

## 2025-02-15 RX ADMIN — Medication 3 MG: at 21:16

## 2025-02-15 RX ADMIN — SENNOSIDES AND DOCUSATE SODIUM 1 TABLET: 50; 8.6 TABLET ORAL at 21:16

## 2025-02-15 RX ADMIN — PHENOBARBITAL 64.8 MG: 32.4 TABLET ORAL at 09:55

## 2025-02-15 RX ADMIN — PHENOBARBITAL 64.8 MG: 32.4 TABLET ORAL at 21:16

## 2025-02-15 RX ADMIN — GABAPENTIN 100 MG: 100 CAPSULE ORAL at 09:54

## 2025-02-15 NOTE — NURSING NOTE
Patient visible on the unit.Disorganized, rambling at times. Believes she has syphilis. Compliant with evening medications. Denies SI, and HI, Appears anxious. Tylenol given for severe headache. Will continue to monitor and access. Q 15 minute safety checks maintained.

## 2025-02-15 NOTE — PLAN OF CARE
Problem: Ineffective Coping  Goal: Participates in unit activities  Description: Interventions:  - Provide therapeutic environment   - Provide required programming   - Redirect inappropriate behaviors   Outcome: Progressing  Goal: Understands least restrictive measures  Description: Interventions:  - Utilize least restrictive behavior  Outcome: Progressing  Goal: Free from restraint events  Description: - Utilize least restrictive measures   - Provide behavioral interventions   - Redirect inappropriate behaviors   Outcome: Progressing     Problem: Depression  Goal: Verbalize thoughts and feelings  Description: Interventions:  - Assess and re-assess patient's level of risk   - Engage patient in 1:1 interactions, daily, for a minimum of 15 minutes   - Encourage patient to express feelings, fears, frustrations, hopes   Outcome: Progressing  Goal: Refrain from harming self  Description: Interventions:  - Monitor patient closely, per order   - Supervise medication ingestion, monitor effects and side effects   Outcome: Progressing  Goal: Refrain from isolation  Description: Interventions:  - Develop a trusting relationship   - Encourage socialization   Outcome: Progressing     Problem: Anxiety  Goal: Anxiety is at manageable level  Description: Interventions:  - Assess and monitor patient's anxiety level.   - Monitor for signs and symptoms (heart palpitations, chest pain, shortness of breath, headaches, nausea, feeling jumpy, restlessness, irritable, apprehensive).   - Collaborate with interdisciplinary team and initiate plan and interventions as ordered.  - [place] patient to unit/surroundings  - Explain treatment plan  - Encourage participation in care  - Encourage verbalization of concerns/fears  - Identify coping mechanisms  - Assist in developing anxiety-reducing skills  - Administer/offer alternative therapies  - Limit or eliminate stimulants  Outcome: Progressing     Problem: Alteration in Orientation  Goal:  Interact with staff daily  Description: Interventions:  - Assess and re-assess patient's level of orientation  - Engage patient in 1 on 1 interactions, daily, for a minimum of 15 minutes   - Establish rapport/trust with patient   Outcome: Progressing

## 2025-02-15 NOTE — NURSING NOTE
Patient visible in milieu with peers, medication compliant and cooperative. Patient has high anxiety, no depression, denies SI/HI and hallucinations. Patient had PRN Atarax 50 mg for a Larson of 27 and medication was effective. Continued care and safety rounds in progress.

## 2025-02-15 NOTE — PROGRESS NOTES
"Progress Note - Laine Tse 69 y.o. female MRN: [id number]    Unit/Bed#: -01 Encounter: [id number]        Subjective:   Patient seen and examined at bedside after reviewing the chart and discussing the case with the caring staff.      Patient examined at bedside.  Patient has no acute symptoms.    Labs waiting to be collected.    Physical Exam   Vitals: Blood pressure (!) 178/88, pulse 80, temperature 97.5 °F (36.4 °C), temperature source Temporal, resp. rate 18, height 5' 4\" (1.626 m), weight 79.7 kg (175 lb 11.3 oz), SpO2 95%, not currently breastfeeding.,Body mass index is 30.16 kg/m².  Constitutional: Patient in no acute distress.  HEENT: PERR, EOMI, MMM.  Cardiovascular: Normal rate and regular rhythm.    Pulmonary/Chest: Effort normal and breath sounds normal.   Abdomen: Soft, + BS, NT.    Assessment/Plan:  Laine Tse is a(n) 69 y.o. female with schizoaffective disorder.      Cardiac with hx of HTN, HLD.  Continue atorvastatin 20 mg daily, ASA 81 mg daily.  Not on antihypertensive meds prehospital.  Start lisinopril 5 mg daily 2/15. Hydralazine 25 mg as needed for SBP >170 or DBP >110.  Cont to monitor vitals.    Allergic rhinitis.  Flonase nasal spray daily.  Claritin 10 mg daily as needed (Zyrtec nonform).   Bilateral hearing loss.  Supportive measures.   Osteoporosis.  Continue calcium/vitamin D supplement.  Resume Fosamax on discharge.  DJD/OA/HA/chronic low back pain.  Tylenol, Zanaflex, lidocaine patch as needed.  Hypothyroidism.  Patient is on levothyroxine 100 mcg daily.  TSH normal on 2/13/25.  Seizure disorder.  Continue phenobarbital 64.8 mg q12h.  Pt follows with [facility]'s neurology (last saw 2/11/25).  Chronic constipation.  Continue Senokot S nightly.  Miralax as needed.  Hyponatremia.  Level 129 on 2/13.  Repeat CMP.    The patient was discussed with Dr. Wise and he is in agreement with the above note.  "

## 2025-02-15 NOTE — PROGRESS NOTES
"Progress Note - Behavioral Health   Name: Laine Tse 69 y.o. female I MRN: [id number]  Unit/Bed#: [facility] 209-01 I Date of Admission: 2/14/2025   Date of Service: 2/15/2025 I Hospital Day: 1     Assessment & Plan  Seizure disorder (HCC)    Schizoaffective disorder (HCC)    Insomnia    Hypothyroidism    Hyponatremia    Progress Note - Behavioral Health     Laine Tse 69 y.o. female MRN: [id number]   Unit/Bed#: [facility]-01 Encounter: [id number]    Behavior over the last 24 hours: unchanged.     Chart reviewed and discussed with nursing staff.  Laine seen in hallway where she presents yelling, distraught, refusing meds and stating no one cares about her.   A couple minutes later asks nursing staff for her meds and \"this is the best hospital\".     Sleep: difficulty falling asleep  Appetite: fair  Medication side effects: none reported  ROS:  unable to assess    Mental Status Evaluation:    Appearance:  disheveled   Behavior:  at times cooperative   Speech:  loud   Mood:  dysphoric, anxious   Affect:  labile   Thought Process:  concrete   Associations: perseveration   Thought Content:  paranoid ideation   Perceptual Disturbances: appears distracted, appears preoccupied   Risk Potential: Suicidal ideation - None at present  Homicidal ideation - None at present   Sensorium:  unable to assess   Memory:  recent and remote memory: unable to assess due to lack of cooperation   Consciousness:  alert and awake   Attention: attention span and concentration: unable to assess due to lack of cooperation   Insight:  poor   Judgment: limited   Gait/Station: normal gait/station   Motor Activity: no abnormal movements     Vital signs in last 24 hours:    Temp:  [97.5 °F (36.4 °C)-97.6 °F (36.4 °C)] 97.5 °F (36.4 °C)  HR:  [80-91] 80  BP: (143-178)/() 178/88  Resp:  [18-19] 18  SpO2:  [95 %-99 %] 95 %  O2 Device: None (Room air)    Laboratory results: I have personally reviewed all pertinent laboratory/tests " results.        Assessment & Plan   Principal Problem:    Schizoaffective disorder (HCC)  Active Problems:    Hyponatremia    Hypothyroidism    Seizure disorder (HCC)    Insomnia    Recommended Treatment: cont present tx    Planned medication and treatment changes:  no med changes: neurontin 100 mg tid, ativan 1 mg bid, zyprexa 5 mg q bedtime, artane 2 mg bid.     All current active medications have been reviewed  Encourage group therapy, milieu therapy and occupational therapy  Behavioral Health checks for safety monitoring  Current Facility-Administered Medications   Medication Dose Route Frequency Provider Last Rate    acetaminophen  650 mg Oral Q4H PRN Mima Wells MD      acetaminophen  650 mg Oral Q4H PRN Mima Wells MD      acetaminophen  975 mg Oral Q6H PRN Mima Wells MD      aluminum-magnesium hydroxide-simethicone  30 mL Oral Q4H PRN Mima Wells MD      aspirin  81 mg Oral Daily Marion L Dagnall, PA-C      atorvastatin  20 mg Oral Daily With Dinner Marion L Dagnall, PA-C      calcium carbonate-vitamin D  1 tablet Oral Daily With Breakfast Marion L Dagnall, PA-C      chlorhexidine  15 mL Swish & Spit Q12H JANNIE Marion L Dagnall, PA-C      fluticasone  2 spray Nasal Daily Marion L Dagnall, PA-C      gabapentin  100 mg Oral TID Papito An MD      hydrALAZINE  25 mg Oral Q8H PRN Marion L Dagnall, PA-C      hydrOXYzine HCL  25 mg Oral Q6H PRN Max 4/day Mima Wells MD      hydrOXYzine HCL  50 mg Oral Q6H PRN Max 4/day Mima Wells MD      levothyroxine  100 mcg Oral Early Morning Marion L Dagnall, PA-C      lidocaine  1 patch Topical Daily PRN Marion L Dagnall, PA-C      loratadine  10 mg Oral Daily PRN Marion L Dagnall, PA-C      LORazepam  1 mg Oral BID Papito An MD      melatonin  3 mg Oral HS Mima Wells MD      nicotine polacrilex  4 mg Oral Q2H PRN Mima Wells MD      OLANZapine  5 mg Oral HS Papito An MD      PHENobarbital  64.8 mg Oral Q12H  Marion Javier PA-C      polyethylene glycol  17 g Oral Daily PRN Marion Javier PA-C      propranolol  5 mg Oral Q8H PRN Mima Wells MD      risperiDONE  0.25 mg Oral Q4H PRN Max 6/day Mima Wells MD      risperiDONE  0.5 mg Oral Q4H PRN Max 3/day Mima Wells MD      risperiDONE  1 mg Oral Q2H PRN Max 3/day Mima Wells MD      senna-docusate sodium  1 tablet Oral HS Marion Javier PA-C      sodium chloride  2 spray Each Nare Q1H PRN Marion Javier PA-C      tiZANidine  2 mg Oral Q6H PRN Marion Javier PA-C      traZODone  50 mg Oral Q6H PRN Max 3/day Mima Wells MD      trihexyphenidyl  2 mg Oral BID Papito An MD       Facility-Administered Medications Ordered in Other Encounters   Medication Dose Route Frequency Provider Last Rate    lactated ringers   Intravenous Continuous PRN Celi Preston CRNA         Risks / Benefits of Treatment:    Risks, benefits, and possible side effects of medications explained to patient. Patient has limited understanding of risks and benefits of treatment at this time, but agrees to take medications as prescribed.    Counseling / Coordination of Care:    Total floor / unit time spent today 15 minutes. Greater than 50% of total time was spent with the patient and / or family counseling and / or coordination of care. A description of counseling / coordination of care:    Rufina Carrasquillo PA-C 02/15/25

## 2025-02-16 LAB
ALBUMIN SERPL BCG-MCNC: 4.3 G/DL (ref 3.5–5)
ALP SERPL-CCNC: 47 U/L (ref 34–104)
ALT SERPL W P-5'-P-CCNC: 20 U/L (ref 7–52)
ANION GAP SERPL CALCULATED.3IONS-SCNC: 9 MMOL/L (ref 4–13)
AST SERPL W P-5'-P-CCNC: 19 U/L (ref 13–39)
BASOPHILS # BLD AUTO: 0.01 THOUSANDS/ÂΜL (ref 0–0.1)
BASOPHILS NFR BLD AUTO: 0 % (ref 0–1)
BILIRUB SERPL-MCNC: 0.34 MG/DL (ref 0.2–1)
BUN SERPL-MCNC: 13 MG/DL (ref 5–25)
CALCIUM SERPL-MCNC: 9.2 MG/DL (ref 8.4–10.2)
CHLORIDE SERPL-SCNC: 98 MMOL/L (ref 96–108)
CHOLEST SERPL-MCNC: 198 MG/DL (ref ?–200)
CO2 SERPL-SCNC: 23 MMOL/L (ref 21–32)
CREAT SERPL-MCNC: 0.66 MG/DL (ref 0.6–1.3)
EOSINOPHIL # BLD AUTO: 0 THOUSAND/ÂΜL (ref 0–0.61)
EOSINOPHIL NFR BLD AUTO: 0 % (ref 0–6)
ERYTHROCYTE [DISTWIDTH] IN BLOOD BY AUTOMATED COUNT: 12.2 % (ref 11.6–15.1)
GFR SERPL CREATININE-BSD FRML MDRD: 89 ML/MIN/1.73SQ M
GLUCOSE SERPL-MCNC: 136 MG/DL (ref 65–140)
HCT VFR BLD AUTO: 41.8 % (ref 34.8–46.1)
HDLC SERPL-MCNC: 77 MG/DL
HGB BLD-MCNC: 14.2 G/DL (ref 11.5–15.4)
IMM GRANULOCYTES # BLD AUTO: 0.01 THOUSAND/UL (ref 0–0.2)
IMM GRANULOCYTES NFR BLD AUTO: 0 % (ref 0–2)
LDLC SERPL CALC-MCNC: 97 MG/DL (ref 0–100)
LYMPHOCYTES # BLD AUTO: 1.84 THOUSANDS/ÂΜL (ref 0.6–4.47)
LYMPHOCYTES NFR BLD AUTO: 32 % (ref 14–44)
MCH RBC QN AUTO: 32.6 PG (ref 26.8–34.3)
MCHC RBC AUTO-ENTMCNC: 34 G/DL (ref 31.4–37.4)
MCV RBC AUTO: 96 FL (ref 82–98)
MONOCYTES # BLD AUTO: 0.46 THOUSAND/ÂΜL (ref 0.17–1.22)
MONOCYTES NFR BLD AUTO: 8 % (ref 4–12)
NEUTROPHILS # BLD AUTO: 3.37 THOUSANDS/ÂΜL (ref 1.85–7.62)
NEUTS SEG NFR BLD AUTO: 60 % (ref 43–75)
NONHDLC SERPL-MCNC: 121 MG/DL
NRBC BLD AUTO-RTO: 0 /100 WBCS
PLATELET # BLD AUTO: 298 THOUSANDS/UL (ref 149–390)
PMV BLD AUTO: 9.8 FL (ref 8.9–12.7)
POTASSIUM SERPL-SCNC: 4.5 MMOL/L (ref 3.5–5.3)
PROT SERPL-MCNC: 7.3 G/DL (ref 6.4–8.4)
RBC # BLD AUTO: 4.36 MILLION/UL (ref 3.81–5.12)
SODIUM SERPL-SCNC: 130 MMOL/L (ref 135–147)
TRIGL SERPL-MCNC: 122 MG/DL (ref ?–150)
WBC # BLD AUTO: 5.69 THOUSAND/UL (ref 4.31–10.16)

## 2025-02-16 PROCEDURE — 82607 VITAMIN B-12: CPT | Performed by: PSYCHIATRY & NEUROLOGY

## 2025-02-16 PROCEDURE — 82746 ASSAY OF FOLIC ACID SERUM: CPT | Performed by: PSYCHIATRY & NEUROLOGY

## 2025-02-16 PROCEDURE — 86780 TREPONEMA PALLIDUM: CPT | Performed by: PSYCHIATRY & NEUROLOGY

## 2025-02-16 PROCEDURE — 80061 LIPID PANEL: CPT | Performed by: PSYCHIATRY & NEUROLOGY

## 2025-02-16 PROCEDURE — 85025 COMPLETE CBC W/AUTO DIFF WBC: CPT | Performed by: PSYCHIATRY & NEUROLOGY

## 2025-02-16 PROCEDURE — 82306 VITAMIN D 25 HYDROXY: CPT | Performed by: PSYCHIATRY & NEUROLOGY

## 2025-02-16 PROCEDURE — 80053 COMPREHEN METABOLIC PANEL: CPT

## 2025-02-16 PROCEDURE — 99232 SBSQ HOSP IP/OBS MODERATE 35: CPT | Performed by: PHYSICIAN ASSISTANT

## 2025-02-16 RX ORDER — OLANZAPINE 10 MG/1
10 TABLET, ORALLY DISINTEGRATING ORAL
Status: DISCONTINUED | OUTPATIENT
Start: 2025-02-16 | End: 2025-02-25

## 2025-02-16 RX ORDER — OLANZAPINE 5 MG/1
5 TABLET, ORALLY DISINTEGRATING ORAL ONCE
Status: COMPLETED | OUTPATIENT
Start: 2025-02-16 | End: 2025-02-16

## 2025-02-16 RX ADMIN — SENNOSIDES AND DOCUSATE SODIUM 1 TABLET: 50; 8.6 TABLET ORAL at 21:01

## 2025-02-16 RX ADMIN — OLANZAPINE 10 MG: 10 TABLET, ORALLY DISINTEGRATING ORAL at 21:01

## 2025-02-16 RX ADMIN — LISINOPRIL 5 MG: 5 TABLET ORAL at 08:53

## 2025-02-16 RX ADMIN — CHLORHEXIDINE GLUCONATE 15 ML: 1.2 SOLUTION ORAL at 21:01

## 2025-02-16 RX ADMIN — ACETAMINOPHEN 975 MG: 325 TABLET ORAL at 15:03

## 2025-02-16 RX ADMIN — ASPIRIN 81 MG CHEWABLE TABLET 81 MG: 81 TABLET CHEWABLE at 08:53

## 2025-02-16 RX ADMIN — OLANZAPINE 5 MG: 5 TABLET, ORALLY DISINTEGRATING ORAL at 12:39

## 2025-02-16 RX ADMIN — GABAPENTIN 100 MG: 100 CAPSULE ORAL at 08:53

## 2025-02-16 RX ADMIN — GABAPENTIN 100 MG: 100 CAPSULE ORAL at 21:01

## 2025-02-16 RX ADMIN — TRIHEXYPHENIDYL HYDROCHLORIDE 2 MG: 2 TABLET ORAL at 21:01

## 2025-02-16 RX ADMIN — Medication 3 MG: at 21:01

## 2025-02-16 RX ADMIN — Medication 1 TABLET: at 06:46

## 2025-02-16 RX ADMIN — LEVOTHYROXINE SODIUM 100 MCG: 100 TABLET ORAL at 06:46

## 2025-02-16 RX ADMIN — LORAZEPAM 1 MG: 1 TABLET ORAL at 17:11

## 2025-02-16 RX ADMIN — ATORVASTATIN CALCIUM 20 MG: 20 TABLET, FILM COATED ORAL at 15:47

## 2025-02-16 RX ADMIN — PHENOBARBITAL 64.8 MG: 32.4 TABLET ORAL at 08:53

## 2025-02-16 RX ADMIN — PHENOBARBITAL 64.8 MG: 32.4 TABLET ORAL at 21:01

## 2025-02-16 RX ADMIN — TRIHEXYPHENIDYL HYDROCHLORIDE 2 MG: 2 TABLET ORAL at 08:52

## 2025-02-16 RX ADMIN — LORAZEPAM 1 MG: 1 TABLET ORAL at 08:53

## 2025-02-16 RX ADMIN — GABAPENTIN 100 MG: 100 CAPSULE ORAL at 15:03

## 2025-02-16 RX ADMIN — HYDROXYZINE HYDROCHLORIDE 50 MG: 50 TABLET, FILM COATED ORAL at 15:46
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
## 2025-02-16 NOTE — NURSING NOTE
Patient visible on the unit throughout the day. She is disorganized and hyperverbal at times. She is paranoid of staff at times. She was medication compliant. Attends groups. Social with staff and peers. Patient came to the desk asking for atarax for increased anxiety. She stated no one loved her here . Confused. Administered atarax 50 mg at 1546 for pittman of 23. Patient had increased anxiety and atarax was effective. Patient is currently resting in her room. Safety precautions maintained.

## 2025-02-16 NOTE — PROGRESS NOTES
"Progress Note - Laine Tse 69 y.o. female MRN: [id number]    Unit/Bed#: -01 Encounter: [id number]        Subjective:   Patient seen and examined at bedside after reviewing the chart and discussing the case with the caring staff.      Patient examined at bedside.  Patient has no acute symptoms.    Labs pending.     Physical Exam   Vitals: Blood pressure 145/88, pulse 80, temperature 98.6 °F (37 °C), temperature source Temporal, resp. rate 18, height 5' 4\" (1.626 m), weight 79.7 kg (175 lb 11.3 oz), SpO2 98%, not currently breastfeeding.,Body mass index is 30.16 kg/m².  Constitutional: Patient in no acute distress.  HEENT: PERR, EOMI, MMM.  Cardiovascular: Normal rate and regular rhythm.    Pulmonary/Chest: Effort normal and breath sounds normal.   Abdomen: Soft, + BS, NT.    Assessment/Plan:  Laine Tse is a(n) 69 y.o. female with schizoaffective disorder.      Cardiac with hx of HTN, HLD.  Continue atorvastatin 20 mg daily, ASA 81 mg daily.  Not on antihypertensive meds prehospital.  Start lisinopril 5 mg daily 2/15. Hydralazine 25 mg as needed for SBP >170 or DBP >110.  Cont to monitor vitals.    Allergic rhinitis.  Flonase nasal spray daily.  Claritin 10 mg daily as needed (Zyrtec nonform).   Bilateral hearing loss.  Supportive measures.   Osteoporosis.  Continue calcium/vitamin D supplement.  Resume Fosamax on discharge.  DJD/OA/HA/chronic low back pain.  Tylenol, Zanaflex, lidocaine patch as needed.  Hypothyroidism.  Patient is on levothyroxine 100 mcg daily.  TSH normal on 2/13/25.  Seizure disorder.  Continue phenobarbital 64.8 mg q12h.  [facility] saw 2/11/25).  Chronic constipation.  Continue Senokot S nightly.  Miralax as needed.  Hyponatremia.  Level 129 on 2/13.  Repeat CMP.    The patient was discussed with Dr. Wise and he is in agreement with the above note.  "

## 2025-02-16 NOTE — NURSING NOTE
Patient in and out of room. Expresses anxiety and feeling of not being able to settle down and sleep. Rapid pressured speech. Repetitive. Risperdal and Trazodone given. Will continue to monitor and access. Q 15 minute safety rounds maintained.

## 2025-02-16 NOTE — PLAN OF CARE
Problem: Alteration in Thoughts and Perception  Goal: Refrain from acting on delusional thinking/internal stimuli  Description: Interventions:  - Monitor patient closely, per order   - Utilize least restrictive measures   - Set reasonable limits, give positive feedback for acceptable   - Administer medications as ordered and monitor of potential side effects  Outcome: Not Progressing     Problem: Depression  Goal: Refrain from harming self  Description: Interventions:  - Monitor patient closely, per order   - Supervise medication ingestion, monitor effects and side effects   Outcome: Progressing

## 2025-02-17 LAB
25(OH)D3 SERPL-MCNC: 64.6 NG/ML (ref 30–100)
FOLATE SERPL-MCNC: >22.3 NG/ML
TREPONEMA PALLIDUM IGG+IGM AB [PRESENCE] IN SERUM OR PLASMA BY IMMUNOASSAY: NORMAL
VIT B12 SERPL-MCNC: 966 PG/ML (ref 180–914)

## 2025-02-17 PROCEDURE — 99232 SBSQ HOSP IP/OBS MODERATE 35: CPT | Performed by: PSYCHIATRY & NEUROLOGY

## 2025-02-17 RX ORDER — GABAPENTIN 300 MG/1
300 CAPSULE ORAL 3 TIMES DAILY
Status: DISCONTINUED | OUTPATIENT
Start: 2025-02-17 | End: 2025-02-18

## 2025-02-17 RX ADMIN — SALINE NASAL SPRAY 2 SPRAY: 1.5 SOLUTION NASAL at 17:51

## 2025-02-17 RX ADMIN — Medication 3 MG: at 21:11

## 2025-02-17 RX ADMIN — TRIHEXYPHENIDYL HYDROCHLORIDE 2 MG: 2 TABLET ORAL at 21:11

## 2025-02-17 RX ADMIN — ATORVASTATIN CALCIUM 20 MG: 20 TABLET, FILM COATED ORAL at 16:02

## 2025-02-17 RX ADMIN — LORAZEPAM 1 MG: 1 TABLET ORAL at 09:08

## 2025-02-17 RX ADMIN — GABAPENTIN 300 MG: 300 CAPSULE ORAL at 16:02

## 2025-02-17 RX ADMIN — FLUTICASONE PROPIONATE 2 SPRAY: 50 SPRAY, METERED NASAL at 09:11

## 2025-02-17 RX ADMIN — LISINOPRIL 5 MG: 5 TABLET ORAL at 09:06

## 2025-02-17 RX ADMIN — Medication 1 TABLET: at 09:07

## 2025-02-17 RX ADMIN — GABAPENTIN 300 MG: 300 CAPSULE ORAL at 21:11

## 2025-02-17 RX ADMIN — HYDROXYZINE HYDROCHLORIDE 50 MG: 50 TABLET, FILM COATED ORAL at 13:41

## 2025-02-17 RX ADMIN — LEVOTHYROXINE SODIUM 100 MCG: 100 TABLET ORAL at 06:20

## 2025-02-17 RX ADMIN — CHLORHEXIDINE GLUCONATE 15 ML: 1.2 SOLUTION ORAL at 09:11

## 2025-02-17 RX ADMIN — LORAZEPAM 1 MG: 1 TABLET ORAL at 17:17

## 2025-02-17 RX ADMIN — PHENOBARBITAL 64.8 MG: 32.4 TABLET ORAL at 09:06

## 2025-02-17 RX ADMIN — ASPIRIN 81 MG CHEWABLE TABLET 81 MG: 81 TABLET CHEWABLE at 09:07

## 2025-02-17 RX ADMIN — OLANZAPINE 10 MG: 10 TABLET, ORALLY DISINTEGRATING ORAL at 21:11

## 2025-02-17 RX ADMIN — GABAPENTIN 100 MG: 100 CAPSULE ORAL at 09:06

## 2025-02-17 RX ADMIN — SENNOSIDES AND DOCUSATE SODIUM 1 TABLET: 50; 8.6 TABLET ORAL at 21:11

## 2025-02-17 RX ADMIN — PHENOBARBITAL 64.8 MG: 32.4 TABLET ORAL at 21:11

## 2025-02-17 RX ADMIN — RISPERIDONE 0.5 MG: 0.5 TABLET, FILM COATED ORAL at 23:27

## 2025-02-17 RX ADMIN — TRIHEXYPHENIDYL HYDROCHLORIDE 2 MG: 2 TABLET ORAL at 09:07

## 2025-02-17 NOTE — PROGRESS NOTES
02/17/25 1341   Larson Anxiety Scale   Anxious Mood 3   Tension 3   Fears 3   Insomnia 0   Intellectual 3   Depressed Mood 3   Somatic Complaints: Muscular 1   Somatic Complaints: Sensory 0   Cardiovascular Symptoms 0   Respiratory Symptoms 0   Gastrointestinal Symptoms 0   Genitourinary Symptoms 0   Autonomic Symptoms 1   Behavior at Interview 3   Larson Anxiety Score 20     Prn atarax 50 mg requested/received for increased anxiety over medication Zyprexa wanting it to be during the day too and assuming staff was laughing at her; patient became increasing emotional and tearful. Support provided. Will monitor prn effects.

## 2025-02-17 NOTE — SOCIAL WORK
CM placed return call to [facility] [phone] ext [phone]. Left voice mail.

## 2025-02-17 NOTE — PLAN OF CARE
Problem: Ineffective Coping  Goal: Participates in unit activities  Description: Interventions:  - Provide therapeutic environment   - Provide required programming   - Redirect inappropriate behaviors   Outcome: Progressing     Problem: Alteration in Thoughts and Perception  Goal: Attend and participate in unit activities, including therapeutic, recreational, and educational groups  Description: Interventions:  -Encourage Visitation and family involvement in care  Outcome: Progressing   She has been active over the weekend in groups

## 2025-02-17 NOTE — PROGRESS NOTES
02/17/25    Team Meeting   Meeting Type Daily Rounds   Team Members Present   Team Members Present Physician;Occupational Therapist;;Nurse   Physician Team Member Nataliya WHITE   Nursing Team Member Rio LUZ   Social Work Team Member Chacho TARANGO   OT Team Member Moody    Patient/Family Present   Patient Present No   Patient's Family Present No   201, slept, thought gh is poisoning her, disorganized, rambles, appears internally pre occupied, PRN, endorses anx/dep, no d/c date med adjust

## 2025-02-17 NOTE — SOCIAL WORK
CM placed return call to [facility] [phone]. Left Voice Mail     CM spoke to Benjamin at [facility] [phone]. Reviewed tx plan and and goals. Janetbeverly stated that they have no issues/concerns with pt returning to group home. Pt has a hx of hearing voices but  has been manageable.  is asking that meds be looked at. Benjamin assured that with enough notice they will p/u pt at time of d/c. The  uses [facility] in [place].

## 2025-02-17 NOTE — PROGRESS NOTES
02/17/25 1421   Housing Stability   In the last 12 months, was there a time when you were not able to pay the mortgage or rent on time? N   In the past 12 months, how many times have you moved where you were living? 0   At any time in the past 12 months, were you homeless or living in a shelter (including now)? N   Transportation Needs   In the past 12 months, has lack of transportation kept you from medical appointments or from getting medications? no   In the past 12 months, has lack of transportation kept you from meetings, work, or from getting things needed for daily living? No   Food Insecurity   Within the past 12 months, you worried that your food would run out before you got the money to buy more. Never true   Within the past 12 months, the food you bought just didn't last and you didn't have money to get more. Never true   Intimate Partner Violence   Within the last year, have you been afraid of your partner or ex-partner? No   Within the last year, have you been humiliated or emotionally abused in other ways by your partner or ex-partner? No   Within the last year, have you been kicked, hit, slapped, or otherwise physically hurt by your partner or ex-partner? No   Within the last year, have you been raped or forced to have any kind of sexual activity by your partner or ex-partner? No   Utilities   In the past 12 months has the electric, gas, oil, or water company threatened to shut off services in your home? No

## 2025-02-17 NOTE — NURSING NOTE
PRN atarax was effective in decreasing anxiety . Patient is calm and no longer crying. She is walking up and down the hallway and social with peers.

## 2025-02-17 NOTE — NURSING NOTE
Patient has been visible on the unit throughout the day. She is disorganized and hyperverbal. She was medication compliant today. Attends groups. Confused. Endorsed a lot of anxiety and depression. Denied SI,HI, or Hallucinations. Safety precautions maintained. She does require redirection and staff re-assurance.

## 2025-02-17 NOTE — PROGRESS NOTES
02/17/25    Team Meeting   Meeting Type Tx Team Meeting   Team Members Present   Team Members Present Physician;Nurse;   Physician Team Member Nataliya GONZÁLES   Nursing Team Member Rio LUZ   Social Work Team Member Chacho TARANGO   Patient/Family Present   Patient Present Yes   Patient's Family Present No   Tx team reviewed pt strengths,limitations,tx goals and plan.  All in agreement and signed.

## 2025-02-17 NOTE — NURSING NOTE
Patient visible on the unit. Loud and labile at times, disorganized. Sat and desk with her Rosary beads. Compliant with medications. Denies SI, HI, and hallucinations. Endorses anxiety and depression. Will continue to monitor and access. Q 15 minute safety rounds maintained.

## 2025-02-17 NOTE — PROGRESS NOTES
Progress Note - Behavioral Health   Name: Laine Tes 69 y.o. female I MRN: [id number]   Unit/Bed#: [facility] 209-01 I Date of Admission: 2/14/2025   Date of Service: 2/17/2025 I Hospital Day: 3         Assessment & Plan  Seizure disorder (HCC)    Schizoaffective disorder (HCC)    Insomnia    Hypothyroidism    Hyponatremia      Recommended Treatment:     Planned medication and treatment changes:    All current active medications have been reviewed  Encourage group therapy, milieu therapy and occupational therapy  Behavioral Health checks for safety monitoring  Increase Neurontin to 300 mg po tid.  Monitor behavior and fall risk.    Current medications:  Current Facility-Administered Medications   Medication Dose Route Frequency Provider Last Rate    acetaminophen  650 mg Oral Q4H PRN Mima Wells MD      acetaminophen  650 mg Oral Q4H PRN Mima Wells MD      acetaminophen  975 mg Oral Q6H PRN Mima Wells MD      aluminum-magnesium hydroxide-simethicone  30 mL Oral Q4H PRN Mima Wells MD      aspirin  81 mg Oral Daily Marion L Dagnall, PA-C      atorvastatin  20 mg Oral Daily With Dinner Marion L Vickinall, PA-C      calcium carbonate-vitamin D  1 tablet Oral Daily With Breakfast Marion L Vickinall, PA-C      chlorhexidine  15 mL Swish & Spit Q12H JANNIE Marion L Dagnall, PA-C      fluticasone  2 spray Nasal Daily Marion L Dagnall, PA-C      gabapentin  100 mg Oral TID Papito An MD      hydrALAZINE  25 mg Oral Q8H PRN Marion L Dagnall, PA-C      hydrOXYzine HCL  25 mg Oral Q6H PRN Max 4/day Mima Wells MD      hydrOXYzine HCL  50 mg Oral Q6H PRN Max 4/day Mima Wells MD      levothyroxine  100 mcg Oral Early Morning Marion L Dagnall, PA-C      lidocaine  1 patch Topical Daily PRN Marion L Dagnall, PA-C      lisinopril  5 mg Oral Daily Rosenda Zelaya PA-C      loratadine  10 mg Oral Daily PRN Marion L Holdenl, PA-C      LORazepam  1 mg Oral BID Papito An MD       "melatonin  3 mg Oral HS Mima Wells MD      nicotine polacrilex  4 mg Oral Q2H PRN Mima Wells MD      OLANZapine  10 mg Oral HS Rufina Carrasquillo PA-C      PHENobarbital  64.8 mg Oral Q12H Marion Javier PA-C      polyethylene glycol  17 g Oral Daily PRN Marion Javier PA-C      propranolol  5 mg Oral Q8H PRN Mima Wells MD      risperiDONE  0.25 mg Oral Q4H PRN Max 6/day Mima Wells MD      risperiDONE  0.5 mg Oral Q4H PRN Max 3/day Mima Wells MD      risperiDONE  1 mg Oral Q2H PRN Max 3/day Mima Wells MD      senna-docusate sodium  1 tablet Oral HS Marion Javier PA-C      sodium chloride  2 spray Each Nare Q1H PRN Marion Javier PA-C      tiZANidine  2 mg Oral Q6H PRN Marion Javier PA-C      traZODone  50 mg Oral Q6H PRN Max 3/day Mima Wells MD      trihexyphenidyl  2 mg Oral BID Papito An MD       Facility-Administered Medications Ordered in Other Encounters   Medication Dose Route Frequency Provider Last Rate    lactated ringers   Intravenous Continuous PRN Celi Preston CRNA         Risks / Benefits of Treatment:    Risks, benefits, and possible side effects of medications explained to patient. Patient has limited understanding of risks and benefits of treatment at this time, but agrees to take medications as prescribed.    Subjective:    Behavior over the last 24 hours: limited improvement.     Laine was seen for continuing care. She is currently complaining of a chest cold which she states the has all the time. She states that it burns and felt like it \"burst\".  She was also distressed by someone named Funmi who broke her jaw because \"snitches get stitches\". She states people here are \"slandering her name\". She states she feels nervous and depressed. Pt has been adherence to meds but continues requiring frequent redirection.  Does not come to groups.    Sleep: improved  Appetite: fair, however constantly questioned if she should " be fasting  Medication side effects: No   ROS: does not answer    Mental Status Evaluation:    Appearance:  disheveled   Behavior:  agitated   Speech:  increased rate, increased latency of response   Mood:  dysphoric, anxious   Affect:  labile   Thought Process:  disorganized, circumstantial   Associations: loose associations   Thought Content:  paranoid and bizarre delusions   Perceptual Disturbances: denies auditory hallucinations when asked   Risk Potential: Suicidal ideation - None at present  Homicidal ideation - None at present  Potential for aggression - No   Sensorium:  unable to assess   Memory:  recent and remote memory: unable to assess due to lack of cooperation   Consciousness:  alert   Attention/Concentration: decreased concentration and decreased attention span   Insight:  poor   Judgment: poor   Gait/Station: unstable gait   Motor Activity: abnormal movement noted: dyskinetic oral movements present     Vital signs in last 24 hours:    Temp:  [97.5 °F (36.4 °C)] 97.5 °F (36.4 °C)  HR:  [71] 71  BP: (100-116)/(55-65) 116/65  Resp:  [18] 18  SpO2:  [95 %] 95 %         Laboratory results: I have personally reviewed all pertinent laboratory/tests results    Most Recent Labs:   Lab Results   Component Value Date    WBC 5.69 02/16/2025    RBC 4.36 02/16/2025    HGB 14.2 02/16/2025    HCT 41.8 02/16/2025     02/16/2025    RDW 12.2 02/16/2025    NEUTROABS 3.37 02/16/2025    SODIUM 130 (L) 02/16/2025    K 4.5 02/16/2025    CL 98 02/16/2025    CO2 23 02/16/2025    BUN 13 02/16/2025    CREATININE 0.66 02/16/2025    GLUC 136 02/16/2025    CALCIUM 9.2 02/16/2025    AST 19 02/16/2025    ALT 20 02/16/2025    ALKPHOS 47 02/16/2025    TP 7.3 02/16/2025    ALB 4.3 02/16/2025    TBILI 0.34 02/16/2025    CHOLESTEROL 198 02/16/2025    HDL 77 02/16/2025    TRIG 122 02/16/2025    LDLCALC 97 02/16/2025    NONHDLC 121 02/16/2025    VALPROICTOT 71 11/29/2019    AMMONIA 15 12/19/2017    RTC5LYBBCSVF 0.920 02/13/2025     FREET4 0.94 08/14/2024    SYPHILISAB Non-reactive 04/05/2024    HGBA1C 5.5 04/26/2024     04/26/2024       Counseling / Coordination of Care:    Patient's progress discussed with staff in treatment team meeting.  Medication changes reviewed with nursing staff.  Supportive therapy provided to patient.  Group attendance encouraged.    Papito An MD 02/17/25

## 2025-02-17 NOTE — PROGRESS NOTES
Met with the patient to complete her self assessment. The patients biggest stressor is that she can't concentrate. The patietn was crying and displaying paranoid thoughts. The patient enjoys reading thee bible and listening to music. The patient wants to improve her concentration and learn new coping skills.The patient was encouraged to attend all groups and listen to doctor and nurse recommendations.

## 2025-02-18 PROCEDURE — 99232 SBSQ HOSP IP/OBS MODERATE 35: CPT | Performed by: PSYCHIATRY & NEUROLOGY

## 2025-02-18 RX ORDER — GABAPENTIN 300 MG/1
300 CAPSULE ORAL 2 TIMES DAILY
Status: DISCONTINUED | OUTPATIENT
Start: 2025-02-18 | End: 2025-02-19

## 2025-02-18 RX ORDER — GUAIFENESIN/DEXTROMETHORPHAN 100-10MG/5
10 SYRUP ORAL EVERY 4 HOURS PRN
Status: DISCONTINUED | OUTPATIENT
Start: 2025-02-18 | End: 2025-03-10 | Stop reason: HOSPADM

## 2025-02-18 RX ORDER — BISACODYL 10 MG
10 SUPPOSITORY, RECTAL RECTAL DAILY PRN
Status: DISCONTINUED | OUTPATIENT
Start: 2025-02-18 | End: 2025-03-10 | Stop reason: HOSPADM

## 2025-02-18 RX ORDER — LACTULOSE 10 G/15ML
20 SOLUTION ORAL 2 TIMES DAILY PRN
Status: DISCONTINUED | OUTPATIENT
Start: 2025-02-18 | End: 2025-03-10 | Stop reason: HOSPADM

## 2025-02-18 RX ADMIN — POLYETHYLENE GLYCOL 3350 17 G: 17 POWDER, FOR SOLUTION ORAL at 08:47

## 2025-02-18 RX ADMIN — LORAZEPAM 1 MG: 1 TABLET ORAL at 17:51

## 2025-02-18 RX ADMIN — PHENOBARBITAL 64.8 MG: 32.4 TABLET ORAL at 20:59

## 2025-02-18 RX ADMIN — LACTULOSE 20 G: 20 SOLUTION ORAL at 21:04

## 2025-02-18 RX ADMIN — PHENOBARBITAL 64.8 MG: 32.4 TABLET ORAL at 08:45

## 2025-02-18 RX ADMIN — RISPERIDONE 1 MG: 1 TABLET, FILM COATED ORAL at 20:59

## 2025-02-18 RX ADMIN — LEVOTHYROXINE SODIUM 100 MCG: 100 TABLET ORAL at 06:18

## 2025-02-18 RX ADMIN — OLANZAPINE 10 MG: 10 TABLET, ORALLY DISINTEGRATING ORAL at 21:00

## 2025-02-18 RX ADMIN — CHLORHEXIDINE GLUCONATE 15 ML: 1.2 SOLUTION ORAL at 08:47

## 2025-02-18 RX ADMIN — TRAZODONE HYDROCHLORIDE 50 MG: 50 TABLET ORAL at 21:00

## 2025-02-18 RX ADMIN — GABAPENTIN 300 MG: 300 CAPSULE ORAL at 13:02

## 2025-02-18 RX ADMIN — GUAIFENESIN AND DEXTROMETHORPHAN 10 ML: 100; 10 SYRUP ORAL at 21:04

## 2025-02-18 RX ADMIN — RISPERIDONE 1 MG: 1 TABLET, FILM COATED ORAL at 07:09

## 2025-02-18 RX ADMIN — LISINOPRIL 5 MG: 5 TABLET ORAL at 08:46

## 2025-02-18 RX ADMIN — GABAPENTIN 300 MG: 300 CAPSULE ORAL at 08:45

## 2025-02-18 RX ADMIN — Medication 1 TABLET: at 08:45

## 2025-02-18 RX ADMIN — TRIHEXYPHENIDYL HYDROCHLORIDE 2 MG: 2 TABLET ORAL at 21:00

## 2025-02-18 RX ADMIN — SENNOSIDES AND DOCUSATE SODIUM 1 TABLET: 50; 8.6 TABLET ORAL at 20:59

## 2025-02-18 RX ADMIN — RISPERIDONE 1 MG: 1 TABLET, FILM COATED ORAL at 16:21

## 2025-02-18 RX ADMIN — LORAZEPAM 1 MG: 1 TABLET ORAL at 08:46

## 2025-02-18 RX ADMIN — FLUTICASONE PROPIONATE 2 SPRAY: 50 SPRAY, METERED NASAL at 08:46

## 2025-02-18 RX ADMIN — ASPIRIN 81 MG CHEWABLE TABLET 81 MG: 81 TABLET CHEWABLE at 08:45

## 2025-02-18 RX ADMIN — ATORVASTATIN CALCIUM 20 MG: 20 TABLET, FILM COATED ORAL at 16:21

## 2025-02-18 RX ADMIN — TRIHEXYPHENIDYL HYDROCHLORIDE 2 MG: 2 TABLET ORAL at 08:46

## 2025-02-18 RX ADMIN — CHLORHEXIDINE GLUCONATE 15 ML: 1.2 SOLUTION ORAL at 20:59

## 2025-02-18 RX ADMIN — Medication 3 MG: at 21:00

## 2025-02-18 NOTE — NURSING NOTE
At 4:20 pm nurse administered 1mg risperidal for increased agitation,racing thoughts, and restlessness. She was disorganized in her thoughts and unable to concentrate. She was loud and unable to redirect. Patient appears calm and able to sit still. Risperidol was effective.

## 2025-02-18 NOTE — PROGRESS NOTES
Progress Note - Behavioral Health   Name: Laine Tse 69 y.o. female I MRN: [id number]   Unit/Bed#: [facility] 209-01 I Date of Admission: 2/14/2025   Date of Service: 2/18/2025 I Hospital Day: 4         Assessment & Plan  Seizure disorder (HCC)    Schizoaffective disorder (HCC)    Insomnia    Hypothyroidism    Hyponatremia      Recommended Treatment:     Planned medication and treatment changes:    All current active medications have been reviewed  Encourage group therapy, milieu therapy and occupational therapy  Behavioral Health checks for safety monitoring  Neurontin to 300 mg po bid in the morning and after lunch.  Monitor behavior and fall risk.    Current medications:  Current Facility-Administered Medications   Medication Dose Route Frequency Provider Last Rate    acetaminophen  650 mg Oral Q4H PRN Mima Wells MD      acetaminophen  650 mg Oral Q4H PRN Mima Wells MD      acetaminophen  975 mg Oral Q6H PRN Mima Wells MD      aluminum-magnesium hydroxide-simethicone  30 mL Oral Q4H PRN Mima Wells MD      aspirin  81 mg Oral Daily Marion L Dagnall, PA-C      atorvastatin  20 mg Oral Daily With Dinner Marion L Vickinall, PA-C      bisacodyl  10 mg Rectal Daily PRN Marion L Dagnall, PA-C      calcium carbonate-vitamin D  1 tablet Oral Daily With Breakfast Marion L Vickinall, PA-C      chlorhexidine  15 mL Swish & Spit Q12H JANNIE Marion L Dagnall, PA-C      fluticasone  2 spray Nasal Daily Marion L Holdenl, PA-C      [START ON 2/19/2025] gabapentin  300 mg Oral BID Papito An MD      hydrALAZINE  25 mg Oral Q8H PRN Marion L Dagnall, PA-C      hydrOXYzine HCL  25 mg Oral Q6H PRN Max 4/day Mima Wells MD      hydrOXYzine HCL  50 mg Oral Q6H PRN Max 4/day Mima Wells MD      lactulose  20 g Oral BID PRN Marion L Dagnall, PA-C      levothyroxine  100 mcg Oral Early Morning Marion L Dagnall, PA-C      lidocaine  1 patch Topical Daily PRN Marion L Dagnall, PA-C      lisinopril   "5 mg Oral Daily Rosenda Zelaya PA-C      loratadine  10 mg Oral Daily PRN Marion Javier PA-C      LORazepam  1 mg Oral BID Papito An MD      melatonin  3 mg Oral HS Mima Wells MD      nicotine polacrilex  4 mg Oral Q2H PRN Mima Wells MD      OLANZapine  10 mg Oral HS Rufina ZapataBRODY lombardi      PHENobarbital  64.8 mg Oral Q12H Marion Javier PA-C      polyethylene glycol  17 g Oral Daily PRN Marion Javier PA-C      propranolol  5 mg Oral Q8H PRN Mima Wells MD      risperiDONE  0.25 mg Oral Q4H PRN Max 6/day Mima Wells MD      risperiDONE  0.5 mg Oral Q4H PRN Max 3/day Mima Wells MD      risperiDONE  1 mg Oral Q2H PRN Max 3/day Mima Wells MD      senna-docusate sodium  1 tablet Oral HS Marion Javier PA-C      sodium chloride  2 spray Each Nare Q1H PRN Marion Javier PA-C      tiZANidine  2 mg Oral Q6H PRN Marion Javier PA-C      traZODone  50 mg Oral Q6H PRN Max 3/day Mima Wells MD      trihexyphenidyl  2 mg Oral BID Papito An MD       Facility-Administered Medications Ordered in Other Encounters   Medication Dose Route Frequency Provider Last Rate    lactated ringers   Intravenous Continuous PRN Celi Preston CRNA         Risks / Benefits of Treatment:    Risks, benefits, and possible side effects of medications explained to patient. Patient has limited understanding of risks and benefits of treatment at this time, but agrees to take medications as prescribed.    Subjective:    Behavior over the last 24 hours: unchanged.     Laine was seen for continuing care. She reports doing \"okay\" except some disruptive sleep at night. Remains loud voice at times but she is redirectable. Continues circumstantial and labile periodically but has been compliant with medication and is able to follow verbal redirection. Claims  she can only taking Neurontin in the morning and lunchtime. She can't take it at night because \"it " "activate her\". Continue voicing bizarre delusion but she  does not act on them. Pt has been adherence to meds and denies any current side effects. Staff reports minimal participation in groups.    Sleep: broken sleep  Appetite: fair  Medication side effects: denies   ROS: no complaints, all other systems are negative    Mental Status Evaluation:    Appearance:  age appropriate   Behavior:  restless, responds to redirection   Speech:  normal rate, loud   Mood:  dysphoric, anxious   Affect:  reactive   Thought Process:  disorganized, circumstantial   Associations: tangential associations   Thought Content:  paranoid and bizarre delusions   Perceptual Disturbances: denies auditory hallucinations when asked   Risk Potential: Suicidal ideation - None at present  Homicidal ideation - None at present  Potential for aggression - Not at present   Sensorium:  unable to assess   Memory:  recent and remote memory: unable to assess due to lack of cooperation   Consciousness:  alert   Attention/Concentration: decreased concentration and decreased attention span   Insight:  poor   Judgment: poor   Gait/Station: unstable gait   Motor Activity: abnormal movement noted: dyskinetic oral movements present     Vital signs in last 24 hours:    Temp:  [97.3 °F (36.3 °C)-97.7 °F (36.5 °C)] 97.6 °F (36.4 °C)  HR:  [56-77] 56  BP: (129-162)/(73-98) 162/98  Resp:  [18] 18  SpO2:  [96 %-100 %] 100 %         Laboratory results: I have personally reviewed all pertinent laboratory/tests results    Most Recent Labs:   Lab Results   Component Value Date    WBC 5.69 02/16/2025    RBC 4.36 02/16/2025    HGB 14.2 02/16/2025    HCT 41.8 02/16/2025     02/16/2025    RDW 12.2 02/16/2025    NEUTROABS 3.37 02/16/2025    SODIUM 130 (L) 02/16/2025    K 4.5 02/16/2025    CL 98 02/16/2025    CO2 23 02/16/2025    BUN 13 02/16/2025    CREATININE 0.66 02/16/2025    GLUC 136 02/16/2025    CALCIUM 9.2 02/16/2025    AST 19 02/16/2025    ALT 20 02/16/2025    " ALKPHOS 47 02/16/2025    TP 7.3 02/16/2025    ALB 4.3 02/16/2025    TBILI 0.34 02/16/2025    CHOLESTEROL 198 02/16/2025    HDL 77 02/16/2025    TRIG 122 02/16/2025    LDLCALC 97 02/16/2025    NONHDLC 121 02/16/2025    VALPROICTOT 71 11/29/2019    AMMONIA 15 12/19/2017    [id number] 0.920 02/13/2025    FREET4 0.94 08/14/2024    SYPHILISAB Non-reactive 04/05/2024    HGBA1C 5.5 04/26/2024     04/26/2024       Counseling / Coordination of Care:    Patient's progress discussed with staff in treatment team meeting.  Medication changes reviewed with nursing staff.  Supportive therapy provided to patient.  Group attendance encouraged.    Papito An MD 02/18/25

## 2025-02-18 NOTE — NURSING NOTE
Pt calm and cooperative with assessment. Denies SI/HI/AH/VH and pain. Compliant with meds. Socializes with peers and staff. Q 15 minute checks ongoing.    2320 Pt became agitated once in bed disrupting her room mate. Pt crying and singing loudly and begging for forgiveness and saying she was going to purgatory. Unable to redirect for more then a few moments. Broset score of 2. Risperdal 5 mg given and effective. Pt currently sleeping. Q 15 minute checks ongoing.

## 2025-02-18 NOTE — SOCIAL WORK
CM spoke to Clari MARTÍNEZ -[phone]. Clari requested psych eval and med list be sent via fax [phone] and would like to meet with pt on unit.  She agrees to notify marry of time.

## 2025-02-18 NOTE — PROGRESS NOTES
02/18/25    Team Meeting   Meeting Type Daily Rounds   Team Members Present   Team Members Present Physician;Occupational Therapist;Nurse;   Physician Team Member Nataliya Negrete MD   Nursing Team Member Rio LUZ   Social Work Team Member Chacho TARANGO   OT Team Member Yazmin CONTRERAS   Patient/Family Present   Patient Present No   Patient's Family Present No   201, slept 4 hrs, multiple PRN, disorganized, loud, irritable, med complaint, compliant, no d/c date med adjust, Service [facility] with LV ACT

## 2025-02-18 NOTE — NURSING NOTE
Pt up several times during the night appearing agitated, would be redirected and go back to sleep. Pt at no time displayed ay signs of distress. Q 15 minute checks ongoing.

## 2025-02-18 NOTE — PROGRESS NOTES
"Progress Note - Laine Tse 69 y.o. female MRN: [id number]    Unit/Bed#: -01 Encounter: [id number]        Subjective:   Patient seen and examined at bedside after reviewing the chart and discussing the case with the caring staff.      Patient examined at bedside.  Patient complaining of constipation.  Took Miralax this morning without improvement.  Denies abdominal pain, nausea, vomiting.     Sodium 130 on 2/16.   Repeat BMP in AM for follow-up on hyponatremia.     Physical Exam   Vitals: Blood pressure 162/98, pulse 56, temperature 97.6 °F (36.4 °C), temperature source Temporal, resp. rate 18, height 5' 4\" (1.626 m), weight 79.7 kg (175 lb 11.3 oz), SpO2 100%, not currently breastfeeding.,Body mass index is 30.16 kg/m².  Constitutional: Patient in no acute distress.  HEENT: PERR, EOMI, MMM.  Cardiovascular: Normal rate and regular rhythm.    Pulmonary/Chest: Effort normal and breath sounds normal.   Abdomen: Soft, + BS, NT.    Assessment/Plan:  Laine Tse is a(n) 69 y.o. female with schizoaffective disorder.      Cardiac with hx of HTN, HLD.  Continue atorvastatin 20 mg daily, ASA 81 mg daily.  Not on antihypertensive meds prehospital.  Start lisinopril 5 mg daily 2/15. Hydralazine 25 mg as needed for SBP >170 or DBP >110.  Cont to monitor vitals.    Allergic rhinitis.  Flonase nasal spray daily.  Claritin 10 mg daily as needed (Zyrtec nonform).   Bilateral hearing loss.  Supportive measures.   Osteoporosis.  Continue calcium/vitamin D supplement.  Resume Fosamax on discharge.  DJD/OA/HA/chronic low back pain.  Tylenol, Zanaflex, lidocaine patch as needed.  Hypothyroidism.  Patient is on levothyroxine 100 mcg daily.  TSH normal on 2/13/25.  Seizure disorder.  Continue phenobarbital 64.8 mg q12h.  Pt follows with [facility] neurology (last saw 2/11/25).  Chronic constipation.  Continue Senokot S nightly.  Miralax as needed.  Add lactulose and dulxolax suppository as needed 2/18.   Hyponatremia.  " Level 129 -> 130 on 2/16.  Repeat BMP.    The patient was discussed with Dr. Wise and he is in agreement with the above note.

## 2025-02-18 NOTE — NURSING NOTE
Patient was laying in the hallway praying. Staff was able to redirect her to stand up. She was cooperative. When nurse was administering medication earlier in the morning she was paranoia and stated that the devil was in her room. She requested we leave her room and go into the alonso. Safety precautions maintained.

## 2025-02-18 NOTE — NURSING NOTE
Nightshift administered 1mg risperidol for broset 4. Increased agitation and restlessness. Medication was effective. Patient was calm walking in halls at this time.

## 2025-02-19 PROCEDURE — 97165 OT EVAL LOW COMPLEX 30 MIN: CPT

## 2025-02-19 PROCEDURE — 97163 PT EVAL HIGH COMPLEX 45 MIN: CPT

## 2025-02-19 PROCEDURE — 99232 SBSQ HOSP IP/OBS MODERATE 35: CPT | Performed by: PSYCHIATRY & NEUROLOGY

## 2025-02-19 RX ORDER — LORAZEPAM 0.5 MG/1
0.5 TABLET ORAL 3 TIMES DAILY
Status: DISCONTINUED | OUTPATIENT
Start: 2025-02-19 | End: 2025-02-20

## 2025-02-19 RX ORDER — GABAPENTIN 300 MG/1
300 CAPSULE ORAL 3 TIMES DAILY
Status: DISCONTINUED | OUTPATIENT
Start: 2025-02-19 | End: 2025-02-21

## 2025-02-19 RX ORDER — OLANZAPINE 5 MG/1
5 TABLET, ORALLY DISINTEGRATING ORAL DAILY
Status: DISCONTINUED | OUTPATIENT
Start: 2025-02-20 | End: 2025-02-21

## 2025-02-19 RX ADMIN — ASPIRIN 81 MG CHEWABLE TABLET 81 MG: 81 TABLET CHEWABLE at 09:45

## 2025-02-19 RX ADMIN — ATORVASTATIN CALCIUM 20 MG: 20 TABLET, FILM COATED ORAL at 17:56

## 2025-02-19 RX ADMIN — OLANZAPINE 10 MG: 10 TABLET, ORALLY DISINTEGRATING ORAL at 20:17

## 2025-02-19 RX ADMIN — LORAZEPAM 0.5 MG: 0.5 TABLET ORAL at 20:19

## 2025-02-19 RX ADMIN — GABAPENTIN 300 MG: 300 CAPSULE ORAL at 09:45

## 2025-02-19 RX ADMIN — GABAPENTIN 300 MG: 300 CAPSULE ORAL at 20:18

## 2025-02-19 RX ADMIN — RISPERIDONE 0.5 MG: 0.5 TABLET, FILM COATED ORAL at 13:51

## 2025-02-19 RX ADMIN — PHENOBARBITAL 64.8 MG: 32.4 TABLET ORAL at 20:17

## 2025-02-19 RX ADMIN — TRIHEXYPHENIDYL HYDROCHLORIDE 2 MG: 2 TABLET ORAL at 20:18

## 2025-02-19 RX ADMIN — SENNOSIDES AND DOCUSATE SODIUM 1 TABLET: 50; 8.6 TABLET ORAL at 20:19

## 2025-02-19 RX ADMIN — HYDROXYZINE HYDROCHLORIDE 50 MG: 50 TABLET, FILM COATED ORAL at 23:27

## 2025-02-19 RX ADMIN — LEVOTHYROXINE SODIUM 100 MCG: 100 TABLET ORAL at 06:43

## 2025-02-19 RX ADMIN — FLUTICASONE PROPIONATE 2 SPRAY: 50 SPRAY, METERED NASAL at 09:44

## 2025-02-19 RX ADMIN — GUAIFENESIN AND DEXTROMETHORPHAN 10 ML: 100; 10 SYRUP ORAL at 20:18

## 2025-02-19 RX ADMIN — LISINOPRIL 5 MG: 5 TABLET ORAL at 09:45

## 2025-02-19 RX ADMIN — PHENOBARBITAL 64.8 MG: 32.4 TABLET ORAL at 09:45

## 2025-02-19 RX ADMIN — HYDROXYZINE HYDROCHLORIDE 50 MG: 50 TABLET, FILM COATED ORAL at 12:26

## 2025-02-19 RX ADMIN — LORAZEPAM 0.5 MG: 0.5 TABLET ORAL at 17:56

## 2025-02-19 RX ADMIN — RISPERIDONE 1 MG: 1 TABLET, FILM COATED ORAL at 10:16

## 2025-02-19 RX ADMIN — TRIHEXYPHENIDYL HYDROCHLORIDE 2 MG: 2 TABLET ORAL at 09:45

## 2025-02-19 RX ADMIN — LORAZEPAM 1 MG: 1 TABLET ORAL at 09:45

## 2025-02-19 RX ADMIN — CHLORHEXIDINE GLUCONATE 15 ML: 1.2 SOLUTION ORAL at 09:44

## 2025-02-19 RX ADMIN — Medication 3 MG: at 20:17

## 2025-02-19 RX ADMIN — CHLORHEXIDINE GLUCONATE 15 ML: 1.2 SOLUTION ORAL at 20:18

## 2025-02-19 NOTE — PLAN OF CARE
Problem: Alteration in Thoughts and Perception  Goal: Agree to be compliant with medication regime, as prescribed and report medication side effects  Description: Interventions:  - Offer appropriate PRN medication and supervise ingestion; conduct AIMS, as needed   Outcome: Progressing     Problem: Alteration in Thoughts and Perception  Goal: Refrain from acting on delusional thinking/internal stimuli  Description: Interventions:  - Monitor patient closely, per order   - Utilize least restrictive measures   - Set reasonable limits, give positive feedback for acceptable   - Administer medications as ordered and monitor of potential side effects  Outcome: Not Progressing

## 2025-02-19 NOTE — TREATMENT TEAM
"   25 1000   Broset Violence Checklist   Assessment type Reassessment   Irritability 1   Confusion 1   Boisterousness 1   Threatening physical violence 0   Verbal threats 0   Violence 0   Broset score 3     Patient agitated, yelling out, not redirectable. Asking for \"saline to help with the  of the serpent in me.\" Risperdal 2mg given. Will monitor for effectiveness.   "

## 2025-02-19 NOTE — TREATMENT TEAM
02/18/25 2059   Larson Anxiety Scale   Anxious Mood 4   Tension 4   Fears 4   Insomnia 2   Intellectual 4   Depressed Mood 0   Somatic Complaints: Muscular 0   Somatic Complaints: Sensory 2   Cardiovascular Symptoms 0   Respiratory Symptoms 0   Gastrointestinal Symptoms 0   Genitourinary Symptoms 0   Autonomic Symptoms 1   Behavior at Interview 4   Larson Anxiety Score 25   Broset Violence Checklist   Assessment type Shift   Irritability 1   Confusion 1   Boisterousness 1   Threatening physical violence 0   Verbal threats 0   Violence 0   Broset score 3       Trazodone 50mg and Risperdal 1mg given at 2059 for severe agitation and severe anxiety. Will re-assess.

## 2025-02-19 NOTE — NURSING NOTE
Patient med compliant, participated in a snack tonight. She denies all psychiatric symptoms. Pt was loud, restless and nonsensical. She attempted to pour her pills into her water cup but was able to be redirected. Risperdal and trazodone given at 2059 appear to have been effective as pt is currently in bed. Lactulose and Robitussin given at 2104. Safety precautions maintained q15 mins.

## 2025-02-19 NOTE — TREATMENT TEAM
02/19/25 1351   Broset Violence Checklist   Assessment type Shift   Irritability 1   Confusion 1   Boisterousness 0   Threatening physical violence 0   Verbal threats 0   Violence 0   Broset score 2     Pt administered risperdal 0.5 mg for moderate agitation. Pt appears restless and is becoming increasingly agitated. Pt is getting increasingly loud and demanding a medication for agitation.

## 2025-02-19 NOTE — PROGRESS NOTES
"Progress Note - Laine Tse 69 y.o. female MRN: [id number]    Unit/Bed#: -01 Encounter: [id number]        Subjective:   Patient seen and examined at bedside after reviewing the chart and discussing the case with the caring staff.      Patient examined at bedside.  Patient has no acute symptoms.    On review of patient's labs patient's vitamin D level was 64.6 and B12 level was 966.    Physical Exam   Vitals: Blood pressure 116/65, pulse 71, temperature 97.5 °F (36.4 °C), temperature source Temporal, resp. rate 18, height 5' 4\" (1.626 m), weight 79.7 kg (175 lb 11.3 oz), SpO2 95%, not currently breastfeeding.,Body mass index is 30.16 kg/m².  Constitutional: Patient in no acute distress.  HEENT: PERR, EOMI, MMM.  Cardiovascular: Normal rate and regular rhythm.    Pulmonary/Chest: Effort normal and breath sounds normal.   Abdomen: Soft, + BS, NT.    Assessment/Plan:  Laine Tse is a(n) 69 y.o. female with schizoaffective disorder.      Cardiac with hx of HTN, HLD.  Continue atorvastatin 20 mg daily, ASA 81 mg daily.  Not on antihypertensive meds prehospital.  Start lisinopril 5 mg daily 2/15. Hydralazine 25 mg as needed for SBP >170 or DBP >110.  Cont to monitor vitals.    Allergic rhinitis.  Flonase nasal spray daily.  Claritin 10 mg daily as needed (Zyrtec nonform).   Bilateral hearing loss.  Supportive measures.   Osteoporosis.  Continue calcium/vitamin D supplement.  Resume Fosamax on discharge.  DJD/OA/HA/chronic low back pain.  Tylenol, Zanaflex, lidocaine patch as needed.  Hypothyroidism.  Patient is on levothyroxine 100 mcg daily.  TSH normal on 2/13/25.  Seizure disorder.  Continue phenobarbital 64.8 mg q12h.  Pt follows with [facility] neurology (last saw 2/11/25).  Chronic constipation.  Continue Senokot S nightly.  Miralax as needed.  Hyponatremia.  Level 129 on 2/13.  Repeat CMP.  " 2025       RE: Cameron Bueno  [address]  [place] [id number]     Dear Colleague,    Thank you for referring your patient, Cameron Bueno, to the [facility] at [facility]. Please see a copy of my visit note below.    Chief Complaint(s) and History of Present Illness(es)       Strabismus Evaluation              Comments: Noted Left eye crossing in/drifting up since birth, worsening over the last year.               Comments     s/p PNI + BUL [place] (10/10/23)   Phx; Downs Syndrome    Inf; Mom, sister and Armenian                History was obtained from the following independent historians: Mom and sister with an  translating throughout the encounter.    Primary care: [facility] is home  Assessment & Plan   Cameron Bueno is a 1 year old female who presents with:     Down's syndrome s/p VSD repair    Esotropia with refractive amblyopia left eye   Won't keep anything near face.   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   - reassured and explained that some tearing will inevitably persist in Down syndrome  - warm compresses as needed     Hyperopia of both eyes with astigmatism   Won't wear glasses.        Return in about 4 months (around 2025) for Orthoptics.    Patient Instructions   Use atropine, 1 drop in the RIGHT eye every other day. STOP 1 week prior to your next eye appointment.    Atropine Drop Treatment for Amblyopia     What to Expect  Atropine drops are being used to treat your child's amblyopia (visual developmental delay).  Atropine blurs vision in the better-seeing eye to encourage use of the eye with poorer vision (the amblyopic eye).  This  workout  improves vision in the amblyopic eye over time.  This therapeutic blur is an alternative to occlusive patch therapy.   Do  the drops hurt?   No. Unlike other types of eye drops, atropine drops usually do not sting.  How do I put them in?   With your child lying down and looking up to the ceiling, hold the eyelids apart and place the drop anywhere between the lids.  If the child is frightened, try giving the drop before he or she wakes up.  For some children it is necessary for one adult to hold the child while the other gives the drop.  Eventually you will establish a routine, making it easier to instill the drops.  Wash your hands before and after giving the eye drops to prevent inadvertently dilating your own eye with residual medicine from your fingertips.    What are the side-effects?   Redness and swelling around the eyes and face within an hour of administration  Irritability  The above symptoms will go away without treatment and are not dangerous.  It often indicates that you have given more than one drop.    Serious side effects are extremely rare, but if your child appears lethargic (poorly responsive) or develops respiratory distress (fast breathing, wheezing, blue lips), call 911.  If you have any concerns, stop using the drop and call our office.  How do I store the drops?   They may be kept at room temperature.  Be sure to keep the atropine drops out of the reach of children.  If anyone drinks atropine from the bottle, call 911 immediately.  I gave a drop of atropine five days ago, and my child's pupil is still dilated. Is something wrong?   No.  A single drop of atropine may dilate the pupil for up to 2 weeks. Although the pupil remains dilated, the blurring effect of the atropine wears off in 1-2 days.  Remember to notify any pediatrician, family doctor, or emergency room doctor that your child is using atropine eye drops.   Should my child wear sunglasses since the pupil is always dilated?   Outdoors on a jessica day, your child will be more comfortable wearing sunglasses.  If your child already wears glasses, they can be  coated with a clear ultraviolet filter.  How can my child function at school with the better eye blurred?   The child retains use of both eyes, but the poorer-seeing eye will now seem clearer and be encouraged to  catch up  with the other eye.  This is the point of the therapy.  If the atropine seems to be interfering with schoolwork, contact us.   How long will I need to use the atropine?   Treatment may be continued for months or even years, depending on the age of the child and the severity of amblyopia.   My appointment is next week. Should I continue using the atropine drops?   Stop using atropine drops one full week before your appointment (or before any surgery) unless your doctor says otherwise.   I put atropine drops in my child's eye, but now my own pupil is dilated.  What happened?  You forgot to wash your hands after giving the eye drops and got atropine in your own eye.  Your may have blurred vision and a dilated pupil for up to a week.      Visit Diagnoses & Orders    ICD-10-CM    1. Esotropia, left eye  H50.012 Sensorimotor      2. Refractive amblyopia of left eye  H53.022 atropine 1 % ophthalmic solution      3. Down's syndrome  Q90.9       4. Hyperopia of both eyes with astigmatism  H52.03     H52.203          The longitudinal plan of care for the diagnosis(es)/condition(s) as documented were addressed during this visit. Due to the added complexity in care, I will continue to support Cameron Bueno in the subsequent management and with the ongoing continuity of care.    Attending Physician Attestation:  Complete documentation of historical and exam elements from today's encounter can be found in the full encounter summary report (not reduplicated in this progress note).  I personally obtained the chief complaint(s) and history of present illness.  I confirmed and edited as necessary the review of systems, past medical/surgical history, family history, social history, and examination findings as  documented by others; and I examined the patient myself.  I personally reviewed the relevant tests, images, and reports as documented above.  I formulated and edited as necessary the assessment and plan and discussed the findings and management plan with the patient and family. - Anibal Chavira Jr., MD       Again, thank you for allowing me to participate in the care of your patient.      Sincerely,    Anibal Chavira MD

## 2025-02-19 NOTE — PROGRESS NOTES
02/19/25    Team Meeting   Meeting Type Daily Rounds   Team Members Present   Team Members Present Physician;Nurse;;Occupational Therapist   Physician Team Member Nataliya Negrete MD   Nursing Team Member Uche RN   Social Work Team Member Chacho TARANGO   OT Team Member Yazmin CONTRERAS   Patient/Family Present   Patient Present No   Patient's Family Present No   201, agitated, liable, loud, racing thoughts, multiple PRN, slept, med complaint, refused labs, nonsensical, ah devils, colored on floor, d/c home to group home once med adjust are complete

## 2025-02-19 NOTE — SOCIAL WORK
Medications 02/19/25 02/20/25 02/21/25 02/22/25 02/23/25 02/24/25 02/25/25   aspirin chewable tablet 81 mg  Dose: 81 mg  Freq: Daily Route: PO  Start: 02/15/25 0900    0945      0900      0900            atorvastatin (LIPITOR) tablet 20 mg  Dose: 20 mg  Freq: Daily with dinner Route: PO  Start: 02/14/25 1630    1630      1630      1630      1630      1630          calcium carbonate-vitamin D 500 mg-5 mcg tablet 1 tablet  Dose: 1 tablet  Freq: Daily with breakfast Route: PO  Start: 02/15/25 0730   Admin Instructions:   5 mcg = 200 units of cholecalciferol (Vitamin D3); LOOK ALIKE SOUND ALIKE MED    (4224)      0769      0779            chlorhexidine (PERIDEX) 0.12 % oral rinse 15 mL  Dose: 15 mL  Freq: Every 12 hours scheduled Route: SWISH & SPIT  Start: 02/14/25 2100   Admin Instructions:   Swish orally for 30 seconds, then expectorate.  Do not swallow. **DISPOSE IN 8 GALLON BLACK CONTAINER**    0944     2100 0900 2100 0900 2100 0900 2100 0900 2100          fluticasone (FLONASE) 50 mcg/act nasal spray 2 spray  Dose: 2 spray  Freq: Daily Route: NA  Start: 02/15/25 0900   Admin Instructions:   LOOK ALIKE SOUND ALIKE MED    0944      0900      0900            gabapentin (NEURONTIN) capsule 300 mg  Dose: 300 mg  Freq: 3 times daily Route: PO  Start: 02/19/25 1600   Admin Instructions:   LOOK ALIKE S[facility] ALIKE MED    1600     2100 0900 1600 2100 0900 1600 2100 0900 1600     2100             Dose: 300 mg  Freq: 2 times daily Route: PO  Start: 02/18/25 1103 End: 02/19/25 1142   Admin Instructions:   LOOK ALIKE S[facility] ALIKE MED    0945     1142-D/C'd            levothyroxine tablet 100 mcg  Dose: 100 mcg  Freq: Daily (early morning) Route: PO  Start: 02/15/25 0600   Admin Instructions:   Administer on an empty stomach, at least 30 to 60 minutes before food. Tablets may be crushed and suspended in 5-10mls of water for administration. LOOK ALIKE S[facility] ALIKE  MED.    0643      0600      0600            lisinopril (ZESTRIL) tablet 5 mg  Dose: 5 mg  Freq: Daily Route: PO  Start: 02/15/25 1545   Admin Instructions:   LOOK ALIKE S[facility] ALIKE MED   Order specific questions:       0945 0900      0900            LORazepam (ATIVAN) tablet 0.5 mg  Dose: 0.5 mg  Freq: 3 times daily Route: PO  Start: 02/19/25 1600   Admin Instructions:   High alert medication. [employer] [facility]    1600     2100      0900     1600     2100      0900     1600     2100      0900 1600     2100             Dose: 1 mg  Freq: 2 times daily Route: PO  Start: 02/14/25 1245 End: 02/19/25 1141   Admin Instructions:   High alert medication. [facility] ALI[facility]    09     1141-D/C'd            melatonin tablet 3 mg  Dose: 3 mg  Freq: Daily at bedtime Route: PO  Indications of Use: INSOMNIA  Start: 02/14/25 2200    2200      2200      2200      2200      2200          OLANZapine (ZyPREXA ZYDIS) dispersible tablet 10 mg  Dose: 10 mg  Freq: Daily at bedtime Route: PO  Start: 02/16/25 2200   Admin Instructions:   LOOK ALIKE S[facility] ALIKE MED    2200      2200      2200      2200           OLANZapine (ZyPREXA ZYDIS) dispersible tablet 5 mg  Dose: 5 mg  Freq: Daily Route: PO  Start: 02/20/25 0900   Admin Instructions:   LOOK ALIKE S[facility] ALIKE MED     0900      0900      0900      0900          PHENobarbital tablet 64.8 mg  Dose: 64.8 mg  Freq: Every 12 hours Route: PO  Start: 02/14/25 2100   Admin Instructions:   High alert medication    0945 2100 0900 2100 0900 2100 0900 2100 0900     2100          senna-docusate sodium (SENOKOT S) 8.6-50 mg per tablet 1 tablet  Dose: 1 tablet  Freq: Daily at bedtime Route: PO  Start: 02/14/25 2200    2200      2200      2200      2200      2200          trihexyphenidyl (ARTANE) tablet 2 mg  Dose: 2 mg  Freq: 2 times daily Route: PO  Start: 02/14/25 1315    0945     2100 0900 2100      0900 2100      0900      2100      0900     2100

## 2025-02-19 NOTE — PLAN OF CARE
Problem: Alteration in Thoughts and Perception  Goal: Attend and participate in unit activities, including therapeutic, recreational, and educational groups  Description: Interventions:  -Encourage Visitation and family involvement in care  Outcome: Progressing     Problem: Ineffective Coping  Goal: Participates in unit activities  Description: Interventions:  - Provide therapeutic environment   - Provide required programming   - Redirect inappropriate behaviors   Outcome: Progressing   Patient wants to be in groups but is disorganized and hard to redirect; she needs firm boundaries.
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
## 2025-02-19 NOTE — NURSING NOTE
Patient appears to have slept the majority of the overnight hours. No concerns voiced, no signs of distress. Risperdal and trazodone appear to have been effective. Continuous 15 min safety checks in place.

## 2025-02-19 NOTE — OCCUPATIONAL THERAPY NOTE
Occupational Therapy Evaluation     Patient Name: Laine Tse  Today's Date: 2/19/2025  Problem List  Principal Problem:    Schizoaffective disorder (HCC)  Active Problems:    Hyponatremia    Hypothyroidism    Seizure disorder (HCC)    Insomnia    Past Medical History  Past Medical History:   Diagnosis Date    Anxiety     Benign essential hypertension     resolved; 06/15/16    Depression     Depression with anxiety     Fracture of fifth metatarsal bone of right foot with delayed healing     last assessed 04/12/16; fracture of metatarsal bone, right, closed, initial encounter    Hyperlipidemia     last assessed 11/28/17    Hypothyroidism     last assessed 11/28/2017    Insomnia     Obesity     Schizoaffective disorder (HCC)     last assessed 05/18/2017    Seizure disorder (HCC)     last assessed 03/28/17    Seizures (HCC)      Past Surgical History  Past Surgical History:   Procedure Laterality Date    COLONOSCOPY      complete    GA COLONOSCOPY FLX DX W/COLLJ SPEC WHEN PFRMD N/A 8/30/2018    Procedure: COLONOSCOPY with polypectomies;  Surgeon: Andriy Lopez MD;  Location: [facility];  Service: Gastroenterology        02/19/25 1336   OT Last Visit   OT Visit Date 02/19/25   Note Type   Note type Evaluation   Additional Comments Pt seen as a co-eval with PT due to the patient's co-morbidities, clinically unstable presentation, and present impairments which are a regression from the patient's baseline.   Pain Assessment   Pain Assessment Tool 0-10   Pain Score 6   Pain Location/Orientation Orientation: Lower;Location: Back   Pain Radiating Towards n/a   Pain Onset/Description Onset: Ongoing   Effect of Pain on Daily Activities n/a   Patient's Stated Pain Goal No pain   Hospital Pain Intervention(s) Medication (See MAR);Repositioned   Multiple Pain Sites No   Restrictions/Precautions   Weight Bearing Precautions Per Order No   Other Precautions Cognitive;Fall Risk   Home Living   Type of Home Group Home   Home  "Layout One level   Home Equipment Cane  (SPC used at baseline)   Additional Comments limited home set-up and PLOF gathered d/t pt's cognition and disorganized speech; limited information gathered via chart review   Prior Function   Level of Day Independent with ADLs;Independent with functional mobility;Needs assistance with IADLS   Lives With Facility staff   Receives Help From Other (Comment)  (facility staff)   IADLs Family/Friend/Other provides transportation;Family/Friend/Other provides meals;Family/Friend/Other provides medication management   Falls in the last 6 months   (pt denies)   Vocational Retired   Subjective   Subjective \"I don't need you, I need the doctor\"   ADL   Where Assessed Chair   Eating Assistance 7  Independent   Grooming Assistance 7  Independent   UB Bathing Assistance 6  Modified Independent   LB Bathing Assistance 5  Supervision/Setup   UB Dressing Assistance 6  Modified independent   LB Dressing Assistance 5  Supervision/Setup   Toileting Assistance  5  Supervision/Setup   Bed Mobility   Additional Comments DNT; pt seated in chair upon arrival and conclusion of session   Transfers   Sit to Stand 5  Supervision   Additional items Assist x 1;Increased time required   Stand to Sit 5  Supervision   Additional items Assist x 1;Increased time required   Additional Comments HHA used; VC for safe hand placement and proper body mechanics   Functional Mobility   Functional Mobility   (CGA)   Additional Comments Pt completed short distance ADL mobility around hallway at CGA level w/ HHA. No significant LOB observed, mild instability   Additional items Hand hold assistance   Balance   Static Sitting Good   Dynamic Sitting Fair +   Static Standing Fair +   Dynamic Standing Fair   Ambulatory Fair -   Activity Tolerance   Activity Tolerance Patient limited by fatigue   Nurse Made Aware Yes, nursing staff made aware of session outcomes   RUE Assessment   RUE Assessment WFL   RUE Strength   RUE " Overall Strength   (4-/5 grossly assessed)   LUE Assessment   LUE Assessment WFL   LUE Strength   LUE Overall Strength   (4-/5 grossly assessed)   Hand Function   Gross Motor Coordination Functional   Fine Motor Coordination Functional   Vision-Basic Assessment   Current Vision Wears glasses all the time   Cognition   Overall Cognitive Status Impaired   Arousal/Participation Alert;Responsive   Attention Difficulty attending to directions   Orientation Level Oriented to person;Oriented to place;Disoriented to situation;Disoriented to time   Memory Decreased recall of precautions;Decreased recall of recent events;Decreased short term memory;Decreased long term memory   Following Commands Follows one step commands inconsistently   Comments Pt with disorganized and tangential speech; frequent re-direction required throughout evaluation   Assessment   Limitation Decreased endurance;Decreased cognition   Prognosis Good   Assessment Pt is a 69 y.o. female seen for OT evaluation s/p admit to [facility] on 2/14/2025 w/ Schizoaffective disorder (HCC).  Comorbidities affecting pt's functional performance at time of assessment include: hyponatremia, hypothryoidism, sizure disorder, hyperlipidemia, HTN, unsteady gait, osteoporosis. Personal factors affecting pt at time of IE include:behavioral pattern, difficulty performing ADLS, difficulty performing IADLS , limited insight into deficits, compliance, decreased initiation and engagement , and health management . Prior to admission, pt was I with ADLs and requires some A with IADLs. Upon evaluation: pt is completing functional mobility and self-care tasks at MI to S level. Pt is currently functioning at/around baseline and does not warrant any further inpatient OT services at this time. From OT standpoint, recommendation at time of d/c would be home with minimal intensity OT resources.   Goals   Patient Goals to speak to the doctor   Plan   OT Frequency Eval only   Discharge  Recommendation   Rehab Resource Intensity Level, OT III (Minimum Resource Intensity)   Additional Comments  The patient's raw score on the AM-PAC Daily Activity inpatient short form is 19, standardized score is 40.22, greater than 39.4. Patients at this level are likely to benefit from discharge with minimal intensity OT resources. Please refer to the recommendation of the Occupational Therapist for safe discharge planning.   AM-PAC Daily Activity Inpatient   Lower Body Dressing 3   Bathing 3   Toileting 3   Upper Body Dressing 3   Grooming 3   Eating 4   Daily Activity Raw Score 19   Daily Activity Standardized Score (Calc for Raw Score >=11) 40.22   AM-PAC Applied Cognition Inpatient   Following a Speech/Presentation 2   Understanding Ordinary Conversation 3   Taking Medications 2   Remembering Where Things Are Placed or Put Away 2   Remembering List of 4-5 Errands 1   Taking Care of Complicated Tasks 1   Applied Cognition Raw Score 11   Applied Cognition Standardized Score 27.03     All needs met, pt seated in chair upon conclusion of session  Christie Dorsey OTR/L

## 2025-02-19 NOTE — TREATMENT TEAM
"   02/19/25 1226   Larson Anxiety Scale   Anxious Mood 3   Tension 3   Fears 3   Insomnia 0   Intellectual 3   Depressed Mood 2   Somatic Complaints: Muscular 1   Somatic Complaints: Sensory 1   Cardiovascular Symptoms 0   Respiratory Symptoms 1   Gastrointestinal Symptoms 2   Genitourinary Symptoms 0   Autonomic Symptoms 2   Behavior at Interview 2   Larson Anxiety Score 23     Pt states \"I'm very anxious.\" Pt was pacing the halls stating \"I'm anxious, I'm anxious!\" Pt administered atarax 50 mg PO at 1226.  "

## 2025-02-19 NOTE — PHYSICAL THERAPY NOTE
PHYSICAL THERAPY EVALUATION  NAME:  Laine Tse  DATE: 02/19/25    AGE:   69 y.o.  Mrn:   [id number]  ADMIT DX:  Major depressive disorder, single episode, unspecified [F32.9]  Major depressive disorder [F32.9]  Problem List:   Patient Active Problem List   Diagnosis    Depression with anxiety    Hyperlipidemia    Hyponatremia    Hypothyroidism    Flatulence    Injury of right toe, initial encounter    Seizure disorder (HCC)    Hyperglycemia    Benign essential hypertension    Schizoaffective disorder (HCC)    Unsteady gait    Psychosis, bipolar affective (HCC)    Osteoporosis    Insomnia    Folic acid deficiency    Anemia    Pre-diabetes    Decreased hearing of both ears    Cellulitis of abdominal wall    Class 1 obesity with body mass index (BMI) of 32.0 to 32.9 in adult    Chronic midline low back pain without sciatica    Dermatitis    T wave inversion on electrocardiogram    Rash    Skin lesion of left lower extremity       Past Medical History  Past Medical History:   Diagnosis Date    Anxiety     Benign essential hypertension     resolved; 06/15/16    Depression     Depression with anxiety     Fracture of fifth metatarsal bone of right foot with delayed healing     last assessed 04/12/16; fracture of metatarsal bone, right, closed, initial encounter    Hyperlipidemia     last assessed 11/28/17    Hypothyroidism     last assessed 11/28/2017    Insomnia     Obesity     Schizoaffective disorder (HCC)     last assessed 05/18/2017    Seizure disorder (HCC)     last assessed 03/28/17    Seizures (HCC)        Past Surgical History  Past Surgical History:   Procedure Laterality Date    COLONOSCOPY      complete    ME COLONOSCOPY FLX DX W/COLLJ SPEC WHEN PFRMD N/A 8/30/2018    Procedure: COLONOSCOPY with polypectomies;  Surgeon: Andriy Lopez MD;  Location: [facility];  Service: Gastroenterology       Length Of Stay: 5  Performed at least 2 patient identifiers during session: Name and Epic photo       02/19/25  1344   PT Last Visit   PT Visit Date 02/19/25   Note Type   Note type Evaluation   Pain Assessment   Pain Assessment Tool 0-10   Pain Score 6   Pain Location/Orientation Location: Back   Hospital Pain Intervention(s) Medication (See MAR);Repositioned   Restrictions/Precautions   Weight Bearing Precautions Per Order No   Other Precautions Cognitive;Fall Risk   Home Living   Type of Home Group Home   Home Layout One level   Home Equipment Cane  (used at baseline)   Additional Comments limited home set-up and PLOF gathered d/t pt's cognition and disorganized speech; limited information gathered via chart review   Prior Function   Level of [place] Independent with ADLs;Independent with functional mobility;Needs assistance with IADLS   Lives With Facility staff   Receives Help From Other (Comment)  (facility staff)   IADLs Family/Friend/Other provides transportation;Family/Friend/Other provides meals;Family/Friend/Other provides medication management   Falls in the last 6 months   (pt denies)   Vocational Retired   General   Family/Caregiver Present No   Cognition   Overall Cognitive Status Impaired   Arousal/Participation Alert   Attention Difficulty attending to directions  (frrequent redirection required)   Orientation Level Oriented to person;Oriented to place;Disoriented to situation;Disoriented to time   Memory Decreased recall of precautions;Decreased recall of recent events;Decreased short term memory;Decreased long term memory   Following Commands Follows one step commands inconsistently   Comments pt presenting with tangential speech, multiple outbursts unrelated to topic   RUE Assessment   RUE Assessment WFL   RUE Strength   RUE Overall Strength   (4-/5 grossly assessed)   LUE Assessment   LUE Assessment WFL   LUE Strength   LUE Overall Strength   (4-/5 grossly assessed)   RLE Assessment   RLE Assessment WFL   LLE Assessment   LLE Assessment WFL   Vision-Basic Assessment   Current Vision Wears glasses all  the time   Transfers   Sit to Stand 6  Modified independent   Stand to Sit 6  Modified independent   Additional Comments good safety awareness noted   Ambulation/Elevation   Gait pattern Ataxia   Gait Assistance 6  Modified independent   Assistive Device None   Distance 80 and 15 ft   Ambulation/Elevation Additional Comments per staff pt ambulating Indep in the hallway   Balance   Static Sitting Good   Dynamic Sitting Fair +   Static Standing Fair +   Dynamic Standing Fair   Ambulatory Fair   Endurance Deficit   Endurance Deficit No   Activity Tolerance   Activity Tolerance Patient limited by pain   Nurse Made Aware NSG assisted with behaviors during session   Assessment   Prognosis Good   Assessment Pt is 69 y.o. female seen for PT evaluation s/p admit to [facility] on 2/14/2025 w/ Schizoaffective disorder (HCC). PT consulted to assess pt's functional mobility and d/c needs. Order placed for PT eval and tx, w/ up and OOB as tolerated order. Pt agreeable to PT  session upon arrival, pt found ambulating.  Patient was independent w/ all functional mobility w/ no AD.  Pt presents at [facility] with transfers, ambulation, and bed mobility.  From PT/mobility standpoint, recommendation at time of d/c would be anticipate no needs/resources. Upon conclusion pt ambulating. D/c PT services at this time. Complexity: Comorbidities affecting pt's physical performance at time of assessment include: obesity, anxiety, depression, and schziophrenia and bipolar and LBP . Personal factors affecting pt at time of IE include: decreased social skills, limited insight into impairments, depression, anxiety, decreased cognition, and impulsivity. Please find objective findings from PT assessment regarding body systems outlined above with impairments and limitations including impaired balance, decreased safety awareness, impaired judgement, fall risk, and decreased cognition.  Pt's clinical presentation is currently unstable/unpredictable  seen in pt's presentation of pain, confusion, agitation, and behaviors . The patient's AM-PAC Basic Mobility Inpatient Short Form Raw Score is 23.  A Raw score of greater than 16 suggests the patient may benefit from discharge to home. Please also refer to the recommendation of the Physical Therapist for safe discharge planning. Pt seen as a co-eval with OT due to the patient's co-morbidities, clinically unstable presentation, and behaviors.   Barriers to Discharge None   Goals   Patient Goals to talk to the doctor   Discharge Recommendation   Rehab Resource Intensity Level, PT No post-acute rehabilitation needs   AM-PAC Basic Mobility Inpatient   Turning in Flat Bed Without Bedrails 4   Lying on Back to Sitting on Edge of Flat Bed Without Bedrails 4   Moving Bed to Chair 4   Standing Up From Chair Using Arms 4   Walk in Room 4   Climb 3-5 Stairs With Railing 4   Basic Mobility Inpatient Raw Score 24   Basic Mobility Standardized Score 57.68   [facility] Highest Level Of Mobility   JH-HLM Goal 8: Walk 250 feet or more   JH-HLM Achieved 7: Walk 25 feet or more       Time In: 1333  Time Out: 1344  Total Evaluation Minutes: 11    Shaista Alexandra, PT

## 2025-02-19 NOTE — NURSING NOTE
"Pt visible on unit and social with select peers. Pt observed wandering the halls. She is restless and verbally aggressive at times. Pt speech is loud- content is disorganized and nonsensical. Pt requested an antipsychotic and was informed she already took one earlier. Pt states \"I did not, you're a liar.\" Pt endorses moderate anxiety and AVH of her \"uncle.\" Pt states \"he's right over there, my uncle is right over there.\" Pt states \"my doctor used to be my dad's best friend.\" Pt requires frequent redirection. Pt denies SI/HI/AVH. Continuous monitoring maintained.   "

## 2025-02-19 NOTE — SOCIAL WORK
Cm spoke to pt Elizabeth [facility] Act [phone]. CM provided pt progress update. Elizabeth provided cm additional background info that are were added to her intake. Usman will continue collaboration with pt ACT team during tx stay and notify of discharge.

## 2025-02-19 NOTE — PROGRESS NOTES
"Progress Note - Laine Tse 69 y.o. female MRN: [id number]    Unit/Bed#: -01 Encounter: [id number]        Subjective:   Patient seen and examined at bedside after reviewing the chart and discussing the case with the caring staff.      Patient examined at bedside.  Patient denies any acute symptoms.     Sodium 130 on 2/16.   BMP waiting to be collected.     Physical Exam   Vitals: Blood pressure 136/90, pulse 61, temperature 97.5 °F (36.4 °C), temperature source Temporal, resp. rate 18, height 5' 4\" (1.626 m), weight 79.7 kg (175 lb 11.3 oz), SpO2 95%, not currently breastfeeding.,Body mass index is 30.16 kg/m².  Constitutional: Patient in no acute distress.  HEENT: PERR, EOMI, MMM.  Cardiovascular: Normal rate and regular rhythm.    Pulmonary/Chest: Effort normal and breath sounds normal.   Abdomen: Soft, + BS, NT.    Assessment/Plan:  Laine Tse is a(n) 69 y.o. female with schizoaffective disorder.      Cardiac with hx of HTN, HLD.  Continue atorvastatin 20 mg daily, ASA 81 mg daily.  Not on antihypertensive meds prehospital.  Start lisinopril 5 mg daily 2/15. Hydralazine 25 mg as needed for SBP >170 or DBP >110.  Cont to monitor vitals.    Allergic rhinitis.  Flonase nasal spray daily.  Claritin 10 mg daily as needed (Zyrtec nonform).   Bilateral hearing loss.  Supportive measures.   Osteoporosis.  Continue calcium/vitamin D supplement.  Resume Fosamax on discharge.  DJD/OA/HA/chronic low back pain.  Tylenol, Zanaflex, lidocaine patch as needed.  Hypothyroidism.  Patient is on levothyroxine 100 mcg daily.  TSH normal on 2/13/25.  Seizure disorder.  Continue phenobarbital 64.8 mg q12h.  Pt follows with [facility]'s neurology (last saw 2/11/25).  Chronic constipation.  Continue Senokot S nightly.  Miralax as needed.  Add lactulose and dulxolax suppository as needed 2/18.   Hyponatremia.  Level 129 -> 130 on 2/16.  Repeat BMP.    The patient was discussed with Dr. Wise and he is in agreement with the " above note.

## 2025-02-19 NOTE — PROGRESS NOTES
Progress Note - Behavioral Health   Name: Laine Tse 69 y.o. female I MRN: [id number]   Unit/Bed#: [facility] 209-01 I Date of Admission: 2/14/2025   Date of Service: 2/19/2025 I Hospital Day: 5         Assessment & Plan  Seizure disorder (HCC)    Schizoaffective disorder (HCC)    Insomnia    Hypothyroidism    Hyponatremia      Recommended Treatment:     Planned medication and treatment changes:    All current active medications have been reviewed  Encourage group therapy, milieu therapy and occupational therapy  Behavioral Health checks for safety monitoring  Increase Zydis to 5 mg po am + 10 mg po hs  Neurontin to 300 mg po tid.  Monitor behavior and fall risk.    Current medications:  Current Facility-Administered Medications   Medication Dose Route Frequency Provider Last Rate    acetaminophen  650 mg Oral Q4H PRN Mima Wells MD      acetaminophen  650 mg Oral Q4H PRN Mima Wells MD      acetaminophen  975 mg Oral Q6H PRN Mima Wells MD      aluminum-magnesium hydroxide-simethicone  30 mL Oral Q4H PRN Mima Wells MD      aspirin  81 mg Oral Daily Marion L Dagnall, PA-C      atorvastatin  20 mg Oral Daily With Dinner Marion L Dagnall, PA-C      bisacodyl  10 mg Rectal Daily PRN Marion L Dagnall, PA-C      calcium carbonate-vitamin D  1 tablet Oral Daily With Breakfast Marion L Dagnall, PA-C      chlorhexidine  15 mL Swish & Spit Q12H JANNIE Marion L Dagnall, PA-C      dextromethorphan-guaiFENesin  10 mL Oral Q4H PRN Joel Wise MD      fluticasone  2 spray Nasal Daily Marion L Dagnall, PA-C      gabapentin  300 mg Oral TID Papito An MD      hydrALAZINE  25 mg Oral Q8H PRN Marion L Dagnall, PA-C      hydrOXYzine HCL  25 mg Oral Q6H PRN Max 4/day Mima Wells MD      hydrOXYzine HCL  50 mg Oral Q6H PRN Max 4/day Mima Wells MD      lactulose  20 g Oral BID PRN Marion L Dagnall, PA-C      levothyroxine  100 mcg Oral Early Morning Marion L Dagnall, PA-C      lidocaine  1  patch Topical Daily PRN Marion Javier PA-C      lisinopril  5 mg Oral Daily Rosenda Zelaya PA-C      loratadine  10 mg Oral Daily PRN Marion Javier PA-C      LORazepam  0.5 mg Oral TID Papito An MD      melatonin  3 mg Oral HS Mima Wells MD      nicotine polacrilex  4 mg Oral Q2H PRN Mima Wells MD      OLANZapine  10 mg Oral HS Rufina Carrasquillo PA-C      [START ON 2/20/2025] OLANZapine  5 mg Oral Daily Papito An MD      PHENobarbital  64.8 mg Oral Q12H CHARLI MaddoxC      polyethylene glycol  17 g Oral Daily PRN Marion Javier PA-C      propranolol  5 mg Oral Q8H PRN Mima Wells MD      risperiDONE  0.25 mg Oral Q4H PRN Max 6/day Mima Wells MD      risperiDONE  0.5 mg Oral Q4H PRN Max 3/day Mima Wells MD      risperiDONE  1 mg Oral Q2H PRN Max 3/day Mima Wells MD      senna-docusate sodium  1 tablet Oral HS CHARLI MaddoxC      sodium chloride  2 spray Each Nare Q1H PRN Marion Javier PA-C      tiZANidine  2 mg Oral Q6H PRN BAILEY Maddox-LARRY      traZODone  50 mg Oral Q6H PRN Max 3/day Mima Wells MD      trihexyphenidyl  2 mg Oral BID Papito An MD       Facility-Administered Medications Ordered in Other Encounters   Medication Dose Route Frequency Provider Last Rate    lactated ringers   Intravenous Continuous PRN Celi Preston CRNA         Risks / Benefits of Treatment:    Risks, benefits, and possible side effects of medications explained to patient. Patient has limited understanding of risks and benefits of treatment at this time, but agrees to take medications as prescribed.    Subjective:    Behavior over the last 24 hours: unchanged.     Laine was seen for continuing care. Staff reports patient continues labile, loud at times with poor reality testing. She became agitated and unable to follow verbal redirection, requiring prn medications earlier this morning. Continues tangential, religiously  preoccupied with limited reality testing. She has been compliant with medication and denies current side effects. Calorie intake and sleep are improving. Staff reports poor participation in groups.    Sleep: slept better   Appetite: fair  Medication side effects: denies   ROS: no complaints, all other systems are negative    Mental Status Evaluation:    Appearance:  age appropriate   Behavior:  psychomotor agitation, restless and fidgety   Speech:  loud   Mood:  dysphoric, anxious   Affect:  reactive   Thought Process:  disorganized, circumstantial   Associations: tangential associations   Thought Content:  paranoid and bizarre delusions   Perceptual Disturbances: denies auditory hallucinations when asked, appears preoccupied   Risk Potential: Suicidal ideation - None at present  Homicidal ideation - None at present  Potential for aggression - Not at present   Sensorium:  unable to assess   Memory:  recent and remote memory: unable to assess due to lack of cooperation   Consciousness:  alert   Attention/Concentration: decreased concentration and decreased attention span   Insight:  poor   Judgment: poor   Gait/Station: unstable gait   Motor Activity: abnormal movement noted: dyskinetic oral movements present     Vital signs in last 24 hours:    Temp:  [97.5 °F (36.4 °C)-97.9 °F (36.6 °C)] 97.5 °F (36.4 °C)  HR:  [61-80] 61  BP: ()/(50-90) 136/90  Resp:  [18] 18  SpO2:  [93 %-99 %] 95 %  O2 Device: None (Room air)         Laboratory results: I have personally reviewed all pertinent laboratory/tests results    Most Recent Labs:   Lab Results   Component Value Date    WBC 5.69 02/16/2025    RBC 4.36 02/16/2025    HGB 14.2 02/16/2025    HCT 41.8 02/16/2025     02/16/2025    RDW 12.2 02/16/2025    NEUTROABS 3.37 02/16/2025    SODIUM 130 (L) 02/16/2025    K 4.5 02/16/2025    CL 98 02/16/2025    CO2 23 02/16/2025    BUN 13 02/16/2025    CREATININE 0.66 02/16/2025    GLUC 136 02/16/2025    CALCIUM 9.2  02/16/2025    AST 19 02/16/2025    ALT 20 02/16/2025    ALKPHOS 47 02/16/2025    TP 7.3 02/16/2025    ALB 4.3 02/16/2025    TBILI 0.34 02/16/2025    CHOLESTEROL 198 02/16/2025    HDL 77 02/16/2025    TRIG 122 02/16/2025    LDLCALC 97 02/16/2025    NONHDLC 121 02/16/2025    VALPROICTOT 71 11/29/2019    AMMONIA 15 12/19/2017    [id number] 0.920 02/13/2025    FREET4 0.94 08/14/2024    SYPHILISAB Non-reactive 04/05/2024    HGBA1C 5.5 04/26/2024     04/26/2024       Counseling / Coordination of Care:    Patient's progress discussed with staff in treatment team meeting.  Medication changes reviewed with nursing staff.  Supportive therapy provided to patient.  Group attendance encouraged.    Papito An MD 02/19/25

## 2025-02-20 PROCEDURE — 99232 SBSQ HOSP IP/OBS MODERATE 35: CPT | Performed by: PSYCHIATRY & NEUROLOGY

## 2025-02-20 PROCEDURE — 87081 CULTURE SCREEN ONLY: CPT | Performed by: FAMILY MEDICINE

## 2025-02-20 RX ORDER — LORAZEPAM 0.5 MG/1
0.5 TABLET ORAL DAILY
Status: DISCONTINUED | OUTPATIENT
Start: 2025-02-21 | End: 2025-02-21

## 2025-02-20 RX ORDER — LORAZEPAM 0.5 MG/1
0.5 TABLET ORAL 2 TIMES DAILY
Status: DISCONTINUED | OUTPATIENT
Start: 2025-02-20 | End: 2025-02-20

## 2025-02-20 RX ORDER — CLONAZEPAM 0.5 MG/1
0.5 TABLET ORAL
Status: DISCONTINUED | OUTPATIENT
Start: 2025-02-20 | End: 2025-02-21

## 2025-02-20 RX ADMIN — LEVOTHYROXINE SODIUM 100 MCG: 100 TABLET ORAL at 05:49

## 2025-02-20 RX ADMIN — SENNOSIDES AND DOCUSATE SODIUM 1 TABLET: 50; 8.6 TABLET ORAL at 22:02

## 2025-02-20 RX ADMIN — LORAZEPAM 0.5 MG: 0.5 TABLET ORAL at 09:03

## 2025-02-20 RX ADMIN — GUAIFENESIN AND DEXTROMETHORPHAN 10 ML: 100; 10 SYRUP ORAL at 05:54

## 2025-02-20 RX ADMIN — RISPERIDONE 1 MG: 1 TABLET, FILM COATED ORAL at 11:55

## 2025-02-20 RX ADMIN — RISPERIDONE 1 MG: 1 TABLET, FILM COATED ORAL at 00:51

## 2025-02-20 RX ADMIN — OLANZAPINE 10 MG: 10 TABLET, ORALLY DISINTEGRATING ORAL at 22:02

## 2025-02-20 RX ADMIN — CLONAZEPAM 0.5 MG: 0.5 TABLET ORAL at 22:02

## 2025-02-20 RX ADMIN — TRIHEXYPHENIDYL HYDROCHLORIDE 2 MG: 2 TABLET ORAL at 22:04

## 2025-02-20 RX ADMIN — GUAIFENESIN AND DEXTROMETHORPHAN 10 ML: 100; 10 SYRUP ORAL at 22:03

## 2025-02-20 RX ADMIN — GABAPENTIN 300 MG: 300 CAPSULE ORAL at 16:23

## 2025-02-20 RX ADMIN — Medication 3 MG: at 22:01

## 2025-02-20 RX ADMIN — GABAPENTIN 300 MG: 300 CAPSULE ORAL at 09:03

## 2025-02-20 RX ADMIN — PHENOBARBITAL 64.8 MG: 32.4 TABLET ORAL at 09:02

## 2025-02-20 RX ADMIN — ASPIRIN 81 MG CHEWABLE TABLET 81 MG: 81 TABLET CHEWABLE at 09:03

## 2025-02-20 RX ADMIN — TRAZODONE HYDROCHLORIDE 50 MG: 50 TABLET ORAL at 00:51

## 2025-02-20 RX ADMIN — CHLORHEXIDINE GLUCONATE 15 ML: 1.2 SOLUTION ORAL at 22:03

## 2025-02-20 RX ADMIN — GABAPENTIN 300 MG: 300 CAPSULE ORAL at 22:02

## 2025-02-20 RX ADMIN — TRIHEXYPHENIDYL HYDROCHLORIDE 2 MG: 2 TABLET ORAL at 09:02

## 2025-02-20 RX ADMIN — LISINOPRIL 5 MG: 5 TABLET ORAL at 09:03

## 2025-02-20 RX ADMIN — ATORVASTATIN CALCIUM 20 MG: 20 TABLET, FILM COATED ORAL at 16:23

## 2025-02-20 RX ADMIN — TRAZODONE HYDROCHLORIDE 50 MG: 50 TABLET ORAL at 22:02

## 2025-02-20 RX ADMIN — CHLORHEXIDINE GLUCONATE 15 ML: 1.2 SOLUTION ORAL at 09:02

## 2025-02-20 RX ADMIN — Medication 1 TABLET: at 09:02

## 2025-02-20 RX ADMIN — OLANZAPINE 5 MG: 5 TABLET, ORALLY DISINTEGRATING ORAL at 09:03

## 2025-02-20 RX ADMIN — GUAIFENESIN AND DEXTROMETHORPHAN 10 ML: 100; 10 SYRUP ORAL at 00:51

## 2025-02-20 RX ADMIN — PHENOBARBITAL 64.8 MG: 32.4 TABLET ORAL at 22:02

## 2025-02-20 NOTE — NURSING NOTE
Patient was loud, labile and erratic this evening. Trying unit doors, going into other patient's rooms, and arguing with staff when redirected. Patient was compliant with medication regime.  Attended and participated in unit activities. Pt. refrained from self-harm and had no suicidal ideations. Anxiety level is stated as high .  Depression level denied . Denied current homicidal ideations and/or audio or visual hallucinations. Safety maintained via clutter-free environment, ID band, and given non-skid socks. Fluids maintained at beside to promote hydrations. Medication education given. Care Plan reviewed and amended. Maintained on q 15 minute safety checks.  Will continue to monitor.

## 2025-02-20 NOTE — NURSING NOTE
Patient visible in milieu pacing and with peers, medication compliant, needs redirection, loud, labile, confused and somewhat cooperative. Patient endorses moderate anxiety/depression, denies SI/HI and hallucinations. Patient had PRN Risperdal 1 mg at 1155 for severe agitation and broset of 3. Medication was effective. Continued care and safety rounds in progress.

## 2025-02-20 NOTE — PROGRESS NOTES
Progress Note - Behavioral Health   Name: Laine Tse 69 y.o. female I MRN: [id number]   Unit/Bed#: [facility] 209-01 I Date of Admission: 2/14/2025   Date of Service: 2/20/2025 I Hospital Day: 6         Assessment & Plan  Seizure disorder (HCC)    Schizoaffective disorder (HCC)    Insomnia    Hypothyroidism    Hyponatremia      Recommended Treatment:     Planned medication and treatment changes:    All current active medications have been reviewed  Encourage group therapy, milieu therapy and occupational therapy  Behavioral Health checks for safety monitoring  Zydis to 5 mg po am + 10 mg po hs  Neurontin to 300 mg po tid.  Klonopin 0.5 mg po hs.   Decrease Ativan 0.5 mg po daily.  Monitor behavior and fall risk.    Current medications:  Current Facility-Administered Medications   Medication Dose Route Frequency Provider Last Rate    acetaminophen  650 mg Oral Q4H PRN Mima Wells MD      acetaminophen  650 mg Oral Q4H PRN Mima Wells MD      acetaminophen  975 mg Oral Q6H PRN Mima Wells MD      aluminum-magnesium hydroxide-simethicone  30 mL Oral Q4H PRN Mima Wells MD      aspirin  81 mg Oral Daily Marion L Dagnall, PA-C      atorvastatin  20 mg Oral Daily With Dinner Marion L Dagnall, PA-C      bisacodyl  10 mg Rectal Daily PRN Marion L Dagnall, PA-C      calcium carbonate-vitamin D  1 tablet Oral Daily With Breakfast Marion L Dagnall, PA-C      chlorhexidine  15 mL Swish & Spit Q12H JANNIE Marion L Dagnall, PA-C      dextromethorphan-guaiFENesin  10 mL Oral Q4H PRN Joel Wise MD      fluticasone  2 spray Nasal Daily Marion L Dagnall, PA-C      gabapentin  300 mg Oral TID Papito An MD      hydrALAZINE  25 mg Oral Q8H PRN Marion L Dagnall, PA-C      hydrOXYzine HCL  25 mg Oral Q6H PRN Max 4/day Mima Wells MD      hydrOXYzine HCL  50 mg Oral Q6H PRN Max 4/day Mima Wells MD      lactulose  20 g Oral BID PRN Marion L Vickinall, PA-C      levothyroxine  100 mcg Oral Early  "Morning Marion Hatchl, PA-C      lidocaine  1 patch Topical Daily PRN Marion L Dagnall, PA-C      lisinopril  5 mg Oral Daily Rosendabladimir Zelaya PA-C      loratadine  10 mg Oral Daily PRN Marion L Holdenl, PA-C      LORazepam  0.5 mg Oral TID Papito An MD      melatonin  3 mg Oral HS Mima Wells MD      nicotine polacrilex  4 mg Oral Q2H PRN Mima Wells MD      OLANZapine  10 mg Oral HS Rufina CarrasquilloBRODY      OLANZapine  5 mg Oral Daily Papito An MD      PHENobarbital  64.8 mg Oral Q12H Marion L Dagnall, PA-C      polyethylene glycol  17 g Oral Daily PRN Marion L Dagnall, PA-C      propranolol  5 mg Oral Q8H PRN Mima Wells MD      risperiDONE  0.25 mg Oral Q4H PRN Max 6/day Mima Wells MD      risperiDONE  0.5 mg Oral Q4H PRN Max 3/day Mima Wells MD      risperiDONE  1 mg Oral Q2H PRN Max 3/day Mima Wells MD      senna-docusate sodium  1 tablet Oral HS Marion Cohennall, PA-C      sodium chloride  2 spray Each Nare Q1H PRN Marion L Dagnall, PA-C      tiZANidine  2 mg Oral Q6H PRN Marion L Dagnall, PA-C      traZODone  50 mg Oral Q6H PRN Max 3/day Mima Wells MD      trihexyphenidyl  2 mg Oral BID Papito An MD       Facility-Administered Medications Ordered in Other Encounters   Medication Dose Route Frequency Provider Last Rate    lactated ringers   Intravenous Continuous PRN Celi Preston CRNA         Risks / Benefits of Treatment:    Risks, benefits, and possible side effects of medications explained to patient. Patient has limited understanding of risks and benefits of treatment at this time, but agrees to take medications as prescribed.    Subjective:    Behavior over the last 24 hours: minimal improvement.     Laine was seen for continuing care. Staff reports patient continues loud at times and going into other patients' rooms in the middle of the night to hug them. When asked how she is doing today, she reports \"terrible\". She " "speaks tangentially usually of Scientologist origin. She states she did not sleep well last night due to someone \"taking her eyeballs out and giving her laser vision\". She did not eat breakfast this morning because she states there was blood in it. Continues requiring prn meds periodically and needs frequent redirection.   Spoke with Dr. Sharpe from [facility] team who stated pt is intermittently lout and verbalizes chronic delusions at baseline. She has extensive [facility] He has been following pt's up for long time with multiple meds trials.       Sleep: broken sleep  Appetite: decreased  Medication side effects: denies   ROS: no complaints, all other systems are negative    Mental Status Evaluation:    Appearance:  age appropriate   Behavior:  restless and fidgety   Speech:  increased latency of response, loud   Mood:  anxious   Affect:  labile   Thought Process:  disorganized, tangential   Associations: tangential associations, flight of ideas   Thought Content:  paranoid and bizarre delusions   Perceptual Disturbances: denies auditory hallucinations when asked, appears preoccupied   Risk Potential: Suicidal ideation - None at present  Homicidal ideation - None at present  Potential for aggression - Not at present   Sensorium:  unable to assess   Memory:  recent and remote memory: unable to assess due to lack of cooperation   Consciousness:  alert   Attention/Concentration: decreased concentration and decreased attention span   Insight:  poor   Judgment: poor   Gait/Station: unstable gait   Motor Activity: abnormal movement noted: dyskinetic oral movements present     Vital signs in last 24 hours:    Temp:  [96.5 °F (35.8 °C)-97.8 °F (36.6 °C)] 97.8 °F (36.6 °C)  HR:  [78-87] 78  BP: (134-160)/(68-83) 160/83  Resp:  [17-18] 17  SpO2:  [90 %-99 %] 99 %  O2 Device: None (Room air)         Laboratory results: I have personally reviewed all pertinent laboratory/tests " results    Most Recent Labs:   Lab Results   Component Value Date    WBC 5.69 02/16/2025    RBC 4.36 02/16/2025    HGB 14.2 02/16/2025    HCT 41.8 02/16/2025     02/16/2025    RDW 12.2 02/16/2025    NEUTROABS 3.37 02/16/2025    SODIUM 130 (L) 02/16/2025    K 4.5 02/16/2025    CL 98 02/16/2025    CO2 23 02/16/2025    BUN 13 02/16/2025    CREATININE 0.66 02/16/2025    GLUC 136 02/16/2025    CALCIUM 9.2 02/16/2025    AST 19 02/16/2025    ALT 20 02/16/2025    ALKPHOS 47 02/16/2025    TP 7.3 02/16/2025    ALB 4.3 02/16/2025    TBILI 0.34 02/16/2025    CHOLESTEROL 198 02/16/2025    HDL 77 02/16/2025    TRIG 122 02/16/2025    LDLCALC 97 02/16/2025    NONHDLC 121 02/16/2025    VALPROICTOT 71 11/29/2019    AMMONIA 15 12/19/2017    YYU7TDTAEIHI 0.920 02/13/2025    FREET4 0.94 08/14/2024    SYPHILISAB Non-reactive 04/05/2024    HGBA1C 5.5 04/26/2024     04/26/2024       Counseling / Coordination of Care:    Patient's progress discussed with staff in treatment team meeting.  Medication changes reviewed with nursing staff.  Supportive therapy provided to patient.  Group attendance encouraged.    Papito An MD 02/20/25

## 2025-02-20 NOTE — PROGRESS NOTES
No change in patient behaviors, restless, argumentative, yelling in room and hallways, going into other patient's rooms. Zyprexa 5 mg given for moderate agitation PO at this time.   02/20/25 0051   Broset Violence Checklist   Assessment type Shift   Irritability 1   Confusion 1   Boisterousness 1   Threatening physical violence 0   Verbal threats 0   Violence 0   Broset score 3

## 2025-02-20 NOTE — NURSING NOTE
Patient called 911 on patient phones and hung up. 911 called patient phones back to make sure everything is alright. Assured 911 we are an Older Adult Behavioral Health Unit and all is well. Continued care and safety rounds in progress.

## 2025-02-20 NOTE — PLAN OF CARE
Problem: Alteration in Thoughts and Perception  Goal: Treatment Goal: Gain control of psychotic behaviors/thinking, reduce/eliminate presenting symptoms and demonstrate improved reality functioning upon discharge  Outcome: Progressing  Goal: Refrain from acting on delusional thinking/internal stimuli  Description: Interventions:  - Monitor patient closely, per order   - Utilize least restrictive measures   - Set reasonable limits, give positive feedback for acceptable   - Administer medications as ordered and monitor of potential side effects  Outcome: Progressing  Goal: Agree to be compliant with medication regime, as prescribed and report medication side effects  Description: Interventions:  - Offer appropriate PRN medication and supervise ingestion; conduct AIMS, as needed   Outcome: Progressing  Goal: Attend and participate in unit activities, including therapeutic, recreational, and educational groups  Description: Interventions:  -Encourage Visitation and family involvement in care  Outcome: Progressing  Goal: Recognize dysfunctional thoughts, communicate reality-based thoughts at the time of discharge  Description: Interventions:  - Provide medication and psycho-education to assist patient in compliance and developing insight into his/her illness   Outcome: Progressing     Problem: Depression  Goal: Treatment Goal: Demonstrate behavioral control of depressive symptoms, verbalize feelings of improved mood/affect, and adopt new coping skills prior to discharge  Outcome: Progressing  Goal: Verbalize thoughts and feelings  Description: Interventions:  - Assess and re-assess patient's level of risk   - Engage patient in 1:1 interactions, daily, for a minimum of 15 minutes   - Encourage patient to express feelings, fears, frustrations, hopes   Outcome: Progressing  Goal: Refrain from harming self  Description: Interventions:  - Monitor patient closely, per order   - Supervise medication ingestion, monitor effects  and side effects   Outcome: Progressing  Goal: Refrain from isolation  Description: Interventions:  - Develop a trusting relationship   - Encourage socialization   Outcome: Progressing  Goal: Refrain from self-neglect  Outcome: Progressing     Problem: Anxiety  Goal: Anxiety is at manageable level  Description: Interventions:  - Assess and monitor patient's anxiety level.   - Monitor for signs and symptoms (heart palpitations, chest pain, shortness of breath, headaches, nausea, feeling jumpy, restlessness, irritable, apprehensive).   - Collaborate with interdisciplinary team and initiate plan and interventions as ordered.  - Moro patient to unit/surroundings  - Explain treatment plan  - Encourage participation in care  - Encourage verbalization of concerns/fears  - Identify coping mechanisms  - Assist in developing anxiety-reducing skills  - Administer/offer alternative therapies  - Limit or eliminate stimulants  Outcome: Progressing     Problem: Alteration in Orientation  Goal: Treatment Goal: Demonstrate a reduction of confusion and improved orientation to person, place, time and/or situation upon discharge, according to optimum baseline/ability  Outcome: Progressing  Goal: Interact with staff daily  Description: Interventions:  - Assess and re-assess patient's level of orientation  - Engage patient in 1 on 1 interactions, daily, for a minimum of 15 minutes   - Establish rapport/trust with patient   Outcome: Progressing  Goal: Allow medical examinations, as recommended  Description: Interventions:  - Provide physical/neurological exams and/or referrals, per provider   Outcome: Progressing  Goal: Cooperate with recommended testing/procedures  Description: Interventions:  - Determine need for ancillary testing  - Observe for mental status changes  - Implement falls/precaution protocol   Outcome: Progressing  Goal: Complete daily ADLs, including personal hygiene independently, as able  Description:  Interventions:  - Observe, teach, and assist patient with ADLS  Outcome: Progressing     Problem: DISCHARGE PLANNING - CARE MANAGEMENT  Goal: Discharge to post-acute care or home with appropriate resources  Description: INTERVENTIONS:  - Conduct assessment to determine patient/family and health care team treatment goals, and need for post-acute services based on payer coverage, community resources, and patient preferences, and barriers to discharge  - Address psychosocial, clinical, and financial barriers to discharge as identified in assessment in conjunction with the patient/family and health care team  - Arrange appropriate level of post-acute services according to patient’s   needs and preference and payer coverage in collaboration with the physician and health care team  - Communicate with and update the patient/family, physician, and health care team regarding progress on the discharge plan  - Arrange appropriate transportation to post-acute venues  Outcome: Progressing

## 2025-02-20 NOTE — NURSING NOTE
Patient had disturbed sleep overnight. Awake most of shift, only appeared sleeping from 0300 to 0430. . Maintained on q 15 minute safety checks. No acute medical issues. Patient ran into room 200 three times overnight, touching the patient in bed 2. Redirected each time. Rosary bead given when she asked for them. Currently, resting in bed. Fluids at bedside to promote hydration.

## 2025-02-20 NOTE — PROGRESS NOTES
Patient awake, restless, going to exit doors, going into others rooms, hostile towards staff. Atarax 50 mg given at 2337 for moderate anxiety.    02/19/25 2327   Larson Anxiety Scale   Anxious Mood 3   Tension 3   Fears 3   Insomnia 3   Intellectual 3   Depressed Mood 2   Somatic Complaints: Muscular 1   Somatic Complaints: Sensory 1   Cardiovascular Symptoms 1   Respiratory Symptoms 1   Gastrointestinal Symptoms 0   Genitourinary Symptoms 0   Autonomic Symptoms 1   Behavior at Interview 3   Larson Anxiety Score 25

## 2025-02-20 NOTE — PROGRESS NOTES
Patient behaviors improved. Still needs occasional redirection. Less yelling out.   02/20/25 0151   Broset Violence Checklist   Assessment type Reassessment   Irritability 1   Confusion 0   Boisterousness 0   Threatening physical violence 0   Verbal threats 0   Violence 0   Broset score 1

## 2025-02-20 NOTE — NURSING NOTE
"Patient came to the day room to eat breakfast, and asked for just her oatmeal. When she sat to eat it, she said there was blood in it and refused to eat it. There was no blood in the the oatmeal. She was offered the rest of her breakfast and ate a bite of her muffin. Patient is restless, pacing the halls, trying to open doors, and is stating \"I can't stop crying.\" Patient did redirect to her room and laid down in her bed.   "

## 2025-02-20 NOTE — PROGRESS NOTES
No change in patient's behaviors. Remains restless, hard to redirect and explosive. Atarax 50 mg not effective.

## 2025-02-20 NOTE — PROGRESS NOTES
"Progress Note - Laine Tse 69 y.o. female MRN: [id number]    Unit/Bed#: -01 Encounter: [id number]        Subjective:   Patient seen and examined at bedside after reviewing the chart and discussing the case with the caring staff.      Patient examined at bedside.  Patient noted to have a cough.  When asking when this started she reports \"6 months ago.\"  Reports also having sore throat \"forever.\"  Denies chest pain or shortness of breath.     Sodium 130 on 2/16.   BMP waiting to be collected.     Physical Exam   Vitals: Blood pressure 160/83, pulse 78, temperature 97.8 °F (36.6 °C), temperature source Temporal, resp. rate 17, height 5' 4\" (1.626 m), weight 79.7 kg (175 lb 11.3 oz), SpO2 99%, not currently breastfeeding.,Body mass index is 30.16 kg/m².  Constitutional: Patient in no acute distress.  HEENT: PERR, EOMI, MMM.  Cardiovascular: Normal rate and regular rhythm.    Pulmonary/Chest: Effort normal and breath sounds normal.   Abdomen: Soft, + BS, NT.    Assessment/Plan:  Laine Tse is a(n) 69 y.o. female with schizoaffective disorder.      Cardiac with hx of HTN, HLD.  Continue atorvastatin 20 mg daily, ASA 81 mg daily.  Not on antihypertensive meds prehospital.  Start lisinopril 5 mg daily 2/15. Hydralazine 25 mg as needed for SBP >170 or DBP >110.  Cont to monitor vitals.    Allergic rhinitis.  Flonase nasal spray daily.  Claritin 10 mg daily as needed (Zyrtec nonform).   Bilateral hearing loss.  Supportive measures.   Osteoporosis.  Continue calcium/vitamin D supplement.  Resume Fosamax on discharge.  DJD/OA/HA/chronic low back pain.  Tylenol, Zanaflex, lidocaine patch as needed.  Hypothyroidism.  Patient is on levothyroxine 100 mcg daily.  TSH normal on 2/13/25.  Seizure disorder.  Continue phenobarbital 64.8 mg q12h.  Pt follows with [facility] neurology (last saw 2/11/25).  Chronic constipation.  Continue Senokot S nightly.  Miralax as needed.  Add lactulose and dulxolax suppository as " needed 2/18.   Hyponatremia.  Level 129 -> 130 on 2/16.  Repeat BMP.  Cough/sore throat.  Ongoing x months per pt.  Afebrile.  Lungs clear.  Robitussin DM as needed.      The patient was discussed with Dr. Wise and he is in agreement with the above note.

## 2025-02-20 NOTE — PROGRESS NOTES
02/20/25   Team Meeting   Meeting Type Daily Rounds   Team Members Present   Team Members Present Physician;Nurse;;Occupational Therapist   Physician Team Member Nataliya Negrete MD   Nursing Team Member Raegan LUZ   Social Work Team Member Chacho TARANGO   OT Team Member Yazmin CONTRERAS   Patient/Family Present   Patient Present No   Patient's Family Present No   201, consult with ACT psych provider, broken sleep, poor boundaries, argumentative, multiple PRN, loud, yelling, reading Bible, d/c next week when med adjust complete

## 2025-02-21 LAB — MRSA NOSE QL CULT: NORMAL

## 2025-02-21 PROCEDURE — 99232 SBSQ HOSP IP/OBS MODERATE 35: CPT | Performed by: PSYCHIATRY & NEUROLOGY

## 2025-02-21 RX ORDER — CLONAZEPAM 1 MG/1
1 TABLET ORAL
Status: DISCONTINUED | OUTPATIENT
Start: 2025-02-21 | End: 2025-02-24

## 2025-02-21 RX ORDER — RISPERIDONE 0.5 MG/1
0.5 TABLET, ORALLY DISINTEGRATING ORAL 2 TIMES DAILY
Status: DISCONTINUED | OUTPATIENT
Start: 2025-02-22 | End: 2025-02-21

## 2025-02-21 RX ORDER — RISPERIDONE 1 MG/1
1 TABLET, ORALLY DISINTEGRATING ORAL 2 TIMES DAILY
Status: DISCONTINUED | OUTPATIENT
Start: 2025-02-22 | End: 2025-02-25

## 2025-02-21 RX ORDER — GABAPENTIN 100 MG/1
100 CAPSULE ORAL 3 TIMES DAILY
Status: DISCONTINUED | OUTPATIENT
Start: 2025-02-21 | End: 2025-02-25

## 2025-02-21 RX ADMIN — ATORVASTATIN CALCIUM 20 MG: 20 TABLET, FILM COATED ORAL at 17:54

## 2025-02-21 RX ADMIN — RISPERIDONE 1 MG: 1 TABLET, FILM COATED ORAL at 12:32

## 2025-02-21 RX ADMIN — TRIHEXYPHENIDYL HYDROCHLORIDE 2 MG: 2 TABLET ORAL at 08:33

## 2025-02-21 RX ADMIN — PHENOBARBITAL 64.8 MG: 32.4 TABLET ORAL at 21:14

## 2025-02-21 RX ADMIN — CHLORHEXIDINE GLUCONATE 15 ML: 1.2 SOLUTION ORAL at 08:30

## 2025-02-21 RX ADMIN — Medication 3 MG: at 21:14

## 2025-02-21 RX ADMIN — TRIHEXYPHENIDYL HYDROCHLORIDE 2 MG: 2 TABLET ORAL at 21:14

## 2025-02-21 RX ADMIN — ASPIRIN 81 MG CHEWABLE TABLET 81 MG: 81 TABLET CHEWABLE at 08:28

## 2025-02-21 RX ADMIN — FLUTICASONE PROPIONATE 2 SPRAY: 50 SPRAY, METERED NASAL at 08:32

## 2025-02-21 RX ADMIN — LEVOTHYROXINE SODIUM 100 MCG: 100 TABLET ORAL at 06:19

## 2025-02-21 RX ADMIN — SENNOSIDES AND DOCUSATE SODIUM 1 TABLET: 50; 8.6 TABLET ORAL at 21:14

## 2025-02-21 RX ADMIN — CHLORHEXIDINE GLUCONATE 15 ML: 1.2 SOLUTION ORAL at 21:15

## 2025-02-21 RX ADMIN — Medication 1 TABLET: at 08:28

## 2025-02-21 RX ADMIN — LISINOPRIL 5 MG: 5 TABLET ORAL at 08:28

## 2025-02-21 RX ADMIN — ACETAMINOPHEN 650 MG: 325 TABLET ORAL at 11:00

## 2025-02-21 RX ADMIN — GABAPENTIN 100 MG: 100 CAPSULE ORAL at 17:54

## 2025-02-21 RX ADMIN — HYDROXYZINE HYDROCHLORIDE 50 MG: 50 TABLET, FILM COATED ORAL at 12:08

## 2025-02-21 RX ADMIN — GABAPENTIN 300 MG: 300 CAPSULE ORAL at 08:28

## 2025-02-21 RX ADMIN — OLANZAPINE 10 MG: 10 TABLET, ORALLY DISINTEGRATING ORAL at 21:14

## 2025-02-21 RX ADMIN — PHENOBARBITAL 64.8 MG: 32.4 TABLET ORAL at 08:29

## 2025-02-21 RX ADMIN — CLONAZEPAM 1 MG: 1 TABLET ORAL at 21:14

## 2025-02-21 RX ADMIN — LORAZEPAM 0.5 MG: 0.5 TABLET ORAL at 08:29

## 2025-02-21 RX ADMIN — GABAPENTIN 100 MG: 100 CAPSULE ORAL at 21:14

## 2025-02-21 RX ADMIN — OLANZAPINE 5 MG: 5 TABLET, ORALLY DISINTEGRATING ORAL at 08:28

## 2025-02-21 NOTE — NURSING NOTE
Patient is visible in milieu, pacing in hallway, medication compliant, needs redirection and is selectively cooperative. Patient had an unsuccessful elopement today to far stairway, but staff was able to bring patient back up to second floor from 1st floor landing. Patient is loud/labile and disorganized. Patient endorses moderate anxiety/depression. Denies SI/HI and hallucinations. Continued care and safety rounds in progress.

## 2025-02-21 NOTE — PROGRESS NOTES
Progress Note - Behavioral Health   Name: Laine Tse 69 y.o. female I MRN: [id number]   Unit/Bed#: [facility] 209-01 I Date of Admission: 2/14/2025   Date of Service: 2/21/2025 I Hospital Day: 7         Assessment & Plan  Seizure disorder (HCC)    Schizoaffective disorder (HCC)    Insomnia    Hypothyroidism    Hyponatremia  130 on 2/16/25    Recommended Treatment:     Planned medication and treatment changes:    All current active medications have been reviewed  Encourage group therapy, milieu therapy and occupational therapy  Behavioral Health checks for safety monitoring  Zydis 10 mg po hs  Decrease Neurontin to 100 mg po tid due to ongoing low Na.   Increase Klonopin 1 mg po hs.   D/C Ativan 0.5 mg po daily.  Risperidal M-tab 1 mg po bid.  Monitor behavior and fall risk.    Current medications:  Current Facility-Administered Medications   Medication Dose Route Frequency Provider Last Rate    acetaminophen  650 mg Oral Q4H PRN Mima Wells MD      acetaminophen  650 mg Oral Q4H PRN Mima Wells MD      acetaminophen  975 mg Oral Q6H PRN Mima Wells MD      aluminum-magnesium hydroxide-simethicone  30 mL Oral Q4H PRN Mima Wells MD      aspirin  81 mg Oral Daily Marion L Dagnall, PA-C      atorvastatin  20 mg Oral Daily With Dinner Marion L Dagnall, PA-C      bisacodyl  10 mg Rectal Daily PRN Marion L Dagnall, PA-C      calcium carbonate-vitamin D  1 tablet Oral Daily With Breakfast Marion L Dagnall, PA-C      chlorhexidine  15 mL Swish & Spit Q12H JANNIE Marion L Dagnall, PA-C      clonazePAM  0.5 mg Oral HS Papito An MD      dextromethorphan-guaiFENesin  10 mL Oral Q4H PRN Joel Wise MD      fluticasone  2 spray Nasal Daily Marion L Dagnall, PA-C      gabapentin  300 mg Oral TID Papito An MD      hydrALAZINE  25 mg Oral Q8H PRN Marion L Dagnall, PA-C      hydrOXYzine HCL  25 mg Oral Q6H PRN Max 4/day Mima Wells MD      hydrOXYzine HCL  50 mg Oral Q6H PRN Max 4/day Mima GONZÁLES  MD Priscilla      lactulose  20 g Oral BID PRN Marion L Dagnall, PA-C      levothyroxine  100 mcg Oral Early Morning Marion L Dagnall, PA-C      lidocaine  1 patch Topical Daily PRN Marion L Dagnall, PA-C      lisinopril  5 mg Oral Daily Rosenda Mattjoce Zelaya, PA-C      loratadine  10 mg Oral Daily PRN Marion L Dagnall, PA-C      LORazepam  0.5 mg Oral Daily Papito An MD      melatonin  3 mg Oral HS Mima Wells MD      nicotine polacrilex  4 mg Oral Q2H PRN Mima Wells MD      OLANZapine  10 mg Oral HS Rufina Carrasquillo, BRODY      OLANZapine  5 mg Oral Daily Papito An MD      PHENobarbital  64.8 mg Oral Q12H Marion L Dagnall, PA-C      polyethylene glycol  17 g Oral Daily PRN Marion L Dagnall, PA-C      propranolol  5 mg Oral Q8H PRN Mima Wells MD      risperiDONE  0.25 mg Oral Q4H PRN Max 6/day Mima Wells MD      risperiDONE  0.5 mg Oral Q4H PRN Max 3/day Mima Wells MD      risperiDONE  1 mg Oral Q2H PRN Max 3/day Mima Wells MD      senna-docusate sodium  1 tablet Oral HS Marion L Dagnall, PA-C      sodium chloride  2 spray Each Nare Q1H PRN Marion L Dagnall, PA-C      tiZANidine  2 mg Oral Q6H PRN Marion L Dagnall, PA-C      traZODone  50 mg Oral Q6H PRN Max 3/day Mima Wells MD      trihexyphenidyl  2 mg Oral BID Papito An MD       Facility-Administered Medications Ordered in Other Encounters   Medication Dose Route Frequency Provider Last Rate    lactated ringers   Intravenous Continuous PRN Celi Preston CRNA         Risks / Benefits of Treatment:    Risks, benefits, and possible side effects of medications explained to patient. Patient has limited understanding of risks and benefits of treatment at this time, but agrees to take medications as prescribed.    Subjective:    Behavior over the last 24 hours: unchanged.     Laine was seen for continuing care. Patient remains irritable, loud at times and going into selective peer room  periodically. She continues circumstantial, pacing and kneeling down in the unit with increased Anabaptist preoccupation. She requires prn meds periodically along with frequent redirection. Patient has been compliant with medication and denies any current side effects. No acute focal neurological deficit or change in mental status noted during examination      Sleep: sleep on and off  Appetite: decreased  Medication side effects: denies   ROS: no complaints, all other systems are negative    Mental Status Evaluation:    Appearance:  age appropriate   Behavior:  bizarre, restless   Speech:  increased latency of response, loud at times   Mood:  anxious   Affect:  labile   Thought Process:  circumstantial, tangential   Associations: circumstantial associations, perseverative   Thought Content:  Anabaptist preoccupation   Perceptual Disturbances: denies auditory hallucinations when asked, appears preoccupied   Risk Potential: Suicidal ideation - None at present  Homicidal ideation - None at present  Potential for aggression - Not at present   Sensorium:  unable to assess   Memory:  recent and remote memory: unable to assess due to lack of cooperation   Consciousness:  alert   Attention/Concentration: decreased concentration and decreased attention span   Insight:  poor   Judgment: poor   Gait/Station: unstable gait   Motor Activity: abnormal movement noted: dyskinetic oral movements present     Vital signs in last 24 hours:    Temp:  [97.5 °F (36.4 °C)-98.3 °F (36.8 °C)] 97.5 °F (36.4 °C)  HR:  [78-83] 78  BP: (127-159)/(77-82) 159/77  Resp:  [16-18] 16  SpO2:  [97 %-99 %] 99 %  O2 Device: None (Room air)         Laboratory results: I have personally reviewed all pertinent laboratory/tests results    Most Recent Labs:   Lab Results   Component Value Date    WBC 5.69 02/16/2025    RBC 4.36 02/16/2025    HGB 14.2 02/16/2025    HCT 41.8 02/16/2025     02/16/2025    RDW 12.2 02/16/2025    NEUTROABS 3.37 02/16/2025     SODIUM 130 (L) 02/16/2025    K 4.5 02/16/2025    CL 98 02/16/2025    CO2 23 02/16/2025    BUN 13 02/16/2025    CREATININE 0.66 02/16/2025    GLUC 136 02/16/2025    CALCIUM 9.2 02/16/2025    AST 19 02/16/2025    ALT 20 02/16/2025    ALKPHOS 47 02/16/2025    TP 7.3 02/16/2025    ALB 4.3 02/16/2025    TBILI 0.34 02/16/2025    CHOLESTEROL 198 02/16/2025    HDL 77 02/16/2025    TRIG 122 02/16/2025    LDLCALC 97 02/16/2025    NONHDLC 121 02/16/2025    VALPROICTOT 71 11/29/2019    AMMONIA 15 12/19/2017    [id number] 0.920 02/13/2025    FREET4 0.94 08/14/2024    SYPHILISAB Non-reactive 04/05/2024    HGBA1C 5.5 04/26/2024     04/26/2024       Counseling / Coordination of Care:    Patient's progress discussed with staff in treatment team meeting.  Medication changes reviewed with nursing staff.  Supportive therapy provided to patient.  Group attendance encouraged.    Papito An MD 02/21/25

## 2025-02-21 NOTE — NURSING NOTE
Sleep observed as during shift, no respiratory distress. Awake twice to check time, then she returned promptly bed. Continues on q 15 minute safety checks.  Clutter free environment continued to prevent falls. Fluids maintained at beside to promote hydration.  Will continue to monitor.

## 2025-02-21 NOTE — NURSING NOTE
Received patient at 1900. Minimally social with peers and out in the community. Rated anxiety high and depression as mild. Denied suicidal and/or homicidal ideations.Upset over calling 911. Ask staff multiple times to call 911 and apologize for calling earlier. Became tearful and explosive after being redirected. Mild improvement in mood lability this evening. Compliant with medications and snack.  traZODone (DESYREL) tablet 50 mg given at 2202. Less exit seeking this evening.  No aspiration risks noted.  Fluids at bedside to promote hydration.  Maintained on q 15 minute checks.  Medication education given.  Care Plan reviewed and amended. Will continue to monitor.

## 2025-02-21 NOTE — NUTRITION
02/20/25 2012   Biochemical Data,Medical Tests, and Procedures   Biochemical Data/Medical Tests/Procedures Lab values reviewed;Meds reviewed   Labs (Comment) 2/16 Na:130, lipids WNL, Vit B12:966, CBC WNL   Meds (Comment) ASA, lipitor, klonopin, CaCO3, Vit D, levothyroxine, melatonin, desyrel   Nutrition-Focused Physical Exam   Nutrition-Focused Physical Exam Findings RN skin assessment reviewed;No skin issues documented   Nutrition-Focused Physical Exam Findings Confused, oriented to person/place. Emotional, argumentative.   Medical-Related Concerns Anxiety     Benign essential hypertension  resolved; 06/15/16   Depression     Depression with anxiety     Fracture of fifth metatarsal bone of right foot with delayed healing  last assessed 04/12/16; fracture of metatarsal bone, right, closed, initial encounter   Hyperlipidemia  last assessed 11/28/17   Hypothyroidism  last assessed 11/28/2017   Insomnia     Obesity     Schizoaffective disorder (HCC)  last assessed 05/18/2017   Seizure disorder (HCC)  last assessed 03/28/17   Seizures (HCC)   Adequacy of Intake   Nutrition Modality PO   Feeding Route   PO Independent   Current PO Intake   Current Diet Order Regular diet thin liquids   Current Meal Intake 0-25%;25-50%;50-75%;%   Estimated calorie intake compared to estimated need Meal completions vary, mostly 0%; nutrient needs not always met.   PES Statement   Problem Intake   Energy Balance (1) Predicted suboptimal energy intake NI-1.4   Related to Other (Comment);Decreased appetite  (psychiatric illness)   As evidenced by: Intake < estimated needs;Per patient/family interview   Recommendations/Interventions   Malnutrition/BMI Present No  (does not meet criteria; will monitor)   Summary Length of stay. Regular diet thin liquids. Meal completions vary, frequently 0%. Patient walking away from writer, minimal participation. Reports her appetite is terrible. Per notes patient is from a group home. 2/14/#;  1/22/#, 7#(3.8%) loss; 9/10/#, 15#(7.8%) loss; 2/26/#. No significant weight changes. Confused, oriented to person/place. Emotional, argumentative. Skin intact. Recommend add ensure plus high protein BID.   Interventions/Recommendations Continue current diet order;Supplement initiate   Intervention Comments add ensure plus high protein BID   Education Assessment   Education Patient/caregiver not appropriate for education at this time   Patient Nutrition Goals   Goal Meet PO needs;Avoid weight loss   Goal Status Initiated   Timeframe to complete goal by next f/u   Nutrition Complexity Risk   Nutrition complexity level High risk   Follow up date 02/27/25

## 2025-02-21 NOTE — CMS CERTIFICATION NOTE
Recertification: Based upon physical, mental and social evaluations, I certify that inpatient psychiatric services continue to be medically necessary for this patient for a duration of 30 midnights for the treatment of Schizoaffective disorder (HCC) Available alternative community resources still do not meet the patient's mental health care needs. I further attest that an established written individualized plan of care has been updated and is outlined in the patient's medical records.

## 2025-02-21 NOTE — PROGRESS NOTES
02/21/25 0945   Team Meeting   Meeting Type Daily Rounds   Team Members Present   Team Members Present Physician;Nurse;;Occupational Therapist   Physician Team Member Dr. Nataliya MD; CHRISTOPHER Clemons; MD Caden   Nursing Team Member NATTY Carlin   Social Work Team Member SUKHDEEP Flor, DOROTEO Waters; SUKHDEEP Mina   OT Team Member BEN Arce   Patient/Family Present   Patient Present No   Patient's Family Present No   Yelling less, redirectable, delusional, disorganized speech, loud, called 911, unsteady gait, med adjusted.

## 2025-02-21 NOTE — NURSING NOTE
Patient had PRN Risperdal 1 mg @1232 for a Broset of 4 at 1232. Continued care and safety rounds in progress. Medication was somewhat effective.

## 2025-02-21 NOTE — PLAN OF CARE
Problem: Alteration in Thoughts and Perception  Goal: Treatment Goal: Gain control of psychotic behaviors/thinking, reduce/eliminate presenting symptoms and demonstrate improved reality functioning upon discharge  Outcome: Progressing  Goal: Refrain from acting on delusional thinking/internal stimuli  Description: Interventions:  - Monitor patient closely, per order   - Utilize least restrictive measures   - Set reasonable limits, give positive feedback for acceptable   - Administer medications as ordered and monitor of potential side effects  Outcome: Progressing  Goal: Agree to be compliant with medication regime, as prescribed and report medication side effects  Description: Interventions:  - Offer appropriate PRN medication and supervise ingestion; conduct AIMS, as needed   Outcome: Progressing  Goal: Attend and participate in unit activities, including therapeutic, recreational, and educational groups  Description: Interventions:  -Encourage Visitation and family involvement in care  Outcome: Progressing  Goal: Recognize dysfunctional thoughts, communicate reality-based thoughts at the time of discharge  Description: Interventions:  - Provide medication and psycho-education to assist patient in compliance and developing insight into his/her illness   Outcome: Progressing     Problem: Depression  Goal: Treatment Goal: Demonstrate behavioral control of depressive symptoms, verbalize feelings of improved mood/affect, and adopt new coping skills prior to discharge  Outcome: Progressing  Goal: Verbalize thoughts and feelings  Description: Interventions:  - Assess and re-assess patient's level of risk   - Engage patient in 1:1 interactions, daily, for a minimum of 15 minutes   - Encourage patient to express feelings, fears, frustrations, hopes   Outcome: Progressing  Goal: Refrain from harming self  Description: Interventions:  - Monitor patient closely, per order   - Supervise medication ingestion, monitor effects  and side effects   Outcome: Progressing  Goal: Refrain from isolation  Description: Interventions:  - Develop a trusting relationship   - Encourage socialization   Outcome: Progressing  Goal: Refrain from self-neglect  Outcome: Progressing     Problem: Anxiety  Goal: Anxiety is at manageable level  Description: Interventions:  - Assess and monitor patient's anxiety level.   - Monitor for signs and symptoms (heart palpitations, chest pain, shortness of breath, headaches, nausea, feeling jumpy, restlessness, irritable, apprehensive).   - Collaborate with interdisciplinary team and initiate plan and interventions as ordered.  - [place] patient to unit/surroundings  - Explain treatment plan  - Encourage participation in care  - Encourage verbalization of concerns/fears  - Identify coping mechanisms  - Assist in developing anxiety-reducing skills  - Administer/offer alternative therapies  - Limit or eliminate stimulants  Outcome: Progressing     Problem: Alteration in Orientation  Goal: Treatment Goal: Demonstrate a reduction of confusion and improved orientation to person, place, time and/or situation upon discharge, according to optimum baseline/ability  Outcome: Progressing  Goal: Interact with staff daily  Description: Interventions:  - Assess and re-assess patient's level of orientation  - Engage patient in 1 on 1 interactions, daily, for a minimum of 15 minutes   - Establish rapport/trust with patient   Outcome: Progressing  Goal: Allow medical examinations, as recommended  Description: Interventions:  - Provide physical/neurological exams and/or referrals, per provider   Outcome: Progressing  Goal: Cooperate with recommended testing/procedures  Description: Interventions:  - Determine need for ancillary testing  - Observe for mental status changes  - Implement falls/precaution protocol   Outcome: Progressing  Goal: Complete daily ADLs, including personal hygiene independently, as able  Description:  Interventions:  - Observe, teach, and assist patient with ADLS  Outcome: Progressing     Problem: DISCHARGE PLANNING - CARE MANAGEMENT  Goal: Discharge to post-acute care or home with appropriate resources  Description: INTERVENTIONS:  - Conduct assessment to determine patient/family and health care team treatment goals, and need for post-acute services based on payer coverage, community resources, and patient preferences, and barriers to discharge  - Address psychosocial, clinical, and financial barriers to discharge as identified in assessment in conjunction with the patient/family and health care team  - Arrange appropriate level of post-acute services according to patient’s   needs and preference and payer coverage in collaboration with the physician and health care team  - Communicate with and update the patient/family, physician, and health care team regarding progress on the discharge plan  - Arrange appropriate transportation to post-acute venues  Outcome: Progressing     Problem: Nutrition/Hydration-ADULT  Goal: Nutrient/Hydration intake appropriate for improving, restoring or maintaining nutritional needs  Description: Monitor and assess patient's nutrition/hydration status for malnutrition. Collaborate with interdisciplinary team and initiate plan and interventions as ordered.  Monitor patient's weight and dietary intake as ordered or per policy. Utilize nutrition screening tool and intervene as necessary. Determine patient's food preferences and provide high-protein, high-caloric foods as appropriate.     INTERVENTIONS:  - Monitor oral intake, urinary output, labs, and treatment plans  - Assess nutrition and hydration status and recommend course of action  - Evaluate amount of meals eaten  - Assist patient with eating if necessary   - Allow adequate time for meals  - Recommend/ encourage appropriate diets, oral nutritional supplements, and vitamin/mineral supplements  - Order, calculate, and assess  calorie counts as needed  - Recommend, monitor, and adjust tube feedings and TPN/PPN based on assessed needs  - Assess need for intravenous fluids  - Provide specific nutrition/hydration education as appropriate  - Include patient/family/caregiver in decisions related to nutrition  Outcome: Progressing

## 2025-02-21 NOTE — PROGRESS NOTES
"Progress Note - Laine Tse 69 y.o. female MRN: [id number]    Unit/Bed#: -01 Encounter: [id number]        Subjective:   Patient seen and examined at bedside after reviewing the chart and discussing the case with the caring staff.      Patient examined at bedside.  Patient denies any acute symptoms.     Sodium 130 on 2/16.     Repeat BMP waiting to be collected.     Physical Exam   Vitals: Blood pressure 159/77, pulse 78, temperature 97.5 °F (36.4 °C), temperature source Temporal, resp. rate 16, height 5' 4\" (1.626 m), weight 79.7 kg (175 lb 11.3 oz), SpO2 99%, not currently breastfeeding.,Body mass index is 30.16 kg/m².  Constitutional: Patient in no acute distress.  HEENT: PERR, EOMI, MMM.  Cardiovascular: Normal rate and regular rhythm.    Pulmonary/Chest: Effort normal and breath sounds normal.   Abdomen: Soft, + BS, NT.    Assessment/Plan:  Laine Tse is a(n) 69 y.o. female with schizoaffective disorder.      Cardiac with hx of HTN, HLD.  Continue atorvastatin 20 mg daily, ASA 81 mg daily.  Not on antihypertensive meds prehospital.  Start lisinopril 5 mg daily 2/15. Hydralazine 25 mg as needed for SBP >170 or DBP >110.  Cont to monitor vitals.    Allergic rhinitis.  Flonase nasal spray daily.  Claritin 10 mg daily as needed (Zyrtec nonform).   Bilateral hearing loss.  Supportive measures.   Osteoporosis.  Continue calcium/vitamin D supplement.  Resume Fosamax on discharge.  DJD/OA/HA/chronic low back pain.  Tylenol, Zanaflex, lidocaine patch as needed.  Hypothyroidism.  Patient is on levothyroxine 100 mcg daily.  TSH normal on 2/13/25.  Seizure disorder.  Continue phenobarbital 64.8 mg q12h.  Pt follows with [facility] neurology (last saw 2/11/25).  Chronic constipation.  Continue Senokot S nightly.  Miralax as needed.  Add lactulose and dulxolax suppository as needed 2/18.   Cough/sore throat.  Ongoing x months per pt.  Afebrile.  Lungs clear.  Robitussin DM as needed.    Hyponatremia.  Level " 129 -> 130 on 2/16.  Repeat BMP.    The patient was discussed with Dr. Wise and he is in agreement with the above note.

## 2025-02-22 LAB
ANION GAP SERPL CALCULATED.3IONS-SCNC: 6 MMOL/L (ref 4–13)
BUN SERPL-MCNC: 22 MG/DL (ref 5–25)
CALCIUM SERPL-MCNC: 9.3 MG/DL (ref 8.4–10.2)
CHLORIDE SERPL-SCNC: 100 MMOL/L (ref 96–108)
CO2 SERPL-SCNC: 29 MMOL/L (ref 21–32)
CREAT SERPL-MCNC: 0.75 MG/DL (ref 0.6–1.3)
GFR SERPL CREATININE-BSD FRML MDRD: 80 ML/MIN/1.73SQ M
GLUCOSE P FAST SERPL-MCNC: 87 MG/DL (ref 65–99)
GLUCOSE SERPL-MCNC: 87 MG/DL (ref 65–140)
POTASSIUM SERPL-SCNC: 4.6 MMOL/L (ref 3.5–5.3)
SODIUM SERPL-SCNC: 135 MMOL/L (ref 135–147)

## 2025-02-22 PROCEDURE — 99232 SBSQ HOSP IP/OBS MODERATE 35: CPT

## 2025-02-22 PROCEDURE — 80048 BASIC METABOLIC PNL TOTAL CA: CPT

## 2025-02-22 RX ORDER — TRAZODONE HYDROCHLORIDE 50 MG/1
50 TABLET ORAL
Status: DISCONTINUED | OUTPATIENT
Start: 2025-02-22 | End: 2025-02-23

## 2025-02-22 RX ADMIN — TRIHEXYPHENIDYL HYDROCHLORIDE 2 MG: 2 TABLET ORAL at 21:09

## 2025-02-22 RX ADMIN — FLUTICASONE PROPIONATE 2 SPRAY: 50 SPRAY, METERED NASAL at 08:39

## 2025-02-22 RX ADMIN — ATORVASTATIN CALCIUM 20 MG: 20 TABLET, FILM COATED ORAL at 16:21

## 2025-02-22 RX ADMIN — OLANZAPINE 10 MG: 10 TABLET, ORALLY DISINTEGRATING ORAL at 21:10

## 2025-02-22 RX ADMIN — TRAZODONE HYDROCHLORIDE 50 MG: 50 TABLET ORAL at 21:10

## 2025-02-22 RX ADMIN — TRIHEXYPHENIDYL HYDROCHLORIDE 2 MG: 2 TABLET ORAL at 08:35

## 2025-02-22 RX ADMIN — ACETAMINOPHEN 650 MG: 325 TABLET ORAL at 10:54

## 2025-02-22 RX ADMIN — ACETAMINOPHEN 975 MG: 325 TABLET ORAL at 16:21

## 2025-02-22 RX ADMIN — CHLORHEXIDINE GLUCONATE 15 ML: 1.2 SOLUTION ORAL at 08:35

## 2025-02-22 RX ADMIN — GABAPENTIN 100 MG: 100 CAPSULE ORAL at 08:35

## 2025-02-22 RX ADMIN — TIZANIDINE 2 MG: 4 TABLET ORAL at 16:21

## 2025-02-22 RX ADMIN — LISINOPRIL 5 MG: 5 TABLET ORAL at 08:35

## 2025-02-22 RX ADMIN — GABAPENTIN 100 MG: 100 CAPSULE ORAL at 21:10

## 2025-02-22 RX ADMIN — Medication 1 TABLET: at 08:35

## 2025-02-22 RX ADMIN — GABAPENTIN 100 MG: 100 CAPSULE ORAL at 16:21

## 2025-02-22 RX ADMIN — PHENOBARBITAL 64.8 MG: 32.4 TABLET ORAL at 21:10

## 2025-02-22 RX ADMIN — LEVOTHYROXINE SODIUM 100 MCG: 100 TABLET ORAL at 06:11

## 2025-02-22 RX ADMIN — ASPIRIN 81 MG CHEWABLE TABLET 81 MG: 81 TABLET CHEWABLE at 08:35

## 2025-02-22 RX ADMIN — PHENOBARBITAL 64.8 MG: 32.4 TABLET ORAL at 08:35

## 2025-02-22 RX ADMIN — RISPERIDONE 0.5 MG: 0.5 TABLET, FILM COATED ORAL at 13:17

## 2025-02-22 RX ADMIN — CHLORHEXIDINE GLUCONATE 15 ML: 1.2 SOLUTION ORAL at 21:10

## 2025-02-22 RX ADMIN — CLONAZEPAM 1 MG: 1 TABLET ORAL at 21:10

## 2025-02-22 RX ADMIN — RISPERIDONE 1 MG: 1 TABLET, ORALLY DISINTEGRATING ORAL at 08:35

## 2025-02-22 RX ADMIN — RISPERIDONE 1 MG: 1 TABLET, ORALLY DISINTEGRATING ORAL at 16:21

## 2025-02-22 RX ADMIN — Medication 3 MG: at 21:10

## 2025-02-22 RX ADMIN — SENNOSIDES AND DOCUSATE SODIUM 1 TABLET: 50; 8.6 TABLET ORAL at 21:09

## 2025-02-22 RX ADMIN — HYDROXYZINE HYDROCHLORIDE 50 MG: 50 TABLET, FILM COATED ORAL at 13:17

## 2025-02-22 NOTE — NURSING NOTE
B - Abdomen is soft and non-distended. Bowel sounds positive x4. Patient reports last BM was yesterday (2/21/25).  L - Respirations even and unlabored. Lungs clear to auscultation. Patient denies SOB or chest pain.  E - Trace edema observed in BLLE.  P - CRT <3 seconds. PPP.  S - Cracking observed on bilateral heels and a fissure is observed on left heel. It is covered with a band aid at this time.

## 2025-02-22 NOTE — TREATMENT TEAM
02/22/25 1621   Pain Assessment   Pain Assessment Tool 0-10   Pain Score 10 - Worst Possible Pain   Pain Location/Orientation Location: Head;Location: Generalized   Hospital Pain Intervention(s) Medication (See MAR)     975mg Tylenol and 2mg Zanalfex administered at 1621 for severe pain.

## 2025-02-22 NOTE — NURSING NOTE
Patient had difficulty falling asleep. Awake until 0430. Pacing disorganized. Q 15 minute safety rounds maintained.

## 2025-02-22 NOTE — PROGRESS NOTES
"Progress Note - Laine Tse 69 y.o. female MRN: [id number]    Unit/Bed#: -01 Encounter: [id number]        Subjective:   Patient seen and examined at bedside after reviewing the chart and discussing the case with the caring staff.      Patient examined at bedside.  Patient denies any acute symptoms.     Patient's BMP from 2/22/2025 showed sodium 135.    Physical Exam   Vitals: Blood pressure 134/71, pulse 82, temperature (!) 97.3 °F (36.3 °C), temperature source Temporal, resp. rate 20, height 5' 4\" (1.626 m), weight 79.7 kg (175 lb 11.3 oz), SpO2 96%, not currently breastfeeding.,Body mass index is 30.16 kg/m².  Constitutional: Patient in no acute distress.  HEENT: PERR, EOMI, MMM.  Cardiovascular: Normal rate and regular rhythm.    Pulmonary/Chest: Effort normal and breath sounds normal.   Abdomen: Soft, + BS, NT.    Assessment/Plan:  Laine Tse is a(n) 69 y.o. female with schizoaffective disorder.      Cardiac with hx of HTN, HLD.  Continue atorvastatin 20 mg daily, ASA 81 mg daily.  Not on antihypertensive meds prehospital.  Start lisinopril 5 mg daily 2/15. Hydralazine 25 mg as needed for SBP >170 or DBP >110.  Cont to monitor vitals.    Allergic rhinitis.  Flonase nasal spray daily.  Claritin 10 mg daily as needed (Zyrtec nonform).   Bilateral hearing loss.  Supportive measures.   Osteoporosis.  Continue calcium/vitamin D supplement.  Resume Fosamax on discharge.  DJD/OA/HA/chronic low back pain.  Tylenol, Zanaflex, lidocaine patch as needed.  Hypothyroidism.  Patient is on levothyroxine 100 mcg daily.  TSH normal on 2/13/25.  Seizure disorder.  Continue phenobarbital 64.8 mg q12h.  Pt follows with [facility] neurology (last saw 2/11/25).  Chronic constipation.  Continue Senokot S nightly.  Miralax as needed.  Add lactulose and dulxolax suppository as needed 2/18.   Cough/sore throat.  Ongoing x months per pt.  Afebrile.  Lungs clear.  Robitussin DM as needed.    Hyponatremia.  Level 129 -> 130 " -> 135 on 2/22/2025.

## 2025-02-22 NOTE — TREATMENT TEAM
02/22/25 1317   Larson Anxiety Scale   Anxious Mood 4   Tension 4   Fears 3   Insomnia 1   Intellectual 3   Depressed Mood 3   Somatic Complaints: Muscular 2   Somatic Complaints: Sensory 0   Cardiovascular Symptoms 0   Respiratory Symptoms 0   Gastrointestinal Symptoms 0   Genitourinary Symptoms 0   Autonomic Symptoms 1   Behavior at Interview 3   Larson Anxiety Score 24   Broset Violence Checklist   Assessment type Shift   Irritability 1   Confusion 1   Boisterousness 0   Threatening physical violence 0   Verbal threats 0   Violence 0   Broset score 2     50mg Atarax administered for moderate anxiety and 0.5mg Risperdal administered for moderate agitation at 1317. Please see Larson and Broset scores.

## 2025-02-22 NOTE — TREATMENT TEAM
02/22/25 1054   Pain Assessment   Pain Assessment Tool 0-10   Pain Score 5   Pain Location/Orientation Location: Head   Hospital Pain Intervention(s) Medication (See MAR)     975mg Tylenol administered at 1054 for moderate headache.   Please inform pt that his allergy panel is normal  Also - there is another note open re: pt's need to see psychiatry re: ongoing medication management  Please read that to him as well  Thanks!

## 2025-02-22 NOTE — TREATMENT TEAM
02/22/25 1154   Pain Assessment Post Intervention   Pain Assessment Tool Used: 0-10   Post Intervention Pain Score 5   Post Intervention Pain Location/Orientation Location: Head   Response to Interventions Patient reports no relief     Medication ineffective.

## 2025-02-22 NOTE — TREATMENT TEAM
02/22/25 0040   Pain Assessment Post Intervention   Pain Assessment Tool Used: 0-10   Post Intervention Pain Score 5   Post Intervention Pain Location/Orientation Location: Head;Location: Generalized   Response to Interventions Patient reports moderate relief     Medication effective.

## 2025-02-22 NOTE — PROGRESS NOTES
Progress Note - Behavioral Health   Name: Laine Tse 69 y.o. female I MRN: [id number]  Unit/Bed#: [facility] 209-01 I Date of Admission: 2/14/2025   Date of Service: 2/22/2025 I Hospital Day: 8    Assessment & Plan  Seizure disorder (HCC)    Schizoaffective disorder (HCC)    Insomnia    Hypothyroidism    Hyponatremia  130 on 2/16/25    Current medications:  Current Facility-Administered Medications   Medication Dose Route Frequency Provider Last Rate    acetaminophen  650 mg Oral Q4H PRN Mima Wells MD      acetaminophen  650 mg Oral Q4H PRN Mima Wells MD      acetaminophen  975 mg Oral Q6H PRN Mima Wells MD      aluminum-magnesium hydroxide-simethicone  30 mL Oral Q4H PRN Mima Wells MD      aspirin  81 mg Oral Daily Marion L Dagnall, PA-C      atorvastatin  20 mg Oral Daily With Dinner Marion L Dagnall, PA-C      bisacodyl  10 mg Rectal Daily PRN Marion L Dagnall, PA-C      calcium carbonate-vitamin D  1 tablet Oral Daily With Breakfast Marion L Dagnall, PA-C      chlorhexidine  15 mL Swish & Spit Q12H JANNIE Marion L Dagnall, PA-C      clonazePAM  1 mg Oral HS Papito An MD      dextromethorphan-guaiFENesin  10 mL Oral Q4H PRN Joel Wise MD      fluticasone  2 spray Nasal Daily Marion L Dagnall, PA-C      gabapentin  100 mg Oral TID Papito An MD      hydrALAZINE  25 mg Oral Q8H PRN Marion L Dagnall, PA-C      hydrOXYzine HCL  25 mg Oral Q6H PRN Max 4/day Mima Wells MD      hydrOXYzine HCL  50 mg Oral Q6H PRN Max 4/day Mima Wells MD      lactulose  20 g Oral BID PRN Marion L Dagnall, PA-C      levothyroxine  100 mcg Oral Early Morning Marion L Dagnall, PA-C      lidocaine  1 patch Topical Daily PRN Marion L Dagnall, PA-C      lisinopril  5 mg Oral Daily Rosenda Zelaya PA-C      loratadine  10 mg Oral Daily PRN Marion L Dagnall, PA-C      melatonin  3 mg Oral HS Mima Wells MD      nicotine polacrilex  4 mg Oral Q2H PRN Mima Wells MD       OLANZapine  10 mg Oral HS Rufina SongBRODY phelan      PHENobarbital  64.8 mg Oral Q12H Marion L Dagkathleenl, PA-C      polyethylene glycol  17 g Oral Daily PRN Marion Hatchl, PA-C      propranolol  5 mg Oral Q8H PRN Mima Wells MD      risperiDONE  1 mg Oral BID Papito An MD      risperiDONE  0.25 mg Oral Q4H PRN Max 6/day Mima Wells MD      risperiDONE  0.5 mg Oral Q4H PRN Max 3/day Mima Wells MD      risperiDONE  1 mg Oral Q2H PRN Max 3/day Mima Wells MD      senna-docusate sodium  1 tablet Oral HS Marion ADRIAN Javier PA-C      sodium chloride  2 spray Each Nare Q1H PRN BAILEY Maddox-C      tiZANidine  2 mg Oral Q6H PRN Marion Javier, PA-C      traZODone  50 mg Oral Q6H PRN Max 3/day iMma Wells MD      trihexyphenidyl  2 mg Oral BID Papito An MD       Facility-Administered Medications Ordered in Other Encounters   Medication Dose Route Frequency Provider Last Rate    lactated ringers   Intravenous Continuous PRN Celi Preston CRNA          Risks/Benefits of Treatment:     Risks, benefits, and possible side effects of medications explained to patient and patient verbalizes understanding and agreement for treatment.    Progress Toward Goals:  Unchanged    Treatment Planning:      - Encourage early mobility and having a structured day  - Provide frequent re-orientation, and cognitive stimulation  - Ensure assistive devices are in proper working order (eye-glasses, hearing aids)  - Encourage adequate hydration, nutrition and monitor bowel movements  - Maintain sleep-wake cycle: Uninterrupted sleep time; low-level lighting at night  - Fall precaution  - f/u SLIM recs regarding the medical problems   - Continue medication titration and treatment plan; adjust medication to optimize treatment response and as clinically indicated.   - Observation: N/A  - Legal Status: 201  - VS: as per unit protocol  - Encourage group attendance and milieu therapy  - Dispo: To be  determined  - Long Stay Certification : Not Applicable  - Estimated Discharge Day: 2/24/2025 10 days (3/4/2025)    Subjective       Laine was seen today for psychiatric follow-up.ptient is visible on the unit she appears to have an irritable edge, she is loud at times.  She is tangential during interview, with bizarre behaviors patient observed kneeling and praying to the [place].  She also believes she has venereal disease and needs to be treated.  Her mood is labile.  According to nursing staff reports she is compliant with her current medication regimen and redirectable. She denied any SI/HI/AVH, however patient appears internally preoccupied.      Sleep: insomnia  Appetite: normal  Medication side effects: No  ROS: review of systems as noted above in HPI/Subjective report, all other systems are negative    Objective :  Temp:  [97.3 °F (36.3 °C)-97.7 °F (36.5 °C)] 97.3 °F (36.3 °C)  HR:  [69-89] 82  BP: (134-156)/(71-76) 134/71  Resp:  [18-20] 20  SpO2:  [94 %-97 %] 96 %  O2 Device: None (Room air)    Temp:  [97.3 °F (36.3 °C)-97.7 °F (36.5 °C)] 97.3 °F (36.3 °C)  HR:  [69-89] 82  BP: (134-156)/(71-76) 134/71  Resp:  [18-20] 20  SpO2:  [94 %-97 %] 96 %  O2 Device: None (Room air)    Mental Status Evaluation:    Appearance:  age appropriate wearing eye glasses   Behavior:  bizarre, restless and fidgety   Speech:  Increased latency   Mood:  anxious, labile   Affect:  labile   Thought Process:  tangential   Thought Content:  Sikhism preoccupation   Perceptual Disturbances: Denied AVH, however appears internally preoccupied   Risk Potential: Suicidal Ideation -  None at present  Homicidal Ideation -  None at present  Potential for Aggression - No   Sensorium:  oriented to person and place   Memory:  recent and remote memory: unable to assess due to lack of cooperation   Consciousness:  alert and awake   Attention/Concentration: decreased attention span and decreased concentration   Insight:  poor   Judgment: poor    Gait/Station: unstable gait   Motor Activity: abnormal movement noted: dyskinetic oral movements present     Cognitive Assessment : N/A  Pain : denied  Pain Scale : 0      Lab Results: I have reviewed the following results:  Most Recent Labs:   Lab Results   Component Value Date    WBC 5.69 02/16/2025    RBC 4.36 02/16/2025    HGB 14.2 02/16/2025    HCT 41.8 02/16/2025     02/16/2025    RDW 12.2 02/16/2025    NEUTROABS 3.37 02/16/2025    SODIUM 135 02/22/2025    K 4.6 02/22/2025     02/22/2025    CO2 29 02/22/2025    BUN 22 02/22/2025    CREATININE 0.75 02/22/2025    GLUC 87 02/22/2025    CALCIUM 9.3 02/22/2025    AST 19 02/16/2025    ALT 20 02/16/2025    ALKPHOS 47 02/16/2025    TP 7.3 02/16/2025    ALB 4.3 02/16/2025    TBILI 0.34 02/16/2025    CHOLESTEROL 198 02/16/2025    HDL 77 02/16/2025    TRIG 122 02/16/2025    LDLCALC 97 02/16/2025    NONHDLC 121 02/16/2025    VALPROICTOT 71 11/29/2019    AMMONIA 15 12/19/2017    GHI4CLGUNHCZ 0.920 02/13/2025    FREET4 0.94 08/14/2024    SYPHILISAB Non-reactive 04/05/2024    HGBA1C 5.5 04/26/2024     04/26/2024       Administrative Statements     Counseling / Coordination of Care:   Patient's progress discussed with staff in treatment team meeting.  Medication changes reviewed with staff in treatment team meeting..    CHRISTOPHER Alexander 02/22/25

## 2025-02-22 NOTE — PLAN OF CARE
Problem: Alteration in Thoughts and Perception  Goal: Agree to be compliant with medication regime, as prescribed and report medication side effects  Description: Interventions:  - Offer appropriate PRN medication and supervise ingestion; conduct AIMS, as needed   Outcome: Progressing     Problem: Ineffective Coping  Goal: Participates in unit activities  Description: Interventions:  - Provide therapeutic environment   - Provide required programming   - Redirect inappropriate behaviors   Outcome: Progressing

## 2025-02-22 NOTE — NURSING NOTE
Patient's mood is labile. Her thoughts are disorganized. She is tearful this morning and endorses high anxiety and depression. She yells out occasionally but is redirectable. She takes medications without difficulty. Appetite is good. No complaints of pain.

## 2025-02-22 NOTE — NURSING NOTE
Upon reassessment, patient continues to report feeling anxious and agitated and is asking for Zyprexa. She does however seem to be less restless and remains in her room at this time and is yelling out less. Medication somewhat effective.

## 2025-02-23 PROCEDURE — 99232 SBSQ HOSP IP/OBS MODERATE 35: CPT

## 2025-02-23 RX ORDER — AMMONIUM LACTATE 12 G/100G
LOTION TOPICAL 2 TIMES DAILY
Status: DISCONTINUED | OUTPATIENT
Start: 2025-02-23 | End: 2025-03-10 | Stop reason: HOSPADM

## 2025-02-23 RX ORDER — OLANZAPINE 2.5 MG/1
2.5 TABLET, FILM COATED ORAL ONCE
Status: COMPLETED | OUTPATIENT
Start: 2025-02-23 | End: 2025-02-23

## 2025-02-23 RX ORDER — TRAZODONE HYDROCHLORIDE 150 MG/1
75 TABLET ORAL
Status: DISCONTINUED | OUTPATIENT
Start: 2025-02-23 | End: 2025-02-24

## 2025-02-23 RX ADMIN — GABAPENTIN 100 MG: 100 CAPSULE ORAL at 21:00

## 2025-02-23 RX ADMIN — LEVOTHYROXINE SODIUM 100 MCG: 100 TABLET ORAL at 06:28

## 2025-02-23 RX ADMIN — PHENOBARBITAL 64.8 MG: 32.4 TABLET ORAL at 08:44

## 2025-02-23 RX ADMIN — Medication 3 MG: at 21:00

## 2025-02-23 RX ADMIN — PHENOBARBITAL 64.8 MG: 32.4 TABLET ORAL at 21:00

## 2025-02-23 RX ADMIN — GUAIFENESIN AND DEXTROMETHORPHAN 10 ML: 100; 10 SYRUP ORAL at 21:08

## 2025-02-23 RX ADMIN — Medication 1 TABLET: at 08:44

## 2025-02-23 RX ADMIN — TRAZODONE HYDROCHLORIDE 75 MG: 150 TABLET ORAL at 21:00

## 2025-02-23 RX ADMIN — TRIHEXYPHENIDYL HYDROCHLORIDE 2 MG: 2 TABLET ORAL at 21:00

## 2025-02-23 RX ADMIN — CLONAZEPAM 1 MG: 1 TABLET ORAL at 21:00

## 2025-02-23 RX ADMIN — RISPERIDONE 1 MG: 1 TABLET, ORALLY DISINTEGRATING ORAL at 08:44

## 2025-02-23 RX ADMIN — ATORVASTATIN CALCIUM 20 MG: 20 TABLET, FILM COATED ORAL at 17:00

## 2025-02-23 RX ADMIN — RISPERIDONE 1 MG: 1 TABLET, ORALLY DISINTEGRATING ORAL at 17:00

## 2025-02-23 RX ADMIN — SENNOSIDES AND DOCUSATE SODIUM 1 TABLET: 50; 8.6 TABLET ORAL at 21:00

## 2025-02-23 RX ADMIN — GABAPENTIN 100 MG: 100 CAPSULE ORAL at 08:44

## 2025-02-23 RX ADMIN — OLANZAPINE 10 MG: 10 TABLET, ORALLY DISINTEGRATING ORAL at 21:00

## 2025-02-23 RX ADMIN — ASPIRIN 81 MG CHEWABLE TABLET 81 MG: 81 TABLET CHEWABLE at 08:44

## 2025-02-23 RX ADMIN — HYDROXYZINE HYDROCHLORIDE 50 MG: 50 TABLET, FILM COATED ORAL at 13:16

## 2025-02-23 RX ADMIN — FLUTICASONE PROPIONATE 2 SPRAY: 50 SPRAY, METERED NASAL at 08:44

## 2025-02-23 RX ADMIN — CHLORHEXIDINE GLUCONATE 15 ML: 1.2 SOLUTION ORAL at 08:49

## 2025-02-23 RX ADMIN — OLANZAPINE 2.5 MG: 2.5 TABLET, FILM COATED ORAL at 13:16

## 2025-02-23 RX ADMIN — CHLORHEXIDINE GLUCONATE 15 ML: 1.2 SOLUTION ORAL at 21:00

## 2025-02-23 RX ADMIN — GABAPENTIN 100 MG: 100 CAPSULE ORAL at 17:00

## 2025-02-23 RX ADMIN — LISINOPRIL 5 MG: 5 TABLET ORAL at 08:44

## 2025-02-23 RX ADMIN — TRIHEXYPHENIDYL HYDROCHLORIDE 2 MG: 2 TABLET ORAL at 08:44

## 2025-02-23 NOTE — NURSING NOTE
Patient wandering unit. Labile and disorganized. Compliant with medications. Wandering into other patients rooms. Will continue to monitor and access. Q 15 minute safety rounds maintained.

## 2025-02-23 NOTE — NURSING NOTE
While coffee was being served, patient threw a cup of hot coffee toward a staff member unprovoked. Diet changed to finger foods/no hot liquids for safety.

## 2025-02-23 NOTE — PROGRESS NOTES
"Progress Note - Laine Tse 69 y.o. female MRN: [id number]    Unit/Bed#: -01 Encounter: [id number]        Subjective:   Patient seen and examined at bedside after reviewing the chart and discussing the case with the caring staff.      Patient examined at bedside.  Patient is complaining of dry cracked skin in bilateral heel.    Physical Exam   Vitals: Blood pressure 144/86, pulse 79, temperature 98 °F (36.7 °C), temperature source Temporal, resp. rate 18, height 5' 4\" (1.626 m), weight 76.2 kg (167 lb 15.9 oz), SpO2 96%, not currently breastfeeding.,Body mass index is 28.84 kg/m².  Constitutional: Patient in no acute distress.  HEENT: PERR, EOMI, MMM.  Cardiovascular: Normal rate and regular rhythm.    Pulmonary/Chest: Effort normal and breath sounds normal.   Abdomen: Soft, + BS, NT.    Assessment/Plan:  Laine Tse is a(n) 69 y.o. female with schizoaffective disorder.      Cardiac with hx of HTN, HLD.  Continue atorvastatin 20 mg daily, ASA 81 mg daily.  Not on antihypertensive meds prehospital.  Start lisinopril 5 mg daily 2/15. Hydralazine 25 mg as needed for SBP >170 or DBP >110.  Cont to monitor vitals.    Allergic rhinitis.  Flonase nasal spray daily.  Claritin 10 mg daily as needed (Zyrtec nonform).   Bilateral hearing loss.  Supportive measures.   Osteoporosis.  Continue calcium/vitamin D supplement.  Resume Fosamax on discharge.  DJD/OA/HA/chronic low back pain.  Tylenol, Zanaflex, lidocaine patch as needed.  Hypothyroidism.  Patient is on levothyroxine 100 mcg daily.  TSH normal on 2/13/25.  Seizure disorder.  Continue phenobarbital 64.8 mg q12h.  [facility] neurology (last saw 2/11/25).  Chronic constipation.  Continue Senokot S nightly.  Miralax as needed.  Add lactulose and dulxolax suppository as needed 2/18.   Cough/sore throat.  Ongoing x months per pt.  Afebrile.  Lungs clear.  Robitussin DM as needed.    Hyponatremia.  Level 129 -> 130 -> 135 on 2/22/2025.   Dry " cracked skin bilateral heel.  I will put the patient on ammonium lactate twice daily along with Band-Aid or dressing 2/23/2025.  I will also consult podiatry for further evaluation and treatment.

## 2025-02-23 NOTE — PROGRESS NOTES
Progress Note - Behavioral Health   Name: Laine Tse 69 y.o. female I MRN: [id number]  Unit/Bed#: [facility]-01 I Date of Admission: 2/14/2025   Date of Service: 2/23/2025 I Hospital Day: 9    Assessment & Plan  Seizure disorder (HCC)  Phenobarbital 64.8 mg twice daily  Schizoaffective disorder (HCC)  Zyprexa Zydis 10 mg p.o. daily at bedtime  Klonopin 1 mg oral daily at bedtime  Neurontin 100 mg p.o. daily 3 times daily  Insomnia  Trazodone 75 mg po daily HS  Melatonin 3 mg p.o. daily at bedtime  Hypothyroidism    Hyponatremia  130 on 2/16/25    Current medications:  Current Facility-Administered Medications   Medication Dose Route Frequency Provider Last Rate    acetaminophen  650 mg Oral Q4H PRN Mima Wells MD      acetaminophen  650 mg Oral Q4H PRN Mima Wells MD      acetaminophen  975 mg Oral Q6H PRN Mima Wells MD      aluminum-magnesium hydroxide-simethicone  30 mL Oral Q4H PRN Mima Wells MD      aspirin  81 mg Oral Daily Marion L Dagnall, PA-C      atorvastatin  20 mg Oral Daily With Dinner Marion L Dagnall, PA-C      bisacodyl  10 mg Rectal Daily PRN Marion L Dagnall, PA-C      calcium carbonate-vitamin D  1 tablet Oral Daily With Breakfast Marion L Dagnall, PA-C      chlorhexidine  15 mL Swish & Spit Q12H JANNIE Marion L Dagnall, PA-C      clonazePAM  1 mg Oral HS Papito An MD      dextromethorphan-guaiFENesin  10 mL Oral Q4H PRN Joel Wise MD      fluticasone  2 spray Nasal Daily Marion L Dagnall, PA-C      gabapentin  100 mg Oral TID Papito An MD      hydrALAZINE  25 mg Oral Q8H PRN Marion L Dagnall, PA-C      hydrOXYzine HCL  25 mg Oral Q6H PRN Max 4/day Mima Wells MD      hydrOXYzine HCL  50 mg Oral Q6H PRN Max 4/day Mima Wells MD      lactulose  20 g Oral BID PRN Marion L Dagnall, PA-C      levothyroxine  100 mcg Oral Early Morning Marion L Dagnall, PA-C      lidocaine  1 patch Topical Daily PRN Marion Javier PA-C      lisinopril  5 mg  Oral Daily Rosenda Zelaya PA-C      loratadine  10 mg Oral Daily PRN Marion Javier PA-C      melatonin  3 mg Oral HS Mima Wells MD      nicotine polacrilex  4 mg Oral Q2H PRN Mima Wells MD      OLANZapine  10 mg Oral HS Rufina CarrasquilloBRODY      OLANZapine  2.5 mg Oral Once Clayton Herbert, CHRISTOPHER      PHENobarbital  64.8 mg Oral Q12H BAILEY Maddox-C      polyethylene glycol  17 g Oral Daily PRN CHARLI MaddoxC      propranolol  5 mg Oral Q8H PRN Mima Wells MD      risperiDONE  1 mg Oral BID Papito An MD      risperiDONE  0.25 mg Oral Q4H PRN Max 6/day Mima Wells MD      risperiDONE  0.5 mg Oral Q4H PRN Max 3/day Mima Wells MD      risperiDONE  1 mg Oral Q2H PRN Max 3/day Mima Wells MD      senna-docusate sodium  1 tablet Oral HS CHARLI MaddoxC      sodium chloride  2 spray Each Nare Q1H PRN CHARLI MaddoxC      tiZANidine  2 mg Oral Q6H PRN CHARLI MaddoxC      traZODone  50 mg Oral Q6H PRN Max 3/day Mima Wells MD      traZODone  50 mg Oral HS lCayton Herbert, CHRISTOPHER      trihexyphenidyl  2 mg Oral BID Papito An MD       Facility-Administered Medications Ordered in Other Encounters   Medication Dose Route Frequency Provider Last Rate    lactated ringers   Intravenous Continuous PRN Celi Preston CRNA          Risks/Benefits of Treatment:     Risks, benefits, and possible side effects of medications explained to patient and patient verbalizes understanding and agreement for treatment.    Progress Toward Goals:  unchanged    Treatment Planning:      - Encourage early mobility and having a structured day  - Provide frequent re-orientation, and cognitive stimulation  - Ensure assistive devices are in proper working order (eye-glasses, hearing aids)  - Encourage adequate hydration, nutrition and monitor bowel movements  - Maintain sleep-wake cycle: Uninterrupted sleep time; low-level lighting at  night  - Fall precaution  - f/u SLIM recs regarding the medical problems   - Continue medication titration and treatment plan; adjust medication to optimize treatment response and as clinically indicated.   - Observation: N/A  - Legal Status: 201  - VS: as per unit protocol  - Encourage group attendance and milieu therapy  - Dispo: To be determined  - Long Stay Certification : Not Applicable  - Estimated Discharge Day: 2/24/2025 10 days (3/5/2025)    Marvel Jang was seen today for psychiatric follow-up.  Patient is visible on the unit she remains labile with periodic yelling out, loud pressured speech, rambling and bizarre behavior.  Patient appears paranoid and religiously preoccupied.  She appears to have an irritable edge today.  According to nursing staff report patient was disruptive on the unit throughout the night, difficult to redirect at times.  She is medication compliant.  She denied any SI/HI/AVH.  Although patient appears internally preoccupied    Sleep: insomnia  Appetite: normal  Medication side effects: No  ROS: review of systems as noted above in HPI/Subjective report, all other systems are negative    Objective :  Temp:  [98 °F (36.7 °C)-98.5 °F (36.9 °C)] 98 °F (36.7 °C)  HR:  [] 79  BP: (144)/(86) 144/86  Resp:  [18] 18  SpO2:  [96 %-100 %] 96 %  O2 Device: None (Room air)    Temp:  [98 °F (36.7 °C)-98.5 °F (36.9 °C)] 98 °F (36.7 °C)  HR:  [] 79  BP: (144)/(86) 144/86  Resp:  [18] 18  SpO2:  [96 %-100 %] 96 %  O2 Device: None (Room air)    Mental Status Evaluation:    Appearance:  age appropriate   Behavior:  Bizarre   Speech:  Increased latency   Mood:  labile, irritable   Affect:  labile   Thought Process:  disorganized, tangential   Thought Content:  Scientology preoccupation   Perceptual Disturbances: Denied AVH, although appears internally preoccupied   Risk Potential: Suicidal Ideation -  None at present  Homicidal Ideation -  None at present  Potential for Aggression  - No   Sensorium:  oriented to person and place   Memory:  recent and remote memory: unable to assess due to lack of cooperation   Consciousness:  alert and awake   Attention/Concentration: decreased attention span and decreased concentration   Insight:  poor   Judgment: poor   Gait/Station: unstable gait   Motor Activity: abnormal movement noted: dyskinetic oral movements present     Cognitive Assessment : N/A  Pain : denied  Pain Scale : 0      Lab Results: I have reviewed the following results:  Most Recent Labs:   Lab Results   Component Value Date    WBC 5.69 02/16/2025    RBC 4.36 02/16/2025    HGB 14.2 02/16/2025    HCT 41.8 02/16/2025     02/16/2025    RDW 12.2 02/16/2025    NEUTROABS 3.37 02/16/2025    SODIUM 135 02/22/2025    K 4.6 02/22/2025     02/22/2025    CO2 29 02/22/2025    BUN 22 02/22/2025    CREATININE 0.75 02/22/2025    GLUC 87 02/22/2025    CALCIUM 9.3 02/22/2025    AST 19 02/16/2025    ALT 20 02/16/2025    ALKPHOS 47 02/16/2025    TP 7.3 02/16/2025    ALB 4.3 02/16/2025    TBILI 0.34 02/16/2025    CHOLESTEROL 198 02/16/2025    HDL 77 02/16/2025    TRIG 122 02/16/2025    LDLCALC 97 02/16/2025    NONHDLC 121 02/16/2025    VALPROICTOT 71 11/29/2019    AMMONIA 15 12/19/2017    TVC9UBSEVRNE 0.920 02/13/2025    FREET4 0.94 08/14/2024    SYPHILISAB Non-reactive 04/05/2024    HGBA1C 5.5 04/26/2024     04/26/2024       Administrative Statements     Counseling / Coordination of Care:   Patient's progress discussed with staff in treatment team meeting.  Medication changes reviewed with staff in treatment team meeting..    CHRISTOPHER Alexander 02/23/25

## 2025-02-23 NOTE — TREATMENT TEAM
02/23/25 1316   Larson Anxiety Scale   Anxious Mood 4   Tension 4   Fears 3   Insomnia 1   Intellectual 3   Depressed Mood 3   Somatic Complaints: Muscular 1   Somatic Complaints: Sensory 0   Cardiovascular Symptoms 0   Respiratory Symptoms 0   Gastrointestinal Symptoms 0   Genitourinary Symptoms 0   Autonomic Symptoms 1   Behavior at Interview 2   Larson Anxiety Score 22     50mg Atarax administered at 1316 for moderate anxiety as indicated by Larson score.

## 2025-02-23 NOTE — NURSING NOTE
Patient remains labile this morning. She is loud, pressured, and rambling. She is disorganized. She is paranoid and religiously preoccupied. She is becoming increasingly irritable towards staff and is posturing toward staff at times. She is redirectable at this time. She was incontinent of a large, loose BM and allowed staff to help shower her after some encouragement. No complaints of GI upset at this time. She is taking all medications without difficulty.

## 2025-02-23 NOTE — NURSING NOTE
Upon reassessment, patient is lying quietly in bed but is awake. She appears calmer although states she still feels anxious. Medication somewhat effective.

## 2025-02-23 NOTE — ASSESSMENT & PLAN NOTE
Zyprexa Zydis 10 mg p.o. daily at bedtime  Klonopin 1 mg oral daily at bedtime  Neurontin 100 mg p.o. daily 3 times daily

## 2025-02-24 PROCEDURE — 99232 SBSQ HOSP IP/OBS MODERATE 35: CPT | Performed by: PSYCHIATRY & NEUROLOGY

## 2025-02-24 RX ORDER — TRAZODONE HYDROCHLORIDE 100 MG/1
100 TABLET ORAL
Status: DISCONTINUED | OUTPATIENT
Start: 2025-02-24 | End: 2025-03-05

## 2025-02-24 RX ORDER — CLONAZEPAM 0.5 MG/1
0.5 TABLET ORAL 3 TIMES DAILY
Status: DISCONTINUED | OUTPATIENT
Start: 2025-02-24 | End: 2025-02-26

## 2025-02-24 RX ADMIN — HYDROXYZINE HYDROCHLORIDE 50 MG: 50 TABLET, FILM COATED ORAL at 02:37

## 2025-02-24 RX ADMIN — CHLORHEXIDINE GLUCONATE 15 ML: 1.2 SOLUTION ORAL at 22:06

## 2025-02-24 RX ADMIN — AMMONIUM LACTATE: 12 LOTION TOPICAL at 08:51

## 2025-02-24 RX ADMIN — GABAPENTIN 100 MG: 100 CAPSULE ORAL at 08:29

## 2025-02-24 RX ADMIN — ACETAMINOPHEN 650 MG: 325 TABLET ORAL at 14:24

## 2025-02-24 RX ADMIN — Medication 3 MG: at 22:06

## 2025-02-24 RX ADMIN — LEVOTHYROXINE SODIUM 100 MCG: 100 TABLET ORAL at 05:48

## 2025-02-24 RX ADMIN — CLONAZEPAM 0.5 MG: 0.5 TABLET ORAL at 17:06

## 2025-02-24 RX ADMIN — GABAPENTIN 100 MG: 100 CAPSULE ORAL at 17:06

## 2025-02-24 RX ADMIN — GUAIFENESIN AND DEXTROMETHORPHAN 10 ML: 100; 10 SYRUP ORAL at 22:12

## 2025-02-24 RX ADMIN — SENNOSIDES AND DOCUSATE SODIUM 1 TABLET: 50; 8.6 TABLET ORAL at 22:06

## 2025-02-24 RX ADMIN — LISINOPRIL 5 MG: 5 TABLET ORAL at 08:30

## 2025-02-24 RX ADMIN — TRAZODONE HYDROCHLORIDE 100 MG: 100 TABLET ORAL at 22:06

## 2025-02-24 RX ADMIN — TRIHEXYPHENIDYL HYDROCHLORIDE 2 MG: 2 TABLET ORAL at 22:06

## 2025-02-24 RX ADMIN — RISPERIDONE 1 MG: 1 TABLET, ORALLY DISINTEGRATING ORAL at 17:06

## 2025-02-24 RX ADMIN — TRIHEXYPHENIDYL HYDROCHLORIDE 2 MG: 2 TABLET ORAL at 08:30

## 2025-02-24 RX ADMIN — PHENOBARBITAL 64.8 MG: 32.4 TABLET ORAL at 08:29

## 2025-02-24 RX ADMIN — PHENOBARBITAL 64.8 MG: 32.4 TABLET ORAL at 22:06

## 2025-02-24 RX ADMIN — OLANZAPINE 10 MG: 10 TABLET, ORALLY DISINTEGRATING ORAL at 22:06

## 2025-02-24 RX ADMIN — GABAPENTIN 100 MG: 100 CAPSULE ORAL at 22:06

## 2025-02-24 RX ADMIN — CLONAZEPAM 0.5 MG: 0.5 TABLET ORAL at 22:06

## 2025-02-24 RX ADMIN — FLUTICASONE PROPIONATE 2 SPRAY: 50 SPRAY, METERED NASAL at 08:30

## 2025-02-24 RX ADMIN — ATORVASTATIN CALCIUM 20 MG: 20 TABLET, FILM COATED ORAL at 17:06

## 2025-02-24 RX ADMIN — Medication 1 TABLET: at 08:29

## 2025-02-24 RX ADMIN — RISPERIDONE 1 MG: 1 TABLET, ORALLY DISINTEGRATING ORAL at 08:29

## 2025-02-24 RX ADMIN — ASPIRIN 81 MG CHEWABLE TABLET 81 MG: 81 TABLET CHEWABLE at 08:30

## 2025-02-24 NOTE — PROGRESS NOTES
Progress Note - Behavioral Health   Name: Laine Tse 69 y.o. female I MRN: [id number]   Unit/Bed#: [facility] 209-01 I Date of Admission: 2/14/2025   Date of Service: 2/24/2025 I Hospital Day: 10         Assessment & Plan  Seizure disorder (HCC)  Phenobarbital 64.8 mg twice daily  Schizoaffective disorder (HCC)  Zyprexa Zydis 10 mg p.o. daily at bedtime  Increase Klonopin 0.5 mg po tid.  Neurontin 100 mg p.o. daily 3 times daily  Insomnia  Trazodone 75 mg po daily HS  Melatonin 3 mg p.o. daily at bedtime  Hypothyroidism    Hyponatremia  135 on 2/22/25    Recommended Treatment:     Planned medication and treatment changes:    All current active medications have been reviewed  Encourage group therapy, milieu therapy and occupational therapy  Behavioral Health checks for safety monitoring  Zydis 10 mg po hs  Neurontin to 100 mg po tid.   Increase Klonopin  0.5 mg tid.   Risperidal M-tab 1 mg po bid.  Monitor behavior and fall risk.    Current medications:  Current Facility-Administered Medications   Medication Dose Route Frequency Provider Last Rate    acetaminophen  650 mg Oral Q4H PRN Mima Wells MD      acetaminophen  650 mg Oral Q4H PRN Mima Wells MD      acetaminophen  975 mg Oral Q6H PRN Mima Wells MD      aluminum-magnesium hydroxide-simethicone  30 mL Oral Q4H PRN Mima Wells MD      ammonium lactate   Topical BID Joel Wies MD      aspirin  81 mg Oral Daily Marion L Concepcion, PA-C      atorvastatin  20 mg Oral Daily With Dinner Marion L Holdenl, PA-C      bisacodyl  10 mg Rectal Daily PRN Marion L Dagnall, PA-C      calcium carbonate-vitamin D  1 tablet Oral Daily With Breakfast Marion L Concepcion, PA-C      chlorhexidine  15 mL Swish & Spit Q12H [facility] Marionsushil Javier, PA-C      clonazePAM  1 mg Oral HS Papito An MD      dextromethorphan-guaiFENesin  10 mL Oral Q4H PRN Joel Wise MD      fluticasone  2 spray Nasal Daily Marion L Dagnall, PA-C      gabapentin  100 mg Oral TID  Papito An MD      hydrALAZINE  25 mg Oral Q8H PRN Marion L Dagnall, PA-C      hydrOXYzine HCL  25 mg Oral Q6H PRN Max 4/day Mima Wells MD      hydrOXYzine HCL  50 mg Oral Q6H PRN Max 4/day Mima Wells MD      lactulose  20 g Oral BID PRN Marion L Dagnall, PA-C      levothyroxine  100 mcg Oral Early Morning Marion L Dagnall, PA-C      lidocaine  1 patch Topical Daily PRN Marion L Dagnall, PA-C      lisinopril  5 mg Oral Daily Rosenda Zelaya PA-C      loratadine  10 mg Oral Daily PRN Marion L Dagnall, PA-C      melatonin  3 mg Oral HS Mima Wells MD      nicotine polacrilex  4 mg Oral Q2H PRN Mima Wells MD      OLANZapine  10 mg Oral HS Rufina Carrasquillo, PA-C      PHENobarbital  64.8 mg Oral Q12H Marion L Dagnall, PA-C      polyethylene glycol  17 g Oral Daily PRN Marion L Dagnall, PA-C      propranolol  5 mg Oral Q8H PRN Mima Wells MD      risperiDONE  1 mg Oral BID Papito An MD      risperiDONE  0.25 mg Oral Q4H PRN Max 6/day Mima Wells MD      risperiDONE  0.5 mg Oral Q4H PRN Max 3/day Mima Wells MD      risperiDONE  1 mg Oral Q2H PRN Max 3/day Mima Wells MD      senna-docusate sodium  1 tablet Oral HS Marion L Dagnall, PA-C      sodium chloride  2 spray Each Nare Q1H PRN Marion L Dagnall, PA-C      tiZANidine  2 mg Oral Q6H PRN Marion L Dagnall, PA-C      traZODone  50 mg Oral Q6H PRN Max 3/day Mima Wells MD      traZODone  75 mg Oral HS CHRISTOPHER Alexander      trihexyphenidyl  2 mg Oral BID Papito An MD       Facility-Administered Medications Ordered in Other Encounters   Medication Dose Route Frequency Provider Last Rate    lactated ringers   Intravenous Continuous PRN Celi Preston CRNA         Risks / Benefits of Treatment:    Risks, benefits, and possible side effects of medications explained to patient. Patient has limited understanding of risks and benefits of treatment at this time, but agrees to take  medications as prescribed.    Subjective:    Behavior over the last 24 hours: unchanged.     Laine was seen for continuing care. Patient is visible on unit, continues to be restless, labile with loud speech consistent of Confucianism ramblings usually. . Currently is stating that her food is being poisoned. States she does not hear voices, did not respond when asked about visual hallucinations. Denies SI/HI.  She also noted to be knelling down periodically. Meds adherence has been stable. Poor participation in groups.     Sleep: sleep on and off  Appetite: decreased  Medication side effects: denies   ROS: no complaints, all other systems are negative    Mental Status Evaluation:    Appearance:  age appropriate, dressed in hospital attire   Behavior:  agitated, bizarre, restless   Speech:  increased latency of response, loud at times   Mood:  labile, irritable   Affect:  labile   Thought Process:  tangential   Associations: tangential associations, perseverative   Thought Content:  Confucianism preoccupation   Perceptual Disturbances: denies auditory hallucinations when asked, appears preoccupied   Risk Potential: Suicidal ideation - None at present  Homicidal ideation - None at present  Potential for aggression - Not at present   Sensorium:  unable to assess   Memory:  recent and remote memory: unable to assess due to lack of cooperation   Consciousness:  alert   Attention/Concentration: decreased concentration and decreased attention span   Insight:  poor   Judgment: poor   Gait/Station: unstable gait   Motor Activity: abnormal movement noted: dyskinetic oral movements present     Vital signs in last 24 hours:    Temp:  [97.3 °F (36.3 °C)-98 °F (36.7 °C)] 97.3 °F (36.3 °C)  HR:  [75-94] 94  BP: (116-179)/(58-80) 179/77  Resp:  [18] 18  SpO2:  [95 %-98 %] 98 %  O2 Device: None (Room air)         Laboratory results: I have personally reviewed all pertinent laboratory/tests results    Most Recent Labs:   Lab Results    Component Value Date    WBC 5.69 02/16/2025    RBC 4.36 02/16/2025    HGB 14.2 02/16/2025    HCT 41.8 02/16/2025     02/16/2025    RDW 12.2 02/16/2025    NEUTROABS 3.37 02/16/2025    SODIUM 135 02/22/2025    K 4.6 02/22/2025     02/22/2025    CO2 29 02/22/2025    BUN 22 02/22/2025    CREATININE 0.75 02/22/2025    GLUC 87 02/22/2025    CALCIUM 9.3 02/22/2025    AST 19 02/16/2025    ALT 20 02/16/2025    ALKPHOS 47 02/16/2025    TP 7.3 02/16/2025    ALB 4.3 02/16/2025    TBILI 0.34 02/16/2025    CHOLESTEROL 198 02/16/2025    HDL 77 02/16/2025    TRIG 122 02/16/2025    LDLCALC 97 02/16/2025    NONHDLC 121 02/16/2025    VALPROICTOT 71 11/29/2019    AMMONIA 15 12/19/2017    SNX1MMAKWQWC 0.920 02/13/2025    FREET4 0.94 08/14/2024    SYPHILISAB Non-reactive 04/05/2024    HGBA1C 5.5 04/26/2024     04/26/2024       Counseling / Coordination of Care:    Patient's progress discussed with staff in treatment team meeting.  Medication changes reviewed with nursing staff.  Supportive therapy provided to patient.  Group attendance encouraged.    Papito An MD 02/24/25

## 2025-02-24 NOTE — NURSING NOTE
Patient appears to have had broken sleep through the majority of the overnight hours. No concerns voiced, no signs of distress. Atarax 50 appears to have been ineffective, pt is restless and continues to pace the halls. Continuous 15 min safety checks in place.

## 2025-02-24 NOTE — PLAN OF CARE
Problem: Alteration in Thoughts and Perception  Goal: Treatment Goal: Gain control of psychotic behaviors/thinking, reduce/eliminate presenting symptoms and demonstrate improved reality functioning upon discharge  Outcome: Not Progressing  Goal: Refrain from acting on delusional thinking/internal stimuli  Description: Interventions:  - Monitor patient closely, per order   - Utilize least restrictive measures   - Set reasonable limits, give positive feedback for acceptable   - Administer medications as ordered and monitor of potential side effects  Outcome: Not Progressing  Goal: Recognize dysfunctional thoughts, communicate reality-based thoughts at the time of discharge  Description: Interventions:  - Provide medication and psycho-education to assist patient in compliance and developing insight into his/her illness   Outcome: Not Progressing

## 2025-02-24 NOTE — SOCIAL WORK
Usman assisted pt with calling her gh. She spoke to Jennifer and apologized for needing to be in the hospital.  Jennifer reassured pt that she was welcome to return and they all miss her. Pt brightened and said good bye. CM spoke to Jennifer following pt. Provided pt progress update. Jennifer verbalied understanding and in agreement. Call ended mutually.

## 2025-02-24 NOTE — ASSESSMENT & PLAN NOTE
Zyprexa Zydis 10 mg p.o. daily at bedtime  Increase Klonopin 0.5 mg po tid.  Neurontin 100 mg p.o. daily 3 times daily

## 2025-02-24 NOTE — PROGRESS NOTES
"Progress Note - Laine Tse 69 y.o. female MRN: [id number]    Unit/Bed#: -01 Encounter: [id number]        Subjective:   Patient seen and examined at bedside after reviewing the chart and discussing the case with the caring staff.      Patient examined at bedside.  Patient is complaining of dry cracked skin in bilateral heel.    Physical Exam   Vitals: Blood pressure (!) 179/77, pulse 94, temperature (!) 97.3 °F (36.3 °C), temperature source Temporal, resp. rate 18, height 5' 4\" (1.626 m), weight 76.2 kg (167 lb 15.9 oz), SpO2 98%, not currently breastfeeding.,Body mass index is 28.84 kg/m².  Constitutional: Patient in no acute distress.  HEENT: PERR, EOMI, MMM.  Cardiovascular: Normal rate and regular rhythm.    Pulmonary/Chest: Effort normal and breath sounds normal.   Abdomen: Soft, + BS, NT.    Assessment/Plan:  Laine Tse is a(n) 69 y.o. female with schizoaffective disorder.      Cardiac with hx of HTN, HLD.  Continue atorvastatin 20 mg daily, ASA 81 mg daily.  Not on antihypertensive meds prehospital.  Start lisinopril 5 mg daily 2/15. Hydralazine 25 mg as needed for SBP >170 or DBP >110.  Cont to monitor vitals.    Allergic rhinitis.  Flonase nasal spray daily.  Claritin 10 mg daily as needed (Zyrtec nonform).   Bilateral hearing loss.  Supportive measures.   Osteoporosis.  Continue calcium/vitamin D supplement.  Resume Fosamax on discharge.  DJD/OA/HA/chronic low back pain.  Tylenol, Zanaflex, lidocaine patch as needed.  Hypothyroidism.  Patient is on levothyroxine 100 mcg daily.  TSH normal on 2/13/25.  Seizure disorder.  Continue phenobarbital 64.8 mg q12h.  Pt follows with [facility] neurology (last saw 2/11/25).  Chronic constipation.  Continue Senokot S nightly.  Miralax as needed.  Add lactulose and dulxolax suppository as needed 2/18.   Cough/sore throat.  Ongoing x months per pt.  Afebrile.  Lungs clear.  Robitussin DM as needed.    Hyponatremia.  Level 129 -> 130 -> 135 on 2/22/2025. "   Dry cracked skin bilateral heel.  I will put the patient on ammonium lactate twice daily along with Band-Aid or dressing 2/23/2025.  I will also consult podiatry for further evaluation and treatment.

## 2025-02-24 NOTE — PLAN OF CARE
Problem: Alteration in Thoughts and Perception  Goal: Attend and participate in unit activities, including therapeutic, recreational, and educational groups  Description: Interventions:  -Encourage Visitation and family involvement in care  Outcome: Not Progressing     Problem: Ineffective Coping  Goal: Participates in unit activities  Description: Interventions:  - Provide therapeutic environment   - Provide required programming   - Redirect inappropriate behaviors   Outcome: Not Progressing   Patient behavioral and hard to redirect; not group appropriate at this time.

## 2025-02-24 NOTE — PROGRESS NOTES
02/24/25   Team Meeting   Meeting Type Daily Rounds   Team Members Present   Team Members Present Physician;Nurse;;Occupational Therapist   Physician Team Member Nataliya WHITE   Nursing Team Member Rio LUZ   Social Work Team Member Chacho TARANGO   OT Team Member Yazmin CONTRERAS   Patient/Family Present   Patient Present No   Patient's Family Present No   201, broken sleep, multiple PRN, religiously preoccupied, threw coffee on staff, med complaint, no d/c date med adjustments, elopement risk

## 2025-02-24 NOTE — NURSING NOTE
Patient visible in milieu, pacing, constant need of redirection, broken sleep/very little, medication compliant and confused. Patient endorses high anxiety, mild depression, agitation at times, loud and labile. Patient denies SI/HI and hallucinations. Continued care and safety rounds in progress.

## 2025-02-24 NOTE — NURSING NOTE
"Patient med compliant, participated in a snack tonight. She denies all psychiatric symptoms but appears internally preoccupied making statements such as \"Theres a monster down there I can't go there\". Pt is labile, loud and intrusive. Robitussin given at 2108 per pt request. Safety precautions maintained q15 mins.     "

## 2025-02-24 NOTE — SOCIAL WORK
CM spoke to Domenic Jones [phone]  at [employer]. CM review pt progress and tx/d/c plans.  Domenic verbalized understding and in agreement.

## 2025-02-25 ENCOUNTER — TELEPHONE (OUTPATIENT)
Age: 70
End: 2025-02-25

## 2025-02-25 ENCOUNTER — TELEPHONE (OUTPATIENT)
Dept: INTERNAL MEDICINE CLINIC | Age: 70
End: 2025-02-25

## 2025-02-25 PROCEDURE — 99232 SBSQ HOSP IP/OBS MODERATE 35: CPT | Performed by: PSYCHIATRY & NEUROLOGY

## 2025-02-25 RX ADMIN — Medication 3 MG: at 21:42

## 2025-02-25 RX ADMIN — PHENOBARBITAL 64.8 MG: 32.4 TABLET ORAL at 21:42

## 2025-02-25 RX ADMIN — TRIHEXYPHENIDYL HYDROCHLORIDE 2 MG: 2 TABLET ORAL at 21:42

## 2025-02-25 RX ADMIN — PHENOBARBITAL 64.8 MG: 32.4 TABLET ORAL at 08:59

## 2025-02-25 RX ADMIN — RISPERIDONE 1 MG: 1 TABLET, ORALLY DISINTEGRATING ORAL at 08:59

## 2025-02-25 RX ADMIN — Medication 1 TABLET: at 08:59

## 2025-02-25 RX ADMIN — CLONAZEPAM 0.5 MG: 0.5 TABLET ORAL at 16:46

## 2025-02-25 RX ADMIN — GABAPENTIN 100 MG: 100 CAPSULE ORAL at 08:59

## 2025-02-25 RX ADMIN — ATORVASTATIN CALCIUM 20 MG: 20 TABLET, FILM COATED ORAL at 16:45

## 2025-02-25 RX ADMIN — OLANZAPINE 15 MG: 10 TABLET, ORALLY DISINTEGRATING ORAL at 21:42

## 2025-02-25 RX ADMIN — CLONAZEPAM 0.5 MG: 0.5 TABLET ORAL at 21:42

## 2025-02-25 RX ADMIN — AMMONIUM LACTATE: 12 LOTION TOPICAL at 16:47

## 2025-02-25 RX ADMIN — AMMONIUM LACTATE: 12 LOTION TOPICAL at 09:05

## 2025-02-25 RX ADMIN — LACTULOSE 20 G: 20 SOLUTION ORAL at 16:46

## 2025-02-25 RX ADMIN — POLYETHYLENE GLYCOL 3350 17 G: 17 POWDER, FOR SOLUTION ORAL at 12:06

## 2025-02-25 RX ADMIN — SENNOSIDES AND DOCUSATE SODIUM 1 TABLET: 50; 8.6 TABLET ORAL at 21:42

## 2025-02-25 RX ADMIN — FLUTICASONE PROPIONATE 2 SPRAY: 50 SPRAY, METERED NASAL at 08:59

## 2025-02-25 RX ADMIN — TRAZODONE HYDROCHLORIDE 100 MG: 100 TABLET ORAL at 21:42

## 2025-02-25 RX ADMIN — CLONAZEPAM 0.5 MG: 0.5 TABLET ORAL at 08:59

## 2025-02-25 RX ADMIN — CHLORHEXIDINE GLUCONATE 15 ML: 1.2 SOLUTION ORAL at 21:42

## 2025-02-25 RX ADMIN — ACETAMINOPHEN 650 MG: 325 TABLET ORAL at 22:32

## 2025-02-25 RX ADMIN — TRIHEXYPHENIDYL HYDROCHLORIDE 2 MG: 2 TABLET ORAL at 08:59

## 2025-02-25 RX ADMIN — ASPIRIN 81 MG CHEWABLE TABLET 81 MG: 81 TABLET CHEWABLE at 08:59

## 2025-02-25 NOTE — PROGRESS NOTES
"Progress Note - Laine Tse 69 y.o. female MRN: [id number]    Unit/Bed#: -01 Encounter: [id number]        Subjective:   Patient seen and examined at bedside after reviewing the chart and discussing the case with the caring staff.      Patient examined at bedside.  Patient reports no acute symptoms.    Physical Exam   Vitals: Blood pressure (!) 179/77, pulse 81, temperature 97.9 °F (36.6 °C), temperature source Temporal, resp. rate 18, height 5' 4\" (1.626 m), weight 76.2 kg (167 lb 15.9 oz), SpO2 94%, not currently breastfeeding.,Body mass index is 28.84 kg/m².  Constitutional: Patient in no acute distress.  HEENT: PERR, EOMI, MMM.  Cardiovascular: Normal rate and regular rhythm.    Pulmonary/Chest: Effort normal and breath sounds normal.   Abdomen: Soft, + BS, NT.    Assessment/Plan:  Laine Tse is a(n) 69 y.o. female with schizoaffective disorder.      Cardiac with hx of HTN, HLD.  Continue atorvastatin 20 mg daily, ASA 81 mg daily.  Not on antihypertensive meds prehospital.  Start lisinopril 5 mg daily 2/15. Hydralazine 25 mg as needed for SBP >170 or DBP >110.  Cont to monitor vitals.    Allergic rhinitis.  Flonase nasal spray daily.  Claritin 10 mg daily as needed (Zyrtec nonform).   Bilateral hearing loss.  Supportive measures.   Osteoporosis.  Continue calcium/vitamin D supplement.  Resume Fosamax on discharge.  DJD/OA/HA/chronic low back pain.  Tylenol, Zanaflex, lidocaine patch as needed.  Hypothyroidism.  Patient is on levothyroxine 100 mcg daily.  TSH normal on 2/13/25.  Seizure disorder.  Continue phenobarbital 64.8 mg q12h.  Pt follows with [facility] neurology (last saw 2/11/25).  Chronic constipation.  Continue Senokot S nightly.  Miralax as needed.  Add lactulose and dulxolax suppository as needed 2/18.   Cough/sore throat.  Ongoing x months per pt.  Afebrile.  Lungs clear.  Robitussin DM as needed.    Hyponatremia.  Level 129 -> 130 -> 135 on 2/22/2025.   Dry cracked skin bilateral " heel.  I will put the patient on ammonium lactate twice daily along with Band-Aid or dressing 2/23/2025.  I will also consult podiatry for further evaluation and treatment.

## 2025-02-25 NOTE — PROGRESS NOTES
Progress Note - Behavioral Health   Name: Laine Tse 69 y.o. female I MRN: [id number]   Unit/Bed#: [facility] 209-01 I Date of Admission: 2/14/2025   Date of Service: 2/25/2025 I Hospital Day: 11         Assessment & Plan  Seizure disorder (HCC)  Phenobarbital 64.8 mg twice daily  Schizoaffective disorder (HCC)  Increase Zydis 15 mg p.o. daily at bedtime  Increase Klonopin 0.5 mg po tid.    Insomnia  Trazodone 100 mg po daily HS  Melatonin 3 mg p.o. daily at bedtime  Hypothyroidism    Hyponatremia  135 on 2/22/25    Recommended Treatment:     Planned medication and treatment changes:    All current active medications have been reviewed  Encourage group therapy, milieu therapy and occupational therapy  Behavioral Health checks for safety monitoring  Increase Zydis 15 mg po hs  Klonopin  0.5 mg tid.   Increase Trazodone 100 mg po hs.  Monitor behavior and fall risk.    Current medications:  Current Facility-Administered Medications   Medication Dose Route Frequency Provider Last Rate    acetaminophen  650 mg Oral Q4H PRN Mima Wells MD      acetaminophen  650 mg Oral Q4H PRN Mima Wells MD      acetaminophen  975 mg Oral Q6H PRN Mima Wells MD      aluminum-magnesium hydroxide-simethicone  30 mL Oral Q4H PRN Mima Wells MD      ammonium lactate   Topical BID Joel Wise MD      aspirin  81 mg Oral Daily Marion L Dagnall, PA-C      atorvastatin  20 mg Oral Daily With Dinner Marion L Dagnall, PA-C      bisacodyl  10 mg Rectal Daily PRN Marion L Dagnall, PA-C      calcium carbonate-vitamin D  1 tablet Oral Daily With Breakfast Marion L Dagnall, PA-C      chlorhexidine  15 mL Swish & Spit Q12H [facility] Marion L Vickinall, PA-C      clonazePAM  0.5 mg Oral TID Papito An MD      dextromethorphan-guaiFENesin  10 mL Oral Q4H PRN Joel Wise MD      fluticasone  2 spray Nasal Daily Marion L Dagnall, PA-C      hydrALAZINE  25 mg Oral Q8H PRN Marion L Dagnall, PA-C      hydrOXYzine HCL  25 mg Oral  Q6H PRN Max 4/day Mima Wells MD      hydrOXYzine HCL  50 mg Oral Q6H PRN Max 4/day Mima Wells MD      lactulose  20 g Oral BID PRN Marion L Dagnall, PA-C      levothyroxine  100 mcg Oral Early Morning Marion L Dagnall, PA-C      lidocaine  1 patch Topical Daily PRN Marion L Dagnall, PA-C      lisinopril  5 mg Oral Daily Rosenda Zelaya, PA-C      loratadine  10 mg Oral Daily PRN Marion L Dagnall, PA-C      melatonin  3 mg Oral HS Mima Wells MD      nicotine polacrilex  4 mg Oral Q2H PRN Mima Wells MD      OLANZapine  10 mg Oral HS Rufina Carrasquillo, PA-C      PHENobarbital  64.8 mg Oral Q12H Marion L Dagnall, PA-C      polyethylene glycol  17 g Oral Daily PRN Marion L Dagnall, PA-C      propranolol  5 mg Oral Q8H PRN Mima Wells MD      risperiDONE  0.25 mg Oral Q4H PRN Max 6/day Mima Wells MD      risperiDONE  0.5 mg Oral Q4H PRN Max 3/day Mima Wells MD      risperiDONE  1 mg Oral Q2H PRN Max 3/day Mima Wells MD      senna-docusate sodium  1 tablet Oral HS Marion L Dagnall, PA-C      sodium chloride  2 spray Each Nare Q1H PRN Marion L Dagnall, PA-C      tiZANidine  2 mg Oral Q6H PRN Marion L Dagnall, PA-C      traZODone  100 mg Oral HS Papito An MD      traZODone  50 mg Oral Q6H PRN Max 3/day Mima Wells MD      trihexyphenidyl  2 mg Oral BID Papito An MD       Facility-Administered Medications Ordered in Other Encounters   Medication Dose Route Frequency Provider Last Rate    lactated ringers   Intravenous Continuous PRN Celi Preston CRNA         Risks / Benefits of Treatment:    Risks, benefits, and possible side effects of medications explained to patient. Patient has limited understanding of risks and benefits of treatment at this time, but agrees to take medications as prescribed.    Subjective:    Behavior over the last 24 hours: minimal improvement.     Laine was seen for continuing care. Patient is visible on unit,  continues to be restless, religiously preoccupied but less yelling this morning. Staff reports pt is more anxious and disruptive in the afternoon and evening hours with poor sleep. Her chronic delusion persist but states she does not hear voices presently. Denies current SI or wish to die presently. Meds adherence has been stable. She remains with unsteady gait and requires fall risk precaution.        Sleep: broken  Appetite: decreased  Medication side effects: denies   ROS: no complaints, all other systems are negative    Mental Status Evaluation:    Appearance:  age appropriate, dressed in hospital attire   Behavior:  bizarre, restless   Speech:  increased latency of response   Mood:  irritable   Affect:  labile   Thought Process:  circumstantial, concrete   Associations: circumstantial associations, perseverative   Thought Content:  Adventist preoccupation   Perceptual Disturbances: denies auditory hallucinations when asked, appears preoccupied   Risk Potential: Suicidal ideation - None at present  Homicidal ideation - None at present  Potential for aggression - Not at present   Sensorium:  unable to assess   Memory:  recent and remote memory: unable to assess due to lack of cooperation   Consciousness:  alert   Attention/Concentration: decreased concentration and decreased attention span   Insight:  poor   Judgment: limited   Gait/Station: unstable gait   Motor Activity: abnormal movement noted: dyskinetic oral movements present     Vital signs in last 24 hours:    Temp:  [97.9 °F (36.6 °C)] 97.9 °F (36.6 °C)  HR:  [81] 81  Resp:  [18] 18  SpO2:  [94 %] 94 %         Laboratory results: I have personally reviewed all pertinent laboratory/tests results    Most Recent Labs:   Lab Results   Component Value Date    WBC 5.69 02/16/2025    RBC 4.36 02/16/2025    HGB 14.2 02/16/2025    HCT 41.8 02/16/2025     02/16/2025    RDW 12.2 02/16/2025    NEUTROABS 3.37 02/16/2025    SODIUM 135 02/22/2025    K 4.6  02/22/2025     02/22/2025    CO2 29 02/22/2025    BUN 22 02/22/2025    CREATININE 0.75 02/22/2025    GLUC 87 02/22/2025    CALCIUM 9.3 02/22/2025    AST 19 02/16/2025    ALT 20 02/16/2025    ALKPHOS 47 02/16/2025    TP 7.3 02/16/2025    ALB 4.3 02/16/2025    TBILI 0.34 02/16/2025    CHOLESTEROL 198 02/16/2025    HDL 77 02/16/2025    TRIG 122 02/16/2025    LDLCALC 97 02/16/2025    NONHDLC 121 02/16/2025    VALPROICTOT 71 11/29/2019    AMMONIA 15 12/19/2017    [id number] 0.920 02/13/2025    FREET4 0.94 08/14/2024    SYPHILISAB Non-reactive 04/05/2024    HGBA1C 5.5 04/26/2024     04/26/2024       Counseling / Coordination of Care:    Patient's progress discussed with staff in treatment team meeting.  Medication changes reviewed with nursing staff.  Supportive therapy provided to patient.  Group attendance encouraged.    Papito An MD 02/25/25

## 2025-02-25 NOTE — NURSING NOTE
Patient intermittently visible within the milieu. Patient remains labile, loud, and intrusive with need of frequent redirection. Patient lying in hallway and refusing to cooperative with redirection. Patient can be verbally aggressive towards staff. Endorsing anxiety and depression; denies SI/HI and hallucinations. Compliant with scheduled medication after frequent prompting. Continuous q15 minute rounding and safety checks ongoing/

## 2025-02-25 NOTE — NURSING NOTE
Patient requires redirection often. She layed on the floor by the doors and staff had to redirect her to get up. She wants to go to the quiet room but is redirected by staff to go to her room. Administered lactulose earlier in the afternoon for c/o constipation.

## 2025-02-25 NOTE — NURSING NOTE
Patient has been visible on the unit intermittently. She did come out for breakfast and took her medications. She refused her vital signs x 3 so unable to administer B/P medication. She is not as loud as she was the previous day. Her gait was unsteady earlier this morning. She requires constant redirection. At times she is loud and labile. Administered PRN miralax at her request. She did nap in her bed for approximately 2 hours this morning. Safety precautions maintained.

## 2025-02-25 NOTE — TELEPHONE ENCOUNTER
[facility] called in regards to fax sent 2/18.  Unable to find in chart.  They will refax.  Please complete when received.

## 2025-02-25 NOTE — TELEPHONE ENCOUNTER
Received a fax from [facility], Patients signature is no where on form to send information- will not send until we know more due to it being refused to sign by patient. [facility] is not listed anywhere in patients chart there fore we should not be sending information out, we do have  an access services listed and a James karimi listed but that is it

## 2025-02-25 NOTE — TELEPHONE ENCOUNTER
I placed an encounter in chart- since patient refused to sign form and they are not listed as contacts we cannot send this form back

## 2025-02-25 NOTE — NURSING NOTE
Patient had broken sleep throughout the night. Needed to be redirected frequently. Non labored breathing noted while asleep. No signs or symptoms of distress observed. Continuous q15 minute rounding and safety checks ongoing.

## 2025-02-25 NOTE — NURSING NOTE
Patient refusing AM medications. Patient reporting they are poisonous and she will not be taking them. Also sitting and rolling around on the floor in her room. Will not redirect.

## 2025-02-25 NOTE — SOCIAL WORK
02/25/25    Team Meeting   Meeting Type Daily Rounds   Team Members Present   Team Members Present Physician;Nurse;;Occupational Therapist   Physician Team Member Nataliya GONZÁLES, Caden GONZÁLES , Kaci WHITE   Nursing Team Member Rio LUZ   Social Work Team Member Keeley TARANGO   OT Team Member Yazmin CONTRERAS   Patient/Family Present   Patient Present No   Patient's Family Present No   201, poor sleep, unsteady on her feet , loud , labile, intrusive, med complaint, No D/C date med adjustments, elopement risk.

## 2025-02-26 PROCEDURE — 99232 SBSQ HOSP IP/OBS MODERATE 35: CPT | Performed by: PSYCHIATRY & NEUROLOGY

## 2025-02-26 RX ORDER — OLANZAPINE 5 MG/1
5 TABLET, ORALLY DISINTEGRATING ORAL DAILY
Status: DISCONTINUED | OUTPATIENT
Start: 2025-02-26 | End: 2025-03-05

## 2025-02-26 RX ORDER — RISPERIDONE 1 MG/1
1 TABLET ORAL 4 TIMES DAILY PRN
Status: DISCONTINUED | OUTPATIENT
Start: 2025-02-26 | End: 2025-03-10 | Stop reason: HOSPADM

## 2025-02-26 RX ORDER — RISPERIDONE 0.5 MG/1
0.5 TABLET ORAL
Status: DISCONTINUED | OUTPATIENT
Start: 2025-02-26 | End: 2025-03-10 | Stop reason: HOSPADM

## 2025-02-26 RX ORDER — ZIPRASIDONE MESYLATE 20 MG/ML
10 INJECTION, POWDER, LYOPHILIZED, FOR SOLUTION INTRAMUSCULAR ONCE
Status: DISCONTINUED | OUTPATIENT
Start: 2025-02-27 | End: 2025-02-27

## 2025-02-26 RX ORDER — RISPERIDONE 2 MG/1
2 TABLET ORAL 3 TIMES DAILY PRN
Status: DISCONTINUED | OUTPATIENT
Start: 2025-02-26 | End: 2025-03-10 | Stop reason: HOSPADM

## 2025-02-26 RX ORDER — CLONAZEPAM 0.5 MG/1
0.5 TABLET ORAL 2 TIMES DAILY
Status: DISCONTINUED | OUTPATIENT
Start: 2025-02-26 | End: 2025-02-27

## 2025-02-26 RX ADMIN — Medication 6 MG: at 20:47

## 2025-02-26 RX ADMIN — ASPIRIN 81 MG CHEWABLE TABLET 81 MG: 81 TABLET CHEWABLE at 09:04

## 2025-02-26 RX ADMIN — ATORVASTATIN CALCIUM 20 MG: 20 TABLET, FILM COATED ORAL at 17:08

## 2025-02-26 RX ADMIN — CHLORHEXIDINE GLUCONATE 15 ML: 1.2 SOLUTION ORAL at 09:03

## 2025-02-26 RX ADMIN — RISPERIDONE 2 MG: 2 TABLET, FILM COATED ORAL at 20:58

## 2025-02-26 RX ADMIN — LEVOTHYROXINE SODIUM 100 MCG: 100 TABLET ORAL at 09:06

## 2025-02-26 RX ADMIN — TRIHEXYPHENIDYL HYDROCHLORIDE 2 MG: 2 TABLET ORAL at 20:48

## 2025-02-26 RX ADMIN — ACETAMINOPHEN 975 MG: 325 TABLET ORAL at 17:10

## 2025-02-26 RX ADMIN — CLONAZEPAM 0.5 MG: 0.5 TABLET ORAL at 17:08

## 2025-02-26 RX ADMIN — OLANZAPINE 5 MG: 5 TABLET, ORALLY DISINTEGRATING ORAL at 13:29

## 2025-02-26 RX ADMIN — Medication 1 TABLET: at 09:04

## 2025-02-26 RX ADMIN — GUAIFENESIN AND DEXTROMETHORPHAN 10 ML: 100; 10 SYRUP ORAL at 20:58

## 2025-02-26 RX ADMIN — CHLORHEXIDINE GLUCONATE 15 ML: 1.2 SOLUTION ORAL at 20:47

## 2025-02-26 RX ADMIN — FLUTICASONE PROPIONATE 2 SPRAY: 50 SPRAY, METERED NASAL at 09:05

## 2025-02-26 RX ADMIN — OLANZAPINE 15 MG: 10 TABLET, ORALLY DISINTEGRATING ORAL at 20:48

## 2025-02-26 RX ADMIN — TRIHEXYPHENIDYL HYDROCHLORIDE 2 MG: 2 TABLET ORAL at 09:04

## 2025-02-26 RX ADMIN — PHENOBARBITAL 64.8 MG: 32.4 TABLET ORAL at 09:04

## 2025-02-26 RX ADMIN — PHENOBARBITAL 64.8 MG: 32.4 TABLET ORAL at 20:47

## 2025-02-26 RX ADMIN — AMMONIUM LACTATE: 12 LOTION TOPICAL at 17:08

## 2025-02-26 RX ADMIN — TRAZODONE HYDROCHLORIDE 100 MG: 100 TABLET ORAL at 20:47

## 2025-02-26 RX ADMIN — CLONAZEPAM 0.5 MG: 0.5 TABLET ORAL at 09:04

## 2025-02-26 RX ADMIN — SENNOSIDES AND DOCUSATE SODIUM 1 TABLET: 50; 8.6 TABLET ORAL at 20:47

## 2025-02-26 NOTE — TELEPHONE ENCOUNTER
Nisha from [facility] aging agency called back, relayed message from [place].

## 2025-02-26 NOTE — PROGRESS NOTES
"Progress Note - Laine Tse 69 y.o. female MRN: [id number]    Unit/Bed#: -01 Encounter: [id number]        Subjective:   Patient seen and examined at bedside after reviewing the chart and discussing the case with the caring staff.      Patient examined at bedside.  Patient reports no acute symptoms.    Physical Exam   Vitals: Blood pressure 145/99, pulse 62, temperature (!) 97.4 °F (36.3 °C), temperature source Temporal, resp. rate 18, height 5' 4\" (1.626 m), weight 76.2 kg (167 lb 15.9 oz), SpO2 94%, not currently breastfeeding.,Body mass index is 28.84 kg/m².  Constitutional: Patient in no acute distress.  HEENT: PERR, EOMI, MMM.  Cardiovascular: Normal rate and regular rhythm.    Pulmonary/Chest: Effort normal and breath sounds normal.   Abdomen: Soft, + BS, NT.    Assessment/Plan:  Laine Tse is a(n) 69 y.o. female with schizoaffective disorder.      Cardiac with hx of HTN, HLD.  Continue atorvastatin 20 mg daily, ASA 81 mg daily.  Not on antihypertensive meds prehospital.  Start lisinopril 5 mg daily 2/15. Hydralazine 25 mg as needed for SBP >170 or DBP >110.  Cont to monitor vitals.    Allergic rhinitis.  Flonase nasal spray daily.  Claritin 10 mg daily as needed (Zyrtec nonform).   Bilateral hearing loss.  Supportive measures.   Osteoporosis.  Continue calcium/vitamin D supplement.  Resume Fosamax on discharge.  DJD/OA/HA/chronic low back pain.  Tylenol, Zanaflex, lidocaine patch as needed.  Hypothyroidism.  Patient is on levothyroxine 100 mcg daily.  TSH normal on 2/13/25.  Seizure disorder.  Continue phenobarbital 64.8 mg q12h.  Pt follows with [facility]'s neurology (last saw 2/11/25).  Chronic constipation.  Continue Senokot S nightly.  Miralax as needed.  Add lactulose and dulxolax suppository as needed 2/18.   Cough/sore throat.  Ongoing x months per pt.  Afebrile.  Lungs clear.  Robitussin DM as needed.    Hyponatremia.  Level 129 -> 130 -> 135 on 2/22/2025.   Dry cracked skin bilateral " heel.  I will put the patient on ammonium lactate twice daily along with Band-Aid or dressing 2/23/2025.  I will also consult podiatry for further evaluation and treatment.

## 2025-02-26 NOTE — NURSING NOTE
"Patient visible in milieu, mood and affect labile. Alert to name only. Patient insisted that it was [facility], became verbally aggressive with nurse upon redirection. Periods of kneeling and lying on floor near foot of bed. Per patient, \"This is my spot\". Patient refused to lie in bed. Patient stated she had \"plenty\" of anxiety and was very sad. Unable to identify causative factors. Denies SI/HI. Patient would not respond to nurse question of whether or not she had hallucinations. Speech loud, pressured, rambling. Thought process disorganized with poor concentration. Patient paranoid of eating something poisonous this morning and repeatedly asked nurse for medication to make her \"throw up\". Patient did agree to take medication with much encouragement and reassurance however threw her cup of water towards the back of the room onto the floor. Patient appears disheveled. Attended group x 2 thus far. Remains medication compliant and on 15\" checks for safety and behaviors.  "

## 2025-02-26 NOTE — NURSING NOTE
Patient mostly withdrawn to room this evening. Out on unit for short periods. Anxiety and depression unable to rate at first, rated both as moderate later when calm. Periods of loud yelling when redirected. No laying in hallway or attempting exit doors this evening.  Denies suicidal /homicidal ideations.  Mood is labile. Positive for medication and HS snack. No complaints voiced.  Maintained on q 15 minute safety checks.  Will continue to monitor.

## 2025-02-26 NOTE — PLAN OF CARE
Problem: Ineffective Coping  Goal: Participates in unit activities  Description: Interventions:  - Provide therapeutic environment   - Provide required programming   - Redirect inappropriate behaviors   Outcome: Not Progressing   Patient continues to struggle with controlling her behaviors and remains hard to redirect most of the time when having behviors.

## 2025-02-26 NOTE — PROGRESS NOTES
02/26/25    Team Meeting   Meeting Type Daily Rounds   Team Members Present   Team Members Present Physician;Nurse;;Occupational Therapist   Physician Team Member Nataliya Negrete MD   Nursing Team Member Raegan LUZ   Social Work Team Member Chacho SADLER    OT Team Member Yazmin CONTRERAS   Patient/Family Present   Patient Present No   Patient's Family Present No   201, Slept on floor, broken sleep, yelling, throwing cup, encouragement to take meds, argumentative, paranoid, loud, nonsensical, high anx/dep, denies si/hi

## 2025-02-26 NOTE — NURSING NOTE
Sleep was interrupted. Found sleeping on floor several times. Encouraged and redirected to sleep in bed. Behavior improved overnight. No yelling out this shift.  No respiratory distress observed while sleeping. Maintained on Q 15 min safety checks. Fall safety precautions remain in place.  Fluids at bedside to promote hydration.

## 2025-02-26 NOTE — NURSING NOTE
"Patient attempted to dial 911 due to believing people were trying to kill \"Gillian\". Patient verbally redirected at this time.  "

## 2025-02-26 NOTE — PLAN OF CARE
Problem: Alteration in Thoughts and Perception  Goal: Treatment Goal: Gain control of psychotic behaviors/thinking, reduce/eliminate presenting symptoms and demonstrate improved reality functioning upon discharge  Outcome: Progressing  Goal: Refrain from acting on delusional thinking/internal stimuli  Description: Interventions:  - Monitor patient closely, per order   - Utilize least restrictive measures   - Set reasonable limits, give positive feedback for acceptable   - Administer medications as ordered and monitor of potential side effects  Outcome: Not Progressing  Goal: Agree to be compliant with medication regime, as prescribed and report medication side effects  Description: Interventions:  - Offer appropriate PRN medication and supervise ingestion; conduct AIMS, as needed   Outcome: Progressing  Goal: Attend and participate in unit activities, including therapeutic, recreational, and educational groups  Description: Interventions:  -Encourage Visitation and family involvement in care  Outcome: Progressing  Goal: Recognize dysfunctional thoughts, communicate reality-based thoughts at the time of discharge  Description: Interventions:  - Provide medication and psycho-education to assist patient in compliance and developing insight into his/her illness   Outcome: Not Progressing     Problem: Depression  Goal: Treatment Goal: Demonstrate behavioral control of depressive symptoms, verbalize feelings of improved mood/affect, and adopt new coping skills prior to discharge  Outcome: Progressing  Goal: Verbalize thoughts and feelings  Description: Interventions:  - Assess and re-assess patient's level of risk   - Engage patient in 1:1 interactions, daily, for a minimum of 15 minutes   - Encourage patient to express feelings, fears, frustrations, hopes   Outcome: Progressing  Goal: Refrain from harming self  Description: Interventions:  - Monitor patient closely, per order   - Supervise medication ingestion, monitor  effects and side effects   Outcome: Progressing  Goal: Refrain from isolation  Description: Interventions:  - Develop a trusting relationship   - Encourage socialization   Outcome: Progressing  Goal: Refrain from self-neglect  Outcome: Progressing     Problem: Anxiety  Goal: Anxiety is at manageable level  Description: Interventions:  - Assess and monitor patient's anxiety level.   - Monitor for signs and symptoms (heart palpitations, chest pain, shortness of breath, headaches, nausea, feeling jumpy, restlessness, irritable, apprehensive).   - Collaborate with interdisciplinary team and initiate plan and interventions as ordered.  - [place] patient to unit/surroundings  - Explain treatment plan  - Encourage participation in care  - Encourage verbalization of concerns/fears  - Identify coping mechanisms  - Assist in developing anxiety-reducing skills  - Administer/offer alternative therapies  - Limit or eliminate stimulants  Outcome: Progressing     Problem: Alteration in Orientation  Goal: Treatment Goal: Demonstrate a reduction of confusion and improved orientation to person, place, time and/or situation upon discharge, according to optimum baseline/ability  Outcome: Progressing  Goal: Interact with staff daily  Description: Interventions:  - Assess and re-assess patient's level of orientation  - Engage patient in 1 on 1 interactions, daily, for a minimum of 15 minutes   - Establish rapport/trust with patient   Outcome: Progressing  Goal: Allow medical examinations, as recommended  Description: Interventions:  - Provide physical/neurological exams and/or referrals, per provider   Outcome: Progressing  Goal: Cooperate with recommended testing/procedures  Description: Interventions:  - Determine need for ancillary testing  - Observe for mental status changes  - Implement falls/precaution protocol   Outcome: Progressing  Goal: Complete daily ADLs, including personal hygiene independently, as able  Description:  Interventions:  - Observe, teach, and assist patient with ADLS  Outcome: Progressing     Problem: DISCHARGE PLANNING - CARE MANAGEMENT  Goal: Discharge to post-acute care or home with appropriate resources  Description: INTERVENTIONS:  - Conduct assessment to determine patient/family and health care team treatment goals, and need for post-acute services based on payer coverage, community resources, and patient preferences, and barriers to discharge  - Address psychosocial, clinical, and financial barriers to discharge as identified in assessment in conjunction with the patient/family and health care team  - Arrange appropriate level of post-acute services according to patient’s   needs and preference and payer coverage in collaboration with the physician and health care team  - Communicate with and update the patient/family, physician, and health care team regarding progress on the discharge plan  - Arrange appropriate transportation to post-acute venues  Outcome: Progressing     Problem: Nutrition/Hydration-ADULT  Goal: Nutrient/Hydration intake appropriate for improving, restoring or maintaining nutritional needs  Description: Monitor and assess patient's nutrition/hydration status for malnutrition. Collaborate with interdisciplinary team and initiate plan and interventions as ordered.  Monitor patient's weight and dietary intake as ordered or per policy. Utilize nutrition screening tool and intervene as necessary. Determine patient's food preferences and provide high-protein, high-caloric foods as appropriate.     INTERVENTIONS:  - Monitor oral intake, urinary output, labs, and treatment plans  - Assess nutrition and hydration status and recommend course of action  - Evaluate amount of meals eaten  - Assist patient with eating if necessary   - Allow adequate time for meals  - Recommend/ encourage appropriate diets, oral nutritional supplements, and vitamin/mineral supplements  - Order, calculate, and assess  calorie counts as needed  - Recommend, monitor, and adjust tube feedings and TPN/PPN based on assessed needs  - Assess need for intravenous fluids  - Provide specific nutrition/hydration education as appropriate  - Include patient/family/caregiver in decisions related to nutrition  Outcome: Progressing

## 2025-02-26 NOTE — PROGRESS NOTES
Progress Note - Behavioral Health   Name: Laine Tse 69 y.o. female I MRN: [id number]   Unit/Bed#: [facility] 209-01 I Date of Admission: 2/14/2025   Date of Service: 2/26/2025 I Hospital Day: 12         Assessment & Plan  Seizure disorder (HCC)  Phenobarbital 64.8 mg twice daily  Schizoaffective disorder (HCC)  Increase Zydis 15 mg p.o. daily at bedtime  Increase Klonopin 0.5 mg po bid.    Insomnia  Trazodone 100 mg po daily HS  Melatonin 6 mg p.o. daily at bedtime  Hypothyroidism    Hyponatremia  135 on 2/22/25    Recommended Treatment:     Planned medication and treatment changes:    All current active medications have been reviewed  Encourage group therapy, milieu therapy and occupational therapy  Behavioral Health checks for safety monitoring  Requires continued inpatient treatment due to chronic illness and high risk of decompensation if discharged before long term stability is achieved  Zydis 15 mg po hs  Klonopin  0.5 mg bid.   Trazodone 100 mg po hs.  Increase Melatonin to 6 mg po hs.  Monitor behavior and fall risk.    Current medications:  Current Facility-Administered Medications   Medication Dose Route Frequency Provider Last Rate    acetaminophen  650 mg Oral Q4H PRN Mima Wells MD      acetaminophen  650 mg Oral Q4H PRN Mima Wells MD      acetaminophen  975 mg Oral Q6H PRN Mima Wells MD      aluminum-magnesium hydroxide-simethicone  30 mL Oral Q4H PRN Mima Wells MD      ammonium lactate   Topical BID Joel Wise MD      aspirin  81 mg Oral Daily Marion L Vickinall, PA-C      atorvastatin  20 mg Oral Daily With Dinner Marion L Vickinall, PA-C      bisacodyl  10 mg Rectal Daily PRN Marion L Dagnall, PA-C      calcium carbonate-vitamin D  1 tablet Oral Daily With Breakfast Marion L Dagnall, PA-C      chlorhexidine  15 mL Swish & Spit Q12H [facility] Marion L Holdenl, PA-C      clonazePAM  0.5 mg Oral TID Papito An MD      dextromethorphan-guaiFENesin  10 mL Oral Q4H PRN Syriac  MD Frandy      fluticasone  2 spray Nasal Daily Marion L Dagnall, PA-C      hydrALAZINE  25 mg Oral Q8H PRN Marion L Dagnall, PA-C      hydrOXYzine HCL  25 mg Oral Q6H PRN Max 4/day Mima Wells MD      hydrOXYzine HCL  50 mg Oral Q6H PRN Max 4/day Mima Wells MD      lactulose  20 g Oral BID PRN Marion L Dagnall, PA-C      levothyroxine  100 mcg Oral Early Morning Marion L Dagnall, PA-C      lidocaine  1 patch Topical Daily PRN Marion L Dagnall, PA-C      lisinopril  5 mg Oral Daily Rosenda Zelaya, PA-C      loratadine  10 mg Oral Daily PRN Marion L Dagnall, PA-C      melatonin  3 mg Oral HS Mima Wells MD      nicotine polacrilex  4 mg Oral Q2H PRN Mima Wells MD      OLANZapine  15 mg Oral HS Papito An MD      PHENobarbital  64.8 mg Oral Q12H Marion L Dagnall, PA-C      polyethylene glycol  17 g Oral Daily PRN Marion L Dagnall, PA-C      propranolol  5 mg Oral Q8H PRN Mima Wells MD      risperiDONE  0.25 mg Oral Q4H PRN Max 6/day Mima Wells MD      risperiDONE  0.5 mg Oral Q4H PRN Max 3/day Mima Wells MD      risperiDONE  1 mg Oral Q2H PRN Max 3/day Mima Wells MD      senna-docusate sodium  1 tablet Oral HS Marion L Dagnall, PA-C      sodium chloride  2 spray Each Nare Q1H PRN Marion L Dagnall, PA-C      tiZANidine  2 mg Oral Q6H PRN Marion L Dagnall, PA-C      traZODone  100 mg Oral HS Papito An MD      traZODone  50 mg Oral Q6H PRN Max 3/day Mima Wells MD      trihexyphenidyl  2 mg Oral BID Papito An MD       Facility-Administered Medications Ordered in Other Encounters   Medication Dose Route Frequency Provider Last Rate    lactated ringers   Intravenous Continuous PRN Celi Preston CRNA         Risks / Benefits of Treatment:    Risks, benefits, and possible side effects of medications explained to patient. Patient has limited understanding of risks and benefits of treatment at this time, but agrees to take  medications as prescribed.    Subjective:    Behavior over the last 24 hours: unchanged.     Laine was seen for continuing care. Patient is on her bed, noted to be praying and continues religiously preoccupied. She is redirectable this morning but staff reports pt is more disruptive, placed self on the floor in the afternoon and evening hours with broken sleep. Her chronic delusion persist but states she does not hear voices presently. Denies current SI or wish to die presently. Calorie intake and meds adherence has been stable. She remains with unsteady gait and requires fall risk precaution.        Sleep: broken sleep  Appetite: decreased  Medication side effects: denies   ROS: no complaints, all other systems are negative    Mental Status Evaluation:    Appearance:  age appropriate, dressed in hospital attire   Behavior:  restless and fidgety   Speech:  increased latency of response   Mood:  anxious   Affect:  labile   Thought Process:  circumstantial, concrete   Associations: circumstantial associations, perseverative   Thought Content:  Shinto preoccupation   Perceptual Disturbances: denies auditory hallucinations when asked, appears preoccupied   Risk Potential: Suicidal ideation - None at present  Homicidal ideation - None at present  Potential for aggression - Not at present   Sensorium:  unable to assess   Memory:  recent and remote memory: unable to assess due to lack of cooperation   Consciousness:  alert   Attention/Concentration: decreased concentration and decreased attention span   Insight:  poor   Judgment: limited   Gait/Station: unstable gait   Motor Activity: abnormal movement noted: dyskinetic oral movements present     Vital signs in last 24 hours:    Temp:  [97.4 °F (36.3 °C)-97.6 °F (36.4 °C)] 97.4 °F (36.3 °C)  HR:  [] 62  BP: (145)/(99) 145/99  Resp:  [18-19] 18  SpO2:  [94 %-97 %] 94 %  O2 Device: None (Room air)         Laboratory results: I have personally reviewed all  pertinent laboratory/tests results    Most Recent Labs:   Lab Results   Component Value Date    WBC 5.69 02/16/2025    RBC 4.36 02/16/2025    HGB 14.2 02/16/2025    HCT 41.8 02/16/2025     02/16/2025    RDW 12.2 02/16/2025    NEUTROABS 3.37 02/16/2025    SODIUM 135 02/22/2025    K 4.6 02/22/2025     02/22/2025    CO2 29 02/22/2025    BUN 22 02/22/2025    CREATININE 0.75 02/22/2025    GLUC 87 02/22/2025    CALCIUM 9.3 02/22/2025    AST 19 02/16/2025    ALT 20 02/16/2025    ALKPHOS 47 02/16/2025    TP 7.3 02/16/2025    ALB 4.3 02/16/2025    TBILI 0.34 02/16/2025    CHOLESTEROL 198 02/16/2025    HDL 77 02/16/2025    TRIG 122 02/16/2025    LDLCALC 97 02/16/2025    NONHDLC 121 02/16/2025    VALPROICTOT 71 11/29/2019    AMMONIA 15 12/19/2017    LUJ0MEXPXRTN 0.920 02/13/2025    FREET4 0.94 08/14/2024    SYPHILISAB Non-reactive 04/05/2024    HGBA1C 5.5 04/26/2024     04/26/2024       Counseling / Coordination of Care:    Patient's progress discussed with staff in treatment team meeting.  Medication changes reviewed with nursing staff.  Supportive therapy provided to patient.  Group attendance encouraged.    Papito An MD 02/26/25

## 2025-02-27 PROCEDURE — 99221 1ST HOSP IP/OBS SF/LOW 40: CPT | Performed by: PODIATRIST

## 2025-02-27 PROCEDURE — 99232 SBSQ HOSP IP/OBS MODERATE 35: CPT | Performed by: PSYCHIATRY & NEUROLOGY

## 2025-02-27 PROCEDURE — 0HBRXZZ EXCISION OF TOE NAIL, EXTERNAL APPROACH: ICD-10-PCS | Performed by: PODIATRIST

## 2025-02-27 PROCEDURE — 11721 DEBRIDE NAIL 6 OR MORE: CPT | Performed by: PODIATRIST

## 2025-02-27 RX ORDER — CLONAZEPAM 1 MG/1
1 TABLET ORAL 2 TIMES DAILY
Status: DISCONTINUED | OUTPATIENT
Start: 2025-02-27 | End: 2025-03-10 | Stop reason: HOSPADM

## 2025-02-27 RX ADMIN — LISINOPRIL 5 MG: 5 TABLET ORAL at 08:54

## 2025-02-27 RX ADMIN — Medication 6 MG: at 22:35

## 2025-02-27 RX ADMIN — PHENOBARBITAL 64.8 MG: 32.4 TABLET ORAL at 08:54

## 2025-02-27 RX ADMIN — TRIHEXYPHENIDYL HYDROCHLORIDE 2 MG: 2 TABLET ORAL at 08:54

## 2025-02-27 RX ADMIN — CLONAZEPAM 0.5 MG: 0.5 TABLET ORAL at 08:54

## 2025-02-27 RX ADMIN — PHENOBARBITAL 64.8 MG: 32.4 TABLET ORAL at 22:33

## 2025-02-27 RX ADMIN — CLONAZEPAM 1 MG: 1 TABLET ORAL at 17:13

## 2025-02-27 RX ADMIN — SENNOSIDES AND DOCUSATE SODIUM 1 TABLET: 50; 8.6 TABLET ORAL at 22:34

## 2025-02-27 RX ADMIN — ASPIRIN 81 MG CHEWABLE TABLET 81 MG: 81 TABLET CHEWABLE at 08:54

## 2025-02-27 RX ADMIN — AMMONIUM LACTATE: 12 LOTION TOPICAL at 08:56

## 2025-02-27 RX ADMIN — CHLORHEXIDINE GLUCONATE 15 ML: 1.2 SOLUTION ORAL at 22:34

## 2025-02-27 RX ADMIN — LEVOTHYROXINE SODIUM 100 MCG: 100 TABLET ORAL at 05:24

## 2025-02-27 RX ADMIN — FLUTICASONE PROPIONATE 2 SPRAY: 50 SPRAY, METERED NASAL at 08:57

## 2025-02-27 RX ADMIN — OLANZAPINE 5 MG: 5 TABLET, ORALLY DISINTEGRATING ORAL at 12:15

## 2025-02-27 RX ADMIN — Medication 1 TABLET: at 08:54

## 2025-02-27 RX ADMIN — CHLORHEXIDINE GLUCONATE 15 ML: 1.2 SOLUTION ORAL at 08:58

## 2025-02-27 RX ADMIN — ATORVASTATIN CALCIUM 20 MG: 20 TABLET, FILM COATED ORAL at 17:13

## 2025-02-27 RX ADMIN — TRAZODONE HYDROCHLORIDE 100 MG: 100 TABLET ORAL at 22:34

## 2025-02-27 RX ADMIN — OLANZAPINE 15 MG: 10 TABLET, ORALLY DISINTEGRATING ORAL at 22:35

## 2025-02-27 RX ADMIN — TRIHEXYPHENIDYL HYDROCHLORIDE 2 MG: 2 TABLET ORAL at 22:35

## 2025-02-27 RX ADMIN — LACTULOSE 20 G: 20 SOLUTION ORAL at 19:39

## 2025-02-27 NOTE — NURSING NOTE
"Patient lying on floor in hallway waiting for her brother to \"take her out of here\". Patient did get off floor with verbal redirection. Remains on 15\" checks for safety and behaviors.  "

## 2025-02-27 NOTE — PROGRESS NOTES
Progress Note - Behavioral Health   Name: Laine Tse 69 y.o. female I MRN: [id number]   Unit/Bed#: [facility] 209-01 I Date of Admission: 2/14/2025   Date of Service: 2/27/2025 I Hospital Day: 13         Assessment & Plan  Seizure disorder (HCC)  Phenobarbital 64.8 mg twice daily  Schizoaffective disorder (HCC)  Increase Zydis 5 mg 1 pm + 15 mg p.o. daily at bedtime  Increase Klonopin 1 mg po bid.    Insomnia  Trazodone 100 mg po daily HS  Melatonin 6 mg p.o. daily at bedtime  Hypothyroidism    Hyponatremia  135 on 2/22/25    Recommended Treatment:     Planned medication and treatment changes:    All current active medications have been reviewed  Encourage group therapy, milieu therapy and occupational therapy  Behavioral Health checks for safety monitoring  Requires continued inpatient treatment due to chronic illness and high risk of decompensation if discharged before long term stability is achieved  Zydis 5 mg  1pm + Zydis 15 mg po hs  Increase Klonopin 1 mg bid.   Trazodone 100 mg po hs.  Melatonin to 6 mg po hs.  Monitor behavior and fall risk.    Current medications:  Current Facility-Administered Medications   Medication Dose Route Frequency Provider Last Rate    acetaminophen  650 mg Oral Q4H PRN Mima Wells MD      acetaminophen  650 mg Oral Q4H PRN Mima Wells MD      acetaminophen  975 mg Oral Q6H PRN Mima Wells MD      aluminum-magnesium hydroxide-simethicone  30 mL Oral Q4H PRN Mima Wells MD      ammonium lactate   Topical BID Joel Wise MD      aspirin  81 mg Oral Daily BAILEY Maddox-C      atorvastatin  20 mg Oral Daily With Dinner Marion Javier PA-C      bisacodyl  10 mg Rectal Daily PRN Marion Javier, PA-C      calcium carbonate-vitamin D  1 tablet Oral Daily With Breakfast MarionBAILEY Owusu-C      chlorhexidine  15 mL Swish & Spit Q12H Count includes the [facility] Marion Javier, PA-C      clonazePAM  0.5 mg Oral BID Papito An MD      dextromethorphan-guaiFENesin  10  mL Oral Q4H PRN Joel Wise MD      fluticasone  2 spray Nasal Daily Marion L Dagnall, PA-C      hydrALAZINE  25 mg Oral Q8H PRN Marion L Dagnall, PA-C      hydrOXYzine HCL  25 mg Oral Q6H PRN Max 4/day Mima Wells MD      hydrOXYzine HCL  50 mg Oral Q6H PRN Max 4/day Mima Wells MD      lactulose  20 g Oral BID PRN Marion L Dagnall, PA-C      levothyroxine  100 mcg Oral Early Morning Marion L Dagnall, PA-C      lidocaine  1 patch Topical Daily PRN Marion L Dagnall, PA-C      lisinopril  5 mg Oral Daily Rosenda Zelaya, PA-C      loratadine  10 mg Oral Daily PRN Marion L Dagnall, PA-C      melatonin  6 mg Oral HS Papito An MD      nicotine polacrilex  4 mg Oral Q2H PRN Mima Wells MD      OLANZapine  15 mg Oral HS Papito An MD      OLANZapine  5 mg Oral Daily Papito An MD      PHENobarbital  64.8 mg Oral Q12H Marion L Dagnall, PA-C      polyethylene glycol  17 g Oral Daily PRN Marion L Dagnall, PA-C      propranolol  5 mg Oral Q8H PRN Mima Wells MD      risperiDONE  0.5 mg Oral Q4H PRN Max 6/day Papito An MD      risperiDONE  1 mg Oral 4x Daily PRN Papito An MD      risperiDONE  2 mg Oral TID PRN Papito An MD      senna-docusate sodium  1 tablet Oral HS Marion L Dagnall, PA-C      sodium chloride  2 spray Each Nare Q1H PRN Marion L Dagnall, PA-C      tiZANidine  2 mg Oral Q6H PRN Marion L Dagnall, PA-C      traZODone  100 mg Oral HS Papito An MD      traZODone  50 mg Oral Q6H PRN Max 3/day Mima Wells MD      trihexyphenidyl  2 mg Oral BID Papito An MD      ziprasidone  10 mg Intramuscular Once Mima Wells MD       Facility-Administered Medications Ordered in Other Encounters   Medication Dose Route Frequency Provider Last Rate    lactated ringers   Intravenous Continuous PRN Celi Benson CRNA         Risks / Benefits of Treatment:    Risks, benefits, and possible side effects of medications explained to patient. Patient has  limited understanding of risks and benefits of treatment at this time, but agrees to take medications as prescribed.    Subjective:    Behavior over the last 24 hours: regressed.     Laine was seen for continuing care. Staff reports pt became more restless and loud, requiring to be placed in quiet room last night. Patient continues with loud speech, religiously preoccupied and difficult to be redirected this morning. Her chronic delusion persist which is at her baseline but states she does not hear voices presently. Denies current SI or wish to die presently. Calorie intake and meds adherence has been stable. No acute focal neurological or change of mental status noted.         Sleep: broken sleep  Appetite: decreased  Medication side effects: denies   ROS: no complaints, all other systems are negative    Mental Status Evaluation:    Appearance:  age appropriate, dressed in hospital attire   Behavior:  restless and fidgety   Speech:  increased latency of response, loud at times    Mood:  irritable   Affect:  labile   Thought Process:  circumstantial, concrete   Associations: circumstantial associations, perseverative   Thought Content:  Buddhist preoccupation   Perceptual Disturbances: denies auditory hallucinations when asked, appears preoccupied   Risk Potential: Suicidal ideation - None at present  Homicidal ideation - None at present  Potential for aggression - Not at present   Sensorium:  unable to assess   Memory:  recent and remote memory: unable to assess due to lack of cooperation   Consciousness:  alert   Attention/Concentration: decreased concentration and decreased attention span   Insight:  poor   Judgment: limited   Gait/Station: unstable gait   Motor Activity: abnormal movement noted: dyskinetic oral movements present     Vital signs in last 24 hours:    Temp:  [97.3 °F (36.3 °C)-98 °F (36.7 °C)] 97.3 °F (36.3 °C)  HR:  [66-89] 89  BP: (108-168)/(59-93) 168/93  Resp:  [16-20] 20  SpO2:  [90 %-98 %]  98 %  O2 Device: None (Room air)         Laboratory results: I have personally reviewed all pertinent laboratory/tests results    Most Recent Labs:   Lab Results   Component Value Date    WBC 5.69 02/16/2025    RBC 4.36 02/16/2025    HGB 14.2 02/16/2025    HCT 41.8 02/16/2025     02/16/2025    RDW 12.2 02/16/2025    NEUTROABS 3.37 02/16/2025    SODIUM 135 02/22/2025    K 4.6 02/22/2025     02/22/2025    CO2 29 02/22/2025    BUN 22 02/22/2025    CREATININE 0.75 02/22/2025    GLUC 87 02/22/2025    CALCIUM 9.3 02/22/2025    AST 19 02/16/2025    ALT 20 02/16/2025    ALKPHOS 47 02/16/2025    TP 7.3 02/16/2025    ALB 4.3 02/16/2025    TBILI 0.34 02/16/2025    CHOLESTEROL 198 02/16/2025    HDL 77 02/16/2025    TRIG 122 02/16/2025    LDLCALC 97 02/16/2025    NONHDLC 121 02/16/2025    VALPROICTOT 71 11/29/2019    AMMONIA 15 12/19/2017    [id number] 0.920 02/13/2025    FREET4 0.94 08/14/2024    SYPHILISAB Non-reactive 04/05/2024    HGBA1C 5.5 04/26/2024     04/26/2024       Counseling / Coordination of Care:    Patient's progress discussed with staff in treatment team meeting.  Medication changes reviewed with nursing staff.  Supportive therapy provided to patient.  Group attendance encouraged.    Papito An MD 02/27/25

## 2025-02-27 NOTE — SOCIAL WORK
DALE spoke to Rowdy at pt group home with [facility] [phone].CM provided pt progress. Cm asked about pt roommate status and  pt does not have a roommate. Each person living in the [facility] is given their own room.

## 2025-02-27 NOTE — NURSING NOTE
"Patient visible in milieu, periodically wanders unit however mostly stays in or around her room. Mood and affect labile, periods of irritability with staff. Alert to self only, verbalizes simple needs. Thought process disorganized. Speech loud, pressured, rambling, and repetitive. Patient stated she has \"lots of anxiety and depression\" because the lights were on in her room, lights turned off per request. Denies SI/HI, unable to assess for hallucinations as she would not speak about this to staff. Patient requesting PRN laxative. Nurse offered Miralax, patient refused stating it was \"poison\". MD aware. Patient did not attend any groups thus far. Periods of yelling out for \"Gillian\", her cousin, was she states that Gillian wants to leave here. Patient remains medication compliant and on 15\" checks for safety and behaviors.   "

## 2025-02-27 NOTE — PROGRESS NOTES
"Progress Note - Laine Tse 69 y.o. female MRN: [id number]    Unit/Bed#: -01 Encounter: [id number]        Subjective:   Patient seen and examined at bedside after reviewing the chart and discussing the case with the caring staff.      Patient examined at bedside.  Patient is complaining of constipation.    Physical Exam   Vitals: Blood pressure 168/93, pulse 89, temperature (!) 97.3 °F (36.3 °C), temperature source Temporal, resp. rate 20, height 5' 4\" (1.626 m), weight 76.2 kg (167 lb 15.9 oz), SpO2 98%, not currently breastfeeding.,Body mass index is 28.84 kg/m².  Constitutional: Patient in no acute distress.  HEENT: PERR, EOMI, MMM.  Cardiovascular: Normal rate and regular rhythm.    Pulmonary/Chest: Effort normal and breath sounds normal.   Abdomen: Soft, + BS, NT.    Assessment/Plan:  Laine Tse is a(n) 69 y.o. female with schizoaffective disorder.      Cardiac with hx of HTN, HLD.  Continue atorvastatin 20 mg daily, ASA 81 mg daily.  Not on antihypertensive meds prehospital.  Start lisinopril 5 mg daily 2/15. Hydralazine 25 mg as needed for SBP >170 or DBP >110.  Cont to monitor vitals.    Allergic rhinitis.  Flonase nasal spray daily.  Claritin 10 mg daily as needed (Zyrtec nonform).   Bilateral hearing loss.  Supportive measures.   Osteoporosis.  Continue calcium/vitamin D supplement.  Resume Fosamax on discharge.  DJD/OA/HA/chronic low back pain.  Tylenol, Zanaflex, lidocaine patch as needed.  Hypothyroidism.  Patient is on levothyroxine 100 mcg daily.  TSH normal on 2/13/25.  Seizure disorder.  Continue phenobarbital 64.8 mg q12h.  Pt follows with [facility] neurology (last saw 2/11/25).  Chronic constipation.  Continue Senokot S nightly.  Miralax as needed.  Add lactulose and dulxolax suppository as needed 2/18.   Cough/sore throat.  Ongoing x months per pt.  Afebrile.  Lungs clear.  Robitussin DM as needed.    Hyponatremia.  Level 129 -> 130 -> 135 on 2/22/2025.   Dry cracked skin " bilateral heel.  I will put the patient on ammonium lactate twice daily along with Band-Aid or dressing 2/23/2025.  I will also consult podiatry for further evaluation and treatment.

## 2025-02-27 NOTE — TREATMENT TEAM
02/26/25 2058   Broset Violence Checklist   Assessment type Shift   Irritability 1   Confusion 1   Boisterousness 1   Threatening physical violence 0   Verbal threats 1   Violence 0   Broset score 4       Risperdal 2mg given for severe agitation as indicated by the Broset scale. Pt is making comments that she is looking for a weapon and that she wants to kill the doctor who prescribed haldol to her because that is the wrong medication. Will re-assess.

## 2025-02-27 NOTE — MALNUTRITION/BMI
This medical record reflects one or more clinical indicators suggestive of malnutrition and/or morbid obesity.    Malnutrition Findings:   Adult Malnutrition type: Acute illness  Adult Degree of Malnutrition: Other severe protein calorie malnutrition  Malnutrition Characteristics: Inadequate energy, Weight loss                360 Statement: Malnutrition related to acute illness in setting of psychiatric illness as evidenced by >5% body weight loss in 1 month, <50% energy intake compared to estimated energy needs for >5 days.  To treat with oral diet and nutrition supplements as accepted.    BMI Findings:           Body mass index is 28.84 kg/m².     See Nutrition note dated 2/27/2025 in flow sheets for additional details.  Completed nutrition assessment is viewable in the nutrition documentation.

## 2025-02-27 NOTE — NURSING NOTE
"Patient med compliant, participated in a snack tonight. She is visible in the milieu, She reports being anxious but did not answer the rest of the assessment questions. Patient continues to make comments such as \"I need a gun because the doctor needs to be shot\", \"all they do is shoot people up with dope\". Safety precautions maintained q15 mins.     "

## 2025-02-27 NOTE — NURSING NOTE
"Patient laying on floor near foot of bed 2 per request. Refusing to get off floor. Remains on 15\" checks for safety and behaviors.  "

## 2025-02-27 NOTE — PLAN OF CARE
Problem: Alteration in Thoughts and Perception  Goal: Treatment Goal: Gain control of psychotic behaviors/thinking, reduce/eliminate presenting symptoms and demonstrate improved reality functioning upon discharge  Outcome: Not Progressing  Goal: Refrain from acting on delusional thinking/internal stimuli  Description: Interventions:  - Monitor patient closely, per order   - Utilize least restrictive measures   - Set reasonable limits, give positive feedback for acceptable   - Administer medications as ordered and monitor of potential side effects  Outcome: Not Progressing  Goal: Recognize dysfunctional thoughts, communicate reality-based thoughts at the time of discharge  Description: Interventions:  - Provide medication and psycho-education to assist patient in compliance and developing insight into his/her illness   Outcome: Not Progressing     Problem: Alteration in Thoughts and Perception  Goal: Agree to be compliant with medication regime, as prescribed and report medication side effects  Description: Interventions:  - Offer appropriate PRN medication and supervise ingestion; conduct AIMS, as needed   Outcome: Progressing

## 2025-02-27 NOTE — NURSING NOTE
Patient appears to have had broken sleep through the majority of the overnight hours. She was noted to be asleep between the hours of 0330 and 0430. Risperdal given at 2058 was ineffective. No concerns voiced, no signs of distress. Continuous 15 min safety checks in place.

## 2025-02-27 NOTE — SOCIAL WORK
DALE spoke to Nisha [phone] ext [phone] with [facility].  CM provided pt progress up. [facility] will continue to follow pt and requesting to be notified of d/c.

## 2025-02-27 NOTE — TREATMENT TEAM
02/26/25 2351   Provider Notification   Reason for Communication Other (Comment)  (need for IM med)   Provider Name Dr. Wells   Provider Role Attending physician   Method of Communication   (secure chat)   Response See orders   Notification Time 5076   Shift Event   (one time dose ordered of geodon)       Geodon IM ordered one time dose for severe agitation.

## 2025-02-27 NOTE — PROGRESS NOTES
02/27/25   Team Meeting   Meeting Type Daily Rounds   Team Members Present   Team Members Present Physician;Nurse;;Occupational Therapist   Physician Team Member Nataliya Negrete MD   Nursing Team Member Raegan LUZ   Social Work Team Member Chacho SADLER   OT Team Member Yazmin CONTRERAS   Patient/Family Present   Patient Present No   Patient's Family Present No   201, verbally attacked peer, tried to call 911, calling crisis and [facility] on-call, monitor phone, stating she has to shoot ppl bc the hospital is giving her dope, quiet room, loud, delusional, high dep/anx, does not want lights on in room, meds are poisoned, d/c Wed/Thurs after monitoring med changes

## 2025-02-28 PROBLEM — E43 SEVERE PROTEIN-CALORIE MALNUTRITION (HCC): Status: ACTIVE | Noted: 2025-02-28

## 2025-02-28 PROCEDURE — 99232 SBSQ HOSP IP/OBS MODERATE 35: CPT | Performed by: PSYCHIATRY & NEUROLOGY

## 2025-02-28 RX ADMIN — ACETAMINOPHEN 975 MG: 325 TABLET ORAL at 17:04

## 2025-02-28 RX ADMIN — TRAZODONE HYDROCHLORIDE 100 MG: 100 TABLET ORAL at 20:48

## 2025-02-28 RX ADMIN — FLUTICASONE PROPIONATE 2 SPRAY: 50 SPRAY, METERED NASAL at 08:37

## 2025-02-28 RX ADMIN — TRIHEXYPHENIDYL HYDROCHLORIDE 2 MG: 2 TABLET ORAL at 08:34

## 2025-02-28 RX ADMIN — CHLORHEXIDINE GLUCONATE 15 ML: 1.2 SOLUTION ORAL at 20:49

## 2025-02-28 RX ADMIN — OLANZAPINE 15 MG: 10 TABLET, ORALLY DISINTEGRATING ORAL at 20:48

## 2025-02-28 RX ADMIN — ATORVASTATIN CALCIUM 20 MG: 20 TABLET, FILM COATED ORAL at 15:58

## 2025-02-28 RX ADMIN — Medication 1 TABLET: at 08:34

## 2025-02-28 RX ADMIN — Medication 6 MG: at 20:48

## 2025-02-28 RX ADMIN — HYDROXYZINE HYDROCHLORIDE 50 MG: 50 TABLET, FILM COATED ORAL at 15:22

## 2025-02-28 RX ADMIN — RISPERIDONE 2 MG: 2 TABLET, FILM COATED ORAL at 15:21

## 2025-02-28 RX ADMIN — SENNOSIDES AND DOCUSATE SODIUM 1 TABLET: 50; 8.6 TABLET ORAL at 20:47

## 2025-02-28 RX ADMIN — PHENOBARBITAL 64.8 MG: 32.4 TABLET ORAL at 08:34

## 2025-02-28 RX ADMIN — LEVOTHYROXINE SODIUM 100 MCG: 100 TABLET ORAL at 06:01

## 2025-02-28 RX ADMIN — ASPIRIN 81 MG CHEWABLE TABLET 81 MG: 81 TABLET CHEWABLE at 08:34

## 2025-02-28 RX ADMIN — TRIHEXYPHENIDYL HYDROCHLORIDE 2 MG: 2 TABLET ORAL at 20:47

## 2025-02-28 RX ADMIN — ACETAMINOPHEN 650 MG: 325 TABLET ORAL at 20:47

## 2025-02-28 RX ADMIN — CHLORHEXIDINE GLUCONATE 15 ML: 1.2 SOLUTION ORAL at 08:35

## 2025-02-28 RX ADMIN — LISINOPRIL 5 MG: 5 TABLET ORAL at 08:34

## 2025-02-28 RX ADMIN — TIZANIDINE 2 MG: 4 TABLET ORAL at 20:47

## 2025-02-28 RX ADMIN — CLONAZEPAM 1 MG: 1 TABLET ORAL at 17:05

## 2025-02-28 RX ADMIN — CLONAZEPAM 1 MG: 1 TABLET ORAL at 08:35

## 2025-02-28 RX ADMIN — GUAIFENESIN AND DEXTROMETHORPHAN 10 ML: 100; 10 SYRUP ORAL at 20:49

## 2025-02-28 RX ADMIN — ACETAMINOPHEN 975 MG: 325 TABLET ORAL at 10:42

## 2025-02-28 RX ADMIN — HYDROXYZINE HYDROCHLORIDE 50 MG: 50 TABLET, FILM COATED ORAL at 22:03

## 2025-02-28 RX ADMIN — AMMONIUM LACTATE 1 APPLICATION: 12 LOTION TOPICAL at 08:35

## 2025-02-28 RX ADMIN — OLANZAPINE 5 MG: 5 TABLET, ORALLY DISINTEGRATING ORAL at 13:31

## 2025-02-28 RX ADMIN — PHENOBARBITAL 64.8 MG: 32.4 TABLET ORAL at 20:48

## 2025-02-28 NOTE — NURSING NOTE
Noted sleep improvement overnight. Patient had mildly interrupted sleep this shift. . Awake x 2. Redirected to not sleep on floor, did sleep in bed most of shift. Maintained on q 15 minute safety checks. No acute medical issues. No acute behaviors this shift. Currently, appears sleeping. Fluids at bedside to promote hydration.

## 2025-02-28 NOTE — PROGRESS NOTES
Progress Note - Behavioral Health   Name: Laine Tse 69 y.o. female I MRN: [id number]   Unit/Bed#: [facility] 209-01 I Date of Admission: 2/14/2025   Date of Service: 2/28/2025 I Hospital Day: 14         Assessment & Plan  Seizure disorder (HCC)  Phenobarbital 64.8 mg twice daily  Schizoaffective disorder (HCC)    Insomnia    Hypothyroidism    Hyponatremia  135 on 2/22/25  Severe protein-calorie malnutrition (HCC)  Malnutrition Findings:   Adult Malnutrition type: Acute illness  Adult Degree of Malnutrition: Other severe protein calorie malnutrition  Malnutrition Characteristics: Inadequate energy, Weight loss  360 Statement: Malnutrition related to acute illness in setting of psychiatric illness as evidenced by >5% body weight loss in 1 month, <50% energy intake compared to estimated energy needs for >5 days.  To treat with oral diet and nutrition supplements as accepted.    BMI Findings:      Body mass index is 28.84 kg/m².       Recommended Treatment:     Planned medication and treatment changes:    All current active medications have been reviewed  Encourage group therapy, milieu therapy and occupational therapy  Behavioral Health checks for safety monitoring  Requires continued inpatient treatment due to chronic illness and high risk of decompensation if discharged before long term stability is achieved  Zydis 5 mg  1pm + Zydis 15 mg po hs  Klonopin 1 mg bid.   Trazodone 100 mg po hs.  Melatonin to 6 mg po hs.  Monitor behavior and fall risk.    Current medications:  Current Facility-Administered Medications   Medication Dose Route Frequency Provider Last Rate    acetaminophen  650 mg Oral Q4H PRN Mima Wells MD      acetaminophen  650 mg Oral Q4H PRN Mima Wells MD      acetaminophen  975 mg Oral Q6H PRN Mima Wells MD      aluminum-magnesium hydroxide-simethicone  30 mL Oral Q4H PRN Mima Wells MD      ammonium lactate   Topical BID Joel Wise MD      aspirin  81 mg Oral Daily  Marion L Dagnall, PA-C      atorvastatin  20 mg Oral Daily With Dinner Marion L Vickinall, PA-C      bisacodyl  10 mg Rectal Daily PRN Marion L Dagnall, PA-C      calcium carbonate-vitamin D  1 tablet Oral Daily With Breakfast Marion L Vickinall, PA-C      chlorhexidine  15 mL Swish & Spit Q12H JANNIE Marion CAMPBELL Vickinall, PA-C      clonazePAM  1 mg Oral BID Papito An MD      dextromethorphan-guaiFENesin  10 mL Oral Q4H PRN Joel Wise MD      fluticasone  2 spray Nasal Daily Marion L Dagnall, PA-C      hydrALAZINE  25 mg Oral Q8H PRN Marion L Dagnall, PA-C      hydrOXYzine HCL  25 mg Oral Q6H PRN Max 4/day Mima Wells MD      hydrOXYzine HCL  50 mg Oral Q6H PRN Max 4/day Mima Wells MD      lactulose  20 g Oral BID PRN Marion L Vickinall, PA-C      levothyroxine  100 mcg Oral Early Morning Marion L Vickinall, PA-C      lidocaine  1 patch Topical Daily PRN Marion L Dagnall, PA-C      lisinopril  5 mg Oral Daily Rosenda Zelaya PA-C      loratadine  10 mg Oral Daily PRN Marion L Vickinall, PA-C      melatonin  6 mg Oral HS Papito An MD      nicotine polacrilex  4 mg Oral Q2H PRN Mima Wells MD      OLANZapine  15 mg Oral HS Papito An MD      OLANZapine  5 mg Oral Daily Papito An MD      PHENobarbital  64.8 mg Oral Q12H Marion L Dagnall, PA-C      polyethylene glycol  17 g Oral Daily PRN Marion L Dagnall, PA-C      propranolol  5 mg Oral Q8H PRN Mima Wells MD      risperiDONE  0.5 mg Oral Q4H PRN Max 6/day Papito An MD      risperiDONE  1 mg Oral 4x Daily PRN Papito An MD      risperiDONE  2 mg Oral TID PRN Papito An MD      senna-docusate sodium  1 tablet Oral HS Marion L Dagnall, PA-C      sodium chloride  2 spray Each Nare Q1H PRN Marion L Dagnall, PA-C      tiZANidine  2 mg Oral Q6H PRN Marion Javier PA-C      traZODone  100 mg Oral HS Papito An MD      traZODone  50 mg Oral Q6H PRN Max 3/day iMma Wells MD      trihexyphenidyl  2 mg Oral BID  Papito An MD       Facility-Administered Medications Ordered in Other Encounters   Medication Dose Route Frequency Provider Last Rate    lactated ringers   Intravenous Continuous PRN Celi Benson CRNA         Risks / Benefits of Treatment:    Risks, benefits, and possible side effects of medications explained to patient. Patient has limited understanding of risks and benefits of treatment at this time, but agrees to take medications as prescribed.    Subjective:    Behavior over the last 24 hours: unchanged.     Laine was seen for continuing care. Staff reports pt's behavior was more in controlled and sleep better last night. However, she continues exhibiting intermittent episodes of restlessness, requiring frequent redirection. She continues yelling, lying on the floor periodically and was difficult to be redirected this morning. Her chronic Religion theme delusion persist which is at her baseline but states she does not hear voices presently. Denies current SI or wish to die presently. Calorie intake and meds adherence has been stable.     Sleep: slept better   Appetite: decreased  Medication side effects: denies   ROS: no complaints, all other systems are negative    Mental Status Evaluation:    Appearance:  age appropriate   Behavior:  restless and fidgety   Speech:  increased volume, loud at times    Mood:  dysphoric, anxious   Affect:  labile   Thought Process:  circumstantial, concrete   Associations: circumstantial associations, perseverative   Thought Content:  Religion preoccupation   Perceptual Disturbances: denies auditory hallucinations when asked, appears preoccupied   Risk Potential: Suicidal ideation - None at present  Homicidal ideation - None at present  Potential for aggression - Not at present   Sensorium:  unable to assess   Memory:  recent and remote memory: unable to assess due to lack of cooperation   Consciousness:  alert   Attention/Concentration: decreased concentration and  decreased attention span   Insight:  poor   Judgment: limited   Gait/Station: unstable gait   Motor Activity: abnormal movement noted: dyskinetic oral movements present     Vital signs in last 24 hours:    Temp:  [98.6 °F (37 °C)] 98.6 °F (37 °C)  HR:  [77] 77  BP: (122)/(86) 122/86  Resp:  [18] 18  SpO2:  [93 %] 93 %         Laboratory results: I have personally reviewed all pertinent laboratory/tests results    Most Recent Labs:   Lab Results   Component Value Date    WBC 5.69 02/16/2025    RBC 4.36 02/16/2025    HGB 14.2 02/16/2025    HCT 41.8 02/16/2025     02/16/2025    RDW 12.2 02/16/2025    NEUTROABS 3.37 02/16/2025    SODIUM 135 02/22/2025    K 4.6 02/22/2025     02/22/2025    CO2 29 02/22/2025    BUN 22 02/22/2025    CREATININE 0.75 02/22/2025    GLUC 87 02/22/2025    CALCIUM 9.3 02/22/2025    AST 19 02/16/2025    ALT 20 02/16/2025    ALKPHOS 47 02/16/2025    TP 7.3 02/16/2025    ALB 4.3 02/16/2025    TBILI 0.34 02/16/2025    CHOLESTEROL 198 02/16/2025    HDL 77 02/16/2025    TRIG 122 02/16/2025    LDLCALC 97 02/16/2025    NONHDLC 121 02/16/2025    VALPROICTOT 71 11/29/2019    AMMONIA 15 12/19/2017    [id number] 0.920 02/13/2025    FREET4 0.94 08/14/2024    SYPHILISAB Non-reactive 04/05/2024    HGBA1C 5.5 04/26/2024     04/26/2024       Counseling / Coordination of Care:    Patient's progress discussed with staff in treatment team meeting.  Medication changes reviewed with nursing staff.  Supportive therapy provided to patient.  Group attendance encouraged.    Papito An MD 02/28/25

## 2025-02-28 NOTE — NURSING NOTE
Patient minimally social with peers. Out in the community for brief periods.  Rated depression and anxiety as mild.  Denied any suicidal thoughts or hallucinations. No yelling out this evening  Pleasant during assessment. Compliant with medications and snacks.  lactulose (CHRONULAC) oral solution 20 g given PO at 1938 per request. This medication was effective for large BM.  HS medication education given. Care Plan reviewed and amended. Maintained on q 15 minute checks.  Will continue to monitor.

## 2025-02-28 NOTE — PLAN OF CARE
Problem: Alteration in Thoughts and Perception  Goal: Treatment Goal: Gain control of psychotic behaviors/thinking, reduce/eliminate presenting symptoms and demonstrate improved reality functioning upon discharge  Outcome: Progressing  Goal: Refrain from acting on delusional thinking/internal stimuli  Description: Interventions:  - Monitor patient closely, per order   - Utilize least restrictive measures   - Set reasonable limits, give positive feedback for acceptable   - Administer medications as ordered and monitor of potential side effects  Outcome: Progressing  Goal: Agree to be compliant with medication regime, as prescribed and report medication side effects  Description: Interventions:  - Offer appropriate PRN medication and supervise ingestion; conduct AIMS, as needed   Outcome: Progressing  Goal: Attend and participate in unit activities, including therapeutic, recreational, and educational groups  Description: Interventions:  -Encourage Visitation and family involvement in care  Outcome: Progressing  Goal: Recognize dysfunctional thoughts, communicate reality-based thoughts at the time of discharge  Description: Interventions:  - Provide medication and psycho-education to assist patient in compliance and developing insight into his/her illness   Outcome: Progressing     Problem: Depression  Goal: Treatment Goal: Demonstrate behavioral control of depressive symptoms, verbalize feelings of improved mood/affect, and adopt new coping skills prior to discharge  Outcome: Progressing  Goal: Verbalize thoughts and feelings  Description: Interventions:  - Assess and re-assess patient's level of risk   - Engage patient in 1:1 interactions, daily, for a minimum of 15 minutes   - Encourage patient to express feelings, fears, frustrations, hopes   Outcome: Progressing  Goal: Refrain from harming self  Description: Interventions:  - Monitor patient closely, per order   - Supervise medication ingestion, monitor effects  and side effects   Outcome: Progressing  Goal: Refrain from isolation  Description: Interventions:  - Develop a trusting relationship   - Encourage socialization   Outcome: Progressing  Goal: Refrain from self-neglect  Outcome: Progressing     Problem: Anxiety  Goal: Anxiety is at manageable level  Description: Interventions:  - Assess and monitor patient's anxiety level.   - Monitor for signs and symptoms (heart palpitations, chest pain, shortness of breath, headaches, nausea, feeling jumpy, restlessness, irritable, apprehensive).   - Collaborate with interdisciplinary team and initiate plan and interventions as ordered.  - [place] patient to unit/surroundings  - Explain treatment plan  - Encourage participation in care  - Encourage verbalization of concerns/fears  - Identify coping mechanisms  - Assist in developing anxiety-reducing skills  - Administer/offer alternative therapies  - Limit or eliminate stimulants  Outcome: Progressing     Problem: Alteration in Orientation  Goal: Treatment Goal: Demonstrate a reduction of confusion and improved orientation to person, place, time and/or situation upon discharge, according to optimum baseline/ability  Outcome: Progressing  Goal: Interact with staff daily  Description: Interventions:  - Assess and re-assess patient's level of orientation  - Engage patient in 1 on 1 interactions, daily, for a minimum of 15 minutes   - Establish rapport/trust with patient   Outcome: Progressing  Goal: Allow medical examinations, as recommended  Description: Interventions:  - Provide physical/neurological exams and/or referrals, per provider   Outcome: Progressing  Goal: Cooperate with recommended testing/procedures  Description: Interventions:  - Determine need for ancillary testing  - Observe for mental status changes  - Implement falls/precaution protocol   Outcome: Progressing  Goal: Complete daily ADLs, including personal hygiene independently, as able  Description:  Interventions:  - Observe, teach, and assist patient with ADLS  Outcome: Progressing     Problem: DISCHARGE PLANNING - CARE MANAGEMENT  Goal: Discharge to post-acute care or home with appropriate resources  Description: INTERVENTIONS:  - Conduct assessment to determine patient/family and health care team treatment goals, and need for post-acute services based on payer coverage, community resources, and patient preferences, and barriers to discharge  - Address psychosocial, clinical, and financial barriers to discharge as identified in assessment in conjunction with the patient/family and health care team  - Arrange appropriate level of post-acute services according to patient’s   needs and preference and payer coverage in collaboration with the physician and health care team  - Communicate with and update the patient/family, physician, and health care team regarding progress on the discharge plan  - Arrange appropriate transportation to post-acute venues  Outcome: Progressing     Problem: Nutrition/Hydration-ADULT  Goal: Nutrient/Hydration intake appropriate for improving, restoring or maintaining nutritional needs  Description: Monitor and assess patient's nutrition/hydration status for malnutrition. Collaborate with interdisciplinary team and initiate plan and interventions as ordered.  Monitor patient's weight and dietary intake as ordered or per policy. Utilize nutrition screening tool and intervene as necessary. Determine patient's food preferences and provide high-protein, high-caloric foods as appropriate.     INTERVENTIONS:  - Monitor oral intake, urinary output, labs, and treatment plans  - Assess nutrition and hydration status and recommend course of action  - Evaluate amount of meals eaten  - Assist patient with eating if necessary   - Allow adequate time for meals  - Recommend/ encourage appropriate diets, oral nutritional supplements, and vitamin/mineral supplements  - Order, calculate, and assess  calorie counts as needed  - Recommend, monitor, and adjust tube feedings and TPN/PPN based on assessed needs  - Assess need for intravenous fluids  - Provide specific nutrition/hydration education as appropriate  - Include patient/family/caregiver in decisions related to nutrition  Outcome: Progressing

## 2025-02-28 NOTE — CONSULTS
[facility]iatry - Consultation    Patient Information:   Laine Tse 69 y.o. female MRN: [id number]  Unit/Bed#: [facility] 209-01 Encounter: [id number]  PCP: Adilson Yip MD  Date of Admission:  2/14/2025  Date of Consultation: 02/27/25  Requesting Physician: Papito An MD      ASSESSMENT:    Laine Tse is a 69 y.o. female with:    Painful thick nails on both feet  Prediabetic    PLAN:    Debride mycotic nails and thin the nail plates x 6 with the use of a nail nipper manually and an electric Dremel bur was used to reduce the thickness of the nail beds and smoothed the distal aspect of the nails.   Rest of care per primary team.      Weight Bearing Status: FWB    SUBJECTIVE    History of Present Illness:    Laine Tse is a 69 y.o. female with past medical history of prediabetes who presents with painful thick nails on both feet.     Review of Systems:    Constitutional: Negative.    HENT: Negative.    Eyes: Negative.    Respiratory: Negative.    Cardiovascular: Negative.    Gastrointestinal: Negative.    Musculoskeletal: Negative  Skin: Thick elongated nails on both feet  Neurological: Negative  Psych: Negative.     Past Medical and Surgical History:     Past Medical History:   Diagnosis Date    Anxiety     Benign essential hypertension     resolved; 06/15/16    Depression     Depression with anxiety     Fracture of fifth metatarsal bone of right foot with delayed healing     last assessed 04/12/16; fracture of metatarsal bone, right, closed, initial encounter    Hyperlipidemia     last assessed 11/28/17    Hypothyroidism     last assessed 11/28/2017    Insomnia     Obesity     Schizoaffective disorder (HCC)     last assessed 05/18/2017    Seizure disorder (HCC)     last assessed 03/28/17    Seizures (HCC)        Past Surgical History:   Procedure Laterality Date    COLONOSCOPY      complete    LA COLONOSCOPY FLX DX W/COLLJ SPEC WHEN PFRMD N/A 8/30/2018    Procedure: COLONOSCOPY with polypectomies;   Surgeon: Andriy Lopez MD;  Location: [facility];  Service: Gastroenterology       Meds/Allergies:      Medications Prior to Admission:     acetaminophen (TYLENOL) 500 mg tablet    alendronate (FOSAMAX) 70 mg tablet    aspirin (Aspirin Low Dose) 81 mg chewable tablet    atorvastatin (LIPITOR) 20 mg tablet    bismuth subsalicylate (GNP Pink Bismuth) 262 MG chewable tablet    busPIRone (BUSPAR) 15 mg tablet    calcium carbonate-vitamin D 250 mg-3.125 mcg per tablet    calcium carbonate-vitamin D 500 mg-5 mcg per tablet    cetirizine (ZyrTEC) 5 MG tablet    chlorhexidine (PERIDEX) 0.12 % solution    Diclofenac Sodium (VOLTAREN) 1 %    divalproex sodium (DEPAKOTE) 500 mg EC tablet    docusate sodium (Stool Softener) 100 mg capsule    fluticasone (FLONASE) 50 mcg/act nasal spray    gabapentin (NEURONTIN) 300 mg capsule    L-Theanine 100 MG CAPS    levothyroxine 100 mcg tablet    LORazepam (ATIVAN) 1 mg tablet    loxapine (LOXITANE) 25 mg capsule    PHENobarbital 64.8 mg tablet    polyethylene glycol (GLYCOLAX) 17 GM/SCOOP    Saphris 10 MG SL tablet    senna-docusate sodium (SENOKOT-S) 8.6-50 mg per tablet    sodium chloride (OCEAN) 0.65 % nasal spray    Thera (THERA/BETA-CAROTENE)    traZODone (DESYREL) 150 mg tablet    trihexyphenidyl (ARTANE) 5 mg tablet    benzonatate (TESSALON) 200 MG capsule    Calcium Carb-Cholecalciferol (calcium carbonate-vitamin D) 500 mg-5 mcg tablet    [] cephalexin (KEFLEX) 500 mg capsule    cetaphil (CETAPHIL) lotion    cetaphil (CETAPHIL) lotion    dextromethorphan-guaiFENesin (ROBITUSSIN DM)  mg/5 mL syrup    diphenhydrAMINE (BENADRYL) 50 mg capsule    gabapentin (NEURONTIN) 100 mg capsule    GNP Tussin DM  MG/10ML oral liquid    Incontinence Supplies MISC    Incontinence Supply Disposable (Disposable Brief Large) MISC    Lidocaine (HM Lidocaine Patch) 4 % PTCH    lidocaine (LMX) 4 % cream    LORazepam (ATIVAN) 0.5 mg tablet    loxapine (LOXITANE) 50 MG capsule     "Menthol, Topical Analgesic, (Bengay Ultra Strength) 5 % PTCH    mupirocin (BACTROBAN) 2 % ointment    Prolia 60 MG/ML    tiZANidine (ZANAFLEX) 2 mg tablet    Allergies   Allergen Reactions    Clozapine Other (See Comments)     low white blood count  Other reaction(s): Other (See Comments)  low white blood count    Haloperidol Other (See Comments)     EPS  Other reaction(s): Other (See Comments)  EPS    Lithium Other (See Comments)     Toxicity    Prolixin [Fluphenazine] Hyperactivity and Other (See Comments)     EPS, Hyperactivity  EPS, Hyperactivity    Valproic Acid Confusion and Other (See Comments)     \"Mental confusion\"  \"Mental confusion\"       Social History:     Marital Status: /Civil Union    Substance Use History:   Social History     Substance and Sexual Activity   Alcohol Use No     Social History     Tobacco Use   Smoking Status Former    Current packs/day: 0.00    Average packs/day: 0.5 packs/day for 21.0 years (10.5 ttl pk-yrs)    Types: Cigarettes    Start date:     Quit date:     Years since quittin.1   Smokeless Tobacco Never     Social History     Substance and Sexual Activity   Drug Use No       Family History:    Family History   Problem Relation Age of Onset    No Known Problems Mother     No Known Problems Father     Lung disease Other         mesothelioma    No Known Problems Maternal Grandmother     No Known Problems Maternal Grandfather     No Known Problems Paternal Grandmother     No Known Problems Paternal Grandfather     No Known Problems Sister     No Known Problems Sister     Kidney failure Brother     No Known Problems Maternal Aunt     No Known Problems Maternal Aunt     No Known Problems Maternal Aunt     No Known Problems Maternal Aunt     No Known Problems Paternal Aunt     No Known Problems Paternal Aunt          OBJECTIVE:    Vitals:   Blood Pressure: 168/93 (25)  Pulse: 89 (25)  Temperature: (!) 97.3 °F (36.3 °C) (25)  Temp " "Source: Temporal (02/27/25 0728)  Respirations: 20 (02/27/25 0728)  Height: 5' 4\" (162.6 cm) (02/20/25 2011)  Weight - Scale: 76.2 kg (167 lb 15.9 oz) (02/22/25 1300)  SpO2: 98 % (02/27/25 0728)    Physical Exam:     General Appearance: Alert, cooperative, no distress.  HEENT: Head normocephalic, atraumatic, without obvious abnormality.  Heart: Normal rate and rhythm.  Lungs: Non-labored breathing. No respiratory distress.  Abdomen: Without distension.  Psychiatric: AAOx3  Lower Extremity:  Vascular:   DP/PT pedal pulses on the left are present. DP/PT pedal pulses on the right are present. CRT brisk at the digits. Pedal hair is absent. trace edema noted at bilateral lower extremities. Skin temperature is within normal limits bilaterally.    Musculoskeletal:  MMT is 4/5 in all muscle compartments bilaterally. Passive ROM at the 1st MPJ and ankle joint are Normal bilaterally with the leg extended. Active ROM at the lesser digits is intact. No Pain on palpation of No gross deformities noted.     Dermatological:  Nails are brittle elongated hypertrophic yellow-white discoloration with subungual debris x 6.  There is an increased thickness and the nails were approximately 2 mm.  There appears to be slight white flaky tissue present on the plantar aspect of both feet.  Skin is of normal appearance, temperature, and turgor with no open lesions or ulcerations noted.    Neurological:  Gross sensation is intact. Protective sensation is intact. Patient Denies numbness and/or paresthesias.        Additional Data:     Lab Results: I have personally reviewed pertinent labs including:        Results from last 7 days   Lab Units 02/22/25  0616   POTASSIUM mmol/L 4.6   CHLORIDE mmol/L 100   CO2 mmol/L 29   BUN mg/dL 22   CREATININE mg/dL 0.75   CALCIUM mg/dL 9.3           Cultures: I have personally reviewed pertinent cultures including:              Imaging: I have personally reviewed pertinent films in PACS.  EKG, Pathology, and " "Other Studies: I have personally reviewed pertinent reports.        ** Please Note: Portions of the record may have been created with voice recognition software. Occasional wrong word or \"sound a like\" substitutions may have occurred due to the inherent limitations of voice recognition software. Read the chart carefully and recognize, using context, where substitutions have occurred. **   "

## 2025-02-28 NOTE — ASSESSMENT & PLAN NOTE
Malnutrition Findings:   Adult Malnutrition type: Acute illness  Adult Degree of Malnutrition: Other severe protein calorie malnutrition  Malnutrition Characteristics: Inadequate energy, Weight loss  360 Statement: Malnutrition related to acute illness in setting of psychiatric illness as evidenced by >5% body weight loss in 1 month, <50% energy intake compared to estimated energy needs for >5 days.  To treat with oral diet and nutrition supplements as accepted.    BMI Findings:      Body mass index is 28.84 kg/m².

## 2025-02-28 NOTE — NURSING NOTE
Pt up ad farhad & gait is usually steady. Pt is disorganized & loud @ times. Pt requires frequent redirection. Pt c/o mild depression & mild anxiety. Pt denies any hallucinations, suicidal or homicidal ideations. Q 15 min checks maintained to monitor pt's behavior & safety. Pt is compliant with medications. Pt is irritable & rambles with conversations.

## 2025-02-28 NOTE — PROGRESS NOTES
02/28/25    Team Meeting   Meeting Type Daily Rounds   Team Members Present   Team Members Present Physician;Nurse;;Occupational Therapist   Physician Team Member Nataliya Negrete MD   Nursing Team Member Aron LUZ   Social Work Team Member Chacho SADLER   OT Team Member Yazmin CTRS   Patient/Family Present   Patient Present No   Patient's Family Present No   201, slept in bed, delusional, mild anx/dep, baseline, loud, redirectable, d/c Thurs to  with [facility], continue med adjust/monitoring

## 2025-02-28 NOTE — SOCIAL WORK
CM received call from Nisha [phone] with [facility].  CM provided pt progress up. [facility] will continue to follow pt and requesting to be notified of d/c.
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
## 2025-02-28 NOTE — PROGRESS NOTES
"Progress Note - Laine Tse 69 y.o. female MRN: [id number]    Unit/Bed#: -01 Encounter: [id number]        Subjective:   Patient seen and examined at bedside after reviewing the chart and discussing the case with the caring staff.      Patient examined at bedside.  Patient is complaining of constipation.    Physical Exam   Vitals: Blood pressure 122/86, pulse 77, temperature 98.6 °F (37 °C), temperature source Temporal, resp. rate 18, height 5' 4\" (1.626 m), weight 76.2 kg (167 lb 15.9 oz), SpO2 93%, not currently breastfeeding.,Body mass index is 28.84 kg/m².  Constitutional: Patient in no acute distress.  HEENT: PERR, EOMI, MMM.  Cardiovascular: Normal rate and regular rhythm.    Pulmonary/Chest: Effort normal and breath sounds normal.   Abdomen: Soft, + BS, NT.    Assessment/Plan:  Laine Tse is a(n) 69 y.o. female with schizoaffective disorder.      Cardiac with hx of HTN, HLD.  Continue atorvastatin 20 mg daily, ASA 81 mg daily.  Not on antihypertensive meds prehospital.  Start lisinopril 5 mg daily 2/15. Hydralazine 25 mg as needed for SBP >170 or DBP >110.  Cont to monitor vitals.    Allergic rhinitis.  Flonase nasal spray daily.  Claritin 10 mg daily as needed (Zyrtec nonform).   Bilateral hearing loss.  Supportive measures.   Osteoporosis.  Continue calcium/vitamin D supplement.  Resume Fosamax on discharge.  DJD/OA/HA/chronic low back pain.  Tylenol, Zanaflex, lidocaine patch as needed.  Hypothyroidism.  Patient is on levothyroxine 100 mcg daily.  TSH normal on 2/13/25.  Seizure disorder.  Continue phenobarbital 64.8 mg q12h.  Pt follows with [facility]'s neurology (last saw 2/11/25).  Chronic constipation.  Continue Senokot S nightly.  Miralax as needed.  Add lactulose and dulxolax suppository as needed 2/18.   Cough/sore throat.  Ongoing x months per pt.  Afebrile.  Lungs clear.  Robitussin DM as needed.    Hyponatremia.  Level 129 -> 130 -> 135 on 2/22/2025.   Dry cracked skin bilateral " heel.  I will put the patient on ammonium lactate twice daily along with Band-Aid or dressing 2/23/2025.  I will also consult podiatry for further evaluation and treatment.

## 2025-02-28 NOTE — PLAN OF CARE
Problem: Alteration in Thoughts and Perception  Goal: Attend and participate in unit activities, including therapeutic, recreational, and educational groups  Description: Interventions:  -Encourage Visitation and family involvement in care  Outcome: Not Progressing     Problem: Ineffective Coping  Goal: Participates in unit activities  Description: Interventions:  - Provide therapeutic environment   - Provide required programming   - Redirect inappropriate behaviors   Outcome: Not Progressing

## 2025-02-28 NOTE — NURSING NOTE
Pt medicated for increased anxiety & increased agitation @ 1521 with Atarax 50 mg  & Risperidone 2 mg po with moderate relief obtained. Pt unable to be redirected @ that time. Security present @ this time.  [place] - 25 & Agitation - 46. Q 15 min checks maintained to monitor pt's behavior & safety.

## 2025-03-01 PROCEDURE — 99232 SBSQ HOSP IP/OBS MODERATE 35: CPT | Performed by: PHYSICIAN ASSISTANT

## 2025-03-01 RX ADMIN — OLANZAPINE 15 MG: 10 TABLET, ORALLY DISINTEGRATING ORAL at 21:36

## 2025-03-01 RX ADMIN — HYDROXYZINE HYDROCHLORIDE 50 MG: 50 TABLET, FILM COATED ORAL at 11:01

## 2025-03-01 RX ADMIN — PHENOBARBITAL 64.8 MG: 32.4 TABLET ORAL at 21:36

## 2025-03-01 RX ADMIN — LEVOTHYROXINE SODIUM 100 MCG: 100 TABLET ORAL at 06:55

## 2025-03-01 RX ADMIN — TRAZODONE HYDROCHLORIDE 100 MG: 100 TABLET ORAL at 21:36

## 2025-03-01 RX ADMIN — FLUTICASONE PROPIONATE 2 SPRAY: 50 SPRAY, METERED NASAL at 09:23

## 2025-03-01 RX ADMIN — Medication 6 MG: at 21:36

## 2025-03-01 RX ADMIN — HYDROXYZINE HYDROCHLORIDE 50 MG: 50 TABLET, FILM COATED ORAL at 21:36

## 2025-03-01 RX ADMIN — ACETAMINOPHEN 650 MG: 325 TABLET ORAL at 19:36

## 2025-03-01 RX ADMIN — TRIHEXYPHENIDYL HYDROCHLORIDE 2 MG: 2 TABLET ORAL at 21:36

## 2025-03-01 RX ADMIN — TRIHEXYPHENIDYL HYDROCHLORIDE 2 MG: 2 TABLET ORAL at 09:20

## 2025-03-01 RX ADMIN — Medication 1 TABLET: at 09:20

## 2025-03-01 RX ADMIN — SENNOSIDES AND DOCUSATE SODIUM 1 TABLET: 50; 8.6 TABLET ORAL at 21:36

## 2025-03-01 RX ADMIN — CLONAZEPAM 1 MG: 1 TABLET ORAL at 17:23

## 2025-03-01 RX ADMIN — ASPIRIN 81 MG CHEWABLE TABLET 81 MG: 81 TABLET CHEWABLE at 09:20

## 2025-03-01 RX ADMIN — CLONAZEPAM 1 MG: 1 TABLET ORAL at 09:20

## 2025-03-01 RX ADMIN — CHLORHEXIDINE GLUCONATE 15 ML: 1.2 SOLUTION ORAL at 09:23

## 2025-03-01 RX ADMIN — PHENOBARBITAL 64.8 MG: 32.4 TABLET ORAL at 09:20

## 2025-03-01 RX ADMIN — CHLORHEXIDINE GLUCONATE 15 ML: 1.2 SOLUTION ORAL at 21:38

## 2025-03-01 RX ADMIN — OLANZAPINE 5 MG: 5 TABLET, ORALLY DISINTEGRATING ORAL at 13:13

## 2025-03-01 RX ADMIN — ATORVASTATIN CALCIUM 20 MG: 20 TABLET, FILM COATED ORAL at 15:50

## 2025-03-01 RX ADMIN — RISPERIDONE 2 MG: 2 TABLET, FILM COATED ORAL at 11:01
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
## 2025-03-01 NOTE — PLAN OF CARE
Problem: Alteration in Thoughts and Perception  Goal: Treatment Goal: Gain control of psychotic behaviors/thinking, reduce/eliminate presenting symptoms and demonstrate improved reality functioning upon discharge  Outcome: Progressing  Goal: Refrain from acting on delusional thinking/internal stimuli  Description: Interventions:  - Monitor patient closely, per order   - Utilize least restrictive measures   - Set reasonable limits, give positive feedback for acceptable   - Administer medications as ordered and monitor of potential side effects  Outcome: Progressing  Goal: Agree to be compliant with medication regime, as prescribed and report medication side effects  Description: Interventions:  - Offer appropriate PRN medication and supervise ingestion; conduct AIMS, as needed   Outcome: Progressing  Goal: Attend and participate in unit activities, including therapeutic, recreational, and educational groups  Description: Interventions:  -Encourage Visitation and family involvement in care  Outcome: Progressing  Goal: Recognize dysfunctional thoughts, communicate reality-based thoughts at the time of discharge  Description: Interventions:  - Provide medication and psycho-education to assist patient in compliance and developing insight into his/her illness   Outcome: Progressing     Problem: Depression  Goal: Treatment Goal: Demonstrate behavioral control of depressive symptoms, verbalize feelings of improved mood/affect, and adopt new coping skills prior to discharge  Outcome: Progressing  Goal: Verbalize thoughts and feelings  Description: Interventions:  - Assess and re-assess patient's level of risk   - Engage patient in 1:1 interactions, daily, for a minimum of 15 minutes   - Encourage patient to express feelings, fears, frustrations, hopes   Outcome: Progressing  Goal: Refrain from harming self  Description: Interventions:  - Monitor patient closely, per order   - Supervise medication ingestion, monitor effects  and side effects   Outcome: Progressing  Goal: Refrain from isolation  Description: Interventions:  - Develop a trusting relationship   - Encourage socialization   Outcome: Progressing  Goal: Refrain from self-neglect  Outcome: Progressing     Problem: Anxiety  Goal: Anxiety is at manageable level  Description: Interventions:  - Assess and monitor patient's anxiety level.   - Monitor for signs and symptoms (heart palpitations, chest pain, shortness of breath, headaches, nausea, feeling jumpy, restlessness, irritable, apprehensive).   - Collaborate with interdisciplinary team and initiate plan and interventions as ordered.  - [place] patient to unit/surroundings  - Explain treatment plan  - Encourage participation in care  - Encourage verbalization of concerns/fears  - Identify coping mechanisms  - Assist in developing anxiety-reducing skills  - Administer/offer alternative therapies  - Limit or eliminate stimulants  Outcome: Progressing     Problem: Alteration in Orientation  Goal: Treatment Goal: Demonstrate a reduction of confusion and improved orientation to person, place, time and/or situation upon discharge, according to optimum baseline/ability  Outcome: Progressing  Goal: Interact with staff daily  Description: Interventions:  - Assess and re-assess patient's level of orientation  - Engage patient in 1 on 1 interactions, daily, for a minimum of 15 minutes   - Establish rapport/trust with patient   Outcome: Progressing  Goal: Allow medical examinations, as recommended  Description: Interventions:  - Provide physical/neurological exams and/or referrals, per provider   Outcome: Progressing  Goal: Cooperate with recommended testing/procedures  Description: Interventions:  - Determine need for ancillary testing  - Observe for mental status changes  - Implement falls/precaution protocol   Outcome: Progressing  Goal: Complete daily ADLs, including personal hygiene independently, as able  Description:  Interventions:  - Observe, teach, and assist patient with ADLS  Outcome: Progressing     Problem: DISCHARGE PLANNING - CARE MANAGEMENT  Goal: Discharge to post-acute care or home with appropriate resources  Description: INTERVENTIONS:  - Conduct assessment to determine patient/family and health care team treatment goals, and need for post-acute services based on payer coverage, community resources, and patient preferences, and barriers to discharge  - Address psychosocial, clinical, and financial barriers to discharge as identified in assessment in conjunction with the patient/family and health care team  - Arrange appropriate level of post-acute services according to patient’s   needs and preference and payer coverage in collaboration with the physician and health care team  - Communicate with and update the patient/family, physician, and health care team regarding progress on the discharge plan  - Arrange appropriate transportation to post-acute venues  Outcome: Progressing     Problem: Nutrition/Hydration-ADULT  Goal: Nutrient/Hydration intake appropriate for improving, restoring or maintaining nutritional needs  Description: Monitor and assess patient's nutrition/hydration status for malnutrition. Collaborate with interdisciplinary team and initiate plan and interventions as ordered.  Monitor patient's weight and dietary intake as ordered or per policy. Utilize nutrition screening tool and intervene as necessary. Determine patient's food preferences and provide high-protein, high-caloric foods as appropriate.     INTERVENTIONS:  - Monitor oral intake, urinary output, labs, and treatment plans  - Assess nutrition and hydration status and recommend course of action  - Evaluate amount of meals eaten  - Assist patient with eating if necessary   - Allow adequate time for meals  - Recommend/ encourage appropriate diets, oral nutritional supplements, and vitamin/mineral supplements  - Order, calculate, and assess  calorie counts as needed  - Recommend, monitor, and adjust tube feedings and TPN/PPN based on assessed needs  - Assess need for intravenous fluids  - Provide specific nutrition/hydration education as appropriate  - Include patient/family/caregiver in decisions related to nutrition  Outcome: Progressing

## 2025-03-01 NOTE — PROGRESS NOTES
Progress Note - Behavioral Health   Name: Laine Tse 69 y.o. female I MRN: [id number]  Unit/Bed#: [facility] 209-01 I Date of Admission: 2/14/2025   Date of Service: 3/1/2025 I Hospital Day: 15    Assessment & Plan  Seizure disorder (HCC)  Phenobarbital 64.8 mg twice daily  Schizoaffective disorder (HCC)  Continue  Zydis 5 mg 1 pm + 15 mg p.o. daily at bedtime  Continue Klonopin 1 mg po bid.    Insomnia  Trazodone 100 mg po daily HS  Melatonin 6 mg p.o. daily at bedtime  Hypothyroidism    Hyponatremia  135 on 2/22/25  Severe protein-calorie malnutrition (HCC)  Malnutrition Findings:   Adult Malnutrition type: Acute illness  Adult Degree of Malnutrition: Other severe protein calorie malnutrition  Malnutrition Characteristics: Inadequate energy, Weight loss  360 Statement: Malnutrition related to acute illness in setting of psychiatric illness as evidenced by >5% body weight loss in 1 month, <50% energy intake compared to estimated energy needs for >5 days.  To treat with oral diet and nutrition supplements as accepted.    BMI Findings:      Body mass index is 28.84 kg/m².       Current medications:  Current Facility-Administered Medications   Medication Dose Route Frequency Provider Last Rate    acetaminophen  650 mg Oral Q4H PRN Mima Wells MD      acetaminophen  650 mg Oral Q4H PRN Mima Wells MD      acetaminophen  975 mg Oral Q6H PRN Mima Wells MD      aluminum-magnesium hydroxide-simethicone  30 mL Oral Q4H PRN Mima Wells MD      ammonium lactate   Topical BID Joel Wise MD      aspirin  81 mg Oral Daily Marion L Vickinall, PA-C      atorvastatin  20 mg Oral Daily With Dinner Marion L Vickinall, PA-C      bisacodyl  10 mg Rectal Daily PRN Marion L Dagnall, PA-C      calcium carbonate-vitamin D  1 tablet Oral Daily With Breakfast Marion L Vickinall, PA-C      chlorhexidine  15 mL Swish & Spit Q12H JANNIE Marion L Vickinall, PA-C      clonazePAM  1 mg Oral BID Papito An MD       dextromethorphan-guaiFENesin  10 mL Oral Q4H PRN Joel Wise MD      fluticasone  2 spray Nasal Daily Marion L Dagnall, PA-C      hydrALAZINE  25 mg Oral Q8H PRN Marion L Dagnall, PA-C      hydrOXYzine HCL  25 mg Oral Q6H PRN Max 4/day Mima Wells MD      hydrOXYzine HCL  50 mg Oral Q6H PRN Max 4/day Mima Wells MD      lactulose  20 g Oral BID PRN Marion L Dagnall, PA-C      levothyroxine  100 mcg Oral Early Morning Marion L Dagnall, PA-C      lidocaine  1 patch Topical Daily PRN Marion L Dagnall, PA-C      lisinopril  5 mg Oral Daily Rosenda Zelaya, PA-C      loratadine  10 mg Oral Daily PRN Marion L Dagnall, PA-C      melatonin  6 mg Oral HS Papito An MD      nicotine polacrilex  4 mg Oral Q2H PRN Mima Wells MD      OLANZapine  15 mg Oral HS Papito An MD      OLANZapine  5 mg Oral Daily Papito An MD      PHENobarbital  64.8 mg Oral Q12H Marion L Dagnall, PA-C      polyethylene glycol  17 g Oral Daily PRN Marion L Dagnall, PA-C      propranolol  5 mg Oral Q8H PRN Mima eWlls MD      risperiDONE  0.5 mg Oral Q4H PRN Max 6/day Papito An MD      risperiDONE  1 mg Oral 4x Daily PRN Papito An MD      risperiDONE  2 mg Oral TID PRN Papito An MD      senna-docusate sodium  1 tablet Oral HS Marion L Dagnall, PA-C      sodium chloride  2 spray Each Nare Q1H PRN Marion L Dagnall, PA-C      tiZANidine  2 mg Oral Q6H PRN Marion L Dagnall, PA-C      traZODone  100 mg Oral HS Papito An MD      traZODone  50 mg Oral Q6H PRN Max 3/day Mima Wells MD      trihexyphenidyl  2 mg Oral BID Papito An MD       Facility-Administered Medications Ordered in Other Encounters   Medication Dose Route Frequency Provider Last Rate    lactated ringers   Intravenous Continuous PRN Celi Nomi Benson, CRNA          Risks/Benefits of Treatment:     Risks, benefits, and possible side effects of medications explained to patient. Patient has limited understanding of risks and  benefits of treatment at this time, but agrees to take medications as prescribed.    Progress Toward Goals: Unchanged    Treatment Planning:      - Encourage early mobility and having a structured day  - Provide frequent re-orientation, and cognitive stimulation  - Ensure assistive devices are in proper working order (eye-glasses, hearing aids)  - Encourage adequate hydration, nutrition and monitor bowel movements  - Maintain sleep-wake cycle: Uninterrupted sleep time; low-level lighting at night  - Fall precaution  - f/u SLIM recs regarding the medical problems   - Continue medication titration and treatment plan; adjust medication to optimize treatment response and as clinically indicated.   - Observation:Routine  - Legal Status: 201  - VS: as per unit protocol  - Encourage group attendance and milieu therapy  - Dispo: To be determined plan for group home next week.  - Long Stay Certification : Not Applicable  - Estimated Discharge Day: 3/6/2025     Subjective       Laine seen today for psychiatric follow-up.  Chart reviewed and patient discussed with nursing supervisor prior to visit.  Patient wandering around hallways and at times inappropriately walking and other residents rooms.  Patient somewhat agitated and yelling in the hallway.  When approached patient stating she could not speak to me until after she said her prayers.  Patient witnessed sleeping on the floor throughout the day.  Delusional thinking ongoing which is reported to be at her baseline.  Denies SI when asked.    Sleep: slept off and on  Appetite: decreased  Medication side effects: No  ROS: review of systems as noted above in HPI/Subjective report, all other systems are negative    Objective :  Temp:  [98.1 °F (36.7 °C)-98.3 °F (36.8 °C)] 98.3 °F (36.8 °C)  HR:  [81-92] 92  BP: (107-160)/(64-66) 160/66  Resp:  [18] 18  SpO2:  [94 %-98 %] 98 %    Temp:  [98.1 °F (36.7 °C)-98.3 °F (36.8 °C)] 98.3 °F (36.8 °C)  HR:  [81-92] 92  BP:  (107-160)/(64-66) 160/66  Resp:  [18] 18  SpO2:  [94 %-98 %] 98 %    Mental Status Evaluation:    Appearance:  age appropriate   Behavior:  bizarre, restless and fidgety, some agitation   Speech:  Loud and yelling at times.   Mood:  dysphoric, anxious, labile, irritable   Affect:  labile   Thought Process:  concrete, poverty of thought   Thought Content:  Religiously preoccupied   Perceptual Disturbances: Denies AVH when asked but appears preoccupied.   Risk Potential: Suicidal Ideation -  None at present  Homicidal Ideation -  None  Potential for Aggression - No   Sensorium:  unable to assess   Memory:  recent and remote memory: unable to assess due to lack of cooperation   Consciousness:  alert and awake   Attention/Concentration: attention span and concentration: unable to assess due to lack of cooperation   Insight:  poor   Judgment: poor   Gait/Station: unstable gait, slow gait   Motor Activity: abnormal movement noted: dyskinetic oral movements present               Lab Results: I have reviewed the following results:  Results from the past 24 hours: No results found for this or any previous visit (from the past 24 hours).    Administrative Statements     Counseling / Coordination of Care:   Patient's progress discussed with staff in treatment team meeting.  Medication changes reviewed with staff in treatment team meeting..    Edwin Fernandes PA-C 03/01/25

## 2025-03-01 NOTE — NURSING NOTE
Pt extremely agitated this morning, staff splitting, hates most of the staff and is becoming aggressive towards staff. Pt forced herself in the med room while another nurse was getting medications this morning and would not come out, grabbing things in there. This writer and a BHT had to remove patient from med room in which she got agressive and threw water cup at us. Pt was escorted to quiet room where she wanted to go as she was screaming to go in there. Pt relaxed for a bit in there with doors unlocked and was able to come out whenever she calmed down. Pt did come out and only would take medications from the other nurse in which she gave them to her. PT then went to the dining room to eat breakfast. Pt is calm and controlled as of this time. Continuous safety monitoring in place.

## 2025-03-01 NOTE — NURSING NOTE
Patient had interrupted sleep overnight. 2 episodes of increased mood lability.  Redirected well. No extensive period of yelling out. No further complaints of anxiety (see previous note please). Maintained on q 15 minute safety checks. Will continue to monitor.

## 2025-03-01 NOTE — PROGRESS NOTES
Patient requested and received Atarax 50 mg PO at 2203. Larson Scale 19. Complained of moderate anxiety. Appeared restless. Reassessed at 2303. Appeared to be sleeping. No further complaints of anxiety. Larson Scale 0. Medication effective.   02/28/25 2300   Safety   Visual Checks Q 15 minute checks   Larson Anxiety Scale   Anxious Mood 0   Tension 0   Fears 0   Insomnia 0   Intellectual 0   Depressed Mood 0   Somatic Complaints: Muscular 0   Somatic Complaints: Sensory 0   Cardiovascular Symptoms 0   Respiratory Symptoms 0   Gastrointestinal Symptoms 0   Genitourinary Symptoms 0   Autonomic Symptoms 0   Behavior at Interview 0   Larson Anxiety Score 0   Safe Environment   Arm Bands On ID;Allergies;Fall   Call Light Within Reach Yes   Overbed Table Within Reach Yes   Bed In Lowest Position Yes   Bed Wheels Locked Yes   Side Rails/Bed Safety 2/4   NonSkid Footwear On;Patient in bed   Care Items and Personal Belongings within Reach Yes   Clutter Removed Yes

## 2025-03-01 NOTE — NURSING NOTE
Patient minimally social and out in the community (short periods).  Rated depression and anxiety as mild.  Denies any suicidal thoughts or hallucinations.  Pleasant during assessment. Two periods of yelling in hallway, redirected well. A negative statement was found written on her bedroom wall in Crayon. Compliant with medications and snacks. Medication education given. Care Plan reviewed and amended. Maintained on q 15 minute checks.  Will continue to monitor.

## 2025-03-01 NOTE — PROGRESS NOTES
"Progress Note - Laine Tse 69 y.o. female MRN: [id number]    Unit/Bed#: -01 Encounter: [id number]        Subjective:   Patient seen and examined at bedside after reviewing the chart and discussing the case with the caring staff.      Patient examined at bedside.  Patient denies any acute symptoms.     Physical Exam   Vitals: Blood pressure 107/64, pulse 81, temperature 98.1 °F (36.7 °C), temperature source Temporal, resp. rate 18, height 5' 4\" (1.626 m), weight 76.2 kg (167 lb 15.9 oz), SpO2 94%, not currently breastfeeding.,Body mass index is 28.84 kg/m².  Constitutional: Patient in no acute distress.  HEENT: PERR, EOMI, MMM.  Cardiovascular: Normal rate and regular rhythm.    Pulmonary/Chest: Effort normal and breath sounds normal.   Abdomen: Soft, + BS, NT.    Assessment/Plan:  Laine Tse is a(n) 69 y.o. female with schizoaffective disorder.      Cardiac with hx of HTN, HLD.  Continue atorvastatin 20 mg daily, ASA 81 mg daily.  Not on antihypertensive meds prehospital.  Start lisinopril 5 mg daily 2/15. Hydralazine 25 mg as needed for SBP >170 or DBP >110.  Cont to monitor vitals.    Allergic rhinitis.  Flonase nasal spray daily.  Claritin 10 mg daily as needed (Zyrtec nonform).   Bilateral hearing loss.  Supportive measures.   Osteoporosis.  Continue calcium/vitamin D supplement.  Resume Fosamax on discharge.  DJD/OA/HA/chronic low back pain.  Tylenol, Zanaflex, lidocaine patch as needed.  Hypothyroidism.  Patient is on levothyroxine 100 mcg daily.  TSH normal on 2/13/25.  Seizure disorder.  Continue phenobarbital 64.8 mg q12h.  [facility] (last saw 2/11/25).  Chronic constipation.  Continue Senokot S nightly.  Miralax as needed.  Add lactulose and dulxolax suppository as needed 2/18.   Cough/sore throat.  Ongoing x months per pt.  Afebrile.  Lungs clear.  Robitussin DM as needed.    Hyponatremia.  Level 129 -> 130 -> 135 on 2/22/2025.   Dry cracked skin bilateral " heel.  I will put the patient on ammonium lactate twice daily along with Band-Aid or dressing 2/23/2025.  I will also consult podiatry for further evaluation and treatment.    The patient was discussed with Dr. Wise and he is in agreement with the above note.

## 2025-03-02 PROCEDURE — 99232 SBSQ HOSP IP/OBS MODERATE 35: CPT | Performed by: PHYSICIAN ASSISTANT

## 2025-03-02 RX ADMIN — ACETAMINOPHEN 650 MG: 325 TABLET ORAL at 19:34

## 2025-03-02 RX ADMIN — LEVOTHYROXINE SODIUM 100 MCG: 100 TABLET ORAL at 05:00

## 2025-03-02 RX ADMIN — AMMONIUM LACTATE: 12 LOTION TOPICAL at 08:10

## 2025-03-02 RX ADMIN — OLANZAPINE 15 MG: 10 TABLET, ORALLY DISINTEGRATING ORAL at 21:46

## 2025-03-02 RX ADMIN — CLONAZEPAM 1 MG: 1 TABLET ORAL at 08:05

## 2025-03-02 RX ADMIN — LISINOPRIL 5 MG: 5 TABLET ORAL at 08:05

## 2025-03-02 RX ADMIN — HYDROXYZINE HYDROCHLORIDE 50 MG: 50 TABLET, FILM COATED ORAL at 21:47

## 2025-03-02 RX ADMIN — SENNOSIDES AND DOCUSATE SODIUM 1 TABLET: 50; 8.6 TABLET ORAL at 21:47

## 2025-03-02 RX ADMIN — TRIHEXYPHENIDYL HYDROCHLORIDE 2 MG: 2 TABLET ORAL at 08:05

## 2025-03-02 RX ADMIN — TRAZODONE HYDROCHLORIDE 100 MG: 100 TABLET ORAL at 21:46

## 2025-03-02 RX ADMIN — TRIHEXYPHENIDYL HYDROCHLORIDE 2 MG: 2 TABLET ORAL at 21:46

## 2025-03-02 RX ADMIN — CLONAZEPAM 1 MG: 1 TABLET ORAL at 18:34

## 2025-03-02 RX ADMIN — ASPIRIN 81 MG CHEWABLE TABLET 81 MG: 81 TABLET CHEWABLE at 08:05

## 2025-03-02 RX ADMIN — FLUTICASONE PROPIONATE 2 SPRAY: 50 SPRAY, METERED NASAL at 08:10

## 2025-03-02 RX ADMIN — PHENOBARBITAL 64.8 MG: 32.4 TABLET ORAL at 08:05

## 2025-03-02 RX ADMIN — ATORVASTATIN CALCIUM 20 MG: 20 TABLET, FILM COATED ORAL at 16:37

## 2025-03-02 RX ADMIN — Medication 6 MG: at 21:46

## 2025-03-02 RX ADMIN — Medication 1 TABLET: at 08:05

## 2025-03-02 RX ADMIN — HYDROXYZINE HYDROCHLORIDE 50 MG: 50 TABLET, FILM COATED ORAL at 09:17

## 2025-03-02 RX ADMIN — RISPERIDONE 2 MG: 2 TABLET, FILM COATED ORAL at 09:17

## 2025-03-02 RX ADMIN — CHLORHEXIDINE GLUCONATE 15 ML: 1.2 SOLUTION ORAL at 08:05

## 2025-03-02 RX ADMIN — CHLORHEXIDINE GLUCONATE 15 ML: 1.2 SOLUTION ORAL at 21:47

## 2025-03-02 RX ADMIN — PHENOBARBITAL 64.8 MG: 32.4 TABLET ORAL at 21:47

## 2025-03-02 RX ADMIN — OLANZAPINE 5 MG: 5 TABLET, ORALLY DISINTEGRATING ORAL at 14:05

## 2025-03-02 NOTE — PLAN OF CARE
Problem: Depression  Goal: Verbalize thoughts and feelings  Description: Interventions:  - Assess and re-assess patient's level of risk   - Engage patient in 1:1 interactions, daily, for a minimum of 15 minutes   - Encourage patient to express feelings, fears, frustrations, hopes   Outcome: Progressing  Goal: Refrain from harming self  Description: Interventions:  - Monitor patient closely, per order   - Supervise medication ingestion, monitor effects and side effects   Outcome: Progressing     Problem: Depression  Goal: Verbalize thoughts and feelings  Description: Interventions:  - Assess and re-assess patient's level of risk   - Engage patient in 1:1 interactions, daily, for a minimum of 15 minutes   - Encourage patient to express feelings, fears, frustrations, hopes   Outcome: Progressing  Goal: Refrain from harming self  Description: Interventions:  - Monitor patient closely, per order   - Supervise medication ingestion, monitor effects and side effects   Outcome: Progressing

## 2025-03-02 NOTE — NURSING NOTE
Pt observed laying on floor in hallway head on blanket, encouraged client go to room for a nap, pt told this writer Rosalio alonso of records destroyed her divorce papers. Pt request robitussin, pt not observed coughing. Pt continues to decline assessments.

## 2025-03-02 NOTE — PROGRESS NOTES
"Progress Note - Laine Tse 69 y.o. female MRN: [id number]    Unit/Bed#: -01 Encounter: [id number]        Subjective:   Patient seen and examined at bedside after reviewing the chart and discussing the case with the caring staff.      Patient examined at bedside.  Patient denies any acute symptoms.     Physical Exam   Vitals: Blood pressure 134/80, pulse 80, temperature 97.5 °F (36.4 °C), temperature source Temporal, resp. rate 16, height 5' 4\" (1.626 m), weight 76.2 kg (167 lb 15.9 oz), SpO2 99%, not currently breastfeeding.,Body mass index is 28.84 kg/m².  Constitutional: Patient in no acute distress.  HEENT: PERR, EOMI, MMM.  Cardiovascular: Normal rate and regular rhythm.    Pulmonary/Chest: Effort normal and breath sounds normal.   Abdomen: Soft, + BS, NT.    Assessment/Plan:  Laine Tse is a(n) 69 y.o. female with schizoaffective disorder.      Cardiac with hx of HTN, HLD.  Continue atorvastatin 20 mg daily, ASA 81 mg daily.  Not on antihypertensive meds prehospital.  Start lisinopril 5 mg daily 2/15. Hydralazine 25 mg as needed for SBP >170 or DBP >110.  Cont to monitor vitals.    Allergic rhinitis.  Flonase nasal spray daily.  Claritin 10 mg daily as needed (Zyrtec nonform).   Bilateral hearing loss.  Supportive measures.   Osteoporosis.  Continue calcium/vitamin D supplement.  Resume Fosamax on discharge.  DJD/OA/HA/chronic low back pain.  Tylenol, Zanaflex, lidocaine patch as needed.  Hypothyroidism.  Patient is on levothyroxine 100 mcg daily.  TSH normal on 2/13/25.  Seizure disorder.  Continue phenobarbital 64.8 mg q12h.  [facility]  Chronic constipation.  Continue Senokot S nightly.  Miralax as needed.  Add lactulose and dulxolax suppository as needed 2/18.   Cough/sore throat.  Ongoing x months per pt.  Afebrile.  Lungs clear.  Robitussin DM as needed.    Hyponatremia.  Level 129 -> 130 -> 135 on 2/22/2025.   Dry cracked skin bilateral " heel.  I will put the patient on ammonium lactate twice daily along with Band-Aid or dressing 2/23/2025.  I will also consult podiatry for further evaluation and treatment.    The patient was discussed with Dr. Wise and he is in agreement with the above note.

## 2025-03-02 NOTE — NURSING NOTE
"Pt was visible on the milieu this evening. Pt remains bizare, labile, paranoid, and delusional. Pt purposely laying on floor in room and hallway at times because \"its good for my back\". Pt was was med compliant, endorsed anxiety administered 50mg atarax @ 2136 HAM 18, upon reassessment @ 2236, medication seemingly effective, pt appeared to be asleep. Will Ctm. Q15 minute pt safety checks ongoing.   "

## 2025-03-02 NOTE — PROGRESS NOTES
Progress Note - Behavioral Health   Name: Laine Tse 69 y.o. female I MRN: [id number]  Unit/Bed#: [facility] 209-01 I Date of Admission: 2/14/2025   Date of Service: 3/2/2025 I Hospital Day: 16    Assessment & Plan  Seizure disorder (HCC)  Phenobarbital 64.8 mg twice daily  Schizoaffective disorder (HCC)  Continue  Zydis 5 mg 1 pm + 15 mg p.o. daily at bedtime  Continue Klonopin 1 mg po bid.    Insomnia  Trazodone 100 mg po daily HS  Melatonin 6 mg p.o. daily at bedtime  Hypothyroidism    Hyponatremia  135 on 2/22/25  Severe protein-calorie malnutrition (HCC)  Malnutrition Findings:   Adult Malnutrition type: Acute illness  Adult Degree of Malnutrition: Other severe protein calorie malnutrition  Malnutrition Characteristics: Inadequate energy, Weight loss  360 Statement: Malnutrition related to acute illness in setting of psychiatric illness as evidenced by >5% body weight loss in 1 month, <50% energy intake compared to estimated energy needs for >5 days.  To treat with oral diet and nutrition supplements as accepted.    BMI Findings:      Body mass index is 28.84 kg/m².       Current medications:  Current Facility-Administered Medications   Medication Dose Route Frequency Provider Last Rate    acetaminophen  650 mg Oral Q4H PRN Mima Wells MD      acetaminophen  650 mg Oral Q4H PRN Mima Wells MD      acetaminophen  975 mg Oral Q6H PRN Mima Wells MD      aluminum-magnesium hydroxide-simethicone  30 mL Oral Q4H PRN Mima Wells MD      ammonium lactate   Topical BID Joel Wise MD      aspirin  81 mg Oral Daily Marion L Vickinall, PA-C      atorvastatin  20 mg Oral Daily With Dinner Marion L Vickinall, PA-C      bisacodyl  10 mg Rectal Daily PRN Marion L Dagnall, PA-C      calcium carbonate-vitamin D  1 tablet Oral Daily With Breakfast Marion L Vickinall, PA-C      chlorhexidine  15 mL Swish & Spit Q12H JANNIE Marion L Vickinall, PA-C      clonazePAM  1 mg Oral BID Papito An MD       dextromethorphan-guaiFENesin  10 mL Oral Q4H PRN Joel Wise MD      fluticasone  2 spray Nasal Daily Marion L Dagnall, PA-C      hydrALAZINE  25 mg Oral Q8H PRN Marion L Dagnall, PA-C      hydrOXYzine HCL  25 mg Oral Q6H PRN Max 4/day Mima Wells MD      hydrOXYzine HCL  50 mg Oral Q6H PRN Max 4/day Mima Wells MD      lactulose  20 g Oral BID PRN Marion L Dagnall, PA-C      levothyroxine  100 mcg Oral Early Morning Marion L Dagnall, PA-C      lidocaine  1 patch Topical Daily PRN Marion L Dagnall, PA-C      lisinopril  5 mg Oral Daily Rosenda Zelaya, PA-C      loratadine  10 mg Oral Daily PRN Marion L Dagnall, PA-C      melatonin  6 mg Oral HS Papito An MD      nicotine polacrilex  4 mg Oral Q2H PRN Mima Wells MD      OLANZapine  15 mg Oral HS Papito An MD      OLANZapine  5 mg Oral Daily Papito An MD      PHENobarbital  64.8 mg Oral Q12H Marion L Dagnall, PA-C      polyethylene glycol  17 g Oral Daily PRN Marion L Dagnall, PA-C      propranolol  5 mg Oral Q8H PRN Mima Wells MD      risperiDONE  0.5 mg Oral Q4H PRN Max 6/day Papito An MD      risperiDONE  1 mg Oral 4x Daily PRN Papito An MD      risperiDONE  2 mg Oral TID PRN Papito An MD      senna-docusate sodium  1 tablet Oral HS Marion L Dagnall, PA-C      sodium chloride  2 spray Each Nare Q1H PRN Marion L Dagnall, PA-C      tiZANidine  2 mg Oral Q6H PRN Marion L Dagnall, PA-C      traZODone  100 mg Oral HS Papito An MD      traZODone  50 mg Oral Q6H PRN Max 3/day Mima Wells MD      trihexyphenidyl  2 mg Oral BID Papito An MD       Facility-Administered Medications Ordered in Other Encounters   Medication Dose Route Frequency Provider Last Rate    lactated ringers   Intravenous Continuous PRN Celi Nomi Benson, CRNA          Risks/Benefits of Treatment:     Risks, benefits, and possible side effects of medications explained to patient. Patient has limited understanding of risks and  "benefits of treatment at this time, but agrees to take medications as prescribed.    Progress Toward Goals: Unchanged    Treatment Planning:      - Encourage early mobility and having a structured day  - Provide frequent re-orientation, and cognitive stimulation  - Ensure assistive devices are in proper working order (eye-glasses, hearing aids)  - Encourage adequate hydration, nutrition and monitor bowel movements  - Maintain sleep-wake cycle: Uninterrupted sleep time; low-level lighting at night  - Fall precaution  - f/u SLIM recs regarding the medical problems   - Continue medication titration and treatment plan; adjust medication to optimize treatment response and as clinically indicated.   - Observation:Routine  - Legal Status: 201  - VS: as per unit protocol  - Encourage group attendance and milieu therapy  - Dispo: To be determined plan for group home next week.  - Long Stay Certification : Not Applicable  - Estimated Discharge Day: 3/6/2025     Subjective       Laine seen today for psychiatric follow-up.  Chart reviewed and patient discussed with nursing supervisor prior to visit.  Patient wandering around hallways and at times inappropriately walking and other residents rooms.  Patient at times agitated and yelling in the hallway.  Remains religiously preoccupied.  Patient was observed laying on the floor in the hallway with head on blanket.  Patient encouraged by staff to go to her room for a nap.  Delusional thinking ongoing which is reported to be at her baseline.  Denies SI when asked.  Patient yelling at writer this morning stating \"you are the bad doctor who keeps giving me Haldol.\"    Sleep: slept off and on  Appetite: decreased  Medication side effects: No  ROS: review of systems as noted above in HPI/Subjective report, all other systems are negative    Objective :  Temp:  [97.5 °F (36.4 °C)-98.3 °F (36.8 °C)] 97.5 °F (36.4 °C)  HR:  [75-92] 80  BP: (110-160)/(66-80) 134/80  Resp:  [16-18] " 16  SpO2:  [94 %-99 %] 99 %  O2 Device: None (Room air)    Temp:  [97.5 °F (36.4 °C)-98.3 °F (36.8 °C)] 97.5 °F (36.4 °C)  HR:  [75-92] 80  BP: (110-160)/(66-80) 134/80  Resp:  [16-18] 16  SpO2:  [94 %-99 %] 99 %  O2 Device: None (Room air)    Mental Status Evaluation:    Appearance:  age appropriate   Behavior:  bizarre, restless and fidgety, some agitation   Speech:  Loud and yelling at times.   Mood:  dysphoric, anxious, labile, irritable   Affect:  labile   Thought Process:  concrete, poverty of thought   Thought Content:  Religiously preoccupied   Perceptual Disturbances: Denies AVH when asked but appears preoccupied.   Risk Potential: Suicidal Ideation -  None at present  Homicidal Ideation -  None  Potential for Aggression - No   Sensorium:  unable to assess   Memory:  recent and remote memory: unable to assess due to lack of cooperation   Consciousness:  alert and awake   Attention/Concentration: attention span and concentration: unable to assess due to lack of cooperation   Insight:  poor   Judgment: poor   Gait/Station: unstable gait, slow gait   Motor Activity: abnormal movement noted: dyskinetic oral movements present               Lab Results: I have reviewed the following results:  Results from the past 24 hours: No results found for this or any previous visit (from the past 24 hours).    Administrative Statements     Counseling / Coordination of Care:   Patient's progress discussed with staff in treatment team meeting.  Medication changes reviewed with staff in treatment team meeting..    Edwin Fernandes PA-C 03/02/25

## 2025-03-02 NOTE — NURSING NOTE
Pt not cooperative for any assessment, pt reports that she has had depression her whole life, then pt started to become anxious and walked away from this writer verbalizing that I was the cause of her stress and would not allow further assessment either physical or behaviors. Will continue to monitor and attempt assessment at a later time. Pt appears to be in no distress.

## 2025-03-02 NOTE — NURSING NOTE
Patient had slightly broken sleep, but overall good sleep for the pt . No acute behaviors or concerns voiced. Will CTM. Q15 minute patient safety checks in progress.

## 2025-03-03 PROCEDURE — 99232 SBSQ HOSP IP/OBS MODERATE 35: CPT | Performed by: PSYCHIATRY & NEUROLOGY

## 2025-03-03 RX ADMIN — CLONAZEPAM 1 MG: 1 TABLET ORAL at 17:32

## 2025-03-03 RX ADMIN — HYDROXYZINE HYDROCHLORIDE 50 MG: 50 TABLET, FILM COATED ORAL at 09:29

## 2025-03-03 RX ADMIN — CLONAZEPAM 1 MG: 1 TABLET ORAL at 08:03

## 2025-03-03 RX ADMIN — RISPERIDONE 2 MG: 2 TABLET, FILM COATED ORAL at 09:30

## 2025-03-03 RX ADMIN — ASPIRIN 81 MG CHEWABLE TABLET 81 MG: 81 TABLET CHEWABLE at 08:03

## 2025-03-03 RX ADMIN — TRIHEXYPHENIDYL HYDROCHLORIDE 2 MG: 2 TABLET ORAL at 08:03

## 2025-03-03 RX ADMIN — GUAIFENESIN AND DEXTROMETHORPHAN 10 ML: 100; 10 SYRUP ORAL at 12:57

## 2025-03-03 RX ADMIN — Medication 1 TABLET: at 08:03

## 2025-03-03 RX ADMIN — HYDROXYZINE HYDROCHLORIDE 50 MG: 50 TABLET, FILM COATED ORAL at 21:35

## 2025-03-03 RX ADMIN — GUAIFENESIN AND DEXTROMETHORPHAN 10 ML: 100; 10 SYRUP ORAL at 08:09

## 2025-03-03 RX ADMIN — TRIHEXYPHENIDYL HYDROCHLORIDE 2 MG: 2 TABLET ORAL at 20:52

## 2025-03-03 RX ADMIN — FLUTICASONE PROPIONATE 2 SPRAY: 50 SPRAY, METERED NASAL at 08:02

## 2025-03-03 RX ADMIN — CHLORHEXIDINE GLUCONATE 15 ML: 1.2 SOLUTION ORAL at 20:54

## 2025-03-03 RX ADMIN — TRAZODONE HYDROCHLORIDE 100 MG: 100 TABLET ORAL at 20:52

## 2025-03-03 RX ADMIN — Medication 6 MG: at 20:51

## 2025-03-03 RX ADMIN — AMMONIUM LACTATE: 12 LOTION TOPICAL at 18:03

## 2025-03-03 RX ADMIN — LEVOTHYROXINE SODIUM 100 MCG: 100 TABLET ORAL at 05:15

## 2025-03-03 RX ADMIN — OLANZAPINE 15 MG: 10 TABLET, ORALLY DISINTEGRATING ORAL at 20:51

## 2025-03-03 RX ADMIN — SENNOSIDES AND DOCUSATE SODIUM 1 TABLET: 50; 8.6 TABLET ORAL at 20:52

## 2025-03-03 RX ADMIN — PHENOBARBITAL 64.8 MG: 32.4 TABLET ORAL at 08:03

## 2025-03-03 RX ADMIN — ATORVASTATIN CALCIUM 20 MG: 20 TABLET, FILM COATED ORAL at 17:32

## 2025-03-03 RX ADMIN — PHENOBARBITAL 64.8 MG: 32.4 TABLET ORAL at 20:52

## 2025-03-03 RX ADMIN — CHLORHEXIDINE GLUCONATE 15 ML: 1.2 SOLUTION ORAL at 08:02

## 2025-03-03 NOTE — PROGRESS NOTES
03/03/25   Team Meeting   Meeting Type Daily Rounds   Team Members Present   Team Members Present Physician;Nurse;;Occupational Therapist   Physician Team Member Nataliya WHITE   Nursing Team Member Damon LUZ   Social Work Team Member Chacho SADLER   OT Team Member Yazmin CONTRERAS   Patient/Family Present   Patient Present No   Patient's Family Present No   201, KIAN CRR with LV Act, loud, can be intrusive, sleeping on floor in room, PRN atarax at bedtime effective, throwing things in med room after following staff in, no change in behavior, elopement risk, med compliant, monitor med adjustments

## 2025-03-03 NOTE — NURSING NOTE
Pt was visible on the milieu this evening. Pt remains bizare, labile, paranoid, and delusional. Pt can be child like at times. Pt was was med compliant, endorsed anxiety administered 50mg atarax @ 2147 HAM 18, upon reassessment @ 2247, medication seemingly effective, pt appeared to be asleep. Will Ctm. Q15 minute pt safety checks ongoing.

## 2025-03-03 NOTE — PROGRESS NOTES
Progress Note - Behavioral Health   Name: Laine Tse 69 y.o. female I MRN: [id number]   Unit/Bed#: [facility] 209-01 I Date of Admission: 2/14/2025   Date of Service: 3/3/2025 I Hospital Day: 17         Assessment & Plan  Seizure disorder (HCC)  Phenobarbital 64.8 mg twice daily  Schizoaffective disorder (HCC)  Continue  Zydis 5 mg 1 pm + 15 mg p.o. daily at bedtime  Continue Klonopin 1 mg po bid.    Insomnia  Trazodone 100 mg po daily HS  Melatonin 6 mg p.o. daily at bedtime  Hypothyroidism    Hyponatremia  135 on 2/22/25  Severe protein-calorie malnutrition (HCC)  Malnutrition Findings:   Adult Malnutrition type: Acute illness  Adult Degree of Malnutrition: Other severe protein calorie malnutrition  Malnutrition Characteristics: Inadequate energy, Weight loss  360 Statement: Malnutrition related to acute illness in setting of psychiatric illness as evidenced by >5% body weight loss in 1 month, <50% energy intake compared to estimated energy needs for >5 days.  To treat with oral diet and nutrition supplements as accepted.    BMI Findings:      Body mass index is 28.84 kg/m².       Recommended Treatment:     Planned medication and treatment changes:    All current active medications have been reviewed  Encourage group therapy, milieu therapy and occupational therapy  Behavioral Health checks for safety monitoring  Requires continued inpatient treatment due to chronic illness and high risk of decompensation if discharged before long term stability is achieved  Zydis 5 mg  1pm + Zydis 15 mg po hs  Klonopin 1 mg bid.   Trazodone 100 mg po hs.  Melatonin to 6 mg po hs.  Monitor behavior and fall risk.    Current medications:  Current Facility-Administered Medications   Medication Dose Route Frequency Provider Last Rate    acetaminophen  650 mg Oral Q4H PRN Mima Wells MD      acetaminophen  650 mg Oral Q4H PRN Mima Wells MD      acetaminophen  975 mg Oral Q6H PRN Mima Wells MD      aluminum-magnesium  hydroxide-simethicone  30 mL Oral Q4H PRN Mima Wells MD      ammonium lactate   Topical BID Joel Wise MD      aspirin  81 mg Oral Daily Marion L Dagnall, PA-C      atorvastatin  20 mg Oral Daily With Dinner Marion L Dagnall, PA-C      bisacodyl  10 mg Rectal Daily PRN Marion L Dagnall, PA-C      calcium carbonate-vitamin D  1 tablet Oral Daily With Breakfast Marion L Dagnall, PA-C      chlorhexidine  15 mL Swish & Spit Q12H JANNIE Marion L Dagnall, PA-C      clonazePAM  1 mg Oral BID Papito An MD      dextromethorphan-guaiFENesin  10 mL Oral Q4H PRN Joel Wise MD      fluticasone  2 spray Nasal Daily Marion L Dagnall, PA-C      hydrALAZINE  25 mg Oral Q8H PRN Marion L Dagnall, PA-C      hydrOXYzine HCL  25 mg Oral Q6H PRN Max 4/day Mima Wells MD      hydrOXYzine HCL  50 mg Oral Q6H PRN Max 4/day Mima Wells MD      lactulose  20 g Oral BID PRN Marion L Dagnall, PA-C      levothyroxine  100 mcg Oral Early Morning Marion L Dagnall, PA-C      lidocaine  1 patch Topical Daily PRN Marion L Dagnall, PA-C      lisinopril  5 mg Oral Daily BAILEY Ortiz-C      loratadine  10 mg Oral Daily PRN Marion L Dagnall, PA-C      melatonin  6 mg Oral HS Papito An MD      nicotine polacrilex  4 mg Oral Q2H PRN Mima Wells MD      OLANZapine  15 mg Oral HS Papito An MD      OLANZapine  5 mg Oral Daily Papito An MD      PHENobarbital  64.8 mg Oral Q12H Marion L Dagnall, PA-C      polyethylene glycol  17 g Oral Daily PRN Marion L Dagnall, PA-C      propranolol  5 mg Oral Q8H PRN Mima Wells MD      risperiDONE  0.5 mg Oral Q4H PRN Max 6/day Papito An MD      risperiDONE  1 mg Oral 4x Daily PRN Papito An MD      risperiDONE  2 mg Oral TID PRN Papito An MD      senna-docusate sodium  1 tablet Oral HS Marion Javier PA-C      sodium chloride  2 spray Each Nare Q1H PRN Marion Javier PA-C      tiZANidine  2 mg Oral Q6H PRN Marion Javier PA-C       traZODone  100 mg Oral HS Papito An MD      traZODone  50 mg Oral Q6H PRN Max 3/day Mima Wells MD      trihexyphenidyl  2 mg Oral BID Papito An MD       Facility-Administered Medications Ordered in Other Encounters   Medication Dose Route Frequency Provider Last Rate    lactated ringers   Intravenous Continuous PRN Celi Benson CRNA         Risks / Benefits of Treatment:    Risks, benefits, and possible side effects of medications explained to patient. Patient has limited understanding of risks and benefits of treatment at this time, but agrees to take medications as prescribed.    Subjective:    Behavior over the last 24 hours: limited improvement.     Laine was seen for continuing care. Patient was seen in halls pacing, speaking loudly. Laughing but able to follow verbal redirection. Patient periodically gets restless and still is religiously preoccupied. Staff reports seeing her in halls on her knees praying periodically but did not restraint to be placed in quiet room not IM meds. She has been adherence to meds and denies any current side effects. Pt appears to be near or at baseline.      Sleep: slept better   Appetite: decreased  Medication side effects: denies   ROS: no complaints, all other systems are negative    Mental Status Evaluation:    Appearance:  age appropriate   Behavior:  restless and fidgety   Speech:  increased volume, loud at times    Mood:  dysphoric, anxious   Affect:  labile, overbright   Thought Process:  disorganized, circumstantial   Associations: circumstantial associations, perseverative   Thought Content:  Mu-ism preoccupation   Perceptual Disturbances: denies auditory hallucinations when asked, appears preoccupied   Risk Potential: Suicidal ideation - None at present  Homicidal ideation - None at present  Potential for aggression - Not at present   Sensorium:  unable to assess   Memory:  recent and remote memory: unable to assess due to lack of cooperation    Consciousness:  alert   Attention/Concentration: decreased concentration and decreased attention span   Insight:  poor   Judgment: limited   Gait/Station: unstable gait   Motor Activity: abnormal movement noted: dyskinetic oral movements present     Vital signs in last 24 hours:    Temp:  [97.8 °F (36.6 °C)-98 °F (36.7 °C)] 98 °F (36.7 °C)  HR:  [85-93] 93  BP: ()/(56-85) 92/56  Resp:  [18] 18  SpO2:  [95 %-96 %] 96 %         Laboratory results: I have personally reviewed all pertinent laboratory/tests results    Most Recent Labs:   Lab Results   Component Value Date    WBC 5.69 02/16/2025    RBC 4.36 02/16/2025    HGB 14.2 02/16/2025    HCT 41.8 02/16/2025     02/16/2025    RDW 12.2 02/16/2025    NEUTROABS 3.37 02/16/2025    SODIUM 135 02/22/2025    K 4.6 02/22/2025     02/22/2025    CO2 29 02/22/2025    BUN 22 02/22/2025    CREATININE 0.75 02/22/2025    GLUC 87 02/22/2025    CALCIUM 9.3 02/22/2025    AST 19 02/16/2025    ALT 20 02/16/2025    ALKPHOS 47 02/16/2025    TP 7.3 02/16/2025    ALB 4.3 02/16/2025    TBILI 0.34 02/16/2025    CHOLESTEROL 198 02/16/2025    HDL 77 02/16/2025    TRIG 122 02/16/2025    LDLCALC 97 02/16/2025    NONHDLC 121 02/16/2025    VALPROICTOT 71 11/29/2019    AMMONIA 15 12/19/2017    VFJ0QHOFIMVS 0.920 02/13/2025    FREET4 0.94 08/14/2024    SYPHILISAB Non-reactive 04/05/2024    HGBA1C 5.5 04/26/2024     04/26/2024       Counseling / Coordination of Care:    Patient's progress discussed with staff in treatment team meeting.  Medication changes reviewed with nursing staff.  Supportive therapy provided to patient.  Group attendance encouraged.    Papito An MD 03/03/25

## 2025-03-03 NOTE — PROGRESS NOTES
"Progress Note - Laine Tse 69 y.o. female MRN: [id number]    Unit/Bed#: -01 Encounter: [id number]        Subjective:   Patient seen and examined at bedside after reviewing the chart and discussing the case with the caring staff.      Patient examined at bedside.  Patient denies any acute symptoms.     Physical Exam   Vitals: Blood pressure 92/56, pulse 93, temperature 98 °F (36.7 °C), temperature source Temporal, resp. rate 18, height 5' 4\" (1.626 m), weight 76.2 kg (167 lb 15.9 oz), SpO2 96%, not currently breastfeeding.,Body mass index is 28.84 kg/m².  Constitutional: Patient in no acute distress.  HEENT: PERR, EOMI, MMM.  Cardiovascular: Normal rate and regular rhythm.    Pulmonary/Chest: Effort normal and breath sounds normal.   Abdomen: Soft, + BS, NT.    Assessment/Plan:  Laine Tse is a(n) 69 y.o. female with schizoaffective disorder.      Cardiac with hx of HTN, HLD.  Continue atorvastatin 20 mg daily, ASA 81 mg daily.  Not on antihypertensive meds prehospital.  Start lisinopril 5 mg daily 2/15. Hydralazine 25 mg as needed for SBP >170 or DBP >110.  Cont to monitor vitals.    Allergic rhinitis.  Flonase nasal spray daily.  Claritin 10 mg daily as needed (Zyrtec nonform).   Bilateral hearing loss.  Supportive measures.   Osteoporosis.  Continue calcium/vitamin D supplement.  Resume Fosamax on discharge.  DJD/OA/HA/chronic low back pain.  Tylenol, Zanaflex, lidocaine patch as needed.  Hypothyroidism.  Patient is on levothyroxine 100 mcg daily.  TSH normal on 2/13/25.  Seizure disorder.  Continue phenobarbital 64.8 mg q12h.  Pt follows with [facility]'s neurology (last saw 2/11/25).  Chronic constipation.  Continue Senokot S nightly.  Miralax as needed.  Add lactulose and dulxolax suppository as needed 2/18.   Cough/sore throat.  Ongoing x months per pt.  Afebrile.  Lungs clear.  Robitussin DM as needed.    Hyponatremia.  Level 129 -> 130 -> 135 on 2/22/2025.   Dry cracked skin bilateral heel.  " I will put the patient on ammonium lactate twice daily along with Band-Aid or dressing 2/23/2025.  I will also consult podiatry for further evaluation and treatment.

## 2025-03-04 DIAGNOSIS — R94.31 ABNORMAL ECG: ICD-10-CM

## 2025-03-04 DIAGNOSIS — R94.31 T WAVE INVERSION ON ELECTROCARDIOGRAM: ICD-10-CM

## 2025-03-04 PROCEDURE — 99232 SBSQ HOSP IP/OBS MODERATE 35: CPT | Performed by: PSYCHIATRY & NEUROLOGY

## 2025-03-04 RX ADMIN — OLANZAPINE 15 MG: 10 TABLET, ORALLY DISINTEGRATING ORAL at 21:13

## 2025-03-04 RX ADMIN — CHLORHEXIDINE GLUCONATE 15 ML: 1.2 SOLUTION ORAL at 21:13

## 2025-03-04 RX ADMIN — OLANZAPINE 5 MG: 5 TABLET, ORALLY DISINTEGRATING ORAL at 12:35

## 2025-03-04 RX ADMIN — ASPIRIN 81 MG CHEWABLE TABLET 81 MG: 81 TABLET CHEWABLE at 08:34

## 2025-03-04 RX ADMIN — ATORVASTATIN CALCIUM 20 MG: 20 TABLET, FILM COATED ORAL at 17:01

## 2025-03-04 RX ADMIN — RISPERIDONE 1 MG: 1 TABLET, FILM COATED ORAL at 21:13

## 2025-03-04 RX ADMIN — PHENOBARBITAL 64.8 MG: 32.4 TABLET ORAL at 08:34

## 2025-03-04 RX ADMIN — PHENOBARBITAL 64.8 MG: 32.4 TABLET ORAL at 21:13

## 2025-03-04 RX ADMIN — SENNOSIDES AND DOCUSATE SODIUM 1 TABLET: 50; 8.6 TABLET ORAL at 21:13

## 2025-03-04 RX ADMIN — FLUTICASONE PROPIONATE 2 SPRAY: 50 SPRAY, METERED NASAL at 08:34

## 2025-03-04 RX ADMIN — Medication 6 MG: at 21:32

## 2025-03-04 RX ADMIN — RISPERIDONE 1 MG: 1 TABLET, FILM COATED ORAL at 00:40

## 2025-03-04 RX ADMIN — LISINOPRIL 5 MG: 5 TABLET ORAL at 08:34

## 2025-03-04 RX ADMIN — TRIHEXYPHENIDYL HYDROCHLORIDE 2 MG: 2 TABLET ORAL at 08:34

## 2025-03-04 RX ADMIN — Medication 1 TABLET: at 08:34

## 2025-03-04 RX ADMIN — CLONAZEPAM 1 MG: 1 TABLET ORAL at 17:01

## 2025-03-04 RX ADMIN — TRIHEXYPHENIDYL HYDROCHLORIDE 2 MG: 2 TABLET ORAL at 21:13

## 2025-03-04 RX ADMIN — TRAZODONE HYDROCHLORIDE 100 MG: 100 TABLET ORAL at 21:13

## 2025-03-04 RX ADMIN — LEVOTHYROXINE SODIUM 100 MCG: 100 TABLET ORAL at 06:20

## 2025-03-04 RX ADMIN — CLONAZEPAM 1 MG: 1 TABLET ORAL at 08:34

## 2025-03-04 RX ADMIN — CHLORHEXIDINE GLUCONATE 15 ML: 1.2 SOLUTION ORAL at 08:34

## 2025-03-04 NOTE — TREATMENT TEAM
03/03/25 2135   Larson Anxiety Scale   Anxious Mood 2   Tension 2   Fears 2   Insomnia 2   Intellectual 2   Depressed Mood 2   Somatic Complaints: Muscular 1   Somatic Complaints: Sensory 1   Cardiovascular Symptoms 1   Respiratory Symptoms 1   Gastrointestinal Symptoms 1   Genitourinary Symptoms 1   Autonomic Symptoms 1   Behavior at Interview 1   Larson Anxiety Score 20     Atarax 50 mg administered as ordered per pt's request for moderate anxiety. Pt appeared to be anxious loud pressured speech, wandering the unit. Attempted to use coping skills. Will continue to monitor. Safety checks continued.

## 2025-03-04 NOTE — NURSING NOTE
Patient has been calm and has been quiet with no yelling out thus far today. She is paranoid at times. Appears disheveled and her hygiene is poor. Appetite is good. She took all medications without difficulty and has no complaints of pain.

## 2025-03-04 NOTE — NURSING NOTE
Pt was observed on the unit, withdrawn to self. Compliant with medication. Cooperative with staff during care. Pt appeared anxious and guarded during assessment. Displayed paranoid behavior during the shift. Denied SI, HI, AVH, or depression. Pt's mood and affect was labile. Speech pattern was loud and repetitive. Displayed disorganized thinking. Appeared internally preoccupied. Appearance and hygiene was disheveled. Made no c/o pain or discomfort. Safety checks continued.

## 2025-03-04 NOTE — PLAN OF CARE
Problem: Alteration in Thoughts and Perception  Goal: Refrain from acting on delusional thinking/internal stimuli  Description: Interventions:  - Monitor patient closely, per order   - Utilize least restrictive measures   - Set reasonable limits, give positive feedback for acceptable   - Administer medications as ordered and monitor of potential side effects  Outcome: Not Progressing  Goal: Agree to be compliant with medication regime, as prescribed and report medication side effects  Description: Interventions:  - Offer appropriate PRN medication and supervise ingestion; conduct AIMS, as needed   Outcome: Progressing     Problem: Depression  Goal: Verbalize thoughts and feelings  Description: Interventions:  - Assess and re-assess patient's level of risk   - Engage patient in 1:1 interactions, daily, for a minimum of 15 minutes   - Encourage patient to express feelings, fears, frustrations, hopes   Outcome: Progressing     Problem: Anxiety  Goal: Anxiety is at manageable level  Description: Interventions:  - Assess and monitor patient's anxiety level.   - Monitor for signs and symptoms (heart palpitations, chest pain, shortness of breath, headaches, nausea, feeling jumpy, restlessness, irritable, apprehensive).   - Collaborate with interdisciplinary team and initiate plan and interventions as ordered.  - [place] patient to unit/surroundings  - Explain treatment plan  - Encourage participation in care  - Encourage verbalization of concerns/fears  - Identify coping mechanisms  - Assist in developing anxiety-reducing skills  - Administer/offer alternative therapies  - Limit or eliminate stimulants  Outcome: Progressing     Problem: Alteration in Orientation  Goal: Interact with staff daily  Description: Interventions:  - Assess and re-assess patient's level of orientation  - Engage patient in 1 on 1 interactions, daily, for a minimum of 15 minutes   - Establish rapport/trust with patient   Outcome: Progressing      Problem: Nutrition/Hydration-ADULT  Goal: Nutrient/Hydration intake appropriate for improving, restoring or maintaining nutritional needs  Description: Monitor and assess patient's nutrition/hydration status for malnutrition. Collaborate with interdisciplinary team and initiate plan and interventions as ordered.  Monitor patient's weight and dietary intake as ordered or per policy. Utilize nutrition screening tool and intervene as necessary. Determine patient's food preferences and provide high-protein, high-caloric foods as appropriate.     INTERVENTIONS:  - Monitor oral intake, urinary output, labs, and treatment plans  - Assess nutrition and hydration status and recommend course of action  - Evaluate amount of meals eaten  - Assist patient with eating if necessary   - Allow adequate time for meals  - Recommend/ encourage appropriate diets, oral nutritional supplements, and vitamin/mineral supplements  - Order, calculate, and assess calorie counts as needed  - Recommend, monitor, and adjust tube feedings and TPN/PPN based on assessed needs  - Assess need for intravenous fluids  - Provide specific nutrition/hydration education as appropriate  - Include patient/family/caregiver in decisions related to nutrition  Outcome: Progressing

## 2025-03-04 NOTE — PROGRESS NOTES
03/04/25    Team Meeting   Meeting Type Daily Rounds   Team Members Present   Team Members Present Physician;Nurse;Occupational Therapist;   Physician Team Member Nataliya Negrete MD   Nursing Team Member Aron LUZ   Social Work Team Member Chacho TARANGO   OT Team Member Yazmin CTRS   Patient/Family Present   Patient Present No   Patient's Family Present No   201, d/c home Mon to gh with LV Act after med monitor, loud, can be intrusive, med complaint, delusional, slept on floor due to back issues, reviewed meds

## 2025-03-04 NOTE — NURSING NOTE
Patient appeared to have slept throughout the night after receiving Risperdal 1 mg PO. Respirations even and unlabored. No acute behaviors. Q15 minute checks continue.

## 2025-03-04 NOTE — PROGRESS NOTES
Progress Note - Behavioral Health   Name: Laine Tse 69 y.o. female I MRN: [id number]   Unit/Bed#: [facility] 209-01 I Date of Admission: 2/14/2025   Date of Service: 3/4/2025 I Hospital Day: 18         Assessment & Plan  Seizure disorder (HCC)  Phenobarbital 64.8 mg twice daily  Schizoaffective disorder (HCC)  Continue  Zydis 5 mg 1 pm + 15 mg p.o. daily at bedtime  Continue Klonopin 1 mg po bid.    Insomnia  Trazodone 100 mg po daily HS  Melatonin 6 mg p.o. daily at bedtime  Hypothyroidism    Hyponatremia  135 on 2/22/25  Severe protein-calorie malnutrition (HCC)  Malnutrition Findings:   Adult Malnutrition type: Acute illness  Adult Degree of Malnutrition: Other severe protein calorie malnutrition  Malnutrition Characteristics: Inadequate energy, Weight loss  360 Statement: Malnutrition related to acute illness in setting of psychiatric illness as evidenced by >5% body weight loss in 1 month, <50% energy intake compared to estimated energy needs for >5 days.  To treat with oral diet and nutrition supplements as accepted.    BMI Findings:      Body mass index is 28.84 kg/m².       Recommended Treatment:     Planned medication and treatment changes:    All current active medications have been reviewed  Encourage group therapy, milieu therapy and occupational therapy  Behavioral Health checks for safety monitoring  Requires continued inpatient treatment due to chronic illness and high risk of decompensation if discharged before long term stability is achieved  Zydis 5 mg  1pm + Zydis 15 mg po hs  Klonopin 1 mg bid.   Trazodone 100 mg po hs.  Melatonin to 6 mg po hs.  Monitor behavior and fall risk.    Current medications:  Current Facility-Administered Medications   Medication Dose Route Frequency Provider Last Rate    acetaminophen  650 mg Oral Q4H PRN Mima Wells MD      acetaminophen  650 mg Oral Q4H PRN Mima Wells MD      acetaminophen  975 mg Oral Q6H PRN Mima Wells MD      aluminum-magnesium  hydroxide-simethicone  30 mL Oral Q4H PRN Mima Wells MD      ammonium lactate   Topical BID Joel Wise MD      aspirin  81 mg Oral Daily Marion L Dagnall, PA-C      atorvastatin  20 mg Oral Daily With Dinner Marion L Dagnall, PA-C      bisacodyl  10 mg Rectal Daily PRN Marion L Dagnall, PA-C      calcium carbonate-vitamin D  1 tablet Oral Daily With Breakfast Marion L Dagnall, PA-C      chlorhexidine  15 mL Swish & Spit Q12H JANNIE Marion L Dagnall, PA-C      clonazePAM  1 mg Oral BID Papito An MD      dextromethorphan-guaiFENesin  10 mL Oral Q4H PRN Joel Wise MD      fluticasone  2 spray Nasal Daily Marion L Dagnall, PA-C      hydrALAZINE  25 mg Oral Q8H PRN Marion L Dagnall, PA-C      hydrOXYzine HCL  25 mg Oral Q6H PRN Max 4/day Mima Wells MD      hydrOXYzine HCL  50 mg Oral Q6H PRN Max 4/day Mima Wells MD      lactulose  20 g Oral BID PRN Marion L Dagnall, PA-C      levothyroxine  100 mcg Oral Early Morning Marion L Dagnall, PA-C      lidocaine  1 patch Topical Daily PRN Marion L Dagnall, PA-C      lisinopril  5 mg Oral Daily BAILEY Ortiz-C      loratadine  10 mg Oral Daily PRN Marion L Dagnall, PA-C      melatonin  6 mg Oral HS Papito An MD      nicotine polacrilex  4 mg Oral Q2H PRN Mima Wells MD      OLANZapine  15 mg Oral HS Papito An MD      OLANZapine  5 mg Oral Daily Papito An MD      PHENobarbital  64.8 mg Oral Q12H Marion L Dagnall, PA-C      polyethylene glycol  17 g Oral Daily PRN Marion L Dagnall, PA-C      propranolol  5 mg Oral Q8H PRN Mima Wells MD      risperiDONE  0.5 mg Oral Q4H PRN Max 6/day Papito An MD      risperiDONE  1 mg Oral 4x Daily PRN Papito An MD      risperiDONE  2 mg Oral TID PRN Papito An MD      senna-docusate sodium  1 tablet Oral HS Marion Javier PA-C      sodium chloride  2 spray Each Nare Q1H PRN Marion Javier PA-C      tiZANidine  2 mg Oral Q6H PRN Marion Javier PA-C       traZODone  100 mg Oral HS Papito An MD      traZODone  50 mg Oral Q6H PRN Max 3/day Mima Wells MD      trihexyphenidyl  2 mg Oral BID Papito An MD       Facility-Administered Medications Ordered in Other Encounters   Medication Dose Route Frequency Provider Last Rate    lactated ringers   Intravenous Continuous PRN Celi Benson CRNA         Risks / Benefits of Treatment:    Risks, benefits, and possible side effects of medications explained to patient. Patient has limited understanding of risks and benefits of treatment at this time, but agrees to take medications as prescribed.    Subjective:    Behavior over the last 24 hours: unchanged.     Laine was seen for continuing care. Patient was seen in her room, praying and then laying on the floor. She was able to get up with verbal redirection. Patient still becomes periodically restless and religiously preoccupied but appears calmer this morning. She remains somewhat disruptive during evening hour, requiring prn medications at times. she has been adherence to meds and denies any current side effects. Staff report limited participation in groups    Sleep: broken sleep   Appetite: decreased  Medication side effects: denies   ROS: no complaints, all other systems are negative    Mental Status Evaluation:    Appearance:  age appropriate   Behavior:  restless   Speech:  increased volume    Mood:  dysphoric, anxious   Affect:  labile   Thought Process:  disorganized, circumstantial   Associations: circumstantial associations, perseverative   Thought Content:  Shinto preoccupation   Perceptual Disturbances: denies auditory hallucinations when asked, appears preoccupied   Risk Potential: Suicidal ideation - None at present  Homicidal ideation - None at present  Potential for aggression - Not at present   Sensorium:  unable to assess   Memory:  recent and remote memory: unable to assess due to lack of cooperation   Consciousness:  alert    Attention/Concentration: decreased concentration and decreased attention span   Insight:  poor   Judgment: limited   Gait/Station: unstable gait   Motor Activity: abnormal movement noted: dyskinetic oral movements present     Vital signs in last 24 hours:    Temp:  [97 °F (36.1 °C)-97.9 °F (36.6 °C)] 97.9 °F (36.6 °C)  HR:  [69-91] 69  BP: (106-134)/(61-75) 134/75  Resp:  [18] 18  SpO2:  [96 %-98 %] 96 %  O2 Device: None (Room air)         Laboratory results: I have personally reviewed all pertinent laboratory/tests results    Most Recent Labs:   Lab Results   Component Value Date    WBC 5.69 02/16/2025    RBC 4.36 02/16/2025    HGB 14.2 02/16/2025    HCT 41.8 02/16/2025     02/16/2025    RDW 12.2 02/16/2025    NEUTROABS 3.37 02/16/2025    SODIUM 135 02/22/2025    K 4.6 02/22/2025     02/22/2025    CO2 29 02/22/2025    BUN 22 02/22/2025    CREATININE 0.75 02/22/2025    GLUC 87 02/22/2025    CALCIUM 9.3 02/22/2025    AST 19 02/16/2025    ALT 20 02/16/2025    ALKPHOS 47 02/16/2025    TP 7.3 02/16/2025    ALB 4.3 02/16/2025    TBILI 0.34 02/16/2025    CHOLESTEROL 198 02/16/2025    HDL 77 02/16/2025    TRIG 122 02/16/2025    LDLCALC 97 02/16/2025    NONHDLC 121 02/16/2025    VALPROICTOT 71 11/29/2019    AMMONIA 15 12/19/2017    [id number] 0.920 02/13/2025    FREET4 0.94 08/14/2024    SYPHILISAB Non-reactive 04/05/2024    HGBA1C 5.5 04/26/2024     04/26/2024       Counseling / Coordination of Care:    Patient's progress discussed with staff in treatment team meeting.  Medication changes reviewed with nursing staff.  Supportive therapy provided to patient.  Group attendance encouraged.    Papito An MD 03/04/25

## 2025-03-04 NOTE — NURSING NOTE
Pt given Risperdal 1 mg PO for moderate agitation at 0040. Medication was effective, pt sleeping.Q 15 minute checks ongoing.

## 2025-03-04 NOTE — PROGRESS NOTES
"Progress Note - Laine Tse 69 y.o. female MRN: [id number]    Unit/Bed#: -01 Encounter: [id number]        Subjective:   Patient seen and examined at bedside after reviewing the chart and discussing the case with the caring staff.      Patient examined at bedside.  Patient denies any acute symptoms.     Physical Exam   Vitals: Blood pressure 134/75, pulse 69, temperature 97.9 °F (36.6 °C), temperature source Temporal, resp. rate 18, height 5' 4\" (1.626 m), weight 76.2 kg (167 lb 15.9 oz), SpO2 96%, not currently breastfeeding.,Body mass index is 28.84 kg/m².  Constitutional: Patient in no acute distress.  HEENT: PERR, EOMI, MMM.  Cardiovascular: Normal rate and regular rhythm.    Pulmonary/Chest: Effort normal and breath sounds normal.   Abdomen: Soft, + BS, NT.    Assessment/Plan:  Laine Tse is a(n) 69 y.o. female with schizoaffective disorder.      Cardiac with hx of HTN, HLD.  Continue atorvastatin 20 mg daily, ASA 81 mg daily.  Not on antihypertensive meds prehospital.  Start lisinopril 5 mg daily 2/15.  Hydralazine 25 mg as needed for SBP >170 or DBP >110.  Cont to monitor vitals.    Allergic rhinitis.  Flonase nasal spray daily.  Claritin 10 mg daily as needed (Zyrtec nonform).   Bilateral hearing loss.  Supportive measures.   Osteoporosis.  Continue calcium/vitamin D supplement.  Resume Fosamax on discharge.  DJD/OA/HA/chronic low back pain.  Tylenol, Zanaflex, lidocaine patch as needed.  Hypothyroidism.  Patient is on levothyroxine 100 mcg daily.  TSH normal on 2/13/25.  Seizure disorder.  Continue phenobarbital 64.8 mg q12h.  [facility] (last saw 2/11/25).  Chronic constipation.  Continue Senokot S nightly.  Miralax as needed.  Add lactulose and dulxolax suppository as needed 2/18.   Cough/sore throat.  Ongoing x months per pt.  Afebrile.  Lungs clear.  Robitussin DM as needed.    Hyponatremia.  Level 129 -> 130 -> 135 on 2/22/25.   Dry cracked skin bilateral heel. "  Apply ammonium lactate twice daily along with Band-Aid or dressing 2/23/25.  Podiatry consulted for further evaluation and treatment.    The patient was discussed with Dr. Wise and he is in agreement with the above note.   English

## 2025-03-05 PROCEDURE — 99232 SBSQ HOSP IP/OBS MODERATE 35: CPT

## 2025-03-05 RX ORDER — OLANZAPINE 15 MG/1
15 TABLET, FILM COATED ORAL
Status: DISCONTINUED | OUTPATIENT
Start: 2025-03-05 | End: 2025-03-10 | Stop reason: HOSPADM

## 2025-03-05 RX ORDER — TRAZODONE HYDROCHLORIDE 150 MG/1
150 TABLET ORAL
Status: DISCONTINUED | OUTPATIENT
Start: 2025-03-05 | End: 2025-03-10 | Stop reason: HOSPADM

## 2025-03-05 RX ORDER — ASPIRIN 81 MG
TABLET,CHEWABLE ORAL
Qty: 31 TABLET | Refills: 5 | Status: SHIPPED | OUTPATIENT
Start: 2025-03-05 | End: 2025-03-09

## 2025-03-05 RX ORDER — OLANZAPINE 5 MG/1
5 TABLET, FILM COATED ORAL DAILY
Status: DISCONTINUED | OUTPATIENT
Start: 2025-03-05 | End: 2025-03-10 | Stop reason: HOSPADM

## 2025-03-05 RX ADMIN — AMMONIUM LACTATE: 12 LOTION TOPICAL at 08:22

## 2025-03-05 RX ADMIN — Medication 6 MG: at 20:48

## 2025-03-05 RX ADMIN — SENNOSIDES AND DOCUSATE SODIUM 1 TABLET: 50; 8.6 TABLET ORAL at 20:48

## 2025-03-05 RX ADMIN — TIZANIDINE 2 MG: 4 TABLET ORAL at 20:47

## 2025-03-05 RX ADMIN — OLANZAPINE 15 MG: 15 TABLET, FILM COATED ORAL at 20:49

## 2025-03-05 RX ADMIN — LISINOPRIL 5 MG: 5 TABLET ORAL at 08:21

## 2025-03-05 RX ADMIN — ATORVASTATIN CALCIUM 20 MG: 20 TABLET, FILM COATED ORAL at 17:23

## 2025-03-05 RX ADMIN — LEVOTHYROXINE SODIUM 100 MCG: 100 TABLET ORAL at 06:59

## 2025-03-05 RX ADMIN — ASPIRIN 81 MG CHEWABLE TABLET 81 MG: 81 TABLET CHEWABLE at 08:22

## 2025-03-05 RX ADMIN — TRAZODONE HYDROCHLORIDE 150 MG: 150 TABLET ORAL at 20:48

## 2025-03-05 RX ADMIN — CLONAZEPAM 1 MG: 1 TABLET ORAL at 08:22

## 2025-03-05 RX ADMIN — PHENOBARBITAL 64.8 MG: 32.4 TABLET ORAL at 20:48

## 2025-03-05 RX ADMIN — ACETAMINOPHEN 650 MG: 325 TABLET ORAL at 20:47

## 2025-03-05 RX ADMIN — TRIHEXYPHENIDYL HYDROCHLORIDE 2 MG: 2 TABLET ORAL at 08:22

## 2025-03-05 RX ADMIN — GUAIFENESIN AND DEXTROMETHORPHAN 10 ML: 100; 10 SYRUP ORAL at 20:47

## 2025-03-05 RX ADMIN — PHENOBARBITAL 64.8 MG: 32.4 TABLET ORAL at 08:22

## 2025-03-05 RX ADMIN — Medication 1 TABLET: at 08:21

## 2025-03-05 RX ADMIN — CHLORHEXIDINE GLUCONATE 15 ML: 1.2 SOLUTION ORAL at 20:46

## 2025-03-05 RX ADMIN — OLANZAPINE 5 MG: 5 TABLET, FILM COATED ORAL at 13:34

## 2025-03-05 RX ADMIN — TRIHEXYPHENIDYL HYDROCHLORIDE 2 MG: 2 TABLET ORAL at 20:48

## 2025-03-05 RX ADMIN — CLONAZEPAM 1 MG: 1 TABLET ORAL at 17:23

## 2025-03-05 NOTE — NUTRITION
03/05/25 1653   Biochemical Data,Medical Tests, and Procedures   Biochemical Data/Medical Tests/Procedures Lab values reviewed;Meds reviewed   Labs (Comment) No new labs   Meds (Comment) ASA, lipitor, klonopin, Vit D, CaCO3, apresoline, atarax, melatonin, zyprexa, desyrel   Nutrition-Focused Physical Exam   Nutrition-Focused Physical Exam Findings RN skin assessment reviewed;No skin issues documented   Medical-Related Concerns PMH reviewed   Adequacy of Intake   Nutrition Modality PO   Feeding Route   PO Independent   Current PO Intake   Current Diet Order Regular finger foods thin liquids   Nutrition Supplements Ensure Plus High Protein;Magic Cup  (each BID)   Current Meal Intake 0-25%;25-50%;50-75%;%   Intake Supplements Other (comment)  (unknown)   Estimated calorie intake compared to estimated need Nutrient needs not met.   PES Statement   Problem Continue previous diagnosis   Recommendations/Interventions   Malnutrition/BMI Present Yes  (as previously noted)   Summary Regular finger foods thin liquids. Ensure plus high protein BID, magic cup BID. Intake varies. No new weight; obtain current weight. 2/22 167#,BMI=28.84; 8# weight loss from admission 2/14 175#. No new labs. Confused, oriented to person. Trace BLE edema. Skin intact. Lactated ringers prn ordered however not currently infusing.  Patient laying on the floor of her room during visit. Patient unable to participate in any meaningful conversation and is discharge focused. Continue to encourage oral intake.   Interventions/Recommendations Continue current diet order;Supplement continue;Obtain current weight   Education Assessment   Education Patient/caregiver not appropriate for education at this time   Patient Nutrition Goals   Goal Meet PO needs;Avoid weight loss   Goal Status Not met;Extended   Timeframe to complete goal by next f/u   Nutrition Complexity Risk   Nutrition complexity level High risk   Follow up date 03/12/25

## 2025-03-05 NOTE — ASSESSMENT & PLAN NOTE
Trazodone increased to 150 mg po daily HS on 3/5/2025  Continue Melatonin 6 mg p.o. daily at bedtime

## 2025-03-05 NOTE — NURSING NOTE
"Pt visible and intrusive on unit. Pt observed wandering in peers room. She is able to redirected. Pt is med compliant. She is on strict mouth checks. Per BHT's, 2 white pills were discovered on the floor in pt's room. Pt is labile and selective with staff. Pt sleeps on the floor and refuses to her sleep in her bed. Pt educated on the importance of proper sleeping positions. Pt presents a disorganized, confused, paranoid, and religiously preoccupied. Pt observed laying on the floor praying. Pt states \"I hate Nazi's\" and \"everyone is trying to poison me.\" Pt noted to be pointing and yelling/targeting specific staff, pt observed calling staff members \"ugly.\" Pt denies all. Continuous monitoring maintained.   "

## 2025-03-05 NOTE — PROGRESS NOTES
Progress Note - Behavioral Health   Name: Laine Tse 69 y.o. female I MRN: [id number]  Unit/Bed#: [facility] 209-01 I Date of Admission: 2/14/2025   Date of Service: 3/5/2025 I Hospital Day: 19    Assessment & Plan  Seizure disorder (HCC)  Phenobarbital 64.8 mg twice daily  Schizoaffective disorder (HCC)  Continue Zyprexa  5 mg at 1 pm + 15 mg p.o. daily at bedtime  Continue Klonopin 1 mg po bid.    Insomnia  Trazodone increased to 150 mg po daily HS on 3/5/2025  Continue Melatonin 6 mg p.o. daily at bedtime  Hypothyroidism  Management per medical team.   Hyponatremia  135 on 2/22/25  Severe protein-calorie malnutrition (HCC)  Malnutrition Findings:   Adult Malnutrition type: Acute illness  Adult Degree of Malnutrition: Other severe protein calorie malnutrition  Malnutrition Characteristics: Inadequate energy, Weight loss  360 Statement: Malnutrition related to acute illness in setting of psychiatric illness as evidenced by >5% body weight loss in 1 month, <50% energy intake compared to estimated energy needs for >5 days.  To treat with oral diet and nutrition supplements as accepted.    BMI Findings:      Body mass index is 28.84 kg/m².       Current medications:  Current Facility-Administered Medications   Medication Dose Route Frequency Provider Last Rate    acetaminophen  650 mg Oral Q4H PRN Mima Wells MD      acetaminophen  650 mg Oral Q4H PRN Mima Wells MD      acetaminophen  975 mg Oral Q6H PRN Mima Wells MD      aluminum-magnesium hydroxide-simethicone  30 mL Oral Q4H PRN Mima Wells MD      ammonium lactate   Topical BID Joel Wise MD      aspirin  81 mg Oral Daily Marion BAILEY Rabago-C      atorvastatin  20 mg Oral Daily With Dinner Marionsushil Hatchl PA-C      bisacodyl  10 mg Rectal Daily PRN Marion Javier PA-C      calcium carbonate-vitamin D  1 tablet Oral Daily With Breakfast Marion BAILEY Rabago-C      chlorhexidine  15 mL Swish & Spit Q12H JANNIE Marionsushil Javier,  PA-C      clonazePAM  1 mg Oral BID Papito An MD      dextromethorphan-guaiFENesin  10 mL Oral Q4H PRN Joel Wise MD      fluticasone  2 spray Nasal Daily Marion L Dagnall, PA-C      hydrALAZINE  25 mg Oral Q8H PRN Marion L Dagnall, PA-C      hydrOXYzine HCL  25 mg Oral Q6H PRN Max 4/day Mima Wells MD      hydrOXYzine HCL  50 mg Oral Q6H PRN Max 4/day Mima Wells MD      lactulose  20 g Oral BID PRN Marion L Dagnall, PA-C      levothyroxine  100 mcg Oral Early Morning Marion L Dagnall, PA-C      lidocaine  1 patch Topical Daily PRN Marion L Dagnall, PA-C      lisinopril  5 mg Oral Daily Rosenda Zelaya, PA-C      loratadine  10 mg Oral Daily PRN Marion L Dagnall, PA-C      melatonin  6 mg Oral HS Papito An MD      nicotine polacrilex  4 mg Oral Q2H PRN Mima Wells MD      OLANZapine  15 mg Oral HS Clayton Herbert, CRNP      OLANZapine  5 mg Oral Daily Clayton Herbert, CRNP      PHENobarbital  64.8 mg Oral Q12H Marion L Dagnall, PA-C      polyethylene glycol  17 g Oral Daily PRN Marion L Dagnall, PA-C      propranolol  5 mg Oral Q8H PRN Mima Wells MD      risperiDONE  0.5 mg Oral Q4H PRN Max 6/day Papito An MD      risperiDONE  1 mg Oral 4x Daily PRN Papito An MD      risperiDONE  2 mg Oral TID PRN Papito An MD      senna-docusate sodium  1 tablet Oral HS Marion L Dagnall, PA-C      sodium chloride  2 spray Each Nare Q1H PRN Marion L Dagnall, PA-C      tiZANidine  2 mg Oral Q6H PRN Marion L Dagnall, PA-C      traZODone  150 mg Oral HS Clayton Herbert, CRNP      traZODone  50 mg Oral Q6H PRN Max 3/day Mima Wells MD      trihexyphenidyl  2 mg Oral BID Papito An MD       Facility-Administered Medications Ordered in Other Encounters   Medication Dose Route Frequency Provider Last Rate    lactated ringers   Intravenous Continuous PRN Celi Benson CRNA          Risks/Benefits of Treatment:     Risks, benefits, and possible side effects  of medications explained to patient and patient verbalizes understanding and agreement for treatment.    Progress Toward Goals:  Unchanged    Treatment Planning:      - Encourage early mobility and having a structured day  - Provide frequent re-orientation, and cognitive stimulation  - Ensure assistive devices are in proper working order (eye-glasses, hearing aids)  - Encourage adequate hydration, nutrition and monitor bowel movements  - Maintain sleep-wake cycle: Uninterrupted sleep time; low-level lighting at night  - Fall precaution  - f/u SLIM recs regarding the medical problems   - Continue medication titration and treatment plan; adjust medication to optimize treatment response and as clinically indicated.   - Observation: N/A  - Legal Status: 201  - VS: as per unit protocol  - Encourage group attendance and milieu therapy  - Dispo: To be determined  - Long Stay Certification : Not Applicable  - Estimated Discharge Day: 3/6/2025 5 days (3/10/2025)    Subjective       Laine was seen today for psychiatric follow-up. Patient is restless and fidgety. She is disorganized and illogical in thought. Her behavior is bizarre, patient believes writer is the wong and is asking for blessings. She is religiously preoccupied. She can be disruptive on the unit at times. She is generally redirectable. She is minimally social with peers.    Sleep:  broken sleep  Appetite: fair  Medication side effects: No  ROS: review of systems as noted above in HPI/Subjective report, all other systems are negative    Objective :  Temp:  [97.2 °F (36.2 °C)-97.3 °F (36.3 °C)] 97.2 °F (36.2 °C)  HR:  [92] 92  BP: (129)/(95) 129/95  Resp:  [16-18] 16  SpO2:  [100 %] 100 %  O2 Device: None (Room air)    Temp:  [97.2 °F (36.2 °C)-97.3 °F (36.3 °C)] 97.2 °F (36.2 °C)  HR:  [92] 92  BP: (129)/(95) 129/95  Resp:  [16-18] 16  SpO2:  [100 %] 100 %  O2 Device: None (Room air)    Mental Status Evaluation:    Appearance:  Age appropriate, marginal  hygiene   Behavior:  bizarre, restless and fidgety   Speech:  pressured, loud   Mood:  dysphoric   Affect:  labile   Thought Process:  disorganized, illogical, tangential   Thought Content:  Religiously preoccupied   Perceptual Disturbances: Denied AVH, however appears internally preoccupied   Risk Potential: Suicidal Ideation -  None at present  Homicidal Ideation -  None at present  Potential for Aggression - No   Sensorium:  oriented to person   Memory:  recent and remote memory: unable to assess due to lack of cooperation   Consciousness:  alert and awake   Attention/Concentration: decreased attention span and decreased concentration   Insight:  poor   Judgment: limited   Gait/Station: unstable gait   Motor Activity: abnormal movement noted: dyskinetic oral movements present     Cognitive Assessment : N/A  Pain : denied  Pain Scale : 0      Lab Results: I have reviewed the following results:  Most Recent Labs:   Lab Results   Component Value Date    WBC 5.69 02/16/2025    RBC 4.36 02/16/2025    HGB 14.2 02/16/2025    HCT 41.8 02/16/2025     02/16/2025    RDW 12.2 02/16/2025    NEUTROABS 3.37 02/16/2025    SODIUM 135 02/22/2025    K 4.6 02/22/2025     02/22/2025    CO2 29 02/22/2025    BUN 22 02/22/2025    CREATININE 0.75 02/22/2025    GLUC 87 02/22/2025    CALCIUM 9.3 02/22/2025    AST 19 02/16/2025    ALT 20 02/16/2025    ALKPHOS 47 02/16/2025    TP 7.3 02/16/2025    ALB 4.3 02/16/2025    TBILI 0.34 02/16/2025    CHOLESTEROL 198 02/16/2025    HDL 77 02/16/2025    TRIG 122 02/16/2025    LDLCALC 97 02/16/2025    NONHDLC 121 02/16/2025    VALPROICTOT 71 11/29/2019    AMMONIA 15 12/19/2017    [id number] 0.920 02/13/2025    FREET4 0.94 08/14/2024    SYPHILISAB Non-reactive 04/05/2024    HGBA1C 5.5 04/26/2024     04/26/2024       Administrative Statements     Counseling / Coordination of Care:   Patient's progress discussed with staff in treatment team meeting.  Medication changes reviewed with  staff in treatment team meeting..    CHRISTOPHER Alexander 03/05/25

## 2025-03-05 NOTE — SOCIAL WORK
03/05/25    Team Meeting   Meeting Type Daily Rounds   Team Members Present   Team Members Present Physician;Nurse;;Occupational Therapist   Physician Team Member Nataliya GONZÁLES, Caden GONZÁLES, Kaci Navarrete   Nursing Team Member Aron LUZ   Social Work Team Member Keeley TARANGO   OT Team Member Yazmin CONTRERAS   Patient/Family Present   Patient Present No   Patient's Family Present No   201, broken sleep , Risperdal 1 mg mildly effective , pill found on floor of room mouth checks to be completed. D/C Monday .

## 2025-03-05 NOTE — TREATMENT TEAM
03/04/25 2113   Broset Violence Checklist   Assessment type Shift   Irritability 1   Confusion 1   Boisterousness 0   Threatening physical violence 0   Verbal threats 0   Violence 0   Broset score 2       Risperdal 1 mg given at 2113 for a Broset score of 2. Will re-assess.

## 2025-03-05 NOTE — PROGRESS NOTES
"Progress Note - Laine Tse 69 y.o. female MRN: [id number]    Unit/Bed#: -01 Encounter: [id number]        Subjective:   Patient seen and examined at bedside after reviewing the chart and discussing the case with the caring staff.      Patient examined at bedside.  Patient denies any acute symptoms.     Physical Exam   Vitals: Blood pressure 129/95, pulse 92, temperature (!) 97.2 °F (36.2 °C), temperature source Temporal, resp. rate 16, height 5' 4\" (1.626 m), weight 76.2 kg (167 lb 15.9 oz), SpO2 100%, not currently breastfeeding.,Body mass index is 28.84 kg/m².  Constitutional: Patient in no acute distress.  HEENT: PERR, EOMI, MMM.  Cardiovascular: Normal rate and regular rhythm.    Pulmonary/Chest: Effort normal and breath sounds normal.   Abdomen: Soft, + BS, NT.    Assessment/Plan:  Laine Tse is a(n) 69 y.o. female with schizoaffective disorder.      Cardiac with hx of HTN, HLD.  Continue atorvastatin 20 mg daily, ASA 81 mg daily.  Not on antihypertensive meds prehospital.  Start lisinopril 5 mg daily 2/15.  Hydralazine 25 mg as needed for SBP >170 or DBP >110.  Cont to monitor vitals.    Allergic rhinitis.  Flonase nasal spray daily.  Claritin 10 mg daily as needed (Zyrtec nonform).   Bilateral hearing loss.  Supportive measures.   Osteoporosis.  Continue calcium/vitamin D supplement.  Resume Fosamax on discharge.  DJD/OA/HA/chronic low back pain.  Tylenol, Zanaflex, lidocaine patch as needed.  Hypothyroidism.  Patient is on levothyroxine 100 mcg daily.  TSH normal on 2/13/25.  Seizure disorder.  Continue phenobarbital 64.8 mg q12h.  Pt follows with [facility]'s neurology (last saw 2/11/25).  Chronic constipation.  Continue Senokot S nightly.  Miralax as needed.  Add lactulose and dulxolax suppository as needed 2/18.   Cough/sore throat.  Ongoing x months per pt.  Afebrile.  Lungs clear.  Robitussin DM as needed.    Hyponatremia.  Level 129 -> 130 -> 135 on 2/22/25.   Dry cracked skin bilateral " heel.  Apply ammonium lactate twice daily along with Band-Aid or dressing 2/23/25.  Podiatry consulted for further evaluation and treatment.  Malnutrition.  Nutrition following.  Adult Malnutrition type: Acute illness.  Adult Degree of Malnutrition: Other severe protein calorie malnutrition.  Malnutrition Characteristics: Inadequate energy, Weight loss.    The patient was discussed with Dr. Wise and he is in agreement with the above note.

## 2025-03-05 NOTE — PLAN OF CARE
Problem: Alteration in Thoughts and Perception  Goal: Recognize dysfunctional thoughts, communicate reality-based thoughts at the time of discharge  Description: Interventions:  - Provide medication and psycho-education to assist patient in compliance and developing insight into his/her illness   Outcome: Not Progressing     Problem: Alteration in Thoughts and Perception  Goal: Agree to be compliant with medication regime, as prescribed and report medication side effects  Description: Interventions:  - Offer appropriate PRN medication and supervise ingestion; conduct AIMS, as needed   Outcome: Progressing

## 2025-03-05 NOTE — NURSING NOTE
Patient appears to have had broken sleep through the majority of the overnight hours. No concerns voiced, no signs of distress. Risperdal mildly effective, pt less agitated but still restless. Continuous 15 min safety checks in place.

## 2025-03-05 NOTE — NURSING NOTE
Patient cooperative and med compliant, participated in a snack tonight. She denies all psychiatric symptoms but does appear to be internally preoccupied. Risperdal 1 mg given at 2113 appears to be ineffective as patient is still agitated but redirectable at the moment. Safety precautions maintained q15 mins.

## 2025-03-06 PROCEDURE — 99232 SBSQ HOSP IP/OBS MODERATE 35: CPT

## 2025-03-06 RX ADMIN — RISPERIDONE 2 MG: 2 TABLET, FILM COATED ORAL at 11:21

## 2025-03-06 RX ADMIN — CHLORHEXIDINE GLUCONATE 15 ML: 1.2 SOLUTION ORAL at 08:41

## 2025-03-06 RX ADMIN — LISINOPRIL 5 MG: 5 TABLET ORAL at 08:38

## 2025-03-06 RX ADMIN — OLANZAPINE 15 MG: 15 TABLET, FILM COATED ORAL at 22:00

## 2025-03-06 RX ADMIN — TRIHEXYPHENIDYL HYDROCHLORIDE 2 MG: 2 TABLET ORAL at 08:38

## 2025-03-06 RX ADMIN — TRAZODONE HYDROCHLORIDE 150 MG: 150 TABLET ORAL at 22:00

## 2025-03-06 RX ADMIN — PHENOBARBITAL 64.8 MG: 32.4 TABLET ORAL at 22:00

## 2025-03-06 RX ADMIN — SENNOSIDES AND DOCUSATE SODIUM 1 TABLET: 50; 8.6 TABLET ORAL at 22:00

## 2025-03-06 RX ADMIN — OLANZAPINE 5 MG: 5 TABLET, FILM COATED ORAL at 13:04

## 2025-03-06 RX ADMIN — HYDROXYZINE HYDROCHLORIDE 50 MG: 50 TABLET, FILM COATED ORAL at 11:21

## 2025-03-06 RX ADMIN — TRIHEXYPHENIDYL HYDROCHLORIDE 2 MG: 2 TABLET ORAL at 22:00

## 2025-03-06 RX ADMIN — LEVOTHYROXINE SODIUM 100 MCG: 100 TABLET ORAL at 08:38

## 2025-03-06 RX ADMIN — CLONAZEPAM 1 MG: 1 TABLET ORAL at 08:38

## 2025-03-06 RX ADMIN — FLUTICASONE PROPIONATE 2 SPRAY: 50 SPRAY, METERED NASAL at 08:38

## 2025-03-06 RX ADMIN — Medication 1 TABLET: at 08:38

## 2025-03-06 RX ADMIN — ASPIRIN 81 MG CHEWABLE TABLET 81 MG: 81 TABLET CHEWABLE at 08:38

## 2025-03-06 RX ADMIN — Medication 6 MG: at 22:00

## 2025-03-06 RX ADMIN — PHENOBARBITAL 64.8 MG: 32.4 TABLET ORAL at 08:38

## 2025-03-06 NOTE — SOCIAL WORK
CM spoke to Domenic director of [facility] -[phone]. Discussed pt d/c Monday. A meeting is being coordinated with [facility], pt residence, and [facility] team tomorrow to discuss d/c.       CM placed call to pt [facility] nurse [facility] [phone]. CM provided pt update and notified of plans to discharge. As per [facility] the  will provide transport home.

## 2025-03-06 NOTE — PROGRESS NOTES
"Progress Note - Laine Tse 69 y.o. female MRN: [id number]    Unit/Bed#: -01 Encounter: [id number]        Subjective:   Patient seen and examined at bedside after reviewing the chart and discussing the case with the caring staff.      Patient examined at bedside.  Patient denies any acute symptoms.     Patient is a possible discharge on Monday 3/10/25.    Physical Exam   Vitals: Blood pressure 128/65, pulse 73, temperature 97.8 °F (36.6 °C), temperature source Temporal, resp. rate 16, height 5' 4\" (1.626 m), weight 76.2 kg (167 lb 15.9 oz), SpO2 95%, not currently breastfeeding.,Body mass index is 28.84 kg/m².  Constitutional: Patient in no acute distress.  HEENT: PERR, EOMI, MMM.  Cardiovascular: Normal rate and regular rhythm.    Pulmonary/Chest: Effort normal and breath sounds normal.   Abdomen: Soft, + BS, NT.    Assessment/Plan:  Laine Tse is a(n) 69 y.o. female with schizoaffective disorder.      Cardiac with hx of HTN, HLD.  Continue atorvastatin 20 mg daily, ASA 81 mg daily.  Not on antihypertensive meds prehospital.  Start lisinopril 5 mg daily 2/15.  Hydralazine 25 mg as needed for SBP >170 or DBP >110.  Cont to monitor vitals.    Allergic rhinitis.  Flonase nasal spray daily.  Claritin 10 mg daily as needed (Zyrtec nonform).   Bilateral hearing loss.  Supportive measures.   Osteoporosis.  Continue calcium/vitamin D supplement.  Resume Fosamax on discharge.  DJD/OA/HA/chronic low back pain.  Tylenol, Zanaflex, lidocaine patch as needed.  Hypothyroidism.  Patient is on levothyroxine 100 mcg daily.  TSH normal on 2/13/25.  Seizure disorder.  Continue phenobarbital 64.8 mg q12h.  Pt follows with [facility] neurology (last saw 2/11/25).  Chronic constipation.  Continue Senokot S nightly.  Miralax as needed.  Add lactulose and dulxolax suppository as needed 2/18.   Cough/sore throat.  Ongoing x months per pt.  Afebrile.  Lungs clear.  Robitussin DM as needed.    Hyponatremia.  Level 129 -> 130 " -> 135 on 2/22/25.   Dry cracked skin bilateral heel.  Apply ammonium lactate twice daily along with Band-Aid or dressing 2/23/25.  Podiatry consulted for further evaluation and treatment.  Malnutrition.  Nutrition following.  Adult Malnutrition type: Acute illness.  Adult Degree of Malnutrition: Other severe protein calorie malnutrition.  Malnutrition Characteristics: Inadequate energy, Weight loss.    The patient was discussed with Dr. Wise and he is in agreement with the above note.

## 2025-03-06 NOTE — NURSING NOTE
Patient loud and labile during the first portion of shift. Entered another patient's room. Redirected. Childlike and disorganized this evening. Mild exit seeking noted.  Also redirected. Argumentative with certain staff. Refused evening snack. Complaint with medications (except   chlorhexidine (PERIDEX) 0.12 % oral rinse 15 mL). Gait unsteady at times. Maintained on q 15 minute safety checks.

## 2025-03-06 NOTE — PROGRESS NOTES
03/06/25    Team Meeting   Meeting Type Daily Rounds   Team Members Present   Team Members Present Physician;Nurse;;Occupational Therapist   Physician Team Member Nataliya Negrete MD   Nursing Team Member Julio LUZ   Social Work Team Member Chacho TARANGO   OT Team Member Yazmin CONTRERAS   Patient/Family Present   Patient Present No   Patient's Family Present No   201, d/c home Mon to [facility] with [facility], pleasant this am, med complaints, childlike, sleeping on fl, pushing on doors, restless in evening

## 2025-03-06 NOTE — NURSING NOTE
Patient visible in milieu, pacing, confused, bizarre at times, medication compliant and at times cooperative with encouragement. Patient endorses mild anxiety/depression, denies SI/HI and hallucinations. Continued care and safety rounds in progress.

## 2025-03-06 NOTE — PROGRESS NOTES
Progress Note - Behavioral Health   Name: Laine Tse 69 y.o. female I MRN: [id number]  Unit/Bed#: [facility] 209-01 I Date of Admission: 2/14/2025   Date of Service: 3/6/2025 I Hospital Day: 20    Assessment & Plan  Seizure disorder (HCC)  Phenobarbital 64.8 mg twice daily  Schizoaffective disorder (HCC)  Continue Zyprexa  5 mg at 1 pm + 15 mg p.o. daily at bedtime  Continue Klonopin 1 mg po bid.    Insomnia  Trazodone increased to 150 mg po daily HS on 3/5/2025  Continue Melatonin 6 mg p.o. daily at bedtime  Hypothyroidism  Management per medical team.   Hyponatremia  135 on 2/22/25  Severe protein-calorie malnutrition (HCC)  Malnutrition Findings:   Adult Malnutrition type: Acute illness  Adult Degree of Malnutrition: Other severe protein calorie malnutrition  Malnutrition Characteristics: Inadequate energy, Weight loss  360 Statement: Malnutrition related to acute illness in setting of psychiatric illness as evidenced by >5% body weight loss in 1 month, <50% energy intake compared to estimated energy needs for >5 days.  To treat with oral diet and nutrition supplements as accepted.    BMI Findings:      Body mass index is 28.84 kg/m².       Current medications:  Current Facility-Administered Medications   Medication Dose Route Frequency Provider Last Rate    acetaminophen  650 mg Oral Q4H PRN Mima Wells MD      acetaminophen  650 mg Oral Q4H PRN Mima Wells MD      acetaminophen  975 mg Oral Q6H PRN Mima Wells MD      aluminum-magnesium hydroxide-simethicone  30 mL Oral Q4H PRN Mima Wells MD      ammonium lactate   Topical BID Joel Wise MD      aspirin  81 mg Oral Daily Marion BAILEY Rabago-C      atorvastatin  20 mg Oral Daily With Dinner Marionsushil Javier PA-C      bisacodyl  10 mg Rectal Daily PRN Marion Javier PA-C      calcium carbonate-vitamin D  1 tablet Oral Daily With Breakfast Marion BAILEY Rabago-C      chlorhexidine  15 mL Swish & Spit Q12H JANNIE Marionsushil Javier,  PA-C      clonazePAM  1 mg Oral BID Papito An MD      dextromethorphan-guaiFENesin  10 mL Oral Q4H PRN Joel Wise MD      fluticasone  2 spray Nasal Daily Marion L Dagnall, PA-C      hydrALAZINE  25 mg Oral Q8H PRN Marion L Dagnall, PA-C      hydrOXYzine HCL  25 mg Oral Q6H PRN Max 4/day Mima Wells MD      hydrOXYzine HCL  50 mg Oral Q6H PRN Max 4/day Mima Wells MD      lactulose  20 g Oral BID PRN Marion L Dagnall, PA-C      levothyroxine  100 mcg Oral Early Morning Marion L Dagnall, PA-C      lidocaine  1 patch Topical Daily PRN Marion L Dagnall, PA-C      lisinopril  5 mg Oral Daily Rosenda Zelaya, PA-C      loratadine  10 mg Oral Daily PRN Marion L Dagnall, PA-C      melatonin  6 mg Oral HS Papito An MD      nicotine polacrilex  4 mg Oral Q2H PRN Mima Wells MD      OLANZapine  15 mg Oral HS Clayton Herbert, CRNP      OLANZapine  5 mg Oral Daily Clayton Herbert, CRNP      PHENobarbital  64.8 mg Oral Q12H Marion L Dagnall, PA-C      polyethylene glycol  17 g Oral Daily PRN Marion L Dagnall, PA-C      propranolol  5 mg Oral Q8H PRN Mima Wells MD      risperiDONE  0.5 mg Oral Q4H PRN Max 6/day Papito An MD      risperiDONE  1 mg Oral 4x Daily PRN Papito An MD      risperiDONE  2 mg Oral TID PRN Papito An MD      senna-docusate sodium  1 tablet Oral HS Marion L Dagnall, PA-C      sodium chloride  2 spray Each Nare Q1H PRN Marion L Dagnall, PA-C      tiZANidine  2 mg Oral Q6H PRN Marion L Dagnall, PA-C      traZODone  150 mg Oral HS Clayton Herbert, CRNP      traZODone  50 mg Oral Q6H PRN Max 3/day Mima Wells MD      trihexyphenidyl  2 mg Oral BID Papito An MD       Facility-Administered Medications Ordered in Other Encounters   Medication Dose Route Frequency Provider Last Rate    lactated ringers   Intravenous Continuous PRN Celi Benson CRNA          Risks/Benefits of Treatment:     Risks, benefits, and possible side effects  of medications explained to patient and patient verbalizes understanding and agreement for treatment.    Progress Toward Goals:  Unchanged    Treatment Planning:      - Encourage early mobility and having a structured day  - Provide frequent re-orientation, and cognitive stimulation  - Ensure assistive devices are in proper working order (eye-glasses, hearing aids)  - Encourage adequate hydration, nutrition and monitor bowel movements  - Maintain sleep-wake cycle: Uninterrupted sleep time; low-level lighting at night  - Fall precaution  - f/u SLIM recs regarding the medical problems   - Continue medication titration and treatment plan; adjust medication to optimize treatment response and as clinically indicated.   - Observation: N/A  - Legal Status: 201  - VS: as per unit protocol  - Encourage group attendance and milieu therapy  - Dispo: To be determined  - Long Stay Certification : Not Applicable  - Estimated Discharge Day: 3/10/2025 4 days (3/10/2025)    Subjective       Laine was seen today for psychiatric follow-up. Patient is cooperative. He is visible on the unit, minimally social with peers. Her hygiene is marginal. Patient remains childlike in behavior on the unit, with periods of agitation yelling out and being disruptive on the unit.According to nursing staff patient is medication compliant, bizarre at times . She denied any SI/HI/AVH, however she appears internally preoccupied.    Sleep:  broken  Appetite: normal  Medication side effects: No  ROS: review of systems as noted above in HPI/Subjective report, all other systems are negative    Objective :  Temp:  [97.8 °F (36.6 °C)-98 °F (36.7 °C)] 97.8 °F (36.6 °C)  HR:  [73-76] 73  BP: (128)/(65) 128/65  Resp:  [16-18] 16  SpO2:  [95 %] 95 %  O2 Device: None (Room air)    Temp:  [97.8 °F (36.6 °C)-98 °F (36.7 °C)] 97.8 °F (36.6 °C)  HR:  [73-76] 73  BP: (128)/(65) 128/65  Resp:  [16-18] 16  SpO2:  [95 %] 95 %  O2 Device: None (Room air)    Mental Status  Evaluation:    Appearance:  age appropriate, marginal hygiene   Behavior:  Childlike, bizarre at times, cooperative   Speech:  pressured, loud   Mood:  labile   Affect:  labile   Thought Process:  disorganized, illogical, tangential   Thought Content:  Religiously preoccupied   Perceptual Disturbances: Denied AVH, however appears internally preoccupied   Risk Potential: Suicidal Ideation -  None at present  Homicidal Ideation -  None at present  Potential for Aggression - No   Sensorium:  oriented to person and place   Memory:  recent and remote memory: unable to assess due to lack of cooperation   Consciousness:  alert and awake   Attention/Concentration: decreased attention span and decreased concentration   Insight:  poor   Judgment: limited   Gait/Station: unstable gait   Motor Activity: abnormal movement noted: dyskinetic oral movements present     Cognitive Assessment : N/A  Pain : denied  Pain Scale : 0      Lab Results: I have reviewed the following results:  Most Recent Labs:   Lab Results   Component Value Date    WBC 5.69 02/16/2025    RBC 4.36 02/16/2025    HGB 14.2 02/16/2025    HCT 41.8 02/16/2025     02/16/2025    RDW 12.2 02/16/2025    NEUTROABS 3.37 02/16/2025    SODIUM 135 02/22/2025    K 4.6 02/22/2025     02/22/2025    CO2 29 02/22/2025    BUN 22 02/22/2025    CREATININE 0.75 02/22/2025    GLUC 87 02/22/2025    CALCIUM 9.3 02/22/2025    AST 19 02/16/2025    ALT 20 02/16/2025    ALKPHOS 47 02/16/2025    TP 7.3 02/16/2025    ALB 4.3 02/16/2025    TBILI 0.34 02/16/2025    CHOLESTEROL 198 02/16/2025    HDL 77 02/16/2025    TRIG 122 02/16/2025    LDLCALC 97 02/16/2025    NONHDLC 121 02/16/2025    VALPROICTOT 71 11/29/2019    AMMONIA 15 12/19/2017    VRT0ZZJXGPBS 0.920 02/13/2025    FREET4 0.94 08/14/2024    SYPHILISAB Non-reactive 04/05/2024    HGBA1C 5.5 04/26/2024     04/26/2024       Administrative Statements     Counseling / Coordination of Care:   Patient's progress discussed  with staff in treatment team meeting.  Medication changes reviewed with staff in treatment team meeting..    CHRISTOPHER Alexander 03/06/25

## 2025-03-06 NOTE — NURSING NOTE
Patient had disturbed sleep overnight. Awake several times, with multiple incidental requests . Maintained on q 15 minute safety checks. No acute medical issues. Mild behaviors this shift, like putting her drinks in the corners of unit, going near exit doors. Patient did rest in bed tonight and not on floor.. Currently, appears sleeping. Fluids at bedside to promote hydration.

## 2025-03-06 NOTE — PLAN OF CARE
Problem: Alteration in Thoughts and Perception  Goal: Treatment Goal: Gain control of psychotic behaviors/thinking, reduce/eliminate presenting symptoms and demonstrate improved reality functioning upon discharge  Outcome: Progressing  Goal: Refrain from acting on delusional thinking/internal stimuli  Description: Interventions:  - Monitor patient closely, per order   - Utilize least restrictive measures   - Set reasonable limits, give positive feedback for acceptable   - Administer medications as ordered and monitor of potential side effects  Outcome: Progressing  Goal: Agree to be compliant with medication regime, as prescribed and report medication side effects  Description: Interventions:  - Offer appropriate PRN medication and supervise ingestion; conduct AIMS, as needed   Outcome: Progressing  Goal: Attend and participate in unit activities, including therapeutic, recreational, and educational groups  Description: Interventions:  -Encourage Visitation and family involvement in care  Outcome: Progressing  Goal: Recognize dysfunctional thoughts, communicate reality-based thoughts at the time of discharge  Description: Interventions:  - Provide medication and psycho-education to assist patient in compliance and developing insight into his/her illness   Outcome: Progressing     Problem: Depression  Goal: Treatment Goal: Demonstrate behavioral control of depressive symptoms, verbalize feelings of improved mood/affect, and adopt new coping skills prior to discharge  Outcome: Progressing  Goal: Verbalize thoughts and feelings  Description: Interventions:  - Assess and re-assess patient's level of risk   - Engage patient in 1:1 interactions, daily, for a minimum of 15 minutes   - Encourage patient to express feelings, fears, frustrations, hopes   Outcome: Progressing  Goal: Refrain from harming self  Description: Interventions:  - Monitor patient closely, per order   - Supervise medication ingestion, monitor effects  and side effects   Outcome: Progressing  Goal: Refrain from isolation  Description: Interventions:  - Develop a trusting relationship   - Encourage socialization   Outcome: Progressing  Goal: Refrain from self-neglect  Outcome: Progressing     Problem: Anxiety  Goal: Anxiety is at manageable level  Description: Interventions:  - Assess and monitor patient's anxiety level.   - Monitor for signs and symptoms (heart palpitations, chest pain, shortness of breath, headaches, nausea, feeling jumpy, restlessness, irritable, apprehensive).   - Collaborate with interdisciplinary team and initiate plan and interventions as ordered.  - Prospect patient to unit/surroundings  - Explain treatment plan  - Encourage participation in care  - Encourage verbalization of concerns/fears  - Identify coping mechanisms  - Assist in developing anxiety-reducing skills  - Administer/offer alternative therapies  - Limit or eliminate stimulants  Outcome: Progressing     Problem: Alteration in Orientation  Goal: Treatment Goal: Demonstrate a reduction of confusion and improved orientation to person, place, time and/or situation upon discharge, according to optimum baseline/ability  Outcome: Progressing  Goal: Interact with staff daily  Description: Interventions:  - Assess and re-assess patient's level of orientation  - Engage patient in 1 on 1 interactions, daily, for a minimum of 15 minutes   - Establish rapport/trust with patient   Outcome: Progressing  Goal: Allow medical examinations, as recommended  Description: Interventions:  - Provide physical/neurological exams and/or referrals, per provider   Outcome: Progressing  Goal: Cooperate with recommended testing/procedures  Description: Interventions:  - Determine need for ancillary testing  - Observe for mental status changes  - Implement falls/precaution protocol   Outcome: Progressing  Goal: Complete daily ADLs, including personal hygiene independently, as able  Description:  Interventions:  - Observe, teach, and assist patient with ADLS  Outcome: Progressing     Problem: DISCHARGE PLANNING - CARE MANAGEMENT  Goal: Discharge to post-acute care or home with appropriate resources  Description: INTERVENTIONS:  - Conduct assessment to determine patient/family and health care team treatment goals, and need for post-acute services based on payer coverage, community resources, and patient preferences, and barriers to discharge  - Address psychosocial, clinical, and financial barriers to discharge as identified in assessment in conjunction with the patient/family and health care team  - Arrange appropriate level of post-acute services according to patient’s   needs and preference and payer coverage in collaboration with the physician and health care team  - Communicate with and update the patient/family, physician, and health care team regarding progress on the discharge plan  - Arrange appropriate transportation to post-acute venues  Outcome: Progressing     Problem: Nutrition/Hydration-ADULT  Goal: Nutrient/Hydration intake appropriate for improving, restoring or maintaining nutritional needs  Description: Monitor and assess patient's nutrition/hydration status for malnutrition. Collaborate with interdisciplinary team and initiate plan and interventions as ordered.  Monitor patient's weight and dietary intake as ordered or per policy. Utilize nutrition screening tool and intervene as necessary. Determine patient's food preferences and provide high-protein, high-caloric foods as appropriate.     INTERVENTIONS:  - Monitor oral intake, urinary output, labs, and treatment plans  - Assess nutrition and hydration status and recommend course of action  - Evaluate amount of meals eaten  - Assist patient with eating if necessary   - Allow adequate time for meals  - Recommend/ encourage appropriate diets, oral nutritional supplements, and vitamin/mineral supplements  - Order, calculate, and assess  calorie counts as needed  - Recommend, monitor, and adjust tube feedings and TPN/PPN based on assessed needs  - Assess need for intravenous fluids  - Provide specific nutrition/hydration education as appropriate  - Include patient/family/caregiver in decisions related to nutrition  Outcome: Progressing

## 2025-03-07 PROCEDURE — 99232 SBSQ HOSP IP/OBS MODERATE 35: CPT

## 2025-03-07 RX ADMIN — Medication 6 MG: at 21:33

## 2025-03-07 RX ADMIN — PHENOBARBITAL 64.8 MG: 32.4 TABLET ORAL at 21:33

## 2025-03-07 RX ADMIN — FLUTICASONE PROPIONATE 2 SPRAY: 50 SPRAY, METERED NASAL at 08:43

## 2025-03-07 RX ADMIN — TRAZODONE HYDROCHLORIDE 150 MG: 150 TABLET ORAL at 21:33

## 2025-03-07 RX ADMIN — OLANZAPINE 15 MG: 15 TABLET, FILM COATED ORAL at 21:33

## 2025-03-07 RX ADMIN — PHENOBARBITAL 64.8 MG: 32.4 TABLET ORAL at 08:43

## 2025-03-07 RX ADMIN — HYDROXYZINE HYDROCHLORIDE 50 MG: 50 TABLET, FILM COATED ORAL at 14:50

## 2025-03-07 RX ADMIN — ATORVASTATIN CALCIUM 20 MG: 20 TABLET, FILM COATED ORAL at 17:39

## 2025-03-07 RX ADMIN — SENNOSIDES AND DOCUSATE SODIUM 1 TABLET: 50; 8.6 TABLET ORAL at 21:33

## 2025-03-07 RX ADMIN — TRIHEXYPHENIDYL HYDROCHLORIDE 2 MG: 2 TABLET ORAL at 08:43

## 2025-03-07 RX ADMIN — OLANZAPINE 5 MG: 5 TABLET, FILM COATED ORAL at 14:51

## 2025-03-07 RX ADMIN — CLONAZEPAM 1 MG: 1 TABLET ORAL at 08:43

## 2025-03-07 RX ADMIN — CLONAZEPAM 1 MG: 1 TABLET ORAL at 17:39

## 2025-03-07 RX ADMIN — TRIHEXYPHENIDYL HYDROCHLORIDE 2 MG: 2 TABLET ORAL at 21:33

## 2025-03-07 RX ADMIN — HYDROXYZINE HYDROCHLORIDE 50 MG: 50 TABLET, FILM COATED ORAL at 21:33

## 2025-03-07 RX ADMIN — LISINOPRIL 5 MG: 5 TABLET ORAL at 08:43

## 2025-03-07 RX ADMIN — Medication 1 TABLET: at 08:43

## 2025-03-07 RX ADMIN — ASPIRIN 81 MG CHEWABLE TABLET 81 MG: 81 TABLET CHEWABLE at 08:41

## 2025-03-07 NOTE — PROGRESS NOTES
"Progress Note - Laine Tse 69 y.o. female MRN: [id number]    Unit/Bed#: -01 Encounter: [id number]        Subjective:   Patient seen and examined at bedside after reviewing the chart and discussing the case with the caring staff.      Patient examined at bedside.  Patient denies any acute symptoms.     Patient is a possible discharge on Monday 3/10/25.    Physical Exam   Vitals: Blood pressure 165/72, pulse 81, temperature 98.3 °F (36.8 °C), temperature source Temporal, resp. rate 16, height 5' 4\" (1.626 m), weight 76.2 kg (167 lb 15.9 oz), SpO2 96%, not currently breastfeeding.,Body mass index is 28.84 kg/m².  Constitutional: Patient in no acute distress.  HEENT: PERR, EOMI, MMM.  Cardiovascular: Normal rate and regular rhythm.    Pulmonary/Chest: Effort normal and breath sounds normal.   Abdomen: Soft, + BS, NT.    Assessment/Plan:  Laine Tse is a(n) 69 y.o. female with schizoaffective disorder.      Cardiac with hx of HTN, HLD.  Continue atorvastatin 20 mg daily, ASA 81 mg daily.  Not on antihypertensive meds prehospital.  Start lisinopril 5 mg daily 2/15.  Hydralazine 25 mg as needed for SBP >170 or DBP >110.  Cont to monitor vitals.    Allergic rhinitis.  Flonase nasal spray daily.  Claritin 10 mg daily as needed (Zyrtec nonform).   Bilateral hearing loss.  Supportive measures.   Osteoporosis.  Continue calcium/vitamin D supplement.  Resume Fosamax on discharge.  DJD/OA/HA/chronic low back pain.  Tylenol, Zanaflex, lidocaine patch as needed.  Hypothyroidism.  Patient is on levothyroxine 100 mcg daily.  TSH normal on 2/13/25.  Seizure disorder.  Continue phenobarbital 64.8 mg q12h.  Pt follows with [facility] neurology (last saw 2/11/25).  Chronic constipation.  Continue Senokot S nightly.  Miralax as needed.  Add lactulose and dulxolax suppository as needed 2/18.   Cough/sore throat.  Ongoing x months per pt.  Afebrile.  Lungs clear.  Robitussin DM as needed.    Hyponatremia.  Level 129 -> 130 " -> 135 on 2/22/25.   Dry cracked skin bilateral heel.  Apply ammonium lactate twice daily along with Band-Aid or dressing 2/23/25.  Podiatry consulted for further evaluation and treatment.  Malnutrition.  Nutrition following.  Adult Malnutrition type: Acute illness.  Adult Degree of Malnutrition: Other severe protein calorie malnutrition.  Malnutrition Characteristics: Inadequate energy, Weight loss.    The patient was discussed with Dr. Wise and he is in agreement with the above note.

## 2025-03-07 NOTE — PLAN OF CARE
Problem: Alteration in Thoughts and Perception  Goal: Treatment Goal: Gain control of psychotic behaviors/thinking, reduce/eliminate presenting symptoms and demonstrate improved reality functioning upon discharge  Outcome: Progressing  Goal: Refrain from acting on delusional thinking/internal stimuli  Description: Interventions:  - Monitor patient closely, per order   - Utilize least restrictive measures   - Set reasonable limits, give positive feedback for acceptable   - Administer medications as ordered and monitor of potential side effects  Outcome: Progressing  Goal: Agree to be compliant with medication regime, as prescribed and report medication side effects  Description: Interventions:  - Offer appropriate PRN medication and supervise ingestion; conduct AIMS, as needed   Outcome: Progressing  Goal: Attend and participate in unit activities, including therapeutic, recreational, and educational groups  Description: Interventions:  -Encourage Visitation and family involvement in care  Outcome: Progressing  Goal: Recognize dysfunctional thoughts, communicate reality-based thoughts at the time of discharge  Description: Interventions:  - Provide medication and psycho-education to assist patient in compliance and developing insight into his/her illness   Outcome: Progressing     Problem: Depression  Goal: Treatment Goal: Demonstrate behavioral control of depressive symptoms, verbalize feelings of improved mood/affect, and adopt new coping skills prior to discharge  Outcome: Progressing  Goal: Verbalize thoughts and feelings  Description: Interventions:  - Assess and re-assess patient's level of risk   - Engage patient in 1:1 interactions, daily, for a minimum of 15 minutes   - Encourage patient to express feelings, fears, frustrations, hopes   Outcome: Progressing  Goal: Refrain from harming self  Description: Interventions:  - Monitor patient closely, per order   - Supervise medication ingestion, monitor effects  and side effects   Outcome: Progressing  Goal: Refrain from isolation  Description: Interventions:  - Develop a trusting relationship   - Encourage socialization   Outcome: Progressing  Goal: Refrain from self-neglect  Outcome: Progressing     Problem: Anxiety  Goal: Anxiety is at manageable level  Description: Interventions:  - Assess and monitor patient's anxiety level.   - Monitor for signs and symptoms (heart palpitations, chest pain, shortness of breath, headaches, nausea, feeling jumpy, restlessness, irritable, apprehensive).   - Collaborate with interdisciplinary team and initiate plan and interventions as ordered.  - [place] patient to unit/surroundings  - Explain treatment plan  - Encourage participation in care  - Encourage verbalization of concerns/fears  - Identify coping mechanisms  - Assist in developing anxiety-reducing skills  - Administer/offer alternative therapies  - Limit or eliminate stimulants  Outcome: Progressing     Problem: Alteration in Orientation  Goal: Treatment Goal: Demonstrate a reduction of confusion and improved orientation to person, place, time and/or situation upon discharge, according to optimum baseline/ability  Outcome: Progressing  Goal: Interact with staff daily  Description: Interventions:  - Assess and re-assess patient's level of orientation  - Engage patient in 1 on 1 interactions, daily, for a minimum of 15 minutes   - Establish rapport/trust with patient   Outcome: Progressing  Goal: Allow medical examinations, as recommended  Description: Interventions:  - Provide physical/neurological exams and/or referrals, per provider   Outcome: Progressing  Goal: Cooperate with recommended testing/procedures  Description: Interventions:  - Determine need for ancillary testing  - Observe for mental status changes  - Implement falls/precaution protocol   Outcome: Progressing  Goal: Complete daily ADLs, including personal hygiene independently, as able  Description:  Interventions:  - Observe, teach, and assist patient with ADLS  Outcome: Progressing     Problem: DISCHARGE PLANNING - CARE MANAGEMENT  Goal: Discharge to post-acute care or home with appropriate resources  Description: INTERVENTIONS:  - Conduct assessment to determine patient/family and health care team treatment goals, and need for post-acute services based on payer coverage, community resources, and patient preferences, and barriers to discharge  - Address psychosocial, clinical, and financial barriers to discharge as identified in assessment in conjunction with the patient/family and health care team  - Arrange appropriate level of post-acute services according to patient’s   needs and preference and payer coverage in collaboration with the physician and health care team  - Communicate with and update the patient/family, physician, and health care team regarding progress on the discharge plan  - Arrange appropriate transportation to post-acute venues  Outcome: Progressing     Problem: Nutrition/Hydration-ADULT  Goal: Nutrient/Hydration intake appropriate for improving, restoring or maintaining nutritional needs  Description: Monitor and assess patient's nutrition/hydration status for malnutrition. Collaborate with interdisciplinary team and initiate plan and interventions as ordered.  Monitor patient's weight and dietary intake as ordered or per policy. Utilize nutrition screening tool and intervene as necessary. Determine patient's food preferences and provide high-protein, high-caloric foods as appropriate.     INTERVENTIONS:  - Monitor oral intake, urinary output, labs, and treatment plans  - Assess nutrition and hydration status and recommend course of action  - Evaluate amount of meals eaten  - Assist patient with eating if necessary   - Allow adequate time for meals  - Recommend/ encourage appropriate diets, oral nutritional supplements, and vitamin/mineral supplements  - Order, calculate, and assess  calorie counts as needed  - Recommend, monitor, and adjust tube feedings and TPN/PPN based on assessed needs  - Assess need for intravenous fluids  - Provide specific nutrition/hydration education as appropriate  - Include patient/family/caregiver in decisions related to nutrition  Outcome: Progressing

## 2025-03-07 NOTE — NURSING NOTE
Very upset about getting room-mate. Raising voice at staff. Restless. 2nd bed removed from room instead of placing another patient in room due to her behaviors. Laine calmed down. Again found sleeping on floor. Redirected back to bed, patient refused. Sleep improved this shift. Maintained on q 15 minute safety checks.

## 2025-03-07 NOTE — PROGRESS NOTES
Progress Note - Behavioral Health   Name: Laine Tse 69 y.o. female I MRN: [id number]  Unit/Bed#: [facility] I Date of Admission: 2/14/2025   Date of Service: 3/7/2025 I Hospital Day: 21    Assessment & Plan  Seizure disorder (HCC)  Phenobarbital 64.8 mg twice daily  Schizoaffective disorder (HCC)  Continue Zyprexa  5 mg at 1 pm + 15 mg p.o. daily at bedtime  Continue Klonopin 1 mg po bid.    Insomnia  Continue Trazodone increased to 150 mg po daily HS  Continue Melatonin 6 mg p.o. daily at bedtime  Hypothyroidism  Management per medical team.   Hyponatremia  135 on 2/22/25  Severe protein-calorie malnutrition (HCC)  Malnutrition Findings:   Adult Malnutrition type: Acute illness  Adult Degree of Malnutrition: Other severe protein calorie malnutrition  Malnutrition Characteristics: Inadequate energy, Weight loss  360 Statement: Malnutrition related to acute illness in setting of psychiatric illness as evidenced by >5% body weight loss in 1 month, <50% energy intake compared to estimated energy needs for >5 days.  To treat with oral diet and nutrition supplements as accepted.    BMI Findings:      Body mass index is 28.84 kg/m².       Current medications:  Current Facility-Administered Medications   Medication Dose Route Frequency Provider Last Rate    acetaminophen  650 mg Oral Q4H PRN Mima Wells MD      acetaminophen  650 mg Oral Q4H PRN Mima Wells MD      acetaminophen  975 mg Oral Q6H PRN Mima Wells MD      aluminum-magnesium hydroxide-simethicone  30 mL Oral Q4H PRN Mima Wells MD      ammonium lactate   Topical BID Joel Wise MD      aspirin  81 mg Oral Daily BAILEY Maddox-C      atorvastatin  20 mg Oral Daily With Dinner BAILEY Maddox-C      bisacodyl  10 mg Rectal Daily PRN BAILEY Maddox-LARRY      calcium carbonate-vitamin D  1 tablet Oral Daily With Breakfast Marion Javier, PA-C      chlorhexidine  15 mL Swish & Spit Q12H JANNIE Marion Javier, PA-C       clonazePAM  1 mg Oral BID Papito An MD      dextromethorphan-guaiFENesin  10 mL Oral Q4H PRN Joel Wise MD      fluticasone  2 spray Nasal Daily Marion L Dagnall, PA-C      hydrALAZINE  25 mg Oral Q8H PRN Marion L Dagnall, PA-C      hydrOXYzine HCL  25 mg Oral Q6H PRN Max 4/day Mima Wells MD      hydrOXYzine HCL  50 mg Oral Q6H PRN Max 4/day Mima Wells MD      lactulose  20 g Oral BID PRN Marion L Dagnall, PA-C      levothyroxine  100 mcg Oral Early Morning Marion L Dagnall, PA-C      lidocaine  1 patch Topical Daily PRN Marion L Dagnall, PA-C      lisinopril  5 mg Oral Daily Rosenda Zelaya, PA-C      loratadine  10 mg Oral Daily PRN Marion L Dagnall, PA-C      melatonin  6 mg Oral HS Papito An MD      nicotine polacrilex  4 mg Oral Q2H PRN Mima Wells MD      OLANZapine  15 mg Oral HS Clayton Herbert, CRNP      OLANZapine  5 mg Oral Daily Clayton Kaci-Anoandreia, CRNP      PHENobarbital  64.8 mg Oral Q12H Marion L Dagnall, PA-C      polyethylene glycol  17 g Oral Daily PRN Marion L Dagnall, PA-C      propranolol  5 mg Oral Q8H PRN Mima Wells MD      risperiDONE  0.5 mg Oral Q4H PRN Max 6/day Papito An MD      risperiDONE  1 mg Oral 4x Daily PRN Papito An MD      risperiDONE  2 mg Oral TID PRN Papito An MD      senna-docusate sodium  1 tablet Oral HS Marion L Dagnall, PA-C      sodium chloride  2 spray Each Nare Q1H PRN Marion L Dagnall, PA-C      tiZANidine  2 mg Oral Q6H PRN Marion L Dagnall, PA-C      traZODone  150 mg Oral HS Clayton KaciAngelica, CRNP      traZODone  50 mg Oral Q6H PRN Max 3/day Mima Wells MD      trihexyphenidyl  2 mg Oral BID Papito An MD       Facility-Administered Medications Ordered in Other Encounters   Medication Dose Route Frequency Provider Last Rate    lactated ringers   Intravenous Continuous PRN Celi Benson CRNA          Risks/Benefits of Treatment:     Risks, benefits, and possible side effects of  medications explained to patient and patient verbalizes understanding and agreement for treatment.    Progress Toward Goals:  Unchanged    Treatment Planning:      - Encourage early mobility and having a structured day  - Provide frequent re-orientation, and cognitive stimulation  - Ensure assistive devices are in proper working order (eye-glasses, hearing aids)  - Encourage adequate hydration, nutrition and monitor bowel movements  - Maintain sleep-wake cycle: Uninterrupted sleep time; low-level lighting at night  - Fall precaution  - f/u SLIM recs regarding the medical problems   - Continue medication titration and treatment plan; adjust medication to optimize treatment response and as clinically indicated.   - Observation: N/A  - Legal Status: 201  - VS: as per unit protocol  - Encourage group attendance and milieu therapy  - Dispo: To be determined  - Long Stay Certification : Not Applicable  - Estimated Discharge Day: 3/10/2025 3 days (3/10/2025)    Marvel Jang was seen today for psychiatric follow-up. Patient remains labile on the unit with periods of agitation, pressured and loud speech. She is redirectable.she is restless and fidgety at times. According to nursing staff report patient slept overnight and she has been compliant with her current medications. She denied any SI/HI/AVH, although she appears internally preoccupied.    Sleep: improved  Appetite: normal  Medication side effects: No  ROS: review of systems as noted above in HPI/Subjective report, all other systems are negative    Objective :  Temp:  [97.5 °F (36.4 °C)-98.3 °F (36.8 °C)] 98.3 °F (36.8 °C)  HR:  [81] 81  BP: (135-165)/(72-87) 165/72  Resp:  [16-18] 16  SpO2:  [96 %] 96 %    Temp:  [97.5 °F (36.4 °C)-98.3 °F (36.8 °C)] 98.3 °F (36.8 °C)  HR:  [81] 81  BP: (135-165)/(72-87) 165/72  Resp:  [16-18] 16  SpO2:  [96 %] 96 %    Mental Status Evaluation:    Appearance:  disheveled, age appropriate   Behavior:  childlike   Speech:   Pressures and loud at times   Mood:  labile   Affect:  labile   Thought Process:  disorganized   Thought Content:  Religiously preoccupied   Perceptual Disturbances: Denied AVH, although she appears internally preoccupied   Risk Potential: Suicidal Ideation -  None at present  Homicidal Ideation -  None at present  Potential for Aggression - No   Sensorium:  oriented to person and place   Memory:  recent and remote memory: unable to assess due to lack of cooperation   Consciousness:  alert and awake   Attention/Concentration: decreased concentration and poor attention span   Insight:  poor   Judgment: limited   Gait/Station: unstable gait   Motor Activity: abnormal movement noted: dyskinetic oral movements present     Cognitive Assessment : N/A  Pain : denied  Pain Scale : 0      Lab Results: I have reviewed the following results:  Most Recent Labs:   Lab Results   Component Value Date    WBC 5.69 02/16/2025    RBC 4.36 02/16/2025    HGB 14.2 02/16/2025    HCT 41.8 02/16/2025     02/16/2025    RDW 12.2 02/16/2025    NEUTROABS 3.37 02/16/2025    SODIUM 135 02/22/2025    K 4.6 02/22/2025     02/22/2025    CO2 29 02/22/2025    BUN 22 02/22/2025    CREATININE 0.75 02/22/2025    GLUC 87 02/22/2025    CALCIUM 9.3 02/22/2025    AST 19 02/16/2025    ALT 20 02/16/2025    ALKPHOS 47 02/16/2025    TP 7.3 02/16/2025    ALB 4.3 02/16/2025    TBILI 0.34 02/16/2025    CHOLESTEROL 198 02/16/2025    HDL 77 02/16/2025    TRIG 122 02/16/2025    LDLCALC 97 02/16/2025    NONHDLC 121 02/16/2025    VALPROICTOT 71 11/29/2019    AMMONIA 15 12/19/2017    DBY1WQWJFCPX 0.920 02/13/2025    FREET4 0.94 08/14/2024    SYPHILISAB Non-reactive 04/05/2024    HGBA1C 5.5 04/26/2024     04/26/2024       Administrative Statements     Counseling / Coordination of Care:   Patient's progress discussed with staff in treatment team meeting.  Medication changes reviewed with staff in treatment team meeting..    CHRISTOPHER Alexander  03/07/25

## 2025-03-07 NOTE — PROGRESS NOTES
03/07/25    Team Meeting   Meeting Type Escalated Tx Team Meeting   Team Members Present   Team Members Present Nurse;;;Other (Discipline and Name)   Nursing Team Member Anita Wilcox LV Act Dinorah Cruz RN   Other (Discipline and Name) Carmen Meraz , Benitez Escobar [place], Marion Farley Supervisor, Angelika Flor MSW   Patient/Family Present   Patient Present No   Patient's Family Present No

## 2025-03-07 NOTE — PROGRESS NOTES
03/07/2025   Team Meeting   Meeting Type Daily Rounds   Team Members Present   Team Members Present Physician;Nurse;;Occupational Therapist   Physician Team Member Nataliya WHITE   Nursing Team Member Donte LUZ    Social Work Team Member Chacho TARANGO   OT Team Member Yazmin CTRS   Patient/Family Present   Patient Present No   Patient's Family Present No   201, loud, mouth checks, PRN, med complaint, urinated on door, d/c Mon to  with act services

## 2025-03-07 NOTE — NURSING NOTE
Patient mostly withdrawn to room this evening.  Anxiety and depression stated as moderate.  Denies suicidal /homicidal ideations.  Pleasant during assessment. Delusional and bizarre.  No entering other patient's. Patient threw salt at another patient.  Positive for medication. Refused HS snack. No complaints voiced.  Maintained on q 15 minute safety checks.  Will continue to monitor.

## 2025-03-08 PROBLEM — R14.3 FLATULENCE: Status: RESOLVED | Noted: 2018-05-16 | Resolved: 2025-03-08

## 2025-03-08 PROBLEM — F32.9 MAJOR DEPRESSIVE DISORDER: Status: RESOLVED | Noted: 2025-03-08 | Resolved: 2025-03-08

## 2025-03-08 PROBLEM — F32.9 MAJOR DEPRESSIVE DISORDER: Status: ACTIVE | Noted: 2025-03-08

## 2025-03-08 PROBLEM — G47.00 INSOMNIA: Status: RESOLVED | Noted: 2017-05-15 | Resolved: 2025-03-08

## 2025-03-08 PROBLEM — F32.9 MAJOR DEPRESSIVE DISORDER, SINGLE EPISODE, UNSPECIFIED: Status: RESOLVED | Noted: 2025-03-08 | Resolved: 2025-03-08

## 2025-03-08 PROBLEM — F32.9 MAJOR DEPRESSIVE DISORDER, SINGLE EPISODE, UNSPECIFIED: Status: ACTIVE | Noted: 2025-03-08

## 2025-03-08 PROCEDURE — 99232 SBSQ HOSP IP/OBS MODERATE 35: CPT

## 2025-03-08 RX ORDER — CLONAZEPAM 1 MG/1
1 TABLET ORAL 2 TIMES DAILY
Qty: 60 TABLET | Refills: 0 | OUTPATIENT
Start: 2025-03-08 | End: 2025-04-07

## 2025-03-08 RX ORDER — TRIHEXYPHENIDYL HYDROCHLORIDE 2 MG/1
2 TABLET ORAL 2 TIMES DAILY
Qty: 60 TABLET | Refills: 0 | OUTPATIENT
Start: 2025-03-08

## 2025-03-08 RX ORDER — TRAZODONE HYDROCHLORIDE 150 MG/1
150 TABLET ORAL
Qty: 30 TABLET | Refills: 0 | OUTPATIENT
Start: 2025-03-08

## 2025-03-08 RX ADMIN — Medication 1 TABLET: at 09:05

## 2025-03-08 RX ADMIN — LISINOPRIL 5 MG: 5 TABLET ORAL at 09:05

## 2025-03-08 RX ADMIN — FLUTICASONE PROPIONATE 2 SPRAY: 50 SPRAY, METERED NASAL at 09:07

## 2025-03-08 RX ADMIN — TRAZODONE HYDROCHLORIDE 150 MG: 150 TABLET ORAL at 21:39

## 2025-03-08 RX ADMIN — PHENOBARBITAL 64.8 MG: 32.4 TABLET ORAL at 09:05

## 2025-03-08 RX ADMIN — TRIHEXYPHENIDYL HYDROCHLORIDE 2 MG: 2 TABLET ORAL at 09:05

## 2025-03-08 RX ADMIN — ATORVASTATIN CALCIUM 20 MG: 20 TABLET, FILM COATED ORAL at 16:27

## 2025-03-08 RX ADMIN — CLONAZEPAM 1 MG: 1 TABLET ORAL at 16:27

## 2025-03-08 RX ADMIN — ASPIRIN 81 MG CHEWABLE TABLET 81 MG: 81 TABLET CHEWABLE at 09:05

## 2025-03-08 RX ADMIN — AMMONIUM LACTATE: 12 LOTION TOPICAL at 16:28

## 2025-03-08 RX ADMIN — CHLORHEXIDINE GLUCONATE 15 ML: 1.2 SOLUTION ORAL at 09:05

## 2025-03-08 RX ADMIN — HYDROXYZINE HYDROCHLORIDE 50 MG: 50 TABLET, FILM COATED ORAL at 21:38

## 2025-03-08 RX ADMIN — TIZANIDINE 2 MG: 4 TABLET ORAL at 18:13

## 2025-03-08 RX ADMIN — OLANZAPINE 15 MG: 15 TABLET, FILM COATED ORAL at 21:38

## 2025-03-08 RX ADMIN — PHENOBARBITAL 64.8 MG: 32.4 TABLET ORAL at 21:39

## 2025-03-08 RX ADMIN — Medication 6 MG: at 21:38

## 2025-03-08 RX ADMIN — TRIHEXYPHENIDYL HYDROCHLORIDE 2 MG: 2 TABLET ORAL at 21:39

## 2025-03-08 RX ADMIN — OLANZAPINE 5 MG: 5 TABLET, FILM COATED ORAL at 14:32

## 2025-03-08 RX ADMIN — LEVOTHYROXINE SODIUM 100 MCG: 100 TABLET ORAL at 09:05

## 2025-03-08 RX ADMIN — SENNOSIDES AND DOCUSATE SODIUM 1 TABLET: 50; 8.6 TABLET ORAL at 21:39

## 2025-03-08 RX ADMIN — CLONAZEPAM 1 MG: 1 TABLET ORAL at 09:05

## 2025-03-08 NOTE — PLAN OF CARE
Problem: Alteration in Thoughts and Perception  Goal: Treatment Goal: Gain control of psychotic behaviors/thinking, reduce/eliminate presenting symptoms and demonstrate improved reality functioning upon discharge  Outcome: Progressing  Goal: Refrain from acting on delusional thinking/internal stimuli  Description: Interventions:  - Monitor patient closely, per order   - Utilize least restrictive measures   - Set reasonable limits, give positive feedback for acceptable   - Administer medications as ordered and monitor of potential side effects  Outcome: Progressing  Goal: Agree to be compliant with medication regime, as prescribed and report medication side effects  Description: Interventions:  - Offer appropriate PRN medication and supervise ingestion; conduct AIMS, as needed   Outcome: Progressing  Goal: Attend and participate in unit activities, including therapeutic, recreational, and educational groups  Description: Interventions:  -Encourage Visitation and family involvement in care  Outcome: Progressing  Goal: Recognize dysfunctional thoughts, communicate reality-based thoughts at the time of discharge  Description: Interventions:  - Provide medication and psycho-education to assist patient in compliance and developing insight into his/her illness   Outcome: Progressing     Problem: Ineffective Coping  Goal: Identifies ineffective coping skills  Outcome: Progressing  Goal: Identifies healthy coping skills  Outcome: Progressing  Goal: Demonstrates healthy coping skills  Outcome: Progressing  Goal: Participates in unit activities  Description: Interventions:  - Provide therapeutic environment   - Provide required programming   - Redirect inappropriate behaviors   Outcome: Progressing  Goal: Patient/Family participate in treatment and DC plans  Description: Interventions:  - Provide therapeutic environment  Outcome: Progressing  Goal: Patient/Family verbalizes awareness of resources  Outcome: Progressing  Goal:  Understands least restrictive measures  Description: Interventions:  - Utilize least restrictive behavior  Outcome: Progressing  Goal: Free from restraint events  Description: - Utilize least restrictive measures   - Provide behavioral interventions   - Redirect inappropriate behaviors   Outcome: Progressing     Problem: Depression  Goal: Treatment Goal: Demonstrate behavioral control of depressive symptoms, verbalize feelings of improved mood/affect, and adopt new coping skills prior to discharge  Outcome: Progressing  Goal: Verbalize thoughts and feelings  Description: Interventions:  - Assess and re-assess patient's level of risk   - Engage patient in 1:1 interactions, daily, for a minimum of 15 minutes   - Encourage patient to express feelings, fears, frustrations, hopes   Outcome: Progressing  Goal: Refrain from harming self  Description: Interventions:  - Monitor patient closely, per order   - Supervise medication ingestion, monitor effects and side effects   Outcome: Progressing  Goal: Refrain from isolation  Description: Interventions:  - Develop a trusting relationship   - Encourage socialization   Outcome: Progressing  Goal: Refrain from self-neglect  Outcome: Progressing     Problem: Anxiety  Goal: Anxiety is at manageable level  Description: Interventions:  - Assess and monitor patient's anxiety level.   - Monitor for signs and symptoms (heart palpitations, chest pain, shortness of breath, headaches, nausea, feeling jumpy, restlessness, irritable, apprehensive).   - Collaborate with interdisciplinary team and initiate plan and interventions as ordered.  - [place] patient to unit/surroundings  - Explain treatment plan  - Encourage participation in care  - Encourage verbalization of concerns/fears  - Identify coping mechanisms  - Assist in developing anxiety-reducing skills  - Administer/offer alternative therapies  - Limit or eliminate stimulants  Outcome: Progressing     Problem: Alteration in  Orientation  Goal: Treatment Goal: Demonstrate a reduction of confusion and improved orientation to person, place, time and/or situation upon discharge, according to optimum baseline/ability  Outcome: Progressing  Goal: Interact with staff daily  Description: Interventions:  - Assess and re-assess patient's level of orientation  - Engage patient in 1 on 1 interactions, daily, for a minimum of 15 minutes   - Establish rapport/trust with patient   Outcome: Progressing  Goal: Allow medical examinations, as recommended  Description: Interventions:  - Provide physical/neurological exams and/or referrals, per provider   Outcome: Progressing  Goal: Cooperate with recommended testing/procedures  Description: Interventions:  - Determine need for ancillary testing  - Observe for mental status changes  - Implement falls/precaution protocol   Outcome: Progressing  Goal: Complete daily ADLs, including personal hygiene independently, as able  Description: Interventions:  - Observe, teach, and assist patient with ADLS  Outcome: Progressing

## 2025-03-08 NOTE — NURSING NOTE
Patient allowed a limited assessment.  B - Abdomen is soft and non-distended. Bowel sounds hypoactive. Patient reports last BM was yesterday (3/7/2025).  L - Respirations even and unlabored. Lungs clear to auscultation. Patient denies SOB or chest pain.  E - No edema observed.  P - Patient did not allow this nurse to check for peripheral pulses. Capillary refill <3 seconds.  S - Patient only allowed this nurse to assess her feet briefly. Large bandages on BL hells for dryness and cracking but patient did not allow this nurse to remove the bandages. Dry calluses observed on right great toe.

## 2025-03-08 NOTE — NURSING NOTE
Pt was visible on the milieu this evening, withdrawn to self. Pt remains disorganized, labile, and delusional. Pt is able to be redirected. Administered 50mg of atarax HAM 18@ 2133, upon reassessment @ 2233 pt appeared to be asleep. Will CTM. Q15 minute pt safety checks ongoing.

## 2025-03-08 NOTE — NURSING NOTE
Patient had broken sleep. No acute behaviors or concerns voiced. Will CTM. Q15 minute patient safety checks in progress.

## 2025-03-08 NOTE — PROGRESS NOTES
Progress Note - Behavioral Health   Name: Laine Tse 69 y.o. female I MRN: [id number]  Unit/Bed#: [facility] 209-01 I Date of Admission: 2/14/2025   Date of Service: 3/8/2025 I Hospital Day: 22     Assessment & Plan  Seizure disorder (HCC)  Phenobarbital 64.8 mg twice daily  Schizoaffective disorder (HCC)  Continue Zyprexa  5 mg at 1 pm + 15 mg p.o. daily at bedtime  Continue Klonopin 1 mg po bid.    Insomnia  Continue Trazodone 150 mg po daily HS  Continue Melatonin 6 mg p.o. daily at bedtime  Hypothyroidism  Management per medical team.   Hyponatremia  135 on 2/22/25  Severe protein-calorie malnutrition (HCC)  Malnutrition Findings:   Adult Malnutrition type: Acute illness  Adult Degree of Malnutrition: Other severe protein calorie malnutrition  Malnutrition Characteristics: Inadequate energy, Weight loss  360 Statement: Malnutrition related to acute illness in setting of psychiatric illness as evidenced by >5% body weight loss in 1 month, <50% energy intake compared to estimated energy needs for >5 days.  To treat with oral diet and nutrition supplements as accepted.    BMI Findings:      Body mass index is 28.84 kg/m².       Current medications:  Current Facility-Administered Medications   Medication Dose Route Frequency Provider Last Rate    acetaminophen  650 mg Oral Q4H PRN Mima Wells MD      acetaminophen  650 mg Oral Q4H PRN Mima Welsl MD      acetaminophen  975 mg Oral Q6H PRN Mima Wells MD      aluminum-magnesium hydroxide-simethicone  30 mL Oral Q4H PRN Mima Wells MD      ammonium lactate   Topical BID Joel Wise MD      aspirin  81 mg Oral Daily Marion Javier PA-C      atorvastatin  20 mg Oral Daily With Dinner CHARLI MaddoxC      bisacodyl  10 mg Rectal Daily PRN Marion Javier PA-C      calcium carbonate-vitamin D  1 tablet Oral Daily With Breakfast Marion Javier PA-C      chlorhexidine  15 mL Swish & Spit Q12H JANNIE Marion Javier PA-C       clonazePAM  1 mg Oral BID Papito An MD      dextromethorphan-guaiFENesin  10 mL Oral Q4H PRN Joel Wise MD      fluticasone  2 spray Nasal Daily Marion L Dagnall, PA-C      hydrALAZINE  25 mg Oral Q8H PRN Marion L Dagnall, PA-C      hydrOXYzine HCL  25 mg Oral Q6H PRN Max 4/day Mima Wells MD      hydrOXYzine HCL  50 mg Oral Q6H PRN Max 4/day Mima Wells MD      lactulose  20 g Oral BID PRN Marion L Dagnall, PA-C      levothyroxine  100 mcg Oral Early Morning Marion L Dagnall, PA-C      lidocaine  1 patch Topical Daily PRN Marion L Dagnall, PA-C      lisinopril  5 mg Oral Daily Rosenda Zelaya, PA-C      loratadine  10 mg Oral Daily PRN Marion L Dagnall, PA-C      melatonin  6 mg Oral HS Papito An MD      nicotine polacrilex  4 mg Oral Q2H PRN Mima Wells MD      OLANZapine  15 mg Oral HS Clayton Herbert, CRNP      OLANZapine  5 mg Oral Daily Clayton Kaci-Anoandreia, CRNP      PHENobarbital  64.8 mg Oral Q12H Marion L Dagnall, PA-C      polyethylene glycol  17 g Oral Daily PRN Marion L Dagnall, PA-C      propranolol  5 mg Oral Q8H PRN Mima Wells MD      risperiDONE  0.5 mg Oral Q4H PRN Max 6/day Papito An MD      risperiDONE  1 mg Oral 4x Daily PRN Papito An MD      risperiDONE  2 mg Oral TID PRN Papito An MD      senna-docusate sodium  1 tablet Oral HS Marion L Dagnall, PA-C      sodium chloride  2 spray Each Nare Q1H PRN Marion L Dagnall, PA-C      tiZANidine  2 mg Oral Q6H PRN Marion L Dagnall, PA-C      traZODone  150 mg Oral HS Clayton KaciAngelica, CRNP      traZODone  50 mg Oral Q6H PRN Max 3/day Mima Wells MD      trihexyphenidyl  2 mg Oral BID Papito An MD       Facility-Administered Medications Ordered in Other Encounters   Medication Dose Route Frequency Provider Last Rate    lactated ringers   Intravenous Continuous PRN Celi Benson CRNA          Risks/Benefits of Treatment:     Risks, benefits, and possible side effects of  medications explained to patient. Patient has limited understanding of risks and benefits of treatment at this time, but agrees to take medications as prescribed.      Treatment Planning:      - Encourage early mobility and having a structured day  - Provide frequent re-orientation, and cognitive stimulation  - Ensure assistive devices are in proper working order (eye-glasses, hearing aids)  - Encourage adequate hydration, nutrition and monitor bowel movements  - Maintain sleep-wake cycle: Uninterrupted sleep time; low-level lighting at night  - Fall precaution  - f/u SLIM recs regarding the medical problems   - Continue medication titration and treatment plan; adjust medication to optimize treatment response and as clinically indicated.   - Observation:Routine  - Legal Status: 201  - VS: as per unit protocol  - Encourage group attendance and milieu therapy  - Dispo: To be determined  - Long Stay Certification : Not Applicable  - Estimated Discharge Day: 3/10/2025 3 days (3/11/2025)    Subjective       Patient was visited on unit for continuing care; chart reviewed and discussed with multidisciplinary treatment team.  On approach, the patient was calm but on irritable edge and fairly cooperative.    Patient continues to be selectively interactive with staff and peers. No reports of aggression or self-injurious behavior on unit.  Staff reports continued periods of lability and mild agitation.  She is bizarre, pressured, religiously preoccupied during the encounter.  Denies hallucinations but appears internally preoccupied.  Slept on and off, tolerating Zyprexa 20 mg daily with no reported adverse effects.  She denies SI and HI.    Patient accepted all offered medications and no adverse effects of medications noted or reported.    Sleep: slept off and on  Appetite: fair  Medication side effects: No  ROS: review of systems as noted above in HPI/Subjective report, all other systems are negative    Objective :  Temp:   [97.6 °F (36.4 °C)-98 °F (36.7 °C)] 98 °F (36.7 °C)  HR:  [76-77] 77  BP: (115-129)/(57-68) 115/68  Resp:  [16-18] 16  SpO2:  [97 %-98 %] 98 %    Temp:  [97.6 °F (36.4 °C)-98 °F (36.7 °C)] 98 °F (36.7 °C)  HR:  [76-77] 77  BP: (115-129)/(57-68) 115/68  Resp:  [16-18] 16  SpO2:  [97 %-98 %] 98 %    Mental Status Evaluation:    Appearance:  disheveled   Behavior:  restless and fidgety   Speech:  slightly pressured   Mood:  labile   Affect:  labile   Thought Process:  disorganized   Thought Content:  some paranoia   Perceptual Disturbances: appears preoccupied   Risk Potential: Suicidal Ideation -  None at present  Homicidal Ideation -  None at present  Potential for Aggression - No   Sensorium:  oriented to person and place   Memory:  recent and remote memory: unable to assess due to lack of cooperation   Consciousness:  alert and awake   Attention/Concentration: attention span and concentration appear shorter than expected for age   Insight:  impaired   Judgment: impaired   Gait/Station: in bed   Motor Activity: abnormal movement noted: dyskinetic oral movements present           Lab Results: I have reviewed the following results:  Most Recent Labs:   Lab Results   Component Value Date    WBC 5.69 02/16/2025    RBC 4.36 02/16/2025    HGB 14.2 02/16/2025    HCT 41.8 02/16/2025     02/16/2025    RDW 12.2 02/16/2025    NEUTROABS 3.37 02/16/2025    SODIUM 135 02/22/2025    K 4.6 02/22/2025     02/22/2025    CO2 29 02/22/2025    BUN 22 02/22/2025    CREATININE 0.75 02/22/2025    GLUC 87 02/22/2025    CALCIUM 9.3 02/22/2025    AST 19 02/16/2025    ALT 20 02/16/2025    ALKPHOS 47 02/16/2025    TP 7.3 02/16/2025    ALB 4.3 02/16/2025    TBILI 0.34 02/16/2025    CHOLESTEROL 198 02/16/2025    HDL 77 02/16/2025    TRIG 122 02/16/2025    LDLCALC 97 02/16/2025    NONHDLC 121 02/16/2025    VALPROICTOT 71 11/29/2019    AMMONIA 15 12/19/2017    PJM4DSQRNKJX 0.920 02/13/2025    FREET4 0.94 08/14/2024    SYPHILISAB  Non-reactive 04/05/2024    HGBA1C 5.5 04/26/2024     04/26/2024       Administrative Statements     Counseling / Coordination of Care:   Patient's progress discussed with staff in treatment team meeting..    CHRISTOPHER Bansal 03/08/25

## 2025-03-08 NOTE — PROGRESS NOTES
"Progress Note - Laine Tse 69 y.o. female MRN: [id number]    Unit/Bed#: -01 Encounter: [id number]        Subjective:   Patient seen and examined at bedside after reviewing the chart and discussing the case with the caring staff.      Patient examined at bedside.  Patient denies any acute symptoms.     Patient is a possible discharge on Monday 3/10/25.    Physical Exam   Vitals: Blood pressure 115/68, pulse 77, temperature 98 °F (36.7 °C), temperature source Temporal, resp. rate 16, height 5' 4\" (1.626 m), weight 76.2 kg (167 lb 15.9 oz), SpO2 98%, not currently breastfeeding.,Body mass index is 28.84 kg/m².  Constitutional: Patient in no acute distress.  HEENT: PERR, EOMI, MMM.  Cardiovascular: Normal rate and regular rhythm.    Pulmonary/Chest: Effort normal and breath sounds normal.   Abdomen: Soft, + BS, NT.    Assessment/Plan:  Laine Tse is a(n) 69 y.o. female with schizoaffective disorder.      Cardiac with hx of HTN, HLD.  Continue atorvastatin 20 mg daily, ASA 81 mg daily.  Not on antihypertensive meds prehospital.  Start lisinopril 5 mg daily 2/15.  Hydralazine 25 mg as needed for SBP >170 or DBP >110.  Cont to monitor vitals.    Allergic rhinitis.  Flonase nasal spray daily.  Claritin 10 mg daily as needed (Zyrtec nonform).   Bilateral hearing loss.  Supportive measures.   Osteoporosis.  Continue calcium/vitamin D supplement.  Resume Fosamax on discharge.  DJD/OA/HA/chronic low back pain.  Tylenol, Zanaflex, lidocaine patch as needed.  Hypothyroidism.  Patient is on levothyroxine 100 mcg daily.  TSH normal on 2/13/25.  Seizure disorder.  Continue phenobarbital 64.8 mg q12h.  Pt follows with [facility] neurology (last saw 2/11/25).  Chronic constipation.  Continue Senokot S nightly.  Miralax as needed.  Add lactulose and dulxolax suppository as needed 2/18.   Cough/sore throat.  Ongoing x months per pt.  Afebrile.  Lungs clear.  Robitussin DM as needed.    Hyponatremia.  Level 129 -> 130 " -> 135 on 2/22/25.   Dry cracked skin bilateral heel.  Apply ammonium lactate twice daily along with Band-Aid or dressing 2/23/25.  Podiatry consulted for further evaluation and treatment.  Malnutrition.  Nutrition following.  Adult Malnutrition type: Acute illness.  Adult Degree of Malnutrition: Other severe protein calorie malnutrition.  Malnutrition Characteristics: Inadequate energy, Weight loss.    The patient was discussed with Dr. Wise and he is in agreement with the above note.

## 2025-03-08 NOTE — BH TRANSITION RECORD
Contact Information: If you have any questions, concerns, pended studies, tests and/or procedures, or emergencies regarding your inpatient behavioral health visit. Please contact [facility] [phone] and ask to speak to a , nurse or physician. A contact is available 24 hours/ 7 days a week at this number.     Summary of Procedures Performed During your Stay:  Below is a list of major procedures performed during your hospital stay and a summary of results:  - No major procedures performed.    Pending Studies (From admission, onward)      None          Please follow up on the above pending studies with your PCP and/or referring provider.

## 2025-03-08 NOTE — NURSING NOTE
Patient endorses high anxiety and depression this morning. She is loud, pressured, and disorganized in conversation. She remains paranoid and religiously preoccupied. She has occasional outbursts but is redirectable. She appears disheveled and is malodorous and is encouraged to shower. She is compliant with medications. No complaints of pain at this time. She is able to make needs known.

## 2025-03-09 PROCEDURE — 99232 SBSQ HOSP IP/OBS MODERATE 35: CPT

## 2025-03-09 RX ORDER — FLUTICASONE PROPIONATE 50 MCG
2 SPRAY, SUSPENSION (ML) NASAL DAILY
Qty: 9.9 ML | Refills: 0 | Status: SHIPPED | OUTPATIENT
Start: 2025-03-09

## 2025-03-09 RX ORDER — ASPIRIN 81 MG/1
81 TABLET, CHEWABLE ORAL DAILY
Qty: 30 TABLET | Refills: 0 | Status: SHIPPED | OUTPATIENT
Start: 2025-03-09 | End: 2025-04-24

## 2025-03-09 RX ORDER — ATORVASTATIN CALCIUM 20 MG/1
20 TABLET, FILM COATED ORAL DAILY
Qty: 30 TABLET | Refills: 0 | Status: SHIPPED | OUTPATIENT
Start: 2025-03-09 | End: 2025-04-24 | Stop reason: SDUPTHER

## 2025-03-09 RX ORDER — OLANZAPINE 5 MG/1
5 TABLET, FILM COATED ORAL DAILY
Qty: 30 TABLET | Refills: 0 | Status: SHIPPED | OUTPATIENT
Start: 2025-03-10 | End: 2025-03-21

## 2025-03-09 RX ORDER — TRAZODONE HYDROCHLORIDE 150 MG/1
150 TABLET ORAL
Qty: 30 TABLET | Refills: 0 | Status: ON HOLD | OUTPATIENT
Start: 2025-03-09 | End: 2025-03-28

## 2025-03-09 RX ORDER — OLANZAPINE 15 MG/1
15 TABLET, FILM COATED ORAL
Qty: 30 TABLET | Refills: 0 | Status: SHIPPED | OUTPATIENT
Start: 2025-03-09 | End: 2025-03-25

## 2025-03-09 RX ORDER — LEVOTHYROXINE SODIUM 100 UG/1
100 TABLET ORAL DAILY
Qty: 30 TABLET | Refills: 0 | Status: SHIPPED | OUTPATIENT
Start: 2025-03-09

## 2025-03-09 RX ORDER — CLONAZEPAM 1 MG/1
1 TABLET ORAL 2 TIMES DAILY
Qty: 60 TABLET | Refills: 0 | Status: SHIPPED | OUTPATIENT
Start: 2025-03-10 | End: 2025-04-09

## 2025-03-09 RX ORDER — SENNA AND DOCUSATE SODIUM 50; 8.6 MG/1; MG/1
1 TABLET, FILM COATED ORAL
Qty: 30 TABLET | Refills: 0 | Status: SHIPPED | OUTPATIENT
Start: 2025-03-09

## 2025-03-09 RX ORDER — TRIHEXYPHENIDYL HYDROCHLORIDE 2 MG/1
2 TABLET ORAL 2 TIMES DAILY
Qty: 60 TABLET | Refills: 0 | Status: ON HOLD | OUTPATIENT
Start: 2025-03-09 | End: 2025-03-28

## 2025-03-09 RX ORDER — AMMONIUM LACTATE 12 G/100G
LOTION TOPICAL 2 TIMES DAILY
Qty: 396 G | Refills: 0 | Status: ON HOLD | OUTPATIENT
Start: 2025-03-09 | End: 2025-03-21 | Stop reason: CLARIF

## 2025-03-09 RX ADMIN — HYDROXYZINE HYDROCHLORIDE 50 MG: 50 TABLET, FILM COATED ORAL at 12:00

## 2025-03-09 RX ADMIN — CLONAZEPAM 1 MG: 1 TABLET ORAL at 17:05

## 2025-03-09 RX ADMIN — OLANZAPINE 15 MG: 15 TABLET, FILM COATED ORAL at 21:25

## 2025-03-09 RX ADMIN — AMMONIUM LACTATE: 12 LOTION TOPICAL at 08:26

## 2025-03-09 RX ADMIN — ASPIRIN 81 MG CHEWABLE TABLET 81 MG: 81 TABLET CHEWABLE at 08:19

## 2025-03-09 RX ADMIN — PHENOBARBITAL 64.8 MG: 32.4 TABLET ORAL at 08:19

## 2025-03-09 RX ADMIN — TRIHEXYPHENIDYL HYDROCHLORIDE 2 MG: 2 TABLET ORAL at 21:25

## 2025-03-09 RX ADMIN — TRAZODONE HYDROCHLORIDE 150 MG: 150 TABLET ORAL at 21:25

## 2025-03-09 RX ADMIN — FLUTICASONE PROPIONATE 2 SPRAY: 50 SPRAY, METERED NASAL at 08:23

## 2025-03-09 RX ADMIN — CLONAZEPAM 1 MG: 1 TABLET ORAL at 08:19

## 2025-03-09 RX ADMIN — LEVOTHYROXINE SODIUM 100 MCG: 100 TABLET ORAL at 08:19

## 2025-03-09 RX ADMIN — CHLORHEXIDINE GLUCONATE 15 ML: 1.2 SOLUTION ORAL at 08:23

## 2025-03-09 RX ADMIN — ATORVASTATIN CALCIUM 20 MG: 20 TABLET, FILM COATED ORAL at 17:05

## 2025-03-09 RX ADMIN — PHENOBARBITAL 64.8 MG: 32.4 TABLET ORAL at 21:25

## 2025-03-09 RX ADMIN — Medication 1 TABLET: at 08:19

## 2025-03-09 RX ADMIN — OLANZAPINE 5 MG: 5 TABLET, FILM COATED ORAL at 12:00

## 2025-03-09 RX ADMIN — Medication 6 MG: at 21:25

## 2025-03-09 RX ADMIN — HYDROXYZINE HYDROCHLORIDE 50 MG: 50 TABLET, FILM COATED ORAL at 21:25

## 2025-03-09 RX ADMIN — SENNOSIDES AND DOCUSATE SODIUM 1 TABLET: 50; 8.6 TABLET ORAL at 21:25

## 2025-03-09 RX ADMIN — TRIHEXYPHENIDYL HYDROCHLORIDE 2 MG: 2 TABLET ORAL at 08:19

## 2025-03-09 RX ADMIN — AMMONIUM LACTATE: 12 LOTION TOPICAL at 17:06

## 2025-03-09 NOTE — PROGRESS NOTES
"Progress Note - Laine Tse 69 y.o. female MRN: [id number]    Unit/Bed#: [facility] 209-01 Encounter: [id number]        Subjective:   Patient seen and examined at bedside after reviewing the chart and discussing the case with the caring staff.      Patient examined at bedside.  Patient denies any acute symptoms.     BP low last night 85/52.  Asymptomatic.  Pt refused vitals this morning.   Patient is a possible discharge tomorrow, Monday, 3/10/25.      Physical Exam   Vitals: Blood pressure (!) 85/52, pulse 81, temperature 98.3 °F (36.8 °C), temperature source Temporal, resp. rate 18, height 5' 4\" (1.626 m), weight 76.2 kg (167 lb 15.9 oz), SpO2 91%, not currently breastfeeding.,Body mass index is 28.84 kg/m².  Constitutional: Patient in no acute distress.  HEENT: PERR, EOMI, MMM.  Cardiovascular: Normal rate and regular rhythm.    Pulmonary/Chest: Effort normal and breath sounds normal.   Abdomen: Soft, + BS, NT.    Assessment/Plan:  Laine Tse is a(n) 69 y.o. female with schizoaffective disorder.      Medical clearance.  Patient is medically cleared for discharge.  All scripts were sent over the patient.    Cardiac with hx of HTN, HLD.  Continue atorvastatin 20 mg daily, ASA 81 mg daily.  Not on antihypertensive meds prehospital.  D/c lisinopril 5 mg daily 3/9 due to hypotension.  Cont to monitor vitals.    Allergic rhinitis.  Flonase nasal spray daily.  Claritin 10 mg daily as needed (Zyrtec nonform).   Bilateral hearing loss.  Supportive measures.   Osteoporosis.  Continue calcium/vitamin D supplement.  Resume Fosamax on discharge.  DJD/OA/HA/chronic low back pain.  Tylenol, Zanaflex, lidocaine patch as needed.  Hypothyroidism.  Patient is on levothyroxine 100 mcg daily.  TSH normal on 2/13/25.  Seizure disorder.  Continue phenobarbital 64.8 mg q12h.  Pt follows with [facility] neurology (last saw 2/11/25).  Chronic constipation.  Continue Senokot S nightly.  Miralax as needed.  Add lactulose and dulxolax " suppository as needed 2/18.   Cough/sore throat.  Ongoing x months per pt.  Afebrile.  Lungs clear.  Robitussin DM as needed.    Hyponatremia.  Level 129 -> 130 -> 135 on 2/22/25.   Dry cracked skin bilateral heel.  Apply ammonium lactate twice daily along with Band-Aid or dressing 2/23/25.  Podiatry consulted for further evaluation and treatment.  Malnutrition.  Nutrition following.  Adult Malnutrition type: Acute illness.  Adult Degree of Malnutrition: Other severe protein calorie malnutrition.  Malnutrition Characteristics: Inadequate energy, Weight loss.    The patient was discussed with Dr. Wise and he is in agreement with the above note.

## 2025-03-09 NOTE — NURSING NOTE
"Patient is urinating in styrofoam drinking cups, carrying the cups around with her, and then presenting them to staff, stating, \"It's for my urinalysis.\" She was informed there is no order for urinalysis. Unable to be redirected.  "

## 2025-03-09 NOTE — NURSING NOTE
Patient's mood continues to be labile and her emotions are erratic. She endorses severe anxiety. She is rambling and yells out frequent repetitive statements. She is delusional and her delusional thoughts tend to be Buddhism and paranoid. She can be verbally aggressive toward staff and peers and she needs frequent redirection and limit setting. She takes all medications without difficulty.

## 2025-03-09 NOTE — NURSING NOTE
Pt was visible on the milieu, social w/ staff. Pt remains bizaree, delusional, disorganized, and labile. Pt is religiously preoccupied. Pt able to be redirected, cooperative, and med compliant. Pt endorsed anxiety. Administered 50mg atarax @ 2138 HAM 18, upon reassessment @ 2238, medication seemingly effective pt appeared to be asleep. Will CTM. Q15 minute pt safety checks ongoing.

## 2025-03-09 NOTE — TREATMENT TEAM
03/09/25 1200   Larson Anxiety Scale   Anxious Mood 4   Tension 4   Fears 3   Insomnia 2   Intellectual 4   Depressed Mood 3   Somatic Complaints: Muscular 0   Somatic Complaints: Sensory 0   Cardiovascular Symptoms 0   Respiratory Symptoms 0   Gastrointestinal Symptoms 0   Genitourinary Symptoms 0   Autonomic Symptoms 1   Behavior at Interview 3   Larson Anxiety Score 24     50mg Atarax administered at 1200 for moderate anxiety as indicated by Larson score.

## 2025-03-09 NOTE — DISCHARGE SUMMARY
"Discharge Summary - Behavioral Health   Name: Laine Tse 69 y.o. female I MRN: [id number]  Unit/Bed#: [facility] 209-01 I Date of Admission: 2/14/2025   Date of Service: 3/9/2025 I Hospital Day: 23         Admission Date: 2/14/2025         Discharge Date: 3/10/2025    Attending Psychiatrist: Papito An MD    Reason for Admission/HPI: Major depressive disorder, single episode, unspecified [F32.9]  Major depressive disorder [F32.9]    According to H&P of Dr. An    Patient is a 69 y.o. female presented with a history of Schizoaffective Disorder and anxiety who was admitted to the inpatient psychiatric unit on a voluntary 201 commitment basis due to anxiety, unstable mood, delusional thoughts, paranoid ideation, confusion, and inability to care for self.  As per crisis worker note: Pt was brought to the ED due to not being able to handle at her group home with worsening of paranoia. Patient states that her home is \"driving her nuts\" and are poisoning her. Patient states it has been really bad recently, and all of a sudden; she doesn't know how long this has been happening or where the poison is coming from. Patient reports the group home is putting LSD in her medications and giving it to her. She says she doesn't trust the people at her group home and believes the group home has been making up her medication list, and she just can't take it anymore. . Patient knows she is being poisoned because she started to see images of monsters and things. She says when she goes to sleep she sees scary things because \"they want her to trip on acid\".   On initial evaluation after admission to the inpatient psychiatric unit Laine presents restless, anxious, poor historian and her speech is high tone and volume which appears to be at her baseline. She is able to answer simple question in goal directed way. Patient continues perseverating about being poisoned with multiple medication and LSD at her group home. She states \"I don't " "trust anyone but Dr. Apple. Continues verbalizing paranoid delusion and appear internally preoccupied. She admits she was taking multiple medications but she cannot name any of them at this time. Patient denies current suicidal ideations, but has experienced them in the past. She reports a previous suicide attempt via stabbing herself with a knife but \"it was too bloody and [facility] made her stop\". Patient denies auditory hallucinations and homicidal ideations. Denies alcohol or illicit drug use.  No acute focal neurological deficit or change of mental status noted presently.     Hospital Course: The patient was admitted to the inpatient psychiatric unit and started on every 7 minutes precautions. During the hospitalization the patient was attending individual therapy, group therapy, milieu therapy and occupational therapy.    Psychiatric medications were titrated over the hospital stay. To address agitation, psychotic symptoms, and extrapyramidal symptoms due to antipsychotic medications the patient was started on antipsychotic medication Zyprexa, antiparkinsonian medication Artane, anxiolytic medication Klonopin, and hypnotic medication Trazodone and Melatonin. Medication doses were titrated during the hospital course. Prior to beginning of treatment medications risks and benefits and possible side effects including risk of parkinsonian symptoms, Tardive Dyskinesia and metabolic syndrome related to treatment with antipsychotic medications, risk of cardiovascular events in elderly related to treatment with antipsychotic medications, risks of misuse, abuse or dependence, sedation and respiratory depression related to treatment with benzodiazepine medications, and risk of impaired next-day mental alertness, complex sleep-related behavior and dependence related to treatment with hypnotic medications were reviewed with the patient. The patient verbalized understanding and agreement for treatment.     Patient's " symptoms improved gradually over the hospital course. At the end of treatment the patient was doing well. Mood was stable at the time of discharge. The patient denied suicidal ideation, intent or plan at the time of discharge and denied homicidal ideation, intent or plan at the time of discharge. There was no overt psychosis at the time of discharge. Sleep and appetite were improved. The patient was tolerating medications and was not reporting any significant side effects at the time of discharge.    Since the patient was doing well at the end of the hospitalization, treatment team felt that the patient could be safely discharged to outpatient care.     The outpatient follow up with  Jose OLVERA and JASPER ARNOLD  was arranged by the unit  upon discharge.    Mental Status at time of Discharge:     Appearance:  age appropriate and disheveled   Behavior:  bizarre   Speech:  Loud at times   Mood:  labile   Affect:  mood-congruent   Thought Process:  disorganized   Thought Content:  Paranoid ideations   Perceptual Disturbances: Denied AVH, appears preoccupied   Risk Potential: None   Sensorium:  person and place   Cognition:  recent and remote memory: unable to assess due to lack of cooperation   Consciousness:  alert and awake    Attention: attention span appeared shorter than expected for age   Insight:  limited   Judgment: limited   Gait/Station: Unsteady gait   Motor Activity: no abnormal movements     Admission Diagnosis:Major depressive disorder, single episode, unspecified [F32.9]  Major depressive disorder [F32.9]    Discharge Diagnosis:   Principal Problem:    Schizoaffective disorder (HCC)  Active Problems:    Hyponatremia    Hypothyroidism    Seizure disorder (HCC)    Severe protein-calorie malnutrition (HCC)  Resolved Problems:    Insomnia    Major depressive disorder    Major depressive disorder, single episode, unspecified        Lab results:  Admission on 02/14/2025   Component Date Value    WBC  02/16/2025 5.69     RBC 02/16/2025 4.36     Hemoglobin 02/16/2025 14.2     Hematocrit 02/16/2025 41.8     MCV 02/16/2025 96     MCH 02/16/2025 32.6     MCHC 02/16/2025 34.0     RDW 02/16/2025 12.2     MPV 02/16/2025 9.8     Platelets 02/16/2025 298     nRBC 02/16/2025 0     Segmented % 02/16/2025 60     Immature Grans % 02/16/2025 0     Lymphocytes % 02/16/2025 32     Monocytes % 02/16/2025 8     Eosinophils Relative 02/16/2025 0     Basophils Relative 02/16/2025 0     Absolute Neutrophils 02/16/2025 3.37     Absolute Immature Grans 02/16/2025 0.01     Absolute Lymphocytes 02/16/2025 1.84     Absolute Monocytes 02/16/2025 0.46     Eosinophils Absolute 02/16/2025 0.00     Basophils Absolute 02/16/2025 0.01     Vitamin B-12 02/16/2025 966 (H)     Folate 02/16/2025 >22.3     Vit D, 25-Hydroxy 02/16/2025 64.6     Cholesterol 02/16/2025 198     Triglycerides 02/16/2025 122     HDL, Direct 02/16/2025 77     LDL Calculated 02/16/2025 97     Non-HDL-Chol (CHOL-HDL) 02/16/2025 121     Treponema Pallidium Tota* 02/16/2025 Non-reactive     Sodium 02/16/2025 130 (L)     Potassium 02/16/2025 4.5     Chloride 02/16/2025 98     CO2 02/16/2025 23     ANION GAP 02/16/2025 9     BUN 02/16/2025 13     Creatinine 02/16/2025 0.66     Glucose 02/16/2025 136     Calcium 02/16/2025 9.2     AST 02/16/2025 19     ALT 02/16/2025 20     Alkaline Phosphatase 02/16/2025 47     Total Protein 02/16/2025 7.3     Albumin 02/16/2025 4.3     Total Bilirubin 02/16/2025 0.34     eGFR 02/16/2025 89     Sodium 02/22/2025 135     Potassium 02/22/2025 4.6     Chloride 02/22/2025 100     CO2 02/22/2025 29     ANION GAP 02/22/2025 6     BUN 02/22/2025 22     Creatinine 02/22/2025 0.75     Glucose 02/22/2025 87     Glucose, Fasting 02/22/2025 87     Calcium 02/22/2025 9.3     eGFR 02/22/2025 80     MRSA Culture Only 02/20/2025 No Methicillin Resistant Staphlyococcus aureus (MRSA) isolated        Discharge Medications:  Current Discharge Medication List         START taking these medications    Details   ammonium lactate (LAC-HYDRIN) 12 % lotion Apply topically 2 (two) times a day  Qty: 396 g, Refills: 0    Associated Diagnoses: Dry skin      clonazePAM (KlonoPIN) 1 mg tablet Take 1 tablet (1 mg total) by mouth 2 (two) times a day  Qty: 60 tablet, Refills: 0    Associated Diagnoses: Agitation      melatonin 3 mg Take 2 tablets (6 mg total) by mouth daily at bedtime  Qty: 60 tablet, Refills: 0    Associated Diagnoses: Insomnia       OLANZapine (ZyPREXA) 15 mg tablet Take 1 tablet (15 mg total) by mouth daily at bedtime  Qty: 30 tablet, Refills: 0    Associated Diagnoses: Schizoaffective disorder, unspecified type (HCC)       OLANZapine (ZyPREXA) 5 mg tablet Take 1 tablet (5 mg total) by mouth daily  Qty: 30 tablet, Refills: 0    Associated Diagnoses: Schizoaffective disorder, unspecified type (HCC)                  Current Discharge Medication List        STOP taking these medications       bismuth subsalicylate (GNP Pink Bismuth) 262 MG chewable tablet Comments:   Reason for Stopping:         busPIRone (BUSPAR) 15 mg tablet Comments:   Reason for Stopping:         calcium carbonate-vitamin D 250 mg-3.125 mcg per tablet Comments:   Reason for Stopping:         cetirizine (ZyrTEC) 5 MG tablet Comments:   Reason for Stopping:         chlorhexidine (PERIDEX) 0.12 % solution Comments:   Reason for Stopping:         Diclofenac Sodium (VOLTAREN) 1 % Comments:   Reason for Stopping:         divalproex sodium (DEPAKOTE) 500 mg EC tablet Comments:   Reason for Stopping:         docusate sodium (Stool Softener) 100 mg capsule Comments:   Reason for Stopping:         gabapentin (NEURONTIN) 300 mg capsule Comments:   Reason for Stopping:         L-Theanine 100 MG CAPS Comments:   Reason for Stopping:         LORazepam (ATIVAN) 1 mg tablet Comments:   Reason for Stopping:         loxapine (LOXITANE) 25 mg capsule Comments:   Reason for Stopping:         Saphris 10 MG SL tablet  Comments:   Reason for Stopping:         sodium chloride (OCEAN) 0.65 % nasal spray Comments:   Reason for Stopping:         Thera (THERA/BETA-CAROTENE) Comments:   Reason for Stopping:         benzonatate (TESSALON) 200 MG capsule Comments:   Reason for Stopping:         Calcium Carb-Cholecalciferol (calcium carbonate-vitamin D) 500 mg-5 mcg tablet Comments:   Reason for Stopping:         cephalexin (KEFLEX) 500 mg capsule Comments:   Reason for Stopping:         cetaphil (CETAPHIL) lotion Comments:   Reason for Stopping:         cetaphil (CETAPHIL) lotion Comments:   Reason for Stopping:         dextromethorphan-guaiFENesin (ROBITUSSIN DM)  mg/5 mL syrup Comments:   Reason for Stopping:         diphenhydrAMINE (BENADRYL) 50 mg capsule Comments:   Reason for Stopping:         gabapentin (NEURONTIN) 100 mg capsule Comments:   Reason for Stopping:         GNP Tussin DM  MG/10ML oral liquid Comments:   Reason for Stopping:         Lidocaine (HM Lidocaine Patch) 4 % PTCH Comments:   Reason for Stopping:         lidocaine (LMX) 4 % cream Comments:   Reason for Stopping:         LORazepam (ATIVAN) 0.5 mg tablet Comments:   Reason for Stopping:         loxapine (LOXITANE) 50 MG capsule Comments:   Reason for Stopping:         mupirocin (BACTROBAN) 2 % ointment Comments:   Reason for Stopping:         Prolia 60 MG/ML Comments:   Reason for Stopping:         tiZANidine (ZANAFLEX) 2 mg tablet Comments:   Reason for Stopping:                Current Discharge Medication List        CONTINUE these medications which have CHANGED    Details   aspirin (Aspirin Low Dose) 81 mg chewable tablet Chew 1 tablet (81 mg total) daily  Qty: 30 tablet, Refills: 0    Associated Diagnoses: Abnormal ECG; T wave inversion on electrocardiogram      atorvastatin (LIPITOR) 20 mg tablet Take 1 tablet (20 mg total) by mouth daily  Qty: 30 tablet, Refills: 0    Associated Diagnoses: Mixed hyperlipidemia      fluticasone (FLONASE) 50  mcg/act nasal spray 2 sprays into each nostril daily  Qty: 9.9 mL, Refills: 0    Associated Diagnoses: Seasonal allergies      levothyroxine 100 mcg tablet Take 1 tablet (100 mcg total) by mouth daily  Qty: 30 tablet, Refills: 0    Associated Diagnoses: Acquired hypothyroidism      senna-docusate sodium (SENOKOT-S) 8.6-50 mg per tablet Take 1 tablet by mouth daily at bedtime  Qty: 30 tablet, Refills: 0    Associated Diagnoses: Constipation      traZODone (DESYREL) 150 mg tablet Take 1 tablet (150 mg total) by mouth daily at bedtime  Qty: 30 tablet, Refills: 0    Associated Diagnoses: Insomnia      trihexyphenidyl (ARTANE) 2 mg tablet Take 1 tablet (2 mg total) by mouth 2 (two) times a day  Qty: 60 tablet, Refills: 0    Associated Diagnoses: Parkinsonism (HCC)              Current Discharge Medication List        CONTINUE these medications which have NOT CHANGED    Details   acetaminophen (TYLENOL) 500 mg tablet Take 2 tablets (1,000 mg total) by mouth 3 (three) times a day as needed for mild pain or fever  Qty: 180 tablet, Refills: 1    Associated Diagnoses: Acute pain of right knee      alendronate (FOSAMAX) 70 mg tablet Take 70 mg by mouth every 7 days Every 7 days on Fridays      calcium carbonate-vitamin D 500 mg-5 mcg per tablet Take 1 tablet by mouth daily with breakfast      PHENobarbital 64.8 mg tablet Take 1 tablet (64.8 mg total) by mouth every 12 (twelve) hours  Qty: 62 tablet, Refills: 5    Associated Diagnoses: Seizure disorder (HCC)      polyethylene glycol (GLYCOLAX) 17 GM/SCOOP MIX 1 CAPFUL(17GM) IN 8OZ OF LIQUID AND DRINK DAILY @ 8AM(CONSTIPATION) *AHMAD  Qty: 510 g, Refills: 1    Associated Diagnoses: Other constipation      Incontinence Supplies MISC Use if needed (incontinence) SIZE XL PULL UP DISPOSABLE BRIEFS  Qty: 100 each, Refills: 1    Associated Diagnoses: Other urinary incontinence      Incontinence Supply Disposable (Disposable Brief Large) MISC Use every 12 (twelve) hours  Qty: 36 each,  Refills: 6    Associated Diagnoses: Stress incontinence of urine      Menthol, Topical Analgesic, (Bengay Ultra Strength) 5 % PTCH Apply 1 patch topically in the morning  Qty: 30 patch, Refills: 2    Associated Diagnoses: Acute bilateral low back pain without sciatica              Discharge instructions/Information to patient and family:   See after visit summary for information provided to patient and family.      Provisions for Follow-Up Care:  See after visit summary for information related to follow-up care and any pertinent home health orders.      Discharge Statement     I spent 30 minutes discharging the patient. This time was spent on the day of discharge. I had direct contact with the patient on the day of discharge.     Additional documentation is required if more than 30 minutes were spent on discharge:    I reviewed with Laine importance of compliance with medications and outpatient treatment after discharge.  I discussed the medication regimen and possible side effects of the medications with Laine prior to discharge. At the time of discharge she was tolerating psychiatric medications.  I discussed outpatient follow up with Laine.  I reviewed with Laine crisis plan and safety plan upon discharge.

## 2025-03-09 NOTE — PROGRESS NOTES
Progress Note - Behavioral Health   Name: Laine Tse 69 y.o. female I MRN: [id number]  Unit/Bed#: [facility]-01 I Date of Admission: 2/14/2025   Date of Service: 3/9/2025 I Hospital Day: 23     Assessment & Plan  Seizure disorder (HCC)  Phenobarbital 64.8 mg twice daily  Schizoaffective disorder (HCC)  Continue Zyprexa  5 mg at 1 pm + 15 mg p.o. daily at bedtime  Continue Klonopin 1 mg po bid.    Hypothyroidism  Management per medical team.   Hyponatremia  135 on 2/22/25  Severe protein-calorie malnutrition (HCC)  Malnutrition Findings:   Adult Malnutrition type: Acute illness  Adult Degree of Malnutrition: Other severe protein calorie malnutrition  Malnutrition Characteristics: Inadequate energy, Weight loss  360 Statement: Malnutrition related to acute illness in setting of psychiatric illness as evidenced by >5% body weight loss in 1 month, <50% energy intake compared to estimated energy needs for >5 days.  To treat with oral diet and nutrition supplements as accepted.    BMI Findings:      Body mass index is 28.84 kg/m².       Current medications:  Current Facility-Administered Medications   Medication Dose Route Frequency Provider Last Rate    acetaminophen  650 mg Oral Q4H PRN Mima Wells MD      acetaminophen  650 mg Oral Q4H PRN Mima Wells MD      acetaminophen  975 mg Oral Q6H PRN Mima Wells MD      aluminum-magnesium hydroxide-simethicone  30 mL Oral Q4H PRN Mima Wells MD      ammonium lactate   Topical BID Joel Wise MD      aspirin  81 mg Oral Daily Marion L Dagnall, PA-C      atorvastatin  20 mg Oral Daily With Dinner Marion L Dagnall, PA-C      bisacodyl  10 mg Rectal Daily PRN Marion L Dagnall, PA-C      calcium carbonate-vitamin D  1 tablet Oral Daily With Breakfast Marion L Dagnall, PA-C      chlorhexidine  15 mL Swish & Spit Q12H [facility] Marion L Dagnall, PA-C      clonazePAM  1 mg Oral BID Papito An MD      dextromethorphan-guaiFENesin  10 mL Oral Q4H PRN  Joel Wise MD      fluticasone  2 spray Nasal Daily Marion L Dagnall, PA-C      hydrALAZINE  25 mg Oral Q8H PRN Marion L Dagnall, PA-C      hydrOXYzine HCL  25 mg Oral Q6H PRN Max 4/day Mima Wells MD      hydrOXYzine HCL  50 mg Oral Q6H PRN Max 4/day Mima Wells MD      lactulose  20 g Oral BID PRN Marion L Dagnall, PA-C      levothyroxine  100 mcg Oral Early Morning Marion L Dagnall, PA-C      lidocaine  1 patch Topical Daily PRN Marion L Dagnall, PA-C      lisinopril  5 mg Oral Daily Rosenda Zelaya, PA-C      loratadine  10 mg Oral Daily PRN Marion L Dagnall, PA-C      melatonin  6 mg Oral HS Papito An MD      nicotine polacrilex  4 mg Oral Q2H PRN Mima Wells MD      OLANZapine  15 mg Oral HS CHRISTOPHER Alexander      OLANZapine  5 mg Oral Daily CHRISTOPHER Alexander      PHENobarbital  64.8 mg Oral Q12H Marion L Dagnall, PA-C      polyethylene glycol  17 g Oral Daily PRN Marion L Dagnall, PA-C      propranolol  5 mg Oral Q8H PRN Mima Wells MD      risperiDONE  0.5 mg Oral Q4H PRN Max 6/day Papito An MD      risperiDONE  1 mg Oral 4x Daily PRN Papito An MD      risperiDONE  2 mg Oral TID PRN Papito An MD      senna-docusate sodium  1 tablet Oral HS Marion L Dagnall, PA-C      sodium chloride  2 spray Each Nare Q1H PRN Marion L Dagnall, PA-C      tiZANidine  2 mg Oral Q6H PRN Marion L Dagnall, PA-C      traZODone  150 mg Oral HS CHRISTOPHER Alexander      traZODone  50 mg Oral Q6H PRN Max 3/day Mima Wells MD      trihexyphenidyl  2 mg Oral BID Papito An MD       Facility-Administered Medications Ordered in Other Encounters   Medication Dose Route Frequency Provider Last Rate    lactated ringers   Intravenous Continuous PRN Celi Benson CRNA          Risks/Benefits of Treatment:     Risks, benefits, and possible side effects of medications explained to patient. Patient has limited understanding of risks and benefits of treatment  at this time, but agrees to take medications as prescribed.    Treatment Planning:      - Encourage early mobility and having a structured day  - Provide frequent re-orientation, and cognitive stimulation  - Ensure assistive devices are in proper working order (eye-glasses, hearing aids)  - Encourage adequate hydration, nutrition and monitor bowel movements  - Maintain sleep-wake cycle: Uninterrupted sleep time; low-level lighting at night  - Fall precaution  - f/u SLIM recs regarding the medical problems   - Continue medication titration and treatment plan; adjust medication to optimize treatment response and as clinically indicated.   - Observation:Routine      Subjective       Patient was visited on unit for continuing care; chart reviewed and discussed with multidisciplinary treatment team.  On approach, the patient was irritable, loud.    Patient remains disorganized, labile and on irritable edge, and requires frequent redirection and reorientation by the staff.  Remains bizarre, delusional, disorganized, irrational in thought process.  Periods of yelling out on the unit, able to be redirected at times, received as needed Atarax last evening with mild effect for anxiety and difficulty falling asleep.  No overt aggression. Compliant with psychiatric medications and denies adverse effects.      Sleep: slept off and on  Appetite: fair  Medication side effects: No  ROS: review of systems as noted above in HPI/Subjective report, all other systems are negative    Objective :  Temp:  [97.7 °F (36.5 °C)-98.3 °F (36.8 °C)] 98.3 °F (36.8 °C)  HR:  [78-81] 81  BP: (85)/(52) 85/52  Resp:  [16-18] 18  SpO2:  [91 %-94 %] 91 %  O2 Device: None (Room air)    Temp:  [97.7 °F (36.5 °C)-98.3 °F (36.8 °C)] 98.3 °F (36.8 °C)  HR:  [78-81] 81  BP: (85)/(52) 85/52  Resp:  [16-18] 18  SpO2:  [91 %-94 %] 91 %  O2 Device: None (Room air)    Mental Status Evaluation:    Appearance:  disheveled   Behavior:  bizarre   Speech:  increased  rate, increased volume   Mood:  labile   Affect:  reactive   Thought Process:  disorganized   Thought Content:  paranoid ideation   Perceptual Disturbances: appears preoccupied   Risk Potential: Suicidal Ideation -  None at present  Homicidal Ideation -  None at present  Potential for Aggression - No   Sensorium:  oriented to person and place   Memory:  recent and remote memory: unable to assess due to lack of cooperation   Consciousness:  alert and awake   Attention/Concentration: attention span and concentration appear shorter than expected for age   Insight:  impaired   Judgment: impaired   Gait/Station: in bed   Motor Activity: abnormal movement noted: dyskinetic oral movements present           Lab Results: I have reviewed the following results:  Most Recent Labs:   Lab Results   Component Value Date    WBC 5.69 02/16/2025    RBC 4.36 02/16/2025    HGB 14.2 02/16/2025    HCT 41.8 02/16/2025     02/16/2025    RDW 12.2 02/16/2025    NEUTROABS 3.37 02/16/2025    SODIUM 135 02/22/2025    K 4.6 02/22/2025     02/22/2025    CO2 29 02/22/2025    BUN 22 02/22/2025    CREATININE 0.75 02/22/2025    GLUC 87 02/22/2025    CALCIUM 9.3 02/22/2025    AST 19 02/16/2025    ALT 20 02/16/2025    ALKPHOS 47 02/16/2025    TP 7.3 02/16/2025    ALB 4.3 02/16/2025    TBILI 0.34 02/16/2025    CHOLESTEROL 198 02/16/2025    HDL 77 02/16/2025    TRIG 122 02/16/2025    LDLCALC 97 02/16/2025    NONHDLC 121 02/16/2025    VALPROICTOT 71 11/29/2019    AMMONIA 15 12/19/2017    [id number] 0.920 02/13/2025    FREET4 0.94 08/14/2024    SYPHILISAB Non-reactive 04/05/2024    HGBA1C 5.5 04/26/2024     04/26/2024       Administrative Statements     Counseling / Coordination of Care:   Patient's progress discussed with staff in treatment team meeting.  Medications, treatment progress and treatment plan reviewed with patient..    CHRISTOPHER Bansal 03/09/25

## 2025-03-10 VITALS
WEIGHT: 167.99 LBS | DIASTOLIC BLOOD PRESSURE: 60 MMHG | TEMPERATURE: 97.8 F | HEIGHT: 64 IN | HEART RATE: 70 BPM | RESPIRATION RATE: 18 BRPM | SYSTOLIC BLOOD PRESSURE: 132 MMHG | BODY MASS INDEX: 28.68 KG/M2 | OXYGEN SATURATION: 97 %

## 2025-03-10 PROCEDURE — 99238 HOSP IP/OBS DSCHRG MGMT 30/<: CPT

## 2025-03-10 RX ORDER — ASPIRIN 81 MG/1
81 TABLET, CHEWABLE ORAL DAILY
Qty: 90 TABLET | Refills: 0 | Status: ON HOLD | OUTPATIENT
Start: 2025-03-10 | End: 2025-03-21 | Stop reason: CLARIF

## 2025-03-10 RX ADMIN — ASPIRIN 81 MG CHEWABLE TABLET 81 MG: 81 TABLET CHEWABLE at 09:05

## 2025-03-10 RX ADMIN — TRIHEXYPHENIDYL HYDROCHLORIDE 2 MG: 2 TABLET ORAL at 09:05

## 2025-03-10 RX ADMIN — LEVOTHYROXINE SODIUM 100 MCG: 100 TABLET ORAL at 09:06

## 2025-03-10 RX ADMIN — CLONAZEPAM 1 MG: 1 TABLET ORAL at 09:06

## 2025-03-10 RX ADMIN — PHENOBARBITAL 64.8 MG: 32.4 TABLET ORAL at 09:06

## 2025-03-10 RX ADMIN — FLUTICASONE PROPIONATE 2 SPRAY: 50 SPRAY, METERED NASAL at 09:05

## 2025-03-10 RX ADMIN — CHLORHEXIDINE GLUCONATE 15 ML: 1.2 SOLUTION ORAL at 09:05

## 2025-03-10 RX ADMIN — Medication 1 TABLET: at 09:06

## 2025-03-10 RX ADMIN — SALINE NASAL SPRAY 2 SPRAY: 1.5 SOLUTION NASAL at 09:05

## 2025-03-10 NOTE — DISCHARGE INSTR - OTHER ORDERS
You are being discharged to your home with service [facility] [address] [facility] [address] TELEPHONE [phone]     Triggers you have identified during your hospitalization that led to your admission of a regressed mood due to ineffective coping skills. Coping skills you have identified during your hospitalization include walking and crossroad puzzles. If you are unable to deal with your distressed mood alone please tell staff at your group home or contact a member from you ACT team at [facility] [phone] If that is not effective and you continue to have (ex: suicidal ideation, homicidal ideation, distressed mood, overwhelmed, in crisis) please contact Crisis by dialing 414, [facility] Crisis Hotline: [phone] dial 181 or go to the nearest emergency center.      *[facility] Crisis Hotline: 299.529.4623  * Alcohol Anonymous: 981.682.8554  *[facility] Drug and Alcohol Commision (927) 520-5051  *National [place] [employer] Mental [employer]ness (GEORGE) HELPLINE: 978.100.9478/Website: www.george.org  *Substance Abuse and Mental Health Services Administration([facility]) National Helpline, which is a confidential, free, 24-hour-a-day, 365-day-a-year, information service for individuals and family members facing mental health and/or substance use disorders. This service provides referrals to local treatment facilities, support groups, and community-based organizations. Callers can also order free publications and other information.  Call 1-491.552.6646/Website: www.[facility].gov  *[facility] 2-1-1: This is a toll free, confidential, 24-hour-a-day service which connects you to a community  in your area who can help you find services and resources that are available to you locally and provide critical services that can improve and save lives.  Call: 211  /Website: http://www.[employer].org/       Soni, or Pushpa, our Behavioral Health Nurse Navigators, will be calling you after your discharge,  on the phone number that you provided.  They will be available as an additional support, if needed.   If you wish to speak with one of them, you may contact Soni at [phone] or Pushpa at [phone].

## 2025-03-10 NOTE — PROGRESS NOTES
03/10/25 0923   Activity/Group Checklist   Group Admission/Discharge   Attendance Attended   Attendance Duration (min) 0-15   Interactions Disorganized interaction   Affect/Mood Wide   Goals Achieved Identified feelings;Able to listen to others;Able to engage in interactions;Able to self-disclose;Able to recieve feedback     CTRS attempted to meet with patient to complete safety/relapse prevention plan.  Patient stated she wasn't leaving and that she did not have a home; she wanted to live in the [facility] at a place  called [facility].  That she didn't have a home but then shared she lives by the [place] in [place]. And then patient asked writer to leave she didn't want to talk anymore to me.

## 2025-03-10 NOTE — NURSING NOTE
Pt was visible on the milieu, social w/ staff. Pt remains labile, bizarre, delusional, religiously preoccupied, and disorganized. Pt was able to be redirected and med compliant. Pt endorsed anxiety. Administered 50mg atarax @ 2125 HAM 18 @ 2125, upon reassessment @ 2225, medication seemingly effective, pt appeared to be asleep. Will CTM. Q15 minute pt safety checks ongoing.

## 2025-03-10 NOTE — PROGRESS NOTES
03/10/25 1250   Discharge Planning   Living Arrangements Lives in Facility   Support Systems Self;Home care staff;/;Psychiatrist;Friends/neighbors   Assistance Needed 24 hour supervision, ACT Team service   Type of Current Residence Group home   Current Care Facility Name [facility]   Current Home Care Services No   Other Referral/Resources/Interventions Provided:   Referrals Provided: ACT;Psychiatrist   Post Acute Placement to: Group Home   Discharge Communications   Discharge planning discussed with: pt tx team [facility]   Freedom of Choice Yes   Comments - Freedom of Choice pt satisfied with d/c and did not express the want to appeal   CM contacted family/caregiver? Yes   Were Treatment Team discharge recommendations reviewed with patient/caregiver? Yes   Did patient/caregiver verbalize understanding of patient care needs? Yes   Were patient/caregiver advised of the risks associated with not following Treatment Team discharge recommendations? Yes   Contacts   Patient Contacts [facility] Domenic Jnoes (director)   Relationship to Patient: Treatment Provider   Contact Method Phone   Phone Number [phone]   Reason/Outcome Continuity of Care;Discharge Planning   [facility]   Would you like to participate in our [facility] service program?   No - Declined        .

## 2025-03-10 NOTE — NURSING NOTE
Patient mostly withdrawn to room, labile, needs redirection and at times cooperative. Patient endorses high anxiety and mild depression. Patient is set to leave this morning at 11 am with [facility]. Continued care and safety rounds in progress.

## 2025-03-10 NOTE — SOCIAL WORK
Medications 03/10/25 03/11/25 03/12/25 03/13/25 03/14/25 03/15/25 03/16/25   ammonium lactate (LAC-HYDRIN) 12 % lotion  Freq: 2 times daily Route: TP  Indications of Use: XEROSIS CUTIS  Start: 02/23/25 1800   Admin Instructions:   For topical use ONLY; AVOID use on eyes, lips, or mucous membranes   Order specific questions:       (0696)     1800      0900     1800      0900 1800 0900 1800           aspirin chewable tablet 81 mg  Dose: 81 mg  Freq: Daily Route: PO  Start: 02/15/25 0900    0905      0900      0900      0900      0900          atorvastatin (LIPITOR) tablet 20 mg  Dose: 20 mg  Freq: Daily with dinner Route: PO  Start: 02/14/25 1630    1630      1630      1630      1630           calcium carbonate-vitamin D 500 mg-5 mcg tablet 1 tablet  Dose: 1 tablet  Freq: Daily with breakfast Route: PO  Start: 02/15/25 0730   Admin Instructions:   5 mcg = 200 units of cholecalciferol (Vitamin D3); LOOK [employer]IKE S[employer] ALIKE MED    0906      0730      0730      0730      0730          chlorhexidine (PERIDEX) 0.12 % oral rinse 15 mL  Dose: 15 mL  Freq: Every 12 hours scheduled Route: SWISH & SPIT  Start: 02/14/25 2100   Admin Instructions:   Swish orally for 30 seconds, then expectorate.  Do not swallow. **DISPOSE IN 8 GALLON BLACK CONTAINER**    0905 2100 0900 2100 0900 2100 0900 2100           clonazePAM (KlonoPIN) tablet 1 mg  Dose: 1 mg  Freq: 2 times daily Route: PO  Start: 02/27/25 1800   Admin Instructions:   High alert medication. LOOK [employer] MED. Hazardous Agent, use appropriate precautions for handling and disposal.    0906 1800 0900     1800      0900     1800      0900 1800 0900     1800          fluticasone (FLONASE) 50 mcg/act nasal spray 2 spray  Dose: 2 spray  Freq: Daily Route: NA  Start: 02/15/25 0900   Admin Instructions:   LOOK ALIKE SOUND ALIKE MED    0905      0900      0900      0900      0900          levothyroxine  tablet 100 mcg  Dose: 100 mcg  Freq: Daily (early morning) Route: PO  Start: 02/15/25 0600   Admin Instructions:   Administer on an empty stomach, at least 30 to 60 minutes before food. Tablets may be crushed and suspended in 5-10mls of water for administration. LOOK [employer].    0906      0600      0600      0600      0600          melatonin tablet 6 mg  Dose: 6 mg  Freq: Daily at bedtime Route: PO  Indications of Use: INSOMNIA  Start: 02/26/25 2200    2200      2200      2200      2200           OLANZapine (ZyPREXA) tablet 15 mg  Dose: 15 mg  Freq: Daily at bedtime Route: PO  Indications Comment: Schizoaffective disorder  Start: 03/05/25 2200   Admin Instructions:   LOOK ALIKE SOUND ALIKE MED    2200      2200      2200      2200      2200          OLANZapine (ZyPREXA) tablet 5 mg  Dose: 5 mg  Freq: Daily Route: PO  Start: 03/05/25 1300   Admin Instructions:   LOOK ALIKE SOUND ALIKE MED    1300      1300      1300      1300      1300          PHENobarbital tablet 64.8 mg  Dose: 64.8 mg  Freq: Every 12 hours Route: PO  Start: 02/14/25 2100   Admin Instructions:   High alert medication    0906     2100 0900 2100 0900 2100 0900     2100           senna-docusate sodium (SENOKOT S) 8.6-50 mg per tablet 1 tablet  Dose: 1 tablet  Freq: Daily at bedtime Route: PO  Start: 02/14/25 2200    2200      2200      2200      2200           traZODone (DESYREL) tablet 150 mg  Dose: 150 mg  Freq: Daily at bedtime Route: PO  Indications of Use: INSOMNIA  Start: 03/05/25 2200   Admin Instructions:   LOOK ALIKE S[employer] ALIKE MED    2200      2200      2200      2200      2200          trihexyphenidyl (ARTANE) tablet 2 mg  Dose: 2 mg  Freq: 2 times daily Route: PO  Start: 02/14/25 1315    0905     2100 0900 2100 0900 2100 0900     2100

## 2025-03-10 NOTE — PROGRESS NOTES
03/10/25    Team Meeting   Meeting Type Daily Rounds   Team Members Present   Team Members Present Physician;Nurse;;Occupational Therapist   Physician Team Member Davis Negrete MD   Nursing Team Member Julio LUZ   Social Work Team Member Chacho TARANGO   OT Team Member Yazmin CONTRERAS   Patient/Family Present   Patient Present No   Patient's Family Present No   201, refusing am meds, does not want to d/c, peeing in cup, yelling at roommate, loud, baseline, d/c today home to  with [facility]

## 2025-03-10 NOTE — PROGRESS NOTES
"Progress Note - Laine Tse 69 y.o. female MRN: [id number]    Unit/Bed#: -01 Encounter: [id number]        Subjective:   Patient seen and examined at bedside after reviewing the chart and discussing the case with the caring staff.      Patient examined at bedside.  Patient denies any acute symptoms.     Patient is being discharged today.  Patient is requesting prescriptions.  I reviewed and reconciled patient's problem list and medications.    Physical Exam   Vitals: Blood pressure 132/60, pulse 70, temperature 97.8 °F (36.6 °C), temperature source Temporal, resp. rate 18, height 5' 4\" (1.626 m), weight 76.2 kg (167 lb 15.9 oz), SpO2 97%, not currently breastfeeding.,Body mass index is 28.84 kg/m².  Constitutional: Patient in no acute distress.  HEENT: PERR, EOMI, MMM.  Cardiovascular: Normal rate and regular rhythm.    Pulmonary/Chest: Effort normal and breath sounds normal.   Abdomen: Soft, + BS, NT.    Assessment/Plan:  Laine Tse is a(n) 69 y.o. female with schizoaffective disorder.      Medical clearance.  Patient is medically cleared for discharge.  All scripts were sent over the patient.    Cardiac with hx of HTN, HLD.  Continue atorvastatin 20 mg daily, ASA 81 mg daily.  Not on antihypertensive meds prehospital.  D/c lisinopril 5 mg daily 3/9 due to hypotension.  Cont to monitor vitals.    Allergic rhinitis.  Flonase nasal spray daily.  Claritin 10 mg daily as needed (Zyrtec nonform).   Bilateral hearing loss.  Supportive measures.   Osteoporosis.  Continue calcium/vitamin D supplement.  Resume Fosamax on discharge.  DJD/OA/HA/chronic low back pain.  Tylenol, Zanaflex, lidocaine patch as needed.  Hypothyroidism.  Patient is on levothyroxine 100 mcg daily.  TSH normal on 2/13/25.  Seizure disorder.  Continue phenobarbital 64.8 mg q12h.  Pt follows with [facility] neurology (last saw 2/11/25).  Chronic constipation.  Continue Senokot S nightly.  Miralax as needed.  Add lactulose and dulxolax " Addendum:  Pt seen at 15:15. Pain rated as a 6/10 NRS in pt's chest. Pain described as an ache and constant. He had just reached his one hour limit on pca. Therefore, On-Q ball increased to rate of 14 mL/hr. Pt given ice pack for Right side. Flexeril being sent to unit from pharmacy. RN updated.      PACT Pain Initial Consultation   REFERRING PHYSICIAN: Fred Watkins MD    CHIEF COMPLAINT: No chief complaint on file.    HISTORY OF PRESENT ILLNESS:  The patient is an 16 year old male with PMHx of COA and transverse aortic arch obstruction who presents for an ascending to descending aorta jump graft and right thoracotomy. A PACT consult was placed to help aid in post op pain management.    Pt had a Right side ASHLI block placed in the OR by anesthesia, taped at 8 cm at the skin running Ropivacaine 0.2% at 10 mL/hr.     Pt had expressed increased pain with immediate arrival to unit and a basal rate on pca was added at 0.2mg/hr. Pt seen post op in PCICU shortly thereafter. Upon exam FLACC score 0. Pt LOS 2. Pt appears to be resting comfortably in bed. Mom and dad at bedside. PCA education given at that time.     Pain Scale: Pain Assessment Tool: Numeric Rating Scale 0-10  Post-op Day: 0  Pain Characteristics:  CARLA  Activity level:  bedrest  Last bowel movement: PTA  Nausea/vomiting:      PAST MEDICAL HISTORY:    Heart murmur                                                  Coarctation of aorta                                          HTN, age 0-18                                                 Stenosis of aortic arch                                        ALLERGIES:  ALLERGIES:  Patient has no known allergies.    MEDICATIONS: Per Epic record    SOCIAL HISTORY:  Social History     Socioeconomic History   • Marital status: Single     Spouse name: Not on file   • Number of children: Not on file   • Years of education: Not on file   • Highest education level: Not on file   Occupational History   • Not on file   Tobacco Use    • Smoking status: Never   • Smokeless tobacco: Never   Vaping Use   • Vaping Use: never used   Substance and Sexual Activity   • Alcohol use: Never   • Drug use: Never   • Sexual activity: Not on file   Other Topics Concern   • Not on file   Social History Narrative    Lives with parents, 3 brothers, and 2 sisters in [place], [place]    Wants to be a physical therapist/    Mother: Home    Father: Finance    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 Not COVID vaccinated    Therapies: NA    Exercise: No heavy lifting. Plays baseball    School/work: Freshman in college    Dental: Last dental exam: Summer 2022     Brushes teeth: BID        Social History: Lives with  Parents and five sibling when at home. When at college lives in the dorm.     Social Determinants of Health     Financial Resource Strain: Not on file   Food Insecurity: Not on file   Transportation Needs: Not on file   Physical Activity: Not on file   Stress: Not on file   Social Connections: Not on file   Intimate Partner Violence: Not At Risk   • Social Determinants: Intimate Partner Violence Past Fear: No   • Social Determinants: Intimate Partner Violence Current Fear: No       REVIEW OF SYSTEMS:  Review of Systems   Constitutional: Negative.    HENT: Negative.    Eyes: Negative.    Respiratory: Negative.    Cardiovascular: Negative.    Gastrointestinal: Negative.    Endocrine: Negative.    Genitourinary: Negative.    Musculoskeletal: Negative.    Skin: Negative.    Allergic/Immunologic: Negative.    Neurological: Negative.    Hematological: Negative.    Psychiatric/Behavioral: Negative.    All other systems reviewed and are negative.       Reviewed: Allergies, Medical History, Surgical History, Social History, Family History and Medications    Vital Last Value 24 Hour Range   Temperature 97.5 °F (36.4 °C) (12/19/22 0621) Temp  Min:  suppository as needed 2/18.   Cough/sore throat.  Ongoing x months per pt.  Afebrile.  Lungs clear.  Robitussin DM as needed.    Hyponatremia.  Level 129 -> 130 -> 135 on 2/22/25.   Dry cracked skin bilateral heel.  Apply ammonium lactate twice daily along with Band-Aid or dressing 2/23/25.  Podiatry consulted for further evaluation and treatment.  Malnutrition.  Nutrition following.  Adult Malnutrition type: Acute illness.  Adult Degree of Malnutrition: Other severe protein calorie malnutrition.  Malnutrition Characteristics: Inadequate energy, Weight loss.   97.5 °F (36.4 °C)  Max: 97.5 °F (36.4 °C)   Pulse 93 (12/19/22 0725) Pulse  Min: 78  Max: 104   Respiratory 19 (12/19/22 0725) Resp  Min: 19  Max: 20   Non-Invasive  Blood Pressure (!) 169/60 (12/19/22 0725) BP  Min: 163/66  Max: 169/60   Pulse Oximetry 100 % (12/19/22 0725) SpO2  Min: 99 %  Max: 100 %     Vital Today Admitted   Weight 83.9 kg (185 lb) (12/19/22 0621) Weight: 83.5 kg (184 lb) (12/16/22 1145)   Height N/A Height: 6' (182.9 cm) (12/16/22 1145)   BMI N/A BMI (Calculated): 24.95 (12/16/22 1145)       Laboratory Results:    Lab Results   Component Value Date    WBC 10.4 12/16/2022    HCT 45.5 12/16/2022    HGB 15.4 12/16/2022     12/16/2022    PT 10.6 12/16/2022    INR 1.0 12/16/2022    BUN 20 12/16/2022    CREATININE 1.23 (H) 12/16/2022    PTT 29 12/16/2022       IMAGING and OTHER TESTING:  No orders to display        Medications/Infusions:  Scheduled:   Current Facility-Administered Medications   Medication Dose Route Frequency Provider Last Rate Last Admin   • SODIUM CHLORIDE 0.9 % IV SOLN Pyxis Override                Continuous Infusions:   Current Facility-Administered Medications   Medication Dose Route Frequency Provider Last Rate Last Admin   • lactated ringers infusion   Intravenous Continuous Neisha Merrill CNP 10 mL/hr at 12/19/22 0637 New Bag at 12/19/22 0637   • nitroPRUSSIDE (NIPRIDE) 50 mg/100 mL in sodium chloride 0.9 % infusion  0-3 mcg/kg/min (Dosing Weight) Intravenous Continuous Nancy Neff MD       • ropivacaine (NAROPIN) 0.2 % in 750 mL elastomeric pump infusion   Other Continuous Nancy Neff MD           PHYSICAL EXAMINATION  Physical Exam  Constitutional:       General: He is not in acute distress.     Appearance: Normal appearance. He is normal weight.      Interventions: He is sedated.   HENT:      Head: Normocephalic.      Mouth/Throat:      Mouth: Mucous membranes are moist.   Cardiovascular:      Rate and Rhythm: Normal rate and regular rhythm.      Heart  sounds: Murmur heard.   Pulmonary:      Effort: Tachypnea present.      Breath sounds: Decreased breath sounds present.   Abdominal:      General: Abdomen is flat. Bowel sounds are absent.      Palpations: Abdomen is soft.   Genitourinary:     Comments: Indwelling adams catheter present  Skin:     General: Skin is dry.       ASSESSMENT:  The patient is an 16 year old male with PMHx of COA and transverse aortic arch obstruction who presents for an ascending to descending aorta jump graft and right thoracotomy. A PACT consult was placed to help aid in post op pain management.  PLAN:  Recommendations:  1) Titrate Right side T5 ASHLI block per standing order criteria:   a) Rate between 2-14 mL/hr for pain goal of 3-4/10 NRS. Initial order 10 mL/hr.    b) For questions regarding the nerve block during the day please contact the PACT APRN. After 5 pm and on weekends please first contact Anesthesia at   2) Dilaudid PCA: demand dose of 0.2 mg q 8 min and 1 hr lockout of 0.6 mg.   a) If pt is having severe pain and is alert and awake, consider adding a basal rate to 0.2 mg/hr    b) If pt is having severe pain and is alert and awake, despite adding a basal rate, may increase demand dose to 0.25 mg q 8 min   c) If pt reaches 1 hr lockout is and alert and awake, may increase 1 hr lockout to 0.8 mg  3) Zofran 4 mg IV Q 6 hours PRN; n/v  4) NSAIDs per [facility]   a) PPI/H2 blocker for stomach prophylaxis  5) Senokot S one-tab po BID for bowel prophylaxis when able to take po   6) Flexeril 5 mg po TID; muscle spasm   a) Assess LOS prior to administration. If LOS is greater than 2, hold dose and notify provider.    Total time with patient is 45 minutes, more than half spent counseling/coordinating care.    As per [place] Law Public Act 100-0564, the [place] in the [place]  prior to prescribing a scheduled II narcotic.    Call or page with any new questions or concerns. Thank you for allowing the PACT to  participate in the care of Frantz.  GARY Richards  Phone / Page

## 2025-03-13 ENCOUNTER — OFFICE VISIT (OUTPATIENT)
Dept: INTERNAL MEDICINE CLINIC | Age: 70
End: 2025-03-13
Payer: MEDICARE

## 2025-03-13 ENCOUNTER — TELEPHONE (OUTPATIENT)
Dept: INTERNAL MEDICINE CLINIC | Age: 70
End: 2025-03-13

## 2025-03-13 ENCOUNTER — OFFICE VISIT (OUTPATIENT)
Dept: AUDIOLOGY | Age: 70
End: 2025-03-13

## 2025-03-13 VITALS
BODY MASS INDEX: 28.68 KG/M2 | SYSTOLIC BLOOD PRESSURE: 124 MMHG | DIASTOLIC BLOOD PRESSURE: 80 MMHG | WEIGHT: 168 LBS | TEMPERATURE: 98 F | OXYGEN SATURATION: 99 % | HEART RATE: 79 BPM | HEIGHT: 64 IN

## 2025-03-13 DIAGNOSIS — E43 SEVERE PROTEIN-CALORIE MALNUTRITION (HCC): ICD-10-CM

## 2025-03-13 DIAGNOSIS — F25.9 SCHIZOAFFECTIVE DISORDER, UNSPECIFIED TYPE (HCC): ICD-10-CM

## 2025-03-13 DIAGNOSIS — E78.2 MIXED HYPERLIPIDEMIA: ICD-10-CM

## 2025-03-13 DIAGNOSIS — I10 BENIGN ESSENTIAL HYPERTENSION: Primary | ICD-10-CM

## 2025-03-13 DIAGNOSIS — R05.3 CHRONIC COUGH: ICD-10-CM

## 2025-03-13 DIAGNOSIS — H90.3 SENSORY HEARING LOSS, BILATERAL: Primary | ICD-10-CM

## 2025-03-13 DIAGNOSIS — E03.9 ACQUIRED HYPOTHYROIDISM: ICD-10-CM

## 2025-03-13 DIAGNOSIS — G40.909 SEIZURE DISORDER (HCC): ICD-10-CM

## 2025-03-13 PROBLEM — L03.311 CELLULITIS OF ABDOMINAL WALL: Status: RESOLVED | Noted: 2019-11-01 | Resolved: 2025-03-13

## 2025-03-13 PROCEDURE — 99496 TRANSJ CARE MGMT HIGH F2F 7D: CPT | Performed by: INTERNAL MEDICINE

## 2025-03-13 RX ORDER — GUAIFENESIN 400 MG/1
400 TABLET ORAL EVERY 6 HOURS PRN
Qty: 45 TABLET | Refills: 0 | Status: ON HOLD | OUTPATIENT
Start: 2025-03-13

## 2025-03-13 RX ORDER — GUAIFENESIN 400 MG/1
400 TABLET ORAL
Qty: 30 TABLET | Refills: 1 | Status: SHIPPED | OUTPATIENT
Start: 2025-03-13 | End: 2025-03-13 | Stop reason: SDUPTHER

## 2025-03-13 NOTE — ASSESSMENT & PLAN NOTE
Managed by psychiatrist and during recent admissions her meds were adjusted she appears stable now.  Will be continued to be followed by psychiatrist

## 2025-03-13 NOTE — TELEPHONE ENCOUNTER
Received call from the Access center with [facility] pharmacy on the phone questioning the guaiFENesin 400 mg order.  Current order is for every 4 hours.       Can order be changed to as needed q 4 hours so patient is not woken up in the middle of the night to take.      Please advise, thank you        PCP out of office

## 2025-03-13 NOTE — PROGRESS NOTES
Assessment/Plan:    Benign essential hypertension  Well-controlled with lifestyle changes and will continue to monitor    Hypothyroidism  Thyroid functions are in normal range.  Will continue with levothyroxine 100 mcg daily    Seizure disorder (HCC)  Patient is on phenobarbital and also managed by neurology.    Schizoaffective disorder (HCC)  Managed by psychiatrist and during recent admissions her meds were adjusted she appears stable now.  Will be continued to be followed by psychiatrist    Severe protein-calorie malnutrition (HCC)  Advised for protein supplementation and nutritious diet    Hyperlipidemia  Continue with atorvastatin 20 mg daily along with low-fat diet.  Will continue to monitor       Diagnoses and all orders for this visit:    Benign essential hypertension    Acquired hypothyroidism    Seizure disorder (HCC)    Schizoaffective disorder, unspecified type (HCC)    Severe protein-calorie malnutrition (HCC)    Mixed hyperlipidemia          Subjective:          Patient ID: Laine Tse is a 69 y.o. female.    TCM Call (since 2/27/2025)       Date and time call was made  3/10/2025  4:03 PM    Patient was hospitialized at  Other (comment)    Comment  [facility]    Date of Admission  02/14/25    Date of discharge  03/10/25    Diagnosis  schizoaffective disorder    Disposition  Home          TCM Call (since 2/27/2025)       Scheduled for follow up?  Yes    I have advised the patient to call PCP with any new or worsening symptoms  Rufus Smith CMA            This is follow-up visit after hospitalization to behavioral health unit.  She was admitted to behavioral health unit with worsening paranoia, anxiety and restlessness.  She was seen by a psychiatrist and her psychiatric meds were adjusted.  Now she appears stable.        The following portions of the patient's history were reviewed and updated as appropriate: allergies, current medications, past family history, past medical history, past social  history, past surgical history, and problem list.    Review of Systems   Constitutional:  Negative for fatigue and fever.   HENT:  Negative for congestion, ear discharge, ear pain, postnasal drip, sinus pressure, sore throat, tinnitus and trouble swallowing.    Eyes:  Negative for discharge, itching and visual disturbance.   Respiratory:  Negative for cough and shortness of breath.    Cardiovascular:  Negative for chest pain and palpitations.   Gastrointestinal:  Negative for abdominal pain, diarrhea, nausea and vomiting.   Endocrine: Negative for cold intolerance and polyuria.   Genitourinary:  Negative for difficulty urinating, dysuria and urgency.   Musculoskeletal:  Negative for arthralgias and neck pain.   Skin:  Negative for rash.   Allergic/Immunologic: Negative for environmental allergies.   Neurological:  Negative for dizziness, weakness and headaches.   Psychiatric/Behavioral:  Positive for confusion. The patient is not nervous/anxious.          Past Medical History:   Diagnosis Date    Anxiety     Benign essential hypertension     resolved; 06/15/16    Depression     Depression with anxiety     Fracture of fifth metatarsal bone of right foot with delayed healing     last assessed 04/12/16; fracture of metatarsal bone, right, closed, initial encounter    Hyperlipidemia     last assessed 11/28/17    Hypothyroidism     last assessed 11/28/2017    Insomnia     Obesity     Schizoaffective disorder (HCC)     last assessed 05/18/2017    Seizure disorder (HCC)     last assessed 03/28/17    Seizures ([facility])          Current Outpatient Medications:     acetaminophen (TYLENOL) 500 mg tablet, Take 2 tablets (1,000 mg total) by mouth 3 (three) times a day as needed for mild pain or fever, Disp: 180 tablet, Rfl: 1    alendronate (FOSAMAX) 70 mg tablet, Take 70 mg by mouth every 7 days Every 7 days on Fridays, Disp: , Rfl:     ammonium lactate (LAC-HYDRIN) 12 % lotion, Apply topically 2 (two) times a day, Disp: 396 g,  Rfl: 0    aspirin (Aspirin Low Dose) 81 mg chewable tablet, Chew 1 tablet (81 mg total) daily, Disp: 30 tablet, Rfl: 0    aspirin (Aspirin Low Dose) 81 mg chewable tablet, Chew 1 tablet (81 mg total) daily, Disp: 90 tablet, Rfl: 0    atorvastatin (LIPITOR) 20 mg tablet, Take 1 tablet (20 mg total) by mouth daily, Disp: 30 tablet, Rfl: 0    calcium carbonate-vitamin D 500 mg-5 mcg per tablet, Take 1 tablet by mouth daily with breakfast, Disp: , Rfl:     clonazePAM (KlonoPIN) 1 mg tablet, Take 1 tablet (1 mg total) by mouth 2 (two) times a day, Disp: 60 tablet, Rfl: 0    fluticasone (FLONASE) 50 mcg/act nasal spray, 2 sprays into each nostril daily, Disp: 9.9 mL, Rfl: 0    Incontinence Supplies MISC, Use if needed (incontinence) SIZE XL PULL UP DISPOSABLE BRIEFS, Disp: 100 each, Rfl: 1    Incontinence Supply Disposable (Disposable Brief Large) MISC, Use every 12 (twelve) hours, Disp: 36 each, Rfl: 6    levothyroxine 100 mcg tablet, Take 1 tablet (100 mcg total) by mouth daily, Disp: 30 tablet, Rfl: 0    melatonin 3 mg, Take 2 tablets (6 mg total) by mouth daily at bedtime, Disp: 60 tablet, Rfl: 0    Menthol, Topical Analgesic, (Bengay Ultra Strength) 5 % PTCH, Apply 1 patch topically in the morning, Disp: 30 patch, Rfl: 2    OLANZapine (ZyPREXA) 15 mg tablet, Take 1 tablet (15 mg total) by mouth daily at bedtime, Disp: 30 tablet, Rfl: 0    OLANZapine (ZyPREXA) 5 mg tablet, Take 1 tablet (5 mg total) by mouth daily, Disp: 30 tablet, Rfl: 0    PHENobarbital 64.8 mg tablet, Take 1 tablet (64.8 mg total) by mouth every 12 (twelve) hours, Disp: 62 tablet, Rfl: 5    polyethylene glycol (GLYCOLAX) 17 GM/SCOOP, MIX 1 CAPFUL(17GM) IN 8OZ OF LIQUID AND DRINK DAILY @ 8AM(CONSTIPATION) *AHMAD, Disp: 510 g, Rfl: 1    senna-docusate sodium (SENOKOT-S) 8.6-50 mg per tablet, Take 1 tablet by mouth daily at bedtime, Disp: 30 tablet, Rfl: 0    traZODone (DESYREL) 150 mg tablet, Take 1 tablet (150 mg total) by mouth daily at bedtime,  "Disp: 30 tablet, Rfl: 0    trihexyphenidyl (ARTANE) 2 mg tablet, Take 1 tablet (2 mg total) by mouth 2 (two) times a day, Disp: 60 tablet, Rfl: 0  No current facility-administered medications for this visit.    Facility-Administered Medications Ordered in Other Visits:     lactated ringers infusion, , Intravenous, Continuous PRN, Celi Benson, CRNA, New Bag at 02/08/23 1400    Allergies   Allergen Reactions    Clozapine Other (See Comments)     low white blood count  Other reaction(s): Other (See Comments)  low white blood count    Haloperidol Other (See Comments)     EPS  Other reaction(s): Other (See Comments)  EPS    Lithium Other (See Comments)     Toxicity    Prolixin [Fluphenazine] Hyperactivity and Other (See Comments)     EPS, Hyperactivity  EPS, Hyperactivity    Valproic Acid Confusion and Other (See Comments)     \"Mental confusion\"  \"Mental confusion\"       Social History   Past Surgical History:   Procedure Laterality Date    COLONOSCOPY      complete    DC COLONOSCOPY FLX DX W/COLLJ SPEC WHEN PFRMD N/A 8/30/2018    Procedure: COLONOSCOPY with polypectomies;  Surgeon: Andriy Lopez MD;  Location: [facility];  Service: Gastroenterology     Family History   Problem Relation Age of Onset    No Known Problems Mother     No Known Problems Father     Lung disease Other         mesothelioma    No Known Problems Maternal Grandmother     No Known Problems Maternal Grandfather     No Known Problems Paternal Grandmother     No Known Problems Paternal Grandfather     No Known Problems Sister     No Known Problems Sister     Kidney failure Brother     No Known Problems Maternal Aunt     No Known Problems Maternal Aunt     No Known Problems Maternal Aunt     No Known Problems Maternal Aunt     No Known Problems Paternal Aunt     No Known Problems Paternal Aunt        Objective:  /80 (BP Location: Right arm, Patient Position: Sitting, Cuff Size: Standard)   Pulse 79   Temp 98 °F (36.7 °C) (Temporal)   Ht " "5' 4\" (1.626 m)   Wt 76.2 kg (168 lb)   SpO2 99%   BMI 28.84 kg/m²   Body mass index is 28.84 kg/m².     Physical Exam  Constitutional:       General: She is not in acute distress.     Appearance: She is well-developed. She is obese.   HENT:      Head: Normocephalic.      Right Ear: External ear normal.      Left Ear: External ear normal.      Nose: No congestion or rhinorrhea.      Mouth/Throat:      Pharynx: No posterior oropharyngeal erythema.   Eyes:      General: No scleral icterus.     Pupils: Pupils are equal, round, and reactive to light.   Neck:      Thyroid: No thyromegaly.      Trachea: No tracheal deviation.   Cardiovascular:      Rate and Rhythm: Normal rate and regular rhythm.      Heart sounds: Normal heart sounds. No murmur heard.  Pulmonary:      Effort: Pulmonary effort is normal. No respiratory distress.      Breath sounds: Normal breath sounds.   Chest:      Chest wall: No tenderness.   Abdominal:      General: Bowel sounds are normal.      Palpations: Abdomen is soft. There is no mass.      Tenderness: There is no abdominal tenderness.   Musculoskeletal:         General: Normal range of motion.      Cervical back: Normal range of motion and neck supple. No rigidity.      Right lower leg: No edema.      Left lower leg: No edema.   Lymphadenopathy:      Cervical: No cervical adenopathy.   Skin:     General: Skin is warm.      Findings: No erythema or rash.   Neurological:      Mental Status: She is alert and oriented to person, place, and time.      Cranial Nerves: No cranial nerve deficit.                     "

## 2025-03-13 NOTE — PROGRESS NOTES
Hearing Aid Visit:    Name:  Laine Tse  :  1955  Age:  69 y.o.  MRN:  [id number]  Date of Evaluation: 25     HISTORY:    Laine Tse was seen today (3/13/2025) for a(n) in-warranty hearing aid check of her bilateral hearing aids. Today, Laine reports not wearing the hearing aids due to a hospital stay. She notes difficulty inserting them.    DEVICE INFORMATION:       Left Device Right Device    Hearing Aid Make: OtLife is Tech  OtLife is Tech    Hearing Aid Model: Real 2 miniRITE-R Real 2 miniRITE-R   Serial Number: [id number]   Repair Warranty Date: 3/6/2028 3/6/2028   Loss/Damage Warranty Status: Active  Active         Length/Output 2X85 2X85   Wax System: Pro Wax miniFIT Pro Wax miniFIT   Dome Size/Style: 8 mm DB2 8 mm DB   Battery: Lithium-ion Rechargeable Lithium-ion Rechargeable       E[employer]old Serial Number: N/A N/A   [employer]old Warranty Date:  N/A N/A    Serial Number:  [id number]    Warranty Date:  3/6/2028     Accessories: N/A        ACTION/ADJUSTMENTS:    Patient practiced inserting hearing aids with and without assistance 4 times today. Caretaker was shown how to clip the otoclip to the back of her shirt.    Patient noted that she is much more comfortable inserting the hearing aids.    RECOMMENDATIONS:     Follow up in 4 months for cleaning.      Christiano Reese., [facility]  Clinical Audiologist  [facility]  [facility] [place] [phone]

## 2025-03-18 ENCOUNTER — HOSPITAL ENCOUNTER (INPATIENT)
Facility: HOSPITAL | Age: 70
LOS: 3 days | Discharge: NURSING, REHAB OR LONG-TERM CARE | End: 2025-03-21
Attending: EMERGENCY MEDICINE | Admitting: STUDENT IN AN ORGANIZED HEALTH CARE EDUCATION/TRAINING PROGRAM
Payer: MEDICARE

## 2025-03-18 ENCOUNTER — APPOINTMENT (EMERGENCY)
Dept: CT IMAGING | Facility: HOSPITAL | Age: 70
End: 2025-03-18
Payer: MEDICARE

## 2025-03-18 ENCOUNTER — TELEPHONE (OUTPATIENT)
Age: 70
End: 2025-03-18

## 2025-03-18 DIAGNOSIS — N94.9 ADNEXAL CYST: ICD-10-CM

## 2025-03-18 DIAGNOSIS — S22.080A T12 COMPRESSION FRACTURE (HCC): ICD-10-CM

## 2025-03-18 DIAGNOSIS — S09.90XA CLOSED HEAD INJURY, INITIAL ENCOUNTER: ICD-10-CM

## 2025-03-18 DIAGNOSIS — R26.2 AMBULATORY DYSFUNCTION: ICD-10-CM

## 2025-03-18 DIAGNOSIS — S22.080A COMPRESSION FRACTURE OF T12 VERTEBRA, INITIAL ENCOUNTER (HCC): Primary | ICD-10-CM

## 2025-03-18 DIAGNOSIS — F22 PARANOIA (HCC): ICD-10-CM
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
## 2025-03-18 LAB
ABO GROUP BLD: NORMAL
ALBUMIN SERPL BCG-MCNC: 3.9 G/DL (ref 3.5–5)
ALP SERPL-CCNC: 55 U/L (ref 34–104)
ALT SERPL W P-5'-P-CCNC: 13 U/L (ref 7–52)
ANION GAP SERPL CALCULATED.3IONS-SCNC: 7 MMOL/L (ref 4–13)
AST SERPL W P-5'-P-CCNC: 14 U/L (ref 13–39)
BASOPHILS # BLD AUTO: 0 THOUSANDS/ÂΜL (ref 0–0.1)
BASOPHILS NFR BLD AUTO: 0 % (ref 0–1)
BILIRUB SERPL-MCNC: 0.26 MG/DL (ref 0.2–1)
BLD GP AB SCN SERPL QL: NEGATIVE
BUN SERPL-MCNC: 12 MG/DL (ref 5–25)
CALCIUM SERPL-MCNC: 9.1 MG/DL (ref 8.4–10.2)
CHLORIDE SERPL-SCNC: 106 MMOL/L (ref 96–108)
CO2 SERPL-SCNC: 28 MMOL/L (ref 21–32)
CREAT SERPL-MCNC: 0.65 MG/DL (ref 0.6–1.3)
EOSINOPHIL # BLD AUTO: 0 THOUSAND/ÂΜL (ref 0–0.61)
EOSINOPHIL NFR BLD AUTO: 0 % (ref 0–6)
ERYTHROCYTE [DISTWIDTH] IN BLOOD BY AUTOMATED COUNT: 12.7 % (ref 11.6–15.1)
GFR SERPL CREATININE-BSD FRML MDRD: 89 ML/MIN/1.73SQ M
GLUCOSE SERPL-MCNC: 88 MG/DL (ref 65–140)
HCT VFR BLD AUTO: 35.4 % (ref 34.8–46.1)
HGB BLD-MCNC: 12.1 G/DL (ref 11.5–15.4)
IMM GRANULOCYTES # BLD AUTO: 0.01 THOUSAND/UL (ref 0–0.2)
IMM GRANULOCYTES NFR BLD AUTO: 0 % (ref 0–2)
LYMPHOCYTES # BLD AUTO: 1.05 THOUSANDS/ÂΜL (ref 0.6–4.47)
LYMPHOCYTES NFR BLD AUTO: 18 % (ref 14–44)
MCH RBC QN AUTO: 33 PG (ref 26.8–34.3)
MCHC RBC AUTO-ENTMCNC: 34.2 G/DL (ref 31.4–37.4)
MCV RBC AUTO: 97 FL (ref 82–98)
MONOCYTES # BLD AUTO: 0.39 THOUSAND/ÂΜL (ref 0.17–1.22)
MONOCYTES NFR BLD AUTO: 7 % (ref 4–12)
NEUTROPHILS # BLD AUTO: 4.45 THOUSANDS/ÂΜL (ref 1.85–7.62)
NEUTS SEG NFR BLD AUTO: 75 % (ref 43–75)
NRBC BLD AUTO-RTO: 0 /100 WBCS
PHENOBARB SERPL-MCNC: 18.8 UG/ML (ref 10–40)
PLATELET # BLD AUTO: 232 THOUSANDS/UL (ref 149–390)
PMV BLD AUTO: 9.8 FL (ref 8.9–12.7)
POTASSIUM SERPL-SCNC: 3.7 MMOL/L (ref 3.5–5.3)
PROT SERPL-MCNC: 6.4 G/DL (ref 6.4–8.4)
RBC # BLD AUTO: 3.67 MILLION/UL (ref 3.81–5.12)
RH BLD: POSITIVE
SODIUM SERPL-SCNC: 141 MMOL/L (ref 135–147)
SPECIMEN EXPIRATION DATE: NORMAL
WBC # BLD AUTO: 5.9 THOUSAND/UL (ref 4.31–10.16)

## 2025-03-18 PROCEDURE — 86850 RBC ANTIBODY SCREEN: CPT

## 2025-03-18 PROCEDURE — 80184 ASSAY OF PHENOBARBITAL: CPT

## 2025-03-18 PROCEDURE — 86901 BLOOD TYPING SEROLOGIC RH(D): CPT

## 2025-03-18 PROCEDURE — 70450 CT HEAD/BRAIN W/O DYE: CPT

## 2025-03-18 PROCEDURE — 99222 1ST HOSP IP/OBS MODERATE 55: CPT | Performed by: STUDENT IN AN ORGANIZED HEALTH CARE EDUCATION/TRAINING PROGRAM

## 2025-03-18 PROCEDURE — 99285 EMERGENCY DEPT VISIT HI MDM: CPT

## 2025-03-18 PROCEDURE — 86900 BLOOD TYPING SEROLOGIC ABO: CPT

## 2025-03-18 PROCEDURE — 85025 COMPLETE CBC W/AUTO DIFF WBC: CPT

## 2025-03-18 PROCEDURE — 36415 COLL VENOUS BLD VENIPUNCTURE: CPT

## 2025-03-18 PROCEDURE — 72125 CT NECK SPINE W/O DYE: CPT

## 2025-03-18 PROCEDURE — 99285 EMERGENCY DEPT VISIT HI MDM: CPT | Performed by: EMERGENCY MEDICINE

## 2025-03-18 PROCEDURE — 80053 COMPREHEN METABOLIC PANEL: CPT

## 2025-03-18 PROCEDURE — 74177 CT ABD & PELVIS W/CONTRAST: CPT

## 2025-03-18 RX ORDER — LIDOCAINE 50 MG/G
1 PATCH TOPICAL DAILY
Status: DISCONTINUED | OUTPATIENT
Start: 2025-03-19 | End: 2025-03-21 | Stop reason: HOSPADM

## 2025-03-18 RX ORDER — FLUTICASONE PROPIONATE 50 MCG
2 SPRAY, SUSPENSION (ML) NASAL DAILY
Status: DISCONTINUED | OUTPATIENT
Start: 2025-03-19 | End: 2025-03-21 | Stop reason: HOSPADM

## 2025-03-18 RX ORDER — TRIHEXYPHENIDYL HYDROCHLORIDE 2 MG/1
2 TABLET ORAL 2 TIMES DAILY
Status: DISCONTINUED | OUTPATIENT
Start: 2025-03-18 | End: 2025-03-19

## 2025-03-18 RX ORDER — HEPARIN SODIUM 5000 [USP'U]/ML
5000 INJECTION, SOLUTION INTRAVENOUS; SUBCUTANEOUS EVERY 8 HOURS SCHEDULED
Status: DISCONTINUED | OUTPATIENT
Start: 2025-03-18 | End: 2025-03-21 | Stop reason: HOSPADM

## 2025-03-18 RX ORDER — ASPIRIN 81 MG/1
81 TABLET, CHEWABLE ORAL DAILY
Status: DISCONTINUED | OUTPATIENT
Start: 2025-03-19 | End: 2025-03-21 | Stop reason: HOSPADM

## 2025-03-18 RX ORDER — OLANZAPINE 5 MG/1
5 TABLET ORAL DAILY
Status: DISCONTINUED | OUTPATIENT
Start: 2025-03-19 | End: 2025-03-19

## 2025-03-18 RX ORDER — PHENOBARBITAL 32.4 MG/1
64.8 TABLET ORAL EVERY 12 HOURS SCHEDULED
Status: DISCONTINUED | OUTPATIENT
Start: 2025-03-18 | End: 2025-03-21 | Stop reason: HOSPADM

## 2025-03-18 RX ORDER — AMOXICILLIN 250 MG
1 CAPSULE ORAL
Status: DISCONTINUED | OUTPATIENT
Start: 2025-03-18 | End: 2025-03-21 | Stop reason: HOSPADM

## 2025-03-18 RX ORDER — ATORVASTATIN CALCIUM 20 MG/1
20 TABLET, FILM COATED ORAL
Status: DISCONTINUED | OUTPATIENT
Start: 2025-03-18 | End: 2025-03-21 | Stop reason: HOSPADM

## 2025-03-18 RX ORDER — ACETAMINOPHEN 325 MG/1
650 TABLET ORAL EVERY 6 HOURS PRN
Status: DISCONTINUED | OUTPATIENT
Start: 2025-03-18 | End: 2025-03-21 | Stop reason: HOSPADM

## 2025-03-18 RX ORDER — LANOLIN ALCOHOL/MO/W.PET/CERES
1 CREAM (GRAM) TOPICAL
Status: DISCONTINUED | OUTPATIENT
Start: 2025-03-19 | End: 2025-03-21 | Stop reason: HOSPADM

## 2025-03-18 RX ORDER — LEVOTHYROXINE SODIUM 100 UG/1
100 TABLET ORAL
Status: DISCONTINUED | OUTPATIENT
Start: 2025-03-19 | End: 2025-03-21 | Stop reason: HOSPADM

## 2025-03-18 RX ORDER — CLONAZEPAM 0.5 MG/1
1 TABLET ORAL 2 TIMES DAILY
Status: DISCONTINUED | OUTPATIENT
Start: 2025-03-18 | End: 2025-03-20

## 2025-03-18 RX ORDER — MUSCLE RUB CREAM 100; 150 MG/G; MG/G
CREAM TOPICAL DAILY
Status: CANCELLED | OUTPATIENT
Start: 2025-03-18

## 2025-03-18 RX ORDER — GUAIFENESIN 600 MG/1
600 TABLET, EXTENDED RELEASE ORAL EVERY 12 HOURS SCHEDULED
Status: DISCONTINUED | OUTPATIENT
Start: 2025-03-18 | End: 2025-03-18

## 2025-03-18 RX ADMIN — ATORVASTATIN CALCIUM 20 MG: 40 TABLET, FILM COATED ORAL at 21:20

## 2025-03-18 RX ADMIN — IOHEXOL 100 ML: 350 INJECTION, SOLUTION INTRAVENOUS at 16:11

## 2025-03-18 RX ADMIN — OLANZAPINE 15 MG: 10 TABLET, FILM COATED ORAL at 21:47

## 2025-03-18 RX ADMIN — TRAZODONE HYDROCHLORIDE 150 MG: 50 TABLET ORAL at 21:22

## 2025-03-18 RX ADMIN — PHENOBARBITAL 64.8 MG: 32.4 TABLET ORAL at 21:22

## 2025-03-18 RX ADMIN — SENNOSIDES AND DOCUSATE SODIUM 1 TABLET: 50; 8.6 TABLET ORAL at 21:22

## 2025-03-18 RX ADMIN — Medication 6 MG: at 21:20

## 2025-03-18 RX ADMIN — HEPARIN SODIUM 5000 UNITS: 5000 INJECTION INTRAVENOUS; SUBCUTANEOUS at 21:23

## 2025-03-18 RX ADMIN — TRIHEXYPHENIDYL HYDROCHLORIDE 2 MG: 2 TABLET ORAL at 21:47

## 2025-03-18 RX ADMIN — ACETAMINOPHEN 650 MG: 325 TABLET, FILM COATED ORAL at 21:19

## 2025-03-18 RX ADMIN — CLONAZEPAM 1 MG: 0.5 TABLET ORAL at 21:19

## 2025-03-18 NOTE — Clinical Note
Case was discussed with ROMINA and the patient's admission status was agreed to be Admission Status: inpatient status to the service of Dr. Michael.

## 2025-03-18 NOTE — ASSESSMENT & PLAN NOTE
Patient presenting after x3 witnessed falls at group home with -LOC, +HS, +ASA  Patient presented as a trauma alert, CT AP demonstrated age-indeterminate T12 compression fracture, CTH and CT C-spine negative for acute pathology  On exam, mild midline tenderness at thoracolumbar junction, BLE str 5/5, sensation intact, denies bowel/bladder incontinence    Plan:  Fall precautions  PT/OT consult  TTE pending   Telemetry monitoring
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
## 2025-03-18 NOTE — ASSESSMENT & PLAN NOTE
Patient presented as trauma   CT AP: Mild superior endplate compression deformity at T12, no obvious acute fracture lines identified.     Plan:  TLSO brace ordered   NSGY consult

## 2025-03-18 NOTE — ASSESSMENT & PLAN NOTE
Home med: Klonopin 1 mg BID, Zyprexa 5 mg QD and 15 mg qHS, Trihexyphenidyl 2 mg BID    Plan:  Continue home regimen  Delirium precautions  1:1 monitoring: patient OOB, fall risk

## 2025-03-18 NOTE — TELEPHONE ENCOUNTER
Laine called requesting an x-ray of her lower back as she is in a lot of pain. She was recently seen by Dr. Yip for a TCM appointment. If Dr. Yip is able to send an xray over, [facility] faxed to : [phone]. Care team also had some concerns about her medication. Please have someone advise out to the patient asap.

## 2025-03-18 NOTE — ASSESSMENT & PLAN NOTE
Home med: Phenobarbital 64.8 mg q12    Plan:  Check phenobarbital level  CK level pending  Continue Phenobarbital 64.8 mg q12
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
## 2025-03-19 ENCOUNTER — APPOINTMENT (INPATIENT)
Dept: NON INVASIVE DIAGNOSTICS | Facility: HOSPITAL | Age: 70
End: 2025-03-19
Payer: MEDICARE

## 2025-03-19 ENCOUNTER — APPOINTMENT (INPATIENT)
Dept: RADIOLOGY | Facility: HOSPITAL | Age: 70
End: 2025-03-19
Payer: MEDICARE

## 2025-03-19 LAB
ANION GAP SERPL CALCULATED.3IONS-SCNC: 5 MMOL/L (ref 4–13)
BASOPHILS # BLD AUTO: 0 THOUSANDS/ÂΜL (ref 0–0.1)
BASOPHILS NFR BLD AUTO: 0 % (ref 0–1)
BUN SERPL-MCNC: 11 MG/DL (ref 5–25)
CALCIUM SERPL-MCNC: 8.8 MG/DL (ref 8.4–10.2)
CHLORIDE SERPL-SCNC: 108 MMOL/L (ref 96–108)
CK SERPL-CCNC: 155 U/L (ref 26–192)
CO2 SERPL-SCNC: 28 MMOL/L (ref 21–32)
CREAT SERPL-MCNC: 0.61 MG/DL (ref 0.6–1.3)
EOSINOPHIL # BLD AUTO: 0 THOUSAND/ÂΜL (ref 0–0.61)
EOSINOPHIL NFR BLD AUTO: 0 % (ref 0–6)
ERYTHROCYTE [DISTWIDTH] IN BLOOD BY AUTOMATED COUNT: 12.9 % (ref 11.6–15.1)
GFR SERPL CREATININE-BSD FRML MDRD: 91 ML/MIN/1.73SQ M
GLUCOSE SERPL-MCNC: 82 MG/DL (ref 65–140)
HCT VFR BLD AUTO: 38 % (ref 34.8–46.1)
HGB BLD-MCNC: 12.5 G/DL (ref 11.5–15.4)
IMM GRANULOCYTES # BLD AUTO: 0 THOUSAND/UL (ref 0–0.2)
IMM GRANULOCYTES NFR BLD AUTO: 0 % (ref 0–2)
LYMPHOCYTES # BLD AUTO: 1 THOUSANDS/ÂΜL (ref 0.6–4.47)
LYMPHOCYTES NFR BLD AUTO: 40 % (ref 14–44)
MCH RBC QN AUTO: 32.7 PG (ref 26.8–34.3)
MCHC RBC AUTO-ENTMCNC: 32.9 G/DL (ref 31.4–37.4)
MCV RBC AUTO: 100 FL (ref 82–98)
MONOCYTES # BLD AUTO: 0.24 THOUSAND/ÂΜL (ref 0.17–1.22)
MONOCYTES NFR BLD AUTO: 10 % (ref 4–12)
NEUTROPHILS # BLD AUTO: 1.29 THOUSANDS/ÂΜL (ref 1.85–7.62)
NEUTS SEG NFR BLD AUTO: 50 % (ref 43–75)
NRBC BLD AUTO-RTO: 0 /100 WBCS
PLATELET # BLD AUTO: 226 THOUSANDS/UL (ref 149–390)
PMV BLD AUTO: 10 FL (ref 8.9–12.7)
POTASSIUM SERPL-SCNC: 4.1 MMOL/L (ref 3.5–5.3)
RBC # BLD AUTO: 3.82 MILLION/UL (ref 3.81–5.12)
SODIUM SERPL-SCNC: 141 MMOL/L (ref 135–147)
WBC # BLD AUTO: 2.53 THOUSAND/UL (ref 4.31–10.16)

## 2025-03-19 PROCEDURE — 36415 COLL VENOUS BLD VENIPUNCTURE: CPT

## 2025-03-19 PROCEDURE — 97163 PT EVAL HIGH COMPLEX 45 MIN: CPT

## 2025-03-19 PROCEDURE — 99233 SBSQ HOSP IP/OBS HIGH 50: CPT | Performed by: INTERNAL MEDICINE

## 2025-03-19 PROCEDURE — 97760 ORTHOTIC MGMT&TRAING 1ST ENC: CPT

## 2025-03-19 PROCEDURE — 97167 OT EVAL HIGH COMPLEX 60 MIN: CPT

## 2025-03-19 PROCEDURE — 82550 ASSAY OF CK (CPK): CPT

## 2025-03-19 PROCEDURE — 80048 BASIC METABOLIC PNL TOTAL CA: CPT

## 2025-03-19 PROCEDURE — 99223 1ST HOSP IP/OBS HIGH 75: CPT | Performed by: PHYSICIAN ASSISTANT

## 2025-03-19 PROCEDURE — 85025 COMPLETE CBC W/AUTO DIFF WBC: CPT

## 2025-03-19 RX ORDER — POLYETHYLENE GLYCOL 3350 17 G/17G
17 POWDER, FOR SOLUTION ORAL 2 TIMES DAILY
Status: DISCONTINUED | OUTPATIENT
Start: 2025-03-20 | End: 2025-03-21 | Stop reason: HOSPADM

## 2025-03-19 RX ORDER — MUSCLE RUB CREAM 100; 150 MG/G; MG/G
CREAM TOPICAL 4 TIMES DAILY PRN
Status: DISCONTINUED | OUTPATIENT
Start: 2025-03-19 | End: 2025-03-21 | Stop reason: HOSPADM

## 2025-03-19 RX ORDER — MAGNESIUM CARB/ALUMINUM HYDROX 105-160MG
148 TABLET,CHEWABLE ORAL ONCE
Status: COMPLETED | OUTPATIENT
Start: 2025-03-19 | End: 2025-03-19

## 2025-03-19 RX ORDER — POLYETHYLENE GLYCOL 3350 17 G/17G
17 POWDER, FOR SOLUTION ORAL DAILY
Status: DISCONTINUED | OUTPATIENT
Start: 2025-03-19 | End: 2025-03-19

## 2025-03-19 RX ORDER — TRIHEXYPHENIDYL HYDROCHLORIDE 2 MG/1
2 TABLET ORAL
Status: DISCONTINUED | OUTPATIENT
Start: 2025-03-19 | End: 2025-03-21 | Stop reason: HOSPADM

## 2025-03-19 RX ORDER — OLANZAPINE 5 MG/1
5 TABLET ORAL
Status: DISCONTINUED | OUTPATIENT
Start: 2025-03-19 | End: 2025-03-20

## 2025-03-19 RX ADMIN — OLANZAPINE 5 MG: 5 TABLET, FILM COATED ORAL at 21:02

## 2025-03-19 RX ADMIN — PHENOBARBITAL 64.8 MG: 32.4 TABLET ORAL at 11:24

## 2025-03-19 RX ADMIN — CLONAZEPAM 1 MG: 0.5 TABLET ORAL at 10:27

## 2025-03-19 RX ADMIN — TRAZODONE HYDROCHLORIDE 150 MG: 50 TABLET ORAL at 21:01

## 2025-03-19 RX ADMIN — ASPIRIN 81 MG: 81 TABLET, CHEWABLE ORAL at 10:22

## 2025-03-19 RX ADMIN — Medication 1 TABLET: at 10:29

## 2025-03-19 RX ADMIN — MAGNESIUM CITRATE 148 ML: 1.75 LIQUID ORAL at 16:54

## 2025-03-19 RX ADMIN — POLYETHYLENE GLYCOL 3350 17 G: 17 POWDER, FOR SOLUTION ORAL at 11:23

## 2025-03-19 RX ADMIN — ATORVASTATIN CALCIUM 20 MG: 40 TABLET, FILM COATED ORAL at 16:55

## 2025-03-19 RX ADMIN — ACETAMINOPHEN 650 MG: 325 TABLET, FILM COATED ORAL at 10:26

## 2025-03-19 RX ADMIN — HEPARIN SODIUM 5000 UNITS: 5000 INJECTION INTRAVENOUS; SUBCUTANEOUS at 06:23

## 2025-03-19 RX ADMIN — TRIHEXYPHENIDYL HYDROCHLORIDE 2 MG: 2 TABLET ORAL at 16:55

## 2025-03-19 RX ADMIN — SENNOSIDES AND DOCUSATE SODIUM 1 TABLET: 50; 8.6 TABLET ORAL at 21:01

## 2025-03-19 RX ADMIN — CLONAZEPAM 1 MG: 0.5 TABLET ORAL at 19:21

## 2025-03-19 RX ADMIN — TRIHEXYPHENIDYL HYDROCHLORIDE 2 MG: 2 TABLET ORAL at 10:35

## 2025-03-19 RX ADMIN — PHENOBARBITAL 64.8 MG: 32.4 TABLET ORAL at 21:01

## 2025-03-19 RX ADMIN — FLUTICASONE PROPIONATE 2 SPRAY: 50 SPRAY, METERED NASAL at 10:32

## 2025-03-19 RX ADMIN — LEVOTHYROXINE SODIUM 100 MCG: 100 TABLET ORAL at 06:22

## 2025-03-19 RX ADMIN — OLANZAPINE 5 MG: 10 TABLET, FILM COATED ORAL at 10:33
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
## 2025-03-19 NOTE — ED NOTES
Pt screaming out for RN help. Patient is currently on 1:1. Pt is demanding to get out of bed and walk around. Per tech that is on 1:1 pt is unsteady with gait at this time. Pt educated on decreasing fall risk and redirecting patient to lay in bed. Pt arguing with several staff, becoming aggressive and yelling. MD made aware, pt placed in lap belt for safety. Continued on 1:1.     Lynne Tesfaye RN  03/19/25 [id number]
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
## 2025-03-19 NOTE — CASE MANAGEMENT
Case Management Assessment & Discharge Planning Note    Patient name Laine Tse  Location ED-38/ED-38 MRN [id number]  : 1955 Date 3/19/2025       Current Admission Date: 3/18/2025  Current Admission Diagnosis:Fall   Patient Active Problem List    Diagnosis Date Noted Date Diagnosed    T12 compression fracture (HCC) 2025     Severe protein-calorie malnutrition (HCC) 2025     Skin lesion of left lower extremity 2024     Chronic cough 2023     T wave inversion on electrocardiogram 2023     Rash 2023     Dermatitis 2023     Chronic midline low back pain without sciatica 2021     Class 1 obesity with body mass index (BMI) of 32.0 to 32.9 in adult 2021     Decreased hearing of both ears 2019     Benign essential hypertension 2019     Pre-diabetes 2019     Hyperglycemia 2018     Fall 2018     Injury of right toe, initial encounter 2018     Unsteady gait 10/03/2017     Seizure disorder (HCC) 05/15/2017     Anemia 05/15/2017     Hyponatremia 2015     Folic acid deficiency 10/15/2012     Hyperlipidemia 10/05/2012     Hypothyroidism 10/05/2012     Schizoaffective disorder (HCC) 10/05/2012     Osteoporosis 10/05/2012       LOS (days): 1  Geometric Mean LOS (GMLOS) (days): 2.9  Days to GMLOS:2     OBJECTIVE:  PATIENT READMITTED TO HOSPITAL  Risk of Unplanned Readmission Score: 18.37         Current admission status: Inpatient       Preferred Pharmacy:   [address] [address]  Phone: [phone] Fax: [phone]    Primary Care Provider: Adilson Yip MD    Primary Insurance: MEDICARE  Secondary Insurance:     ASSESSMENT:  Active Health Care Proxies    There are no active Health Care Proxies on file.                 Readmission Root Cause  30 Day Readmission: Yes  During your hospital stay, did someone (provider, nurse, )  explain your care to you in a way you could understand?: Unable to Assess  Did you feel medically stable to leave the hospital?: Unable to Assess  Were you able to pay for your medication at the pharmacy?: Unable to Assess  Did you have reliable transportation to take you to your appointments?: Unable to Assess  Patient was readmitted due to: Fall  Action Plan: Further CM follow up as needed    Patient Information  Admitted from:: Home (Patient resides in Group Home)  Mental Status: Alert  During Assessment patient was accompanied by: Not accompanied during assessment  Assessment information provided by:: Other - please comment (Cl[facility] - )  County of Residence: [place]  What city do you live in?: [place]  Home entry access options. Select all that apply.: No steps to enter home  Type of Current Residence: [place]  Living Arrangements: Other (Comment) (Group Home)  Is patient a ?: No    Activities of Daily Living Prior to Admission  Functional Status: Independent  Completes ADLs independently?: Yes  Ambulates independently?: Yes  Does patient use assisted devices?: Yes  Assisted Devices (DME) used: Straight Cane  Does patient currently own DME?: Yes  What DME does the patient currently own?: Straight Cane  Does patient have a history of Outpatient Therapy (PT/OT)?: No  Does the patient have a history of Short-Term Rehab?: No  Does patient have a history of HHC?: No  Does patient currently have HHC?: No         Patient Information Continued  Income Source: Unknown  Does patient have prescription coverage?: Yes  Can the patient afford their medications and any related supplies (such as glucometers or test strips)?: Yes  Does patient receive dialysis treatments?: No  Does patient have a history of substance abuse?: No  Does patient have a history of Mental Health Diagnosis?: Yes (Schizoaffective disorder)  Is patient receiving treatment for mental health?: Yes         Means of  Transportation  Means of Transport to [facility]:: Other (Comment) (Facility)          DISCHARGE DETAILS:    Discharge planning discussed with:: DALE spoke with Clayton over the phone  Freedom of Choice: Yes  Comments - Freedom of Choice: DALE spoke with the Clayton over the phone re: assessment and dcp. Clayton provided assessment information. DALE and Clayton discussed therapy's recommendation of rehab. Clayton is agreeable to referrals being sent. Clayton asked that a referral be sent to [facility].  CM contacted family/caregiver?: Yes  Were Treatment Team discharge recommendations reviewed with patient/caregiver?: Yes  Did patient/caregiver verbalize understanding of patient care needs?: Yes  Were patient/caregiver advised of the risks associated with not following Treatment Team discharge recommendations?: Yes    Contacts  Patient Contacts: Access Services [facility] Jones -   Relationship to Patient:: Treatment Provider  Contact Method: Phone  Phone Number: [phone]  Reason/Outcome: Continuity of Care, Discharge Planning, Referral    Requested Home Health Care         Is the patient interested in HHC at discharge?: No    DME Referral Provided  Referral made for DME?: No    Other Referral/Resources/Interventions Provided:  Interventions: SNF, Short Term Rehab  Referral Comments: Referral sent via [facility]
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
## 2025-03-19 NOTE — ASSESSMENT & PLAN NOTE
Home med: Phenobarbital 64.8 mg q12  Ck normal  phenobarbital level normal 18.8    Plan:    Continue Phenobarbital 64.8 mg q12

## 2025-03-19 NOTE — ED NOTES
"Assumed care of pt. Pt altered to situation, difficult to re direct. Pt in home clothing, refusing to change. Pt requesting shower for \"brain surgery tomorrow.\" Pt provided clean sheets and warm blanket. Pt offered food/drink, refused at this time.     Denise Aleman, RN  03/18/25 6936    "

## 2025-03-19 NOTE — PHYSICAL THERAPY NOTE
"   PHYSICAL THERAPY EVALUATION  NAME: Laine Tse  AGE:   69 y.o.  MRN:  [id number]  ADMIT DX: Unspecified multiple injuries, initial encounter [T07.XXXA]    PMH:   Past Medical History:   Diagnosis Date    Anxiety     Benign essential hypertension     resolved; 06/15/16    Depression     Depression with anxiety     Fracture of fifth metatarsal bone of right foot with delayed healing     last assessed 04/12/16; fracture of metatarsal bone, right, closed, initial encounter    Hyperlipidemia     last assessed 11/28/17    Hypothyroidism     last assessed 11/28/2017    Insomnia     Obesity     Schizoaffective disorder (HCC)     last assessed 05/18/2017    Seizure disorder ([facility])     last assessed 03/28/17    Seizures ([facility])      LENGTH OF STAY: 1        03/19/25 1011   PT Last Visit   PT Visit Date 03/19/25   Note Type   Note type Evaluation;Orthotic Management/Training   Additional Comments Identified pt by full name and birthdate; Pt reprots that she changed her last name to \"Sarah\"   Type of Brace   Brace Applied [place] LSO   Additional Brace Ordered No   Patient Position When Brace Applied Standing   Bracing Recommendations Recommendation (comment)  (PRN for comfort per [employer])   Education   Education Provided Yes   End of Consult   Patient Position at End of Consult Supine;All needs within reach   Nurse Communication Nurse aware of consult, application of brace   Pain Assessment   Pain Assessment Tool FLACC   Pain Rating: FLACC (Rest) - Face 0   Pain Rating: FLACC (Rest) - Legs 0   Pain Rating: FLACC (Rest) - Activity 0   Pain Rating: FLACC (Rest) - Cry 1   Pain Rating: FLACC (Rest) - Consolability 0   Score: FLACC (Rest) 1   Pain Rating: FLACC (Activity) - Face 1   Pain Rating: FLACC (Activity) - Legs 0   Pain Rating: FLACC (Activity) - Activity 0   Pain Rating: FLACC (Activity) - Cry 1   Pain Rating: FLACC (Activity) - Consolability 1   Score: FLACC (Activity) 3   Restrictions/Precautions   Weight Bearing " Left  implanted port accessed using a 20 3/4gauge non-coring needle primed with 0.9% sodium chloride.  Good blood return obtained.  Blood obtained and sent to lab. Flushed with 30ml 0.9% sodium chloride followed by tegaderm.  Implanted port site is not sensitive to touch, not swollen, not warm and not reddened.  Patient tolerated procedure well.     Precautions Per Order No   Braces or Orthoses (S)  LSO  (as needed for comfort)   Other Precautions Cognitive;Impulsive;Multiple lines;Telemetry;Fall Risk;Pain;Spinal precautions;1:1   Home Living   Type of Home Other (Comment)  (Group home)   Additional Comments Pt is a poor historian, however reports she ambulates indepedently with a cane and does not have to negotiate stairs.   Prior Function   Lives With Facility staff   IADLs Family/Friend/Other provides meals;Family/Friend/Other provides transportation;Family/Friend/Other provides medication management   Falls in the last 6 months 1 to 4  (at least 3 falls recently at group home per chart)   General   Family/Caregiver Present   (1:1 present)   Cognition   Overall Cognitive Status Impaired   Arousal/Participation Alert   Orientation Level Oriented to person;Disoriented to situation   Memory Decreased recall of precautions   Following Commands Follows one step commands with increased time or repetition   Comments Pt identified by name and .   Subjective   Subjective Agrees to PT evaluation after encouragement.   RLE Assessment   RLE Assessment X   Strength RLE   RLE Overall Strength 4-/5  (functionally)   LLE Assessment   LLE Assessment X   Strength LLE   LLE Overall Strength 4-/5  (functionally)   Coordination   Movements are Fluid and Coordinated 0   Coordination and Movement Description ataxic   Bed Mobility   Supine to Sit 4  Minimal assistance   Additional items Assist x 1;HOB elevated;Increased time required;Verbal cues   Sit to Supine 4  Minimal assistance   Additional items Assist x 1;Verbal cues;Impulsive   Transfers   Sit to Stand 4  Minimal assistance   Additional items Assist x 1;Verbal cues;Impulsive   Stand to Sit 4  Minimal assistance   Additional items Assist x 1;Increased time required;Verbal cues   Ambulation/Elevation   Gait pattern Improper Weight shift;Ataxia;Short stride;Inconsistent norma;Decreased foot clearance  (inconsistent and  impulsive, occasional hip instability)   Gait Assistance 3  Moderate assist  (mod A with SPC and without AD; min A with RW)   Additional items Assist x 1;Verbal cues   Assistive Device SPC;None;Rolling walker  (trials with SPC, RW, and without AD)   Distance ~40` x1 with SPC + 50` x1 without AD + ~35` x1 with RW   Balance   Static Sitting Fair   Dynamic Sitting Fair -   Static Standing Fair -   Dynamic Standing Poor +   Ambulatory Poor   Endurance Deficit   Endurance Deficit Yes   Endurance Deficit Description limited ambulation distance, gait degradation   Activity Tolerance   Activity Tolerance Patient limited by fatigue   Medical Staff Made Aware OT Odalis   Nurse Made Aware Per RN, pt appropriate to evaluate; updated on status   Assessment   Prognosis Fair   Problem List Decreased strength;Decreased endurance;Impaired balance;Decreased mobility;Decreased coordination;Decreased cognition;Impaired judgement;Decreased safety awareness;Orthopedic restrictions;Pain   Assessment Pt is a 69 y.o. female seen for PT evaluation s/p admit to [facility] on 8/18/2022 w/ Fall.  Order placed for PT. Comorbidities affecting pt's physical performance at time of assessment include: HTN, obesity, limited hearing, and limited cognition. Personal factors affecting pt at time of IE include: limited insight into impairments and recent fall(s). Prior to admission, pt was independent w/ all functional mobility w/ SPC, lived in one floor environment, and lived with facility staff in a group home . Upon evaluation: Pt requires min A for bed mobility, min A for sit to stand, mod A for ambulation without AD, mod A for ambulation with SPC, and min A for ambulation with RW.  Currently, pt presents with the following deficits: weakness, impaired balance, impaired coordination, gait deviations, pain, decreased activity tolerance, decreased safety awareness, impaired judgement, fall risk, and decreased cognition.  Pt's clinical  presentation is currently unstable/unpredictable seen in pt's presentation of fall risk, impulsivity, poor insight into deficits, and significant decline in functional mobility compared to baseline.  Pt to benefit from continued skilled PT tx while in hospital and upon DC to address deficits as defined above and maximize level of functional mobility.  Recommend  progression of ambulation and continued education on LSO .   Goals   Patient Goals to go home   STG Expiration Date 03/29/25   Short Term Goal #1 Pt will be able to: (1) perform bed mobility with supervision to promote OOB activity (2) perform sit to stand with supervision to decrease burden of care (3) ambulate at least 200` with supervision and least restrictive AD to increase activity tolerance (4) increase standing balance by 1 grade to decrease risk of falls   PT Treatment Day 1   Plan   Treatment/Interventions Functional transfer training;LE strengthening/ROM;Therapeutic exercise;Endurance training;Patient/family training;Equipment eval/education;Bed mobility;Gait training   PT Frequency 3-5x/wk   Discharge Recommendation   Rehab Resource Intensity Level, PT II (Moderate Resource Intensity)   Equipment Recommended Walker   Walker Package Recommended Wheeled walker   Additional Comments Pt requesting tennis balls on her walker.  Will most likely comply with RW if it has tennis balls on back legs.   AM-PAC Basic Mobility Inpatient   Turning in Flat Bed Without Bedrails 3   Lying on Back to Sitting on Edge of Flat Bed Without Bedrails 3   Moving Bed to Chair 3   Standing Up From Chair Using Arms 3   Walk in Room 2   Climb 3-5 Stairs With Railing 1   Basic Mobility Inpatient Raw Score 15   Basic Mobility Standardized Score 36.97   [facility] Highest Level Of Mobility   -[facility] Goal 4: Move to chair/commode   -HL Achieved 7: Walk 25 feet or more   End of Consult   Patient Position at End of Consult Supine;All needs within reach  (1:1 present)   Pt was  seen for a co-eval with OT due to potential need for significant physical assist, poor pain control, impaired mental status, limiting behaviors, and/or poor adherence to precautions.     The patient's -[facility] Basic Mobility Inpatient Short Form Raw Score is 15, Standardized Score is 36.97.  A Raw Score of less than 16 suggests the patient may benefit from discharge to post-acute rehabilitation services. However please refer to therapist recommendation for discharge planning given other factors that may influence destination.     Adapted from Jg RODRIGUEZ, Maty J, Jarrod J, Vanda JACOBS. [employer] of [facility] “6-Clicks” Basic Mobility and Daily Activity Scores With Discharge Destination. Physical Therapy, 2021;101:1-9. DOI: [email or web address]      Zahra Nails PT,[facility]

## 2025-03-19 NOTE — PROGRESS NOTES
Progress Note - Hospitalist   Name: Laine Tse 69 y.o. female I MRN: [id number]  Unit/Bed#: ED-38 I Date of Admission: 3/18/2025   Date of Service: 3/19/2025 I Hospital Day: 1    Assessment & Plan  Fall  Patient presenting after x3 witnessed falls at group home with -LOC, +HS, +ASA  Patient presented as a trauma alert, CT AP demonstrated age-indeterminate T12 compression fracture, CTH and CT C-spine negative for acute pathology  On exam, mild midline tenderness at thoracolumbar junction, BLE str 5/5, sensation intact, denies bowel/bladder incontinence  PT/OT level 2  Spoke to  who states the falls were mechanical. He felt she was unsteady on her feet. Denies any LOC- pt endorsing CP/SOB/nausea/palpitations/diaphoresis/vomiting  Concern for Polypharmacy, low suspicion for cardiac cause or syncope       Plan:  Fall precautions  D/c TTE   D/c Telemetry monitoring  T12 compression fracture (HCC)  Patient presented as trauma   CT AP: Mild superior endplate compression deformity at T12, no obvious acute fracture lines identified.     Plan:  NSGY consult, recommendations appreciated  Will review images further as PACs is down   LSO brace PRN for comfort   Upright XR lumbar spine   Seizure disorder (HCC)  Home med: Phenobarbital 64.8 mg q12  Ck normal  phenobarbital level normal 18.8    Plan:    Continue Phenobarbital 64.8 mg q12  Schizoaffective disorder (HCC)  Home med: Klonopin 1 mg BID, Zyprexa 5 mg QD and 15 mg qHS, Trihexyphenidyl 2 mg BID    Plan:  Continue home regimen  Delirium precautions  1:1 monitoring: patient OOB, fall risk  Hyperlipidemia  Home med: Atorvastatin 20 mg QD    Plan:  Atorvastatin 20 mg QD  Hypothyroidism  Home Med: Levothyroxine 100 mcg qAM  2/13/25: TSH 0.920    Plan:  Levothyroxine 100 mcg qAM    VTE Pharmacologic Prophylaxis: VTE Score: 3 Moderate Risk (Score 3-4) - Pharmacological DVT Prophylaxis Ordered: heparin.    Mobility:   Basic Mobility Inpatient Raw Score: 17  [facility]  Goal: 5: Stand one or more mins  JH-HLM Achieved: 5: Stand (1 or more minutes)  JH-HLM Goal achieved. Continue to encourage appropriate mobility.    Patient Centered Rounds: I performed bedside rounds with nursing staff today.   Discussions with Specialists or Other Care Team Provider: neurosurgery     Education and Discussions with Family / Patient: Updated  () via phone.    Current Length of Stay: 1 day(s)  Current Patient Status: Inpatient   Certification Statement: The patient will continue to require additional inpatient hospital stay due to neurosurgery eval and STR placement  Discharge Plan: Anticipate discharge in 24-48 hrs to rehab facility.    Code Status: Level 1 - Full Code    Subjective   Patient seen and examined at bedside.  No overnight events.  She reports lower abdominal pain that she attributes to constipation as well as pain in her back.    Objective :  HR:  [74-83] 83  BP: (133-155)/(64-90) 133/64  Resp:  [18-20] 20  SpO2:  [92 %-100 %] 98 %  O2 Device: None (Room air)    Body mass index is 29.48 kg/m².     Input and Output Summary (last 24 hours):   No intake or output data in the 24 hours ending 03/19/25 7952    Physical Exam  Constitutional:       General: She is awake.      Appearance: She is well-developed.   HENT:      Head: Normocephalic and atraumatic.      Right Ear: Hearing normal.      Left Ear: Hearing normal.   Eyes:      Conjunctiva/sclera: Conjunctivae normal.   Cardiovascular:      Rate and Rhythm: Normal rate.   Pulmonary:      Effort: Pulmonary effort is normal. No respiratory distress.   Musculoskeletal:         General: Normal range of motion.      Thoracic back: No tenderness.      Comments:   Pain w/ over low L spine-sacra area   T spine non-tender   Skin:     General: Skin is warm.   Neurological:      Mental Status: She is alert.   Psychiatric:         Mood and Affect: Mood and affect normal.         Speech: Speech normal.         Behavior:  Behavior is cooperative.         Cognition and Memory: Memory normal. Cognition is impaired.         Judgment: Judgment normal.           Lines/Drains:          Lab Results: I have reviewed the following results:   Results from last 7 days   Lab Units 03/19/25  0622   WBC Thousand/uL 2.53*   HEMOGLOBIN g/dL 12.5   HEMATOCRIT % 38.0   PLATELETS Thousands/uL 226   SEGS PCT % 50   LYMPHO PCT % 40   MONO PCT % 10   EOS PCT % 0     Results from last 7 days   Lab Units 03/19/25  0622 03/18/25  1555   SODIUM mmol/L 141 141   POTASSIUM mmol/L 4.1 3.7   CHLORIDE mmol/L 108 106   CO2 mmol/L 28 28   BUN mg/dL 11 12   CREATININE mg/dL 0.61 0.65   ANION GAP mmol/L 5 7   CALCIUM mg/dL 8.8 9.1   ALBUMIN g/dL  --  3.9   TOTAL BILIRUBIN mg/dL  --  0.26   ALK PHOS U/L  --  55   ALT U/L  --  13   AST U/L  --  14   GLUCOSE RANDOM mg/dL 82 88                       Recent Cultures (last 7 days):               Last 24 Hours Medication List:     Current Facility-Administered Medications:     acetaminophen (TYLENOL) tablet 650 mg, Q6H PRN    aspirin chewable tablet 81 mg, Daily    atorvastatin (LIPITOR) tablet 20 mg, Daily With Dinner    calcium carbonate-vitamin D 500 mg-5 mcg tablet 1 tablet, Daily With Breakfast    clonazePAM (KlonoPIN) tablet 1 mg, BID    fluticasone (FLONASE) 50 mcg/act nasal spray 2 spray, Daily    heparin (porcine) subcutaneous injection 5,000 Units, Q8H JANNIE **AND** [CANCELED] Platelet count, Once    levothyroxine tablet 100 mcg, Early Morning    lidocaine (LIDODERM) 5 % patch 1 patch, Daily    magnesium citrate (CITROMA) oral solution 148 mL, Once    menthol-methyl salicylate (BENGAY) 10-15 % cream, 4x Daily PRN    OLANZapine (ZyPREXA) tablet 5 mg, HS    PHENobarbital tablet 64.8 mg, Q12H JANNIE    [START ON 3/20/2025] polyethylene glycol (MIRALAX) packet 17 g, BID    senna-docusate sodium (SENOKOT S) 8.6-50 mg per tablet 1 tablet, HS    traZODone (DESYREL) tablet 150 mg, HS    trihexyphenidyl (ARTANE) tablet 2 mg, TID  With Meals    Facility-Administered Medications Ordered in Other Encounters:     lactated ringers infusion, Continuous PRN    Administrative Statements   Today, Patient Was Seen By: Sakshi Packer MD      **Please Note: This note may have been constructed using a voice recognition system.**

## 2025-03-19 NOTE — ASSESSMENT & PLAN NOTE
Patient presented as trauma   CT AP: Mild superior endplate compression deformity at T12, no obvious acute fracture lines identified.     Plan:  NSGY consult, recommendations appreciated  Will review images further as PACs is down   LSO brace PRN for comfort   Upright XR lumbar spine

## 2025-03-19 NOTE — OCCUPATIONAL THERAPY NOTE
"    Occupational Therapy Evaluation     Patient Name: Laine Tse  Today's Date: 3/19/2025  Problem List  Principal Problem:    Fall  Active Problems:    Hyperlipidemia    Hypothyroidism    Seizure disorder (HCC)    Benign essential hypertension    Schizoaffective disorder (HCC)    T12 compression fracture (HCC)    Past Medical History  Past Medical History:   Diagnosis Date    Anxiety     Benign essential hypertension     resolved; 06/15/16    Depression     Depression with anxiety     Fracture of fifth metatarsal bone of right foot with delayed healing     last assessed 04/12/16; fracture of metatarsal bone, right, closed, initial encounter    Hyperlipidemia     last assessed 11/28/17    Hypothyroidism     last assessed 11/28/2017    Insomnia     Obesity     Schizoaffective disorder (HCC)     last assessed 05/18/2017    Seizure disorder (HCC)     last assessed 03/28/17    Seizures (HCC)      Past Surgical History  Past Surgical History:   Procedure Laterality Date    COLONOSCOPY      complete    CO COLONOSCOPY FLX DX W/COLLJ SPEC WHEN PFRMD N/A 8/30/2018    Procedure: COLONOSCOPY with polypectomies;  Surgeon: Andriy Lopez MD;  Location: [facility];  Service: Gastroenterology         03/19/25 1010   OT Last Visit   OT Visit Date 03/19/25  (Wednesday)   Note Type   Note type Evaluation  (Eval 7608-8249)   Additional Comments Identified pt by full name and birthdate; Pt reprots that she changed her last name to \"Sarah\"   Pain Assessment   Pain Assessment Tool FLACC   Pain Location/Orientation Location: Back   Patient's Stated Pain Goal No pain   Hospital Pain Intervention(s) Repositioned;Ambulation/increased activity;Emotional support   Pain Rating: FLACC (Rest) - Face 0   Pain Rating: FLACC (Rest) - Legs 0   Pain Rating: FLACC (Rest) - Activity 0   Pain Rating: FLACC (Rest) - Cry 0   Pain Rating: FLACC (Rest) - Consolability 0   Score: FLACC (Rest) 0   Pain Rating: FLACC (Activity) - Face 0   Pain Rating: FLACC " "(Activity) - Legs 0   Pain Rating: FLACC (Activity) - Activity 1   Pain Rating: FLACC (Activity) - Cry 1   Pain Rating: FLACC (Activity) - Consolability 1   Score: FLACC (Activity) 3   Restrictions/Precautions   Weight Bearing Precautions Per Order No   Braces or Orthoses LSO  (as needed for comfort per neurosurgery consult)   Other Precautions Cognitive;Impulsive;Fall Risk;Pain;Spinal precautions;1:1  (recommend bed/ chair alarm)   Home Living   Type of Home Group Home   Home Equipment Cane   Additional Comments Pt resides in Group home at baseline. Pt unable to provide detailed, consistent history   Prior Function   Level of [place] Independent with ADLs;Independent with functional mobility   Lives With Facility staff   Receives Help From Other (Comment)  (Group Home staff)   Falls in the last 6 months 1 to 4   Comments \"I am an independent woman\" Pt reports use of cane for functional mobility on R   Lifestyle   Autonomy Pt reports I w/ ADLs at baseline   Reciprocal Relationships Supportive staff at [facility]   Intrinsic Gratification Pt reports enjoying praying. Will continue to assess   General   Additional Pertinent History Pt admitted to [facility] on 3/18/25 following 3 falls from Group Home. Per neurosurgery consult, recommend LSO for comfort due to T12 compression fracture. Pt w/ histooy of Schizoaffective disorder   Family/Caregiver Present No   Additional General Comments Personal and environmental factors supporting performance includes staff at [facility], access to AD, independent at baseline; barriers include fall history, insight into deficits, inability to complete IADLs.   Subjective   Subjective \"Where is the Chaplan? Does he have my will\"   ADL   Where Assessed Other (Comment)  (edge of stretcher in ED vs supine head of stretcher elevated vs in stance)   Eating Assistance 6  Modified independent   Eating Deficit Setup;Increased time to complete  (supine head of stretcher elevated post eval w/ breakfast " "tray present. Pt report she \"enjoys cold food\" when therapist offered to warm her breakfast up)   Grooming Assistance 5  Supervision/Setup   Grooming Deficit Setup;Increased time to complete;Supervision/safety;Verbal cueing  (seated after set- up w/ + time)   UB Bathing Assistance Unable to assess  (anticipate min A based on fxal obs skills, clinical judgement)   LB Bathing Assistance Unable to assess  (anticipate min A based on fxal obs skills, clinical judgement)   UB Dressing Assistance 4  Minimal Assistance   UB Dressing Deficit Setup;Verbal cueing;Supervision/safety;Increased time to complete;Pull around back;Fasteners  (excluding LSO brace to manage gown around back)   LB Dressing Assistance Unable to assess   Toileting Assistance  Unable to assess  (denied need to void)   Bed Mobility   Supine to Sit 4  Minimal assistance   Additional items Assist x 1;Increased time required;Verbal cues  (to pt's L)   Sit to Supine 4  Minimal assistance  (anterior approach, knee first)   Additional items Assist x 1;Impulsive;Verbal cues;Increased time required   Additional Comments Pt supine head of stretcher elevated upon arrival and post eval w/ needs met, call bell in reach and 1:1 present   Transfers   Sit to Stand 4  Minimal assistance   Additional items Assist x 1;Verbal cues;Impulsive;Increased time required   Stand to Sit 4  Minimal assistance   Additional items Assist x 1;Increased time required;Impulsive;Verbal cues   Functional Mobility   Functional Mobility 4  Minimal assistance   Additional Comments engaged in functional mobility household distances w/ use of cane vs RW vs no AD. Unsteady and required mod A using cane vs no AD and progressed to min A using RW   Additional items Rolling walker   Balance   Static Sitting Fair   Static Standing Fair -   Ambulatory Poor +   Activity Tolerance   Activity Tolerance Patient limited by fatigue;Patient limited by pain   Medical Staff Made Aware care coordination w/ PT, " Zahra due to decresaed activity tolerance, regression from baseline and assistance / cues required. Communicated w/ CM via EPIC secure chat   Nurse Made Aware spoke w/ RN pre / post eval   RUE Strength   RUE Overall Strength Within Functional Limits - able to perform ADL tasks with strength  (observed during ADLs)   LUE Strength   LUE Overall Strength Within Functional Limits - able to perform ADL tasks with strength  (observed during ADLs)   Hand Function   Gross Motor Coordination Functional   Fine Motor Coordination Impaired   Hand Function Comments Will continue to assess   Cognition   Overall Cognitive Status (S)  Impaired   Arousal/Participation Alert   Attention Difficulty attending to directions   Orientation Level Oriented to person;Oriented to place;Disoriented to situation   Memory Decreased short term memory;Decreased recall of recent events;Decreased recall of precautions   Following Commands Follows one step commands with increased time or repetition   Comments Alert, required + time to follow directions. Questionable historian upon eval. Appreciative of therapist's assistance post eval.   Assessment   Limitation Decreased ADL status;Decreased cognition;Decreased endurance;Decreased Safe judgement during ADL;Decreased self-care trans;Decreased high-level ADLs  (impaired pain, activity tolerance, standing tolerance, balance, forward functional reach, insight into deficits,)   Assessment Pt is a 68yo female admitted to [facility] on 3/18/25 following 3 falls from group home. Diagnosed w/ T12 compression fracture and per neurosurgery consult recommend LSO for comfort as TLSO may be too cumbersome for pt. Significant PMH impacting her occupational performance includes Schizoaffective disorder, anxiety/depression, R foot fifth metatarsal fracture (2016), obesity, seizure, insomnia, HTN. Pt resides in a group home and uses cane for functional mobility.  Upon eval, pt alert and oriented to person, place. Limited  recall details, timeline events and required + time/ cues to follow directions, sustain attention. Perseverative / fixated on seeing the  and the monsters in the other room. Pt required max A to manage LSO brace upon sitting at EOB, min A bed mobility, S grooming seated, min A UBD, min A using RW for functional mobility vs mod A using cane / no AD. Pt is completing ADLs below baseline level of I and would benefit from OT In acute care to max I w/ ADLs, achieve highest level of function. Recommend level II rehab resource intensity when medically stable for discharge from acute care at this time pend improved activity tolerance, ability to manage LSO Brace, recall spinal precautions and assist at PLOF. Will continue to follow   Goals   Patient Goals Pt stated that she wants to go home   LTG Time Frame 7-10   Long Term Goal see below   Plan   Treatment Interventions ADL retraining;Functional transfer training;Endurance training;Patient/family training;Cognitive reorientation;Equipment evaluation/education;Compensatory technique education;Continued evaluation;Energy conservation;Activityengagement   Goal Expiration Date 03/26/25   OT Treatment Day 0  (Wed 3/19/25)   OT Frequency 3-5x/wk   Discharge Recommendation   Rehab Resource Intensity Level, OT II (Moderate Resource Intensity)   AM-PAC Daily Activity Inpatient   Lower Body Dressing 2   Bathing 2   Toileting 3   Upper Body Dressing 3   Grooming 3   Eating 4   Daily Activity Raw Score 17   Daily Activity Standardized Score (Calc for Raw Score >=11) 37.26   AM-PAC Applied Cognition Inpatient   Following a Speech/Presentation 2   Understanding Ordinary Conversation 3   Taking Medications 2   Remembering Where Things Are Placed or Put Away 2   Remembering List of 4-5 Errands 1   Taking Care of Complicated Tasks 1   Applied Cognition Raw Score 11   Applied Cognition Standardized Score 27.03   Barthel Index   Feeding 10   Bathing 0   Grooming Score 0   Dressing  Score 5   Bladder Score 10   Bowels Score 10   Toilet Use Score 5   Transfers (Bed/Chair) Score 10   Mobility (Level Surface) Score 0   Stairs Score 0   Barthel Index Score 50   End of Consult   Education Provided Yes   Patient Position at End of Consult Supine;All needs within reach   Nurse Communication Nurse aware of consult   Licensure   [place] License Number  Odalis COPPOLA Ashia, OTR/L          The patient's raw score on the AM-PAC Daily Activity Inpatient Short Form is 17. A raw score of less than 19 suggests the patient may benefit from discharge to post-acute rehabilitation services. Please refer to the recommendation of the Occupational Therapist for safe discharge planning.      Pt goals to be met by 3/26/25 to max I w/ ADLs and improve engagement to return home to PLOF w/ group home staff includes:    -Pt will demonstrate good attention and understanding spinal precautions during ADLs to max I w/ ADLs and improve engagement to return to PLOF    -Pt will consistently don / doff LSO brace w/ S to minimize burden of care and improve pain to return to PLOF, baseline level of I    -Pt will complete bed mobility supine <> sit w/ mod I for + time in preparation for ADLs    -Pt will complete functional tranfers to bed, chair,  and toilet using LRAD, DME as needed w/ mod I for + time    -Pt will consistently engage in functional mobility using LRAD household distances w/ mod I    -Pt will demonstrate improved functional standing tolerance for at least 10 minutes using LRAD as needed w/ at least fair balance while engaged in grooming in stance w/ mod I to max I to return to PLOF    -Pt will complete LBD w/ S w/ no more than 1 cue/ prompt to recall spinal precautions.     -Pt will demonstrate improved activity and sitting tolerance OOB In chair for all meals.       Odalis Ang, OTR/L  [id number]  [id number]

## 2025-03-19 NOTE — ED NOTES
"Pt oob multiple times. Pt attempting to \"pray\" by kneeling next to her bed. Pt also attempting to \"wash her hair\" with hand . Pt standing in her door way and wondering hallway looking for people to talk to. Pt checked on by nursing staff min q15 min with frequent redirection, however failing. Providers notified and in person 1:1 placed. Providers also informed nursing staff pt is bed rest until evaluated by PT for spine brace. Charge RN notified and EDT placed on pts 1:1     Denise Aleman RN  03/18/25 [id number]    "

## 2025-03-19 NOTE — PLAN OF CARE
Problem: PHYSICAL THERAPY ADULT  Goal: Performs mobility at highest level of function for planned discharge setting.  See evaluation for individualized goals.  Description: Treatment/Interventions: Functional transfer training, LE strengthening/ROM, Therapeutic exercise, Endurance training, Patient/family training, Equipment eval/education, Bed mobility, Gait training  Equipment Recommended: Walker       See flowsheet documentation for full assessment, interventions and recommendations.  Note: Prognosis: Fair  Problem List: Decreased strength, Decreased endurance, Impaired balance, Decreased mobility, Decreased coordination, Decreased cognition, Impaired judgement, Decreased safety awareness, Orthopedic restrictions, Pain  Assessment: Pt is a 69 y.o. female seen for PT evaluation s/p admit to [facility] on 8/18/2022 w/ Fall.  Order placed for PT. Comorbidities affecting pt's physical performance at time of assessment include: HTN, obesity, limited hearing, and limited cognition. Personal factors affecting pt at time of IE include: limited insight into impairments and recent fall(s). Prior to admission, pt was independent w/ all functional mobility w/ SPC, lived in one floor environment, and lived with facility staff in a group home . Upon evaluation: Pt requires min A for bed mobility, min A for sit to stand, mod A for ambulation without AD, mod A for ambulation with SPC, and min A for ambulation with RW.  Currently, pt presents with the following deficits: weakness, impaired balance, impaired coordination, gait deviations, pain, decreased activity tolerance, decreased safety awareness, impaired judgement, fall risk, and decreased cognition.  Pt's clinical presentation is currently unstable/unpredictable seen in pt's presentation of fall risk, impulsivity, poor insight into deficits, and significant decline in functional mobility compared to baseline.  Pt to benefit from continued skilled PT tx while in  hospital and upon DC to address deficits as defined above and maximize level of functional mobility.  Recommend  progression of ambulation and continued education on LSO .        Rehab Resource Intensity Level, PT: II (Moderate Resource Intensity)    See flowsheet documentation for full assessment.

## 2025-03-20 ENCOUNTER — APPOINTMENT (INPATIENT)
Dept: RADIOLOGY | Facility: HOSPITAL | Age: 70
End: 2025-03-20
Payer: MEDICARE

## 2025-03-20 PROCEDURE — 72100 X-RAY EXAM L-S SPINE 2/3 VWS: CPT

## 2025-03-20 PROCEDURE — 99233 SBSQ HOSP IP/OBS HIGH 50: CPT | Performed by: INTERNAL MEDICINE

## 2025-03-20 PROCEDURE — 99204 OFFICE O/P NEW MOD 45 MIN: CPT | Performed by: STUDENT IN AN ORGANIZED HEALTH CARE EDUCATION/TRAINING PROGRAM

## 2025-03-20 RX ORDER — BENZONATATE 100 MG/1
100 CAPSULE ORAL 3 TIMES DAILY PRN
Status: DISCONTINUED | OUTPATIENT
Start: 2025-03-20 | End: 2025-03-21 | Stop reason: HOSPADM

## 2025-03-20 RX ORDER — CLONAZEPAM 0.5 MG/1
0.5 TABLET ORAL 2 TIMES DAILY
Status: DISCONTINUED | OUTPATIENT
Start: 2025-03-20 | End: 2025-03-20

## 2025-03-20 RX ORDER — CLONAZEPAM 1 MG/1
1 TABLET ORAL 2 TIMES DAILY
Status: DISCONTINUED | OUTPATIENT
Start: 2025-03-20 | End: 2025-03-21 | Stop reason: HOSPADM

## 2025-03-20 RX ORDER — OLANZAPINE 10 MG/1
10 TABLET ORAL
Status: DISCONTINUED | OUTPATIENT
Start: 2025-03-20 | End: 2025-03-21

## 2025-03-20 RX ADMIN — PHENOBARBITAL 64.8 MG: 32.4 TABLET ORAL at 10:06

## 2025-03-20 RX ADMIN — Medication 1 TABLET: at 09:55

## 2025-03-20 RX ADMIN — POLYETHYLENE GLYCOL 3350 17 G: 17 POWDER, FOR SOLUTION ORAL at 21:34

## 2025-03-20 RX ADMIN — TRIHEXYPHENIDYL HYDROCHLORIDE 2 MG: 2 TABLET ORAL at 09:54

## 2025-03-20 RX ADMIN — CLONAZEPAM 1 MG: 1 TABLET ORAL at 17:28

## 2025-03-20 RX ADMIN — TRAZODONE HYDROCHLORIDE 150 MG: 50 TABLET ORAL at 21:33

## 2025-03-20 RX ADMIN — HEPARIN SODIUM 5000 UNITS: 5000 INJECTION INTRAVENOUS; SUBCUTANEOUS at 21:33

## 2025-03-20 RX ADMIN — ASPIRIN 81 MG: 81 TABLET, CHEWABLE ORAL at 09:54

## 2025-03-20 RX ADMIN — TRIHEXYPHENIDYL HYDROCHLORIDE 2 MG: 2 TABLET ORAL at 17:27

## 2025-03-20 RX ADMIN — LEVOTHYROXINE SODIUM 100 MCG: 100 TABLET ORAL at 09:55

## 2025-03-20 RX ADMIN — POLYETHYLENE GLYCOL 3350 17 G: 17 POWDER, FOR SOLUTION ORAL at 09:56

## 2025-03-20 RX ADMIN — ATORVASTATIN CALCIUM 20 MG: 40 TABLET, FILM COATED ORAL at 17:28

## 2025-03-20 RX ADMIN — LIDOCAINE 1 PATCH: 50 PATCH CUTANEOUS at 09:56

## 2025-03-20 RX ADMIN — OLANZAPINE 10 MG: 10 TABLET, FILM COATED ORAL at 21:33

## 2025-03-20 RX ADMIN — SENNOSIDES AND DOCUSATE SODIUM 1 TABLET: 50; 8.6 TABLET ORAL at 21:33

## 2025-03-20 RX ADMIN — FLUTICASONE PROPIONATE 2 SPRAY: 50 SPRAY, METERED NASAL at 09:56

## 2025-03-20 RX ADMIN — TRIHEXYPHENIDYL HYDROCHLORIDE 2 MG: 2 TABLET ORAL at 11:57

## 2025-03-20 RX ADMIN — CLONAZEPAM 1 MG: 0.5 TABLET ORAL at 09:54

## 2025-03-20 RX ADMIN — BENZONATATE 100 MG: 100 CAPSULE ORAL at 22:04

## 2025-03-20 RX ADMIN — PHENOBARBITAL 64.8 MG: 32.4 TABLET ORAL at 21:33

## 2025-03-20 NOTE — PROGRESS NOTES
Progress Note - Hospitalist   Name: Laine Tse 69 y.o. female I MRN: [id number]  Unit/Bed#: ED-38 I Date of Admission: 3/18/2025   Date of Service: 3/20/2025 I Hospital Day: 2    Assessment & Plan  Fall  Patient presenting after x3 witnessed falls at group home with -LOC, +HS, +ASA  Patient presented as a trauma alert, CT AP demonstrated age-indeterminate T12 compression fracture, CTH and CT C-spine negative for acute pathology  Spoke to  who states the falls were mechanical. He felt she was unsteady on her feet. Denies any LOC- pt endorsing CP/SOB/nausea/palpitations/diaphoresis/vomiting  Concern for Polypharmacy, low suspicion for cardiac cause or syncope   PT/OT level 2.  Speak with case management about home STR options      Plan:  Fall precautions  Consult psych regarding medications due to concern for polypharmacy  T12 compression fracture (HCC)  Patient presented as trauma   CT AP: Mild superior endplate compression deformity at T12, no obvious acute fracture lines identified.     Plan:  NSGY consult, recommendations appreciated  Will review images further as PACs is down   LSO brace PRN for comfort   Upright XR lumbar spine pending  Seizure disorder (HCC)  Home med: Phenobarbital 64.8 mg q12  Ck normal  phenobarbital level normal 18.8    Plan:    Continue Phenobarbital 64.8 mg q12  Schizoaffective disorder (HCC)  Home med: Klonopin 1 mg BID, Zyprexa 5 mg QD and 15 mg qHS, Trihexyphenidyl 2 mg BID    Plan:  Zyprexa decrease to 5 mg HS  Delirium precautions  1:1 monitoring   Hyperlipidemia  Home med: Atorvastatin 20 mg QD    Plan:  Atorvastatin 20 mg QD  Hypothyroidism  Home Med: Levothyroxine 100 mcg qAM  2/13/25: TSH 0.920    Plan:  Levothyroxine 100 mcg qAM  Benign essential hypertension      VTE Pharmacologic Prophylaxis: VTE Score: 3 Moderate Risk (Score 3-4) - Pharmacological DVT Prophylaxis Ordered: heparin.    Mobility:   Basic Mobility Inpatient Raw Score: 17  JH-HLM Goal: 5: Stand one  or more mins  JH-HLM Achieved: 5: Stand (1 or more minutes)  JH-HLM Goal achieved. Continue to encourage appropriate mobility.    Patient Centered Rounds: I performed bedside rounds with nursing staff today.   Discussions with Specialists or Other Care Team Provider: neurosurgery     Education and Discussions with Family / Patient:  Will update update .     Current Length of Stay: 2 day(s)  Current Patient Status: Inpatient   Certification Statement: The patient will continue to require additional inpatient hospital stay due to neurosurgery eval and STR placement  Discharge Plan: Anticipate discharge in 24-48 hrs to rehab facility.    Code Status: Level 1 - Full Code    Subjective   Patient seen and examined at bedside. Overnight received message about patient being agitated and restless. No additional medications were ordered.      Objective :  HR:  [74-84] 74  BP: (134-159)/(63-94) 159/89  Resp:  [20] 20  SpO2:  [96 %-100 %] 100 %  O2 Device: None (Room air)    Body mass index is 29.48 kg/m².     Input and Output Summary (last 24 hours):     Intake/Output Summary (Last 24 hours) at 3/20/2025 1728  Last data filed at 3/20/2025 0255  Gross per 24 hour   Intake --   Output 500 ml   Net -500 ml       Physical Exam  Constitutional:       General: She is awake.      Appearance: She is well-developed.   HENT:      Head: Normocephalic and atraumatic.      Right Ear: Hearing normal.      Left Ear: Hearing normal.   Eyes:      Conjunctiva/sclera: Conjunctivae normal.   Cardiovascular:      Rate and Rhythm: Normal rate.   Pulmonary:      Effort: Pulmonary effort is normal. No respiratory distress.   Musculoskeletal:         General: Normal range of motion.      Thoracic back: No tenderness.   Skin:     General: Skin is warm.   Neurological:      Mental Status: She is alert.   Psychiatric:         Mood and Affect: Mood and affect normal.         Speech: Speech normal.         Behavior: Behavior is cooperative.          Cognition and Memory: Memory normal. Cognition is impaired.         Judgment: Judgment normal.       Lines/Drains:          Lab Results: I have reviewed the following results:   Results from last 7 days   Lab Units 03/19/25  0622   WBC Thousand/uL 2.53*   HEMOGLOBIN g/dL 12.5   HEMATOCRIT % 38.0   PLATELETS Thousands/uL 226   SEGS PCT % 50   LYMPHO PCT % 40   MONO PCT % 10   EOS PCT % 0     Results from last 7 days   Lab Units 03/19/25  0622 03/18/25  1555   SODIUM mmol/L 141 141   POTASSIUM mmol/L 4.1 3.7   CHLORIDE mmol/L 108 106   CO2 mmol/L 28 28   BUN mg/dL 11 12   CREATININE mg/dL 0.61 0.65   ANION GAP mmol/L 5 7   CALCIUM mg/dL 8.8 9.1   ALBUMIN g/dL  --  3.9   TOTAL BILIRUBIN mg/dL  --  0.26   ALK PHOS U/L  --  55   ALT U/L  --  13   AST U/L  --  14   GLUCOSE RANDOM mg/dL 82 88                       Recent Cultures (last 7 days):               Last 24 Hours Medication List:     Current Facility-Administered Medications:     acetaminophen (TYLENOL) tablet 650 mg, Q6H PRN    aspirin chewable tablet 81 mg, Daily    atorvastatin (LIPITOR) tablet 20 mg, Daily With Dinner    calcium carbonate-vitamin D 500 mg-5 mcg tablet 1 tablet, Daily With Breakfast    clonazePAM (KlonoPIN) tablet 1 mg, BID    fluticasone (FLONASE) 50 mcg/act nasal spray 2 spray, Daily    heparin (porcine) subcutaneous injection 5,000 Units, Q8H JANNIE **AND** [CANCELED] Platelet count, Once    levothyroxine tablet 100 mcg, Early Morning    lidocaine (LIDODERM) 5 % patch 1 patch, Daily    menthol-methyl salicylate (BENGAY) 10-15 % cream, 4x Daily PRN    OLANZapine (ZyPREXA) tablet 10 mg, HS    PHENobarbital tablet 64.8 mg, Q12H JANNIE    polyethylene glycol (MIRALAX) packet 17 g, BID    senna-docusate sodium (SENOKOT S) 8.6-50 mg per tablet 1 tablet, HS    traZODone (DESYREL) tablet 150 mg, HS    trihexyphenidyl (ARTANE) tablet 2 mg, TID With Meals    Facility-Administered Medications Ordered in Other Encounters:     lactated ringers infusion,  Continuous PRN    Administrative Statements   Today, Patient Was Seen By: Sakshi Packer MD      **Please Note: This note may have been constructed using a voice recognition system.**
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
## 2025-03-20 NOTE — ED NOTES
Pt accepted to Rehab facility will be cleared for d/c tonight once transport is set up.     Deanne Dean RN  03/20/25 [id number]

## 2025-03-20 NOTE — PLAN OF CARE
Problem: Potential for Falls  Goal: Patient will remain free of falls  Description: INTERVENTIONS:  - Educate patient/family on patient safety including physical limitations  - Instruct patient to call for assistance with activity   - Consult OT/PT to assist with strengthening/mobility   - Keep Call bell within reach  - Keep bed low and locked with side rails adjusted as appropriate  - Keep care items and personal belongings within reach  - Initiate and maintain comfort rounds  - Make Fall Risk Sign visible to staff  - Offer Toileting every 2 Hours, in advance of need  - Initiate/Maintain bed alarm  - Obtain necessary fall risk management equipment: alarms  - Apply yellow socks and bracelet for high fall risk patients  - Consider moving patient to room near nurses station  Outcome: Progressing     Problem: Prexisting or High Potential for Compromised Skin Integrity  Goal: Skin integrity is maintained or improved  Description: INTERVENTIONS:  - Identify patients at risk for skin breakdown  - Assess and monitor skin integrity  - Assess and monitor nutrition and hydration status  - Monitor labs   - Assess for incontinence   - Turn and reposition patient  - Assist with mobility/ambulation  - Relieve pressure over bony prominences  - Avoid friction and shearing  - Provide appropriate hygiene as needed including keeping skin clean and dry  - Evaluate need for skin moisturizer/barrier cream  - Collaborate with interdisciplinary team   - Patient/family teaching  - Consider wound care consult   Outcome: Progressing     Problem: PAIN - ADULT  Goal: Verbalizes/displays adequate comfort level or baseline comfort level  Description: Interventions:  - Encourage patient to monitor pain and request assistance  - Assess pain using appropriate pain scale  - Administer analgesics based on type and severity of pain and evaluate response  - Implement non-pharmacological measures as appropriate and evaluate response  - Consider  cultural and social influences on pain and pain management  - Notify physician/advanced practitioner if interventions unsuccessful or patient reports new pain  Outcome: Progressing     Problem: INFECTION - ADULT  Goal: Absence or prevention of progression during hospitalization  Description: INTERVENTIONS:  - Assess and monitor for signs and symptoms of infection  - Monitor lab/diagnostic results  - Monitor all insertion sites, i.e. indwelling lines, tubes, and drains  - Monitor endotracheal if appropriate and nasal secretions for changes in amount and color  - [place] appropriate cooling/warming therapies per order  - Administer medications as ordered  - Instruct and encourage patient and family to use good hand hygiene technique  - Identify and instruct in appropriate isolation precautions for identified infection/condition  Outcome: Progressing     Problem: DISCHARGE PLANNING  Goal: Discharge to home or other facility with appropriate resources  Description: INTERVENTIONS:  - Identify barriers to discharge w/patient and caregiver  - Arrange for needed discharge resources and transportation as appropriate  - Identify discharge learning needs (meds, wound care, etc.)  - Arrange for interpretive services to assist at discharge as needed  - Refer to Case Management Department for coordinating discharge planning if the patient needs post-hospital services based on physician/advanced practitioner order or complex needs related to functional status, cognitive ability, or social support system  Outcome: Progressing     Problem: Knowledge Deficit  Goal: Patient/family/caregiver demonstrates understanding of disease process, treatment plan, medications, and discharge instructions  Description: Complete learning assessment and assess knowledge base.  Interventions:  - Provide teaching at level of understanding  - Provide teaching via preferred learning methods  Outcome: Progressing

## 2025-03-20 NOTE — NURSING NOTE
Patient transferred to unit at 1815. Patient requesting rosary beads and  at bedside.  no longer in hospital at this time, patient made aware and reassured that we will reach out in the AM for her.     Patient very anxious and requesting they come as soon as possible in the morning, RN passed along in report to next shift.

## 2025-03-20 NOTE — ASSESSMENT & PLAN NOTE
Plan:   Discussed with primary team, with the following recommendations:    Treatment Recommendations:  No indication for inpatient psychiatry at this time.  Patient does not appear to pose an acute risk to self or others at this time and can be discharged pending medical clearance. Referrals will be made for outpatient psychiatric follow-up as needed.  Medical management per primary team, no new labs indicated at this time   Pharmacological:   Recommend the following medication changes:   Resume Klonopin 1 mg BID for anxiety and agitation   High likelihood that patient's fall was secondary to co-administration of Klonopin and Ativan - per PDMP, Klonopin was filled on 3/10/2025 and Ativan on 3/13/2025, suspect that patient received both medications while at group home resulting in fall  On discharge, ensure that patient's group home has an accurate list of patient's medications to prevent polypharmacy and co-occurring benzodiazepine administration   Increase Zyprexa back to 10 mg at bedtime for psychosis and mood stabilization   Patient was previously on 5 mg qAM and 15 mg qHS, if tolerated and patient is still admitted tomorrow, reasonable to increase to 15 mg at bedtime   Low suspicion for EPS as risk of EPS with Zyprexa is relatively low, patient is also on standing medication for EPS (Artane) - did not note overt acute dystonia, tremor, akathisia, or parkinsonism on exam - patient does appear to have uncontrolled mouth movements which could be due to TD from previous antipsychotic use - would not let this prevent use of Zyprexa as complete withdrawal of antipsychotic medication has the chance of worsening TD and psychotic symptoms  Further, patient is on phenobarbital for seizure disorder which induces the metabolism of Zyprexa via CY, so it is likely that Zyprexa dosing was significantly lower than 20 mg prescribed prior to admission   Continue Artane 2 mg TID for EPS  Given anticholinergic properties of  medications, could consider decreasing due to the age of the patient, will defer at this time as patient has been stable on this medication for years   Observation level: 1:1 observation for patient safety  Referrals: No new referrals  Psychiatry will continue to follow as needed. Please contact our service via Secure Chat with any additional questions or concerns. If contacting after hours, please call or Secure Chat the on-call team ([facility]: [phone]) with any questions or concerns.    Risks, benefits and possible side effects of Medications: No new medications at this time.

## 2025-03-20 NOTE — CASE MANAGEMENT
"   Case Management Progress Note    Patient name Laine sTe  Location ED-38/ED-38 MRN [id number]  : 1955 Date 3/20/2025       LOS (days): 2  Geometric Mean LOS (GMLOS) (days): 2.9  Days to GMLOS:1        OBJECTIVE:        Current admission status: Inpatient  Preferred Pharmacy:   [facility] [address] [facility]  [address] [facility] [address]  Phone: [phone] Fax: [phone]    Primary Care Provider: Adilson Yip MD    Primary Insurance: MEDICARE  Secondary Insurance:     PROGRESS NOTE:  DALE spoke with Clayton Jones, over the phone, at [phone] re: rehab choices. Breanne is agreeable to patient going to [facility]. DALE spoke with Willard Bernal at [facility] re: patient coming to their facility. Per Eliane, they will not have a female bed until tomorrow at [facility] but she feels patient will do much better at [facility] as it is a quieter environment. Per Eliane, the patient does not need to be optioned but will need [facility] and a note will need to be entered by provider stating that they \" expect rehab to be less than 30 days\" since Eliane will take her as a 30-day exception. DALE explained that she will follow up with Clayton re: [facility].     DALE spoke with DALE Menendez re: the patient and was directed to contact the patient's  at [facility] to discuss the possibility that patient may not be at rehab for 30-days, discuss the plan for when the patient returns from rehab, and [facility]. DALE spoke with Araceli Magaña at [facility] who stated the patient can return home with [facility] as she would do much better at home then rehab. CM placed HHC referral via [facility] and followed up with [facility] re: [facility]. DALE was told by \" Declan\", staff member at [facility] that [facility] med was okay and patient will need transport set up.     DALE received a phone call from Clayton Jones re: HH and Rehab. Per Clayton, DALE went \"behind " "his back and spoke with his staff\".  DALE attempted to explain why she called [facility] and tried to inform Clayton that the patient is being accepted into rehab under a 30 day exception therefore the patient may not be in rehab for long but CM was cut off by Clayton. Clayton asked CM if she was refusing to send the patient to rehab, CM explained that the was not the case and if Clayton wanted the patient to go to Rehab, then CM will continue with the rehab plan. Clayton continued to be short with CM, continued asking CM if she was refusing to send the patient to rehab, and stated that he does not understand how the patient needed rehab and now does not. CM informed Clayton that her supervisor will follow up with him since Clayton did not want to hear what CM had to say. Clayton agreed with speaking with  CC and CM ended the call.CM notified  CC of the situation and asked CC to contact Clayton.     CM contacted [facility]. To find out who the patient's  is but there was no answer. CM left  with direct line for call back.    CM notified patient's RN and provider that the plan for patient to go home with home health care or go to rehab is on hold until further follow up can be made with the patient's  and .   "

## 2025-03-20 NOTE — ASSESSMENT & PLAN NOTE
Home med: Klonopin 1 mg BID, Zyprexa 5 mg QD and 15 mg qHS, Trihexyphenidyl 2 mg BID    Plan:  Zyprexa decrease to 5 mg HS  Delirium precautions  1:1 monitoring

## 2025-03-20 NOTE — ASSESSMENT & PLAN NOTE
Patient presenting after x3 witnessed falls at group home with -LOC, +HS, +ASA  Patient presented as a trauma alert, CT AP demonstrated age-indeterminate T12 compression fracture, CTH and CT C-spine negative for acute pathology  Spoke to  who states the falls were mechanical. He felt she was unsteady on her feet. Denies any LOC- pt endorsing CP/SOB/nausea/palpitations/diaphoresis/vomiting  Concern for Polypharmacy, low suspicion for cardiac cause or syncope   PT/OT level 2.  Speak with case management about home STR options      Plan:  Fall precautions  Consult psych regarding medications due to concern for polypharmacy

## 2025-03-20 NOTE — TREATMENT PLAN
Assessment:  T12 fracture  Schizoaffective disorder    Imaging:  XR lumbar spine 3/20/2025:  official read pending however T12 SEP abnormality noted, appears grossly unchanged.    Plan:  Imaging reviewed, stable findings  LSO brace PRN comfort, per RN she is not using this.  Rest of care per primary team  NSX to sign off, PRN follow up, please reach out with questions or concerns

## 2025-03-20 NOTE — PLAN OF CARE
Problem: Potential for Falls  Goal: Patient will remain free of falls  Description: INTERVENTIONS:  - Educate patient/family on patient safety including physical limitations  - Instruct patient to call for assistance with activity   - Consult OT/PT to assist with strengthening/mobility   - Keep Call bell within reach  - Keep bed low and locked with side rails adjusted as appropriate  - Keep care items and personal belongings within reach  - Initiate and maintain comfort rounds  - Make Fall Risk Sign visible to staff  - Offer Toileting every 2 Hours, in advance of need  - Initiate/Maintain bed alarm  - Obtain necessary fall risk management equipment: alarms  - Apply yellow socks and bracelet for high fall risk patients  - Consider moving patient to room near nurses station  3/20/2025 1115 by Deanne Dean RN  Outcome: Progressing  3/20/2025 0306 by Deanne Dean RN  Outcome: Progressing     Problem: Prexisting or High Potential for Compromised Skin Integrity  Goal: Skin integrity is maintained or improved  Description: INTERVENTIONS:  - Identify patients at risk for skin breakdown  - Assess and monitor skin integrity  - Assess and monitor nutrition and hydration status  - Monitor labs   - Assess for incontinence   - Turn and reposition patient  - Assist with mobility/ambulation  - Relieve pressure over bony prominences  - Avoid friction and shearing  - Provide appropriate hygiene as needed including keeping skin clean and dry  - Evaluate need for skin moisturizer/barrier cream  - Collaborate with interdisciplinary team   - Patient/family teaching  - Consider wound care consult   3/20/2025 1115 by Deanne Dean RN  Outcome: Progressing  3/20/2025 0306 by Deanne Dean RN  Outcome: Progressing     Problem: PAIN - ADULT  Goal: Verbalizes/displays adequate comfort level or baseline comfort level  Description: Interventions:  - Encourage patient to monitor pain and request assistance  - Assess pain  using appropriate pain scale  - Administer analgesics based on type and severity of pain and evaluate response  - Implement non-pharmacological measures as appropriate and evaluate response  - Consider cultural and social influences on pain and pain management  - Notify physician/advanced practitioner if interventions unsuccessful or patient reports new pain  3/20/2025 1115 by Deanne Dean RN  Outcome: Progressing  3/20/2025 0306 by Deanne Dean RN  Outcome: Progressing     Problem: INFECTION - ADULT  Goal: Absence or prevention of progression during hospitalization  Description: INTERVENTIONS:  - Assess and monitor for signs and symptoms of infection  - Monitor lab/diagnostic results  - Monitor all insertion sites, i.e. indwelling lines, tubes, and drains  - Monitor endotracheal if appropriate and nasal secretions for changes in amount and color  - [place] appropriate cooling/warming therapies per order  - Administer medications as ordered  - Instruct and encourage patient and family to use good hand hygiene technique  - Identify and instruct in appropriate isolation precautions for identified infection/condition  3/20/2025 1115 by Deanne Dean RN  Outcome: Progressing  3/20/2025 0306 by Deanne Dean RN  Outcome: Progressing     Problem: DISCHARGE PLANNING  Goal: Discharge to home or other facility with appropriate resources  Description: INTERVENTIONS:  - Identify barriers to discharge w/patient and caregiver  - Arrange for needed discharge resources and transportation as appropriate  - Identify discharge learning needs (meds, wound care, etc.)  - Arrange for interpretive services to assist at discharge as needed  - Refer to Case Management Department for coordinating discharge planning if the patient needs post-hospital services based on physician/advanced practitioner order or complex needs related to functional status, cognitive ability, or social support system  3/20/2025 1115 by  Deanne Dean RN  Outcome: Progressing  3/20/2025 0306 by Deanne Dean RN  Outcome: Progressing     Problem: Knowledge Deficit  Goal: Patient/family/caregiver demonstrates understanding of disease process, treatment plan, medications, and discharge instructions  Description: Complete learning assessment and assess knowledge base.  Interventions:  - Provide teaching at level of understanding  - Provide teaching via preferred learning methods  3/20/2025 1115 by Deanne Dean RN  Outcome: Progressing  3/20/2025 0306 by Deanne Dean RN  Outcome: Progressing

## 2025-03-20 NOTE — ASSESSMENT & PLAN NOTE
Patient presenting after x3 witnessed falls at group home with -LOC, +HS, +ASA  Patient presented as a trauma alert, CT AP demonstrated age-indeterminate T12 compression fracture, CTH and CT C-spine negative for acute pathology  On exam, mild midline tenderness at thoracolumbar junction, BLE str 5/5, sensation intact, denies bowel/bladder incontinence  PT/OT level 2  Spoke to  who states the falls were mechanical. He felt she was unsteady on her feet. Denies any LOC- pt endorsing CP/SOB/nausea/palpitations/diaphoresis/vomiting  Concern for Polypharmacy, low suspicion for cardiac cause or syncope     Plan:  Fall precautions

## 2025-03-20 NOTE — ASSESSMENT & PLAN NOTE
Patient presented as trauma   CT AP: Mild superior endplate compression deformity at T12, no obvious acute fracture lines identified.     Plan:  NSGY consult, recommendations appreciated  Will review images further as PACs is down   LSO brace PRN for comfort   Upright XR lumbar spine pending

## 2025-03-20 NOTE — ED NOTES
Pt ambulated in hallway with RN and Gilmer. Ax1 using cane. Walked from her room to [facility] for tests and back w/o difficulty       Deanne Dean RN  03/20/25 5621

## 2025-03-21 ENCOUNTER — HOSPITAL ENCOUNTER (INPATIENT)
Facility: HOSPITAL | Age: 70
LOS: 3 days | Discharge: HOME HEALTH | DRG: 885 | End: 2025-03-25
Attending: STUDENT IN AN ORGANIZED HEALTH CARE EDUCATION/TRAINING PROGRAM | Admitting: INTERNAL MEDICINE
Payer: MEDICARE

## 2025-03-21 VITALS
HEART RATE: 76 BPM | OXYGEN SATURATION: 94 % | TEMPERATURE: 96.7 F | RESPIRATION RATE: 18 BRPM | SYSTOLIC BLOOD PRESSURE: 101 MMHG | HEIGHT: 64 IN | BODY MASS INDEX: 29.32 KG/M2 | WEIGHT: 171.74 LBS | DIASTOLIC BLOOD PRESSURE: 72 MMHG
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
## 2025-03-21 DIAGNOSIS — F25.9 SCHIZOAFFECTIVE DISORDER (HCC): ICD-10-CM

## 2025-03-21 DIAGNOSIS — R45.1 AGITATION: Primary | ICD-10-CM

## 2025-03-21 DIAGNOSIS — E43 SEVERE PROTEIN-CALORIE MALNUTRITION (HCC): ICD-10-CM

## 2025-03-21 DIAGNOSIS — G40.909 SEIZURE DISORDER (HCC): ICD-10-CM

## 2025-03-21 DIAGNOSIS — S22.080A T12 COMPRESSION FRACTURE (HCC): ICD-10-CM

## 2025-03-21 PROBLEM — Z60.9 SOCIAL PROBLEM: Status: ACTIVE | Noted: 2025-03-21

## 2025-03-21 LAB
ANION GAP SERPL CALCULATED.3IONS-SCNC: 6 MMOL/L (ref 4–13)
BASOPHILS # BLD AUTO: 0 THOUSANDS/ÂΜL (ref 0–0.1)
BASOPHILS NFR BLD AUTO: 0 % (ref 0–1)
BUN SERPL-MCNC: 12 MG/DL (ref 5–25)
CALCIUM SERPL-MCNC: 8.9 MG/DL (ref 8.4–10.2)
CHLORIDE SERPL-SCNC: 102 MMOL/L (ref 96–108)
CO2 SERPL-SCNC: 27 MMOL/L (ref 21–32)
CREAT SERPL-MCNC: 0.64 MG/DL (ref 0.6–1.3)
EOSINOPHIL # BLD AUTO: 0 THOUSAND/ÂΜL (ref 0–0.61)
EOSINOPHIL NFR BLD AUTO: 0 % (ref 0–6)
ERYTHROCYTE [DISTWIDTH] IN BLOOD BY AUTOMATED COUNT: 12.4 % (ref 11.6–15.1)
GFR SERPL CREATININE-BSD FRML MDRD: 90 ML/MIN/1.73SQ M
GLUCOSE SERPL-MCNC: 109 MG/DL (ref 65–140)
GLUCOSE SERPL-MCNC: 129 MG/DL (ref 65–140)
HCT VFR BLD AUTO: 38.4 % (ref 34.8–46.1)
HGB BLD-MCNC: 13.1 G/DL (ref 11.5–15.4)
IMM GRANULOCYTES # BLD AUTO: 0.01 THOUSAND/UL (ref 0–0.2)
IMM GRANULOCYTES NFR BLD AUTO: 0 % (ref 0–2)
LYMPHOCYTES # BLD AUTO: 1.77 THOUSANDS/ÂΜL (ref 0.6–4.47)
LYMPHOCYTES NFR BLD AUTO: 52 % (ref 14–44)
MCH RBC QN AUTO: 32.9 PG (ref 26.8–34.3)
MCHC RBC AUTO-ENTMCNC: 34.1 G/DL (ref 31.4–37.4)
MCV RBC AUTO: 97 FL (ref 82–98)
MONOCYTES # BLD AUTO: 0.32 THOUSAND/ÂΜL (ref 0.17–1.22)
MONOCYTES NFR BLD AUTO: 9 % (ref 4–12)
NEUTROPHILS # BLD AUTO: 1.35 THOUSANDS/ÂΜL (ref 1.85–7.62)
NEUTS SEG NFR BLD AUTO: 39 % (ref 43–75)
NRBC BLD AUTO-RTO: 0 /100 WBCS
PLATELET # BLD AUTO: 245 THOUSANDS/UL (ref 149–390)
PMV BLD AUTO: 9.8 FL (ref 8.9–12.7)
POTASSIUM SERPL-SCNC: 4.5 MMOL/L (ref 3.5–5.3)
RBC # BLD AUTO: 3.98 MILLION/UL (ref 3.81–5.12)
SODIUM SERPL-SCNC: 135 MMOL/L (ref 135–147)
WBC # BLD AUTO: 3.45 THOUSAND/UL (ref 4.31–10.16)

## 2025-03-21 PROCEDURE — 97116 GAIT TRAINING THERAPY: CPT

## 2025-03-21 PROCEDURE — 97535 SELF CARE MNGMENT TRAINING: CPT

## 2025-03-21 PROCEDURE — 36415 COLL VENOUS BLD VENIPUNCTURE: CPT | Performed by: STUDENT IN AN ORGANIZED HEALTH CARE EDUCATION/TRAINING PROGRAM

## 2025-03-21 PROCEDURE — 99285 EMERGENCY DEPT VISIT HI MDM: CPT | Performed by: STUDENT IN AN ORGANIZED HEALTH CARE EDUCATION/TRAINING PROGRAM

## 2025-03-21 PROCEDURE — 85025 COMPLETE CBC W/AUTO DIFF WBC: CPT | Performed by: STUDENT IN AN ORGANIZED HEALTH CARE EDUCATION/TRAINING PROGRAM

## 2025-03-21 PROCEDURE — 99239 HOSP IP/OBS DSCHRG MGMT >30: CPT | Performed by: INTERNAL MEDICINE

## 2025-03-21 PROCEDURE — 82948 REAGENT STRIP/BLOOD GLUCOSE: CPT

## 2025-03-21 PROCEDURE — 80048 BASIC METABOLIC PNL TOTAL CA: CPT | Performed by: STUDENT IN AN ORGANIZED HEALTH CARE EDUCATION/TRAINING PROGRAM

## 2025-03-21 PROCEDURE — 99283 EMERGENCY DEPT VISIT LOW MDM: CPT

## 2025-03-21 RX ORDER — LANOLIN ALCOHOL/MO/W.PET/CERES
1 CREAM (GRAM) TOPICAL
Status: DISCONTINUED | OUTPATIENT
Start: 2025-03-22 | End: 2025-03-25 | Stop reason: HOSPADM

## 2025-03-21 RX ORDER — ASPIRIN 81 MG/1
81 TABLET, CHEWABLE ORAL DAILY
Status: DISCONTINUED | OUTPATIENT
Start: 2025-03-22 | End: 2025-03-25 | Stop reason: HOSPADM

## 2025-03-21 RX ORDER — POLYETHYLENE GLYCOL 3350 17 G/17G
17 POWDER, FOR SOLUTION ORAL DAILY
Status: DISCONTINUED | OUTPATIENT
Start: 2025-03-22 | End: 2025-03-25 | Stop reason: HOSPADM

## 2025-03-21 RX ORDER — HEPARIN SODIUM 5000 [USP'U]/ML
5000 INJECTION, SOLUTION INTRAVENOUS; SUBCUTANEOUS EVERY 8 HOURS SCHEDULED
Status: DISCONTINUED | OUTPATIENT
Start: 2025-03-22 | End: 2025-03-25 | Stop reason: HOSPADM

## 2025-03-21 RX ORDER — CLONAZEPAM 1 MG/1
1 TABLET ORAL 2 TIMES DAILY
Status: DISCONTINUED | OUTPATIENT
Start: 2025-03-22 | End: 2025-03-25 | Stop reason: HOSPADM

## 2025-03-21 RX ORDER — ATORVASTATIN CALCIUM 20 MG/1
20 TABLET, FILM COATED ORAL DAILY
Status: DISCONTINUED | OUTPATIENT
Start: 2025-03-22 | End: 2025-03-25 | Stop reason: HOSPADM

## 2025-03-21 RX ORDER — AMOXICILLIN 250 MG
1 CAPSULE ORAL
Status: DISCONTINUED | OUTPATIENT
Start: 2025-03-21 | End: 2025-03-25 | Stop reason: HOSPADM

## 2025-03-21 RX ORDER — TRIHEXYPHENIDYL HYDROCHLORIDE 2 MG/1
2 TABLET ORAL 2 TIMES DAILY
Status: DISCONTINUED | OUTPATIENT
Start: 2025-03-21 | End: 2025-03-25 | Stop reason: HOSPADM

## 2025-03-21 RX ORDER — INSULIN LISPRO 100 [IU]/ML
1-6 INJECTION, SOLUTION INTRAVENOUS; SUBCUTANEOUS
Status: DISCONTINUED | OUTPATIENT
Start: 2025-03-21 | End: 2025-03-25 | Stop reason: HOSPADM

## 2025-03-21 RX ORDER — PHENOBARBITAL 32.4 MG/1
64.8 TABLET ORAL EVERY 12 HOURS SCHEDULED
Status: DISCONTINUED | OUTPATIENT
Start: 2025-03-21 | End: 2025-03-25 | Stop reason: HOSPADM

## 2025-03-21 RX ORDER — LEVOTHYROXINE SODIUM 100 UG/1
100 TABLET ORAL
Status: DISCONTINUED | OUTPATIENT
Start: 2025-03-22 | End: 2025-03-25 | Stop reason: HOSPADM

## 2025-03-21 RX ORDER — ACETAMINOPHEN 325 MG/1
975 TABLET ORAL EVERY 8 HOURS PRN
Status: DISCONTINUED | OUTPATIENT
Start: 2025-03-21 | End: 2025-03-25 | Stop reason: HOSPADM

## 2025-03-21 RX ORDER — FLUTICASONE PROPIONATE 50 MCG
2 SPRAY, SUSPENSION (ML) NASAL DAILY
Status: DISCONTINUED | OUTPATIENT
Start: 2025-03-22 | End: 2025-03-25 | Stop reason: HOSPADM

## 2025-03-21 RX ADMIN — LIDOCAINE 1 PATCH: 50 PATCH CUTANEOUS at 08:44

## 2025-03-21 RX ADMIN — TRIHEXYPHENIDYL HYDROCHLORIDE 2 MG: 2 TABLET ORAL at 08:45

## 2025-03-21 RX ADMIN — Medication 1 TABLET: at 08:44

## 2025-03-21 RX ADMIN — TRIHEXYPHENIDYL HYDROCHLORIDE 2 MG: 2 TABLET ORAL at 13:25

## 2025-03-21 RX ADMIN — FLUTICASONE PROPIONATE 2 SPRAY: 50 SPRAY, METERED NASAL at 13:25

## 2025-03-21 RX ADMIN — PHENOBARBITAL 64.8 MG: 32.4 TABLET ORAL at 08:45

## 2025-03-21 RX ADMIN — POLYETHYLENE GLYCOL 3350 17 G: 17 POWDER, FOR SOLUTION ORAL at 08:44

## 2025-03-21 RX ADMIN — BENZONATATE 100 MG: 100 CAPSULE ORAL at 09:39

## 2025-03-21 RX ADMIN — HEPARIN SODIUM 5000 UNITS: 5000 INJECTION INTRAVENOUS; SUBCUTANEOUS at 13:25

## 2025-03-21 RX ADMIN — HEPARIN SODIUM 5000 UNITS: 5000 INJECTION INTRAVENOUS; SUBCUTANEOUS at 05:25

## 2025-03-21 RX ADMIN — ASPIRIN 81 MG: 81 TABLET, CHEWABLE ORAL at 08:45

## 2025-03-21 RX ADMIN — CLONAZEPAM 1 MG: 1 TABLET ORAL at 08:44

## 2025-03-21 RX ADMIN — LEVOTHYROXINE SODIUM 100 MCG: 100 TABLET ORAL at 05:24

## 2025-03-21 NOTE — ASSESSMENT & PLAN NOTE
Patient presenting after x3 witnessed falls at group home with -LOC, +HS, +ASA  Patient presented as a trauma alert, CT AP demonstrated age-indeterminate T12 compression fracture, CTH and CT C-spine negative for acute pathology  Spoke to  who states the falls were mechanical. He felt she was unsteady on her feet. Denies any LOC- pt endorsing CP/SOB/nausea/palpitations/diaphoresis/vomiting  Concern for Polypharmacy, low suspicion for cardiac cause or syncope   PT/OT level 2. STR Replaced by [facility]. Patient medically stable for discharge. Expect the patient to need less than 30 days in rehab.        Plan:  Fall precautions  Consult psych recommendations appreciated  Discontinue Ativan  Continue Klonopin 1 mg twice daily  Zyprexa 15 mg HS  Can increase to 20 mg HS if patient has persistent symptoms of paranoia and is tolerating 15 HS

## 2025-03-21 NOTE — QUICK NOTE
Patient not formally seen by Psychiatry team today, but chart reviewed since last encounter.     Medication changes made as follows:   Increased Zyprexa to 15 mg hs for paranoia and mood stabilization   Patient DC'd from inpatient psychiatry unit on 20 mg daily total and given ongoing paranoia (though suspect some is baseline for patient per chart review), this is an indicated increase   Patient presently on phenobarbital which induces CY metabolism, thus Zyprexa level is likely lower than anticipated  If patient continues to experience paranoia and is tolerating 15 mg, can potentially increase to 20 mg daily in the rehab - would recommend documentation of this upon DC  Could consider checking orthostatic vital signs as Zyprexa is titrated upward as there is potential for orthostasis - however, recent vital signs are WNL at this time       Cindy Louis, DO   Psychiatry Residency, PGY-2

## 2025-03-21 NOTE — CASE MANAGEMENT
Case Management Progress Note    Patient name Laine Tse  Location S /S -01 MRN [id number]  : 1955 Date 3/21/2025       LOS (days): 3  Geometric Mean LOS (GMLOS) (days): 4.6  Days to GMLOS:2        OBJECTIVE:        Current admission status: Inpatient  Preferred Pharmacy:   [facility] [address]  [address] [facility] [address]  Phone: [phone] Fax: [phone]    Primary Care Provider: Adilson Yip MD    Primary Insurance: MEDICARE  Secondary Insurance:     PROGRESS NOTE:  CM contacted [facility] at [phone] re: the patient's  and to relay that the patient's  Vikash would like the patient to go to rehab. There was no answer therefore CM left VM asking for a call back and provide this CM's direct line as well as the patient's unit CM direct line.     Addendum: CM received phone call from the patient's  Vikash Jones re: the patient, if she was transferred to SNF so he could provide an update to the patient's family. CM informed Vikash that the patient is still in the hospital and has been moved to Med Surg 3. CM provided vikash with patient's room number and told Vikash that the patient's unit CM will follow up with him. CM notified the patient's unit CM of the call.

## 2025-03-21 NOTE — PLAN OF CARE
Problem: Potential for Falls  Goal: Patient will remain free of falls  Description: INTERVENTIONS:  - Educate patient/family on patient safety including physical limitations  - Instruct patient to call for assistance with activity   - Consult OT/PT to assist with strengthening/mobility   - Keep Call bell within reach  - Keep bed low and locked with side rails adjusted as appropriate  - Keep care items and personal belongings within reach  - Initiate and maintain comfort rounds  - Make Fall Risk Sign visible to staff  - Offer Toileting every 2 Hours, in advance of need  - Initiate/Maintain bed alarm  - Obtain necessary fall risk management equipment: alarms  - Apply yellow socks and bracelet for high fall risk patients  - Consider moving patient to room near nurses station  Outcome: Completed     Problem: Prexisting or High Potential for Compromised Skin Integrity  Goal: Skin integrity is maintained or improved  Description: INTERVENTIONS:  - Identify patients at risk for skin breakdown  - Assess and monitor skin integrity  - Assess and monitor nutrition and hydration status  - Monitor labs   - Assess for incontinence   - Turn and reposition patient  - Assist with mobility/ambulation  - Relieve pressure over bony prominences  - Avoid friction and shearing  - Provide appropriate hygiene as needed including keeping skin clean and dry  - Evaluate need for skin moisturizer/barrier cream  - Collaborate with interdisciplinary team   - Patient/family teaching  - Consider wound care consult   Outcome: Completed     Problem: PAIN - ADULT  Goal: Verbalizes/displays adequate comfort level or baseline comfort level  Description: Interventions:  - Encourage patient to monitor pain and request assistance  - Assess pain using appropriate pain scale  - Administer analgesics based on type and severity of pain and evaluate response  - Implement non-pharmacological measures as appropriate and evaluate response  - Consider cultural  and social influences on pain and pain management  - Notify physician/advanced practitioner if interventions unsuccessful or patient reports new pain  Outcome: Completed     Problem: INFECTION - ADULT  Goal: Absence or prevention of progression during hospitalization  Description: INTERVENTIONS:  - Assess and monitor for signs and symptoms of infection  - Monitor lab/diagnostic results  - Monitor all insertion sites, i.e. indwelling lines, tubes, and drains  - Monitor endotracheal if appropriate and nasal secretions for changes in amount and color  - [place] appropriate cooling/warming therapies per order  - Administer medications as ordered  - Instruct and encourage patient and family to use good hand hygiene technique  - Identify and instruct in appropriate isolation precautions for identified infection/condition  Outcome: Completed     Problem: DISCHARGE PLANNING  Goal: Discharge to home or other facility with appropriate resources  Description: INTERVENTIONS:  - Identify barriers to discharge w/patient and caregiver  - Arrange for needed discharge resources and transportation as appropriate  - Identify discharge learning needs (meds, wound care, etc.)  - Arrange for interpretive services to assist at discharge as needed  - Refer to Case Management Department for coordinating discharge planning if the patient needs post-hospital services based on physician/advanced practitioner order or complex needs related to functional status, cognitive ability, or social support system  Outcome: Completed     Problem: Knowledge Deficit  Goal: Patient/family/caregiver demonstrates understanding of disease process, treatment plan, medications, and discharge instructions  Description: Complete learning assessment and assess knowledge base.  Interventions:  - Provide teaching at level of understanding  - Provide teaching via preferred learning methods  Outcome: Completed

## 2025-03-21 NOTE — PLAN OF CARE
Problem: Potential for Falls  Goal: Patient will remain free of falls  Description: INTERVENTIONS:  - Educate patient/family on patient safety including physical limitations  - Instruct patient to call for assistance with activity   - Consult OT/PT to assist with strengthening/mobility   - Keep Call bell within reach  - Keep bed low and locked with side rails adjusted as appropriate  - Keep care items and personal belongings within reach  - Initiate and maintain comfort rounds  - Make Fall Risk Sign visible to staff  - Offer Toileting every 2 Hours, in advance of need  - Initiate/Maintain bed alarm  - Obtain necessary fall risk management equipment: alarms  - Apply yellow socks and bracelet for high fall risk patients  - Consider moving patient to room near nurses station  Outcome: Progressing     Problem: Prexisting or High Potential for Compromised Skin Integrity  Goal: Skin integrity is maintained or improved  Description: INTERVENTIONS:  - Identify patients at risk for skin breakdown  - Assess and monitor skin integrity  - Assess and monitor nutrition and hydration status  - Monitor labs   - Assess for incontinence   - Turn and reposition patient  - Assist with mobility/ambulation  - Relieve pressure over bony prominences  - Avoid friction and shearing  - Provide appropriate hygiene as needed including keeping skin clean and dry  - Evaluate need for skin moisturizer/barrier cream  - Collaborate with interdisciplinary team   - Patient/family teaching  - Consider wound care consult   Outcome: Progressing     Problem: PAIN - ADULT  Goal: Verbalizes/displays adequate comfort level or baseline comfort level  Description: Interventions:  - Encourage patient to monitor pain and request assistance  - Assess pain using appropriate pain scale  - Administer analgesics based on type and severity of pain and evaluate response  - Implement non-pharmacological measures as appropriate and evaluate response  - Consider cultural  and social influences on pain and pain management  - Notify physician/advanced practitioner if interventions unsuccessful or patient reports new pain  Outcome: Progressing     Problem: INFECTION - ADULT  Goal: Absence or prevention of progression during hospitalization  Description: INTERVENTIONS:  - Assess and monitor for signs and symptoms of infection  - Monitor lab/diagnostic results  - Monitor all insertion sites, i.e. indwelling lines, tubes, and drains  - Monitor endotracheal if appropriate and nasal secretions for changes in amount and color  - [place] appropriate cooling/warming therapies per order  - Administer medications as ordered  - Instruct and encourage patient and family to use good hand hygiene technique  - Identify and instruct in appropriate isolation precautions for identified infection/condition  Outcome: Progressing     Problem: DISCHARGE PLANNING  Goal: Discharge to home or other facility with appropriate resources  Description: INTERVENTIONS:  - Identify barriers to discharge w/patient and caregiver  - Arrange for needed discharge resources and transportation as appropriate  - Identify discharge learning needs (meds, wound care, etc.)  - Arrange for interpretive services to assist at discharge as needed  - Refer to Case Management Department for coordinating discharge planning if the patient needs post-hospital services based on physician/advanced practitioner order or complex needs related to functional status, cognitive ability, or social support system  Outcome: Progressing     Problem: Knowledge Deficit  Goal: Patient/family/caregiver demonstrates understanding of disease process, treatment plan, medications, and discharge instructions  Description: Complete learning assessment and assess knowledge base.  Interventions:  - Provide teaching at level of understanding  - Provide teaching via preferred learning methods  Outcome: Progressing

## 2025-03-21 NOTE — PLAN OF CARE
Problem: PHYSICAL THERAPY ADULT  Goal: Performs mobility at highest level of function for planned discharge setting.  See evaluation for individualized goals.  Description: Treatment/Interventions: Functional transfer training, LE strengthening/ROM, Therapeutic exercise, Endurance training, Patient/family training, Equipment eval/education, Bed mobility, Gait training  Equipment Recommended: Walker       See flowsheet documentation for full assessment, interventions and recommendations.  Outcome: Progressing  Note: Prognosis: Fair  Problem List: Decreased strength, Decreased endurance, Impaired balance, Decreased mobility, Impaired judgement, Decreased cognition, Decreased safety awareness  Assessment: PT treatment provided today to address deficits of: impaired balance, decreased activity tolerance, and gait deviations. Interventions provided include: transfer, balance, and gait training in order to challenge pt's activity tolerance and to maximize pt independence w/ functional mobility during current admission. Compared to previous session, pt w/ improvement in ambulation balance requiring decreased amount of assistance; however, pt limited by her impaired cognition and overall limited agreeableness to participate in therapy intervention w/ pt adamantly declining additional mobility trial and or trial of RW. Pt continues to be functioning below baseline level, and remains limited in functional mobility due to impaired cognition, impaired balance, decreased activity tolerance. Pt will continue benefit from PT to promote independence w/ functional mobility, address mobility deficits, and progress towards set goals. Recommend DC w/ level: II (Moderate Rehab Resource Intensity) when medically cleared.        Rehab Resource Intensity Level, PT: II (Moderate Resource Intensity)    See flowsheet documentation for full assessment.

## 2025-03-21 NOTE — PROGRESS NOTES
Progress Note - Hospitalist   Name: Laine Tse 69 y.o. female I MRN: [id number]  Unit/Bed#: S -01 I Date of Admission: 3/18/2025   Date of Service: 3/21/2025 I Hospital Day: 3    Assessment & Plan  Fall  Patient presenting after x3 witnessed falls at group home with -LOC, +HS, +ASA  Patient presented as a trauma alert, CT AP demonstrated age-indeterminate T12 compression fracture, CTH and CT C-spine negative for acute pathology  Spoke to  who states the falls were mechanical. He felt she was unsteady on her feet. Denies any LOC- pt endorsing CP/SOB/nausea/palpitations/diaphoresis/vomiting  Concern for Polypharmacy, low suspicion for cardiac cause or syncope   PT/OT level 2. [facility]. Patient medically stable for discharge. Expect the patient to need less than 30 days in rehab.        Plan:  Fall precautions  Consult psych recommendations appreciated  Discontinue Ativan  Continue Klonopin 1 mg twice daily  Zyprexa 15 mg HS  Can increase to 20 mg HS if patient has persistent symptoms of paranoia and is tolerating 15 HS   T12 compression fracture (HCC)  Patient presented as trauma   CT AP: Mild superior endplate compression deformity at T12, no obvious acute fracture lines identified.     Plan:  NSGY consult, recommendations appreciated  LSO brace PRN for comfort   Seizure disorder (HCC)  Home med: Phenobarbital 64.8 mg q12  Ck normal  phenobarbital level normal 18.8    Plan:    Continue Phenobarbital 64.8 mg q12  Schizoaffective disorder (HCC)  Home med: Klonopin 1 mg BID, Zyprexa 5 mg QD and 15 mg qHS, Trihexyphenidyl 2 mg BID    Plan:  Medications as above   Hyperlipidemia  Home med: Atorvastatin 20 mg QD    Plan:  Atorvastatin 20 mg QD  Hypothyroidism  Home Med: Levothyroxine 100 mcg qAM  2/13/25: TSH 0.920    Plan:  Levothyroxine 100 mcg qAM  Benign essential hypertension      VTE Pharmacologic Prophylaxis: VTE Score: 3 Moderate Risk (Score 3-4) - Pharmacological DVT Prophylaxis  Ordered: heparin.    Mobility:   Basic Mobility Inpatient Raw Score: 17  JH-HLM Goal: 5: Stand one or more mins  JH-HLM Achieved: 6: Walk 10 steps or more  JH-HLM Goal achieved. Continue to encourage appropriate mobility.    Patient Centered Rounds: I performed bedside rounds with nursing staff today.   Discussions with Specialists or Other Care Team Provider: neurosurgery     Education and Discussions with Family / Patient: Updated  (wife) via phone.    Current Length of Stay: 3 day(s)  Current Patient Status: Inpatient   Certification Statement: The patient will continue to require additional inpatient hospital stay due to neurosurgery eval and STR placement  Discharge Plan: Anticipate discharge in 24-48 hrs to rehab facility.    Code Status: Level 1 - Full Code    Subjective   Patient seen and examined at bedside. No overnight events      Objective :  Temp:  [96.7 °F (35.9 °C)-98.3 °F (36.8 °C)] 96.7 °F (35.9 °C)  HR:  [71-80] 76  BP: (101-137)/(72-78) 101/72  Resp:  [18] 18  SpO2:  [94 %] 94 %  O2 Device: None (Room air)    Body mass index is 29.48 kg/m².     Input and Output Summary (last 24 hours):     Intake/Output Summary (Last 24 hours) at 3/21/2025 1647  Last data filed at 3/21/2025 1300  Gross per 24 hour   Intake 600 ml   Output 400 ml   Net 200 ml       Physical Exam  Constitutional:       General: She is awake.      Appearance: She is well-developed.   HENT:      Head: Normocephalic and atraumatic.      Right Ear: Hearing normal.      Left Ear: Hearing normal.   Eyes:      Conjunctiva/sclera: Conjunctivae normal.   Cardiovascular:      Rate and Rhythm: Normal rate.   Pulmonary:      Effort: Pulmonary effort is normal. No respiratory distress.   Musculoskeletal:         General: Normal range of motion.      Thoracic back: No tenderness.   Skin:     General: Skin is warm.   Neurological:      Mental Status: She is alert.   Psychiatric:         Mood and Affect: Mood and affect normal.          Speech: Speech normal.         Behavior: Behavior is cooperative.         Cognition and Memory: Memory normal. Cognition is impaired.         Judgment: Judgment normal.       Lines/Drains:          Lab Results: I have reviewed the following results:   Results from last 7 days   Lab Units 03/19/25  0622   WBC Thousand/uL 2.53*   HEMOGLOBIN g/dL 12.5   HEMATOCRIT % 38.0   PLATELETS Thousands/uL 226   SEGS PCT % 50   LYMPHO PCT % 40   MONO PCT % 10   EOS PCT % 0     Results from last 7 days   Lab Units 03/19/25  0622 03/18/25  1555   SODIUM mmol/L 141 141   POTASSIUM mmol/L 4.1 3.7   CHLORIDE mmol/L 108 106   CO2 mmol/L 28 28   BUN mg/dL 11 12   CREATININE mg/dL 0.61 0.65   ANION GAP mmol/L 5 7   CALCIUM mg/dL 8.8 9.1   ALBUMIN g/dL  --  3.9   TOTAL BILIRUBIN mg/dL  --  0.26   ALK PHOS U/L  --  55   ALT U/L  --  13   AST U/L  --  14   GLUCOSE RANDOM mg/dL 82 88                       Recent Cultures (last 7 days):               Last 24 Hours Medication List:     Current Facility-Administered Medications:     acetaminophen (TYLENOL) tablet 650 mg, Q6H PRN    aspirin chewable tablet 81 mg, Daily    atorvastatin (LIPITOR) tablet 20 mg, Daily With Dinner    benzonatate (TESSALON PERLES) capsule 100 mg, TID PRN    calcium carbonate-vitamin D 500 mg-5 mcg tablet 1 tablet, Daily With Breakfast    clonazePAM (KlonoPIN) tablet 1 mg, BID    fluticasone (FLONASE) 50 mcg/act nasal spray 2 spray, Daily    heparin (porcine) subcutaneous injection 5,000 Units, Q8H JANNIE **AND** [CANCELED] Platelet count, Once    levothyroxine tablet 100 mcg, Early Morning    lidocaine (LIDODERM) 5 % patch 1 patch, Daily    menthol-methyl salicylate (BENGAY) 10-15 % cream, 4x Daily PRN    OLANZapine (ZyPREXA) tablet 15 mg, HS    PHENobarbital tablet 64.8 mg, Q12H JANNIE    polyethylene glycol (MIRALAX) packet 17 g, BID    senna-docusate sodium (SENOKOT S) 8.6-50 mg per tablet 1 tablet, HS    traZODone (DESYREL) tablet 150 mg, HS    trihexyphenidyl (ARTANE)  tablet 2 mg, TID With Meals    Facility-Administered Medications Ordered in Other Encounters:     lactated ringers infusion, Continuous PRN    Administrative Statements   Today, Patient Was Seen By: Sakshi Packer MD      **Please Note: This note may have been constructed using a voice recognition system.**

## 2025-03-21 NOTE — PHYSICAL THERAPY NOTE
PHYSICAL THERAPY TREATMENT NOTE          Patient Name: Laine Tse  Today's Date: 3/21/2025          AGE:   69 y.o.  Mrn:   [id number]  ADMIT DX:  Paranoia ([facility]) [F22]  Adnexal cyst [N94.9]  T12 compression fracture ([facility]) [S22.080A]  Closed head injury, initial encounter [S09.90XA]  Compression fracture of T12 vertebra, initial encounter ([facility]) [S22.080A]  Ambulatory dysfunction [R26.2]  Unspecified multiple injuries, initial encounter [T07.XXXA]    Past Medical History:  Past Medical History:   Diagnosis Date    Anxiety     Benign essential hypertension     resolved; 06/15/16    Depression     Depression with anxiety     Fracture of fifth metatarsal bone of right foot with delayed healing     last assessed 04/12/16; fracture of metatarsal bone, right, closed, initial encounter    Hyperlipidemia     last assessed 11/28/17    Hypothyroidism     last assessed 11/28/2017    Insomnia     Obesity     Schizoaffective disorder ([facility])     last assessed 05/18/2017    Seizure disorder ([facility])     last assessed 03/28/17    Seizures ([facility])           03/21/25 0849   PT Last Visit   PT Visit Date 03/21/25   Note Type   Note Type Treatment for insurance authorization   Pain Assessment   Pain Assessment Tool FLACC   Pain Rating: FLACC (Rest) - Face 0   Pain Rating: FLACC (Rest) - Legs 0   Pain Rating: FLACC (Rest) - Activity 0   Pain Rating: FLACC (Rest) - Cry 0   Pain Rating: FLACC (Rest) - Consolability 0   Score: FLACC (Rest) 0   Pain Rating: FLACC (Activity) - Face 0   Pain Rating: FLACC (Activity) - Legs 0   Pain Rating: FLACC (Activity) - Activity 0   Pain Rating: FLACC (Activity) - Cry 0   Pain Rating: FLACC (Activity) - Consolability 0   Score: FLACC (Activity) 0   Restrictions/Precautions   Weight Bearing Precautions Per Order No   Braces or Orthoses LSO  (as needed for comfort, pt declined donned during PT session)   Other Precautions Cognitive;Chair Alarm;Bed  Alarm;1:1;Impulsive;Fall Risk   General   Additional Pertinent History Pt w/ T12 compression fx; Neurosurgery: LSO brace prn for comfort   Cognition   Overall Cognitive Status Impaired   Arousal/Participation Alert   Attention Difficulty attending to directions   Orientation Level Oriented to person   Memory Decreased recall of precautions;Decreased short term memory;Decreased recall of recent events   Following Commands Follows one step commands inconsistently   Comments Pt ID via name and ; pt w/ schizoaffective disorder, limited active participation in therapy intervention   Bed Mobility   Supine to Sit 5  Supervision   Additional items Increased time required;Verbal cues   Additional Comments able to maintain sitting balance at EOB w/ supervision   Transfers   Sit to Stand 5  Supervision   Additional items Assist x 1;Verbal cues   Stand to Sit 5  Supervision   Additional items Assist x 1;Verbal cues   Additional Comments close supervision during transitional movement due to pt's impulsivity and decreased safety awareness, Ax1 w/ SPC in LUE to maintain standing balance   Ambulation/Elevation   Gait pattern Improper Weight shift;Wide ASHLI;Decreased foot clearance;Decreased hip extension   Gait Assistance 4  Minimal assist   Additional items Assist x 1   Assistive Device Straight cane  (LUE)   Distance 15'   Ambulation/Elevation Additional Comments pt repeatedly declining additional ambulation trial at this time   Balance   Static Sitting Fair +   Dynamic Sitting Fair   Static Standing Poor +   Dynamic Standing Poor +   Ambulatory Fair -  (w/ SPC)   Activity Tolerance   Activity Tolerance Other (Comment)  (pt limited by cognition)   Medical Staff Made Aware Pt benefited from PT/OT care coordination w/ OT Vanessa due to cognitive/behavioral impairments affecting functional mobility, PT and OT goals were addressed individually during session; DALE Raman   Nurse Made Aware NATTY Downey   Assessment   Prognosis Fair    Problem List Decreased strength;Decreased endurance;Impaired balance;Decreased mobility;Impaired judgement;Decreased cognition;Decreased safety awareness   Assessment PT treatment provided today to address deficits of: impaired balance, decreased activity tolerance, and gait deviations. Interventions provided include: transfer, balance, and gait training in order to challenge pt's activity tolerance and to maximize pt independence w/ functional mobility during current admission. Compared to previous session, pt w/ improvement in ambulation balance requiring decreased amount of assistance; however, pt limited by her impaired cognition and overall limited agreeableness to participate in therapy intervention w/ pt adamantly declining additional mobility trial and or trial of RW. Pt continues to be functioning below baseline level, and remains limited in functional mobility due to impaired cognition, impaired balance, decreased activity tolerance. Pt will continue benefit from PT to promote independence w/ functional mobility, address mobility deficits, and progress towards set goals. Recommend DC w/ level: II (Moderate Rehab Resource Intensity) when medically cleared.   Goals   Patient Goals to go for a walk later   STG Expiration Date 03/29/15   Short Term Goal #1 Pt will be able to: (1) perform bed mobility with supervision to promote OOB activity (2) perform sit to stand with supervision to decrease burden of care (3) ambulate at least 200` with supervision and least restrictive AD to increase activity tolerance (4) increase standing balance by 1 grade to decrease risk of falls   PT Treatment Day 2   Plan   Treatment/Interventions Functional transfer training;LE strengthening/ROM;Therapeutic exercise;Endurance training;Cognitive reorientation;Patient/family training;Equipment eval/education;Bed mobility;Gait training;Compensatory technique education   Progress Slow progress, cognitive deficits   PT Frequency  3-5x/wk   Discharge Recommendation   Rehab Resource Intensity Level, PT II (Moderate Resource Intensity)   AM-PAC Basic Mobility Inpatient   Turning in Flat Bed Without Bedrails 4   Lying on Back to Sitting on Edge of Flat Bed Without Bedrails 3   Moving Bed to Chair 3   Standing Up From Chair Using Arms 3   Walk in Room 3   Climb 3-5 Stairs With Railing 1   Basic Mobility Inpatient Raw Score 17   Basic Mobility Standardized Score 39.67   [facility] Highest Level Of Mobility   -HLM Goal 5: Stand one or more mins   -HLM Achieved 6: Walk 10 steps or more   Education   Education Provided Mobility training   Patient Reinforcement needed   End of Consult   Patient Position at End of Consult   (pt sitting OOB in the couch in her room, 1:1 present at bedside)       The patient's AM-PAC Basic Mobility Inpatient Short Form Raw Score is 17. A Raw score of greater than 16 suggests the patient may benefit from discharge to home. Please also refer to the recommendation of the Physical Therapist for safe discharge planning.    Pt will continue to benefit from skilled inpatient PT during this admission in order to facilitate progress towards goals and to maximize pt's independence w/ functional mobility.    DC rec: level II (Moderate Rehab Resource Intensity)      Imani Holman, PT, DPT  03/21/25

## 2025-03-21 NOTE — PLAN OF CARE
Problem: OCCUPATIONAL THERAPY ADULT  Goal: Performs self-care activities at highest level of function for planned discharge setting.  See evaluation for individualized goals.  Description: Treatment Interventions: ADL retraining, Functional transfer training, Endurance training, Patient/family training, Cognitive reorientation, Equipment evaluation/education, Compensatory technique education, Continued evaluation, Energy conservation, Activityengagement          See flowsheet documentation for full assessment, interventions and recommendations.   Outcome: Progressing  Note: Limitation: Decreased ADL status, Decreased cognition, Decreased endurance, Decreased Safe judgement during ADL, Decreased self-care trans, Decreased high-level ADLs (impaired pain, activity tolerance, standing tolerance, balance, forward functional reach, insight into deficits,)     Assessment: Pt seen on this date for skilled OT treatment session. At start of session pt sitting EOB. Pt requiring extensive encouragement to participate. Tangential in conversation and requiring extensive VC for attention. Pt with improved functional mobility and transfers, continues to be limited by cognition. Limited participation in ADL tasks this session due to unwillingness to participate despite edu/encouragement. Pt would continue to benefit from skilled OT treatment sessions in order to address remaining deficits     Rehab Resource Intensity Level, OT: II (Moderate Resource Intensity)

## 2025-03-21 NOTE — ASSESSMENT & PLAN NOTE
Home med: Klonopin 1 mg BID, Zyprexa 5 mg QD and 15 mg qHS, Trihexyphenidyl 2 mg BID    Plan:  Medications as above

## 2025-03-21 NOTE — INCIDENTAL FINDINGS
The following findings require follow up:  Radiographic finding   Finding: Cystic-appearing right adnexal lesion measuring up to 6.5 cm.    Follow up required: Pelvic ultrasound   Follow up should be done soon as possible    Please notify the following clinician to assist with the follow up:   Dr. Yip    Incidental finding results were discussed with the patient's sister Kati Wilson by Sakshi Packer MD on 03/21/25.  They expressed understanding and all questions answered.

## 2025-03-21 NOTE — ASSESSMENT & PLAN NOTE
Patient presented as trauma   CT AP: Mild superior endplate compression deformity at T12, no obvious acute fracture lines identified.     Plan:  NSGY consult, recommendations appreciated  LSO brace PRN for comfort

## 2025-03-21 NOTE — DISCHARGE INSTR - AVS FIRST PAGE
Dear Laine Tse,     It was our pleasure to care for you here at [facility].  It is our hope that we were always able to exceed the expected standards for your care during your stay.  You were hospitalized due to falls.  You were cared for on the third floor by Sakshi Packer MD under the service of Bran Busby DO with the [facility] Hospitalist Group who covers for your primary care physician (PCP), Adilson Yip MD, while you were hospitalized.  If you have any questions or concerns related to this hospitalization, you may contact us at .  For follow up as well as any medication refills, we recommend that you follow up with your primary care physician.  A registered nurse will reach out to you by phone within a few days after your discharge to answer any additional questions that you may have after going home.  However, at this time we provide for you here, the most important instructions / recommendations at discharge:     Notable Medication Adjustments -   Stop daily Ativan.  This medication should not be co-administered Klonopin  Continue Zyprexa 15 mg, 1 tablet, daily at bedtime. If patient continues to experience paranoia and is tolerating 15 mg of zyprexa.Can uptitrate to 20 mg daily per psychiatry.  No other medication changes were made  Testing Required after Discharge -   Please pelvic have a pelvic ultrasound to further evaluate cystic-appearing right adnexal lesion  Please have a complete blood count repeated in 1 week.  Your primary care provider can order this test  Important follow up information -   Follow-up with primary care physician within 1 week of discharge  Other Instructions -   Return to the emergency department if you experience increase in falls and change in behavior altered mental status new or worsening symptoms.  Please wear your LSO brace as needed, for comfort, to help with any back pain  Please review this entire  after visit summary as additional general instructions including medication list, appointments, activity, diet, any pertinent wound care, and other additional recommendations from your care team that may be provided for you.      Sincerely,     Sakshi Packer MD

## 2025-03-21 NOTE — CASE MANAGEMENT
Case Management Discharge Planning Note    Patient name Laine Tse  Location S /S -01 MRN [id number]  : 1955 Date 3/21/2025       Current Admission Date: 3/18/2025  Current Admission Diagnosis:Fall   Patient Active Problem List    Diagnosis Date Noted Date Diagnosed    T12 compression fracture (HCC) 2025     Severe protein-calorie malnutrition (HCC) 2025     Skin lesion of left lower extremity 2024     Chronic cough 2023     T wave inversion on electrocardiogram 2023     Rash 2023     Dermatitis 2023     Chronic midline low back pain without sciatica 2021     Class 1 obesity with body mass index (BMI) of 32.0 to 32.9 in adult 2021     Decreased hearing of both ears 2019     Benign essential hypertension 2019     Pre-diabetes 2019     Hyperglycemia 2018     Fall 2018     Injury of right toe, initial encounter 2018     Unsteady gait 10/03/2017     Seizure disorder (HCC) 05/15/2017     Anemia 05/15/2017     Hyponatremia 2015     Folic acid deficiency 10/15/2012     Hyperlipidemia 10/05/2012     Hypothyroidism 10/05/2012     Schizoaffective disorder (HCC) 10/05/2012     Osteoporosis 10/05/2012       LOS (days): 3  Geometric Mean LOS (GMLOS) (days): 4.6  Days to GMLOS:1.8     OBJECTIVE:  Risk of Unplanned Readmission Score: 20.8         Current admission status: Inpatient   Preferred Pharmacy:   [place] [address] [address]  Phone: [phone] Fax: [phone]    Primary Care Provider: Adilson Yip MD    Primary Insurance: MEDICARE  Secondary Insurance:     DISCHARGE DETAILS:      Accepting Facility Name, City & State : [facility]  Receiving Facility/Agency Phone Number: [phone]  Facility/Agency Fax Number: [phone]       Per SLIM provider, Pt is medically ready for discharge. CM called Clayton Jones- Assistant  Director at [facility]  group home.  DALE discussed that PT/OT worked with Pt today and she is progressing.  Clayton states that the Pt is in a [facility] group home and they are not able to provide much assistance with adls such as dressing, bathing, grooming, etc., or with ambulation. CM explained that Pt is walking with her cane, but is a bit unsteady and recommended for [facility]. James prefers that [facility] before returning to her group home. CM requested family contact info for the Pt. CM spoke with Pts sister, Kati (PH: [phone])  who is also requesting STR.  DALE discussed with both Clayton and Kati that [facility] is able to accept the Pt for STR. Both are agreeable with Pts d/c to [facility]. Kati's contact info added to face sheet.  Clayton and Kati were both provided the phone number and address for [facility].       Pt is leaving via  w/ [facility] at 4:00pm.     RN and SLIM provider aware.

## 2025-03-21 NOTE — OCCUPATIONAL THERAPY NOTE
"  Occupational Therapy Progress Note     Patient Name: Laine Tse  Today's Date: 3/21/2025  Problem List  Principal Problem:    Fall  Active Problems:    Hyperlipidemia    Hypothyroidism    Seizure disorder (HCC)    Benign essential hypertension    Schizoaffective disorder (HCC)    T12 compression fracture (HCC)            03/21/25 0901   OT Last Visit   OT Visit Date 03/21/25   Note Type   Note Type Treatment for insurance authorization   Pain Assessment   Pain Assessment Tool 0-10   Pain Score No Pain   Restrictions/Precautions   Weight Bearing Precautions Per Order No   Braces or Orthoses (S)  LSO  (PRN for comfort)   Other Precautions Cognitive;Chair Alarm;Bed Alarm;1:1;Impulsive;Fall Risk   Lifestyle   Autonomy Pt reports I w/ ADLs at baseline   Reciprocal Relationships Supportive staff at snf   Intrinsic Gratification enjoys praying and talking about her sister Kati ALMANZA   Grooming Comments despite encouragement unwilling to participate in grooming tasks   Toileting Comments pt noted to be incontinent of urine and encouraged to doff soiled underwear pt adamantly refusing   Bed Mobility   Supine to Sit 5  Supervision   Additional items Increased time required;Verbal cues   Transfers   Sit to Stand 5  Supervision   Additional items Increased time required;Verbal cues   Stand to Sit 5  Supervision   Additional items Increased time required;Verbal cues   Additional Comments impulsiveness with transfers, requiring cuing   Functional Mobility   Functional Mobility 4  Minimal assistance   Additional Comments Ax1, functional household distance, limited by cognition/attention to task   Additional items Rolling walker   Subjective   Subjective \"That is NOT my bible, I need you to throw it in the toilet\"   Cognition   Overall Cognitive Status Impaired   Arousal/Participation Alert;Cooperative   Attention Attends with cues to redirect   Orientation Level Oriented to person   Memory Decreased short term " memory;Decreased recall of recent events   Following Commands Follows one step commands with increased time or repetition   Comments Pt is a poor historian, argumentative throughout and HIGHLY tangential. Difficult to re-direct   Activity Tolerance   Activity Tolerance Patient tolerated treatment well   Medical Staff Made Aware PT NATTY Diallo   Assessment   Assessment Pt seen on this date for skilled OT treatment session. At start of session pt sitting EOB. Pt requiring extensive encouragement to participate. Tangential in conversation and requiring extensive VC for attention. Pt with improved functional mobility and transfers, continues to be limited by cognition. Limited participation in ADL tasks this session due to unwillingness to participate despite edu/encouragement. Pt would continue to benefit from skilled OT treatment sessions in order to address remaining deficits   Plan   Goal Expiration Date 03/26/25   OT Treatment Day 1   OT Frequency 3-5x/wk   Discharge Recommendation   Rehab Resource Intensity Level, OT II (Moderate Resource Intensity)   AM-PAC Daily Activity Inpatient   Lower Body Dressing 3   Bathing 3   Toileting 3   Upper Body Dressing 3   Grooming 3   Eating 3   Daily Activity Raw Score 18   Daily Activity Standardized Score (Calc for Raw Score >=11) 38.66   AM-PAC Applied Cognition Inpatient   Following a Speech/Presentation 2   Understanding Ordinary Conversation 3   Taking Medications 2   Remembering Where Things Are Placed or Put Away 2   Remembering List of 4-5 Errands 1   Taking Care of Complicated Tasks 1   Applied Cognition Raw Score 11   Applied Cognition Standardized Score 27.03   End of Consult   Patient Position at End of Consult Bedside chair;All needs within reach  (1:1 present in room)       Pt goals to be met by 3/26/25 to max I w/ ADLs and improve engagement to return home to [facility] w/ group home staff includes:     -Pt will demonstrate good attention and understanding spinal  precautions during ADLs to max I w/ ADLs and improve engagement to return to PLOF     -Pt will consistently don / doff LSO brace w/ S to minimize burden of care and improve pain to return to PLOF, baseline level of I     -Pt will complete bed mobility supine <> sit w/ mod I for + time in preparation for ADLs PROGRESSING     -Pt will complete functional tranfers to bed, chair,  and toilet using LRAD, DME as needed w/ mod I for + time PROGRESSING     -Pt will consistently engage in functional mobility using LRAD household distances w/ mod I PROGRESSING     -Pt will demonstrate improved functional standing tolerance for at least 10 minutes using LRAD as needed w/ at least fair balance while engaged in grooming in stance w/ mod I to max I to return to PLOF     -Pt will complete LBD w/ S w/ no more than 1 cue/ prompt to recall spinal precautions.      -Pt will demonstrate improved activity and sitting tolerance OOB In chair for all meals.  PROGRESSING       The patient's raw score on the -PAC Daily Activity Inpatient Short Form is 18. A raw score of less than 19 suggests the patient may benefit from discharge to post-acute rehabilitation services. HOWEVER please refer to the recommendation of the Occupational Therapist for safe discharge planning.    This session, pt required and most appropriately benefited from skilled OT/PT co-treat due to cognitive-communication impairments, cognitive-behavioral deficits, and unpredictable medical and/or functional status. OT and PT goals were addressed separately as seen in documentation.     Vanessa Torrez MS, OTR/L

## 2025-03-22 LAB
ATRIAL RATE: 80 BPM
GLUCOSE SERPL-MCNC: 85 MG/DL (ref 65–140)
P AXIS: -5 DEGREES
PR INTERVAL: 164 MS
QRS AXIS: 34 DEGREES
QRSD INTERVAL: 74 MS
QT INTERVAL: 368 MS
QTC INTERVAL: 425 MS
T WAVE AXIS: 47 DEGREES
VENTRICULAR RATE: 80 BPM

## 2025-03-22 PROCEDURE — 99222 1ST HOSP IP/OBS MODERATE 55: CPT | Performed by: INTERNAL MEDICINE

## 2025-03-22 PROCEDURE — 82948 REAGENT STRIP/BLOOD GLUCOSE: CPT

## 2025-03-22 PROCEDURE — 93010 ELECTROCARDIOGRAM REPORT: CPT | Performed by: INTERNAL MEDICINE

## 2025-03-22 PROCEDURE — 93005 ELECTROCARDIOGRAM TRACING: CPT

## 2025-03-22 PROCEDURE — 99222 1ST HOSP IP/OBS MODERATE 55: CPT | Performed by: GENERAL PRACTICE

## 2025-03-22 RX ORDER — WATER 10 ML/10ML
INJECTION INTRAMUSCULAR; INTRAVENOUS; SUBCUTANEOUS
Status: COMPLETED
Start: 2025-03-22 | End: 2025-03-22

## 2025-03-22 RX ORDER — OLANZAPINE 10 MG/2ML
5 INJECTION, POWDER, FOR SOLUTION INTRAMUSCULAR ONCE
Status: COMPLETED | OUTPATIENT
Start: 2025-03-22 | End: 2025-03-22

## 2025-03-22 RX ORDER — MUSCLE RUB CREAM 100; 150 MG/G; MG/G
CREAM TOPICAL 4 TIMES DAILY PRN
Status: DISCONTINUED | OUTPATIENT
Start: 2025-03-22 | End: 2025-03-25 | Stop reason: HOSPADM

## 2025-03-22 RX ORDER — OLANZAPINE 10 MG/1
20 TABLET ORAL
Status: DISCONTINUED | OUTPATIENT
Start: 2025-03-22 | End: 2025-03-25 | Stop reason: HOSPADM

## 2025-03-22 RX ADMIN — HEPARIN SODIUM 5000 UNITS: 5000 INJECTION INTRAVENOUS; SUBCUTANEOUS at 22:38

## 2025-03-22 RX ADMIN — Medication 6 MG: at 00:25

## 2025-03-22 RX ADMIN — HEPARIN SODIUM 5000 UNITS: 5000 INJECTION INTRAVENOUS; SUBCUTANEOUS at 05:32

## 2025-03-22 RX ADMIN — SENNOSIDES AND DOCUSATE SODIUM 1 TABLET: 50; 8.6 TABLET ORAL at 00:25

## 2025-03-22 RX ADMIN — ASPIRIN 81 MG CHEWABLE TABLET 81 MG: 81 TABLET CHEWABLE at 08:42

## 2025-03-22 RX ADMIN — TRIHEXYPHENIDYL HYDROCHLORIDE 2 MG: 2 TABLET ORAL at 08:42

## 2025-03-22 RX ADMIN — OLANZAPINE 15 MG: 10 TABLET, FILM COATED ORAL at 00:25

## 2025-03-22 RX ADMIN — Medication 1 TABLET: at 08:42

## 2025-03-22 RX ADMIN — TRAZODONE HYDROCHLORIDE 150 MG: 100 TABLET ORAL at 22:23

## 2025-03-22 RX ADMIN — Medication 6 MG: at 22:23

## 2025-03-22 RX ADMIN — OLANZAPINE 5 MG: 10 INJECTION, POWDER, FOR SOLUTION INTRAMUSCULAR at 16:42

## 2025-03-22 RX ADMIN — TRIHEXYPHENIDYL HYDROCHLORIDE 2 MG: 2 TABLET ORAL at 22:24

## 2025-03-22 RX ADMIN — WATER 10 ML: 1 INJECTION INTRAMUSCULAR; INTRAVENOUS; SUBCUTANEOUS at 16:43

## 2025-03-22 RX ADMIN — PHENOBARBITAL 64.8 MG: 32.4 TABLET ORAL at 22:27

## 2025-03-22 RX ADMIN — TRIHEXYPHENIDYL HYDROCHLORIDE 2 MG: 2 TABLET ORAL at 05:32

## 2025-03-22 RX ADMIN — SENNOSIDES AND DOCUSATE SODIUM 1 TABLET: 50; 8.6 TABLET ORAL at 22:25

## 2025-03-22 RX ADMIN — FLUTICASONE PROPIONATE 2 SPRAY: 50 SPRAY, METERED NASAL at 08:42

## 2025-03-22 RX ADMIN — LEVOTHYROXINE SODIUM 100 MCG: 100 TABLET ORAL at 05:32

## 2025-03-22 RX ADMIN — OLANZAPINE 20 MG: 10 TABLET, FILM COATED ORAL at 22:23

## 2025-03-22 RX ADMIN — PHENOBARBITAL 64.8 MG: 32.4 TABLET ORAL at 08:41

## 2025-03-22 RX ADMIN — CLONAZEPAM 1 MG: 1 TABLET ORAL at 08:42

## 2025-03-22 RX ADMIN — ATORVASTATIN CALCIUM 20 MG: 20 TABLET, FILM COATED ORAL at 08:42

## 2025-03-22 RX ADMIN — TRAZODONE HYDROCHLORIDE 150 MG: 100 TABLET ORAL at 00:25

## 2025-03-22 RX ADMIN — PHENOBARBITAL 64.8 MG: 32.4 TABLET ORAL at 00:25

## 2025-03-22 RX ADMIN — CLONAZEPAM 1 MG: 1 TABLET ORAL at 17:07

## 2025-03-22 NOTE — CASE MANAGEMENT
Case Management Progress Note    Patient name Laine Tse  Location S /S -01 MRN [id number]  : 1955 Date 3/22/2025       LOS (days): 0  Geometric Mean LOS (GMLOS) (days):   Days to GMLOS:        OBJECTIVE:        Current admission status: Observation  Preferred Pharmacy:   [address] [address]  Phone: [phone] Fax: [phone]    Primary Care Provider: Adilson Yip MD    Primary Insurance: MEDICARE  Secondary Insurance:     PROGRESS NOTE:    Cm advised patient back at hospital due to not liking facility she was sent to. CM placed blanket referrals in [facility] to find new STR facilities. CM advised provider that patient does not have ability to choose the facility and it would be up to her sister or legal guardian.
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
## 2025-03-22 NOTE — DISCHARGE SUMMARY
Discharge Summary - Hospitalist   Name: Laine Tse 69 y.o. female I MRN: [id number]  Unit/Bed#: S -01 I Date of Admission: 3/18/2025   Date of Service: 3/21/2025 I Hospital Day: 3     Assessment & Plan  Fall  Patient presenting after x3 witnessed falls at group home with -LOC, +HS, +ASA  Patient presented as a trauma alert, CT AP demonstrated age-indeterminate T12 compression fracture, CTH and CT C-spine negative for acute pathology  Spoke to  who states the falls were mechanical. He felt she was unsteady on her feet. Denies any LOC- pt endorsing CP/SOB/nausea/palpitations/diaphoresis/vomiting  Concern for Polypharmacy, low suspicion for cardiac cause or syncope   PT/OT level 2. [facility]. Patient medically stable for discharge. Expect the patient to need less than 30 days in rehab.    Plan:  Consult psych recommendations appreciated  Discontinue Ativan  Continue Klonopin 1 mg twice daily  Zyprexa 15 mg HS  Can increase to 20 mg HS if patient has persistent symptoms of paranoia and is tolerating 15 HS   T12 compression fracture (HCC)  Patient presented as trauma   CT AP: Mild superior endplate compression deformity at T12, no obvious acute fracture lines identified.     Plan:  NSGY consult, recommendations appreciated  LSO brace PRN for comfort   Seizure disorder (HCC)  Home med: Phenobarbital 64.8 mg q12  Ck normal  phenobarbital level normal 18.8    Plan:    Continue Phenobarbital 64.8 mg q12  Schizoaffective disorder (HCC)  Home med: Klonopin 1 mg BID, Zyprexa 5 mg QD and 15 mg qHS, Trihexyphenidyl 2 mg BID    Plan:  Medications as above   Hyperlipidemia  Home med: Atorvastatin 20 mg QD    Plan:  Atorvastatin 20 mg QD  Hypothyroidism  Home Med: Levothyroxine 100 mcg qAM  2/13/25: TSH 0.920    Plan:  Levothyroxine 100 mcg qAM     Medical Problems       Resolved Problems  Date Reviewed: 3/13/2025   None       Discharging Physician / Practitioner: Sakhsi Packer MD  PCP: Adilson Yip  MD  Admission Date:   Admission Orders (From admission, onward)       Ordered        03/18/25 1758  INPATIENT ADMISSION  Once                          Discharge Date: 03/21/25    Consultations During Hospital Stay:  Behavioral  Psychiatry    Procedures Performed:   None    Significant Findings / Test Results:   Stable L1 superior endplate compression deformity with less than 50% loss of height.     Incidental Findings:   Cystic-appearing right adnexal lesion measuring up to 6.5 cm.   I reviewed the above mentioned incidental findings with the patient and/or family and they expressed understanding.    Test Results Pending at Discharge (will require follow up):   None     Outpatient Tests Requested:  Pelvic ultrasound    Complications: None    Reason for Admission: Fall    Hospital Course:   Laine Tse is a 69 y.o. female w/ PMHx schizoaffective disorder, seizures, HTN, HLD, hypothyroidism who originally presented to the hospital on 3/18/2025 due to multiple witnessed mechanical falls.  Upon arrival patient's trauma workup was negative except for age-indeterminate T12 compression fracture for which neurosurgery was consulted.  A AP lateral lumbar spine x-ray was obtained that noted stable L1 superior endplate compression deformity with less than 50% loss of height.  It was recommended she wear the LSO brace as needed for comfort.  Patient's fall was thought to be due to pill administration of Ativan and Klonopin, so behavioral health was consulted medication management, recommendations above.  Patient was evaluated by PT OT who recommended level 2.  Patient's  and sister felt patient could benefit from STR.  So patient was discharged to [facility].    Please see above list of diagnoses and related plan for additional information.     Condition at Discharge: stable    Discharge Day Visit / Exam:   * Please refer to separate progress note for these details *    Discussion with Family: Updated contact  person (sister) via phone.    Discharge instructions/Information to patient and family:   See after visit summary for information provided to patient and family.      Provisions for Follow-Up Care:  See after visit summary for information related to follow-up care and any pertinent home health orders.      Mobility at time of Discharge:   Basic Mobility Inpatient Raw Score: 17  JH-HLM Goal: 5: Stand one or more mins  JH-HLM Achieved: 6: Walk 10 steps or more  HLM Goal achieved. Continue to encourage appropriate mobility.     Disposition:   Other:  [facility]    Planned Readmission: No    Discharge Medications:  See after visit summary for reconciled discharge medications provided to patient and/or family.      Administrative Statements   Discharge Statement:  I have spent a total time of 30 minutes in caring for this patient on the day of the visit/encounter. .    **Please Note: This note may have been constructed using a voice recognition system**

## 2025-03-22 NOTE — ASSESSMENT & PLAN NOTE
Patient presenting after x3 witnessed falls at group home with -LOC, +HS, +ASA  Patient presented as a trauma alert, CT AP demonstrated age-indeterminate T12 compression fracture, CTH and CT C-spine negative for acute pathology  Spoke to  who states the falls were mechanical. He felt she was unsteady on her feet. Denies any LOC- pt endorsing CP/SOB/nausea/palpitations/diaphoresis/vomiting  Concern for Polypharmacy, low suspicion for cardiac cause or syncope   PT/OT level 2. STR [facility]. Patient medically stable for discharge. Expect the patient to need less than 30 days in rehab.    Plan:  Consult psych recommendations appreciated  Discontinue Ativan  Continue Klonopin 1 mg twice daily  Zyprexa 15 mg HS  Can increase to 20 mg HS if patient has persistent symptoms of paranoia and is tolerating 15 HS

## 2025-03-22 NOTE — CASE MANAGEMENT
Case Management Assessment    Patient name Laine Tse  Location S /S -01 MRN [id number]  : 1955 Date 3/22/2025       Current Admission Date: 3/21/2025  Current Admission Diagnosis:Social problem   Patient Active Problem List    Diagnosis Date Noted Date Diagnosed    Social problem 2025     T12 compression fracture (HCC) 2025     Severe protein-calorie malnutrition (HCC) 2025     Skin lesion of left lower extremity 2024     Chronic cough 2023     T wave inversion on electrocardiogram 2023     Rash 2023     Dermatitis 2023     Chronic midline low back pain without sciatica 2021     Class 1 obesity with body mass index (BMI) of 32.0 to 32.9 in adult 2021     Decreased hearing of both ears 2019     Benign essential hypertension 2019     Pre-diabetes 2019     Hyperglycemia 2018     Fall 2018     Injury of right toe, initial encounter 2018     Unsteady gait 10/03/2017     Seizure disorder (HCC) 05/15/2017     Anemia 05/15/2017     Hyponatremia 2015     Folic acid deficiency 10/15/2012     Hyperlipidemia 10/05/2012     Hypothyroidism 10/05/2012     Schizoaffective disorder (HCC) 10/05/2012     Osteoporosis 10/05/2012       LOS (days): 0  Geometric Mean LOS (GMLOS) (days):   Days to GMLOS:     OBJECTIVE:              Current admission status: Observation       Preferred Pharmacy:   [place] [id number] [id number]  Phone: [phone] Fax: [phone]    Primary Care Provider: Adilson Yip MD    Primary Insurance: MEDICARE  Secondary Insurance:     ASSESSMENT:  Active Health Care Proxies    There are no active Health Care Proxies on file.                 Readmission Root Cause  30 Day Readmission: Yes  During your hospital stay, did someone (provider, nurse, ) explain your care to you in a way you could  understand?: Unable to Assess  Did you feel medically stable to leave the hospital?: Unable to Assess  Were you able to pay for your medication at the pharmacy?: Unable to Assess  Did you have reliable transportation to take you to your appointments?: Unable to Assess  During previous admission, was a post-acute recommendation made?: Yes  What post-acute resources were offered?: STR  Patient was readmitted due to: Social Problem  Action Plan: Further CM follow up as Needed    Patient Information  Admitted from:: Facility  Mental Status: Confused, Alert  During Assessment patient was accompanied by: Not accompanied during assessment  Primary Caregiver: Self  Support Systems: Self, [employer] community, Family members  County of Residence: [place]  What city do you live in?: [place]  Home entry access options. Select all that apply.: No steps to enter home  Type of Current Residence: Facility  Upon entering residence, is there a bedroom on the main floor (no further steps)?: Yes  Upon entering residence, is there a bathroom on the main floor (no further steps)?: Yes  Living Arrangements: Lives in Facility  Is patient a ?: No    Activities of Daily Living Prior to Admission  Functional Status: Independent  Completes ADLs independently?: Yes  Ambulates independently?: Yes  Does patient use assisted devices?: Yes  Assisted Devices (DME) used: Straight Cane  Does patient currently own DME?: Yes  What DME does the patient currently own?: Straight Cane  Does patient have a history of Outpatient Therapy (PT/OT)?: No  Does the patient have a history of Short-Term Rehab?: Yes  Does patient have a history of HHC?: No  Does patient currently have HHC?: No         Patient Information Continued  Income Source: SSI/SSD  Does patient have prescription coverage?: Yes  Can the patient afford their medications and any related supplies (such as glucometers or test strips)?: Yes  Does patient receive dialysis treatments?:  No  Does patient have a history of substance abuse?: No  Does patient have a history of Mental Health Diagnosis?: Yes (Schizoaffective disorder)  Is patient receiving treatment for mental health?: Yes         Means of Transportation  Means of Transport to Appts:: Other (Comment) (Group home provides transport)

## 2025-03-22 NOTE — PLAN OF CARE
Problem: PAIN - ADULT  Goal: Verbalizes/displays adequate comfort level or baseline comfort level  Description: Interventions:- Encourage patient to monitor pain and request assistance- Assess pain using appropriate pain scale- Administer analgesics based on type and severity of pain and evaluate response- Implement non-pharmacological measures as appropriate and evaluate response- Consider cultural and social influences on pain and pain management- Notify physician/advanced practitioner if interventions unsuccessful or patient reports new pain  Outcome: Progressing     Problem: INFECTION - ADULT  Goal: Absence or prevention of progression during hospitalization  Description: INTERVENTIONS:- Assess and monitor for signs and symptoms of infection- Monitor lab/diagnostic results- Monitor all insertion sites, i.e. indwelling lines, tubes, and drains- Monitor endotracheal if appropriate and nasal secretions for changes in amount and color- Nakina appropriate cooling/warming therapies per order- Administer medications as ordered- Instruct and encourage patient and family to use good hand hygiene technique- Identify and instruct in appropriate isolation precautions for identified infection/condition  Outcome: Progressing  Goal: Absence of fever/infection during neutropenic period  Description: INTERVENTIONS:- Monitor WBC  Outcome: Progressing     Problem: SAFETY ADULT  Goal: Patient will remain free of falls  Description: INTERVENTIONS:- Educate patient/family on patient safety including physical limitations- Instruct patient to call for assistance with activity - Consult OT/PT to assist with strengthening/mobility - Keep Call bell within reach- Keep bed low and locked with side rails adjusted as appropriate- Keep care items and personal belongings within reach- Initiate and maintain comfort rounds- Make Fall Risk Sign visible to staff- Offer Toileting every 2 Hours, in advance of need- Initiate/Maintain bed alarm-  Obtain necessary fall risk management equipment- Apply yellow socks and bracelet for high fall risk patients- Consider moving patient to room near nurses station  Outcome: Progressing  Goal: Maintain or return to baseline ADL function  Description: INTERVENTIONS:-  Assess patient's ability to carry out ADLs; assess patient's baseline for ADL function and identify physical deficits which impact ability to perform ADLs (bathing, care of mouth/teeth, toileting, grooming, dressing, etc.)- Assess/evaluate cause of self-care deficits - Assess range of motion- Assess patient's mobility; develop plan if impaired- Assess patient's need for assistive devices and provide as appropriate- Encourage maximum independence but intervene and supervise when necessary- Involve family in performance of ADLs- Assess for home care needs following discharge - Consider OT consult to assist with ADL evaluation and planning for discharge- Provide patient education as appropriate  Outcome: Progressing  Goal: Maintains/Returns to pre admission functional level  Description: INTERVENTIONS:- Perform AM-PAC 6 Click Basic Mobility/ Daily Activity assessment daily.- Set and communicate daily mobility goal to care team and patient/family/caregiver. - Collaborate with rehabilitation services on mobility goals if consulted  Outcome: Progressing     Problem: DISCHARGE PLANNING  Goal: Discharge to home or other facility with appropriate resources  Description: INTERVENTIONS:- Identify barriers to discharge w/patient and caregiver- Arrange for needed discharge resources and transportation as appropriate- Identify discharge learning needs (meds, wound care, etc.)- Arrange for interpretive services to assist at discharge as needed- Refer to Case Management Department for coordinating discharge planning if the patient needs post-hospital services based on physician/advanced practitioner order or complex needs related to functional status, cognitive ability,  or social support system  Outcome: Progressing     Problem: Knowledge Deficit  Goal: Patient/family/caregiver demonstrates understanding of disease process, treatment plan, medications, and discharge instructions  Description: Complete learning assessment and assess knowledge base.Interventions:- Provide teaching at level of understanding- Provide teaching via preferred learning methods  Outcome: Progressing

## 2025-03-22 NOTE — CONSULTS
TeleConsultation - Behavioral Health   Name: Laine Tse 69 y.o. female I MRN: [id number]  Unit/Bed#: S -01 I Date of Admission: 3/21/2025   Date of Service: 3/22/2025 I Hospital Day: 0  Inpatient consult to Psychiatry  Consult performed by: Delaney Spence MD  Consult ordered by: Marquis Osborne MD        Physician Requesting Consult: Marquis Osborne MD  Principal Problem:Social problem  Reason for Consult:  Psych Evaluation     Assessment & Plan   Schizoaffective Disorder     Per chart review patient recently discharged to short-term rehab with near immediate representation for concern for psychotic symptoms.  On exam patient with some notable mood lability and per nursing reports continued delusions and recommend increasing olanzapine to 20 mg nightly as this is what patient was stabilized on after most recent inpatient psychiatric admission.     TREATMENT PLAN RECOMMENDATIONS:  Medications: Increase Olanzapine to 20 mg qhs   PRN for sleep: n/a  PRN for agitation: Depakote  mg IV q6h PRN for acute agitation  (LFTs) wnl      Informed consent for the above medication has been obtained including discussion of the risks, benefits and alternatives: Yes    Disposition: The patient does not currently meet criteria for inpatient psychiatric hospitalization. They deny thoughts or plans for suicide and denies homicidal thoughts or intent. They are not unable to care for themselves due to a mental illness and/or acute psychosis. They are able to adequately participate in care planning with primary team. No criteria for an involuntary psychiatric commitment exists at this time.    Legal Status Recommendation:  Voluntary     Multiple Antipsychotic Review: N/A    Psychotherapy/Psychoeducation: Provided to patient based on their needs and abilities in a manner that they could understand and accommodated their learning style.    Other/Medical Work Up and/or treatment modality recommendations: N/A to this  case.    Patient Caregiver/Family Education: Provided education regarding relevant aspects of the treatment plan to identified patient support based on their needs and abilities in a manner that they could understand with the patient's express consent.    Follow-up: Re-consult PRN    Report regarding the above Assessment and Treatment plan was provided to: Dr. Osborne    History of Present Illness      Patient states that she wants to be allowed to walk around and that she was put in restraints. Patient states that she just wants to be free. Per nursing, patient was placed in restraints because she was searching for God and that she was not redirectable. Patient states that she didn't like the facility and that she wants to go somewhere else. Patient states that she didn't like the smell and wants a different place. Patient denied any current SI/HI/AVH or other acute psychiatric complaints.       Psychiatric Review Of Systems:  sleep: no  appetite changes: no  weight changes: no  energy/anergy: no  interest/pleasure/anhedonia: no  somatic symptoms: no  anxiety/panic: no  louis: no  guilty/hopeless: no  self injurious behavior/risky behavior: no    Historical Information   Past Psychiatric History:   Psychiatric Hospitalizations:   several past inpatient psychiatric admissions  Outpatient Treatment History:   Yes   Suicide Attempts:   None  History of self-harm:   None  Violence History:   no  Past Psychiatric medication trials: Yes     Substance Abuse History: Denied     Family Psychiatric History: Denied       Social History:  Education: high school diploma/GED  Learning Disabilities:  Denied   Marital history: single  Children: Denied   Living arrangement, social support: The patient lives in a group home under the supervision of n/a.  Occupational History: on permanent disability  Functioning Relationships: good support system.  Other Pertinent History: None    Traumatic History:   Abuse: None  Other Traumatic  Events: none    Social History     Tobacco Use    Smoking status: Former     Current packs/day: 0.00     Average packs/day: 0.5 packs/day for 21.0 years (10.5 ttl pk-yrs)     Types: Cigarettes     Start date:      Quit date:      Years since quittin.2    Smokeless tobacco: Never   Vaping Use    Vaping status: Never Used   Substance and Sexual Activity    Alcohol use: Not Currently    Drug use: No    Sexual activity: Never       Meds/Allergies   Current Facility-Administered Medications   Medication Dose Route Frequency Provider Last Rate    acetaminophen  975 mg Oral Q8H PRN Elise Hidalgo MD      aspirin  81 mg Oral Daily Elise Hidalgo MD      atorvastatin  20 mg Oral Daily Elise Hidalgo MD      calcium carbonate-vitamin D  1 tablet Oral Daily With Breakfast Elise Hidalgo MD      clonazePAM  1 mg Oral BID Elise Hidalgo MD      fluticasone  2 spray Nasal Daily Elise Hidalgo MD      heparin (porcine)  5,000 Units Subcutaneous Q8H JANNIE Elise Hidalgo MD      insulin lispro  1-6 Units Subcutaneous 4x Daily (AC & HS) Elise Hidalgo MD      levothyroxine  100 mcg Oral Early Morning Elise Hidalgo MD      melatonin  6 mg Oral HS Elise Hidalgo MD      menthol-methyl salicylate   Apply externally 4x Daily PRN Elise Hidalgo MD      OLANZapine  20 mg Oral HS Marquis Osborne MD      PHENobarbital  64.8 mg Oral Q12H JANNIE Elise Hidalgo MD      polyethylene glycol  17 g Oral Daily Elise Hidalgo MD      senna-docusate sodium  1 tablet Oral HS Elise Hidalgo MD      traZODone  150 mg Oral HS Elise Hidalgo MD      trihexyphenidyl  2 mg Oral BID Elise Hidalgo MD       Facility-Administered Medications Ordered in Other Encounters   Medication Dose Route Frequency Provider Last Rate    lactated ringers   Intravenous Continuous PRN Celi Benson CRNA        Allergies   Allergen Reactions    Clozapine Other (See Comments)     low  "white blood count  Other reaction(s): Other (See Comments)  low white blood count    Haloperidol Other (See Comments)     EPS  Other reaction(s): Other (See Comments)  EPS    Lithium Other (See Comments)     Toxicity    Prolixin [Fluphenazine] Hyperactivity and Other (See Comments)     EPS, Hyperactivity  EPS, Hyperactivity    Valproic Acid Confusion and Other (See Comments)     \"Mental confusion\"  \"Mental confusion\"       Objective :  Temp:  [98.1 °F (36.7 °C)] 98.1 °F (36.7 °C)  HR:  [68-79] 79  BP: (112-145)/(70-93) 145/93  Resp:  [18] 18  SpO2:  [95 %-96 %] 95 %  O2 Device: None (Room air)    Mental Status Evaluation:  Appearance:  age appropriate   Behavior:  psychomotor agitation   Speech:  normal pitch and normal volume   Mood:  dysthymic   Affect:  labile   Language: naming objects   Thought Process:  normal   Associations intact associations   Thought Content:  normal   Perceptual Disturbances: None   Risk Potential: Suicidal Ideations none  Homicidal Ideations none  Potential for Aggression No   Sensorium:  person, place, and time/date   Cognition:  recent and remote memory grossly intact   Consciousness:  alert    Attention: attention span and concentration were age appropriate   Intellect: within normal limits   Fund of Knowledge: awareness of current events: President    Insight:  limited   Judgment: limited   Muscle Strength:  Muscle Tone: normal  NFT  NFT   Gait/Station: NFT   Motor Activity: NFT     Lab Results: I have reviewed the following lab results:   .     03/21/25  2224   WBC 3.45*   HGB 13.1   HCT 38.4      SODIUM 135   K 4.5      CO2 27   BUN 12   CREATININE 0.64   GLUC 129          Lipid Profile:   Lab Results   Component Value Date    CHOLESTEROL 198 02/16/2025    HDL 77 02/16/2025    TRIG 122 02/16/2025    LDLCALC 97 02/16/2025    NONHDLC 121 02/16/2025   Thyroid Studies:   Lab Results   Component Value Date    SMP1MFJYKUQI 0.920 02/13/2025    FREET4 0.94 08/14/2024 "     Ammonia:   Lab Results   Component Value Date    AMMONIA 15 12/19/2017     Drug Levels:   Lab Results   Component Value Date    VALPROICTOT 71 11/29/2019       Imaging Results Review: No pertinent imaging studies reviewed.  Other Study Results Review: No additional pertinent studies reviewed.    Code Status: Level 1 - Full Code  Advance Directive and Living Will:      Power of :    POLST:      Screenings:   1. Nutrition Assessment (completed by Staff):      2. Pain Screening  Pain Assessment  Pain Assessment Tool: 0-10  Pain Score: 0  3. Suicide Screening  ED Crisis Suicide Risk Assessment:      C-SSRS Screening (Nursing Assessment - recent):    C-SSRS Screening (Nursing Assessment - since last contact):      Suicide Risk Assessment completed by the Consultant: Based on today's assessment, Laine presents the following risk of harm to self: low.    Administrative Statements   VIRTUAL CARE DOCUMENTATION:     1. This service was provided via Telemedicine using Teams Virtual Rounding      2. Parties in the room with patient during teleconsult RN    3. Confidentiality My office door was closed     4. Participants No one else was in the room    5. Patient acknowledged consent and understanding of privacy and security of the  Telemedicine consult. I informed the patient that I have reviewed their record in Epic and presented the opportunity for them to ask any questions regarding the visit today.  The patient agreed to participate.    6. I have spent a total time of 30 minutes in caring for this patient on the day of the visit/encounter including Obtaining or reviewing history  , not including the time spent for establishing the audio/video connection.
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
## 2025-03-22 NOTE — ASSESSMENT & PLAN NOTE
Noticed following the fall 4 days ago.  Evaluated by neurosurgery during recent hospital admission.  Continue LSO Brace PRN
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
## 2025-03-23 PROCEDURE — 99232 SBSQ HOSP IP/OBS MODERATE 35: CPT | Performed by: INTERNAL MEDICINE

## 2025-03-23 RX ADMIN — TRAZODONE HYDROCHLORIDE 150 MG: 100 TABLET ORAL at 21:51

## 2025-03-23 RX ADMIN — CLONAZEPAM 1 MG: 1 TABLET ORAL at 17:50

## 2025-03-23 RX ADMIN — CLONAZEPAM 1 MG: 1 TABLET ORAL at 10:28

## 2025-03-23 RX ADMIN — TRIHEXYPHENIDYL HYDROCHLORIDE 2 MG: 2 TABLET ORAL at 21:52

## 2025-03-23 RX ADMIN — LEVOTHYROXINE SODIUM 100 MCG: 100 TABLET ORAL at 06:28

## 2025-03-23 RX ADMIN — OLANZAPINE 20 MG: 10 TABLET, FILM COATED ORAL at 21:51

## 2025-03-23 RX ADMIN — Medication 1 TABLET: at 10:28

## 2025-03-23 RX ADMIN — ASPIRIN 81 MG CHEWABLE TABLET 81 MG: 81 TABLET CHEWABLE at 10:28

## 2025-03-23 RX ADMIN — TRIHEXYPHENIDYL HYDROCHLORIDE 2 MG: 2 TABLET ORAL at 10:30

## 2025-03-23 RX ADMIN — PHENOBARBITAL 64.8 MG: 32.4 TABLET ORAL at 10:28

## 2025-03-23 RX ADMIN — PHENOBARBITAL 64.8 MG: 32.4 TABLET ORAL at 21:51

## 2025-03-23 RX ADMIN — Medication 6 MG: at 21:51

## 2025-03-23 RX ADMIN — SENNOSIDES AND DOCUSATE SODIUM 1 TABLET: 50; 8.6 TABLET ORAL at 21:51
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
## 2025-03-24 ENCOUNTER — TELEPHONE (OUTPATIENT)
Dept: PSYCHIATRY | Facility: CLINIC | Age: 70
End: 2025-03-24

## 2025-03-24 LAB
ANION GAP SERPL CALCULATED.3IONS-SCNC: 4 MMOL/L (ref 4–13)
BUN SERPL-MCNC: 13 MG/DL (ref 5–25)
CALCIUM SERPL-MCNC: 9.4 MG/DL (ref 8.4–10.2)
CHLORIDE SERPL-SCNC: 102 MMOL/L (ref 96–108)
CO2 SERPL-SCNC: 31 MMOL/L (ref 21–32)
CREAT SERPL-MCNC: 0.76 MG/DL (ref 0.6–1.3)
ERYTHROCYTE [DISTWIDTH] IN BLOOD BY AUTOMATED COUNT: 12.5 % (ref 11.6–15.1)
GFR SERPL CREATININE-BSD FRML MDRD: 79 ML/MIN/1.73SQ M
GLUCOSE SERPL-MCNC: 97 MG/DL (ref 65–140)
HCT VFR BLD AUTO: 42.8 % (ref 34.8–46.1)
HGB BLD-MCNC: 14.4 G/DL (ref 11.5–15.4)
MCH RBC QN AUTO: 33 PG (ref 26.8–34.3)
MCHC RBC AUTO-ENTMCNC: 33.6 G/DL (ref 31.4–37.4)
MCV RBC AUTO: 98 FL (ref 82–98)
PLATELET # BLD AUTO: 261 THOUSANDS/UL (ref 149–390)
PMV BLD AUTO: 9.9 FL (ref 8.9–12.7)
POTASSIUM SERPL-SCNC: 4 MMOL/L (ref 3.5–5.3)
RBC # BLD AUTO: 4.37 MILLION/UL (ref 3.81–5.12)
SODIUM SERPL-SCNC: 137 MMOL/L (ref 135–147)
WBC # BLD AUTO: 4.04 THOUSAND/UL (ref 4.31–10.16)

## 2025-03-24 PROCEDURE — 97163 PT EVAL HIGH COMPLEX 45 MIN: CPT

## 2025-03-24 PROCEDURE — 80048 BASIC METABOLIC PNL TOTAL CA: CPT

## 2025-03-24 PROCEDURE — 85027 COMPLETE CBC AUTOMATED: CPT

## 2025-03-24 PROCEDURE — 97167 OT EVAL HIGH COMPLEX 60 MIN: CPT

## 2025-03-24 PROCEDURE — 99232 SBSQ HOSP IP/OBS MODERATE 35: CPT | Performed by: INTERNAL MEDICINE

## 2025-03-24 RX ORDER — GABAPENTIN 100 MG/1
200 CAPSULE ORAL 2 TIMES DAILY
Status: DISCONTINUED | OUTPATIENT
Start: 2025-03-24 | End: 2025-03-25 | Stop reason: HOSPADM

## 2025-03-24 RX ORDER — LORAZEPAM 1 MG/1
1 TABLET ORAL ONCE AS NEEDED
Status: COMPLETED | OUTPATIENT
Start: 2025-03-24 | End: 2025-03-24

## 2025-03-24 RX ADMIN — ASPIRIN 81 MG CHEWABLE TABLET 81 MG: 81 TABLET CHEWABLE at 08:50

## 2025-03-24 RX ADMIN — ATORVASTATIN CALCIUM 20 MG: 20 TABLET, FILM COATED ORAL at 08:51

## 2025-03-24 RX ADMIN — FLUTICASONE PROPIONATE 2 SPRAY: 50 SPRAY, METERED NASAL at 08:52

## 2025-03-24 RX ADMIN — Medication 6 MG: at 21:09

## 2025-03-24 RX ADMIN — SENNOSIDES AND DOCUSATE SODIUM 1 TABLET: 50; 8.6 TABLET ORAL at 21:09

## 2025-03-24 RX ADMIN — CLONAZEPAM 1 MG: 1 TABLET ORAL at 08:51

## 2025-03-24 RX ADMIN — PHENOBARBITAL 64.8 MG: 32.4 TABLET ORAL at 21:09

## 2025-03-24 RX ADMIN — TRIHEXYPHENIDYL HYDROCHLORIDE 2 MG: 2 TABLET ORAL at 21:10

## 2025-03-24 RX ADMIN — LEVOTHYROXINE SODIUM 100 MCG: 100 TABLET ORAL at 05:41

## 2025-03-24 RX ADMIN — OLANZAPINE 20 MG: 10 TABLET, FILM COATED ORAL at 21:09

## 2025-03-24 RX ADMIN — CLONAZEPAM 1 MG: 1 TABLET ORAL at 17:17

## 2025-03-24 RX ADMIN — Medication 1 TABLET: at 08:50

## 2025-03-24 RX ADMIN — PHENOBARBITAL 64.8 MG: 32.4 TABLET ORAL at 08:50

## 2025-03-24 RX ADMIN — TRIHEXYPHENIDYL HYDROCHLORIDE 2 MG: 2 TABLET ORAL at 08:52

## 2025-03-24 RX ADMIN — GABAPENTIN 200 MG: 100 CAPSULE ORAL at 17:17

## 2025-03-24 RX ADMIN — LORAZEPAM 1 MG: 1 TABLET ORAL at 15:36

## 2025-03-24 RX ADMIN — TRAZODONE HYDROCHLORIDE 150 MG: 100 TABLET ORAL at 21:09

## 2025-03-24 NOTE — PHYSICAL THERAPY NOTE
PHYSICAL THERAPY EVALUATION NOTE          Patient Name: Laine Tse  Today's Date: 3/24/2025          AGE:   69 y.o.  Mrn:   [id number]  ADMIT DX:  Schizoaffective disorder (HCC) [F25.9]  Agitation [R45.1]  Severe protein-calorie malnutrition (HCC) [E43]  Fall [W19.XXXA]  Seizure disorder (HCC) [G40.909]  T12 compression fracture (HCC) [S22.080A]  Encounter for other general examination [Z00.8]    Past Medical History:  Past Medical History:   Diagnosis Date    Anxiety     Benign essential hypertension     resolved; 06/15/16    Depression     Depression with anxiety     Fracture of fifth metatarsal bone of right foot with delayed healing     last assessed 16; fracture of metatarsal bone, right, closed, initial encounter    Hyperlipidemia     last assessed 17    Hypothyroidism     last assessed 2017    Insomnia     Obesity     Schizoaffective disorder (HCC)     last assessed 2017    Seizure disorder (HCC)     last assessed 17    Seizures (HCC)        Past Surgical History:  Past Surgical History:   Procedure Laterality Date    COLONOSCOPY      complete    TX COLONOSCOPY FLX DX W/COLLJ SPEC WHEN PFRMD N/A 2018    Procedure: COLONOSCOPY with polypectomies;  Surgeon: Andriy Lopez MD;  Location: [facility];  Service: Gastroenterology     Length Of Stay: 2        PHYSICAL THERAPY EVALUATION:    Patient's identity confirmed via 2 patient identifiers (full name and ) at start of session       25 1045   PT Last Visit   PT Visit Date 25   Note Type   Note type Evaluation   Pain Assessment   Pain Assessment Tool 0-10   Pain Score No Pain   Restrictions/Precautions   Weight Bearing Precautions Per Order No   Braces or Orthoses LSO  (Pt w/ h/o T12 compression fx; Neurosurgery: LSO brace prn for comfort , LSO brace not present in pt's room)   Other Precautions Cognitive;Chair Alarm;Bed Alarm;Impulsive;Fall Risk   Home  Living   Type of Home Group Home   Home Equipment Cane  (SPC)   Additional Comments home set-up and baseline mobility obtained from chart review   Prior Function   Level of [place] Independent with functional mobility;Needs assistance with IADLS   Lives With Facility staff   Receives Help From   (facility staff)   IADLs Family/Friend/Other provides transportation;Family/Friend/Other provides meals;Family/Friend/Other provides medication management   Falls in the last 6 months 1 to 4   Comments Per chart review at baseline pt amb ind w/ SPC   General   Family/Caregiver Present No   Cognition   Overall Cognitive Status Impaired   Attention Difficulty attending to directions   Orientation Level Oriented to person;Oriented to place   Memory Decreased short term memory;Decreased recall of recent events;Decreased recall of precautions   Following Commands Follows one step commands with increased time or repetition   Comments Pt highly tangential and difficult to re-direct, emotionally labile throughout   Strength RLE   RLE Overall Strength   (assessed w/ funcitonal mobility, grossly 3+/5)   Strength LLE   LLE Overall Strength   (assessed w/ funcitonal mobility, grossly 3+/5)   Bed Mobility   Sit to Supine 5  Supervision   Additional items HOB elevated;Impulsive;Verbal cues  (anterior approach, placed knee on bed and then rotated to be supine)   Transfers   Sit to Stand 5  Supervision   Additional items Assist x 1;Armrests   Stand to Sit 5  Supervision   Additional items Assist x 1;Increased time required   Additional Comments impulsive w/ mobility   Ambulation/Elevation   Gait pattern Improper Weight shift;Decreased foot clearance;Short stride;Excessively slow;Decreased hip extension;Decreased heel strike   Gait Assistance 5  Supervision   Additional items Assist x 1;Verbal cues   Assistive Device Straight cane;Rolling walker;None   Distance 60'  (~10' w/ SPC, ~40' w/ RW, ~10' w/ no AD)   Stair Management Assistance  5  Supervision  (close supervision)   Additional items Assist x 1;Verbal cues   Balance   Static Sitting Fair +   Dynamic Sitting Fair   Static Standing Fair -   Dynamic Standing Fair -   Ambulatory Fair -   Activity Tolerance   Activity Tolerance   (pt limited by cognition)   Medical Staff Made Aware Pt benefited from PT/OT care coordination w/ OT Vanessa due to cognitive/behavioral impairments affecting functional mobility and allow for challenge of pt's activity tolerance, PT and OT goals were addressed individually during session; DALE Soriano   Nurse Made Aware RN Melida   Assessment   Prognosis Fair   Problem List Impaired balance;Decreased mobility;Decreased cognition;Impaired judgement;Decreased safety awareness;Decreased strength   Assessment Laine Tse is a 69 y.o. Female who presents to [facility] on [facility] for psychiatric evaluation and diagnosis of social problem. Orders for PT eval and treat received. Comorbidities affecting pt's functional mobility at time of evaluation include: HTN, T12 compression fx, schizoaffective disorder, anemia, osteoporosis, unsteady gait, seizure disorder. Personal factors affecting DC include: ambulating w/ assistive device, decreased cognition, positive fall history, and limited insight into impairments. At baseline, pt mobilizes w/ SPC, and w/ 1-4 fall(s) in the previous 6 months. Upon evaluation, pt presents w/ the following deficits: impaired strength, impaired balance, impaired cognition, decreased safety awareness, and gait deviations. Pt currently requires  supervision for bed mobility, supervision for transfers, supervision w/ SPC, RW, no AD for ambulation. Pt's clinical presentation is unstable/unpredictable due to abnormal lab values, need for increased assistance w/ functional mobility compared to baseline, need for input for mobility technique, need for input for task focus, need to input for safety awareness, recent readmission w/in 30 days, recent h/o falls,  "ongoing medical management. From a PT/mobility standpoint given the above findings, DC recommendation is level: III (Minimum Rehab Resource Intensity). During current admission, pt will benefit from continued skilled inpatient PT in the acute care setting in order to address the above deficits and to maximize function and mobility prior to DC from acute care.   Goals   Patient Goals \"I WANT to go go [place]!!\"   [facility] Expiration Date 04/03/25   Short Term Goal #1 Pt will: perform bed mobility w/ mod I to decrease pt's burden of care and increase pt's independence w/ repositioning in bed; perform transfers w/ mod I to promote OOB mobility; ambulate 100' w/ LRAD and mod I to increase pt's ambulatory endurance/tolerance; increase all balance ratings by at least 1 grade to decrease pt's risk of falls   PT Treatment Day 0   Plan   Treatment/Interventions Functional transfer training;LE strengthening/ROM;Therapeutic exercise;Endurance training;Cognitive reorientation;Equipment eval/education;Patient/family training;Bed mobility;Gait training;Compensatory technique education   PT Frequency 2-3x/wk   Discharge Recommendation   Rehab Resource Intensity Level, PT III (Minimum Resource Intensity)   AM-PAC Basic Mobility Inpatient   Turning in Flat Bed Without Bedrails 4   Lying on Back to Sitting on Edge of Flat Bed Without Bedrails 3   Moving Bed to Chair 3   Standing Up From Chair Using Arms 3   Walk in Room 3   Climb 3-5 Stairs With Railing 2   Basic Mobility Inpatient Raw Score 18   Basic Mobility Standardized Score 41.05   [facility] Highest Level Of Mobility   -HL Goal 6: Walk 10 steps or more   -HL Achieved 7: Walk 25 feet or more   End of Consult   Patient Position at End of Consult Supine;Bed/Chair alarm activated;All needs within reach       The patient's AM-PAC Basic Mobility Inpatient Short Form Raw Score is 18. A Raw score of greater than 16 suggests the patient may benefit from discharge to home. Please " also refer to the recommendation of the Physical Therapist for safe discharge planning.    Pt will benefit from skilled inpatient PT during this admission in order to facilitate progress towards goals and to maximize functional independence prior to DC      DC rec: level III (Minimum Rehab Resource Intensity)        Imani Holman, PT, DPT  03/24/25

## 2025-03-24 NOTE — CASE MANAGEMENT
Case Management Progress Note    Patient name Laine Tse  Location S /S -01 MRN [id number]  : 1955 Date 3/24/2025       LOS (days): 2  Geometric Mean LOS (GMLOS) (days):   Days to GMLOS:        OBJECTIVE:        Current admission status: Inpatient  Preferred Pharmacy:   [address] [address]  Phone: [phone] Fax: [phone]    Primary Care Provider: Adilson Yip MD    Primary Insurance: MEDICARE  Secondary Insurance:     PROGRESS NOTE:      Medication list faxed to [facility] program Nurse at [facility].

## 2025-03-24 NOTE — CASE MANAGEMENT
Case Management Discharge Planning Note    Patient name Laine Funes S /S -01 MRN [id number]  : 1955 Date 3/24/2025       Current Admission Date: 3/21/2025  Current Admission Diagnosis:Social problem   Patient Active Problem List    Diagnosis Date Noted Date Diagnosed    Social problem 2025     T12 compression fracture (HCC) 2025     Severe protein-calorie malnutrition (HCC) 2025     Skin lesion of left lower extremity 2024     Chronic cough 2023     T wave inversion on electrocardiogram 2023     Rash 2023     Dermatitis 2023     Chronic midline low back pain without sciatica 2021     Class 1 obesity with body mass index (BMI) of 32.0 to 32.9 in adult 2021     Decreased hearing of both ears 2019     Benign essential hypertension 2019     Pre-diabetes 2019     Hyperglycemia 2018     Fall 2018     Injury of right toe, initial encounter 2018     Unsteady gait 10/03/2017     Seizure disorder (HCC) 05/15/2017     Anemia 05/15/2017     Hyponatremia 2015     Folic acid deficiency 10/15/2012     Hyperlipidemia 10/05/2012     Hypothyroidism 10/05/2012     Schizoaffective disorder (HCC) 10/05/2012     Osteoporosis 10/05/2012       LOS (days): 2  Geometric Mean LOS (GMLOS) (days):   Days to GMLOS:     OBJECTIVE:  Risk of Unplanned Readmission Score: 23.35         Current admission status: Inpatient   Preferred Pharmacy:   [place] [address] [address]  Phone: [phone] Fax: [phone]    Primary Care Provider: Adilson Yip MD    Primary Insurance: MEDICARE  Secondary Insurance:     DISCHARGE DETAILS:      Requested Home Health Care         Is the patient interested in HHC at discharge?: Yes  Home Health Discipline requested:: Physical Therapy, Occupational Therapy  [facility] Health Agency Name:: [facility]  External Referral Reason (only applicable if external HHA name selected): Scheduling access issues  Home Health Follow-Up Provider:: PCP  Home Health Services Needed:: Strengthening/Theraputic Exercises to Improve Function, Evaluate Functional Status and Safety, Gait/ADL Training  Homebound Criteria Met:: Uses an Assist Device (i.e. cane, walker, etc), Psychological Issues  Supporting Clincal Findings:: Limited Endurance, Fatigues Easliy in Short Distances, Cognitive Deficit Requiring the Assistance of Others         Other Referral/Resources/Interventions Provided:  Referral Comments: [facility] reserved in [place].
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
## 2025-03-24 NOTE — PLAN OF CARE
Problem: PAIN - ADULT  Goal: Verbalizes/displays adequate comfort level or baseline comfort level  Description: Interventions:- Encourage patient to monitor pain and request assistance- Assess pain using appropriate pain scale- Administer analgesics based on type and severity of pain and evaluate response- Implement non-pharmacological measures as appropriate and evaluate response- Consider cultural and social influences on pain and pain management- Notify physician/advanced practitioner if interventions unsuccessful or patient reports new pain  3/24/2025 0408 by Morelia Ricketts RN  Outcome: Progressing  3/24/2025 0408 by Morelia Ricketts RN  Outcome: Progressing     Problem: INFECTION - ADULT  Goal: Absence or prevention of progression during hospitalization  Description: INTERVENTIONS:- Assess and monitor for signs and symptoms of infection- Monitor lab/diagnostic results- Monitor all insertion sites, i.e. indwelling lines, tubes, and drains- Monitor endotracheal if appropriate and nasal secretions for changes in amount and color- [place] appropriate cooling/warming therapies per order- Administer medications as ordered- Instruct and encourage patient and family to use good hand hygiene technique- Identify and instruct in appropriate isolation precautions for identified infection/condition  3/24/2025 0408 by Morelia Ricketts RN  Outcome: Progressing  3/24/2025 0408 by Morelia Ricketts RN  Outcome: Progressing  Goal: Absence of fever/infection during neutropenic period  Description: INTERVENTIONS:- Monitor WBC  3/24/2025 0408 by Morelia Ricketts RN  Outcome: Progressing  3/24/2025 0408 by Morelia Ricketts RN  Outcome: Progressing     Problem: SAFETY ADULT  Goal: Patient will remain free of falls  Description: INTERVENTIONS:- Educate patient/family on patient safety including physical limitations- Instruct patient to call for assistance with activity - Consult OT/PT to assist with strengthening/mobility - Keep Call bell within reach-  Keep bed low and locked with side rails adjusted as appropriate- Keep care items and personal belongings within reach- Initiate and maintain comfort rounds- Make Fall Risk Sign visible to staff- Offer Toileting every  Hours, in advance of need- Initiate/Maintain alarm- Obtain necessary fall risk management equipment: - Apply yellow socks and bracelet for high fall risk patients- Consider moving patient to room near nurses station  3/24/2025 0408 by Morelia Ricketts RN  Outcome: Progressing  3/24/2025 0408 by Morelia Ricketts RN  Outcome: Progressing  Goal: Maintain or return to baseline ADL function  Description: INTERVENTIONS:-  Assess patient's ability to carry out ADLs; assess patient's baseline for ADL function and identify physical deficits which impact ability to perform ADLs (bathing, care of mouth/teeth, toileting, grooming, dressing, etc.)- Assess/evaluate cause of self-care deficits - Assess range of motion- Assess patient's mobility; develop plan if impaired- Assess patient's need for assistive devices and provide as appropriate- Encourage maximum independence but intervene and supervise when necessary- Involve family in performance of ADLs- Assess for home care needs following discharge - Consider OT consult to assist with ADL evaluation and planning for discharge- Provide patient education as appropriate  3/24/2025 0408 by Morelia Ricketts RN  Outcome: Progressing  3/24/2025 0408 by Morelia Ricketts RN  Outcome: Progressing  Goal: Maintains/Returns to pre admission functional level  Description: INTERVENTIONS:- Perform AM-PAC 6 Click Basic Mobility/ Daily Activity assessment daily.- Set and communicate daily mobility goal to care team and patient/family/caregiver. - Collaborate with rehabilitation services on mobility goals if consulted- Perform Range of Motion  times a day.- Reposition patient every  hours.- Dangle patient  times a day- Stand patient  times a day- Ambulate patient  times a day- Out of bed to chair   times a day - Out of bed for meals  times a day- Out of bed for toileting- Record patient progress and toleration of activity level   3/24/2025 0408 by Morelia Ricketts RN  Outcome: Progressing  3/24/2025 0408 by Morelia Ricketts RN  Outcome: Progressing     Problem: DISCHARGE PLANNING  Goal: Discharge to home or other facility with appropriate resources  Description: INTERVENTIONS:- Identify barriers to discharge w/patient and caregiver- Arrange for needed discharge resources and transportation as appropriate- Identify discharge learning needs (meds, wound care, etc.)- Arrange for interpretive services to assist at discharge as needed- Refer to Case Management Department for coordinating discharge planning if the patient needs post-hospital services based on physician/advanced practitioner order or complex needs related to functional status, cognitive ability, or social support system  3/24/2025 0408 by Morelia Ricketts RN  Outcome: Progressing  3/24/2025 0408 by Morelia Ricketts RN  Outcome: Progressing     Problem: Knowledge Deficit  Goal: Patient/family/caregiver demonstrates understanding of disease process, treatment plan, medications, and discharge instructions  Description: Complete learning assessment and assess knowledge base.Interventions:- Provide teaching at level of understanding- Provide teaching via preferred learning methods  3/24/2025 0408 by Morelia Ricketts RN  Outcome: Progressing  3/24/2025 0408 by Morelia Ricketts RN  Outcome: Progressing
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
## 2025-03-24 NOTE — OCCUPATIONAL THERAPY NOTE
Occupational Therapy Evaluation     Patient Name: Laine Tse  Today's Date: 3/24/2025  Problem List  Principal Problem:    Social problem  Active Problems:    Hyperlipidemia    Hypothyroidism    Seizure disorder (HCC)    Benign essential hypertension    Schizoaffective disorder (HCC)    Pre-diabetes    T12 compression fracture (HCC)    Past Medical History  Past Medical History:   Diagnosis Date    Anxiety     Benign essential hypertension     resolved; 06/15/16    Depression     Depression with anxiety     Fracture of fifth metatarsal bone of right foot with delayed healing     last assessed 04/12/16; fracture of metatarsal bone, right, closed, initial encounter    Hyperlipidemia     last assessed 11/28/17    Hypothyroidism     last assessed 11/28/2017    Insomnia     Obesity     Schizoaffective disorder (HCC)     last assessed 05/18/2017    Seizure disorder (HCC)     last assessed 03/28/17    Seizures (HCC)      Past Surgical History  Past Surgical History:   Procedure Laterality Date    COLONOSCOPY      complete    MI COLONOSCOPY FLX DX W/COLLJ SPEC WHEN PFRMD N/A 8/30/2018    Procedure: COLONOSCOPY with polypectomies;  Surgeon: Andriy Lopez MD;  Location: [facility];  Service: Gastroenterology             03/24/25 1101   OT Last Visit   OT Visit Date 03/24/25   Note Type   Note type Evaluation   Pain Assessment   Pain Assessment Tool 0-10   Pain Score No Pain   Restrictions/Precautions   Weight Bearing Precautions Per Order No   Braces or Orthoses LSO  (as needed for comfort as per NSx on last admission - currently not present in hospital)   Other Precautions Cognitive;Chair Alarm;Bed Alarm;Impulsive;Spinal precautions;Fall Risk   Home Living   Type of Home Group Home  (Access Services)   Home Equipment Cane   Additional Comments use of SPC at baseline   Prior Function   Level of [place] Independent with ADLs  (per CM conversation with group home pt requiring intermittent cuing for completing ADL  "tasks)   Lives With Facility staff   Receives Help From Other (Comment)  (facility staff)   IADLs Family/Friend/Other provides transportation;Family/Friend/Other provides meals;Family/Friend/Other provides medication management   Falls in the last 6 months 1 to 4   Lifestyle   Autonomy PTA pt at [facility] for less than 4 hrs before return to [facility]. Prior to previous hospitalization was living at group home, (I) with ADLs and (A) with IADLs, (+)falls, (-)drives, use of SPC at baseline   Reciprocal Relationships supportive staff   Intrinsic Gratification enjoys listening to Theme song from [employer] [place] by Herb Griffin   Additional Pertinent History Pt is a re-admit from [facility] due to psychotic features of paranoia and agitation. Pt with recent stay on [facility] Feb-March. Hx of multiple falls on 3/18 - where she was found to have T12 fracture - LSO brace ordered for comfort. PMH: schizoaffective disorder, seizure disorder, HTN   Family/Caregiver Present No   Subjective   Subjective \"Do not move that sheet that is where my dead children need to stay\" \"Let's dance\"   ADL   Eating Assistance 6  Modified independent   Grooming Assistance 6  Modified Independent   Grooming Deficit Increased time to complete  (washing hands standing at sink)   UB Bathing Assistance 4  Minimal Assistance   LB Bathing Assistance 4  Minimal Assistance   UB Dressing Assistance 4  Minimal Assistance   LB Dressing Assistance 4  Minimal Assistance   Toileting Assistance  4  Minimal Assistance   Toileting Deficit Verbal cueing;Supervison/safety;Increased time to complete   Bed Mobility   Sit to Supine 5  Supervision   Additional items Increased time required;HOB elevated  (anterior approach, placing knee into bed first)   Transfers   Sit to Stand 5  Supervision   Additional items Increased time required   Stand to Sit 5  Supervision   Additional items Increased time required   Additional Comments impulsive movement at times,   Functional Mobility " "  Functional Mobility 5  Supervision   Additional Comments close (S), pt yelling at therapist for providing support with mobility vs being \"too close\". Trial with RW, SPC and no AD   Additional items SPC;Rolling walker   Balance   Static Sitting Fair +   Dynamic Sitting Fair   Static Standing Fair -   Dynamic Standing Fair -   Ambulatory Fair -   Activity Tolerance   Activity Tolerance Patient limited by fatigue;Other (Comment)  (limited by cognition)   Medical Staff Made Aware PT NATTY Diallo Assessment   RUE Assessment WFL   LUE Assessment   LUE Assessment WFL   Hand Function   Gross Motor Coordination Functional   Fine Motor Coordination Functional   Hand Function Comments Able to complete FM tasks including writing notes on her clipboard and handling rosary beads   Cognition   Overall Cognitive Status Impaired   Arousal/Participation Alert;Cooperative   Attention Difficulty attending to directions   Orientation Level Oriented to place   Memory Decreased short term memory;Decreased recall of recent events;Decreased recall of precautions   Following Commands Follows one step commands with increased time or repetition   Comments HIGHLY tangential and requiring extenisve cuing + reassurance + redirection throughout session for attention to task. Pt emotionally labile and yelling at therapists, then later crying and later wanting to dance   Assessment   Limitation Decreased ADL status;Decreased Safe judgement during ADL;Decreased cognition;Decreased endurance;Decreased self-care trans;Decreased high-level ADLs  (impaired balance, fxnl mobility, act caio, fxnl reach, standing caio, strength, attention to task, direction following, safety awareness, insight, pacing, problem solving, learning new tasks, emotional regulation, termination of conversation)   Prognosis Good   Assessment Pt is a 69 y.o. female seen for OT evaluation s/p admission to [facility] on 3/21/2025 due to psychotic features at STR. Diagnosed with " "Social problem. Personal and env factors supporting pt at time of IE include (I) PLOF and accessible home environment. Personal and env factors inhibiting engagement in occupations include current habits and behavioral patterns, lifestyle patterns, and difficulty completing IADLs. Performance deficits that affect the pt’s occupational performance can be seen above. Due to pt's current functional limitations and medical complications pt is functioning below baseline. Pt would benefit from continued skilled OT treatment in order to maximize safety, independence and overall performance with ADLs, functional mobility, and functional transfers in order to achieve highest level of function.   Goals   Patient Goals \"I WANT to go to [facility]!!\"   LTG Time Frame 10-14   Long Term Goal see goals listed below   Plan   Treatment Interventions ADL retraining;Functional transfer training;UE strengthening/ROM;Endurance training;Cognitive reorientation;Patient/family training;Equipment evaluation/education;Compensatory technique education;Energy conservation;Activityengagement   Goal Expiration Date 04/03/25   OT Treatment Day 0   OT Frequency 2-3x/wk   Discharge Recommendation   Rehab Resource Intensity Level, OT III (Minimum Resource Intensity)  (Pt would HIGHLY benefit from dc to familiar environment)   AM-PAC Daily Activity Inpatient   Lower Body Dressing 3   Bathing 3   Toileting 3   Upper Body Dressing 3   Grooming 3   Eating 4   Daily Activity Raw Score 19   Daily Activity Standardized Score (Calc for Raw Score >=11) 40.22   AM-PAC Applied Cognition Inpatient   Following a Speech/Presentation 2   Understanding Ordinary Conversation 3   Taking Medications 2   Remembering Where Things Are Placed or Put Away 2   Remembering List of 4-5 Errands 1   Taking Care of Complicated Tasks 1   Applied Cognition Raw Score 11   Applied Cognition Standardized Score 27.03   End of Consult   Patient Position at End of Consult " Supine;Bed/Chair alarm activated;All needs within reach     GOALS:      -Patient will be Mod I with UB dressing using AE and AD as needed in order to increase (I) with ADLs    -Patient will be Mod I with UB bathing using AE and AD as needed in order to increase (I) with ADLs    -Patient will be Mod I with LB dressing with use of AE and AD as needed in order to increase (I) with ADLs    -Patient will be Mod I with LB bathing with use of AE and AD as needed in order to increase (I) with ADLs    -Patient will complete toileting w/ Mod I w/ G hygiene/thoroughness in order to reduce caregiver burden    -Patient will demonstrate Mod I with bed mobility for ability to manage own comfort and initiate OOB tasks.     -Patient will perform functional transfers with Mod I to/from all surfaces using DME as needed in order to increase (I) with functional tasks    -Patient will be Mod I with functional mobility to/from bathroom for increased independence with toileting tasks    -Patient will tolerate therapeutic activities for greater than 30 min, in order to increase tolerance for functional activities.     -Patient will follow single-step instructions with no VC in order to safely complete functional tasks     -Patient will attend to functional task for 5 min without VC for attention/redirection     -Patient’s caregivers will be independent with providing appropriate cognitive cues in order to facilitate patient’s independence during functional tasks.    -Patient will identify 3 meaningful activities to participate in at the hospital and home environment in order to promote physical/emotional/cognitive well-being      The patient's raw score on the -PAC Daily Activity Inpatient Short Form is 19. A raw score of greater than or equal to 19 suggests the patient may benefit from discharge to home. HOWEVER please refer to the recommendation of the Occupational Therapist for safe discharge planning.    This session, pt required and  most appropriately benefited from skilled OT/PT co-eval due to cognitive-behavioral deficits and unpredictable medical and/or functional status. OT and PT goals were addressed separately as seen in documentation.     Vanessa Torrez MS, OTR/L

## 2025-03-24 NOTE — PLAN OF CARE
Problem: PHYSICAL THERAPY ADULT  Goal: Performs mobility at highest level of function for planned discharge setting.  See evaluation for individualized goals.  Description: Treatment/Interventions: Functional transfer training, LE strengthening/ROM, Therapeutic exercise, Endurance training, Cognitive reorientation, Equipment eval/education, Patient/family training, Bed mobility, Gait training, Compensatory technique education          See flowsheet documentation for full assessment, interventions and recommendations.  Note: Prognosis: Fair  Problem List: Impaired balance, Decreased mobility, Decreased cognition, Impaired judgement, Decreased safety awareness, Decreased strength  Assessment: Laine Tse is a 69 y.o. Female who presents to [facility] on 3/21/25 for psychiatric evaluation and diagnosis of social problem. Orders for PT eval and treat received. Comorbidities affecting pt's functional mobility at time of evaluation include: HTN, T12 compression fx, schizoaffective disorder, anemia, osteoporosis, unsteady gait, seizure disorder. Personal factors affecting DC include: ambulating w/ assistive device, decreased cognition, positive fall history, and limited insight into impairments. At baseline, pt mobilizes w/ SPC, and w/ 1-4 fall(s) in the previous 6 months. Upon evaluation, pt presents w/ the following deficits: impaired strength, impaired balance, impaired cognition, decreased safety awareness, and gait deviations. Pt currently requires  supervision for bed mobility, supervision for transfers, supervision w/ SPC, RW, no AD for ambulation. Pt's clinical presentation is unstable/unpredictable due to abnormal lab values, need for increased assistance w/ functional mobility compared to baseline, need for input for mobility technique, need for input for task focus, need to input for safety awareness, recent readmission w/in 30 days, recent h/o falls, ongoing medical management. From a PT/mobility standpoint  given the above findings, DC recommendation is level: III (Minimum Rehab Resource Intensity). During current admission, pt will benefit from continued skilled inpatient PT in the acute care setting in order to address the above deficits and to maximize function and mobility prior to DC from acute care.        Rehab Resource Intensity Level, PT: III (Minimum Resource Intensity)    See flowsheet documentation for full assessment.

## 2025-03-24 NOTE — PLAN OF CARE
Problem: PAIN - ADULT  Goal: Verbalizes/displays adequate comfort level or baseline comfort level  Description: Interventions:- Encourage patient to monitor pain and request assistance- Assess pain using appropriate pain scale- Administer analgesics based on type and severity of pain and evaluate response- Implement non-pharmacological measures as appropriate and evaluate response- Consider cultural and social influences on pain and pain management- Notify physician/advanced practitioner if interventions unsuccessful or patient reports new pain  Outcome: Progressing     Problem: INFECTION - ADULT  Goal: Absence or prevention of progression during hospitalization  Description: INTERVENTIONS:- Assess and monitor for signs and symptoms of infection- Monitor lab/diagnostic results- Monitor all insertion sites, i.e. indwelling lines, tubes, and drains- Monitor endotracheal if appropriate and nasal secretions for changes in amount and color- [place] appropriate cooling/warming therapies per order- Administer medications as ordered- Instruct and encourage patient and family to use good hand hygiene technique- Identify and instruct in appropriate isolation precautions for identified infection/condition  Outcome: Progressing  Goal: Absence of fever/infection during neutropenic period  Description: INTERVENTIONS:- Monitor WBC  Outcome: Progressing     Problem: SAFETY ADULT  Goal: Patient will remain free of falls  Description: INTERVENTIONS:- Educate patient/family on patient safety including physical limitations- Instruct patient to call for assistance with activity - Consult OT/PT to assist with strengthening/mobility - Keep Call bell within reach- Keep bed low and locked with side rails adjusted as appropriate- Keep care items and personal belongings within reach- Initiate and maintain comfort rounds- Make Fall Risk Sign visible to staff- Offer Toileting every 2 Hours, in advance of need- Initiate/Maintain bed alarm-  Obtain necessary fall risk management equipment- Apply yellow socks and bracelet for high fall risk patients- Consider moving patient to room near nurses station  Outcome: Progressing  Goal: Maintain or return to baseline ADL function  Description: INTERVENTIONS:-  Assess patient's ability to carry out ADLs; assess patient's baseline for ADL function and identify physical deficits which impact ability to perform ADLs (bathing, care of mouth/teeth, toileting, grooming, dressing, etc.)- Assess/evaluate cause of self-care deficits - Assess range of motion- Assess patient's mobility; develop plan if impaired- Assess patient's need for assistive devices and provide as appropriate- Encourage maximum independence but intervene and supervise when necessary- Involve family in performance of ADLs- Assess for home care needs following discharge - Consider OT consult to assist with ADL evaluation and planning for discharge- Provide patient education as appropriate  Outcome: Progressing  Goal: Maintains/Returns to pre admission functional level  Description: INTERVENTIONS:- Perform AM-PAC 6 Click Basic Mobility/ Daily Activity assessment daily.- Set and communicate daily mobility goal to care team and patient/family/caregiver. - Collaborate with rehabilitation services on mobility goals if consulted- Outcome: Progressing     Problem: DISCHARGE PLANNING  Goal: Discharge to home or other facility with appropriate resources  Description: INTERVENTIONS:- Identify barriers to discharge w/patient and caregiver- Arrange for needed discharge resources and transportation as appropriate- Identify discharge learning needs (meds, wound care, etc.)- Arrange for interpretive services to assist at discharge as needed- Refer to Case Management Department for coordinating discharge planning if the patient needs post-hospital services based on physician/advanced practitioner order or complex needs related to functional status, cognitive ability,  or social support system  Outcome: Progressing     Problem: Knowledge Deficit  Goal: Patient/family/caregiver demonstrates understanding of disease process, treatment plan, medications, and discharge instructions  Description: Complete learning assessment and assess knowledge base.Interventions:- Provide teaching at level of understanding- Provide teaching via preferred learning methods  Outcome: Progressing     Problem: SAFETY,RESTRAINT: NV/NON-SELF DESTRUCTIVE BEHAVIOR  Goal: Remains free of harm/injury (restraint for non violent/non self-detsructive behavior)  Description: INTERVENTIONS:- Instruct patient/family regarding restraint use - Assess and monitor physiologic and psychological status - Provide interventions and comfort measures to meet assessed patient needs - Identify and implement measures to help patient regain control- Assess readiness for release of restraint   Outcome: Completed  Goal: Returns to optimal restraint-free functioning  Description: INTERVENTIONS:- Assess the patient's behavior and symptoms that indicate continued need for restraint- Identify and implement measures to help patient regain control- Assess readiness for release of restraint   Outcome: Completed

## 2025-03-24 NOTE — QUICK NOTE
I got a message from the nurse that patient is agitated and redirection at times will.  I did put sof restraints upper limbs.

## 2025-03-25 ENCOUNTER — HOSPITAL ENCOUNTER (INPATIENT)
Facility: HOSPITAL | Age: 70
LOS: 3 days | Discharge: HOME HEALTH | DRG: 885 | End: 2025-03-28
Admitting: INTERNAL MEDICINE
Payer: MEDICARE

## 2025-03-25 VITALS
TEMPERATURE: 98.5 F | SYSTOLIC BLOOD PRESSURE: 147 MMHG | RESPIRATION RATE: 18 BRPM | OXYGEN SATURATION: 97 % | HEART RATE: 82 BPM | DIASTOLIC BLOOD PRESSURE: 96 MMHG

## 2025-03-25 DIAGNOSIS — S22.080D COMPRESSION FRACTURE OF T12 VERTEBRA WITH ROUTINE HEALING, SUBSEQUENT ENCOUNTER: ICD-10-CM

## 2025-03-25 DIAGNOSIS — R45.1 AGITATION: ICD-10-CM

## 2025-03-25 DIAGNOSIS — F25.9 SCHIZOAFFECTIVE DISORDER (HCC): ICD-10-CM

## 2025-03-25 DIAGNOSIS — G47.00 INSOMNIA: ICD-10-CM

## 2025-03-25 DIAGNOSIS — G20.C PARKINSONISM (HCC): ICD-10-CM

## 2025-03-25 DIAGNOSIS — Z60.9 SOCIAL PROBLEM: Primary | ICD-10-CM

## 2025-03-25 LAB
ALBUMIN SERPL BCG-MCNC: 3.8 G/DL (ref 3.5–5)
ALP SERPL-CCNC: 64 U/L (ref 34–104)
ALT SERPL W P-5'-P-CCNC: 20 U/L (ref 7–52)
ANION GAP SERPL CALCULATED.3IONS-SCNC: 7 MMOL/L (ref 4–13)
AST SERPL W P-5'-P-CCNC: 17 U/L (ref 13–39)
BASOPHILS # BLD AUTO: 0 THOUSANDS/ÂΜL (ref 0–0.1)
BASOPHILS NFR BLD AUTO: 0 % (ref 0–1)
BILIRUB SERPL-MCNC: 0.28 MG/DL (ref 0.2–1)
BUN SERPL-MCNC: 10 MG/DL (ref 5–25)
CALCIUM SERPL-MCNC: 8.8 MG/DL (ref 8.4–10.2)
CHLORIDE SERPL-SCNC: 104 MMOL/L (ref 96–108)
CO2 SERPL-SCNC: 26 MMOL/L (ref 21–32)
CREAT SERPL-MCNC: 0.54 MG/DL (ref 0.6–1.3)
EOSINOPHIL # BLD AUTO: 0 THOUSAND/ÂΜL (ref 0–0.61)
EOSINOPHIL NFR BLD AUTO: 0 % (ref 0–6)
ERYTHROCYTE [DISTWIDTH] IN BLOOD BY AUTOMATED COUNT: 12.3 % (ref 11.6–15.1)
GFR SERPL CREATININE-BSD FRML MDRD: 95 ML/MIN/1.73SQ M
GLUCOSE SERPL-MCNC: 94 MG/DL (ref 65–140)
HCT VFR BLD AUTO: 37.8 % (ref 34.8–46.1)
HGB BLD-MCNC: 13 G/DL (ref 11.5–15.4)
IMM GRANULOCYTES # BLD AUTO: 0.02 THOUSAND/UL (ref 0–0.2)
IMM GRANULOCYTES NFR BLD AUTO: 1 % (ref 0–2)
LYMPHOCYTES # BLD AUTO: 1.77 THOUSANDS/ÂΜL (ref 0.6–4.47)
LYMPHOCYTES NFR BLD AUTO: 42 % (ref 14–44)
MCH RBC QN AUTO: 32.8 PG (ref 26.8–34.3)
MCHC RBC AUTO-ENTMCNC: 34.4 G/DL (ref 31.4–37.4)
MCV RBC AUTO: 96 FL (ref 82–98)
MONOCYTES # BLD AUTO: 0.38 THOUSAND/ÂΜL (ref 0.17–1.22)
MONOCYTES NFR BLD AUTO: 9 % (ref 4–12)
NEUTROPHILS # BLD AUTO: 2.08 THOUSANDS/ÂΜL (ref 1.85–7.62)
NEUTS SEG NFR BLD AUTO: 48 % (ref 43–75)
NRBC BLD AUTO-RTO: 0 /100 WBCS
PLATELET # BLD AUTO: 244 THOUSANDS/UL (ref 149–390)
PMV BLD AUTO: 10.1 FL (ref 8.9–12.7)
POTASSIUM SERPL-SCNC: 3.7 MMOL/L (ref 3.5–5.3)
PROT SERPL-MCNC: 6.5 G/DL (ref 6.4–8.4)
RBC # BLD AUTO: 3.96 MILLION/UL (ref 3.81–5.12)
SODIUM SERPL-SCNC: 137 MMOL/L (ref 135–147)
WBC # BLD AUTO: 4.25 THOUSAND/UL (ref 4.31–10.16)

## 2025-03-25 PROCEDURE — 99284 EMERGENCY DEPT VISIT MOD MDM: CPT

## 2025-03-25 PROCEDURE — 36415 COLL VENOUS BLD VENIPUNCTURE: CPT

## 2025-03-25 PROCEDURE — 99239 HOSP IP/OBS DSCHRG MGMT >30: CPT | Performed by: INTERNAL MEDICINE

## 2025-03-25 PROCEDURE — 99285 EMERGENCY DEPT VISIT HI MDM: CPT

## 2025-03-25 PROCEDURE — 99222 1ST HOSP IP/OBS MODERATE 55: CPT | Performed by: PSYCHIATRY & NEUROLOGY

## 2025-03-25 PROCEDURE — 80053 COMPREHEN METABOLIC PANEL: CPT

## 2025-03-25 PROCEDURE — 85025 COMPLETE CBC W/AUTO DIFF WBC: CPT

## 2025-03-25 PROCEDURE — 99223 1ST HOSP IP/OBS HIGH 75: CPT | Performed by: INTERNAL MEDICINE

## 2025-03-25 RX ORDER — OLANZAPINE 10 MG/2ML
2.5 INJECTION, POWDER, FOR SOLUTION INTRAMUSCULAR
Status: DISCONTINUED | OUTPATIENT
Start: 2025-03-25 | End: 2025-03-25

## 2025-03-25 RX ORDER — OLANZAPINE 10 MG/1
20 TABLET ORAL
Status: DISCONTINUED | OUTPATIENT
Start: 2025-03-25 | End: 2025-03-28 | Stop reason: HOSPADM

## 2025-03-25 RX ORDER — CLONAZEPAM 1 MG/1
1 TABLET ORAL 2 TIMES DAILY
Status: DISCONTINUED | OUTPATIENT
Start: 2025-03-25 | End: 2025-03-28 | Stop reason: HOSPADM

## 2025-03-25 RX ORDER — AMOXICILLIN 250 MG
1 CAPSULE ORAL
Status: DISCONTINUED | OUTPATIENT
Start: 2025-03-25 | End: 2025-03-28 | Stop reason: HOSPADM

## 2025-03-25 RX ORDER — FLUTICASONE PROPIONATE 50 MCG
2 SPRAY, SUSPENSION (ML) NASAL DAILY
Status: DISCONTINUED | OUTPATIENT
Start: 2025-03-25 | End: 2025-03-28 | Stop reason: HOSPADM

## 2025-03-25 RX ORDER — ATORVASTATIN CALCIUM 20 MG/1
20 TABLET, FILM COATED ORAL
Status: DISCONTINUED | OUTPATIENT
Start: 2025-03-26 | End: 2025-03-28 | Stop reason: HOSPADM

## 2025-03-25 RX ORDER — LEVOTHYROXINE SODIUM 100 UG/1
100 TABLET ORAL
Status: DISCONTINUED | OUTPATIENT
Start: 2025-03-26 | End: 2025-03-28 | Stop reason: HOSPADM

## 2025-03-25 RX ORDER — ONDANSETRON 2 MG/ML
4 INJECTION INTRAMUSCULAR; INTRAVENOUS EVERY 6 HOURS PRN
Status: DISCONTINUED | OUTPATIENT
Start: 2025-03-25 | End: 2025-03-28 | Stop reason: HOSPADM

## 2025-03-25 RX ORDER — GABAPENTIN 100 MG/1
200 CAPSULE ORAL 2 TIMES DAILY
Status: DISCONTINUED | OUTPATIENT
Start: 2025-03-25 | End: 2025-03-28 | Stop reason: HOSPADM

## 2025-03-25 RX ORDER — ENOXAPARIN SODIUM 100 MG/ML
40 INJECTION SUBCUTANEOUS DAILY
Status: DISCONTINUED | OUTPATIENT
Start: 2025-03-26 | End: 2025-03-28 | Stop reason: HOSPADM

## 2025-03-25 RX ORDER — OLANZAPINE 20 MG/1
20 TABLET ORAL
Qty: 30 TABLET | Refills: 0 | Status: ON HOLD | OUTPATIENT
Start: 2025-03-25 | End: 2025-03-28

## 2025-03-25 RX ORDER — ACETAMINOPHEN 325 MG/1
650 TABLET ORAL EVERY 6 HOURS PRN
Status: DISCONTINUED | OUTPATIENT
Start: 2025-03-25 | End: 2025-03-28 | Stop reason: HOSPADM

## 2025-03-25 RX ORDER — ASPIRIN 81 MG/1
81 TABLET, CHEWABLE ORAL DAILY
Status: DISCONTINUED | OUTPATIENT
Start: 2025-03-26 | End: 2025-03-28 | Stop reason: HOSPADM

## 2025-03-25 RX ORDER — GUAIFENESIN/DEXTROMETHORPHAN 100-10MG/5
10 SYRUP ORAL EVERY 4 HOURS PRN
Status: DISCONTINUED | OUTPATIENT
Start: 2025-03-25 | End: 2025-03-28 | Stop reason: HOSPADM

## 2025-03-25 RX ORDER — POLYETHYLENE GLYCOL 3350 17 G/17G
17 POWDER, FOR SOLUTION ORAL DAILY PRN
Status: DISCONTINUED | OUTPATIENT
Start: 2025-03-25 | End: 2025-03-28 | Stop reason: HOSPADM

## 2025-03-25 RX ORDER — PHENOBARBITAL 32.4 MG/1
64.8 TABLET ORAL EVERY 12 HOURS
Status: DISCONTINUED | OUTPATIENT
Start: 2025-03-25 | End: 2025-03-28 | Stop reason: HOSPADM

## 2025-03-25 RX ORDER — TRIHEXYPHENIDYL HYDROCHLORIDE 2 MG/1
2 TABLET ORAL 2 TIMES DAILY
Status: DISCONTINUED | OUTPATIENT
Start: 2025-03-25 | End: 2025-03-28 | Stop reason: HOSPADM

## 2025-03-25 RX ORDER — GABAPENTIN 100 MG/1
200 CAPSULE ORAL 2 TIMES DAILY
Qty: 120 CAPSULE | Refills: 0 | Status: ON HOLD | OUTPATIENT
Start: 2025-03-25 | End: 2025-03-28

## 2025-03-25 RX ORDER — MUSCLE RUB CREAM 100; 150 MG/G; MG/G
CREAM TOPICAL DAILY
Status: DISCONTINUED | OUTPATIENT
Start: 2025-03-26 | End: 2025-03-28 | Stop reason: HOSPADM

## 2025-03-25 RX ORDER — MAGNESIUM HYDROXIDE/ALUMINUM HYDROXICE/SIMETHICONE 120; 1200; 1200 MG/30ML; MG/30ML; MG/30ML
30 SUSPENSION ORAL EVERY 6 HOURS PRN
Status: DISCONTINUED | OUTPATIENT
Start: 2025-03-25 | End: 2025-03-28 | Stop reason: HOSPADM

## 2025-03-25 RX ADMIN — ATORVASTATIN CALCIUM 20 MG: 20 TABLET, FILM COATED ORAL at 09:58

## 2025-03-25 RX ADMIN — TRIHEXYPHENIDYL HYDROCHLORIDE 2 MG: 2 TABLET ORAL at 09:59

## 2025-03-25 RX ADMIN — HEPARIN SODIUM 5000 UNITS: 5000 INJECTION INTRAVENOUS; SUBCUTANEOUS at 13:02

## 2025-03-25 RX ADMIN — ASPIRIN 81 MG CHEWABLE TABLET 81 MG: 81 TABLET CHEWABLE at 09:58

## 2025-03-25 RX ADMIN — PHENOBARBITAL 64.8 MG: 32.4 TABLET ORAL at 09:58

## 2025-03-25 RX ADMIN — GABAPENTIN 200 MG: 100 CAPSULE ORAL at 09:58

## 2025-03-25 RX ADMIN — CLONAZEPAM 1 MG: 1 TABLET ORAL at 09:58

## 2025-03-25 SDOH — SOCIAL STABILITY - SOCIAL INSECURITY: PROBLEM RELATED TO SOCIAL ENVIRONMENT, UNSPECIFIED: Z60.9

## 2025-03-25 NOTE — QUICK NOTE
I got a message from the nurse that patient had fallen.  No dizziness or lightheadedness.  No pain so imaging was not performed.  No strokelike symptoms.  Fall precaution ordered    Patient told that she rolled out of the bed.
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
## 2025-03-25 NOTE — ASSESSMENT & PLAN NOTE
Noticed following the fall 4 days ago.  Evaluated by neurosurgery during recent hospital admission.    Plan:  Continue LSO Brace PRN upon discharge

## 2025-03-25 NOTE — DISCHARGE INSTR - AVS FIRST PAGE
Dear Laine Tse,     It was our pleasure to care for you here at [facility].  It is our hope that we were always able to exceed the expected standards for your care during your stay.  You were hospitalized due to social issues with previous short-term rehab facility.  You were cared for on the third floor by Alaina Powell MD under the service of Marquis Osborne MD with the [facility] who covers for your primary care physician (PCP), Adilson Yip MD, while you were hospitalized.  If you have any questions or concerns related to this hospitalization, you may contact us at .  For follow up as well as any medication refills, we recommend that you follow up with your primary care physician.  A registered nurse will reach out to you by phone within a few days after your discharge to answer any additional questions that you may have after going home.  However, at this time we provide for you here, the most important instructions / recommendations at discharge:     Notable Medication Adjustments -   Please take 20 mg of Zyprexa today (previous dose was 15 mg daily)  Please take gabapentin (Neurontin) 200 mg, twice daily.  Please resume all other medications that you were taking prior to the hospital.  Testing Required after Discharge -   None  Important follow up information -   Please follow-up with your primary care physician within 1 week  Other Instructions -   In the event that you have another fall and are in significant pain please return to the emergency department.  Please review this entire after visit summary as additional general instructions including medication list, appointments, activity, diet, any pertinent wound care, and other additional recommendations from your care team that may be provided for you.      Sincerely,     Alaina Powell MD

## 2025-03-25 NOTE — CASE MANAGEMENT
Case Management Discharge Planning Note    Patient name Laine Tse  Location S /S -01 MRN [id number]  : 1955 Date 3/25/2025       Current Admission Date: 3/21/2025  Current Admission Diagnosis:Social problem   Patient Active Problem List    Diagnosis Date Noted Date Diagnosed    Social problem 2025     T12 compression fracture (HCC) 2025     Severe protein-calorie malnutrition (HCC) 2025     Skin lesion of left lower extremity 2024     Chronic cough 2023     T wave inversion on electrocardiogram 2023     Rash 2023     Dermatitis 2023     Chronic midline low back pain without sciatica 2021     Class 1 obesity with body mass index (BMI) of 32.0 to 32.9 in adult 2021     Decreased hearing of both ears 2019     Benign essential hypertension 2019     Pre-diabetes 2019     Hyperglycemia 2018     Fall 2018     Injury of right toe, initial encounter 2018     Unsteady gait 10/03/2017     Seizure disorder (HCC) 05/15/2017     Anemia 05/15/2017     Hyponatremia 2015     Folic acid deficiency 10/15/2012     Hyperlipidemia 10/05/2012     Hypothyroidism 10/05/2012     Schizoaffective disorder (HCC) 10/05/2012     Osteoporosis 10/05/2012       LOS (days): 3  Geometric Mean LOS (GMLOS) (days): 6.5  Days to GMLOS:3.5     OBJECTIVE:  Risk of Unplanned Readmission Score: 23.89         Current admission status: Inpatient   Preferred Pharmacy:   [place], [place] - [facility] [address] [address]  Phone: [phone] Fax: [phone]    Primary Care Provider: Adilson Yip MD    Primary Insurance: MEDICARE  Secondary Insurance:     DISCHARGE DETAILS:      Per SLIM and Psychiatry, Pt is medically ready for discharge.     CM left 2 VM's for Clayton Jones-  at [facility] informing him that the Pt is medically ready for d/c and has a  Problem: Mobility Impaired (Adult and Pediatric)  Goal: *Acute Goals and Plan of Care (Insert Text)  Description: Physical Therapy Goals  Initiated 9/27/2020 and to be accomplished within 7 day(s)  1. Patient will move from supine to sit and sit to supine , scoot up and down, and roll side to side in bed with supervision/set-up. 2.  Patient will transfer from bed to chair and chair to bed with minimal assistance/contact guard assist using the least restrictive device. 3.  Patient will perform sit to stand with supervision/set-up. 4.  Patient will ambulate with minimal assistance/contact guard assist for 80 feet with the least restrictive device. 5.  Patient will ascend/descend 3 stairs with unilateral handrail(s) with minimal assistance/contact guard assist.    PLOF: Pt confused and lethargic throughout evaluation, reporting using cane at home. She lives with wife who assists with his ADLs. Lives in 1 story house with 3 AJAY, also has RW available at home. Outcome: Progressing Towards Goal     PHYSICAL THERAPY TREATMENT    Patient: Anika Luevano (26 y.o. male)  Date: 9/29/2020  Diagnosis: Acute renal failure ([facility]) ([facility] [N17.9]   <principal problem not specified>  Procedure(s) (LRB):  INSERTION TUNNELED CENTRAL VENOUS CATHETER (N/A) 1 Day Post-Op  Precautions: Fall    ASSESSMENT:  Pt presents with decreased functional mobility capacity, strength, endurance, and dynamic balance. RN cleared for PT to work with pt. Pt found supine in bed, willing to work with PT. Pt seen with both PT and OT to maximize pt safety and mobility. He reports no pain and takes longer to remember where he is, but is oriented x3. Pt able to perform supine-sit transfer with minAx1. Once sitting, pt initially requires constant support for static sitting and has a posterior lean, v/c to lean forward.  As time goes on, pt able to lean forward spontaneously on his own but still needs reminders to use his UE's to keep from falling "confirmed 7:00pm pick  up.    CM spoke with the group home nurse- Dinorah and informed her that the Pt would be d/c'd today. CM also requested additional contact info for Clayton Jones. Dinorah provided the phone number for the group home ([phone]). CM called the group home and spoke with staff member, Grace.  CM informed Grace that the Pt has a confirmed 7pm  and will be returning home.       3:50pm update: TC received from Clayton Karen who confirms that he did not provide the Pt with a 30-day notice that she cannot return to her group home. DALE was never contacted by any outside agency stating that Pt cannot return.  Clayton states that he met with the \"ACT\" team and the \"[facility]\" to discuss \"next steps\" for the Pt, however DALE was not informed of this meeting, nor invited to provide input regarding her hospital admission. Clayton states that Pt is on a waiting list for [facility]. DALE explained that we do not hold Pts in the hospital to await LTC placement or alternative housing arrangements.  Clayton could not provide info for Pts . He states that she does not have an SC, but has a CM thorough the ACT team- Elizabeth Hubbard, [phone], ext [phone]. Clayton is aware that the Pt is not recommended for STR, although he continues to state that she needs \"physical rehab\". He is aware that the recommendation from PT/OT here is HHC. Pt has [facility] reserved. Pt is ambulating the halls here independently. Pt has been toileting and feeding herself while here.     Per [facility] Psych team, ACT is aware that the Pt will be discharged to her Group home today and plan to see her at home tomorrow, 3/26.     Clayton is aware that the Pt is medically ready for discharge and has BLS transport arranged for 7:00pm today.       RN and SLIM provider aware.     " back. Pt needed x2 attempts to stand on first trial. V/c to use rocking and momentum to stand. Pt helped up with use of gait belt and modAx2 on 1st full stand. V/c to stand upright with RW. Pt able to take side steps to L and R along EOB. Pt observed to not put much weight into his left LE and claims his knee has always given him problems. Pt unable to properly shift weight onto his left leg to step with his R. When lifting L leg off ground, pt unable to bend his knee and seems to have decreased coordination/proprioception when placing his left foot back on ground. He slides his left foot during side steps instead of taking steps. Pt back sitting, poor eccentric control, for seated rest. Coordination of B UE's normal and light touch sensation intact B Le's, patient has WFL strength of L LE. Pt stood again with momentum rocking and required modAx1 this time. Pt took side steps, total of 10 feet with RW and still unable to use his left LE properly for weight shifting and taking steps. Pt able to tolerate standing/amb for 2 minutes at a time before needing a rest. Once back sitting, pt performed exercise per flow chart. Pt also worked on dynamic stability with reaching out of SANTOSH to touch clinicians hands. Pt helped back supine in bed with minAx1 for  Le's. Pt left supine in bed, call bell nearby, no new questions or concerns. CNA made aware his bed is not turning on. Pt in need of rehab for all functional mobility deficits. Progression toward goals:   []      Improving appropriately and progressing toward goals  [x]      Improving slowly and progressing toward goals  []      Not making progress toward goals and plan of care will be adjusted     PLAN:  Patient continues to benefit from skilled intervention to address the above impairments. Continue treatment per established plan of care.   Discharge Recommendations:  Rehab  Further Equipment Recommendations for Discharge:  N/A     SUBJECTIVE:   Patient stated I was able to walk before. I dont know why I can't now.     OBJECTIVE DATA SUMMARY:   Critical Behavior:  Neurologic State: Alert  Orientation Level: Oriented to person, Oriented to place, Disoriented to time  Cognition: Follows commands  Safety/Judgement: Fall prevention  Functional Mobility Training:  Bed Mobility:     Supine to Sit: Minimum assistance  Sit to Supine: Minimum assistance  Scooting: Contact guard assistance         Transfers:  Sit to Stand: Minimum assistance; Moderate assistance;Assist x2  Stand to Sit: Minimum assistance    Balance:  Sitting: Impaired; Without support  Sitting - Static: Fair (occasional)  Sitting - Dynamic: Fair (occasional)  Standing: Impaired; With support  Standing - Static: Fair  Standing - Dynamic : Poor   Ambulation/Gait Training:  Distance (ft): 10 Feet (ft)  Assistive Device: Gait belt;Walker, rolling  Ambulation - Level of Assistance: Minimal assistance        Gait Abnormalities: Decreased step clearance; Antalgic        Base of Support: Narrowed; Center of gravity altered;Shift to right     Speed/Hanny: Pace decreased (<100 feet/min)  Step Length: Left shortened;Right shortened  Therapeutic Exercises:   Pt performed exercises sitting EOB at end of session      EXERCISE   Sets   Reps   Active Active Assist   Passive Self ROM   Comments   Ankle Pumps    [] [] [] []    Quad Sets/Glut Sets    [] [] [] [] Hold for 5 secs   Hamstring Sets   [] [] [] []    Short Arc Quads   [] [] [] []    Heel Slides   [] [] [] []    Straight Leg Raises   [] [] [] []    Hip Add   [] [] [] [] Hold for 5 secs, w/ pillow squeeze   Long Arc Quads 1 10 [x] [] [] []    Seated Marching 1 10 [x] [] [] []    Standing Marching   [] [] [] []       [] [] [] []        Pain:  Pain level pre-treatment: 0/10  Pain level post-treatment: 0/10   Pain Intervention(s): Medication (see MAR);  Rest, Repositioning   Response to intervention: Nurse notified, See doc flow    Activity Tolerance:   Pt tolerated mobility okay, limited in gait by L LE/knee  Please refer to the flowsheet for vital signs taken during this treatment. After treatment:   [] Patient left in no apparent distress sitting up in chair  [x] Patient left in no apparent distress in bed  [x] Call bell left within reach  [x] Nursing notified  [] Caregiver present  [] Bed alarm activated  [] SCDs applied      COMMUNICATION/EDUCATION:   [x]         Role of Physical Therapy in the acute care setting. [x]         Fall prevention education was provided and the patient/caregiver indicated understanding. [x]         Patient/family have participated as able in working toward goals and plan of care. []         Patient/family agree to work toward stated goals and plan of care. []         Patient understands intent and goals of therapy, but is neutral about his/her participation.   []         Patient is unable to participate in stated goals/plan of care: ongoing with therapy staff.  []         Other:        Marco Merino   Time Calculation: 24 mins
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
## 2025-03-25 NOTE — ASSESSMENT & PLAN NOTE
Laine Tse is a 69-year-old female, domiciled in a group home, with psychiatric history significant for schizoaffective disorder. She was initially seen by the consultation psychiatry team during her initial presentation for a fall on 3/20/25 where medication adjustments were made and she was subsequently discharged to a rehab facility. She shortly returned to [facility] due to psychotic symptoms at the facility.  Psychiatry was consulted on 3/22/2025, she was seen via telemedicine by Arun, and who recommended further optimization of her Zyprexa. Our team was reconsulted for clearance to return to her group home. At this time, she does not meet criteria for inpatient psychiatric admission and appears to be functioning at her baseline level of behavior. I do not believe that further inpatient psychiatric admission would benefit the patient and she would do best if she is to return to her group home environment. She follows closely with her ACT team (Mondays and Wednesdays) and would benefit from continuing do so.     Plan:   Discussed with primary team, with the following recommendations:     Treatment Recommendations:  No indication for inpatient psychiatry at this time.  Patient does not appear to pose an acute risk to self or others at this time and can be discharged back to group home  Medical management per primary team, no new labs indicated at this time   Pharmacological:   Continue current medication regimen, no changes at this time  Observation level: Routine  Referrals: No new referrals  Psychiatry will sign off. Please contact our service via Secure Chat with any additional questions or concerns. If contacting after hours, please call or Secure Chat the on-call team (ARUN: [phone]) with any questions or concerns.

## 2025-03-25 NOTE — PLAN OF CARE
Problem: PAIN - ADULT  Goal: Verbalizes/displays adequate comfort level or baseline comfort level  Description: Interventions:- Encourage patient to monitor pain and request assistance- Assess pain using appropriate pain scale- Administer analgesics based on type and severity of pain and evaluate response- Implement non-pharmacological measures as appropriate and evaluate response- Consider cultural and social influences on pain and pain management- Notify physician/advanced practitioner if interventions unsuccessful or patient reports new pain  Outcome: Progressing     Problem: INFECTION - ADULT  Goal: Absence or prevention of progression during hospitalization  Description: INTERVENTIONS:- Assess and monitor for signs and symptoms of infection- Monitor lab/diagnostic results- Monitor all insertion sites, i.e. indwelling lines, tubes, and drains- Monitor endotracheal if appropriate and nasal secretions for changes in amount and color- [place] appropriate cooling/warming therapies per order- Administer medications as ordered- Instruct and encourage patient and family to use good hand hygiene technique- Identify and instruct in appropriate isolation precautions for identified infection/condition  Outcome: Progressing  Goal: Absence of fever/infection during neutropenic period  Description: INTERVENTIONS:- Monitor WBC  Outcome: Progressing     Problem: SAFETY ADULT  Goal: Patient will remain free of falls  Description: INTERVENTIONS:- Educate patient/family on patient safety including physical limitations- Instruct patient to call for assistance with activity - Consult OT/PT to assist with strengthening/mobility - Keep Call bell within reach- Keep bed low and locked with side rails adjusted as appropriate- Keep care items and personal belongings within reach- Initiate and maintain comfort rounds- Make Fall Risk Sign visible to staff- Offer Toileting every  Hours, in advance of need- Initiate/Maintain alarm- Obtain  necessary fall risk management equipment: - Apply yellow socks and bracelet for high fall risk patients- Consider moving patient to room near nurses station  Outcome: Progressing  Goal: Maintain or return to baseline ADL function  Description: INTERVENTIONS:-  Assess patient's ability to carry out ADLs; assess patient's baseline for ADL function and identify physical deficits which impact ability to perform ADLs (bathing, care of mouth/teeth, toileting, grooming, dressing, etc.)- Assess/evaluate cause of self-care deficits - Assess range of motion- Assess patient's mobility; develop plan if impaired- Assess patient's need for assistive devices and provide as appropriate- Encourage maximum independence but intervene and supervise when necessary- Involve family in performance of ADLs- Assess for home care needs following discharge - Consider OT consult to assist with ADL evaluation and planning for discharge- Provide patient education as appropriate  Outcome: Progressing  Goal: Maintains/Returns to pre admission functional level  Description: INTERVENTIONS:- Perform AM-PAC 6 Click Basic Mobility/ Daily Activity assessment daily.- Set and communicate daily mobility goal to care team and patient/family/caregiver. - Collaborate with rehabilitation services on mobility goals if consulted- Perform Range of Motion  times a day.- Reposition patient every  hours.- Dangle patient  times a day- Stand patient  times a day- Ambulate patient  times a day- Out of bed to chair  times a day - Out of bed for meals times a day- Out of bed for toileting- Record patient progress and toleration of activity level   Outcome: Progressing     Problem: DISCHARGE PLANNING  Goal: Discharge to home or other facility with appropriate resources  Description: INTERVENTIONS:- Identify barriers to discharge w/patient and caregiver- Arrange for needed discharge resources and transportation as appropriate- Identify discharge learning needs (meds, wound  care, etc.)- Arrange for interpretive services to assist at discharge as needed- Refer to Case Management Department for coordinating discharge planning if the patient needs post-hospital services based on physician/advanced practitioner order or complex needs related to functional status, cognitive ability, or social support system  Outcome: Progressing     Problem: Knowledge Deficit  Goal: Patient/family/caregiver demonstrates understanding of disease process, treatment plan, medications, and discharge instructions  Description: Complete learning assessment and assess knowledge base.Interventions:- Provide teaching at level of understanding- Provide teaching via preferred learning methods  Outcome: Progressing
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
## 2025-03-25 NOTE — PLAN OF CARE
Problem: PAIN - ADULT  Goal: Verbalizes/displays adequate comfort level or baseline comfort level  Description: Interventions:- Encourage patient to monitor pain and request assistance- Assess pain using appropriate pain scale- Administer analgesics based on type and severity of pain and evaluate response- Implement non-pharmacological measures as appropriate and evaluate response- Consider cultural and social influences on pain and pain management- Notify physician/advanced practitioner if interventions unsuccessful or patient reports new pain  Outcome: Completed     Problem: INFECTION - ADULT  Goal: Absence or prevention of progression during hospitalization  Description: INTERVENTIONS:- Assess and monitor for signs and symptoms of infection- Monitor lab/diagnostic results- Monitor all insertion sites, i.e. indwelling lines, tubes, and drains- Monitor endotracheal if appropriate and nasal secretions for changes in amount and color- [place] appropriate cooling/warming therapies per order- Administer medications as ordered- Instruct and encourage patient and family to use good hand hygiene technique- Identify and instruct in appropriate isolation precautions for identified infection/condition  Outcome: Completed  Goal: Absence of fever/infection during neutropenic period  Description: INTERVENTIONS:- Monitor WBC  Outcome: Completed     Problem: SAFETY ADULT  Goal: Patient will remain free of falls  Description: INTERVENTIONS:- Educate patient/family on patient safety including physical limitations- Instruct patient to call for assistance with activity - Consult OT/PT to assist with strengthening/mobility - Keep Call bell within reach- Keep bed low and locked with side rails adjusted as appropriate- Keep care items and personal belongings within reach- Initiate and maintain comfort rounds- Make Fall Risk Sign visible to staff- Offer Toileting every  Hours, in advance of need- Initiate/Maintain alarm- Obtain  necessary fall risk management equipment: - Apply yellow socks and bracelet for high fall risk patients- Consider moving patient to room near nurses station  Outcome: Completed  Goal: Maintain or return to baseline ADL function  Description: INTERVENTIONS:-  Assess patient's ability to carry out ADLs; assess patient's baseline for ADL function and identify physical deficits which impact ability to perform ADLs (bathing, care of mouth/teeth, toileting, grooming, dressing, etc.)- Assess/evaluate cause of self-care deficits - Assess range of motion- Assess patient's mobility; develop plan if impaired- Assess patient's need for assistive devices and provide as appropriate- Encourage maximum independence but intervene and supervise when necessary- Involve family in performance of ADLs- Assess for home care needs following discharge - Consider OT consult to assist with ADL evaluation and planning for discharge- Provide patient education as appropriate  Outcome: Completed  Goal: Maintains/Returns to pre admission functional level  Description: INTERVENTIONS:- Perform AM-PAC 6 Click Basic Mobility/ Daily Activity assessment daily.- Set and communicate daily mobility goal to care team and patient/family/caregiver. - Collaborate with rehabilitation services on mobility goals if consulted- Perform Range of Motion  times a day.- Reposition patient every  hours.- Dangle patient  times a day- Stand patient  times a day- Ambulate patient  times a day- Out of bed to chair  times a day - Out of bed for meals  times a day- Out of bed for toileting- Record patient progress and toleration of activity level   Outcome: Completed     Problem: DISCHARGE PLANNING  Goal: Discharge to home or other facility with appropriate resources  Description: INTERVENTIONS:- Identify barriers to discharge w/patient and caregiver- Arrange for needed discharge resources and transportation as appropriate- Identify discharge learning needs (meds, wound care,  etc.)- Arrange for interpretive services to assist at discharge as needed- Refer to Case Management Department for coordinating discharge planning if the patient needs post-hospital services based on physician/advanced practitioner order or complex needs related to functional status, cognitive ability, or social support system  Outcome: Completed     Problem: Knowledge Deficit  Goal: Patient/family/caregiver demonstrates understanding of disease process, treatment plan, medications, and discharge instructions  Description: Complete learning assessment and assess knowledge base.Interventions:- Provide teaching at level of understanding- Provide teaching via preferred learning methods  Outcome: Completed
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
## 2025-03-25 NOTE — NURSING NOTE
"Pt bed is alarming, RN entered room and saw pt torso remaining on bed while pt knees are out of bed on floor. Pt said \"she rolled out of bed.\" RN assessed pt, pt has no complaints of pain or discomfort. Pt refused vitals and stated, \"she just wants to go back to bed.\" Provider notified of pt event.   "

## 2025-03-26 LAB
BASOPHILS # BLD AUTO: 0 THOUSANDS/ÂΜL (ref 0–0.1)
BASOPHILS NFR BLD AUTO: 0 % (ref 0–1)
EOSINOPHIL # BLD AUTO: 0 THOUSAND/ÂΜL (ref 0–0.61)
EOSINOPHIL NFR BLD AUTO: 0 % (ref 0–6)
ERYTHROCYTE [DISTWIDTH] IN BLOOD BY AUTOMATED COUNT: 12.4 % (ref 11.6–15.1)
HCT VFR BLD AUTO: 38.4 % (ref 34.8–46.1)
HGB BLD-MCNC: 12.9 G/DL (ref 11.5–15.4)
IMM GRANULOCYTES # BLD AUTO: 0.01 THOUSAND/UL (ref 0–0.2)
IMM GRANULOCYTES NFR BLD AUTO: 0 % (ref 0–2)
LYMPHOCYTES # BLD AUTO: 1.55 THOUSANDS/ÂΜL (ref 0.6–4.47)
LYMPHOCYTES NFR BLD AUTO: 46 % (ref 14–44)
MCH RBC QN AUTO: 32.1 PG (ref 26.8–34.3)
MCHC RBC AUTO-ENTMCNC: 33.6 G/DL (ref 31.4–37.4)
MCV RBC AUTO: 96 FL (ref 82–98)
MONOCYTES # BLD AUTO: 0.46 THOUSAND/ÂΜL (ref 0.17–1.22)
MONOCYTES NFR BLD AUTO: 14 % (ref 4–12)
NEUTROPHILS # BLD AUTO: 1.32 THOUSANDS/ÂΜL (ref 1.85–7.62)
NEUTS SEG NFR BLD AUTO: 40 % (ref 43–75)
NRBC BLD AUTO-RTO: 0 /100 WBCS
PLATELET # BLD AUTO: 247 THOUSANDS/UL (ref 149–390)
PMV BLD AUTO: 9.7 FL (ref 8.9–12.7)
RBC # BLD AUTO: 4.02 MILLION/UL (ref 3.81–5.12)
WBC # BLD AUTO: 3.34 THOUSAND/UL (ref 4.31–10.16)

## 2025-03-26 PROCEDURE — 85025 COMPLETE CBC W/AUTO DIFF WBC: CPT

## 2025-03-26 PROCEDURE — 36415 COLL VENOUS BLD VENIPUNCTURE: CPT

## 2025-03-26 PROCEDURE — NC001 PR NO CHARGE: Performed by: PSYCHIATRY & NEUROLOGY

## 2025-03-26 RX ORDER — LORAZEPAM 2 MG/ML
2 INJECTION INTRAMUSCULAR EVERY 8 HOURS PRN
Status: DISCONTINUED | OUTPATIENT
Start: 2025-03-26 | End: 2025-03-26

## 2025-03-26 RX ADMIN — GUAIFENESIN AND DEXTROMETHORPHAN 10 ML: 100; 10 SYRUP ORAL at 22:13

## 2025-03-26 RX ADMIN — CLONAZEPAM 1 MG: 1 TABLET ORAL at 18:43

## 2025-03-26 RX ADMIN — ACETAMINOPHEN 650 MG: 325 TABLET, FILM COATED ORAL at 00:13

## 2025-03-26 RX ADMIN — PHENOBARBITAL 64.8 MG: 32.4 TABLET ORAL at 00:14

## 2025-03-26 RX ADMIN — GUAIFENESIN AND DEXTROMETHORPHAN 10 ML: 100; 10 SYRUP ORAL at 00:14

## 2025-03-26 RX ADMIN — CLONAZEPAM 1 MG: 1 TABLET ORAL at 08:50

## 2025-03-26 RX ADMIN — TRIHEXYPHENIDYL HYDROCHLORIDE 2 MG: 2 TABLET ORAL at 22:06

## 2025-03-26 RX ADMIN — ATORVASTATIN CALCIUM 20 MG: 40 TABLET, FILM COATED ORAL at 16:03

## 2025-03-26 RX ADMIN — MENTHOL 10%, METHYL SALICYLATE 15% 1 APPLICATION: 10; 15 CREAM TOPICAL at 08:54

## 2025-03-26 RX ADMIN — FLUTICASONE PROPIONATE 2 SPRAY: 50 SPRAY, METERED NASAL at 08:53

## 2025-03-26 RX ADMIN — PHENOBARBITAL 64.8 MG: 32.4 TABLET ORAL at 22:06

## 2025-03-26 RX ADMIN — SENNOSIDES AND DOCUSATE SODIUM 1 TABLET: 50; 8.6 TABLET ORAL at 00:14

## 2025-03-26 RX ADMIN — FLUTICASONE PROPIONATE 2 SPRAY: 50 SPRAY, METERED NASAL at 00:14

## 2025-03-26 RX ADMIN — ASPIRIN 81 MG CHEWABLE TABLET 81 MG: 81 TABLET CHEWABLE at 08:49

## 2025-03-26 RX ADMIN — TRIHEXYPHENIDYL HYDROCHLORIDE 2 MG: 2 TABLET ORAL at 00:58

## 2025-03-26 RX ADMIN — GABAPENTIN 200 MG: 100 CAPSULE ORAL at 08:52

## 2025-03-26 RX ADMIN — Medication 6 MG: at 00:14

## 2025-03-26 RX ADMIN — TRIHEXYPHENIDYL HYDROCHLORIDE 2 MG: 2 TABLET ORAL at 08:52

## 2025-03-26 RX ADMIN — TRAZODONE HYDROCHLORIDE 150 MG: 100 TABLET ORAL at 22:06

## 2025-03-26 RX ADMIN — OLANZAPINE 20 MG: 10 TABLET, FILM COATED ORAL at 22:06

## 2025-03-26 RX ADMIN — CLONAZEPAM 1 MG: 1 TABLET ORAL at 00:14

## 2025-03-26 RX ADMIN — SENNOSIDES AND DOCUSATE SODIUM 1 TABLET: 50; 8.6 TABLET ORAL at 22:06

## 2025-03-26 RX ADMIN — OLANZAPINE 20 MG: 10 TABLET, FILM COATED ORAL at 00:58

## 2025-03-26 RX ADMIN — PHENOBARBITAL 64.8 MG: 32.4 TABLET ORAL at 11:24

## 2025-03-26 RX ADMIN — Medication 6 MG: at 22:06

## 2025-03-26 RX ADMIN — GABAPENTIN 200 MG: 100 CAPSULE ORAL at 00:13

## 2025-03-26 RX ADMIN — LEVOTHYROXINE SODIUM 100 MCG: 100 TABLET ORAL at 06:09

## 2025-03-26 RX ADMIN — TRAZODONE HYDROCHLORIDE 150 MG: 100 TABLET ORAL at 00:14

## 2025-03-26 NOTE — CASE MANAGEMENT
Case Management Progress Note    Patient name Laine Tse  Location ED-06/ED-06 MRN [id number]  : 1955 Date 3/26/2025       LOS (days): 1  Geometric Mean LOS (GMLOS) (days):   Days to GMLOS:        OBJECTIVE:        Current admission status: Inpatient  Preferred Pharmacy:   [place] [address] [address]  Phone: [phone] Fax: [phone]    Primary Care Provider: Adilson Yip MD    Primary Insurance: MEDICARE  Secondary Insurance:     PROGRESS NOTE:      TC placed to [facility] Intake. Pt is open with [facility] Dept. Her assigned  is Josesito Garcia- PH: [phone]
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
## 2025-03-26 NOTE — ED PROVIDER NOTES
Time reflects when diagnosis was documented in both MDM as applicable and the Disposition within this note       Time User Action Codes Description Comment    3/25/2025 10:00 PM Palladino, Annette Add [Z60.9] Social problem     3/25/2025 10:00 PM Palladino, Annette Add [F25.9] Schizoaffective disorder (HCC)           ED Disposition       ED Disposition   Admit    Condition   Stable    Date/Time   Tue Mar 25, 2025  9:59 PM    Comment                  Assessment & Plan       Medical Decision Making  Impression: Social issue  Patient was discharged from hospital today back to facility.  Facility/group home refused to take her back.  Patient arrived to the hospital with no complaints.  Screening labs obtained.  Immediately reach back out to the admitting providers who readmitted her.  Patient needs to be placed at another facility.  Patient eating and drinking here without difficulty.  Patient with no complaints at this time.  Admitted for further placement.    Amount and/or Complexity of Data Reviewed  Labs: ordered.    Risk  Decision regarding hospitalization.             Medications   acetaminophen (TYLENOL) tablet 650 mg (has no administration in time range)   aspirin chewable tablet 81 mg (has no administration in time range)   atorvastatin (LIPITOR) tablet 20 mg (has no administration in time range)   clonazePAM (KlonoPIN) tablet 1 mg (has no administration in time range)   fluticasone (FLONASE) 50 mcg/act nasal spray 2 spray (has no administration in time range)   gabapentin (NEURONTIN) capsule 200 mg (has no administration in time range)   levothyroxine tablet 100 mcg (has no administration in time range)   melatonin tablet 6 mg (has no administration in time range)   OLANZapine (ZyPREXA) tablet 20 mg (has no administration in time range)   PHENobarbital tablet 64.8 mg (has no administration in time range)   senna-docusate sodium (SENOKOT S) 8.6-50 mg per tablet 1 tablet (has no administration in time range)    traZODone (DESYREL) tablet 150 mg (has no administration in time range)   trihexyphenidyl (ARTANE) tablet 2 mg (has no administration in time range)   dextromethorphan-guaiFENesin (ROBITUSSIN DM) oral syrup 10 mL (has no administration in time range)   ondansetron (ZOFRAN) injection 4 mg (has no administration in time range)   aluminum-magnesium hydroxide-simethicone (MAALOX) oral suspension 30 mL (has no administration in time range)   polyethylene glycol (MIRALAX) packet 17 g (has no administration in time range)   enoxaparin (LOVENOX) subcutaneous injection 40 mg (has no administration in time range)   menthol-methyl salicylate (BENGAY) 10-15 % cream (has no administration in time range)       ED Risk Strat Scores                            SBIRT 20yo+      Flowsheet Row Most Recent Value   Initial Alcohol Screen: US AUDIT-C     1. How often do you have a drink containing alcohol? 0 Filed at: 03/25/2025 2013   2. How many drinks containing alcohol do you have on a typical day you are drinking?  0 Filed at: 03/25/2025 2013   3b. FEMALE Any Age, or MALE 65+: How often do you have 4 or more drinks on one occassion? 0 Filed at: 03/25/2025 2013   Audit-C Score 0 Filed at: 03/25/2025 2013   NEERU: How many times in the past year have you...    Used an illegal drug or used a prescription medication for non-medical reasons? Never Filed at: 03/25/2025 2013                            History of Present Illness       Chief Complaint   Patient presents with    Medical Problem     Pt from a group home in [place]. Pt was discharged back to group home earlier today seen previously for multiple fall. Pt cleared medically and by psych prior to discharge back to facility. Group home would not let EMS in to drop pt off. Director of facility ultimately refused pt. Pt has no complaints at this time.        Past Medical History:   Diagnosis Date    Anxiety     Benign essential hypertension     resolved; 06/15/16    Depression      Depression with anxiety     Fracture of fifth metatarsal bone of right foot with delayed healing     last assessed 16; fracture of metatarsal bone, right, closed, initial encounter    Hyperlipidemia     last assessed 17    Hypothyroidism     last assessed 2017    Insomnia     Obesity     Schizoaffective disorder (HCC)     last assessed 2017    Seizure disorder (HCC)     last assessed 17    Seizures (HCC)       Past Surgical History:   Procedure Laterality Date    COLONOSCOPY      complete    PA COLONOSCOPY FLX DX W/COLLJ SPEC WHEN PFRMD N/A 2018    Procedure: COLONOSCOPY with polypectomies;  Surgeon: Andriy Lopez MD;  Location: [facility];  Service: Gastroenterology      Family History   Problem Relation Age of Onset    No Known Problems Mother     No Known Problems Father     Lung disease Other         mesothelioma    No Known Problems Maternal Grandmother     No Known Problems Maternal Grandfather     No Known Problems Paternal Grandmother     No Known Problems Paternal Grandfather     No Known Problems Sister     No Known Problems Sister     Kidney failure Brother     No Known Problems Maternal Aunt     No Known Problems Maternal Aunt     No Known Problems Maternal Aunt     No Known Problems Maternal Aunt     No Known Problems Paternal Aunt     No Known Problems Paternal Aunt       Social History     Tobacco Use    Smoking status: Former     Current packs/day: 0.00     Average packs/day: 0.5 packs/day for 21.0 years (10.5 ttl pk-yrs)     Types: Cigarettes     Start date:      Quit date:      Years since quittin.2    Smokeless tobacco: Never   Vaping Use    Vaping status: Never Used   Substance Use Topics    Alcohol use: Not Currently    Drug use: No      E-Cigarette/Vaping    E-Cigarette Use Never User       E-Cigarette/Vaping Substances    Nicotine No     THC No     CBD No     Flavoring No     Other No     Unknown No       I have reviewed and agree with the history  as documented.     Patient is a 69-year-old female presenting to the emergency department after her group home refused to take her.  Patient was discharged from the hospital today and when EMS brought her back to the facility they declined to take her.  EMS turned back around and brought her back to the emergency department.  Upon arrival to the emergency department patient is calm.  Denies any headache dizziness tinnitus vision change neck pain back pain numbness tingling in any of her extremities she denies any nausea vomiting diarrhea constipation dysuria hematuria.        Review of Systems   Constitutional:  Negative for chills and fever.   HENT:  Negative for ear pain and sore throat.    Eyes:  Negative for pain and visual disturbance.   Respiratory:  Negative for cough and shortness of breath.    Cardiovascular:  Negative for chest pain and palpitations.   Gastrointestinal:  Negative for abdominal pain and vomiting.   Genitourinary:  Negative for dysuria and hematuria.   Musculoskeletal:  Negative for arthralgias and back pain.   Skin:  Negative for color change and rash.   Neurological:  Negative for seizures and syncope.   All other systems reviewed and are negative.          Objective       ED Triage Vitals [03/25/25 2011]   Temperature Pulse Blood Pressure Respirations SpO2 Patient Position - Orthostatic VS   98.1 °F (36.7 °C) 74 141/97 18 98 % Lying      Temp Source Heart Rate Source BP Location FiO2 (%) Pain Score    Oral Monitor Right arm -- --      Vitals      Date and Time Temp Pulse SpO2 Resp BP Pain Score FACES Pain Rating User   03/25/25 2011 98.1 °F (36.7 °C) 74 98 % 18 141/97 -- -- CG            Physical Exam  Vitals and nursing note reviewed.   Constitutional:       General: She is not in acute distress.     Appearance: She is well-developed.      Comments: Disheveled appearing   HENT:      Head: Normocephalic and atraumatic.   Eyes:      Conjunctiva/sclera: Conjunctivae normal.    Cardiovascular:      Rate and Rhythm: Normal rate and regular rhythm.      Heart sounds: No murmur heard.  Pulmonary:      Effort: Pulmonary effort is normal. No respiratory distress.      Breath sounds: Normal breath sounds.   Abdominal:      Palpations: Abdomen is soft.      Tenderness: There is no abdominal tenderness.   Musculoskeletal:         General: No swelling.      Cervical back: Neck supple.   Skin:     General: Skin is warm and dry.      Capillary Refill: Capillary refill takes less than 2 seconds.   Neurological:      Mental Status: She is alert. Mental status is at baseline.   Psychiatric:         Mood and Affect: Mood normal.         Results Reviewed       Procedure Component Value Units Date/Time    Comprehensive metabolic panel [[id number]]  (Abnormal) Collected: 03/25/25 2148    Lab Status: Final result Specimen: Blood from Hand, Left Updated: 03/25/25 2216     Sodium 137 mmol/L      Potassium 3.7 mmol/L      Chloride 104 mmol/L      CO2 26 mmol/L      ANION GAP 7 mmol/L      BUN 10 mg/dL      Creatinine 0.54 mg/dL      Glucose 94 mg/dL      Calcium 8.8 mg/dL      AST 17 U/L      ALT 20 U/L      Alkaline Phosphatase 64 U/L      Total Protein 6.5 g/dL      Albumin 3.8 g/dL      Total Bilirubin 0.28 mg/dL      eGFR 95 ml/min/1.73sq m     Narrative:      National Kidney Disease Foundation guidelines for Chronic Kidney Disease (CKD):     Stage 1 with normal or high GFR (GFR > 90 mL/min/1.73 square meters)    Stage 2 Mild CKD (GFR = 60-89 mL/min/1.73 square meters)    Stage 3A Moderate CKD (GFR = 45-59 mL/min/1.73 square meters)    Stage 3B Moderate CKD (GFR = 30-44 mL/min/1.73 square meters)    Stage 4 Severe CKD (GFR = 15-29 mL/min/1.73 square meters)    Stage 5 End Stage CKD (GFR <15 mL/min/1.73 square meters)  Note: GFR calculation is accurate only with a steady state creatinine    CBC and differential [[id number]]  (Abnormal) Collected: 03/25/25 2148    Lab Status: Final result Specimen: Blood from  Hand, Left Updated: 03/25/25 2216     WBC 4.25 Thousand/uL      RBC 3.96 Million/uL      Hemoglobin 13.0 g/dL      Hematocrit 37.8 %      MCV 96 fL      MCH 32.8 pg      MCHC 34.4 g/dL      RDW 12.3 %      MPV 10.1 fL      Platelets 244 Thousands/uL      nRBC 0 /100 WBCs      Segmented % 48 %      Immature Grans % 1 %      Lymphocytes % 42 %      Monocytes % 9 %      Eosinophils Relative 0 %      Basophils Relative 0 %      Absolute Neutrophils 2.08 Thousands/µL      Absolute Immature Grans 0.02 Thousand/uL      Absolute Lymphocytes 1.77 Thousands/µL      Absolute Monocytes 0.38 Thousand/µL      Eosinophils Absolute 0.00 Thousand/µL      Basophils Absolute 0.00 Thousands/µL             No orders to display       Procedures    ED Medication and Procedure Management   Prior to Admission Medications   Prescriptions Last Dose Informant Patient Reported? Taking?   Incontinence Supplies MISC 3/24/2025 Outside Facility (Specify), Care Giver No Yes   Sig: Use if needed (incontinence) SIZE XL PULL UP DISPOSABLE BRIEFS   Incontinence Supply Disposable (Disposable Brief Large) MISC 3/24/2025 Care Giver, Outside Facility (Specify) No Yes   Sig: Use every 12 (twelve) hours   Menthol, Topical Analgesic, (Bengay Ultra Strength) 5 % PTCH 3/24/2025 Care Giver, Outside Facility (Specify) No Yes   Sig: Apply 1 patch topically in the morning   OLANZapine (ZyPREXA) 20 MG tablet 3/24/2025  No Yes   Sig: Take 1 tablet (20 mg total) by mouth daily at bedtime   PHENobarbital 64.8 mg tablet 3/25/2025 Care Giver, Outside Facility (Specify) No Yes   Sig: Take 1 tablet (64.8 mg total) by mouth every 12 (twelve) hours   acetaminophen (TYLENOL) 500 mg tablet 3/24/2025 Outside Facility (Specify), Care Giver No Yes   Sig: Take 2 tablets (1,000 mg total) by mouth 3 (three) times a day as needed for mild pain or fever   alendronate (FOSAMAX) 70 mg tablet Past Week Care Giver, Outside Facility (Specify) Yes Yes   Sig: Take 70 mg by mouth every 7 days  Every 7 days on    aspirin (Aspirin Low Dose) 81 mg chewable tablet 3/25/2025 Care Giver, Outside Facility (Specify) No Yes   Sig: Chew 1 tablet (81 mg total) daily   atorvastatin (LIPITOR) 20 mg tablet 3/25/2025 Care Giver, Outside Facility (Specify) No Yes   Sig: Take 1 tablet (20 mg total) by mouth daily   calcium carbonate-vitamin D 500 mg-5 mcg per tablet 3/24/2025 Care Giver, Outside Facility (Specify) Yes Yes   Sig: Take 1 tablet by mouth daily with breakfast   clonazePAM (KlonoPIN) 1 mg tablet 3/25/2025 Care Giver, Outside Facility (Specify) No Yes   Sig: Take 1 tablet (1 mg total) by mouth 2 (two) times a day   fluticasone (FLONASE) 50 mcg/act nasal spray 3/24/2025 Care Giver, Outside Facility (Specify) No Yes   Si sprays into each nostril daily   gabapentin (NEURONTIN) 100 mg capsule 3/25/2025  No Yes   Sig: Take 2 capsules (200 mg total) by mouth 2 (two) times a day   guaiFENesin 400 mg 3/24/2025  No Yes   Sig: Take 1 tablet (400 mg total) by mouth every 6 (six) hours as needed for cough   levothyroxine 100 mcg tablet 3/24/2025 Care Giver, Outside Facility (Specify) No Yes   Sig: Take 1 tablet (100 mcg total) by mouth daily   melatonin 3 mg 3/24/2025 Care Giver, Outside Facility (Specify) No Yes   Sig: Take 2 tablets (6 mg total) by mouth daily at bedtime   polyethylene glycol (GLYCOLAX) 17 GM/SCOOP 3/24/2025 Outside Facility (Specify), Care Giver No Yes   Sig: MIX 1 CAPFUL(17GM) IN 8OZ OF LIQUID AND DRINK DAILY @ 8AM(CONSTIPATION) *AHMAD   senna-docusate sodium (SENOKOT-S) 8.6-50 mg per tablet 3/24/2025 Care Giver, Outside Facility (Specify) No Yes   Sig: Take 1 tablet by mouth daily at bedtime   traZODone (DESYREL) 150 mg tablet 3/24/2025 Care Giver, Outside Facility (Specify) No Yes   Sig: Take 1 tablet (150 mg total) by mouth daily at bedtime   trihexyphenidyl (ARTANE) 2 mg tablet 3/25/2025 Care Giver, Outside Facility (Specify) No Yes   Sig: Take 1 tablet (2 mg total) by mouth 2 (two)  times a day      Facility-Administered Medications: None     Patient's Medications   Discharge Prescriptions    No medications on file     No discharge procedures on file.  ED SEPSIS DOCUMENTATION   Time reflects when diagnosis was documented in both MDM as applicable and the Disposition within this note       Time User Action Codes Description Comment    3/25/2025 10:00 PM Palladino, Annette Add [Z60.9] Social problem     3/25/2025 10:00 PM Palladino, Annette Add [F25.9] Schizoaffective disorder (HCC)                  Annette Maria Palladino, DO  03/25/25 [id number]

## 2025-03-26 NOTE — ASSESSMENT & PLAN NOTE
Seizure precaution  ativan as needed for status epilepticus  Continue phenobarbital 64.8 mg twice daily

## 2025-03-26 NOTE — PLAN OF CARE
Problem: Potential for Falls  Goal: Patient will remain free of falls  Description: INTERVENTIONS:- Educate patient/family on patient safety including physical limitations- Instruct patient to call for assistance with activity - Consult OT/PT to assist with strengthening/mobility - Keep Call bell within reach- Keep bed low and locked with side rails adjusted as appropriate- Keep care items and personal belongings within reach- Initiate and maintain comfort rounds- Make Fall Risk Sign visible to staff- Offer Toileting every  Hours, in advance of need- Initiate/Maintain alarm- Obtain necessary fall risk management equipment: - Apply yellow socks and bracelet for high fall risk patients- Consider moving patient to room near nurses station  INTERVENTIONS:- Educate patient/family on patient safety including physical limitations- Instruct patient to call for assistance with activity - Consult OT/PT to assist with strengthening/mobility - Keep Call bell within reach- Keep bed low and locked with side rails adjusted as appropriate- Keep care items and personal belongings within reach- Initiate and maintain comfort rounds- Make Fall Risk Sign visible to staff- Offer Toileting every  Hours, in advance of need- Initiate/Maintain alarm- Obtain necessary fall risk management equipment: - Apply yellow socks and bracelet for high fall risk patients- Consider moving patient to room near nurses station  Outcome: Progressing     Problem: PAIN - ADULT  Goal: Verbalizes/displays adequate comfort level or baseline comfort level  Description: Interventions:- Encourage patient to monitor pain and request assistance- Assess pain using appropriate pain scale- Administer analgesics based on type and severity of pain and evaluate response- Implement non-pharmacological measures as appropriate and evaluate response- Consider cultural and social influences on pain and pain management- Notify physician/advanced practitioner if interventions  unsuccessful or patient reports new pain  Outcome: Progressing     Problem: INFECTION - ADULT  Goal: Absence or prevention of progression during hospitalization  Description: INTERVENTIONS:- Assess and monitor for signs and symptoms of infection- Monitor lab/diagnostic results- Monitor all insertion sites, i.e. indwelling lines, tubes, and drains- Monitor endotracheal if appropriate and nasal secretions for changes in amount and color- Mack appropriate cooling/warming therapies per order- Administer medications as ordered- Instruct and encourage patient and family to use good hand hygiene technique- Identify and instruct in appropriate isolation precautions for identified infection/condition  Outcome: Progressing  Goal: Absence of fever/infection during neutropenic period  Description: INTERVENTIONS:- Monitor WBC  Outcome: Progressing     Problem: SAFETY ADULT  Goal: Patient will remain free of falls  Description: INTERVENTIONS:- Educate patient/family on patient safety including physical limitations- Instruct patient to call for assistance with activity - Consult OT/PT to assist with strengthening/mobility - Keep Call bell within reach- Keep bed low and locked with side rails adjusted as appropriate- Keep care items and personal belongings within reach- Initiate and maintain comfort rounds- Make Fall Risk Sign visible to staff- Offer Toileting every Hours, in advance of need- Initiate/Maintain alarm- Obtain necessary fall risk management equipment: - Apply yellow socks and bracelet for high fall risk patients- Consider moving patient to room near nurses station  INTERVENTIONS:- Educate patient/family on patient safety including physical limitations- Instruct patient to call for assistance with activity - Consult OT/PT to assist with strengthening/mobility - Keep Call bell within reach- Keep bed low and locked with side rails adjusted as appropriate- Keep care items and personal belongings within reach-  Initiate and maintain comfort rounds- Make Fall Risk Sign visible to staff- Offer Toileting every  Hours, in advance of need- Initiate/Maintain alarm- Obtain necessary fall risk management equipment:  Apply yellow socks and bracelet for high fall risk patients- Consider moving patient to room near nurses station  Outcome: Progressing  Goal: Maintain or return to baseline ADL function  Description: INTERVENTIONS:-  Assess patient's ability to carry out ADLs; assess patient's baseline for ADL function and identify physical deficits which impact ability to perform ADLs (bathing, care of mouth/teeth, toileting, grooming, dressing, etc.)- Assess/evaluate cause of self-care deficits - Assess range of motion- Assess patient's mobility; develop plan if impaired- Assess patient's need for assistive devices and provide as appropriate- Encourage maximum independence but intervene and supervise when necessary- Involve family in performance of ADLs- Assess for home care needs following discharge - Consider OT consult to assist with ADL evaluation and planning for discharge- Provide patient education as appropriate  Outcome: Progressing  Goal: Maintains/Returns to pre admission functional level  Description: INTERVENTIONS:- Perform AM-PAC 6 Click Basic Mobility/ Daily Activity assessment daily.- Set and communicate daily mobility goal to care team and patient/family/caregiver. - Collaborate with rehabilitation services on mobility goals if consulted- Perform Range of Motion  times a day.- Reposition patient every  hours.- Dangle patient  times a day- Stand patient  times a day- Ambulate patient  times a day- Out of bed to chair  times a day - Out of bed for meals  times a day- Out of bed for toileting- Record patient progress and toleration of activity level   Outcome: Progressing     Problem: DISCHARGE PLANNING  Goal: Discharge to home or other facility with appropriate resources  Description: INTERVENTIONS:- Identify barriers  to discharge w/patient and caregiver- Arrange for needed discharge resources and transportation as appropriate- Identify discharge learning needs (meds, wound care, etc.)- Arrange for interpretive services to assist at discharge as needed- Refer to Case Management Department for coordinating discharge planning if the patient needs post-hospital services based on physician/advanced practitioner order or complex needs related to functional status, cognitive ability, or social support system  Outcome: Progressing     Problem: Knowledge Deficit  Goal: Patient/family/caregiver demonstrates understanding of disease process, treatment plan, medications, and discharge instructions  Description: Complete learning assessment and assess knowledge base.Interventions:- Provide teaching at level of understanding- Provide teaching via preferred learning methods  Outcome: Progressing

## 2025-03-26 NOTE — CASE MANAGEMENT
Case Management Progress Note    Patient name Laine Tse  Location ED-06/ED-06 MRN [id number]  : 1955 Date 3/26/2025       LOS (days): 1  Geometric Mean LOS (GMLOS) (days): 6.5  Days to GMLOS:5.7        OBJECTIVE:        Current admission status: Inpatient  Preferred Pharmacy:   [facility] [address] [facility]  [address] [facility] [address]  Phone: [phone] Fax: [phone]    Primary Care Provider: Adilson Yip MD    Primary Insurance: MEDICARE  Secondary Insurance:     PROGRESS NOTE:    This CM and CM Danisha Felton met with patient at bedside. Danisha explained to patient that [facility] is unable to accept patient back due to incidents that occurred while she was there. CM's explained that they are looking into other facilities for patient but [facility] may be the only option for patient. Patient stating she is not going to [facility] and wants to go to [facility]. CM explained that there are referrals out to other facilities and we will take into account that she does not want to go to [facility] but that may be the only option for patient. Patient then stating she wants to go to [facility] in [place]. Cm explained that patient does not need to go to [facility] due to not needing rehab at this time. Patient stating she needs rehab. Patient continued to repeat she will not [facility] and will contact her sister to get her  to speak to CM's. CM's advised patient that they would continue looking for facilities but wanted to advise patient of update on facilities at this time.
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
## 2025-03-26 NOTE — ASSESSMENT & PLAN NOTE
Laine Tse is a 69-year-old female with past psychiatric history of schizoaffective disorder who presents to [facility] due to being rejected from her group home. The patient is well known to this service and this is her third encounter since 3/18/2025. She was initially seen due to concerns for psychotropic medications precipitating her falls. Medications were adjusted accordingly and she was discharged to [facility]. Unfortunately, she was paranoid at the rehab facility and was readmitted to the hospital. Psychiatry was consulted again and further titration of her Zyprexa was completed. She was cleared for discharge back to her group home with [facility], but was refused from her group home upon arrival. We are consulted again for concerns for acute psychosis. On evaluation, Laine does not appear to be a danger to herself or others. She is compliant with her medications as well as meals.     At baseline, per chart review, patient expresses paranoid and somatic delusions, which she again expresses today. She appears unchanged from previous evaluations. Baseline psychotic symptoms are not an indication for inpatient psychiatric treatment as the patient has been redirectable and complaint with other aspects of treatment. At this time, inpatient psychiatry would not benefit this patient as she was recently admitted and stabilized on her current regimen. I would highly recommend case management to work with patient's group home and the [facility] to determine a safe disposition plan for this patient as repeat discharges and changes in environment are not benefiting her. It appears a meeting between [facility], case management, and [facility] is pending at this time,. Psychiatry will make self available if needed for this meeting.     Plan:   Discussed with primary team, with the following recommendations:    Treatment Recommendations:  No indication for inpatient psychiatry at this time.  Medical management per primary team, no new  labs indicated at this time   Avoid physical restraints and attempt verbal redirection when possible   Patient appears enjoys music and has calming effect from this, could try this if she becomes agitated  Pharmacological:   Recommend the following medication changes:   Continue home medications, no changes at this time  Observation level: Virtual for safety   Referrals: Deferred  Psychiatry will follow as needed. Please reach out to our service via Secure Chat for re-evaluation as needed. If contacting after hours, please call or Secure Chat the on-call team (ARUN: [phone]) with any questions or concerns.    Risks, benefits and possible side effects of Medications: No new medications at this time.

## 2025-03-26 NOTE — ASSESSMENT & PLAN NOTE
Continue olanzapine 20 mg at bedtime  In the case of acute psychosis, delirium precautions  First try to redirect the patient in the background of psychosis  Patient is on virtual one-on-one observation because of acute psychosis   consulted for disposition planning  Consulted psychiatry for medication up titration/need of inpatient psychiatry admission

## 2025-03-26 NOTE — CASE MANAGEMENT
Case Management Progress Note    Patient name Laine Tse  Location ED-06/ED-06 MRN [id number]  : 1955 Date 3/26/2025       LOS (days): 1  Geometric Mean LOS (GMLOS) (days): 6.5  Days to GMLOS:5.7        OBJECTIVE:        Current admission status: Inpatient  Preferred Pharmacy:   [facility] [address]  [address] [facility] [address]  Phone: [phone] Fax: [phone]    Primary Care Provider: Adilson Yip MD    Primary Insurance: MEDICARE  Secondary Insurance:     PROGRESS NOTE:  CM met with patient at bedside re: nursing homes, finances, and possibily returning to [facility].Per patient, she does not want to go to [facility] because she was told that they abuse their patients and it smells like poop. Patient is adamant about not going to [facility] nursing [place]. CM asked patient how she manages her finances. Per patient, [employer] (phone #: [phone] )in [place], [place] manages her money and provides her with $75 a week. CM and patient discussed [facility], per patient she would like to go back there. Patient asked CM to contact [facility], tell them that she is sorry for how she behaved, that she was in a state of shock, and beg them to take her back. CM stated that she will call them.  Patient stated that she will go back to [facility] or [facility] but does not want to go to [facility]. CM explained that there’s a possibility that [facility] that is the only option that patient has as it and the patient would not have pay the price of 15-20k / month for other nursing homes. Patient stated that if she has to go to [facility] then she might but she really does not want to go and cannot afford that much for a nursing home. Pt asked CM to call her sister Kati and ask her to visit the pt in [facility].     CM sent referrals to SNFs via [facility]IN for LTC.

## 2025-03-26 NOTE — ASSESSMENT & PLAN NOTE
-Continue olanzapine 20 mg at bedtime  -In the case of acute psychosis, delirium precautions  -First try to redirect the patient in the background of psychosis  -Patient was discharged yesterday 3/25/2025 and returned to group home via EMS, who did not except her into the group home D/T acute psychosis which prompted readmission to the hospital.  - consulted for disposition planning, thus far got assigned  for [facility].  -Consulted psychiatry for medication up titration/need of inpatient psychiatry admission

## 2025-03-26 NOTE — ED NOTES
"This tech approached pt to complete ECG. Pt questioned the importance of the exam. Pt reported that she \"had one done a few days ago.\" This tech educated pt on the importance of completing an updated and current ECG. After she was educated further, pt proceeded to state that \"those stickers and pincher clips are used as torture devices to get into my skin to track me.\" RN and provider made aware.     Lizet Morataya  03/25/25 0830    "

## 2025-03-26 NOTE — PROGRESS NOTES
Progress Note - Hospitalist   Name: Laine Tse 69 y.o. female I MRN: [id number]  Unit/Bed#: ED-06 I Date of Admission: 3/25/2025   Date of Service: 3/26/2025 I Hospital Day: 1    Assessment & Plan  Schizoaffective disorder (HCC)  -Continue olanzapine 20 mg at bedtime  -In the case of acute psychosis, delirium precautions  -First try to redirect the patient in the background of psychosis  -Patient was discharged yesterday 3/25/2025 and returned to group home via EMS, who did not except her into the group home D/T acute psychosis which prompted readmission to the hospital.  - consulted for disposition planning, thus far got assigned  for [facility].  -Consulted psychiatry for medication up titration/need of inpatient psychiatry admission    Hyperlipidemia  Continue atorvastatin 20 mg daily  Hypothyroidism  Continue levothyroxine 100 mcg  The most recent TSH was unremarkable  Seizure disorder (HCC)  Seizure precaution  Continue phenobarbital 64.8 mg twice daily  Social problem  Multiple admissions because of disposition issues  Facility is not able to accept because of the active psychosis  Patient does not like facility because of acute psychosis and patient feel that Dr At the facility is trying to change his medication, the staff is abusive to her, the smell is bad over there   consulted for disposition planning    VTE Pharmacologic Prophylaxis: VTE Score: 3 Moderate Risk (Score 3-4) - Pharmacological DVT Prophylaxis Ordered: enoxaparin (Lovenox).    Mobility:   Basic Mobility Inpatient Raw Score: 18  JH-HLM Goal: 6: Walk 10 steps or more  JH-HLM Achieved: 6: Walk 10 steps or more  JH-HLM Goal NOT achieved. Continue with multidisciplinary rounding and encourage appropriate mobility to improve upon JH-HLM goals.    Patient Centered Rounds: I evaluated the patient without nursing staff present due to nurse performing other clinical duties    Discussions with  Specialists or Other Care Team Provider: Yes    Education and Discussions with Family / Patient:  Will call sister later to provide clinical update.     Current Length of Stay: 1 day(s)  Current Patient Status: Inpatient   Certification Statement: The patient will continue to require additional inpatient hospital stay due to acute psychosis, psychiatric evaluation and disposition planning with .  Discharge Plan: Anticipate discharge in 24-48 hrs to inpatient psychiatric facility versus new group home    Code Status: Level 1 - Full Code    Subjective   Patient was resting rather comfortably in bed at the time of examination. Briefly woke patient up from her sleep to inquire about any questions or concerns. She denied any pains and was not complaining of any symptoms.     Objective :  Temp:  [98.1 °F (36.7 °C)-98.5 °F (36.9 °C)] 98.1 °F (36.7 °C)  HR:  [68-82] 68  BP: (109-147)/(61-97) 109/61  Resp:  [16-18] 16  SpO2:  [95 %-99 %] 99 %  O2 Device: None (Room air)    There is no height or weight on file to calculate BMI.     Input and Output Summary (last 24 hours):     Intake/Output Summary (Last 24 hours) at 3/26/2025 1134  Last data filed at 3/26/2025 0301  Gross per 24 hour   Intake 240 ml   Output --   Net 240 ml       Physical Exam  Constitutional:       General: She is not in acute distress.     Appearance: She is not ill-appearing, toxic-appearing or diaphoretic.      Comments: Resting comfortably in bed.   Cardiovascular:      Rate and Rhythm: Normal rate.      Heart sounds: No murmur heard.     No gallop.   Pulmonary:      Effort: Pulmonary effort is normal. No respiratory distress.      Breath sounds: No wheezing or rales.   Abdominal:      General: There is no distension.      Tenderness: There is no abdominal tenderness. There is no guarding.   Musculoskeletal:         General: No tenderness.      Right lower leg: No edema.      Left lower leg: No edema.           Lines/Drains:              Lab  Results: I have reviewed the following results:   Results from last 7 days   Lab Units 03/26/25  0616   WBC Thousand/uL 3.34*   HEMOGLOBIN g/dL 12.9   HEMATOCRIT % 38.4   PLATELETS Thousands/uL 247   SEGS PCT % 40*   LYMPHO PCT % 46*   MONO PCT % 14*   EOS PCT % 0     Results from last 7 days   Lab Units 03/25/25  2148   SODIUM mmol/L 137   POTASSIUM mmol/L 3.7   CHLORIDE mmol/L 104   CO2 mmol/L 26   BUN mg/dL 10   CREATININE mg/dL 0.54*   ANION GAP mmol/L 7   CALCIUM mg/dL 8.8   ALBUMIN g/dL 3.8   TOTAL BILIRUBIN mg/dL 0.28   ALK PHOS U/L 64   ALT U/L 20   AST U/L 17   GLUCOSE RANDOM mg/dL 94         Results from last 7 days   Lab Units 03/22/25  2238 03/21/25  2122   POC GLUCOSE mg/dl 85 109               Recent Cultures (last 7 days):         Imaging Results Review: No pertinent imaging studies reviewed.  Other Study Results Review: No additional pertinent studies reviewed.    Last 24 Hours Medication List:     Current Facility-Administered Medications:     acetaminophen (TYLENOL) tablet 650 mg, Q6H PRN    aluminum-magnesium hydroxide-simethicone (MAALOX) oral suspension 30 mL, Q6H PRN    aspirin chewable tablet 81 mg, Daily    atorvastatin (LIPITOR) tablet 20 mg, Daily With Dinner    clonazePAM (KlonoPIN) tablet 1 mg, BID    dextromethorphan-guaiFENesin (ROBITUSSIN DM) oral syrup 10 mL, Q4H PRN    enoxaparin (LOVENOX) subcutaneous injection 40 mg, Daily    fluticasone (FLONASE) 50 mcg/act nasal spray 2 spray, Daily    gabapentin (NEURONTIN) capsule 200 mg, BID    levothyroxine tablet 100 mcg, Early Morning    melatonin tablet 6 mg, HS    menthol-methyl salicylate (BENGAY) 10-15 % cream, Daily    OLANZapine (ZyPREXA) tablet 20 mg, HS    ondansetron (ZOFRAN) injection 4 mg, Q6H PRN    PHENobarbital tablet 64.8 mg, Q12H    polyethylene glycol (MIRALAX) packet 17 g, Daily PRN    senna-docusate sodium (SENOKOT S) 8.6-50 mg per tablet 1 tablet, HS    traZODone (DESYREL) tablet 150 mg, HS    trihexyphenidyl (ARTANE)  tablet 2 mg, BID    valproate (DEPACON) 250 mg in sodium chloride 0.9 % 50 mL IVPB, Q6H PRN    Facility-Administered Medications Ordered in Other Encounters:     lactated ringers infusion, Continuous PRN    Administrative Statements   Today, Patient Was Seen By: Alaina Powell MD      **Please Note: This note may have been constructed using a voice recognition system.**

## 2025-03-26 NOTE — CASE MANAGEMENT
"   Case Management Progress Note    Patient name Laine Tse  Location ED-/ED-06 MRN [id number]  : 1955 Date 3/26/2025       LOS (days): 1  Geometric Mean LOS (GMLOS) (days): 6.5  Days to GMLOS:5.8        OBJECTIVE:        Current admission status: Inpatient  Preferred Pharmacy:   [facility] [address]  [address] [facility] [address]  Phone: [phone] Fax: [phone]    Primary Care Provider: Adilson Yip MD    Primary Insurance: MEDICARE  Secondary Insurance:     PROGRESS NOTE:    CC received notification that patient's group home turned EMS crew around and brought patient back to ED. CC was able to receive information on [facility] worker responsible for patient's placement from Team members contacting the [facility]. CC informed that Josesito is patient's main contact, and voice message left. ED CM then contacted Josesito Marquis's supervisor in regards to patient.     ED CM communicated with Benitez which then led to CC needing to call and provide further clarification. CC and ED CM were able to get Benitez on the phone to discuss concerns. AVE explained to Benitez the \"otherside\" of patient's multiple admissions and the process that the hospital took to evaluate for a higher level of need. AVE explained that with the calls that the ED CM and CC completed with Group home representative James never once did [employer] provide the contact information for the [facility] team. CC also clarified that the meeting that Benitez wanted was told to the hospital CM yesterday was already completed and that group home and provided okay for patient to return per CM from yesterday. Benitez was stunned to hear information and that group home was not providing information to hospital. Benitez requested documentation of previous interactions with [facility] [employer], that CC will attempt to obtain.     As for this current admission, [facility] confirmed that a 30 day notification was not presented " to patient our [facility] staff. Benitez did communicate that he wants to have a meeting with all parties to discuss a safe dc plan, because he did not have the meeting he needed prior. He is stating he and team are available for meeting on Friday. He did state that patient is nursing home eligible, and will fax over the paperwork to show this. AVE explained that referrals could be made to SNFs but the payor source for long term care is not there and as per Benitez, patient is her own decision maker. Benitez is aware that [facility] is not recommending another inpatient stay and that patient can be managed on medications and outpatient follow up. Benitez is also aware that patient was placed on waitlist at [facility], but that as of today, the facility is not willing to accept patient due to mental health and behaviors.     AVE is awaiting faxed information from Benitez's office for additional referrals to be placed. Concerns that patient will require further involvement for placement. Benitez is aware that patient is medically stable.

## 2025-03-26 NOTE — CASE MANAGEMENT
Case Management Progress Note    Patient name Laine Tse  Location ED-06/ED-06 MRN [id number]  : 1955 Date 3/26/2025       LOS (days): 1  Geometric Mean LOS (GMLOS) (days): 6.5  Days to GMLOS:5.9        OBJECTIVE:        Current admission status: Inpatient  Preferred Pharmacy:   [facility] [address]  [address] [facility] [address]  Phone: [phone] Fax: [phone]    Primary Care Provider: Adilson Yip MD    Primary Insurance: MEDICARE  Secondary Insurance:     PROGRESS NOTE:    TC placed patient's [facility] CM Josesito Garcia at [phone] three times to discuss dcp but there was no answer. CM left voicemails with direct line for call back. DALE spoke with Josesito's supervisor Benitez at [phone]. DALE explained that patient has been brought to the hospital multiple times for placement but [facility] and that the group home is refusing to accept her back. DALE asked Benitez, if the patient is medically stable, has not receive a 30 day notice to leave the group home then where does the hospital send her/ where do we go from here. Per Benitez, the [facility] is requesting a meeting with the patient's psych provider [facility], the hospital, and the [facility] before the patient is discharged to ensure the patient's needs will be met. Benitez is available to meet this Friday and said the group home will be flexible and should be able to meet.     DALE attempted to speak with the patient's CM from [facility] at [phone] to discuss meeting but there was no answer. DALE left detailed VM with direct line for call back.     Benitez's Email: [email or web address]

## 2025-03-26 NOTE — ASSESSMENT & PLAN NOTE
Multiple admissions because of disposition issues  Facility is not able to accept because of the active psychosis  Patient does not like facility because of acute psychosis and patient feel that Dr At the facility is trying to change his medication, the staff is abusive to her, the smell is bad over there   consulted for disposition planning

## 2025-03-26 NOTE — ED NOTES
Attempted to obtain EKG a second time while SLIM provider was in the room. Pt still declining at this time. Pt still declining at this time. Pt also declining to wear spine brace.      Mis Turcios RN  03/26/25 0035

## 2025-03-26 NOTE — H&P
H&P - Hospitalist   Name: Laine Tse 69 y.o. female I MRN: [id number]  Unit/Bed#: ED-06 I Date of Admission: 3/25/2025   Date of Service: 3/25/2025 I Hospital Day: 0     Assessment & Plan  Schizoaffective disorder (HCC)  Continue olanzapine 20 mg at bedtime  In the case of acute psychosis, delirium precautions  First try to redirect the patient in the background of psychosis  Patient is on virtual one-on-one observation because of acute psychosis   consulted for disposition planning  Consulted psychiatry for medication up titration/need of inpatient psychiatry admission  Hyperlipidemia  Continue atorvastatin 20 mg daily  Hypothyroidism  Continue levothyroxine 100 mcg  The most recent TSH was unremarkable  Seizure disorder (HCC)  Seizure precaution  ativan as needed for status epilepticus  Continue phenobarbital 64.8 mg twice daily  Social problem  Multiple admissions because of disposition issues  Facility is not able to accept because of the active psychosis  Patient does not like facility because of acute psychosis and patient feel that Dr At the facility is trying to change his medication, the staff is abusive to her, the smell is bad over there   consulted for disposition planning      VTE Pharmacologic Prophylaxis: VTE Score: 3 Moderate Risk (Score 3-4) - Pharmacological DVT Prophylaxis Ordered: enoxaparin (Lovenox).  Code Status: Level 1 - Full Code   Discussion with family:  Will attempt to inform family member tomorrow in the morning.     Anticipated Length of Stay: Patient will be admitted on an inpatient basis with an anticipated length of stay of greater than 2 midnights secondary to acute psychosis.    History of Present Illness   Chief Complaint: Acute psychosis causing decline to admission to group home    Laine Tse is a 69 y.o. female with a PMH of schizoaffective disorder, hyperlipidemia, social issues causing decline to admission to group home because of acute  psychosis, T12 compression fracture status post spinal breast who presents with acute psychosis causing disposition issues.    Few days ago patient got admitted with acute psychosis and fall and imaging shows spinal compression fracture status post spinal brace and no surgical evaluation.  Then patient will discharge to group home and patient presented again to the hospital for acute psychosis.  During the most recent admission, psychiatry increase the dose of olanzapine to 20 mg daily.  Please see ROS.    Review of Systems   Reason unable to perform ROS: Acute psychosis.       Historical Information   Past Medical History:   Diagnosis Date    Anxiety     Benign essential hypertension     resolved; 06/15/16    Depression     Depression with anxiety     Fracture of fifth metatarsal bone of right foot with delayed healing     last assessed 16; fracture of metatarsal bone, right, closed, initial encounter    Hyperlipidemia     last assessed 17    Hypothyroidism     last assessed 2017    Insomnia     Obesity     Schizoaffective disorder (HCC)     last assessed 2017    Seizure disorder (HCC)     last assessed 17    Seizures (HCC)      Past Surgical History:   Procedure Laterality Date    COLONOSCOPY      complete    SC COLONOSCOPY FLX DX W/COLLJ SPEC WHEN PFRMD N/A 2018    Procedure: COLONOSCOPY with polypectomies;  Surgeon: Andriy Lopez MD;  Location: [facility];  Service: Gastroenterology     Social History     Tobacco Use    Smoking status: Former     Current packs/day: 0.00     Average packs/day: 0.5 packs/day for 21.0 years (10.5 ttl pk-yrs)     Types: Cigarettes     Start date:      Quit date:      Years since quittin.2    Smokeless tobacco: Never   Vaping Use    Vaping status: Never Used   Substance and Sexual Activity    Alcohol use: Not Currently    Drug use: No    Sexual activity: Never     E-Cigarette/Vaping    E-Cigarette Use Never User      E-Cigarette/Vaping  Substances    Nicotine No     THC No     CBD No     Flavoring No     Other No     Unknown No      Family History   Problem Relation Age of Onset    No Known Problems Mother     No Known Problems Father     Lung disease Other         mesothelioma    No Known Problems Maternal Grandmother     No Known Problems Maternal Grandfather     No Known Problems Paternal Grandmother     No Known Problems Paternal Grandfather     No Known Problems Sister     No Known Problems Sister     Kidney failure Brother     No Known Problems Maternal Aunt     No Known Problems Maternal Aunt     No Known Problems Maternal Aunt     No Known Problems Maternal Aunt     No Known Problems Paternal Aunt     No Known Problems Paternal Aunt      Social History:  Marital Status: /Civil Union   Occupation: retired  Patient Pre-hospital Living Situation: group home  Patient Pre-hospital Level of Mobility: walks  Patient Pre-hospital Diet Restrictions: no    Meds/Allergies   I have reviewed home medications with patient personally.  Prior to Admission medications    Medication Sig Start Date End Date Taking? Authorizing Provider   acetaminophen (TYLENOL) 500 mg tablet Take 2 tablets (1,000 mg total) by mouth 3 (three) times a day as needed for mild pain or fever 2/19/24  Yes Adilson Yip MD   alendronate (FOSAMAX) 70 mg tablet Take 70 mg by mouth every 7 days Every 7 days on Fridays 2/14/25  Yes Historical Provider, MD   aspirin (Aspirin Low Dose) 81 mg chewable tablet Chew 1 tablet (81 mg total) daily 3/9/25  Yes Marion Javier PA-C   atorvastatin (LIPITOR) 20 mg tablet Take 1 tablet (20 mg total) by mouth daily 3/9/25  Yes Marion Javier PA-C   calcium carbonate-vitamin D 500 mg-5 mcg per tablet Take 1 tablet by mouth daily with breakfast   Yes Historical Provider, MD   clonazePAM (KlonoPIN) 1 mg tablet Take 1 tablet (1 mg total) by mouth 2 (two) times a day 3/10/25 4/9/25 Yes CHRISTOPHER Alexander   fluticasone (FLONASE) 50  mcg/act nasal spray 2 sprays into each nostril daily 3/9/25  Yes Marion Javier PA-C   gabapentin (NEURONTIN) 100 mg capsule Take 2 capsules (200 mg total) by mouth 2 (two) times a day 3/25/25  Yes Alaina Powell MD   guaiFENesin 400 mg Take 1 tablet (400 mg total) by mouth every 6 (six) hours as needed for cough 3/13/25  Yes Erin Caldera PA-C   Incontinence Supplies MISC Use if needed (incontinence) SIZE XL PULL UP DISPOSABLE BRIEFS 6/18/24  Yes Adilson Yip MD   Incontinence Supply Disposable (Disposable Brief Large) MISC Use every 12 (twelve) hours 11/21/24  Yes Erni Caldera PA-C   levothyroxine 100 mcg tablet Take 1 tablet (100 mcg total) by mouth daily 3/9/25  Yes Marion Javier PA-C   melatonin 3 mg Take 2 tablets (6 mg total) by mouth daily at bedtime 3/9/25  Yes CHRISTOPHER Alexander   Menthol, Topical Analgesic, (Bengay Ultra Strength) 5 % PTCH Apply 1 patch topically in the morning 11/21/24  Yes Erin Caldera PA-C   OLANZapine (ZyPREXA) 20 MG tablet Take 1 tablet (20 mg total) by mouth daily at bedtime 3/25/25  Yes Alaina Powell MD   PHENobarbital 64.8 mg tablet Take 1 tablet (64.8 mg total) by mouth every 12 (twelve) hours 2/11/25  Yes Jack Donovan MD   polyethylene glycol (GLYCOLAX) 17 GM/SCOOP MIX 1 CAPFUL(17GM) IN 8OZ OF LIQUID AND DRINK DAILY @ 8AM(CONSTIPATION) *AHMAD 9/17/24  Yes Adilson Yip MD   senna-docusate sodium (SENOKOT-S) 8.6-50 mg per tablet Take 1 tablet by mouth daily at bedtime 3/9/25  Yes Marion Javier PA-C   traZODone (DESYREL) 150 mg tablet Take 1 tablet (150 mg total) by mouth daily at bedtime 3/9/25  Yes CHRISTOPHER Alexander   trihexyphenidyl (ARTANE) 2 mg tablet Take 1 tablet (2 mg total) by mouth 2 (two) times a day 3/9/25  Yes CHRISTOPHER Alexander   OLANZapine (ZyPREXA) 15 mg tablet Take 1 tablet (15 mg total) by mouth daily at bedtime 3/9/25 3/25/25  CHRISTOPHER Alexander     Allergies   Allergen Reactions    Clozapine  "Other (See Comments)     low white blood count  Other reaction(s): Other (See Comments)  low white blood count    Haloperidol Other (See Comments)     EPS  Other reaction(s): Other (See Comments)  EPS    Lithium Other (See Comments)     Toxicity    Prolixin [Fluphenazine] Hyperactivity and Other (See Comments)     EPS, Hyperactivity  EPS, Hyperactivity    Valproic Acid Confusion and Other (See Comments)     \"Mental confusion\"  \"Mental confusion\"       Objective :  Temp:  [98.1 °F (36.7 °C)-98.5 °F (36.9 °C)] 98.1 °F (36.7 °C)  HR:  [74-82] 74  BP: (135-147)/(92-97) 141/97  Resp:  [18] 18  SpO2:  [97 %-98 %] 98 %  O2 Device: None (Room air)    Physical Exam  Vitals and nursing note reviewed. Exam conducted with a chaperone present.   Constitutional:       General: She is not in acute distress.     Appearance: She is not ill-appearing, toxic-appearing or diaphoretic.   Cardiovascular:      Rate and Rhythm: Normal rate and regular rhythm.      Pulses: Normal pulses.      Heart sounds: Normal heart sounds. No murmur heard.     No friction rub. No gallop.   Pulmonary:      Effort: No respiratory distress.      Breath sounds: No stridor. No wheezing, rhonchi or rales.   Chest:      Chest wall: No tenderness.   Abdominal:      General: There is no distension.      Palpations: Abdomen is soft. There is no mass.      Tenderness: There is no abdominal tenderness. There is no guarding or rebound.      Hernia: No hernia is present.   Musculoskeletal:      Right lower leg: No edema.      Left lower leg: No edema.   Neurological:      General: No focal deficit present.      Mental Status: She is oriented to person, place, and time. Mental status is at baseline.   Psychiatric:      Comments: Acute psychosis          Lines/Drains:            Lab Results: I have reviewed the following results:  Results from last 7 days   Lab Units 03/25/25  2148   WBC Thousand/uL 4.25*   HEMOGLOBIN g/dL 13.0   HEMATOCRIT % 37.8   PLATELETS " Thousands/uL 244   SEGS PCT % 48   LYMPHO PCT % 42   MONO PCT % 9   EOS PCT % 0     Results from last 7 days   Lab Units 03/25/25  2148   SODIUM mmol/L 137   POTASSIUM mmol/L 3.7   CHLORIDE mmol/L 104   CO2 mmol/L 26   BUN mg/dL 10   CREATININE mg/dL 0.54*   ANION GAP mmol/L 7   CALCIUM mg/dL 8.8   ALBUMIN g/dL 3.8   TOTAL BILIRUBIN mg/dL 0.28   ALK PHOS U/L 64   ALT U/L 20   AST U/L 17   GLUCOSE RANDOM mg/dL 94         Results from last 7 days   Lab Units 03/22/25  2238 03/21/25  2122   POC GLUCOSE mg/dl 85 109     Lab Results   Component Value Date    HGBA1C 5.5 04/26/2024    HGBA1C 5.7 (H) 10/27/2023    HGBA1C 5.8 (H) 09/07/2023           None      Administrative Statements       ** Please Note: This note has been constructed using a voice recognition system. **

## 2025-03-27 VITALS
BODY MASS INDEX: 29.32 KG/M2 | OXYGEN SATURATION: 95 % | WEIGHT: 171.74 LBS | HEIGHT: 64 IN | DIASTOLIC BLOOD PRESSURE: 68 MMHG | SYSTOLIC BLOOD PRESSURE: 128 MMHG | RESPIRATION RATE: 18 BRPM | HEART RATE: 69 BPM | TEMPERATURE: 98.6 F

## 2025-03-27 VITALS
SYSTOLIC BLOOD PRESSURE: 129 MMHG | HEART RATE: 68 BPM | DIASTOLIC BLOOD PRESSURE: 82 MMHG | RESPIRATION RATE: 17 BRPM | TEMPERATURE: 98.3 F | OXYGEN SATURATION: 98 %

## 2025-03-27 PROCEDURE — 99231 SBSQ HOSP IP/OBS SF/LOW 25: CPT | Performed by: INTERNAL MEDICINE

## 2025-03-27 RX ADMIN — LEVOTHYROXINE SODIUM 100 MCG: 100 TABLET ORAL at 06:42

## 2025-03-27 RX ADMIN — PHENOBARBITAL 64.8 MG: 32.4 TABLET ORAL at 13:03

## 2025-03-27 RX ADMIN — GABAPENTIN 200 MG: 100 CAPSULE ORAL at 17:10

## 2025-03-27 RX ADMIN — CLONAZEPAM 1 MG: 1 TABLET ORAL at 09:34

## 2025-03-27 RX ADMIN — ATORVASTATIN CALCIUM 20 MG: 40 TABLET, FILM COATED ORAL at 16:42

## 2025-03-27 RX ADMIN — GABAPENTIN 200 MG: 100 CAPSULE ORAL at 09:34

## 2025-03-27 RX ADMIN — FLUTICASONE PROPIONATE 2 SPRAY: 50 SPRAY, METERED NASAL at 09:35

## 2025-03-27 RX ADMIN — CLONAZEPAM 1 MG: 1 TABLET ORAL at 17:10

## 2025-03-27 RX ADMIN — PHENOBARBITAL 64.8 MG: 32.4 TABLET ORAL at 22:30

## 2025-03-27 RX ADMIN — OLANZAPINE 20 MG: 10 TABLET, FILM COATED ORAL at 22:16

## 2025-03-27 RX ADMIN — MENTHOL 10%, METHYL SALICYLATE 15%: 10; 15 CREAM TOPICAL at 22:17

## 2025-03-27 RX ADMIN — ASPIRIN 81 MG CHEWABLE TABLET 81 MG: 81 TABLET CHEWABLE at 09:34

## 2025-03-27 RX ADMIN — TRIHEXYPHENIDYL HYDROCHLORIDE 2 MG: 2 TABLET ORAL at 09:35

## 2025-03-27 RX ADMIN — TRIHEXYPHENIDYL HYDROCHLORIDE 2 MG: 2 TABLET ORAL at 22:17

## 2025-03-27 RX ADMIN — SENNOSIDES AND DOCUSATE SODIUM 1 TABLET: 50; 8.6 TABLET ORAL at 22:16

## 2025-03-27 RX ADMIN — TRAZODONE HYDROCHLORIDE 150 MG: 100 TABLET ORAL at 22:17

## 2025-03-27 RX ADMIN — Medication 6 MG: at 22:16

## 2025-03-27 NOTE — CASE MANAGEMENT
Case Management Discharge Planning Note    Patient name Laine Tse  Location S /S -01 MRN [id number]  : 1955 Date 3/27/2025       Current Admission Date: 3/25/2025  Current Admission Diagnosis:Schizoaffective disorder (HCC)   Patient Active Problem List    Diagnosis Date Noted Date Diagnosed    Social problem 2025     T12 compression fracture (HCC) 2025     Severe protein-calorie malnutrition (HCC) 2025     Skin lesion of left lower extremity 2024     Chronic cough 2023     T wave inversion on electrocardiogram 2023     Rash 2023     Dermatitis 2023     Chronic midline low back pain without sciatica 2021     Class 1 obesity with body mass index (BMI) of 32.0 to 32.9 in adult 2021     Decreased hearing of both ears 2019     Benign essential hypertension 2019     Pre-diabetes 2019     Hyperglycemia 2018     Fall 2018     Injury of right toe, initial encounter 2018     Unsteady gait 10/03/2017     Seizure disorder (HCC) 05/15/2017     Anemia 05/15/2017     Hyponatremia 2015     Folic acid deficiency 10/15/2012     Hyperlipidemia 10/05/2012     Hypothyroidism 10/05/2012     Schizoaffective disorder (HCC) 10/05/2012     Osteoporosis 10/05/2012       LOS (days): 2  Geometric Mean LOS (GMLOS) (days): 6.5  Days to GMLOS:4.8     OBJECTIVE:  Risk of Unplanned Readmission Score: 27.54         Current admission status: Inpatient   Preferred Pharmacy:   [facility] [place] [address] [id number]  Phone: [phone] Fax: [phone]    Primary Care Provider: Adilson Yip MD    Primary Insurance: MEDICARE  Secondary Insurance:     DISCHARGE DETAILS:    Clayton Jones from [facility] ([facility]) Met with Pt in her room. Clayton walked with the Pt the length of 1 hospital hallway. Pt ambulated with her cane and required no assistance.   Pt was able to sit on the couch in her room and stand up independently--simulating being able to sit/stand from a toilet.    Clayton confirms that Pt appears to be at her baseline.     CM directly asked Clayton if there is anything else he needs to observe in order for the Pt to return back to her group home. Clayton said no.      CM asked Clayton if he anticipates any other barriers that will prevent Laine from returning to her GH. Clayton said he will have to see what the other members of her team say.  Team Denise scheduled for 9AM tomorrow which this CM will participate in.     Pt continued to tell Clayton that she wants to return home.

## 2025-03-27 NOTE — CASE MANAGEMENT
Case Management Discharge Planning Note    Patient name Laine Tse  Location S /S -01 MRN [id number]  : 1955 Date 3/27/2025       Current Admission Date: 3/25/2025  Current Admission Diagnosis:Schizoaffective disorder (HCC)   Patient Active Problem List    Diagnosis Date Noted Date Diagnosed    Social problem 2025     T12 compression fracture (HCC) 2025     Severe protein-calorie malnutrition (HCC) 2025     Skin lesion of left lower extremity 2024     Chronic cough 2023     T wave inversion on electrocardiogram 2023     Rash 2023     Dermatitis 2023     Chronic midline low back pain without sciatica 2021     Class 1 obesity with body mass index (BMI) of 32.0 to 32.9 in adult 2021     Decreased hearing of both ears 2019     Benign essential hypertension 2019     Pre-diabetes 2019     Hyperglycemia 2018     Fall 2018     Injury of right toe, initial encounter 2018     Unsteady gait 10/03/2017     Seizure disorder (HCC) 05/15/2017     Anemia 05/15/2017     Hyponatremia 2015     Folic acid deficiency 10/15/2012     Hyperlipidemia 10/05/2012     Hypothyroidism 10/05/2012     Schizoaffective disorder (HCC) 10/05/2012     Osteoporosis 10/05/2012       LOS (days): 2  Geometric Mean LOS (GMLOS) (days): 6.5  Days to GMLOS:4.9     OBJECTIVE:  Risk of Unplanned Readmission Score: 27.49         Current admission status: Inpatient   Preferred Pharmacy:   [place], [place] - [address] [place] [address] [address]  Phone: [phone] Fax: [phone]    Primary Care Provider: Adilson Yip MD    Primary Insurance: MEDICARE  Secondary Insurance:     DISCHARGE DETAILS:    CM met with Patient at bedside to discuss her eventual discharge plan. Pt is aware that [facility] is unable to accept her at this time. Pt is aware that CM is is searching for  "placement and will keep her updated.     Pt gave CM permission to contact her sister, Kati and her Rep-Payee at the [facility].     CM spoke with Pt's Rep-Payee at [facility]- Daya (PH: [phone]). CM explained that Pts group home is unwilling to accept her back and Pt may potentially need SNF placement.  CM informed Daya that the Pt does not have Medical Assistance, but would need this for LTC placement at a SNF.  Daya confirmed that she would be able to complete the [place] Medical [place] Application for the Pt.  CM requested that Daya keep this CM updated on any movement with the MA application.     1:40pm update: Daya confirms that [facility] complete the Medical Assistance application for the Pt on 3/13.     Pt was optioned by [facility] in October, 2024.  CM emailed Ivana Arreaga ( Supervisor at [facility].) @ [email or web address], to find out if Pts LOC can be extended or is she needs to be \"optioned\" again.  CM is awaiting a response.         "

## 2025-03-27 NOTE — QUICK NOTE
I got a message from the nurse that nurse was requesting virtual one-on-one observation for safety.  I did accommodate that request.

## 2025-03-27 NOTE — PLAN OF CARE
Problem: Potential for Falls  Goal: Patient will remain free of falls  Description: INTERVENTIONS:- Educate patient/family on patient safety including physical limitations- Instruct patient to call for assistance with activity - Consult OT/PT to assist with strengthening/mobility - Keep Call bell within reach- Keep bed low and locked with side rails adjusted as appropriate- Keep care items and personal belongings within reach- Initiate and maintain comfort rounds- Make Fall Risk Sign visible to staff- Offer Toileting every 2 Hours, in advance of need- Initiate/Maintain bed/chair alarm- Obtain necessary fall risk management equipment- Apply yellow socks and bracelet for high fall risk patients- Consider moving patient to room near nurses station  INTERVENTIONS:- Educate patient/family on patient safety including physical limitations- Instruct patient to call for assistance with activity - Consult OT/PT to assist with strengthening/mobility - Keep Call bell within reach- Keep bed low and locked with side rails adjusted as appropriate- Keep care items and personal belongings within reach- Initiate and maintain comfort rounds- Make Fall Risk Sign visible to staff- Offer Toileting every 2 Hours, in advance of need- Initiate/Maintain bed alarm- Obtain necessary fall risk management equipment- Apply yellow socks and bracelet for high fall risk patients- Consider moving patient to room near nurses station  Outcome: Progressing     Problem: PAIN - ADULT  Goal: Verbalizes/displays adequate comfort level or baseline comfort level  Description: Interventions:- Encourage patient to monitor pain and request assistance- Assess pain using appropriate pain scale- Administer analgesics based on type and severity of pain and evaluate response- Implement non-pharmacological measures as appropriate and evaluate response- Consider cultural and social influences on pain and pain management- Notify physician/advanced practitioner if  interventions unsuccessful or patient reports new pain  Outcome: Progressing     Problem: INFECTION - ADULT  Goal: Absence or prevention of progression during hospitalization  Description: INTERVENTIONS:- Assess and monitor for signs and symptoms of infection- Monitor lab/diagnostic results- Monitor all insertion sites, i.e. indwelling lines, tubes, and drains- Monitor endotracheal if appropriate and nasal secretions for changes in amount and color- [place] appropriate cooling/warming therapies per order- Administer medications as ordered- Instruct and encourage patient and family to use good hand hygiene technique- Identify and instruct in appropriate isolation precautions for identified infection/condition  Outcome: Progressing  Goal: Absence of fever/infection during neutropenic period  Description: INTERVENTIONS:- Monitor WBC  Outcome: Progressing     Problem: SAFETY ADULT  Goal: Patient will remain free of falls  Description: INTERVENTIONS:- Educate patient/family on patient safety including physical limitations- Instruct patient to call for assistance with activity - Consult OT/PT to assist with strengthening/mobility - Keep Call bell within reach- Keep bed low and locked with side rails adjusted as appropriate- Keep care items and personal belongings within reach- Initiate and maintain comfort rounds- Make Fall Risk Sign visible to staff-INTERVENTIONS:- Educate patient/family on patient safety including physical limitations- Instruct patient to call for assistance with activity - Consult OT/PT to assist with strengthening/mobility - Keep Call bell within reach- Keep bed low and locked with side rails adjusted as appropriate- Keep care items and personal belongings within reach- Initiate and maintain comfort rounds- Make Fall Risk Sign visible to staff- Outcome: Progressing  Goal: Maintain or return to baseline ADL function  Description: INTERVENTIONS:-  Assess patient's ability to carry out ADLs; assess  patient's baseline for ADL function and identify physical deficits which impact ability to perform ADLs (bathing, care of mouth/teeth, toileting, grooming, dressing, etc.)- Assess/evaluate cause of self-care deficits - Assess range of motion- Assess patient's mobility; develop plan if impaired- Assess patient's need for assistive devices and provide as appropriate- Encourage maximum independence but intervene and supervise when necessary- Involve family in performance of ADLs- Assess for home care needs following discharge - Consider OT consult to assist with ADL evaluation and planning for discharge- Provide patient education as appropriate  Outcome: Progressing  Goal: Maintains/Returns to pre admission functional level  Description: INTERVENTIONS:- Perform AM-PAC 6 Click Basic Mobility/ Daily Activity assessment daily.- Set and communicate daily mobility goal to care team and patient/family/caregiver. - Collaborate with rehabilitation services on mobility goals if consulted- 3- Stand patient 3 times a day- Ambulate patient 3 times a day- Out of bed to chair 3 times a day - Out of bed for meals 3 times a day- Out of bed for toileting- Record patient progress and toleration of activity level   Outcome: Progressing     Problem: DISCHARGE PLANNING  Goal: Discharge to home or other facility with appropriate resources  Description: INTERVENTIONS:- Identify barriers to discharge w/patient and caregiver- Arrange for needed discharge resources and transportation as appropriate- Identify discharge learning needs (meds, wound care, etc.)- Arrange for interpretive services to assist at discharge as needed- Refer to Case Management Department for coordinating discharge planning if the patient needs post-hospital services based on physician/advanced practitioner order or complex needs related to functional status, cognitive ability, or social support system  Outcome: Progressing     Problem: Knowledge Deficit  Goal:  Patient/family/caregiver demonstrates understanding of disease process, treatment plan, medications, and discharge instructions  Description: Complete learning assessment and assess knowledge base.Interventions:- Provide teaching at level of understanding- Provide teaching via preferred learning methods  Outcome: Progressing

## 2025-03-27 NOTE — ASSESSMENT & PLAN NOTE
-Continue olanzapine 20 mg at bedtime  -In the case of acute psychosis, delirium precautions  -First try to redirect the patient in the background of psychosis  -Patient was discharged yesterday 3/25/2025 and returned to group home via EMS, who did not except her into the group home D/T acute psychosis which prompted readmission to the hospital.  - consulted for disposition planning, thus far got assigned  for [facility].  -Consulted psychiatry: No Current adjustments to medications and no indications for inpatient psychiatry admission.

## 2025-03-27 NOTE — PROGRESS NOTES
Progress Note - Hospitalist   Name: Laine Tse 69 y.o. female I MRN: [id number]  Unit/Bed#: S -01 I Date of Admission: 3/25/2025   Date of Service: 3/27/2025 I Hospital Day: 2    Assessment & Plan  Schizoaffective disorder (HCC)  -Continue olanzapine 20 mg at bedtime  -In the case of acute psychosis, delirium precautions  -First try to redirect the patient in the background of psychosis  -Patient was discharged yesterday 3/25/2025 and returned to group home via EMS, who did not except her into the group home D/T acute psychosis which prompted readmission to the hospital.  - consulted for disposition planning, thus far got assigned  for [facility].  -Consulted psychiatry: No Current adjustments to medications and no indications for inpatient psychiatry admission.    Hyperlipidemia  Continue atorvastatin 20 mg daily  Hypothyroidism  Continue levothyroxine 100 mcg  The most recent TSH was unremarkable  Seizure disorder (HCC)  Seizure precaution  Continue phenobarbital 64.8 mg twice daily  Social problem  Multiple admissions because of disposition issues  Facility is not able to accept because of the active psychosis  Patient does not like facility because of acute psychosis and patient feel that Dr At the facility is trying to change his medication, the staff is abusive to her, the smell is bad over there   consulted for disposition planning    VTE Pharmacologic Prophylaxis: VTE Score: 3 Moderate Risk (Score 3-4) - Pharmacological DVT Prophylaxis Ordered: enoxaparin (Lovenox).    Mobility:   Basic Mobility Inpatient Raw Score: 20  JH-HLM Goal: 6: Walk 10 steps or more  JH-HLM Achieved: 6: Walk 10 steps or more  JH-HLM Goal achieved. Continue to encourage appropriate mobility.    Patient Centered Rounds: I evaluated the patient without nursing staff present due to nurse performing other clinical obligations    Discussions with Specialists or Other Care Team  Provider: Yes    Education and Discussions with Family / Patient: Updated  (sister) via phone.    Current Length of Stay: 2 day(s)  Current Patient Status: Inpatient   Certification Statement: The patient will continue to require additional inpatient hospital stay due to pending placement  Discharge Plan: Anticipate discharge tomorrow to pending placement for group home or STR    Code Status: Level 1 - Full Code    Subjective   Overall patient was much more pleasant this morning listening to music rather relaxed.  She had no physical complaints.  I discussed with her the planning that is ongoing with case management regarding placement for her to which she understood.  She was also able to understand that these choices are limited given the financial situations.    Objective :  Temp:  [98.3 °F (36.8 °C)] 98.3 °F (36.8 °C)  HR:  [68-83] 68  BP: (100-132)/(66-82) 129/82  Resp:  [17] 17  SpO2:  [92 %-98 %] 98 %  O2 Device: None (Room air)    There is no height or weight on file to calculate BMI.     Input and Output Summary (last 24 hours):     Intake/Output Summary (Last 24 hours) at 3/27/2025 1431  Last data filed at 3/27/2025 0619  Gross per 24 hour   Intake 1260 ml   Output 400 ml   Net 860 ml       Physical Exam  Vitals and nursing note reviewed.   Constitutional:       General: She is not in acute distress.     Appearance: She is well-developed. She is not ill-appearing, toxic-appearing or diaphoretic.   HENT:      Head: Normocephalic and atraumatic.   Eyes:      Conjunctiva/sclera: Conjunctivae normal.   Cardiovascular:      Rate and Rhythm: Normal rate and regular rhythm.      Heart sounds: No murmur heard.  Pulmonary:      Effort: Pulmonary effort is normal. No respiratory distress.      Breath sounds: Normal breath sounds. No wheezing or rales.   Abdominal:      General: There is no distension.      Palpations: Abdomen is soft.      Tenderness: There is no abdominal tenderness. There is no  guarding.   Musculoskeletal:         General: No swelling.      Cervical back: Neck supple.      Right lower leg: No edema.      Left lower leg: No edema.   Skin:     General: Skin is warm.      Capillary Refill: Capillary refill takes less than 2 seconds.   Neurological:      Mental Status: She is alert.   Psychiatric:         Mood and Affect: Mood normal.           Lines/Drains:              Lab Results: I have reviewed the following results:   Results from last 7 days   Lab Units 03/26/25  0616   WBC Thousand/uL 3.34*   HEMOGLOBIN g/dL 12.9   HEMATOCRIT % 38.4   PLATELETS Thousands/uL 247   SEGS PCT % 40*   LYMPHO PCT % 46*   MONO PCT % 14*   EOS PCT % 0     Results from last 7 days   Lab Units 03/25/25  2148   SODIUM mmol/L 137   POTASSIUM mmol/L 3.7   CHLORIDE mmol/L 104   CO2 mmol/L 26   BUN mg/dL 10   CREATININE mg/dL 0.54*   ANION GAP mmol/L 7   CALCIUM mg/dL 8.8   ALBUMIN g/dL 3.8   TOTAL BILIRUBIN mg/dL 0.28   ALK PHOS U/L 64   ALT U/L 20   AST U/L 17   GLUCOSE RANDOM mg/dL 94         Results from last 7 days   Lab Units 03/22/25  2238 03/21/25  2122   POC GLUCOSE mg/dl 85 109               Recent Cultures (last 7 days):         Imaging Results Review: No pertinent imaging studies reviewed.  Other Study Results Review: No additional pertinent studies reviewed.    Last 24 Hours Medication List:     Current Facility-Administered Medications:     acetaminophen (TYLENOL) tablet 650 mg, Q6H PRN    aluminum-magnesium hydroxide-simethicone (MAALOX) oral suspension 30 mL, Q6H PRN    aspirin chewable tablet 81 mg, Daily    atorvastatin (LIPITOR) tablet 20 mg, Daily With Dinner    clonazePAM (KlonoPIN) tablet 1 mg, BID    dextromethorphan-guaiFENesin (ROBITUSSIN DM) oral syrup 10 mL, Q4H PRN    enoxaparin (LOVENOX) subcutaneous injection 40 mg, Daily    fluticasone (FLONASE) 50 mcg/act nasal spray 2 spray, Daily    gabapentin (NEURONTIN) capsule 200 mg, BID    levothyroxine tablet 100 mcg, Early Morning    melatonin  tablet 6 mg, HS    menthol-methyl salicylate (BENGAY) 10-15 % cream, Daily    OLANZapine (ZyPREXA) tablet 20 mg, HS    ondansetron (ZOFRAN) injection 4 mg, Q6H PRN    PHENobarbital tablet 64.8 mg, Q12H    polyethylene glycol (MIRALAX) packet 17 g, Daily PRN    senna-docusate sodium (SENOKOT S) 8.6-50 mg per tablet 1 tablet, HS    traZODone (DESYREL) tablet 150 mg, HS    trihexyphenidyl (ARTANE) tablet 2 mg, BID    valproate (DEPACON) 250 mg in sodium chloride 0.9 % 50 mL IVPB, Q6H PRN    Facility-Administered Medications Ordered in Other Encounters:     lactated ringers infusion, Continuous PRN    Administrative Statements   Today, Patient Was Seen By: Alaina Powell MD      **Please Note: This note may have been constructed using a voice recognition system.**

## 2025-03-28 VITALS
OXYGEN SATURATION: 96 % | DIASTOLIC BLOOD PRESSURE: 62 MMHG | TEMPERATURE: 98.2 F | HEART RATE: 74 BPM | RESPIRATION RATE: 18 BRPM | HEIGHT: 64 IN | WEIGHT: 171.74 LBS | SYSTOLIC BLOOD PRESSURE: 101 MMHG | BODY MASS INDEX: 29.32 KG/M2

## 2025-03-28 PROBLEM — Z60.9 SOCIAL PROBLEM: Status: RESOLVED | Noted: 2025-03-21 | Resolved: 2025-03-28

## 2025-03-28 PROCEDURE — 99239 HOSP IP/OBS DSCHRG MGMT >30: CPT | Performed by: INTERNAL MEDICINE

## 2025-03-28 RX ORDER — OLANZAPINE 20 MG/1
20 TABLET ORAL
Qty: 30 TABLET | Refills: 0 | Status: SHIPPED | OUTPATIENT
Start: 2025-03-28

## 2025-03-28 RX ORDER — TRIHEXYPHENIDYL HYDROCHLORIDE 2 MG/1
2 TABLET ORAL 2 TIMES DAILY
Qty: 60 TABLET | Refills: 0 | Status: SHIPPED | OUTPATIENT
Start: 2025-03-28

## 2025-03-28 RX ORDER — GABAPENTIN 100 MG/1
200 CAPSULE ORAL 2 TIMES DAILY
Qty: 120 CAPSULE | Refills: 0 | Status: SHIPPED | OUTPATIENT
Start: 2025-03-28

## 2025-03-28 RX ORDER — TRAZODONE HYDROCHLORIDE 150 MG/1
150 TABLET ORAL
Qty: 30 TABLET | Refills: 0 | Status: SHIPPED | OUTPATIENT
Start: 2025-03-28

## 2025-03-28 RX ADMIN — LEVOTHYROXINE SODIUM 100 MCG: 100 TABLET ORAL at 06:13

## 2025-03-28 RX ADMIN — CLONAZEPAM 1 MG: 1 TABLET ORAL at 08:09

## 2025-03-28 RX ADMIN — POLYETHYLENE GLYCOL 3350 17 G: 17 POWDER, FOR SOLUTION ORAL at 11:07

## 2025-03-28 RX ADMIN — FLUTICASONE PROPIONATE 2 SPRAY: 50 SPRAY, METERED NASAL at 08:10

## 2025-03-28 RX ADMIN — GABAPENTIN 200 MG: 100 CAPSULE ORAL at 08:09

## 2025-03-28 RX ADMIN — ASPIRIN 81 MG CHEWABLE TABLET 81 MG: 81 TABLET CHEWABLE at 08:09

## 2025-03-28 RX ADMIN — TRIHEXYPHENIDYL HYDROCHLORIDE 2 MG: 2 TABLET ORAL at 08:10

## 2025-03-28 RX ADMIN — PHENOBARBITAL 64.8 MG: 32.4 TABLET ORAL at 11:07

## 2025-03-28 RX ADMIN — MENTHOL 10%, METHYL SALICYLATE 15%: 10; 15 CREAM TOPICAL at 08:10

## 2025-03-28 NOTE — DISCHARGE SUMMARY
Discharge Summary - Hospitalist   Name: Laine Tse 69 y.o. female I MRN: [id number]  Unit/Bed#: S -01 I Date of Admission: 3/25/2025   Date of Service: 3/28/2025 I Hospital Day: 3     Assessment & Plan  Schizoaffective disorder (HCC)  -Continue olanzapine 20 mg at bedtime  -In the case of acute psychosis, delirium precautions  -First try to redirect the patient in the background of psychosis  -Patient was discharged 3/25/2025 and returned to group home via EMS, who did not accept her into the group home D/T acute psychosis which prompted readmission to the hospital.  -Consulted psychiatry: No Current adjustments to medications and no indications for inpatient psychiatry admission.    Plan:  -Return to group home  -Continue Klonopin 1 mg twice daily upon discharge  Continue gabapentin 200 mg twice daily upon discharge  -Continue melatonin 6 mg at bedtime upon discharge  -Continue Zyprexa 20 mg nightly upon discharge  -Continue trazodone 150 mg nightly upon discharge  -Continue Droxia fentanyl 2 mg twice daily upon discharge    Hyperlipidemia  Continue atorvastatin 20 mg daily upon discharge  Hypothyroidism  Continue levothyroxine 100 mcg upon discharge  The most recent TSH was unremarkable  Seizure disorder (HCC)    Continue phenobarbital 64.8 mg twice daily upon discharge  Social problem (Resolved: 3/28/2025)  Multiple admissions because of disposition issues  Facility is not able to accept because of the active psychosis  Patient does not like facility because of acute psychosis and patient feel that Dr At the facility is trying to change his medication, the staff is abusive to her, the smell is bad over there   consulted for disposition planning     Medical Problems       Resolved Problems  Date Reviewed: 3/13/2025   None       Discharging Physician / Practitioner: Alaina Powell MD  PCP: Adilson Yip MD  Admission Date:   Admission Orders (From admission, onward)       Ordered         03/25/25 2200  INPATIENT ADMISSION  Once                          Discharge Date: 03/28/25    Consultations During Hospital Stay:  Psychiatry    Procedures Performed:   None    Significant Findings / Test Results:   None    Incidental Findings:   None     Test Results Pending at Discharge (will require follow up):   None     Outpatient Tests Requested:  None    Complications:  None    Reason for Admission: Not accepted by group home due to acute psychosis.     Hospital Course:   Laine Tse is a 69 y.o. female patient who originally presented to the hospital on 3/25/2025 due to cute psychosis after being rejected from her group home for reacceptance after recent discharge.  Overall, patient had no medical complaints this admission.  The concern was for acute psychosis therefore psychiatry was consulted and patient was deemed not a candidate for inpatient psychiatric unit.  They also had no recommendations for medication adjustments in this patient.  Patient was redirectable when slightly agitated with music and occasionally receiving virtual 1:1.  Director of the group home that she has from came in the day prior to discharge to visit and deemed that patient was back to her baseline. Patient did not require intravenous medications for agitation this admission.  Patient was recommended to continue using LSO brace as needed for comfort given evidence of previous fracture noted on a previous admission as per the exact recommendation of the neurosurgeon at that time.  Patient remained medically stable for discharge, after discussions with , group home members a plan was made for patient to return to the group home, transportation was arranged and patient was discharged.         Please see above list of diagnoses and related plan for additional information.     Condition at Discharge: stable    Discharge Day Visit / Exam:   Subjective: Patient stated she did not wish to speak to me the morning of  discharge and was slightly agitated but redirectable, but not cooperative with physical examination.  When asked if she had complaints she said no.  She was observed ambulating with a walker in no respiratory distress, nontoxic-appearing with the nursing staff.  Vitals: Blood Pressure: 129/82 (03/27/25 0729)  Pulse: 68 (03/27/25 0729)  Temperature: 98.3 °F (36.8 °C) (03/27/25 0729)  Temp Source: Oral (03/27/25 0729)  Respirations: 17 (03/27/25 0729)  SpO2: 98 % (03/27/25 0729)  Physical Exam  Vitals (Patient was not cooperative with physical exam therefore limited.  Patient was ambulating with walker without distress.) and nursing note reviewed.   Constitutional:       General: She is not in acute distress.     Appearance: She is not ill-appearing, toxic-appearing or diaphoretic.   HENT:      Head: Normocephalic.      Nose: No rhinorrhea.   Eyes:      General: No scleral icterus.     Conjunctiva/sclera: Conjunctivae normal.   Pulmonary:      Effort: Pulmonary effort is normal. No respiratory distress.   Neurological:      Mental Status: She is alert. Mental status is at baseline.      limited due to patient not cooperating.    Discussion with Family: Updated  (sister) via phone.    Discharge instructions/Information to patient and family:   See after visit summary for information provided to patient and family.      Provisions for Follow-Up Care:  See after visit summary for information related to follow-up care and any pertinent home health orders.      Mobility at time of Discharge:   Basic Mobility Inpatient Raw Score: 20  JH-HLM Goal: 6: Walk 10 steps or more  JH-HLM Achieved: 6: Walk 10 steps or more  HLM Goal achieved. Continue to encourage appropriate mobility.     Disposition:   Other: Return to group home with home health care    Planned Readmission: No    Discharge Medications:  See after visit summary for reconciled discharge medications provided to patient and/or family.      Administrative  Statements   Discharge Statement:  I have spent a total time of 25 minutes in caring for this patient on the day of the visit/encounter.    **Please Note: This note may have been constructed using a voice recognition system**

## 2025-03-28 NOTE — DISCHARGE INSTR - AVS FIRST PAGE
Dear Laine Tse,     It was our pleasure to care for you here at [facility].  It is our hope that we were always able to exceed the expected standards for your care during your stay.  You were hospitalized due to psychosis.  You were cared for on the third floor by Alaina Powell MD under the service of Marquis Osborne MD with the [facility] who covers for your primary care physician (PCP), Adilson Yip MD, while you were hospitalized.  If you have any questions or concerns related to this hospitalization, you may contact us at .  For follow up as well as any medication refills, we recommend that you follow up with your primary care physician.  A registered nurse will reach out to you by phone within a few days after your discharge to answer any additional questions that you may have after going home.  However, at this time we provide for you here, the most important instructions / recommendations at discharge:     Notable Medication Adjustments -   No adjustments have been made to your medication during his hospital admission, please resume all medications as you have been taking them prior to being admitted to the hospital.  Testing Required after Discharge -   None  Important follow up information -   Follow-up with your primary care physician within 1 week.  Other Instructions -   Please wear the LSO brace as needed for comfort as this was the exact recommendation by the neurosurgeon on previous admission to the hospital.  In the event of any falls or severely worsening back pain please seek care in the emergency department.  Please review this entire after visit summary as additional general instructions including medication list, appointments, activity, diet, any pertinent wound care, and other additional recommendations from your care team that may be provided for you.      Sincerely,     Alaina Powell MD

## 2025-03-28 NOTE — PLAN OF CARE
Problem: Potential for Falls  Goal: Patient will remain free of falls  Description: INTERVENTIONS:- Educate patient/family on patient safety including physical limitations- Instruct patient to call for assistance with activity - Consult OT/PT to assist with strengthening/mobility - Keep Call bell within reach- Keep bed low and locked with side rails adjusted as appropriate- Keep care items and personal belongings within reach- Initiate and maintain comfort rounds- Make Fall Risk Sign visible to staff- Offer Toileting every 2 Hours, in advance of need- Initiate/Maintain bed alarm-  Apply yellow socks and bracelet for high fall risk patients- Consider moving patient to room near nurses station  INTERVENTIONS:- Educate patient/family on patient safety including physical limitations- Instruct patient to call for assistance with activity - Consult OT/PT to assist with strengthening/mobility - Keep Call bell within reach- Keep bed low and locked with side rails adjusted as appropriate- Keep care items and personal belongings within reach- Initiate and maintain comfort rounds- Make Fall Risk Sign visible to staff- Offer Toileting every 2 Hours, in advance of need- Initiate/Maintain bed alarm- Apply yellow socks and bracelet for high fall risk patients- Consider moving patient to room near nurses station  Outcome: Progressing     Problem: PAIN - ADULT  Goal: Verbalizes/displays adequate comfort level or baseline comfort level  Description: Interventions:- Encourage patient to monitor pain and request assistance- Assess pain using appropriate pain scale- Administer analgesics based on type and severity of pain and evaluate response- Implement non-pharmacological measures as appropriate and evaluate response- Consider cultural and social influences on pain and pain management- Notify physician/advanced practitioner if interventions unsuccessful or patient reports new pain  Outcome: Progressing     Problem: INFECTION -  ADULT  Goal: Absence or prevention of progression during hospitalization  Description: INTERVENTIONS:- Assess and monitor for signs and symptoms of infection- Monitor lab/diagnostic results- Monitor all insertion sites, i.e. indwelling lines, tubes, and drains- Monitor endotracheal if appropriate and nasal secretions for changes in amount and color- [place] appropriate cooling/warming therapies per order- Administer medications as ordered- Instruct and encourage patient and family to use good hand hygiene technique- Identify and instruct in appropriate isolation precautions for identified infection/condition  Outcome: Progressing  Goal: Absence of fever/infection during neutropenic period  Description: INTERVENTIONS:- Monitor WBC  Outcome: Progressing     Problem: SAFETY ADULT  Goal: Patient will remain free of falls  Description: INTERVENTIONS:- Educate patient/family on patient safety including physical limitations- Instruct patient to call for assistance with activity - Consult OT/PT to assist with strengthening/mobility - Keep Call bell within reach- Keep bed low and locked with side rails adjusted as appropriate- Keep care items and personal belongings within reach- Initiate and maintain comfort rounds- Make Fall Risk Sign visible to staff- Offer Toileting every 2 Hours, in advance of need- Initiate/Maintain bed alarm-  Apply yellow socks and bracelet for high fall risk patients- Consider moving patient to room near nurses station  INTERVENTIONS:- Educate patient/family on patient safety including physical limitations- Instruct patient to call for assistance with activity - Consult OT/PT to assist with strengthening/mobility - Keep Call bell within reach- Keep bed low and locked with side rails adjusted as appropriate- Keep care items and personal belongings within reach- Initiate and maintain comfort rounds- Make Fall Risk Sign visible to staff- Offer Toileting every 2 Hours, in advance of need-  Initiate/Maintain bed bed alarm- Apply yellow socks and bracelet for high fall risk patients- Consider moving patient to room near nurses station  Outcome: Progressing  Goal: Maintain or return to baseline ADL function  Description: INTERVENTIONS:-  Assess patient's ability to carry out ADLs; assess patient's baseline for ADL function and identify physical deficits which impact ability to perform ADLs (bathing, care of mouth/teeth, toileting, grooming, dressing, etc.)- Assess/evaluate cause of self-care deficits - Assess range of motion- Assess patient's mobility; develop plan if impaired- Assess patient's need for assistive devices and provide as appropriate- Encourage maximum independence but intervene and supervise when necessary- Involve family in performance of ADLs- Assess for home care needs following discharge - Consider OT consult to assist with ADL evaluation and planning for discharge- Provide patient education as appropriate  Outcome: Progressing  Goal: Maintains/Returns to pre admission functional level  Description: INTERVENTIONS:- Perform AM-PAC 6 Click Basic Mobility/ Daily Activity assessment daily.- Set and communicate daily mobility goal to care team and patient/family/caregiver. - Collaborate with rehabilitation services on mobility goals if consulted- Perform Range of Motion 3 times a day.- Reposition patient every 2 hours.- Dangle patient 3 times a day- Stand patient 3 times a day- Ambulate patient 3 times a day- Out of bed to chair 3 times a day - Out of bed for meals 3 times a day- Out of bed for toileting- Record patient progress and toleration of activity level   Outcome: Progressing     Problem: DISCHARGE PLANNING  Goal: Discharge to home or other facility with appropriate resources  Description: INTERVENTIONS:- Identify barriers to discharge w/patient and caregiver- Arrange for needed discharge resources and transportation as appropriate- Identify discharge learning needs (meds, wound  care, etc.)- Arrange for interpretive services to assist at discharge as needed- Refer to Case Management Department for coordinating discharge planning if the patient needs post-hospital services based on physician/advanced practitioner order or complex needs related to functional status, cognitive ability, or social support system  Outcome: Progressing     Problem: Knowledge Deficit  Goal: Patient/family/caregiver demonstrates understanding of disease process, treatment plan, medications, and discharge instructions  Description: Complete learning assessment and assess knowledge base.Interventions:- Provide teaching at level of understanding- Provide teaching via preferred learning methods  Outcome: Progressing     Problem: NEUROSENSORY - ADULT  Goal: Achieves stable or improved neurological status  Description: INTERVENTIONS- Monitor and report changes in neurological status- Monitor vital signs such as temperature, blood pressure, glucose, and any other labs ordered - Initiate measures to prevent increased intracranial pressure- Monitor for seizure activity and implement precautions if appropriate    Outcome: Progressing  Goal: Remains free of injury related to seizures activity  Description: INTERVENTIONS- Maintain airway, patient safety  and administer oxygen as ordered- Monitor patient for seizure activity, document and report duration and description of seizure to physician/advanced practitioner- If seizure occurs,  ensure patient safety during seizure- Reorient patient post seizure- Seizure pads on all 4 side rails- Instruct patient/family to notify RN of any seizure activity including if an aura is experienced- Instruct patient/family to call for assistance with activity based on nursing assessment- Administer anti-seizure medications if ordered  Outcome: Progressing  Goal: Achieves maximal functionality and self care  Description: INTERVENTIONS- Monitor swallowing and airway patency with patient fatigue  and changes in neurological status- Encourage and assist patient to increase activity and self care. - Encourage visually impaired, hearing impaired and aphasic patients to use assistive/communication devices  Outcome: Progressing     Problem: GENITOURINARY - ADULT  Goal: Maintains or returns to baseline urinary function  Description: INTERVENTIONS:- Assess urinary function- Encourage oral fluids to ensure adequate hydration if ordered- Administer IV fluids as ordered to ensure adequate hydration- Administer ordered medications as needed- Offer frequent toileting- Follow urinary retention protocol if ordered  Outcome: Progressing  Goal: Absence of urinary retention  Description: INTERVENTIONS:- Assess patient’s ability to void and empty bladder- Monitor I/O- Bladder scan as needed- Discuss with physician/AP medications to alleviate retention as needed- Discuss catheterization for long term situations as appropriate  Outcome: Progressing     Problem: MUSCULOSKELETAL - ADULT  Goal: Maintain or return mobility to safest level of function  Description: INTERVENTIONS:- Assess patient's ability to carry out ADLs; assess patient's baseline for ADL function and identify physical deficits which impact ability to perform ADLs (bathing, care of mouth/teeth, toileting, grooming, dressing, etc.)- Assess/evaluate cause of self-care deficits - Assess range of motion- Assess patient's mobility- Assess patient's need for assistive devices and provide as appropriate- Encourage maximum independence but intervene and supervise when necessary- Involve family in performance of ADLs- Assess for home care needs following discharge - Consider OT consult to assist with ADL evaluation and planning for discharge- Provide patient education as appropriate  Outcome: Progressing  Goal: Maintain proper alignment of affected body part  Description: INTERVENTIONS:- Support, maintain and protect limb and body alignment- Provide patient/ family with  appropriate education  Outcome: Progressing

## 2025-03-28 NOTE — QUICK NOTE
I got a message from the nurse that patient is agitated/restlessness.  One-on-one continued observation (virtual) was ordered.

## 2025-03-28 NOTE — CASE MANAGEMENT
Case Management Discharge Planning Note    Patient name Laine Tse  Location S /S -01 MRN [id number]  : 1955 Date 3/28/2025       Current Admission Date: 3/25/2025  Current Admission Diagnosis:Schizoaffective disorder (HCC)   Patient Active Problem List    Diagnosis Date Noted Date Diagnosed    Social problem 2025     T12 compression fracture (HCC) 2025     Severe protein-calorie malnutrition (HCC) 2025     Skin lesion of left lower extremity 2024     Chronic cough 2023     T wave inversion on electrocardiogram 2023     Rash 2023     Dermatitis 2023     Chronic midline low back pain without sciatica 2021     Class 1 obesity with body mass index (BMI) of 32.0 to 32.9 in adult 2021     Decreased hearing of both ears 2019     Benign essential hypertension 2019     Pre-diabetes 2019     Hyperglycemia 2018     Fall 2018     Injury of right toe, initial encounter 2018     Unsteady gait 10/03/2017     Seizure disorder (HCC) 05/15/2017     Anemia 05/15/2017     Hyponatremia 2015     Folic acid deficiency 10/15/2012     Hyperlipidemia 10/05/2012     Hypothyroidism 10/05/2012     Schizoaffective disorder (HCC) 10/05/2012     Osteoporosis 10/05/2012       LOS (days): 3  Geometric Mean LOS (GMLOS) (days): 6.5  Days to GMLOS:4     OBJECTIVE:  Risk of Unplanned Readmission Score: 27.84         Current admission status: Inpatient   Preferred Pharmacy:   [facility] - [place], [place] - [address] [place] [id number]  Phone: [phone] Fax: [phone]    Primary Care Provider: Adilson Yip MD    Primary Insurance: MEDICARE  Secondary Insurance:     DISCHARGE DETAILS:        Requested Home Health Care         Is the patient interested in [facility] at discharge?: Yes  Home Health Discipline requested:: Nursing, Occupational Therapy, Physical Therapy  [facility] Health Agency  [facility] External Referral Reason (only applicable if external HHA name selected): Patient has established relationship with provider  Home Health Follow-Up Provider:: PCP  Home Health Services Needed:: Evaluate Functional Status and Safety, Gait/ADL Training, Strengthening/Theraputic Exercises to Improve Function  Homebound Criteria Met:: Uses an Assist Device (i.e. cane, walker, etc)  Supporting Clincal Findings:: Limited Endurance, Fatigues Easliy in Short Distances         Other Referral/Resources/Interventions Provided:  Referral Comments: [facility] resered in [place].      Phone conference held with Clayton Jones- [facility] , [facility] [place], and this CM to discuss Pts return to her group home.     All parties are in agreement with Pts return to her group home today. [facility] is requesting transportation for the Pt.  Pt is leaving via BLS w/ [facility] at 2:45pm.     Clayton is requesting the following : Pts medications be faxed to [facility], [facility]olly/ [facility] (reserved in [place]), and a copy of the Pts discharge instructions to be faxed to [phone].       RN and SLIM provider updated.

## 2025-03-28 NOTE — ASSESSMENT & PLAN NOTE
-Continue olanzapine 20 mg at bedtime  -In the case of acute psychosis, delirium precautions  -First try to redirect the patient in the background of psychosis  -Patient was discharged 3/25/2025 and returned to group home via EMS, who did not accept her into the group home D/T acute psychosis which prompted readmission to the hospital.  -Consulted psychiatry: No Current adjustments to medications and no indications for inpatient psychiatry admission.    Plan:  -Return to group home  -Continue Klonopin 1 mg twice daily upon discharge  Continue gabapentin 200 mg twice daily upon discharge  -Continue melatonin 6 mg at bedtime upon discharge  -Continue Zyprexa 20 mg nightly upon discharge  -Continue trazodone 150 mg nightly upon discharge  -Continue Droxia fentanyl 2 mg twice daily upon discharge

## 2025-03-28 NOTE — PROGRESS NOTES
Patient:  JYOTI CALVO    MRN:  [id number]    [place] Request ID:  [id number]    Level of care reserved:  Home Health Agency    Partner Reserved:  [facility] [place] [address] [phone]    Clinical needs requested:    Geography searched:  [phone]    Start of Service:    Request sent:  9:32am EDT on 3/28/2025 by Christie Carver    Partner reserved:  9:40am EDT on 3/28/2025 by Christie Carver    Choice list shared:  9:39am EDT on 3/28/2025 by Christie Carver

## 2025-03-31 ENCOUNTER — TELEPHONE (OUTPATIENT)
Age: 70
End: 2025-03-31

## 2025-03-31 ENCOUNTER — HOSPITAL ENCOUNTER (OUTPATIENT)
Dept: RADIOLOGY | Age: 70
Discharge: HOME | End: 2025-03-31
Payer: MEDICARE

## 2025-03-31 VITALS — BODY MASS INDEX: 29.95 KG/M2 | HEIGHT: 63 IN | WEIGHT: 169 LBS

## 2025-03-31 DIAGNOSIS — M81.0 AGE-RELATED OSTEOPOROSIS WITHOUT CURRENT PATHOLOGICAL FRACTURE: ICD-10-CM

## 2025-03-31 PROCEDURE — 77080 DXA BONE DENSITY AXIAL: CPT

## 2025-03-31 NOTE — TELEPHONE ENCOUNTER
Patients care taker is requesting Dr Yip only in the [place] office. Please advise to schedule TCM, office unable to assist.

## 2025-04-01 DIAGNOSIS — M25.561 ACUTE PAIN OF RIGHT KNEE: ICD-10-CM

## 2025-04-02 ENCOUNTER — RA CDI HCC (OUTPATIENT)
Dept: OTHER | Facility: HOSPITAL | Age: 70
End: 2025-04-02

## 2025-04-02 RX ORDER — PSEUDOEPHED/ACETAMINOPH/DIPHEN 30MG-500MG
TABLET ORAL
Qty: 180 TABLET | Refills: 1 | Status: SHIPPED | OUTPATIENT
Start: 2025-04-02

## 2025-04-10 ENCOUNTER — APPOINTMENT (EMERGENCY)
Dept: RADIOLOGY | Facility: HOSPITAL | Age: 70
End: 2025-04-10
Payer: MEDICARE

## 2025-04-10 ENCOUNTER — HOSPITAL ENCOUNTER (EMERGENCY)
Facility: HOSPITAL | Age: 70
Discharge: HOME | End: 2025-04-10
Attending: EMERGENCY MEDICINE
Payer: MEDICARE

## 2025-04-10 ENCOUNTER — APPOINTMENT (EMERGENCY)
Dept: CT IMAGING | Facility: HOSPITAL | Age: 70
End: 2025-04-10
Payer: MEDICARE

## 2025-04-10 VITALS
HEART RATE: 70 BPM | RESPIRATION RATE: 18 BRPM | SYSTOLIC BLOOD PRESSURE: 111 MMHG | TEMPERATURE: 98.4 F | DIASTOLIC BLOOD PRESSURE: 56 MMHG | OXYGEN SATURATION: 96 %
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
## 2025-04-10 DIAGNOSIS — M54.50 LOW BACK PAIN: ICD-10-CM

## 2025-04-10 PROCEDURE — 72220 X-RAY EXAM SACRUM TAILBONE: CPT

## 2025-04-10 PROCEDURE — 72100 X-RAY EXAM L-S SPINE 2/3 VWS: CPT

## 2025-04-10 PROCEDURE — 99284 EMERGENCY DEPT VISIT MOD MDM: CPT | Performed by: PHYSICIAN ASSISTANT

## 2025-04-10 PROCEDURE — 72131 CT LUMBAR SPINE W/O DYE: CPT

## 2025-04-10 PROCEDURE — 99284 EMERGENCY DEPT VISIT MOD MDM: CPT

## 2025-04-10 RX ORDER — ACETAMINOPHEN 325 MG/1
975 TABLET ORAL ONCE
Status: COMPLETED | OUTPATIENT
Start: 2025-04-10 | End: 2025-04-10

## 2025-04-10 RX ADMIN — ACETAMINOPHEN 975 MG: 325 TABLET, FILM COATED ORAL at 13:12

## 2025-04-10 NOTE — ED NOTES
Patient ambulated with assistance from staff and walker to restroom approx 50 feet. Patient denies weakness or dizziness upon exertion. Patient visibly unstable while standing and ambulating.       Ty Harman  04/10/25 [id number]

## 2025-04-10 NOTE — ED PROVIDER NOTES
Time reflects when diagnosis was documented in both MDM as applicable and the Disposition within this note       Time User Action Codes Description Comment    4/10/2025  2:50 PM Gilda Pedraza Add [W19.XXXA] Fall     4/10/2025  2:50 PM Gilda Pedraza Add [M54.50] Low back pain           ED Disposition       ED Disposition   Discharge    Condition   Stable    Date/Time   Thu Apr 10, 2025  2:50 PM    Comment   Laine Tse discharge to home/self care.                   Assessment & Plan       Medical Decision Making  Differential diagnosis includes but not limited to: Mechanical fall, contusion, fracture, lumbar sprain, ambulatory difficulty, compression fracture    Problems Addressed:  Fall: acute illness or injury  Low back pain: chronic illness or injury    Amount and/or Complexity of Data Reviewed  Radiology: ordered. Decision-making details documented in ED Course.    Risk  OTC drugs.        ED Course as of 04/10/25 1805   Thu Apr 10, 2025   1300 X-rays with possible compression fracture on plain films.  Will get CT.       Medications   acetaminophen (TYLENOL) tablet 975 mg (975 mg Oral Given 4/10/25 1312)       ED Risk Strat Scores                    No data recorded                            History of Present Illness       Chief Complaint   Patient presents with    Fall     Witnessed fall onto [place], complaining of tail bone pain         Past Medical History:   Diagnosis Date    Anxiety     Benign essential hypertension     resolved; 06/15/16    Depression     Depression with anxiety     Fracture of fifth metatarsal bone of right foot with delayed healing     last assessed 04/12/16; fracture of metatarsal bone, right, closed, initial encounter    Hyperlipidemia     last assessed 11/28/17    Hypothyroidism     last assessed 11/28/2017    Insomnia     Obesity     Schizoaffective disorder (HCC)     last assessed 05/18/2017    Seizure disorder (HCC)     last assessed 03/28/17    Seizures (HCC)       Past  Surgical History:   Procedure Laterality Date    COLONOSCOPY      complete    RI COLONOSCOPY FLX DX W/COLLJ SPEC WHEN PFRMD N/A 2018    Procedure: COLONOSCOPY with polypectomies;  Surgeon: Andriy Lopez MD;  Location: [facility];  Service: Gastroenterology      Family History   Problem Relation Age of Onset    No Known Problems Mother     No Known Problems Father     Lung disease Other         mesothelioma    No Known Problems Maternal Grandmother     No Known Problems Maternal Grandfather     No Known Problems Paternal Grandmother     No Known Problems Paternal Grandfather     No Known Problems Sister     No Known Problems Sister     Kidney failure Brother     No Known Problems Maternal Aunt     No Known Problems Maternal Aunt     No Known Problems Maternal Aunt     No Known Problems Maternal Aunt     No Known Problems Paternal Aunt     No Known Problems Paternal Aunt       Social History     Tobacco Use    Smoking status: Former     Current packs/day: 0.00     Average packs/day: 0.5 packs/day for 21.0 years (10.5 ttl pk-yrs)     Types: Cigarettes     Start date:      Quit date:      Years since quittin.2    Smokeless tobacco: Never   Vaping Use    Vaping status: Never Used   Substance Use Topics    Alcohol use: Not Currently    Drug use: No      E-Cigarette/Vaping    E-Cigarette Use Never User       E-Cigarette/Vaping Substances    Nicotine No     THC No     CBD No     Flavoring No     Other No     Unknown No       I have reviewed and agree with the history as documented.     69 year old female presents to the emergency department after a fall at her residence.  States that she lost her balance earlier and fell on her buttocks.  Presently with complaints of pain in the tailbone and lower back.  Denies head injury.  Has not taken anything for pain prior to arrival.      History provided by:  Patient and EMS personnel   used: No    Fall  Mechanism of injury: fall    Injury  location:  Torso  Incident location:  Home  Arrived directly from scene: yes    Fall:     Fall occurred:  Standing    Point of impact:  Buttocks  Suspicion of alcohol use: no    Suspicion of drug use: no    Associated symptoms: back pain    Associated symptoms: no abdominal pain, no chest pain and no neck pain        Review of Systems   Constitutional:  Negative for chills and fever.   HENT:  Negative for congestion, dental problem and sore throat.    Respiratory:  Positive for cough.    Cardiovascular:  Negative for chest pain.   Gastrointestinal:  Negative for abdominal pain.   Musculoskeletal:  Positive for back pain. Negative for arthralgias and neck pain.   Skin:  Negative for rash and wound.   All other systems reviewed and are negative.          Objective       ED Triage Vitals [04/10/25 1104]   Temperature Pulse Blood Pressure Respirations SpO2 Patient Position - Orthostatic VS   98.4 °F (36.9 °C) 70 118/67 18 96 % --      Temp Source Heart Rate Source BP Location FiO2 (%) Pain Score    Oral Monitor Right arm -- 10 - Worst Possible Pain      Vitals      Date and Time Temp Pulse SpO2 Resp BP Pain Score FACES Pain Rating User   04/10/25 1230 -- -- -- -- 111/56 -- -- MM   04/10/25 1104 98.4 °F (36.9 °C) 70 96 % 18 118/67 10 - Worst Possible Pain -- LH            Physical Exam  Vitals and nursing note reviewed.   Constitutional:       General: She is not in acute distress.     Appearance: She is not diaphoretic.   HENT:      Head: Normocephalic and atraumatic.      Right Ear: External ear normal.      Left Ear: External ear normal.      Mouth/Throat:      Mouth: Mucous membranes are dry.   Eyes:      Conjunctiva/sclera: Conjunctivae normal.   Neck:      Vascular: No JVD.      Trachea: No tracheal deviation.   Cardiovascular:      Rate and Rhythm: Normal rate and regular rhythm.      Heart sounds: Normal heart sounds. No murmur heard.     No friction rub. No gallop.   Pulmonary:      Effort: Pulmonary effort is  normal. No respiratory distress.      Breath sounds: Normal breath sounds. No wheezing or rales.   Chest:      Chest wall: No tenderness.   Abdominal:      General: Bowel sounds are normal. There is no distension.      Palpations: Abdomen is soft.      Tenderness: There is no abdominal tenderness. There is no guarding.   Musculoskeletal:         General: No deformity. Normal range of motion.      Lumbar back: Tenderness present. No swelling, deformity, signs of trauma or lacerations. Normal range of motion.        Back:       Comments: Tenderness at the base of the tailbone without any bruising or swelling.  No signs of trauma.   Lymphadenopathy:      Cervical: No cervical adenopathy.   Skin:     General: Skin is warm and dry.      Findings: No erythema or rash.   Neurological:      General: No focal deficit present.      Mental Status: She is alert and oriented to person, place, and time.   Psychiatric:         Mood and Affect: Mood normal.         Behavior: Behavior normal.         Results Reviewed       None            CT lumbar spine without contrast   Final Interpretation by Enrique Cartre MD (04/10 8667)      Transitional type anatomy with labeling of the spine as described above. Please note that there is discrepancy since prior examinations that labeling of the spine at the purposes of this dictation labeling will remain in accordance with the prior    radiograph from 3/20/2025.      Stable loss of height of the L1 vertebral body with a fracture lucency again noted along the superior endplate. No new acute fractures identified.      Workstation performed: [id number]         XR lumbar spine 2 or 3 views   Final Interpretation by Enrique Carter MD (04/10 0376)      Transitional type anatomy with labeling of the spine as described above and in accordance with the prior radiographs from 3/20/2025. Stable loss of height of the L1 vertebral body. No new acute fractures identified. No acute fracture identified in the     sacrum.         Computerized Assisted Algorithm (CAA) may have been used to analyze all applicable images.         Workstation performed: [id number]         XR sacrum and coccyx   Final Interpretation by Enrique Carter MD (04/10 1406)      Transitional type anatomy with labeling of the spine as described above and in accordance with the prior radiographs from 3/20/2025. Stable loss of height of the L1 vertebral body. No new acute fractures identified. No acute fracture identified in the    sacrum.         Computerized Assisted Algorithm (CAA) may have been used to analyze all applicable images.         Workstation performed: [id number]             Procedures    ED Medication and Procedure Management   Prior to Admission Medications   Prescriptions Last Dose Informant Patient Reported? Taking?   Acetaminophen Extra Strength 500 MG TABS   No No   Sig: TAKE 2 TABLETS (1000MG) BY MOUTH 3X DAILY AS NEEDED FOR MILD PAIN/FEVER *AHMAD   Incontinence Supplies MISC  Outside Facility (Specify), Care Giver No No   Sig: Use if needed (incontinence) SIZE XL PULL UP DISPOSABLE BRIEFS   Incontinence Supply Disposable (Disposable Brief Large) MISC  Care Giver, Outside Facility (Specify) No No   Sig: Use every 12 (twelve) hours   Menthol, Topical Analgesic, (Bengay Ultra Strength) 5 % PTCH  Care Giver, Outside Facility (Specify) No No   Sig: Apply 1 patch topically in the morning   OLANZapine (ZyPREXA) 20 MG tablet   No No   Sig: Take 1 tablet (20 mg total) by mouth daily at bedtime   PHENobarbital 64.8 mg tablet  Care Giver, Outside Facility (Specify) No No   Sig: Take 1 tablet (64.8 mg total) by mouth every 12 (twelve) hours   alendronate (FOSAMAX) 70 mg tablet  Care Giver, Outside Facility (Specify) Yes No   Sig: Take 70 mg by mouth every 7 days Every 7 days on Fridays   aspirin (Aspirin Low Dose) 81 mg chewable tablet  Care Giver, Outside Facility (Specify) No No   Sig: Chew 1 tablet (81 mg total) daily   atorvastatin (LIPITOR) 20  mg tablet  Care Giver, Outside Facility (Specify) No No   Sig: Take 1 tablet (20 mg total) by mouth daily   calcium carbonate-vitamin D 500 mg-5 mcg per tablet  Care Giver, Outside Facility (Specify) Yes No   Sig: Take 1 tablet by mouth daily with breakfast   clonazePAM (KlonoPIN) 1 mg tablet  Care Giver, Outside Facility (Specify) No No   Sig: Take 1 tablet (1 mg total) by mouth 2 (two) times a day   fluticasone (FLONASE) 50 mcg/act nasal spray  Care Giver, Outside Facility (Specify) No No   Si sprays into each nostril daily   gabapentin (NEURONTIN) 100 mg capsule   No No   Sig: Take 2 capsules (200 mg total) by mouth 2 (two) times a day   guaiFENesin 400 mg   No No   Sig: Take 1 tablet (400 mg total) by mouth every 6 (six) hours as needed for cough   levothyroxine 100 mcg tablet  Care Giver, Outside Facility (Specify) No No   Sig: Take 1 tablet (100 mcg total) by mouth daily   melatonin 3 mg  Care Giver, Outside Facility (Specify) No No   Sig: Take 2 tablets (6 mg total) by mouth daily at bedtime   polyethylene glycol (GLYCOLAX) 17 GM/SCOOP  Outside Facility (Specify), Care Giver No No   Sig: MIX 1 CAPFUL(17GM) IN 8OZ OF LIQUID AND DRINK DAILY @ 8AM(CONSTIPATION) *AHMAD   senna-docusate sodium (SENOKOT-S) 8.6-50 mg per tablet  Care Giver, Outside Facility (Specify) No No   Sig: Take 1 tablet by mouth daily at bedtime   traZODone (DESYREL) 150 mg tablet   No No   Sig: Take 1 tablet (150 mg total) by mouth daily at bedtime   trihexyphenidyl (ARTANE) 2 mg tablet   No No   Sig: Take 1 tablet (2 mg total) by mouth 2 (two) times a day      Facility-Administered Medications: None     Discharge Medication List as of 4/10/2025  2:50 PM        CONTINUE these medications which have NOT CHANGED    Details   Acetaminophen Extra Strength 500 MG TABS TAKE 2 TABLETS (1000MG) BY MOUTH 3X DAILY AS NEEDED FOR MILD PAIN/FEVER *AHMAD, Normal      alendronate (FOSAMAX) 70 mg tablet Take 70 mg by mouth every 7 days Every 7 days on  Fridays, Starting Fri 2/14/2025, Historical Med      aspirin (Aspirin Low Dose) 81 mg chewable tablet Chew 1 tablet (81 mg total) daily, Starting Sun 3/9/2025, Normal      atorvastatin (LIPITOR) 20 mg tablet Take 1 tablet (20 mg total) by mouth daily, Starting Sun 3/9/2025, Normal      calcium carbonate-vitamin D 500 mg-5 mcg per tablet Take 1 tablet by mouth daily with breakfast, Historical Med      clonazePAM (KlonoPIN) 1 mg tablet Take 1 tablet (1 mg total) by mouth 2 (two) times a day, Starting Mon 3/10/2025, Until Wed 4/9/2025, Normal      fluticasone (FLONASE) 50 mcg/act nasal spray 2 sprays into each nostril daily, Starting Sun 3/9/2025, Normal      gabapentin (NEURONTIN) 100 mg capsule Take 2 capsules (200 mg total) by mouth 2 (two) times a day, Starting Fri 3/28/2025, Print      guaiFENesin 400 mg Take 1 tablet (400 mg total) by mouth every 6 (six) hours as needed for cough, Starting Thu 3/13/2025, Normal      Incontinence Supplies MISC Use if needed (incontinence) SIZE XL PULL UP DISPOSABLE BRIEFS, Starting Tue 6/18/2024, Normal      Incontinence Supply Disposable (Disposable Brief Large) MISC Use every 12 (twelve) hours, Starting Thu 11/21/2024, Normal      levothyroxine 100 mcg tablet Take 1 tablet (100 mcg total) by mouth daily, Starting Sun 3/9/2025, Normal      melatonin 3 mg Take 2 tablets (6 mg total) by mouth daily at bedtime, Starting Sun 3/9/2025, Normal      Menthol, Topical Analgesic, (Bengay Ultra Strength) 5 % PTCH Apply 1 patch topically in the morning, Starting Thu 11/21/2024, Normal      OLANZapine (ZyPREXA) 20 MG tablet Take 1 tablet (20 mg total) by mouth daily at bedtime, Starting Fri 3/28/2025, Print      PHENobarbital 64.8 mg tablet Take 1 tablet (64.8 mg total) by mouth every 12 (twelve) hours, Starting Tue 2/11/2025, Normal      polyethylene glycol (GLYCOLAX) 17 GM/SCOOP MIX 1 CAPFUL(17GM) IN 8OZ OF LIQUID AND DRINK DAILY @ 8AM(CONSTIPATION) *AHMAD, Normal      senna-docusate sodium  (SENOKOT-S) 8.6-50 mg per tablet Take 1 tablet by mouth daily at bedtime, Starting Sun 3/9/2025, Normal      traZODone (DESYREL) 150 mg tablet Take 1 tablet (150 mg total) by mouth daily at bedtime, Starting Fri 3/28/2025, Print      trihexyphenidyl (ARTANE) 2 mg tablet Take 1 tablet (2 mg total) by mouth 2 (two) times a day, Starting Fri 3/28/2025, Print           No discharge procedures on file.  ED SEPSIS DOCUMENTATION   Time reflects when diagnosis was documented in both MDM as applicable and the Disposition within this note       Time User Action Codes Description Comment    4/10/2025  2:50 PM Gilda Pedraza [W19.XXXA] Fall     4/10/2025  2:50 PM Gilda Pedraza [M54.50] Low back pain                  Gilda Pedraza PA-C  04/10/25 1803

## 2025-04-14 ENCOUNTER — OFFICE VISIT (OUTPATIENT)
Dept: INTERNAL MEDICINE CLINIC | Age: 70
End: 2025-04-14
Payer: MEDICARE

## 2025-04-14 VITALS
HEART RATE: 72 BPM | TEMPERATURE: 98.2 F | DIASTOLIC BLOOD PRESSURE: 70 MMHG | SYSTOLIC BLOOD PRESSURE: 116 MMHG | OXYGEN SATURATION: 98 % | BODY MASS INDEX: 29.77 KG/M2 | WEIGHT: 168 LBS | HEIGHT: 63 IN

## 2025-04-14 DIAGNOSIS — F25.9 SCHIZOAFFECTIVE DISORDER, UNSPECIFIED TYPE (HCC): Primary | ICD-10-CM

## 2025-04-14 DIAGNOSIS — E43 SEVERE PROTEIN-CALORIE MALNUTRITION (HCC): ICD-10-CM

## 2025-04-14 DIAGNOSIS — Z12.31 ENCOUNTER FOR SCREENING MAMMOGRAM FOR BREAST CANCER: ICD-10-CM

## 2025-04-14 DIAGNOSIS — G89.29 CHRONIC MIDLINE LOW BACK PAIN WITHOUT SCIATICA: ICD-10-CM

## 2025-04-14 DIAGNOSIS — E03.9 HYPOTHYROIDISM, UNSPECIFIED TYPE: ICD-10-CM

## 2025-04-14 DIAGNOSIS — G40.909 SEIZURE DISORDER (HCC): ICD-10-CM

## 2025-04-14 DIAGNOSIS — I10 BENIGN ESSENTIAL HYPERTENSION: ICD-10-CM

## 2025-04-14 DIAGNOSIS — M54.50 CHRONIC MIDLINE LOW BACK PAIN WITHOUT SCIATICA: ICD-10-CM

## 2025-04-14 DIAGNOSIS — S22.080D COMPRESSION FRACTURE OF T12 VERTEBRA WITH ROUTINE HEALING, SUBSEQUENT ENCOUNTER: ICD-10-CM

## 2025-04-14 PROCEDURE — 99214 OFFICE O/P EST MOD 30 MIN: CPT | Performed by: INTERNAL MEDICINE

## 2025-04-14 PROCEDURE — G2211 COMPLEX E/M VISIT ADD ON: HCPCS | Performed by: INTERNAL MEDICINE

## 2025-04-14 RX ORDER — DIVALPROEX SODIUM 500 MG/1
500 TABLET, DELAYED RELEASE ORAL
COMMUNITY

## 2025-04-14 RX ORDER — CHLORHEXIDINE GLUCONATE ORAL RINSE 1.2 MG/ML
15 SOLUTION DENTAL 2 TIMES DAILY
COMMUNITY

## 2025-04-14 RX ORDER — CETIRIZINE HYDROCHLORIDE 5 MG/1
5 TABLET, CHEWABLE ORAL
COMMUNITY

## 2025-04-14 RX ORDER — ECHINACEA PURPUREA EXTRACT 125 MG
1 TABLET ORAL AS NEEDED
COMMUNITY

## 2025-04-14 RX ORDER — MENTHOL 1.4 %
ADHESIVE PATCH, MEDICATED TOPICAL 2 TIMES DAILY
Qty: 57 G | Refills: 1 | Status: SHIPPED | OUTPATIENT
Start: 2025-04-14

## 2025-04-14 RX ORDER — LOXAPINE SUCCINATE 50 MG/1
50 TABLET ORAL DAILY
COMMUNITY

## 2025-04-14 RX ORDER — MUPIROCIN 20 MG/G
OINTMENT TOPICAL 2 TIMES DAILY PRN
COMMUNITY

## 2025-04-14 RX ORDER — DIPHENOXYLATE HYDROCHLORIDE AND ATROPINE SULFATE 2.5; .025 MG/1; MG/1
1 TABLET ORAL DAILY
COMMUNITY

## 2025-04-14 RX ORDER — LORAZEPAM 0.5 MG/1
0.5 TABLET ORAL 2 TIMES DAILY PRN
COMMUNITY

## 2025-04-14 RX ORDER — LIDOCAINE 50 MG/G
1 PATCH TOPICAL DAILY
COMMUNITY

## 2025-04-14 RX ORDER — POLYETHYLENE GLYCOL 3350 17 G/17G
17 POWDER, FOR SOLUTION ORAL DAILY
COMMUNITY

## 2025-04-14 RX ORDER — ASENAPINE 10 MG/1
5 TABLET SUBLINGUAL 2 TIMES DAILY
COMMUNITY

## 2025-04-14 RX ORDER — GLY/DIMETH/PETROLAT,WHT/WATER
CREAM (GRAM) TOPICAL AS NEEDED
COMMUNITY

## 2025-04-14 RX ORDER — THEANINE 100 MG
CAPSULE ORAL 2 TIMES DAILY
COMMUNITY

## 2025-04-14 RX ORDER — TIZANIDINE HYDROCHLORIDE 2 MG/1
2 CAPSULE, GELATIN COATED ORAL DAILY
COMMUNITY

## 2025-04-14 RX ORDER — BUSPIRONE HYDROCHLORIDE 15 MG/1
15 TABLET ORAL 2 TIMES DAILY
COMMUNITY

## 2025-04-14 RX ORDER — DIPHENHYDRAMINE HCL 25 MG
25 CAPSULE ORAL
COMMUNITY

## 2025-04-14 RX ORDER — BISMUTH SUBSALICYLATE 262 MG/1
524 TABLET, CHEWABLE ORAL EVERY 6 HOURS PRN
COMMUNITY

## 2025-04-14 RX ORDER — DOCUSATE SODIUM 100 MG/1
100 CAPSULE, LIQUID FILLED ORAL 2 TIMES DAILY PRN
COMMUNITY

## 2025-04-14 RX ORDER — BENZONATATE 200 MG/1
200 CAPSULE ORAL 2 TIMES DAILY PRN
COMMUNITY

## 2025-04-14 RX ORDER — LIDOCAINE 40 MG/G
CREAM TOPICAL AS NEEDED
COMMUNITY

## 2025-04-14 NOTE — PROGRESS NOTES
Assessment/Plan:    Benign essential hypertension  Blood pressure is stable on present regimen.    Hypothyroidism  Continue with present dose of levothyroxine    Seizure disorder (HCC)  Continue with present regimen and managed by neurologist    T12 compression fracture ([facility])  Stable.  Patient is using Bengay patches/cream    Schizoaffective disorder ([facility])  Managed by psychiatrist    Severe protein-calorie malnutrition ([facility])  Continue with nutritious diet and protein supplement as needed    Chronic midline low back pain without sciatica  Continue with PT.  Continue with Bengay       Diagnoses and all orders for this visit:    Schizoaffective disorder, unspecified type ([facility])    Encounter for screening mammogram for breast cancer  -     Mammo screening bilateral w 3d and cad; Future    Compression fracture of T12 vertebra with routine healing, subsequent encounter    Seizure disorder ([facility])    Hypothyroidism, unspecified type    Benign essential hypertension    Severe protein-calorie malnutrition ([facility])    Chronic midline low back pain without sciatica  -     Menthol-Methyl Salicylate (MURIEL AGUDELO GREASELESS) 10-15 % greaseless cream; Apply topically 2 (two) times a day    Other orders  -     divalproex sodium (DEPAKOTE) 500 mg DR tablet; Take 500 mg by mouth 3 qhs  -     loxapine (LOXITANE) 50 MG capsule; Take 50 mg by mouth in the morning  -     TiZANidine (Zanaflex) 2 MG capsule; Take 2 mg by mouth in the morning Daily prn  -     cetirizine (ZyrTEC) 5 MG chewable tablet; Chew 5 mg daily at bedtime One daily prn for allergies  -     benzonatate (TESSALON) 200 MG capsule; Take 200 mg by mouth 2 (two) times a day as needed for cough  -     sodium chloride (OCEAN) 0.65 % nasal spray; 1 spray into each nostril as needed for congestion As directed up to 6x per day for nasal dryness  -     bismuth subsalicylate (PEPTO BISMOL) 262 MG chewable tablet; Chew 524 mg every 6 (six) hours as needed for indigestion For diarrhea  -      lidocaine (LIDODERM) 5 %; Apply 1 patch topically daily Remove & Discard patch within 12 hours or as directed by MD for back prn  -     Diclofenac Sodium (VOLTAREN) 1 %; Apply 2 g topically 4 (four) times a day To knee  -     lidocaine (LMX) 4 % cream; Apply topically as needed for mild pain  -     LORazepam (ATIVAN) 0.5 mg tablet; Take 0.5 mg by mouth 2 (two) times a day as needed for anxiety  -     diphenhydrAMINE (BENADRYL) 25 mg capsule; Take 25 mg by mouth daily at bedtime as needed for itching  -     Emollient (cetaphil) cream; Apply topically as needed for dry skin  -     docusate sodium (COLACE) 100 mg capsule; Take 100 mg by mouth 2 (two) times a day as needed for constipation  -     Calcium Carbonate-Vitamin D (OSCAL 500/200 D-3 PO); Take by mouth  -     chlorhexidine (PERIDEX) 0.12 % solution; Apply 15 mL to the mouth or throat 2 (two) times a day  -     asenapine (Saphris) 10 mg SL tablet; Place 5 mg under the tongue 2 (two) times a day  -     polyethylene glycol (GLYCOLAX) 17 GM/SCOOP powder; Take 17 g by mouth daily  -     multivitamin (THERAGRAN) TABS; Take 1 tablet by mouth daily  -     mupirocin (BACTROBAN) 2 % ointment; Apply topically 2 (two) times a day as needed Left knee bid prn  -     busPIRone (BUSPAR) 15 mg tablet; Take 15 mg by mouth 2 (two) times a day  -     L-Theanine 100 MG CAPS; Take by mouth 2 (two) times a day          Subjective:          Patient ID: Laine Tse is a 69 y.o. female.    TCM Call (since 4/4/2025)       None          TCM Call (since 4/4/2025)       None            This is a follow-up after emergency room visit patient went to ER with a fall and underwent x-ray of lumbar spine no fracture noted.  Was discharged with walker and PT as outpatient.        The following portions of the patient's history were reviewed and updated as appropriate: allergies, current medications, past family history, past medical history, past social history, past surgical history, and  problem list.    Review of Systems   Constitutional:  Negative for fatigue and fever.   HENT:  Negative for congestion, ear discharge, ear pain, postnasal drip, sinus pressure, sore throat, tinnitus and trouble swallowing.    Eyes:  Negative for discharge, itching and visual disturbance.   Respiratory:  Negative for cough and shortness of breath.    Cardiovascular:  Negative for chest pain and palpitations.   Gastrointestinal:  Negative for abdominal pain, diarrhea, nausea and vomiting.   Endocrine: Negative for cold intolerance and polyuria.   Genitourinary:  Negative for difficulty urinating, dysuria and urgency.   Musculoskeletal:  Positive for arthralgias and back pain. Negative for neck pain.   Skin:  Negative for rash.   Allergic/Immunologic: Negative for environmental allergies.   Neurological:  Negative for dizziness, weakness and headaches.   Psychiatric/Behavioral:  The patient is not nervous/anxious.          Past Medical History:   Diagnosis Date    Anxiety     Benign essential hypertension     resolved; 06/15/16    Depression     Depression with anxiety     Fracture of fifth metatarsal bone of right foot with delayed healing     last assessed 04/12/16; fracture of metatarsal bone, right, closed, initial encounter    Hyperlipidemia     last assessed 11/28/17    Hypothyroidism     last assessed 11/28/2017    Insomnia     Obesity     Schizoaffective disorder (HCC)     last assessed 05/18/2017    Seizure disorder ([facility])     last assessed 03/28/17    Seizures ([facility])          Current Outpatient Medications:     Acetaminophen Extra Strength 500 MG TABS, TAKE 2 TABLETS (1000MG) BY MOUTH 3X DAILY AS NEEDED FOR MILD PAIN/FEVER *AHMAD, Disp: 180 tablet, Rfl: 1    alendronate (FOSAMAX) 70 mg tablet, Take 70 mg by mouth every 7 days Every 7 days on Fridays, Disp: , Rfl:     asenapine (Saphris) 10 mg SL tablet, Place 5 mg under the tongue 2 (two) times a day, Disp: , Rfl:     aspirin (Aspirin Low Dose) 81 mg chewable  tablet, Chew 1 tablet (81 mg total) daily, Disp: 30 tablet, Rfl: 0    atorvastatin (LIPITOR) 20 mg tablet, Take 1 tablet (20 mg total) by mouth daily, Disp: 30 tablet, Rfl: 0    benzonatate (TESSALON) 200 MG capsule, Take 200 mg by mouth 2 (two) times a day as needed for cough, Disp: , Rfl:     bismuth subsalicylate (PEPTO BISMOL) 262 MG chewable tablet, Chew 524 mg every 6 (six) hours as needed for indigestion For diarrhea, Disp: , Rfl:     busPIRone (BUSPAR) 15 mg tablet, Take 15 mg by mouth 2 (two) times a day, Disp: , Rfl:     Calcium Carbonate-Vitamin D (OSCAL 500/200 D-3 PO), Take by mouth, Disp: , Rfl:     calcium carbonate-vitamin D 500 mg-5 mcg per tablet, Take 1 tablet by mouth daily with breakfast, Disp: , Rfl:     cetirizine (ZyrTEC) 5 MG chewable tablet, Chew 5 mg daily at bedtime One daily prn for allergies, Disp: , Rfl:     chlorhexidine (PERIDEX) 0.12 % solution, Apply 15 mL to the mouth or throat 2 (two) times a day, Disp: , Rfl:     Diclofenac Sodium (VOLTAREN) 1 %, Apply 2 g topically 4 (four) times a day To knee, Disp: , Rfl:     diphenhydrAMINE (BENADRYL) 25 mg capsule, Take 25 mg by mouth daily at bedtime as needed for itching, Disp: , Rfl:     divalproex sodium (DEPAKOTE) 500 mg DR tablet, Take 500 mg by mouth 3 qhs, Disp: , Rfl:     docusate sodium (COLACE) 100 mg capsule, Take 100 mg by mouth 2 (two) times a day as needed for constipation, Disp: , Rfl:     Emollient (cetaphil) cream, Apply topically as needed for dry skin, Disp: , Rfl:     fluticasone (FLONASE) 50 mcg/act nasal spray, 2 sprays into each nostril daily, Disp: 9.9 mL, Rfl: 0    gabapentin (NEURONTIN) 100 mg capsule, Take 2 capsules (200 mg total) by mouth 2 (two) times a day, Disp: 120 capsule, Rfl: 0    guaiFENesin 400 mg, Take 1 tablet (400 mg total) by mouth every 6 (six) hours as needed for cough, Disp: 45 tablet, Rfl: 0    Incontinence Supplies MISC, Use if needed (incontinence) SIZE XL PULL UP DISPOSABLE BRIEFS, Disp: 100  each, Rfl: 1    Incontinence Supply Disposable (Disposable Brief Large) MISC, Use every 12 (twelve) hours, Disp: 36 each, Rfl: 6    L-Theanine 100 MG CAPS, Take by mouth 2 (two) times a day, Disp: , Rfl:     levothyroxine 100 mcg tablet, Take 1 tablet (100 mcg total) by mouth daily, Disp: 30 tablet, Rfl: 0    lidocaine (LIDODERM) 5 %, Apply 1 patch topically daily Remove & Discard patch within 12 hours or as directed by MD for back prn, Disp: , Rfl:     lidocaine (LMX) 4 % cream, Apply topically as needed for mild pain, Disp: , Rfl:     LORazepam (ATIVAN) 0.5 mg tablet, Take 0.5 mg by mouth 2 (two) times a day as needed for anxiety, Disp: , Rfl:     loxapine (LOXITANE) 50 MG capsule, Take 50 mg by mouth in the morning, Disp: , Rfl:     melatonin 3 mg, Take 2 tablets (6 mg total) by mouth daily at bedtime, Disp: 60 tablet, Rfl: 0    Menthol, Topical Analgesic, (Bengay Ultra Strength) 5 % PTCH, Apply 1 patch topically in the morning, Disp: 30 patch, Rfl: 2    Menthol-Methyl Salicylate (MURIEL AGUDELO GREASELESS) 10-15 % greaseless cream, Apply topically 2 (two) times a day, Disp: 57 g, Rfl: 1    multivitamin (THERAGRAN) TABS, Take 1 tablet by mouth daily, Disp: , Rfl:     mupirocin (BACTROBAN) 2 % ointment, Apply topically 2 (two) times a day as needed Left knee bid prn, Disp: , Rfl:     OLANZapine (ZyPREXA) 20 MG tablet, Take 1 tablet (20 mg total) by mouth daily at bedtime, Disp: 30 tablet, Rfl: 0    PHENobarbital 64.8 mg tablet, Take 1 tablet (64.8 mg total) by mouth every 12 (twelve) hours, Disp: 62 tablet, Rfl: 5    polyethylene glycol (GLYCOLAX) 17 GM/SCOOP powder, Take 17 g by mouth daily, Disp: , Rfl:     polyethylene glycol (GLYCOLAX) 17 GM/SCOOP, MIX 1 CAPFUL(17GM) IN 8OZ OF LIQUID AND DRINK DAILY @ 8AM(CONSTIPATION) *AHMAD, Disp: 510 g, Rfl: 1    senna-docusate sodium (SENOKOT-S) 8.6-50 mg per tablet, Take 1 tablet by mouth daily at bedtime, Disp: 30 tablet, Rfl: 0    sodium chloride (OCEAN) 0.65 % nasal spray, 1  "spray into each nostril as needed for congestion As directed up to 6x per day for nasal dryness, Disp: , Rfl:     TiZANidine (Zanaflex) 2 MG capsule, Take 2 mg by mouth in the morning Daily prn, Disp: , Rfl:     traZODone (DESYREL) 150 mg tablet, Take 1 tablet (150 mg total) by mouth daily at bedtime, Disp: 30 tablet, Rfl: 0    trihexyphenidyl (ARTANE) 2 mg tablet, Take 1 tablet (2 mg total) by mouth 2 (two) times a day, Disp: 60 tablet, Rfl: 0    clonazePAM (KlonoPIN) 1 mg tablet, Take 1 tablet (1 mg total) by mouth 2 (two) times a day (Patient not taking: Reported on 4/14/2025), Disp: 60 tablet, Rfl: 0  No current facility-administered medications for this visit.    Facility-Administered Medications Ordered in Other Visits:     lactated ringers infusion, , Intravenous, Continuous PRN, Celi Benson, CRNA, New Bag at 02/08/23 1400    Allergies   Allergen Reactions    Clozapine Other (See Comments)     low white blood count  Other reaction(s): Other (See Comments)  low white blood count    Haloperidol Other (See Comments)     EPS  Other reaction(s): Other (See Comments)  EPS    Lithium Other (See Comments)     Toxicity    Prolixin [Fluphenazine] Hyperactivity and Other (See Comments)     EPS, Hyperactivity  EPS, Hyperactivity    Valproic Acid Confusion and Other (See Comments)     \"Mental confusion\"  \"Mental confusion\"       Social History   Past Surgical History:   Procedure Laterality Date    COLONOSCOPY      complete    NV COLONOSCOPY FLX DX W/COLLJ SPEC WHEN PFRMD N/A 8/30/2018    Procedure: COLONOSCOPY with polypectomies;  Surgeon: Andriy Lopez MD;  Location: [facility];  Service: Gastroenterology     Family History   Problem Relation Age of Onset    No Known Problems Mother     No Known Problems Father     Lung disease Other         mesothelioma    No Known Problems Maternal Grandmother     No Known Problems Maternal Grandfather     No Known Problems Paternal Grandmother     No Known Problems Paternal " "Grandfather     No Known Problems Sister     No Known Problems Sister     Kidney failure Brother     No Known Problems Maternal Aunt     No Known Problems Maternal Aunt     No Known Problems Maternal Aunt     No Known Problems Maternal Aunt     No Known Problems Paternal Aunt     No Known Problems Paternal Aunt        Objective:  /70 (BP Location: Left arm, Patient Position: Sitting, Cuff Size: Standard)   Pulse 72   Temp 98.2 °F (36.8 °C) (Temporal)   Ht 5' 3\" (1.6 m)   Wt 76.2 kg (168 lb)   SpO2 98%   BMI 29.76 kg/m²   Body mass index is 29.76 kg/m².     Physical Exam  Constitutional:       General: She is not in acute distress.     Appearance: She is well-developed.   HENT:      Head: Normocephalic.      Right Ear: External ear normal. There is no impacted cerumen.      Left Ear: External ear normal. There is no impacted cerumen.      Nose: No congestion or rhinorrhea.      Mouth/Throat:      Pharynx: No oropharyngeal exudate or posterior oropharyngeal erythema.   Eyes:      General: No scleral icterus.     Pupils: Pupils are equal, round, and reactive to light.   Neck:      Thyroid: No thyromegaly.      Trachea: No tracheal deviation.   Cardiovascular:      Rate and Rhythm: Normal rate and regular rhythm.      Heart sounds: Normal heart sounds. No murmur heard.  Pulmonary:      Effort: Pulmonary effort is normal. No respiratory distress.      Breath sounds: Normal breath sounds.   Chest:      Chest wall: No tenderness.   Abdominal:      General: Bowel sounds are normal.      Palpations: Abdomen is soft. There is no mass.      Tenderness: There is no abdominal tenderness.   Musculoskeletal:         General: Normal range of motion.      Cervical back: Normal range of motion and neck supple. No rigidity.      Right lower leg: No edema.      Left lower leg: No edema.   Lymphadenopathy:      Cervical: No cervical adenopathy.   Skin:     General: Skin is warm.      Findings: No erythema or rash. "   Neurological:      Mental Status: She is alert and oriented to person, place, and time.      Cranial Nerves: No cranial nerve deficit.   Psychiatric:         Mood and Affect: Mood normal.         Behavior: Behavior normal.

## 2025-04-15 ENCOUNTER — HOSPITAL ENCOUNTER (EMERGENCY)
Facility: HOSPITAL | Age: 70
Discharge: HOME | End: 2025-04-15
Attending: EMERGENCY MEDICINE
Payer: MEDICARE

## 2025-04-15 VITALS
RESPIRATION RATE: 18 BRPM | WEIGHT: 171.74 LBS | HEART RATE: 83 BPM | HEIGHT: 64 IN | OXYGEN SATURATION: 98 % | DIASTOLIC BLOOD PRESSURE: 59 MMHG | TEMPERATURE: 98.3 F | SYSTOLIC BLOOD PRESSURE: 108 MMHG | BODY MASS INDEX: 29.32 KG/M2

## 2025-04-15 DIAGNOSIS — R53.1 GENERALIZED WEAKNESS: Primary | ICD-10-CM

## 2025-04-15 LAB
ALBUMIN SERPL BCG-MCNC: 4.1 G/DL (ref 3.5–5)
ALP SERPL-CCNC: 60 U/L (ref 34–104)
ALT SERPL W P-5'-P-CCNC: 9 U/L (ref 7–52)
ANION GAP SERPL CALCULATED.3IONS-SCNC: 10 MMOL/L (ref 4–13)
AST SERPL W P-5'-P-CCNC: 11 U/L (ref 13–39)
BACTERIA UR QL AUTO: ABNORMAL /HPF
BASOPHILS # BLD MANUAL: 0 THOUSAND/UL (ref 0–0.1)
BASOPHILS NFR MAR MANUAL: 0 % (ref 0–1)
BILIRUB SERPL-MCNC: 0.32 MG/DL (ref 0.2–1)
BILIRUB UR QL STRIP: NEGATIVE
BUN SERPL-MCNC: 15 MG/DL (ref 5–25)
CALCIUM SERPL-MCNC: 9.2 MG/DL (ref 8.4–10.2)
CHLORIDE SERPL-SCNC: 101 MMOL/L (ref 96–108)
CLARITY UR: CLEAR
CO2 SERPL-SCNC: 26 MMOL/L (ref 21–32)
COLOR UR: YELLOW
CREAT SERPL-MCNC: 0.8 MG/DL (ref 0.6–1.3)
EOSINOPHIL # BLD MANUAL: 0 THOUSAND/UL (ref 0–0.4)
EOSINOPHIL NFR BLD MANUAL: 0 % (ref 0–6)
ERYTHROCYTE [DISTWIDTH] IN BLOOD BY AUTOMATED COUNT: 13.2 % (ref 11.6–15.1)
GFR SERPL CREATININE-BSD FRML MDRD: 74 ML/MIN/1.73SQ M
GLUCOSE SERPL-MCNC: 118 MG/DL (ref 65–140)
GLUCOSE SERPL-MCNC: 128 MG/DL (ref 65–140)
GLUCOSE UR STRIP-MCNC: NEGATIVE MG/DL
HCT VFR BLD AUTO: 40.5 % (ref 34.8–46.1)
HGB BLD-MCNC: 13.6 G/DL (ref 11.5–15.4)
HGB UR QL STRIP.AUTO: NEGATIVE
KETONES UR STRIP-MCNC: ABNORMAL MG/DL
LEUKOCYTE ESTERASE UR QL STRIP: NEGATIVE
LIPASE SERPL-CCNC: 14 U/L (ref 11–82)
LYMPHOCYTES # BLD AUTO: 0.33 THOUSAND/UL (ref 0.6–4.47)
LYMPHOCYTES # BLD AUTO: 8 % (ref 14–44)
MCH RBC QN AUTO: 32.8 PG (ref 26.8–34.3)
MCHC RBC AUTO-ENTMCNC: 33.6 G/DL (ref 31.4–37.4)
MCV RBC AUTO: 98 FL (ref 82–98)
MONOCYTES # BLD AUTO: 0.12 THOUSAND/UL (ref 0–1.22)
MONOCYTES NFR BLD: 3 % (ref 4–12)
MUCOUS THREADS UR QL AUTO: ABNORMAL
NEUTROPHILS # BLD MANUAL: 3.68 THOUSAND/UL (ref 1.85–7.62)
NEUTS BAND NFR BLD MANUAL: 7 % (ref 0–8)
NEUTS SEG NFR BLD AUTO: 82 % (ref 43–75)
NITRITE UR QL STRIP: NEGATIVE
NON-SQ EPI CELLS URNS QL MICRO: ABNORMAL /HPF
PH UR STRIP.AUTO: 6.5 [PH]
PLATELET # BLD AUTO: 224 THOUSANDS/UL (ref 149–390)
PLATELET BLD QL SMEAR: ADEQUATE
PMV BLD AUTO: 10.9 FL (ref 8.9–12.7)
POTASSIUM SERPL-SCNC: 4.4 MMOL/L (ref 3.5–5.3)
PROT SERPL-MCNC: 7.1 G/DL (ref 6.4–8.4)
PROT UR STRIP-MCNC: ABNORMAL MG/DL
RBC # BLD AUTO: 4.15 MILLION/UL (ref 3.81–5.12)
RBC #/AREA URNS AUTO: ABNORMAL /HPF
RBC MORPH BLD: NORMAL
SODIUM SERPL-SCNC: 137 MMOL/L (ref 135–147)
SP GR UR STRIP.AUTO: 1.03 (ref 1–1.03)
UROBILINOGEN UR STRIP-ACNC: <2 MG/DL
WBC # BLD AUTO: 4.14 THOUSAND/UL (ref 4.31–10.16)
WBC #/AREA URNS AUTO: ABNORMAL /HPF

## 2025-04-15 PROCEDURE — 36415 COLL VENOUS BLD VENIPUNCTURE: CPT

## 2025-04-15 PROCEDURE — 80053 COMPREHEN METABOLIC PANEL: CPT

## 2025-04-15 PROCEDURE — 96366 THER/PROPH/DIAG IV INF ADDON: CPT

## 2025-04-15 PROCEDURE — 96365 THER/PROPH/DIAG IV INF INIT: CPT

## 2025-04-15 PROCEDURE — 83690 ASSAY OF LIPASE: CPT

## 2025-04-15 PROCEDURE — 85007 BL SMEAR W/DIFF WBC COUNT: CPT

## 2025-04-15 PROCEDURE — 81001 URINALYSIS AUTO W/SCOPE: CPT

## 2025-04-15 PROCEDURE — 99284 EMERGENCY DEPT VISIT MOD MDM: CPT | Performed by: EMERGENCY MEDICINE

## 2025-04-15 PROCEDURE — 82948 REAGENT STRIP/BLOOD GLUCOSE: CPT

## 2025-04-15 PROCEDURE — 99285 EMERGENCY DEPT VISIT HI MDM: CPT

## 2025-04-15 PROCEDURE — 85027 COMPLETE CBC AUTOMATED: CPT

## 2025-04-15 RX ORDER — SODIUM CHLORIDE, SODIUM GLUCONATE, SODIUM ACETATE, POTASSIUM CHLORIDE, MAGNESIUM CHLORIDE, SODIUM PHOSPHATE, DIBASIC, AND POTASSIUM PHOSPHATE .53; .5; .37; .037; .03; .012; .00082 G/100ML; G/100ML; G/100ML; G/100ML; G/100ML; G/100ML; G/100ML
1000 INJECTION, SOLUTION INTRAVENOUS ONCE
Status: COMPLETED | OUTPATIENT
Start: 2025-04-15 | End: 2025-04-15

## 2025-04-15 RX ADMIN — SODIUM CHLORIDE, SODIUM GLUCONATE, SODIUM ACETATE, POTASSIUM CHLORIDE, MAGNESIUM CHLORIDE, SODIUM PHOSPHATE, DIBASIC, AND POTASSIUM PHOSPHATE 1000 ML: .53; .5; .37; .037; .03; .012; .00082 INJECTION, SOLUTION INTRAVENOUS at 12:28

## 2025-04-15 NOTE — ED PROVIDER NOTES
Time reflects when diagnosis was documented in both MDM as applicable and the Disposition within this note       Time User Action Codes Description Comment    4/15/2025  1:47 PM Clarissa Lam Add [R10.84] Generalized abdominal pain     4/15/2025  1:47 PM Clarissa Lam Remove [R10.84] Generalized abdominal pain           ED Disposition       ED Disposition   Discharge    Condition   Stable    Date/Time   Tue Apr 15, 2025  3:50 PM    Comment   Laine Tse discharge to home/self care.                   Assessment & Plan       Medical Decision Making  This is a 69 years old with a history of seizure disorder, schizoaffective disorder presenting with generalized weakness with concurrent diarrhea and poor p.o. intake.    Favors polypharmacy in the setting of dehydration.  Other possible etiology includes hyponatremia, UTI, hypothyroidism.       Patient workup came back unremarkable, likely weakness from polypharmacy in the setting of dehydration.  Recommended patient to follow-up with PCP and psychiatry for titration of medication.    Risk  Prescription drug management.        ED Course as of 04/15/25 1550   Tue Apr 15, 2025   1219 Creatinine elevated from baseline 0.5   1219 Sodium level normal   1343 Patient was examined at bedside, comfortable in bed, waiting for diet     1536 UA looks unremarkable for any infectious etiology,   1536 Patient's finished meal, tolerated p.o. challenge. Pending ambulation challenge.   1550 Patient ambulates well with walker without issues.       Medications   multi-electrolyte (Plasmalyte-A/Isolyte-S PH 7.4/Normosol-R) IV bolus 1,000 mL (1,000 mL Intravenous New Bag 4/15/25 1228)       ED Risk Strat Scores                    No data recorded        SBIRT 22yo+      Flowsheet Row Most Recent Value   Initial Alcohol Screen: US AUDIT-C     1. How often do you have a drink containing alcohol? 0 Filed at: 04/15/2025 1044   2. How many drinks containing alcohol do you have on a typical day you  are drinking?  0 Filed at: 04/15/2025 1044   3a. Male UNDER 65: How often do you have five or more drinks on one occasion? 0 Filed at: 04/15/2025 1044   3b. FEMALE Any Age, or MALE 65+: How often do you have 4 or more drinks on one occassion? 0 Filed at: 04/15/2025 1044   Audit-C Score 0 Filed at: 04/15/2025 1044   NEERU: How many times in the past year have you...    Used an illegal drug or used a prescription medication for non-medical reasons? Never Filed at: 04/15/2025 1044                            History of Present Illness       Chief Complaint   Patient presents with    Diarrhea     Pt comes in via ems with c/o severe diarrhea and generalized weakness.        Past Medical History:   Diagnosis Date    Anxiety     Benign essential hypertension     resolved; 06/15/16    Depression     Depression with anxiety     Fracture of fifth metatarsal bone of right foot with delayed healing     last assessed 04/12/16; fracture of metatarsal bone, right, closed, initial encounter    Hyperlipidemia     last assessed 11/28/17    Hypothyroidism     last assessed 11/28/2017    Insomnia     Obesity     Schizoaffective disorder (HCC)     last assessed 05/18/2017    Seizure disorder (HCC)     last assessed 03/28/17    Seizures (HCC)       Past Surgical History:   Procedure Laterality Date    COLONOSCOPY      complete    MO COLONOSCOPY FLX DX W/COLLJ SPEC WHEN PFRMD N/A 8/30/2018    Procedure: COLONOSCOPY with polypectomies;  Surgeon: Andriy Lopez MD;  Location: [facility];  Service: Gastroenterology      Family History   Problem Relation Age of Onset    No Known Problems Mother     No Known Problems Father     Lung disease Other         mesothelioma    No Known Problems Maternal Grandmother     No Known Problems Maternal Grandfather     No Known Problems Paternal Grandmother     No Known Problems Paternal Grandfather     No Known Problems Sister     No Known Problems Sister     Kidney failure Brother     No Known Problems  Maternal Aunt     No Known Problems Maternal Aunt     No Known Problems Maternal Aunt     No Known Problems Maternal Aunt     No Known Problems Paternal Aunt     No Known Problems Paternal Aunt       Social History     Tobacco Use    Smoking status: Former     Current packs/day: 0.00     Average packs/day: 0.5 packs/day for 21.0 years (10.5 ttl pk-yrs)     Types: Cigarettes     Start date:      Quit date:      Years since quittin.3    Smokeless tobacco: Never   Vaping Use    Vaping status: Never Used   Substance Use Topics    Alcohol use: Not Currently    Drug use: No      E-Cigarette/Vaping    E-Cigarette Use Never User       E-Cigarette/Vaping Substances    Nicotine No     THC No     CBD No     Flavoring No     Other No     Unknown No       I have reviewed and agree with the history as documented.     This is a 69 years old with history of schizoaffective disorder, seizure, chronic pain presented with ongoing generalized weakness with concurrent diarrhea.    Patient was a poor historian, baseline walks with a walker, from a group home.  Per group home recovery counselor, this patient has been having ongoing generalized weakness, with new onset of diarrhea the day prior to this admission.  However he denies any other ongoing sickness in the group home.     Patient herself endorses generalized weakness, and 1 day onset of diarrhea, with vomiting and nausea.  Endorses poor appetite, however denies any dysphagia.  Patient denies any chest pain, shortness of breath or abdominal pain fever or dysuria.            Diarrhea      Review of Systems   Gastrointestinal:  Positive for diarrhea.           Objective       ED Triage Vitals [04/15/25 1035]   Temperature Pulse Blood Pressure Respirations SpO2 Patient Position - Orthostatic VS   98.3 °F (36.8 °C) 86 107/58 18 96 % Lying      Temp Source Heart Rate Source BP Location FiO2 (%) Pain Score    Oral Monitor Right arm -- --      Vitals      Date and Time Temp  Pulse SpO2 Resp BP Pain Score FACES Pain Rating User   04/15/25 1500 -- 83 98 % 18 110/57 -- -- KK   04/15/25 1200 -- 84 97 % 18 116/66 -- -- LR   04/15/25 1035 98.3 °F (36.8 °C) 86 96 % 18 107/58 -- -- TW            Physical Exam  Vitals and nursing note reviewed.   Constitutional:       General: She is not in acute distress.     Appearance: She is well-developed.      Comments:     HENT:      Head: Normocephalic and atraumatic.      Mouth/Throat:      Mouth: Mucous membranes are dry.   Eyes:      Conjunctiva/sclera: Conjunctivae normal.   Cardiovascular:      Rate and Rhythm: Normal rate and regular rhythm.      Heart sounds: No murmur heard.  Pulmonary:      Effort: Pulmonary effort is normal. No respiratory distress.      Breath sounds: Normal breath sounds.   Abdominal:      Palpations: Abdomen is soft.      Tenderness: There is no abdominal tenderness.   Musculoskeletal:         General: No swelling.      Cervical back: Neck supple.   Skin:     General: Skin is warm and dry.      Capillary Refill: Capillary refill takes less than 2 seconds.   Neurological:      Mental Status: She is alert.   Psychiatric:         Mood and Affect: Mood normal.         Results Reviewed       Procedure Component Value Units Date/Time    Urine Microscopic [[id number]]  (Abnormal) Collected: 04/15/25 1444    Lab Status: Final result Specimen: Urine, Straight Cath Updated: 04/15/25 1454     RBC, UA 1-2 /hpf      WBC, UA 1-2 /hpf      Epithelial Cells Occasional /hpf      Bacteria, UA None Seen /hpf      MUCUS THREADS Occasional    UA w Reflex to Microscopic w Reflex to Culture [[id number]]  (Abnormal) Collected: 04/15/25 1444    Lab Status: Final result Specimen: Urine, Straight Cath Updated: 04/15/25 1453     Color, UA Yellow     Clarity, UA Clear     Specific Gravity, UA 1.029     pH, UA 6.5     Leukocytes, UA Negative     Nitrite, UA Negative     Protein, UA Trace mg/dl      Glucose, UA Negative mg/dl      Ketones, UA Trace mg/dl       Urobilinogen, UA <2.0 mg/dl      Bilirubin, UA Negative     Occult Blood, UA Negative    RBC Morphology Reflex Test [[id number]] Collected: 04/15/25 1125    Lab Status: Final result Specimen: Blood from Arm, Right Updated: 04/15/25 1301    CBC and differential [[id number]]  (Abnormal) Collected: 04/15/25 1125    Lab Status: Final result Specimen: Blood from Arm, Right Updated: 04/15/25 1227     WBC 4.14 Thousand/uL      RBC 4.15 Million/uL      Hemoglobin 13.6 g/dL      Hematocrit 40.5 %      MCV 98 fL      MCH 32.8 pg      MCHC 33.6 g/dL      RDW 13.2 %      MPV 10.9 fL      Platelets 224 Thousands/uL     Narrative:      This is an appended report.  These results have been appended to a previously verified report.    Manual Differential(PHLEBS Do Not Order) [[id number]]  (Abnormal) Collected: 04/15/25 1125    Lab Status: Final result Specimen: Blood from Arm, Right Updated: 04/15/25 1227     Segmented % 82 %      Bands % 7 %      Lymphocytes % 8 %      Monocytes % 3 %      Eosinophils % 0 %      Basophils % 0 %      Absolute Neutrophils 3.68 Thousand/uL      Absolute Lymphocytes 0.33 Thousand/uL      Absolute Monocytes 0.12 Thousand/uL      Absolute Eosinophils 0.00 Thousand/uL      Absolute Basophils 0.00 Thousand/uL      Total Counted --     RBC Morphology Normal     Platelet Estimate Adequate    Comprehensive metabolic panel [[id number]]  (Abnormal) Collected: 04/15/25 1125    Lab Status: Final result Specimen: Blood from Arm, Right Updated: 04/15/25 1152     Sodium 137 mmol/L      Potassium 4.4 mmol/L      Chloride 101 mmol/L      CO2 26 mmol/L      ANION GAP 10 mmol/L      BUN 15 mg/dL      Creatinine 0.80 mg/dL      Glucose 118 mg/dL      Calcium 9.2 mg/dL      AST 11 U/L      ALT 9 U/L      Alkaline Phosphatase 60 U/L      Total Protein 7.1 g/dL      Albumin 4.1 g/dL      Total Bilirubin 0.32 mg/dL      eGFR 74 ml/min/1.73sq m     Narrative:      National Kidney Disease Foundation guidelines for Chronic  Kidney Disease (CKD):     Stage 1 with normal or high GFR (GFR > 90 mL/min/1.73 square meters)    Stage 2 Mild CKD (GFR = 60-89 mL/min/1.73 square meters)    Stage 3A Moderate CKD (GFR = 45-59 mL/min/1.73 square meters)    Stage 3B Moderate CKD (GFR = 30-44 mL/min/1.73 square meters)    Stage 4 Severe CKD (GFR = 15-29 mL/min/1.73 square meters)    Stage 5 End Stage CKD (GFR <15 mL/min/1.73 square meters)  Note: GFR calculation is accurate only with a steady state creatinine    Lipase [[id number]]  (Normal) Collected: 04/15/25 1125    Lab Status: Final result Specimen: Blood from Arm, Right Updated: 04/15/25 1152     Lipase 14 u/L     Fingerstick Glucose (POCT) [[id number]]  (Normal) Collected: 04/15/25 1033    Lab Status: Final result Specimen: Blood Updated: 04/15/25 1034     POC Glucose 128 mg/dl             No orders to display       Procedures    ED Medication and Procedure Management   Prior to Admission Medications   Prescriptions Last Dose Informant Patient Reported? Taking?   Acetaminophen Extra Strength 500 MG TABS   No No   Sig: TAKE 2 TABLETS (1000MG) BY MOUTH 3X DAILY AS NEEDED FOR MILD PAIN/FEVER *AHMAD   Calcium Carbonate-Vitamin D (OSCAL 500/200 D-3 PO)   Yes No   Sig: Take by mouth   Diclofenac Sodium (VOLTAREN) 1 %  Care Giver Yes No   Sig: Apply 2 g topically 4 (four) times a day To knee   Emollient (cetaphil) cream  Care Giver Yes No   Sig: Apply topically as needed for dry skin   Incontinence Supplies MISC  Outside Facility (Specify), Care Giver No No   Sig: Use if needed (incontinence) SIZE XL PULL UP DISPOSABLE BRIEFS   Incontinence Supply Disposable (Disposable Brief Large) MISC  Care Giver, Outside Facility (Specify) No No   Sig: Use every 12 (twelve) hours   L-Theanine 100 MG CAPS  Self Yes No   Sig: Take by mouth 2 (two) times a day   LORazepam (ATIVAN) 0.5 mg tablet  Self Yes No   Sig: Take 0.5 mg by mouth 2 (two) times a day as needed for anxiety   Menthol, Topical Analgesic, (Bengay Ultra  Strength) 5 % PTCH  Care Giver, Outside Facility (Specify) No No   Sig: Apply 1 patch topically in the morning   Menthol-Methyl Salicylate (MURIEL AGUDELO GREASELESS) 10-15 % greaseless cream   No No   Sig: Apply topically 2 (two) times a day   OLANZapine (ZyPREXA) 20 MG tablet   No No   Sig: Take 1 tablet (20 mg total) by mouth daily at bedtime   PHENobarbital 64.8 mg tablet  Care Giver, Outside Facility (Specify) No No   Sig: Take 1 tablet (64.8 mg total) by mouth every 12 (twelve) hours   TiZANidine (Zanaflex) 2 MG capsule  Self Yes No   Sig: Take 2 mg by mouth in the morning Daily prn   alendronate (FOSAMAX) 70 mg tablet  Care Giver, Outside Facility (Specify) Yes No   Sig: Take 70 mg by mouth every 7 days Every 7 days on Fridays   asenapine (Saphris) 10 mg SL tablet  Care Giver Yes No   Sig: Place 5 mg under the tongue 2 (two) times a day   aspirin (Aspirin Low Dose) 81 mg chewable tablet  Care Giver, Outside Facility (Specify) No No   Sig: Chew 1 tablet (81 mg total) daily   atorvastatin (LIPITOR) 20 mg tablet  Care Giver, Outside Facility (Specify) No No   Sig: Take 1 tablet (20 mg total) by mouth daily   benzonatate (TESSALON) 200 MG capsule  Self Yes No   Sig: Take 200 mg by mouth 2 (two) times a day as needed for cough   bismuth subsalicylate (PEPTO BISMOL) 262 MG chewable tablet  Care Giver Yes No   Sig: Chew 524 mg every 6 (six) hours as needed for indigestion For diarrhea   busPIRone (BUSPAR) 15 mg tablet  Care Giver Yes No   Sig: Take 15 mg by mouth 2 (two) times a day   calcium carbonate-vitamin D 500 mg-5 mcg per tablet  Care Giver, Outside Facility (Specify) Yes No   Sig: Take 1 tablet by mouth daily with breakfast   cetirizine (ZyrTEC) 5 MG chewable tablet   Yes No   Sig: Chew 5 mg daily at bedtime One daily prn for allergies   chlorhexidine (PERIDEX) 0.12 % solution  Care Giver Yes No   Sig: Apply 15 mL to the mouth or throat 2 (two) times a day   clonazePAM (KlonoPIN) 1 mg tablet  Care Giver, Outside  Facility (Specify) No No   Sig: Take 1 tablet (1 mg total) by mouth 2 (two) times a day   Patient not taking: Reported on 2025   diphenhydrAMINE (BENADRYL) 25 mg capsule  Care Giver Yes No   Sig: Take 25 mg by mouth daily at bedtime as needed for itching   divalproex sodium (DEPAKOTE) 500 mg DR tablet  Self Yes No   Sig: Take 500 mg by mouth 3 qhs   docusate sodium (COLACE) 100 mg capsule  Care Giver Yes No   Sig: Take 100 mg by mouth 2 (two) times a day as needed for constipation   fluticasone (FLONASE) 50 mcg/act nasal spray  Care Giver, Outside Facility (Specify) No No   Si sprays into each nostril daily   gabapentin (NEURONTIN) 100 mg capsule   No No   Sig: Take 2 capsules (200 mg total) by mouth 2 (two) times a day   guaiFENesin 400 mg   No No   Sig: Take 1 tablet (400 mg total) by mouth every 6 (six) hours as needed for cough   levothyroxine 100 mcg tablet  Care Giver, Outside Facility (Specify) No No   Sig: Take 1 tablet (100 mcg total) by mouth daily   lidocaine (LIDODERM) 5 %  Care Giver Yes No   Sig: Apply 1 patch topically daily Remove & Discard patch within 12 hours or as directed by MD for back prn   lidocaine (LMX) 4 % cream   Yes No   Sig: Apply topically as needed for mild pain   loxapine (LOXITANE) 50 MG capsule   Yes No   Sig: Take 50 mg by mouth in the morning   melatonin 3 mg  Care Giver, Outside Facility (Specify) No No   Sig: Take 2 tablets (6 mg total) by mouth daily at bedtime   multivitamin (THERAGRAN) TABS   Yes No   Sig: Take 1 tablet by mouth daily   mupirocin (BACTROBAN) 2 % ointment   Yes No   Sig: Apply topically 2 (two) times a day as needed Left knee bid prn   polyethylene glycol (GLYCOLAX) 17 GM/SCOOP  Outside Facility (Specify), Care Giver No No   Sig: MIX 1 CAPFUL(17GM) IN 8OZ OF LIQUID AND DRINK DAILY @ 8AM(CONSTIPATION) *AHMAD   polyethylene glycol (GLYCOLAX) 17 GM/SCOOP powder   Yes No   Sig: Take 17 g by mouth daily   senna-docusate sodium (SENOKOT-S) 8.6-50 mg per  tablet  Care Giver, Outside Facility (Specify) No No   Sig: Take 1 tablet by mouth daily at bedtime   sodium chloride (OCEAN) 0.65 % nasal spray  Care Giver Yes No   Si spray into each nostril as needed for congestion As directed up to 6x per day for nasal dryness   traZODone (DESYREL) 150 mg tablet   No No   Sig: Take 1 tablet (150 mg total) by mouth daily at bedtime   trihexyphenidyl (ARTANE) 2 mg tablet   No No   Sig: Take 1 tablet (2 mg total) by mouth 2 (two) times a day      Facility-Administered Medications: None     Patient's Medications   Discharge Prescriptions    No medications on file     No discharge procedures on file.  ED SEPSIS DOCUMENTATION   Time reflects when diagnosis was documented in both MDM as applicable and the Disposition within this note       Time User Action Codes Description Comment    4/15/2025  1:47 PM Clarissa Lam Add [R10.84] Generalized abdominal pain     4/15/2025  1:47 PM Clarissa Lam Remove [R10.84] Generalized abdominal pain                  Clarissa Lam MD  04/15/25 1489

## 2025-04-15 NOTE — ED ATTENDING ATTESTATION
4/15/2025  I, Jeffrey Briggs MD, saw and evaluated the patient. I have discussed the patient with the resident/non-physician practitioner and agree with the resident's/non-physician practitioner's findings, Plan of Care, and MDM as documented in the resident's/non-physician practitioner's note, except where noted. All available labs and Radiology studies were reviewed.  I was present for key portions of any procedure(s) performed by the resident/non-physician practitioner and I was immediately available to provide assistance.       At this point I agree with the current assessment done in the Emergency Department.  I have conducted an independent evaluation of this patient a history and physical is as follows:  Briefly, 69-year-old female with extensive past medical history living in a group home setting presenting with generalized weakness after a large bowel movement this morning.  Patient relates an explosive watery bowel movement, has not had another one since.  She states that after that she felt generally weak, was having difficulty getting up and walking.  She denies nausea, vomiting, fever, trauma, chest pain, shortness of breath, other symptoms.  On examination, heart sounds normal, lungs clear to auscultation, abdomen nontender.  No swelling or tenderness to palpation the lower extremities.  5 out of 5 strength in all extremities, normal speech and coordination with the exception of some slight difficulty articulating due to lipsmacking at apparent baseline per accompanying caretakers.  Labs remarkable for mild elevation of creatinine suspected be due to mild dehydration, patient is noted to be on multiple CNS depressant medications which could be contributing.  Resuscitate with IV fluids, ate a meal without difficulty or vomiting, afterwards was able to walk with a walker at baseline.  Discharged back to group home, hemodynamically stable and comfortable at that time.  ED Course         Critical Care  Time  Procedures

## 2025-04-15 NOTE — DISCHARGE INSTRUCTIONS
Please follow-up with your primary care physician and also follow-up with your psychiatrist regarding your seizure and additionally mood medication.  We suspect your presentation likely contributed from polypharmacy while you have poor p.o. intake.  Please discuss your medications with your providers and onto the optimal dosage.  Additionally please continue to have good oral intake.

## 2025-04-15 NOTE — ED NOTES
Patient ambulated using RW with assistance from ED tech. Patient ambulated without issue.      Christie Llamas RN  04/15/25 6890

## 2025-04-18 DIAGNOSIS — M81.0 OSTEOPOROSIS, UNSPECIFIED OSTEOPOROSIS TYPE, UNSPECIFIED PATHOLOGICAL FRACTURE PRESENCE: Primary | ICD-10-CM

## 2025-04-18 NOTE — TELEPHONE ENCOUNTER
Jennifer from [facility] pharmacy called refill line,   Patient has order from hospital for fosamax 70mg that she takes weekly of Fridays.   Is she to continue the med and if yes, she will need a refill.     Please call pharmacy to advise or send new order

## 2025-04-20 RX ORDER — ALENDRONATE SODIUM 70 MG/1
TABLET ORAL
Qty: 4 TABLET | Refills: 0 | OUTPATIENT
Start: 2025-04-20

## 2025-04-21 RX ORDER — ALENDRONATE SODIUM 70 MG/1
70 TABLET ORAL
Qty: 12 TABLET | Refills: 1 | Status: SHIPPED | OUTPATIENT
Start: 2025-04-21

## 2025-04-23 ENCOUNTER — NURSE TRIAGE (OUTPATIENT)
Dept: OTHER | Facility: OTHER | Age: 70
End: 2025-04-23

## 2025-04-23 NOTE — TELEPHONE ENCOUNTER
"FOLLOW UP: Please refill pt Atorvastatin 20 mg if appropriate and call Group home #[phone] to make them aware if being filled.     REASON FOR CONVERSATION: Medication Refill    SYMPTOMS: For cholesterol control    OTHER: Last filled by [facility].     DISPOSITION: Call PCP When Office is Open  Answer Assessment - Initial Assessment Questions  1. DRUG NAME: \"What medicine do you need to have refilled?\"      Atorvastatin 20 mg  2. REFILLS REMAINING: \"How many refills are remaining?\" (Note: The label on the medicine or pill bottle will show how many refills are remaining. If there are no refills remaining, then a renewal may be needed.)      0  3. EXPIRATION DATE: \"What is the expiration date?\" (Note: The label states when the prescription will , and thus can no longer be refilled.)        4. PRESCRIBING HCP: \"Who prescribed it?\" Reason: If prescribed by specialist, call should be referred to that group.      Last filled on [facility] 3/9/2025. Dr Wise  5. SYMPTOMS: \"Do you have any symptoms?\"    Protocols used: Medication Refill and Renewal Call-Adult-    "

## 2025-04-23 NOTE — TELEPHONE ENCOUNTER
"Regarding: atorvastatin (LIPITOR) 20 mg tablet  ----- Message from Jared SEGURA sent at 4/23/2025  6:12 PM EDT -----  Patient needs a refill of atorvastatin (LIPITOR) 20 mg tablet sent to [facility] [place] [place] - [address] [place] [address].\"    "

## 2025-04-24 ENCOUNTER — TELEPHONE (OUTPATIENT)
Age: 70
End: 2025-04-24

## 2025-04-24 DIAGNOSIS — R94.31 ABNORMAL ECG: ICD-10-CM

## 2025-04-24 DIAGNOSIS — N39.498 OTHER URINARY INCONTINENCE: ICD-10-CM

## 2025-04-24 DIAGNOSIS — N39.3 STRESS INCONTINENCE OF URINE: ICD-10-CM

## 2025-04-24 DIAGNOSIS — E78.2 MIXED HYPERLIPIDEMIA: ICD-10-CM

## 2025-04-24 DIAGNOSIS — R94.31 T WAVE INVERSION ON ELECTROCARDIOGRAM: ICD-10-CM

## 2025-04-24 RX ORDER — ASPIRIN 81 MG
TABLET,CHEWABLE ORAL
Qty: 30 TABLET | Refills: 0 | Status: SHIPPED | OUTPATIENT
Start: 2025-04-24

## 2025-04-24 RX ORDER — ATORVASTATIN CALCIUM 20 MG/1
20 TABLET, FILM COATED ORAL DAILY
Qty: 30 TABLET | Refills: 5 | Status: SHIPPED | OUTPATIENT
Start: 2025-04-24

## 2025-04-24 RX ORDER — DIAPER,BRIEF,ADULT, DISPOSABLE
EACH MISCELLANEOUS EVERY 12 HOURS
Qty: 36 EACH | Refills: 6 | Status: SHIPPED | OUTPATIENT
Start: 2025-04-24

## 2025-04-24 NOTE — TELEPHONE ENCOUNTER
NOV 6/9/25    Medication last filled while in the hospital. Group home asking for refill.

## 2025-04-24 NOTE — TELEPHONE ENCOUNTER
Pt's group home calling in stating pt has become more incontinent recently and asked if the doctor can put in an order for a box of diapers and pads to [facility]. I asked what size pt will be needing and the staff member on the line could not confirm and stated [facility] would know from previous orders but just increase the quantity. She also asked if Dr. Yip could refer a urologist for the pt.    Please advise and give group home a call back once completed, TY.

## 2025-04-28 DIAGNOSIS — G47.00 INSOMNIA: ICD-10-CM

## 2025-04-28 NOTE — TELEPHONE ENCOUNTER
*Caretaker called to ask if Dr. Yip would be able to fill this, as he is Laine's PCP. I explained that it was originally from another doctor, but she requested to ask anyway. Please reach out to caretaker with questions/concerns.    Medication: melatonin 3 mg     Dose/Frequency: Take 2 tablets (6 mg total) by mouth daily at bedtime     Quantity: 60 tablet     Pharmacy: [facility] [facility]     Office:   [x] PCP/Provider -   [] Speciality/Provider -     Does the patient have enough for 3 days?   [] Yes   [x] No - Send as HP to POD

## 2025-04-30 ENCOUNTER — TELEPHONE (OUTPATIENT)
Age: 70
End: 2025-04-30

## 2025-04-30 NOTE — TELEPHONE ENCOUNTER
"Melinda called requesting a correction to the MA-51 form. Representative stated that Dr. Yip checked off the box for \"Personal care home\" instead of \"Nursing Home Care Facility\".     Please correct and send back over to Melinda.    Fax:[phone]  Phone :[phone]  "

## 2025-05-07 ENCOUNTER — APPOINTMENT (EMERGENCY)
Dept: RADIOLOGY | Facility: HOSPITAL | Age: 70
DRG: 605 | End: 2025-05-07
Payer: MEDICARE

## 2025-05-07 ENCOUNTER — HOSPITAL ENCOUNTER (INPATIENT)
Facility: HOSPITAL | Age: 70
LOS: 1 days | DRG: 605 | End: 2025-05-08
Attending: EMERGENCY MEDICINE | Admitting: INTERNAL MEDICINE
Payer: MEDICARE

## 2025-05-07 DIAGNOSIS — R41.82 ALTERED MENTAL STATUS: Primary | ICD-10-CM

## 2025-05-07 DIAGNOSIS — F25.9 SCHIZOAFFECTIVE DISORDER, UNSPECIFIED TYPE (HCC): ICD-10-CM

## 2025-05-07 DIAGNOSIS — Z00.8 MEDICAL CLEARANCE FOR PSYCHIATRIC ADMISSION: ICD-10-CM

## 2025-05-07 DIAGNOSIS — G40.909 SEIZURE DISORDER (HCC): ICD-10-CM

## 2025-05-07 PROBLEM — S01.81XA FACIAL LACERATION: Status: ACTIVE | Noted: 2025-05-07

## 2025-05-07 LAB
ALBUMIN SERPL BCG-MCNC: 4 G/DL (ref 3.5–5)
ALP SERPL-CCNC: 70 U/L (ref 34–104)
ALT SERPL W P-5'-P-CCNC: 8 U/L (ref 7–52)
AMPHETAMINES SERPL QL SCN: NEGATIVE
ANION GAP SERPL CALCULATED.3IONS-SCNC: 5 MMOL/L (ref 4–13)
APAP SERPL-MCNC: <2 UG/ML (ref 10–20)
APTT PPP: 25 SECONDS (ref 23–34)
AST SERPL W P-5'-P-CCNC: 12 U/L (ref 13–39)
BARBITURATES UR QL: POSITIVE
BASOPHILS # BLD AUTO: 0 THOUSANDS/ÂΜL (ref 0–0.1)
BASOPHILS NFR BLD AUTO: 0 % (ref 0–1)
BENZODIAZ UR QL: NEGATIVE
BILIRUB SERPL-MCNC: 0.33 MG/DL (ref 0.2–1)
BILIRUB UR QL STRIP: NEGATIVE
BUN SERPL-MCNC: 11 MG/DL (ref 5–25)
CALCIUM SERPL-MCNC: 9.3 MG/DL (ref 8.4–10.2)
CHLORIDE SERPL-SCNC: 103 MMOL/L (ref 96–108)
CLARITY UR: CLEAR
CO2 SERPL-SCNC: 28 MMOL/L (ref 21–32)
COCAINE UR QL: NEGATIVE
COLOR UR: COLORLESS
CREAT SERPL-MCNC: 0.71 MG/DL (ref 0.6–1.3)
EOSINOPHIL # BLD AUTO: 0 THOUSAND/ÂΜL (ref 0–0.61)
EOSINOPHIL NFR BLD AUTO: 0 % (ref 0–6)
ERYTHROCYTE [DISTWIDTH] IN BLOOD BY AUTOMATED COUNT: 12.9 % (ref 11.6–15.1)
ETHANOL SERPL-MCNC: <10 MG/DL
FENTANYL UR QL SCN: NEGATIVE
GFR SERPL CREATININE-BSD FRML MDRD: 85 ML/MIN/1.73SQ M
GLUCOSE SERPL-MCNC: 87 MG/DL (ref 65–140)
GLUCOSE SERPL-MCNC: 95 MG/DL (ref 65–140)
GLUCOSE UR STRIP-MCNC: NEGATIVE MG/DL
HCT VFR BLD AUTO: 39.9 % (ref 34.8–46.1)
HGB BLD-MCNC: 13 G/DL (ref 11.5–15.4)
HGB UR QL STRIP.AUTO: NEGATIVE
HYDROCODONE UR QL SCN: NEGATIVE
IMM GRANULOCYTES # BLD AUTO: 0.01 THOUSAND/UL (ref 0–0.2)
IMM GRANULOCYTES NFR BLD AUTO: 0 % (ref 0–2)
INR PPP: 0.92 (ref 0.85–1.19)
KETONES UR STRIP-MCNC: NEGATIVE MG/DL
LACTATE SERPL-SCNC: 1.6 MMOL/L (ref 0.5–2)
LDH SERPL-CCNC: 167 U/L (ref 140–271)
LEUKOCYTE ESTERASE UR QL STRIP: NEGATIVE
LYMPHOCYTES # BLD AUTO: 1.17 THOUSANDS/ÂΜL (ref 0.6–4.47)
LYMPHOCYTES NFR BLD AUTO: 32 % (ref 14–44)
MAGNESIUM SERPL-MCNC: 1.9 MG/DL (ref 1.9–2.7)
MCH RBC QN AUTO: 32.3 PG (ref 26.8–34.3)
MCHC RBC AUTO-ENTMCNC: 32.6 G/DL (ref 31.4–37.4)
MCV RBC AUTO: 99 FL (ref 82–98)
METHADONE UR QL: NEGATIVE
MONOCYTES # BLD AUTO: 0.33 THOUSAND/ÂΜL (ref 0.17–1.22)
MONOCYTES NFR BLD AUTO: 9 % (ref 4–12)
NEUTROPHILS # BLD AUTO: 2.1 THOUSANDS/ÂΜL (ref 1.85–7.62)
NEUTS SEG NFR BLD AUTO: 59 % (ref 43–75)
NITRITE UR QL STRIP: NEGATIVE
NRBC BLD AUTO-RTO: 0 /100 WBCS
OPIATES UR QL SCN: NEGATIVE
OXYCODONE+OXYMORPHONE UR QL SCN: NEGATIVE
PCP UR QL: NEGATIVE
PH UR STRIP.AUTO: 7 [PH]
PHOSPHATE SERPL-MCNC: 3.9 MG/DL (ref 2.3–4.1)
PLATELET # BLD AUTO: 257 THOUSANDS/UL (ref 149–390)
PMV BLD AUTO: 9.8 FL (ref 8.9–12.7)
POTASSIUM SERPL-SCNC: 4 MMOL/L (ref 3.5–5.3)
PROT SERPL-MCNC: 6.8 G/DL (ref 6.4–8.4)
PROT UR STRIP-MCNC: NEGATIVE MG/DL
PROTHROMBIN TIME: 12.6 SECONDS (ref 12.3–15)
RBC # BLD AUTO: 4.03 MILLION/UL (ref 3.81–5.12)
SALICYLATES SERPL-MCNC: <5 MG/DL (ref 5–20)
SODIUM SERPL-SCNC: 136 MMOL/L (ref 135–147)
SP GR UR STRIP.AUTO: 1.01 (ref 1–1.03)
THC UR QL: NEGATIVE
TSH SERPL DL<=0.05 MIU/L-ACNC: 3.02 UIU/ML (ref 0.45–4.5)
UROBILINOGEN UR STRIP-ACNC: <2 MG/DL
WBC # BLD AUTO: 3.61 THOUSAND/UL (ref 4.31–10.16)

## 2025-05-07 PROCEDURE — 83615 LACTATE (LD) (LDH) ENZYME: CPT

## 2025-05-07 PROCEDURE — 99223 1ST HOSP IP/OBS HIGH 75: CPT | Performed by: INTERNAL MEDICINE

## 2025-05-07 PROCEDURE — 85610 PROTHROMBIN TIME: CPT

## 2025-05-07 PROCEDURE — 81003 URINALYSIS AUTO W/O SCOPE: CPT

## 2025-05-07 PROCEDURE — 82077 ASSAY SPEC XCP UR&BREATH IA: CPT

## 2025-05-07 PROCEDURE — 85730 THROMBOPLASTIN TIME PARTIAL: CPT

## 2025-05-07 PROCEDURE — 80307 DRUG TEST PRSMV CHEM ANLYZR: CPT

## 2025-05-07 PROCEDURE — 82948 REAGENT STRIP/BLOOD GLUCOSE: CPT

## 2025-05-07 PROCEDURE — 80143 DRUG ASSAY ACETAMINOPHEN: CPT

## 2025-05-07 PROCEDURE — 80165 DIPROPYLACETIC ACID FREE: CPT | Performed by: INTERNAL MEDICINE

## 2025-05-07 PROCEDURE — 85025 COMPLETE CBC W/AUTO DIFF WBC: CPT

## 2025-05-07 PROCEDURE — 84100 ASSAY OF PHOSPHORUS: CPT

## 2025-05-07 PROCEDURE — 84443 ASSAY THYROID STIM HORMONE: CPT

## 2025-05-07 PROCEDURE — 36415 COLL VENOUS BLD VENIPUNCTURE: CPT

## 2025-05-07 PROCEDURE — 83605 ASSAY OF LACTIC ACID: CPT

## 2025-05-07 PROCEDURE — NC001 PR NO CHARGE: Performed by: INTERNAL MEDICINE

## 2025-05-07 PROCEDURE — 80053 COMPREHEN METABOLIC PANEL: CPT

## 2025-05-07 PROCEDURE — 74177 CT ABD & PELVIS W/CONTRAST: CPT

## 2025-05-07 PROCEDURE — 99223 1ST HOSP IP/OBS HIGH 75: CPT | Performed by: PSYCHIATRY & NEUROLOGY

## 2025-05-07 PROCEDURE — 80179 DRUG ASSAY SALICYLATE: CPT

## 2025-05-07 PROCEDURE — 72125 CT NECK SPINE W/O DYE: CPT

## 2025-05-07 PROCEDURE — 70450 CT HEAD/BRAIN W/O DYE: CPT

## 2025-05-07 PROCEDURE — 71260 CT THORAX DX C+: CPT

## 2025-05-07 PROCEDURE — 99284 EMERGENCY DEPT VISIT MOD MDM: CPT

## 2025-05-07 PROCEDURE — 87081 CULTURE SCREEN ONLY: CPT | Performed by: INTERNAL MEDICINE

## 2025-05-07 PROCEDURE — 83735 ASSAY OF MAGNESIUM: CPT

## 2025-05-07 RX ORDER — GABAPENTIN 300 MG/1
300 CAPSULE ORAL 2 TIMES DAILY
Status: DISCONTINUED | OUTPATIENT
Start: 2025-05-07 | End: 2025-05-08

## 2025-05-07 RX ORDER — LEVOTHYROXINE SODIUM 100 UG/1
100 TABLET ORAL
Status: DISCONTINUED | OUTPATIENT
Start: 2025-05-07 | End: 2025-05-08 | Stop reason: HOSPADM

## 2025-05-07 RX ORDER — AMOXICILLIN 250 MG
1 CAPSULE ORAL
Status: DISCONTINUED | OUTPATIENT
Start: 2025-05-07 | End: 2025-05-08 | Stop reason: HOSPADM

## 2025-05-07 RX ORDER — ASPIRIN 81 MG/1
81 TABLET, CHEWABLE ORAL DAILY
Status: DISCONTINUED | OUTPATIENT
Start: 2025-05-07 | End: 2025-05-08 | Stop reason: HOSPADM

## 2025-05-07 RX ORDER — ENOXAPARIN SODIUM 100 MG/ML
40 INJECTION SUBCUTANEOUS DAILY
Status: DISCONTINUED | OUTPATIENT
Start: 2025-05-07 | End: 2025-05-08 | Stop reason: HOSPADM

## 2025-05-07 RX ORDER — ALENDRONATE SODIUM 70 MG/1
70 TABLET ORAL
Status: DISCONTINUED | OUTPATIENT
Start: 2025-05-07 | End: 2025-05-08 | Stop reason: HOSPADM

## 2025-05-07 RX ORDER — FLUTICASONE PROPIONATE 50 MCG
2 SPRAY, SUSPENSION (ML) NASAL DAILY
Status: DISCONTINUED | OUTPATIENT
Start: 2025-05-07 | End: 2025-05-08 | Stop reason: HOSPADM

## 2025-05-07 RX ORDER — TRIHEXYPHENIDYL HYDROCHLORIDE 2 MG/1
2 TABLET ORAL 2 TIMES DAILY
Status: DISCONTINUED | OUTPATIENT
Start: 2025-05-07 | End: 2025-05-08 | Stop reason: HOSPADM

## 2025-05-07 RX ORDER — OLANZAPINE 10 MG/1
20 TABLET, FILM COATED ORAL
Status: DISCONTINUED | OUTPATIENT
Start: 2025-05-07 | End: 2025-05-08 | Stop reason: HOSPADM

## 2025-05-07 RX ORDER — DIVALPROEX SODIUM 500 MG/1
1500 TABLET, DELAYED RELEASE ORAL
Status: DISCONTINUED | OUTPATIENT
Start: 2025-05-07 | End: 2025-05-08 | Stop reason: HOSPADM

## 2025-05-07 RX ORDER — ATORVASTATIN CALCIUM 20 MG/1
20 TABLET, FILM COATED ORAL DAILY
Status: DISCONTINUED | OUTPATIENT
Start: 2025-05-07 | End: 2025-05-08 | Stop reason: HOSPADM

## 2025-05-07 RX ORDER — CLONAZEPAM 1 MG/1
1 TABLET ORAL 2 TIMES DAILY
Status: DISCONTINUED | OUTPATIENT
Start: 2025-05-07 | End: 2025-05-08 | Stop reason: HOSPADM

## 2025-05-07 RX ORDER — PHENOBARBITAL 64.8 MG/1
64.8 TABLET ORAL EVERY 12 HOURS
Status: DISCONTINUED | OUTPATIENT
Start: 2025-05-07 | End: 2025-05-08 | Stop reason: HOSPADM

## 2025-05-07 RX ADMIN — DIVALPROEX SODIUM 1500 MG: 500 TABLET, DELAYED RELEASE ORAL at 21:25

## 2025-05-07 RX ADMIN — GABAPENTIN 300 MG: 300 CAPSULE ORAL at 11:53

## 2025-05-07 RX ADMIN — CLONAZEPAM 1 MG: 1 TABLET ORAL at 11:53

## 2025-05-07 RX ADMIN — TRIHEXYPHENIDYL HYDROCHLORIDE 2 MG: 2 TABLET ORAL at 12:48

## 2025-05-07 RX ADMIN — FLUTICASONE PROPIONATE 2 SPRAY: 50 SPRAY, METERED NASAL at 12:48

## 2025-05-07 RX ADMIN — LEVOTHYROXINE SODIUM 100 MCG: 0.1 TABLET ORAL at 11:53

## 2025-05-07 RX ADMIN — ASPIRIN 81 MG CHEWABLE TABLET 81 MG: 81 TABLET CHEWABLE at 11:53

## 2025-05-07 RX ADMIN — ENOXAPARIN SODIUM 40 MG: 40 INJECTION SUBCUTANEOUS at 11:53

## 2025-05-07 RX ADMIN — IOHEXOL 100 ML: 350 INJECTION, SOLUTION INTRAVENOUS at 08:44

## 2025-05-07 RX ADMIN — Medication 6 MG: at 21:25

## 2025-05-07 RX ADMIN — TRAZODONE HYDROCHLORIDE 150 MG: 50 TABLET ORAL at 21:25

## 2025-05-07 RX ADMIN — OLANZAPINE 20 MG: 10 TABLET, FILM COATED ORAL at 21:25

## 2025-05-07 RX ADMIN — TRIHEXYPHENIDYL HYDROCHLORIDE 2 MG: 2 TABLET ORAL at 21:57

## 2025-05-07 RX ADMIN — ATORVASTATIN CALCIUM 20 MG: 20 TABLET, FILM COATED ORAL at 11:53

## 2025-05-07 RX ADMIN — SENNOSIDES AND DOCUSATE SODIUM 1 TABLET: 50; 8.6 TABLET ORAL at 21:25

## 2025-05-07 RX ADMIN — PHENOBARBITAL 64.8 MG: 64.8 TABLET ORAL at 11:53

## 2025-05-07 RX ADMIN — CLONAZEPAM 1 MG: 1 TABLET ORAL at 17:48

## 2025-05-07 RX ADMIN — PHENOBARBITAL 64.8 MG: 64.8 TABLET ORAL at 22:01

## 2025-05-07 NOTE — ASSESSMENT & PLAN NOTE
"Patient began hallucinating that the facility she was currently in and was remarks of \"eating Jews\" so she jumped out of a first story window and broke the fall with her face.    CT CAP w/ contrast showed no  findings of acute traumatic injury in the chest, abdomen or pelvis. Unchanged right adnexal cystic lesion measuring about 7 cm, for which pelvic ultrasound is again recommended. CT C-spine: no cervical spine fracture or traumatic malalignment. CTH no intracranial hemorrhage or calvarial fracture    Plan:  - Fall precautions   "

## 2025-05-07 NOTE — ASSESSMENT & PLAN NOTE
History of HTN but not on any meds.    Plan:  - Continue to monitor for now, consider starting amlodipine 5 mg

## 2025-05-07 NOTE — ED PROVIDER NOTES
Emergency Department Trauma Note  Laine Tse 69 y.o. female MRN: [id number]  Unit/Bed#: ED 29/ED 29 Encounter: [id number]      Trauma Alert: Trauma Acuity: C  Model of Arrival: Mode of Arrival: BLS via    Trauma Team: Current Providers  Attending Provider: Andriy Drummond MD  Resident: Jamee Javed DO  Registered Nurse: Jovana France RN  Registered Nurse: Pushpa Gilmore RN  Consultants: {[facility]:[id number]}      History of Present Illness     Chief Complaint:   Chief Complaint   Patient presents with    Fall     HPI:  Laine Tse is a 69 y.o. female who presents with ***.  Mechanism:Details of Incident: ran away from group home. fall out of window. +HS, +ASA Injury Date: 05/07/25 Injury Time: 0700      HPI  Review of Systems    Historical Information     Immunizations:   Immunization History   Administered Date(s) Administered    COVID-19 MODERNA VACC 0.5 ML IM 02/12/2021, 03/12/2021    H1N1, All Formulations 01/07/2010    INFLUENZA 09/18/2012, 10/07/2013, 10/12/2016, 10/23/2017    Influenza Quadrivalent Preservative Free 3 years and older IM 10/23/2017    Influenza Quadrivalent, 6-35 Months IM 10/12/2016    Influenza Split High Dose Preservative Free IM 10/30/2024    Influenza, high dose seasonal 0.7 mL 10/15/2018, 11/13/2019, 10/02/2020, 11/16/2021, 12/18/2023    Influenza, seasonal, injectable 10/16/2014    Influenza, seasonal, injectable, preservative free 10/21/2013    Pneumococcal Conjugate 13-Valent 03/20/2019    Pneumococcal Polysaccharide PPV23 07/26/2021    Td (adult), adsorbed 11/28/2017    Tuberculin Skin Test-PPD Intradermal 08/08/2018, 08/11/2020, 09/10/2024       Past Medical History:   Diagnosis Date    Anxiety     Benign essential hypertension     resolved; 06/15/16    Depression     Depression with anxiety     Fracture of fifth metatarsal bone of right foot with delayed healing     last assessed 04/12/16; fracture of metatarsal bone, right, closed, initial  "encounter    Hyperlipidemia     last assessed 17    Hypothyroidism     last assessed 2017    Insomnia     Obesity     Schizoaffective disorder (HCC)     last assessed 2017    Seizure disorder (HCC)     last assessed 17    Seizures (HCC)        Family History   Problem Relation Age of Onset    No Known Problems Mother     No Known Problems Father     Lung disease Other         mesothelioma    No Known Problems Maternal Grandmother     No Known Problems Maternal Grandfather     No Known Problems Paternal Grandmother     No Known Problems Paternal Grandfather     No Known Problems Sister     No Known Problems Sister     Kidney failure Brother     No Known Problems Maternal Aunt     No Known Problems Maternal Aunt     No Known Problems Maternal Aunt     No Known Problems Maternal Aunt     No Known Problems Paternal Aunt     No Known Problems Paternal Aunt      Past Surgical History:   Procedure Laterality Date    COLONOSCOPY      complete    KY COLONOSCOPY FLX DX W/COLLJ SPEC WHEN PFRMD N/A 2018    Procedure: COLONOSCOPY with polypectomies;  Surgeon: Andriy Lopez MD;  Location: [facility];  Service: Gastroenterology     Social History     Tobacco Use    Smoking status: Former     Current packs/day: 0.00     Average packs/day: 0.5 packs/day for 21.0 years (10.5 ttl pk-yrs)     Types: Cigarettes     Start date:      Quit date:      Years since quittin.3    Smokeless tobacco: Never   Vaping Use    Vaping status: Never Used   Substance Use Topics    Alcohol use: Not Currently    Drug use: No     E-Cigarette/Vaping    E-Cigarette Use Never User      E-Cigarette/Vaping Substances    Nicotine No     THC No     CBD No     Flavoring No     Other No     Unknown No        Family History: {SL IP Trauma Family History:62004::\"non-contributory\"}    Meds/Allergies   Prior to Admission Medications   Prescriptions Last Dose Informant Patient Reported? Taking?   Acetaminophen Extra Strength 500 " MG TABS   No No   Sig: TAKE 2 TABLETS (1000MG) BY MOUTH 3X DAILY AS NEEDED FOR MILD PAIN/FEVER *AHMAD   Aspirin Low Dose 81 MG chewable tablet   No No   Sig: CHEW 1 TABLET BY MOUTH ONCE DAILY @8AM (CAD) *MIREYA   Calcium Carbonate-Vitamin D (OSCAL 500/200 D-3 PO)   Yes No   Sig: Take by mouth   Diclofenac Sodium (VOLTAREN) 1 %  Care Giver Yes No   Sig: Apply 2 g topically 4 (four) times a day To knee   Emollient (cetaphil) cream  Care Giver Yes No   Sig: Apply topically as needed for dry skin   Incontinence Supplies MISC   No No   Sig: Use if needed (incontinence) SIZE XL PULL UP DISPOSABLE BRIEFS   Incontinence Supply Disposable (Disposable Brief Large) MISC   No No   Sig: Use every 12 (twelve) hours   L-Theanine 100 MG CAPS  Self Yes No   Sig: Take by mouth 2 (two) times a day   LORazepam (ATIVAN) 0.5 mg tablet  Self Yes No   Sig: Take 0.5 mg by mouth 2 (two) times a day as needed for anxiety   Menthol, Topical Analgesic, (Bengay Ultra Strength) 5 % PTCH  Care Giver, Outside Facility (Specify) No No   Sig: Apply 1 patch topically in the morning   Menthol-Methyl Salicylate (MURIEL AGUDELO GREASELESS) 10-15 % greaseless cream   No No   Sig: Apply topically 2 (two) times a day   OLANZapine (ZyPREXA) 20 MG tablet   No No   Sig: Take 1 tablet (20 mg total) by mouth daily at bedtime   PHENobarbital 64.8 mg tablet  Care Giver, Outside Facility (Specify) No No   Sig: Take 1 tablet (64.8 mg total) by mouth every 12 (twelve) hours   TiZANidine (Zanaflex) 2 MG capsule  Self Yes No   Sig: Take 2 mg by mouth in the morning Daily prn   alendronate (FOSAMAX) 70 mg tablet   No No   Sig: Take 1 tablet (70 mg total) by mouth every 7 days Every 7 days on Fridays   asenapine (Saphris) 10 mg SL tablet  Care Giver Yes No   Sig: Place 5 mg under the tongue 2 (two) times a day   atorvastatin (LIPITOR) 20 mg tablet   No No   Sig: Take 1 tablet (20 mg total) by mouth daily   benzonatate (TESSALON) 200 MG capsule  Self Yes No   Sig: Take 200 mg by  mouth 2 (two) times a day as needed for cough   bismuth subsalicylate (PEPTO BISMOL) 262 MG chewable tablet  Care Giver Yes No   Sig: Chew 524 mg every 6 (six) hours as needed for indigestion For diarrhea   busPIRone (BUSPAR) 15 mg tablet  Care Giver Yes No   Sig: Take 15 mg by mouth 2 (two) times a day   calcium carbonate-vitamin D 500 mg-5 mcg per tablet  Care Giver, Outside Facility (Specify) Yes No   Sig: Take 1 tablet by mouth daily with breakfast   cetirizine (ZyrTEC) 5 MG chewable tablet   Yes No   Sig: Chew 5 mg daily at bedtime One daily prn for allergies   chlorhexidine (PERIDEX) 0.12 % solution  Care Giver Yes No   Sig: Apply 15 mL to the mouth or throat 2 (two) times a day   clonazePAM (KlonoPIN) 1 mg tablet  Care Giver, Outside Facility (Specify) No No   Sig: Take 1 tablet (1 mg total) by mouth 2 (two) times a day   Patient not taking: Reported on 2025   diphenhydrAMINE (BENADRYL) 25 mg capsule  Care Giver Yes No   Sig: Take 25 mg by mouth daily at bedtime as needed for itching   divalproex sodium (DEPAKOTE) 500 mg DR tablet  Self Yes No   Sig: Take 500 mg by mouth 3 qhs   docusate sodium (COLACE) 100 mg capsule  Care Giver Yes No   Sig: Take 100 mg by mouth 2 (two) times a day as needed for constipation   fluticasone (FLONASE) 50 mcg/act nasal spray  Care Giver, Outside Facility (Specify) No No   Si sprays into each nostril daily   gabapentin (NEURONTIN) 100 mg capsule   No No   Sig: Take 2 capsules (200 mg total) by mouth 2 (two) times a day   guaiFENesin 400 mg   No No   Sig: Take 1 tablet (400 mg total) by mouth every 6 (six) hours as needed for cough   levothyroxine 100 mcg tablet  Care Giver, Outside Facility (Specify) No No   Sig: Take 1 tablet (100 mcg total) by mouth daily   lidocaine (LIDODERM) 5 %  Care Giver Yes No   Sig: Apply 1 patch topically daily Remove & Discard patch within 12 hours or as directed by MD for back prn   lidocaine (LMX) 4 % cream   Yes No   Sig: Apply topically  "as needed for mild pain   loxapine (LOXITANE) 50 MG capsule   Yes No   Sig: Take 50 mg by mouth in the morning   melatonin 3 mg   No No   Sig: Take 2 tablets (6 mg total) by mouth daily at bedtime   multivitamin (THERAGRAN) TABS   Yes No   Sig: Take 1 tablet by mouth daily   mupirocin (BACTROBAN) 2 % ointment   Yes No   Sig: Apply topically 2 (two) times a day as needed Left knee bid prn   polyethylene glycol (GLYCOLAX) 17 GM/SCOOP  Outside Facility (Specify), Care Giver No No   Sig: MIX 1 CAPFUL(17GM) IN 8OZ OF LIQUID AND DRINK DAILY @ 8AM(CONSTIPATION) *AHMAD   polyethylene glycol (GLYCOLAX) 17 GM/SCOOP powder   Yes No   Sig: Take 17 g by mouth daily   senna-docusate sodium (SENOKOT-S) 8.6-50 mg per tablet  Care Giver, Outside Facility (Specify) No No   Sig: Take 1 tablet by mouth daily at bedtime   sodium chloride (OCEAN) 0.65 % nasal spray  Care Giver Yes No   Si spray into each nostril as needed for congestion As directed up to 6x per day for nasal dryness   traZODone (DESYREL) 150 mg tablet   No No   Sig: Take 1 tablet (150 mg total) by mouth daily at bedtime   trihexyphenidyl (ARTANE) 2 mg tablet   No No   Sig: Take 1 tablet (2 mg total) by mouth 2 (two) times a day      Facility-Administered Medications: None       Allergies   Allergen Reactions    Clozapine Other (See Comments)     low white blood count  Other reaction(s): Other (See Comments)  low white blood count    Haloperidol Other (See Comments)     EPS  Other reaction(s): Other (See Comments)  EPS    Lithium Other (See Comments)     Toxicity    Prolixin [Fluphenazine] Hyperactivity and Other (See Comments)     EPS, Hyperactivity  EPS, Hyperactivity    Valproic Acid Confusion and Other (See Comments)     \"Mental confusion\"  \"Mental confusion\"       PHYSICAL EXAM    PE limited by: ***    Objective   Vitals:   First set: Temperature: 98 °F (36.7 °C) (25 0740)  Pulse: 76 (25 0731)  Respirations: 20 (25 0740)  Blood Pressure: (!) " 178/67 (05/07/25 0731)  SpO2: 98 % (05/07/25 0731)    Primary Survey:   (A) Airway: ***  (B) Breathing: ***  (C) Circulation: Pulses:   {Pulses:55758}  (D) Disabliity:  {GCS response:39556}  (E) Expose:  {Completed:10608}    Secondary Survey: (Click on Physical Exam tab above)  Physical Exam    Cervical spine cleared by clinical criteria? {YES/NO:36478}     Invasive Devices       Peripheral Intravenous Line  Duration             Peripheral IV 05/07/25 Left Antecubital <1 day                    Lab Results:   Results Reviewed       Procedure Component Value Units Date/Time    Rapid drug screen, urine [[id number]]  (Abnormal) Collected: 05/07/25 0825    Lab Status: Final result Specimen: Urine, Other Updated: 05/07/25 1017     Amph/Meth UR Negative     Barbiturate Ur Positive     Benzodiazepine Urine Negative     Cocaine Urine Negative     Methadone Urine Negative     Opiate Urine Negative     PCP Ur Negative     THC Urine Negative     Oxycodone Urine Negative     Fentanyl Urine Negative     HYDROCODONE URINE Negative    Narrative:      Presumptive report. If requested, specimen will be sent to reference lab for confirmation.  FOR MEDICAL PURPOSES ONLY.   IF CONFIRMATION NEEDED PLEASE CONTACT THE LAB WITHIN 5 DAYS.    Drug Screen Cutoff Levels:  AMPHETAMINE/METHAMPHETAMINES  1000 ng/mL  BARBITURATES     200 ng/mL  BENZODIAZEPINES     200 ng/mL  COCAINE      300 ng/mL  METHADONE      300 ng/mL  OPIATES      300 ng/mL  PHENCYCLIDINE     25 ng/mL  THC       50 ng/mL  OXYCODONE      100 ng/mL  FENTANYL      5 ng/mL  HYDROCODONE     300 ng/mL    Ethanol [[id number]]  (Normal) Collected: 05/07/25 0840    Lab Status: Final result Specimen: Blood from Arm, Left Updated: 05/07/25 0908     Ethanol Lvl <10 mg/dL     TSH, 3rd generation with Free T4 reflex [[id number]]  (Normal) Collected: 05/07/25 0805    Lab Status: Final result Specimen: Blood from Arm, Left Updated: 05/07/25 0854     TSH 3RD GENERATON 3.021 uIU/mL     UA w  Reflex to Microscopic w Reflex to Culture [[id number]] Collected: 05/07/25 0825    Lab Status: Final result Specimen: Urine, Other Updated: 05/07/25 0844     Color, UA Colorless     Clarity, UA Clear     Specific Gravity, UA 1.014     pH, UA 7.0     Leukocytes, UA Negative     Nitrite, UA Negative     Protein, UA Negative mg/dl      Glucose, UA Negative mg/dl      Ketones, UA Negative mg/dl      Urobilinogen, UA <2.0 mg/dl      Bilirubin, UA Negative     Occult Blood, UA Negative    Protime-INR [[id number]]  (Normal) Collected: 05/07/25 0805    Lab Status: Final result Specimen: Blood from Arm, Left Updated: 05/07/25 0843     Protime 12.6 seconds      INR 0.92    Narrative:      INR Therapeutic Range    Indication                                             INR Range      Atrial Fibrillation                                               2.0-3.0  Hypercoagulable State                                    2.0.2.3  Left Ventricular Asist Device                            2.0-3.0  Mechanical Heart Valve                                  -    Aortic(with afib, MI, embolism, HF, LA enlargement,    and/or coagulopathy)                                     2.0-3.0 (2.5-3.5)     Mitral                                                             2.5-3.5  Prosthetic/Bioprosthetic Heart Valve               2.0-3.0  Venous thromboembolism (VTE: VT, PE        2.0-3.0    APTT [[id number]]  (Normal) Collected: 05/07/25 0805    Lab Status: Final result Specimen: Blood from Arm, Left Updated: 05/07/25 0843     PTT 25 seconds     Lactic acid [[id number]]  (Normal) Collected: 05/07/25 0805    Lab Status: Final result Specimen: Blood from Arm, Left Updated: 05/07/25 0841     LACTIC ACID 1.6 mmol/L     Narrative:      Result may be elevated if tourniquet was used during collection.    Comprehensive metabolic panel [[id number]]  (Abnormal) Collected: 05/07/25 0805    Lab Status: Final result Specimen: Blood from Arm, Left Updated: 05/07/25 0840      Sodium 136 mmol/L      Potassium 4.0 mmol/L      Chloride 103 mmol/L      CO2 28 mmol/L      ANION GAP 5 mmol/L      BUN 11 mg/dL      Creatinine 0.71 mg/dL      Glucose 95 mg/dL      Calcium 9.3 mg/dL      AST 12 U/L      ALT 8 U/L      Alkaline Phosphatase 70 U/L      Total Protein 6.8 g/dL      Albumin 4.0 g/dL      Total Bilirubin 0.33 mg/dL      eGFR 85 ml/min/1.73sq m     Narrative:      National Kid[employer] Foundation guidelines for Chronic Kidney Disease (CKD):     Stage 1 with normal or high GFR (GFR > 90 mL/min/1.73 square meters)    Stage 2 Mild CKD (GFR = 60-89 mL/min/1.73 square meters)    Stage 3A Moderate CKD (GFR = 45-59 mL/min/1.73 square meters)    Stage 3B Moderate CKD (GFR = 30-44 mL/min/1.73 square meters)    Stage 4 Severe CKD (GFR = 15-29 mL/min/1.73 square meters)    Stage 5 End Stage CKD (GFR <15 mL/min/1.73 square meters)  Note: GFR calculation is accurate only with a steady state creatinine    Phosphorus [[id number]]  (Normal) Collected: 05/07/25 0805    Lab Status: Final result Specimen: Blood from Arm, Left Updated: 05/07/25 0840     Phosphorus 3.9 mg/dL     Magnesium [[id number]]  (Normal) Collected: 05/07/25 0805    Lab Status: Final result Specimen: Blood from Arm, Left Updated: 05/07/25 0840     Magnesium 1.9 mg/dL     Salicylate level [[id number]]  (Abnormal) Collected: 05/07/25 0805    Lab Status: Final result Specimen: Blood from Arm, Left Updated: 05/07/25 0840     Salicylate Lvl <5 mg/dL     Acetaminophen level-If concentration is detectable, please discuss with medical  on call. [[id number]]  (Abnormal) Collected: 05/07/25 0805    Lab Status: Final result Specimen: Blood from Arm, Left Updated: 05/07/25 0840     Acetaminophen Level <2 ug/mL     CBC and differential [[id number]]  (Abnormal) Collected: 05/07/25 0805    Lab Status: Final result Specimen: Blood from Arm, Left Updated: 05/07/25 0817     WBC 3.61 Thousand/uL      RBC 4.03 Million/uL      Hemoglobin 13.0  g/dL      Hematocrit 39.9 %      MCV 99 fL      MCH 32.3 pg      MCHC 32.6 g/dL      RDW 12.9 %      MPV 9.8 fL      Platelets 257 Thousands/uL      nRBC 0 /100 WBCs      Segmented % 59 %      Immature Grans % 0 %      Lymphocytes % 32 %      Monocytes % 9 %      Eosinophils Relative 0 %      Basophils Relative 0 %      Absolute Neutrophils 2.10 Thousands/µL      Absolute Immature Grans 0.01 Thousand/uL      Absolute Lymphocytes 1.17 Thousands/µL      Absolute Monocytes 0.33 Thousand/µL      Eosinophils Absolute 0.00 Thousand/µL      Basophils Absolute 0.00 Thousands/µL     Fingerstick Glucose (POCT) [[id number]]  (Normal) Collected: 05/07/25 0809    Lab Status: Final result Specimen: Blood Updated: 05/07/25 0810     POC Glucose 87 mg/dl                    Imaging Studies:   Direct to CT: {YES/NO:29837}  TRAUMA - CT head wo contrast   Final Result by Christiane Castellon MD (05/07 0904)   No intracranial hemorrhage or calvarial fracture.         The study was marked in EPIC for immediate notification.               Resident: James Donis I, the attending radiologist, have reviewed the images and agree with the final report above.      Workstation performed: [id number]         TRAUMA - CT spine cervical wo contrast   Final Result by Christiane Castellon MD (05/07 0931)      No cervical spine fracture or traumatic malalignment.      The study was marked in EPIC for immediate notification.            Resident: James Donis I, the attending radiologist, have reviewed the images and agree with the final report above.      Workstation performed: [id number]         TRAUMA - CT chest abdomen pelvis w contrast   Final Result by Christiane Castellon MD (05/07 0951)      No findings of acute traumatic injury in the chest, abdomen or pelvis.      Unchanged right adnexal cystic lesion measuring about 7 cm, for which pelvic ultrasound is again recommended.      The study was marked in EPIC for immediate notification.         Resident: James  Jewels DODGE, the attending radiologist, have reviewed the images and agree with the final report above.      Workstation performed: [id number]               Procedures  Procedures         ED Course  ED Course as of 05/07/25 1020   Wed May 07, 2025   0935 TRAUMA - CT head wo contrast  No intracranial hemorrhage or calvarial fracture.   0935 TRAUMA - CT spine cervical wo contrast  No cervical spine fracture or traumatic malalignment.   1019 TRAUMA - CT chest abdomen pelvis w contrast  No findings of acute traumatic injury in the chest, abdomen or pelvis.     Unchanged right adnexal cystic lesion measuring about 7 cm, for which pelvic ultrasound is again recommended.           Medical Decision Making  Amount and/or Complexity of Data Reviewed  Labs: ordered.  Radiology: ordered. Decision-making details documented in ED Course.    Risk  Prescription drug management.                Disposition  [facility] Transfer: {YES/NO:78366}  Final diagnoses:   None     ED Disposition       None          Follow-up Information    None       Patient's Medications   Discharge Prescriptions    No medications on file     No discharge procedures on file.    PDMP Review         Value Time User    PDMP Reviewed  Yes 3/28/2025  2:53 PM Marquis Osborne MD            ED Provider  Electronically Signed by

## 2025-05-07 NOTE — ASSESSMENT & PLAN NOTE
Patient on Depakote and phenobarbital.  Please see above regarding Depakote, will defer phenobarbital to primary team or neurology.

## 2025-05-07 NOTE — ASSESSMENT & PLAN NOTE
Last seen by [facility] on 3/22 via telemedicine  Home regimen:  Klonopin 1 mg twice daily, Depakote DR 1500 mg at bedtime, gabapentin 300 mg twice daily, and Zyprexa 20 mg at bedtime    Plan:  - Psych consulted, appreciate recs  - Continue home meds above for now

## 2025-05-07 NOTE — ASSESSMENT & PLAN NOTE
Facial laceration on on left frontal noted due to fall from jumping out the window at facility. Initially refused sutures and began to get combative. Eventfully able to get glued and cleaned.

## 2025-05-07 NOTE — ED ATTENDING ATTESTATION
Final Diagnosis:  1. Altered mental status    2. Seizure disorder (HCC)    3. Schizoaffective disorder, unspecified type (HCC)      ED Course as of 05/07/25 1533   Wed May 07, 2025   0832 WBC(!): 3.61   0832 Hemoglobin: 13.0   0832 Platelet Count: 257       I, Andriy Drummond MD, saw and evaluated the patient. All available labs and X-rays were ordered by me or the resident and have been reviewed by myself. I discussed the patient with the resident / non-physician and agree with the resident's / non-physician practitioner's findings and plan as documented in the resident's / non-physician practicitioner's note, except where noted.   At this point, I agree with the current assessment done in the ED.   I was present during key portions of all procedures performed unless otherwise stated.     Trauma Alert: Trauma Acuity: C  Model of Arrival: Mode of Arrival: BLS via    Trauma Team: Current Providers  Attending Provider: Andriy Drummond MD  Attending Provider: Chavez Paul MD  Resident: Jamee Javed DO  Registered Nurse: Jovana France, RN  Registered Nurse: Pushpa Gilmore RN  Registered Nurse: Helena Nieves RN  Registered Nurse: Tomasz Camargo RN  : DOROTEO Kwon  : Rufina Dumont  Patient Care Assistant: Miladis Nair  Registered Nurse: Raven Santos RN  Patient Care Assistant: Steve Kaiser  Code Status: Level 3 - DNAR and DNI  Advance Directive and Living Will:      Power of :    POLST:    Mechanism:  Details of Incident: ran away from group home. fall out of window. +HS, +ASA  Injury Date: 05/07/25  Injury Time: 0700       Chief Complaint   Patient presents with    Fall     This is a 69 y.o. female with baseline schizoaffective + seizure disorder presenting for evaluation of abnormal behavior.  The patient states that her facility likes to eat [employer] people and she is not [employer] so she jumped out of the window but she broke her  fall with her forehead causing multiple lacerations around the left orbit.  Offers no complaints of fevers chills nausea vomiting chest pain shortness of breath dizziness lightheadedness.  She states that she does not want to go back to her facility, she does not want to go to [facility] either.      PMH:   has a past medical history of Anxiety, Benign essential hypertension, Depression, Depression with anxiety, Fracture of fifth metatarsal bone of right foot with delayed healing, Hyperlipidemia, Hypothyroidism, Insomnia, Obesity, Schizoaffective disorder (HCC), Seizure disorder (HCC), and Seizures (HCC).    PSH:   has a past surgical history that includes Colonoscopy and pr colonoscopy flx dx w/collj spec when pfrmd (N/A, 2018).    Social:  Social History     Substance and Sexual Activity   Alcohol Use Not Currently     Social History     Tobacco Use   Smoking Status Former    Current packs/day: 0.00    Average packs/day: 0.5 packs/day for 21.0 years (10.5 ttl pk-yrs)    Types: Cigarettes    Start date:     Quit date:     Years since quittin.3   Smokeless Tobacco Never     Social History     Substance and Sexual Activity   Drug Use No     PE:  Temperature: 98 °F (36.7 °C) (25 0740)  Pulse: 76 (25 0731)  Respirations: 20 (25 0740)  Blood Pressure: (!) 178/67 (25 0731)  SpO2: 98 % (25 07)    Primary Survey:   (A) Airway: intact  (B) Breathing: bilateral breath sounds   (C) Circulation: Pulses:   normal  (D) Disabliity:  GCS Total:  15  (E) Expose:  Completed    Secondary Survey:  General: VSS, NAD, awake, alert. Well-nourished, well-developed. Appears stated age.   Speaking normally in full sentences.   Head: Normocephalic, traumatic, nontender.  Large hematoma around the left orbit with 2 linear lacerations, minimal tension though, so not gaping.   Eyes: PERRL, EOM-I. No diplopia.   No hyphema.   No subconjunctival hemorrhages.  Symmetrical lids.   ENT:  Atraumatic external nose and ears.    MMM  No malocclusion. No stridor. Normal phonation. No drooling. Normal swallowing.   Neck: Symmetric, trachea midline. No JVD.  CV: RRR. +S1/S2  No murmurs or gallops  Peripheral pulses +2 throughout. No chest wall tenderness.   Lungs:   Unlabored No retractions  CTAB, lungs sounds equal bilateral.   No tachypnea.   Abd: +BS, soft, NT/ND.   Bruising on the abdomen but nontender   MSK:   FROM   Back:   No rashes  Skin: Dry, intact.   Neuro: AAOx3, GCS 15, CN II-XII grossly intact.   Motor grossly intact.  Psychiatric/Behavioral: Appropriate mood and affect   Exam: deferred    A:  - Fall  P:  - CTH/CTC/CTCAP given age + story  - Labs given abnormal behavior  - did she have a seizure? Prompted abnormal behavior?   - Admit obs.     - 13 point ROS was performed and all are normal unless stated in the history above.   - Nursing note reviewed. Vitals reviewed.   - Orders placed by myself and/or advanced practitioner / resident.    - Previous chart was reviewed  - No language barrier.   - History obtained from patient.   - There are no limitations to the history obtained.   - Critical care time: Not applicable for this patient.     Medications   alendronate (FOSAMAX) tablet 70 mg ( Oral Held Dose 5/21/25 1100)   aspirin chewable tablet 81 mg (81 mg Oral Given 5/7/25 1153)   atorvastatin (LIPITOR) tablet 20 mg (20 mg Oral Given 5/7/25 1153)   clonazePAM (KlonoPIN) tablet 1 mg (1 mg Oral Given 5/7/25 1153)   divalproex sodium (DEPAKOTE) DR tablet 1,500 mg (has no administration in time range)   fluticasone (FLONASE) 50 mcg/act nasal spray 2 spray (2 sprays Nasal Given 5/7/25 1248)   gabapentin (NEURONTIN) capsule 300 mg (300 mg Oral Given 5/7/25 1153)   levothyroxine tablet 100 mcg (100 mcg Oral Given 5/7/25 1153)   melatonin tablet 6 mg (has no administration in time range)   OLANZapine (ZyPREXA) tablet 20 mg (has no administration in time range)   PHENobarbital tablet 64.8 mg (64.8  mg Oral Given 5/7/25 1153)   senna-docusate sodium (SENOKOT S) 8.6-50 mg per tablet 1 tablet (has no administration in time range)   traZODone (DESYREL) tablet 150 mg (has no administration in time range)   trihexyphenidyl (ARTANE) tablet 2 mg (2 mg Oral Given 5/7/25 1248)   enoxaparin (LOVENOX) subcutaneous injection 40 mg (40 mg Subcutaneous Given 5/7/25 1153)   iohexol (OMNIPAQUE) 350 MG/ML injection (MULTI-DOSE) 100 mL (100 mL Intravenous Given 5/7/25 5721)     TRAUMA - CT head wo contrast   Final Result   No intracranial hemorrhage or calvarial fracture.         The study was marked in EPIC for immediate notification.               Resident: James Donis I, the attending radiologist, have reviewed the images and agree with the final report above.      Workstation performed: [id number]         TRAUMA - CT spine cervical wo contrast   Final Result      No cervical spine fracture or traumatic malalignment.      The study was marked in EPIC for immediate notification.            Resident: James Donis I, the attending radiologist, have reviewed the images and agree with the final report above.      Workstation performed: [id number]         TRAUMA - CT chest abdomen pelvis w contrast   Final Result      No findings of acute traumatic injury in the chest, abdomen or pelvis.      Unchanged right adnexal cystic lesion measuring about 7 cm, for which pelvic ultrasound is again recommended.      The study was marked in EPIC for immediate notification.         Resident: James Donis I, the attending radiologist, have reviewed the images and agree with the final report above.      Workstation performed: [id number]           Orders Placed This Encounter   Procedures    MRSA culture    TRAUMA - CT head wo contrast    TRAUMA - CT spine cervical wo contrast    TRAUMA - CT chest abdomen pelvis w contrast    CBC and differential    Comprehensive metabolic panel    Lactic acid    Protime-INR    APTT    UA  w Reflex to Microscopic w Reflex to Culture    Rapid drug screen, urine    TSH, 3rd generation with Free T4 reflex    Phosphorus    Magnesium    Ethanol    Salicylate level    Acetaminophen level-If concentration is detectable, please discuss with medical  on call.    Lactate dehydrogenase    Basic metabolic panel    CBC and Platelet    Valproic acid level, free    Diet Regular; Regular House    Insert peripheral IV    Fingerstick Glucose (POCT)    Nursing communication Continue IV as ordered.    Notify admitting physician    Notify admitting physician on arrival    Vital Signs per unit routine    I/O    Activity as Tolerated    Out of Bed to Chair    Insert peripheral IV    Maintain IV access    Apply SCD or Foot pumps    1:1 Continual Observation    Level 3 - DNAR (Do Not Attempt Resuscitation) and DNI (Do Not Intubate)    Inpatient consult to Case Management    Inpatient consult to Psychiatry    Inpatient Consult to Wound Care Nurse    INPATIENT ADMISSION    Fall precautions     Labs Reviewed   CBC AND DIFFERENTIAL - Abnormal       Result Value Ref Range Status    WBC 3.61 (*) 4.31 - 10.16 Thousand/uL Final    RBC 4.03  3.81 - 5.12 Million/uL Final    Hemoglobin 13.0  11.5 - 15.4 g/dL Final    Hematocrit 39.9  34.8 - 46.1 % Final    MCV 99 (*) 82 - 98 fL Final    MCH 32.3  26.8 - 34.3 pg Final    MCHC 32.6  31.4 - 37.4 g/dL Final    RDW 12.9  11.6 - 15.1 % Final    MPV 9.8  8.9 - 12.7 fL Final    Platelets 257  149 - 390 Thousands/uL Final    nRBC 0  /100 WBCs Final    Segmented % 59  43 - 75 % Final    Immature Grans % 0  0 - 2 % Final    Lymphocytes % 32  14 - 44 % Final    Monocytes % 9  4 - 12 % Final    Eosinophils Relative 0  0 - 6 % Final    Basophils Relative 0  0 - 1 % Final    Absolute Neutrophils 2.10  1.85 - 7.62 Thousands/µL Final    Absolute Immature Grans 0.01  0.00 - 0.20 Thousand/uL Final    Absolute Lymphocytes 1.17  0.60 - 4.47 Thousands/µL Final    Absolute Monocytes 0.33  0.17 -  1.22 Thousand/µL Final    Eosinophils Absolute 0.00  0.00 - 0.61 Thousand/µL Final    Basophils Absolute 0.00  0.00 - 0.10 Thousands/µL Final   COMPREHENSIVE METABOLIC PANEL - Abnormal    Sodium 136  135 - 147 mmol/L Final    Potassium 4.0  3.5 - 5.3 mmol/L Final    Chloride 103  96 - 108 mmol/L Final    CO2 28  21 - 32 mmol/L Final    ANION GAP 5  4 - 13 mmol/L Final    BUN 11  5 - 25 mg/dL Final    Creatinine 0.71  0.60 - 1.30 mg/dL Final    Comment: Standardized to IDMS reference method    Glucose 95  65 - 140 mg/dL Final    Comment: If the patient is fasting, the ADA then defines impaired fasting glucose as > 100 mg/dL and diabetes as > or equal to 123 mg/dL.    Calcium 9.3  8.4 - 10.2 mg/dL Final    AST 12 (*) 13 - 39 U/L Final    ALT 8  7 - 52 U/L Final    Comment: Specimen collection should occur prior to Sulfasalazine administration due to the potential for falsely depressed results.     Alkaline Phosphatase 70  34 - 104 U/L Final    Total Protein 6.8  6.4 - 8.4 g/dL Final    Albumin 4.0  3.5 - 5.0 g/dL Final    Total Bilirubin 0.33  0.20 - 1.00 mg/dL Final    Comment: Use of this assay is not recommended for patients undergoing treatment with eltrombopag due to the potential for falsely elevated results.  N-acetyl-p-benzoquinone imine (metabolite of Acetaminophen) will generate erroneously low results in samples for patients that have taken an overdose of Acetaminophen.    eGFR 85  ml/min/1.73sq m Final    Narrative:     National Kidney Disease Foundation guidelines for Chronic Kidney Disease (CKD):     Stage 1 with normal or high GFR (GFR > 90 mL/min/1.73 square meters)    Stage 2 Mild CKD (GFR = 60-89 mL/min/1.73 square meters)    Stage 3A Moderate CKD (GFR = 45-59 mL/min/1.73 square meters)    Stage 3B Moderate CKD (GFR = 30-44 mL/min/1.73 square meters)    Stage 4 Severe CKD (GFR = 15-29 mL/min/1.73 square meters)    Stage 5 End Stage CKD (GFR <15 mL/min/1.73 square meters)  Note: GFR calculation is  accurate only with a steady state creatinine   RAPID DRUG SCREEN, URINE - Abnormal    Amph/Meth UR Negative  Negative Final    Barbiturate Ur Positive (*) Negative Final    Benzodiazepine Urine Negative  Negative Final    Cocaine Urine Negative  Negative Final    Methadone Urine Negative  Negative Final    Opiate Urine Negative  Negative Final    PCP Ur Negative  Negative Final    THC Urine Negative  Negative Final    Oxycodone Urine Negative  Negative Final    Fentanyl Urine Negative  Negative Final    HYDROCODONE URINE Negative  Negative Final    Narrative:     Presumptive report. If requested, specimen will be sent to reference lab for confirmation.  FOR MEDICAL PURPOSES ONLY.   IF CONFIRMATION NEEDED PLEASE CONTACT THE LAB WITHIN 5 DAYS.    Drug Screen Cutoff Levels:  AMPHETAMINE/METHAMPHETAMINES  1000 ng/mL  BARBITURATES     200 ng/mL  BENZODIAZEPINES     200 ng/mL  COCAINE      300 ng/mL  METHADONE      300 ng/mL  OPIATES      300 ng/mL  PHENCYCLIDINE     25 ng/mL  THC       50 ng/mL  OXYCODONE      100 ng/mL  FENTANYL      5 ng/mL  HYDROCODONE     300 ng/mL   SALICYLATE LEVEL - Abnormal    Salicylate Lvl <5 (*) 5 - 20 mg/dL Final   ACETAMINOPHEN LEVEL - Abnormal    Acetaminophen Level <2 (*) 10 - 20 ug/mL Final   LACTIC ACID, PLASMA (W/REFLEX IF RESULT > 2.0) - Normal    LACTIC ACID 1.6  0.5 - 2.0 mmol/L Final    Narrative:     Result may be elevated if tourniquet was used during collection.   PROTIME-INR - Normal    Protime 12.6  12.3 - 15.0 seconds Final    INR 0.92  0.85 - 1.19 Final    Narrative:     INR Therapeutic Range    Indication                                             INR Range      Atrial Fibrillation                                               2.0-3.0  Hypercoagulable State                                    2.0.2.3  Left Ventricular Asist Device                            2.0-3.0  Mechanical Heart Valve                                  -    Aortic(with afib, MI, embolism, HF, LA  enlargement,    and/or coagulopathy)                                     2.0-3.0 (2.5-3.5)     Mitral                                                             2.5-3.5  Prosthetic/Bioprosthetic Heart Valve               2.0-3.0  Venous thromboembolism (VTE: VT, PE        2.0-3.0   APTT - Normal    PTT 25  23 - 34 seconds Final    Comment: Therapeutic Heparin Range =  60-90 seconds   TSH, 3RD GENERATION WITH FREE T4 REFLEX - Normal    TSH 3RD GENERATON 3.021  0.450 - 4.500 uIU/mL Final    Comment: The recommended reference ranges for TSH during pregnancy are as follows:   First trimester 0.100 to 2.500 uIU/mL   Second trimester  0.200 to 3.000 uIU/mL   Third trimester 0.300 to 3.000 uIU/m    Note: Normal ranges may not apply to patients who are transgender, non-binary, or whose legal sex, sex at birth, and gender identity differ.  Adult TSH (3rd generation) reference range follows the recommended guidelines of the American Thyroid Association, January, 2020.   PHOSPHORUS - Normal    Phosphorus 3.9  2.3 - 4.1 mg/dL Final   MAGNESIUM - Normal    Magnesium 1.9  1.9 - 2.7 mg/dL Final   MEDICAL ALCOHOL - Normal    Ethanol Lvl <10  <10 mg/dL Final   POCT GLUCOSE - Normal    POC Glucose 87  65 - 140 mg/dl Final   UA W REFLEX TO MICROSCOPIC WITH REFLEX TO CULTURE    Color, UA Colorless   Final    Clarity, UA Clear   Final    Specific Gravity, UA 1.014  1.003 - 1.030 Final    pH, UA 7.0  4.5, 5.0, 5.5, 6.0, 6.5, 7.0, 7.5, 8.0 Final    Leukocytes, UA Negative  Negative Final    Nitrite, UA Negative  Negative Final    Protein, UA Negative  Negative mg/dl Final    Glucose, UA Negative  Negative mg/dl Final    Ketones, UA Negative  Negative mg/dl Final    Urobilinogen, UA <2.0  <2.0 mg/dl mg/dl Final    Bilirubin, UA Negative  Negative Final    Occult Blood, UA Negative  Negative Final   COMA PANEL    Narrative:     The following orders were created for panel order Coma Panel.  Procedure                                Abnormality         Status                     ---------                               -----------         ------                     Ethanol[[id number]]                      Normal              Final result               Salicylate level[[id number]]             Abnormal            Final result               Acetaminophen level-If c...[[id number]]  Abnormal            Final result                 Please view results for these tests on the individual orders.     Time reflects when diagnosis was documented in both MDM as applicable and the Disposition within this note       Time User Action Codes Description Comment    5/7/2025 10:24 AM Jamee Sebastian [R41.82] Altered mental status     5/7/2025 10:56 AM Earnest Appiah Add [F25.9] Schizoaffective disorder, unspecified type (HCC)     5/7/2025 10:57 AM Gerard Jonna Cardenasi Remove [F25.9] Schizoaffective disorder, unspecified type (HCC)     5/7/2025 11:00 AM Gerard Earnest Cardenas Add [G40.909] Seizure disorder (HCC)     5/7/2025 11:00 AM Jonna Appiahi Add [F25.9] Schizoaffective disorder, unspecified type (HCC)           ED Disposition       ED Disposition   Admit    Condition   Stable    Date/Time   Wed May 7, 2025 10:24 AM    Comment   Case was discussed with SOD.               Follow-up Information    None       Current Discharge Medication List        CONTINUE these medications which have NOT CHANGED    Details   Acetaminophen Extra Strength 500 MG TABS TAKE 2 TABLETS (1000MG) BY MOUTH 3X DAILY AS NEEDED FOR MILD PAIN/FEVER *AHMAD  Qty: 180 tablet, Refills: 1    Associated Diagnoses: Acute pain of right knee      alendronate (FOSAMAX) 70 mg tablet Take 1 tablet (70 mg total) by mouth every 7 days Every 7 days on Fridays  Qty: 12 tablet, Refills: 1    Associated Diagnoses: Osteoporosis, unspecified osteoporosis type, unspecified pathological fracture presence      asenapine (Saphris) 10 mg SL tablet Place 5 mg under the tongue 2 (two) times a day      Aspirin Low Dose 81 MG  chewable tablet CHEW 1 TABLET BY MOUTH ONCE DAILY @8AM (CAD) *MIREYA  Qty: 30 tablet, Refills: 0    Associated Diagnoses: Abnormal ECG; T wave inversion on electrocardiogram      atorvastatin (LIPITOR) 20 mg tablet Take 1 tablet (20 mg total) by mouth daily  Qty: 30 tablet, Refills: 5    Associated Diagnoses: Mixed hyperlipidemia      benzonatate (TESSALON) 200 MG capsule Take 200 mg by mouth 2 (two) times a day as needed for cough      bismuth subsalicylate (PEPTO BISMOL) 262 MG chewable tablet Chew 524 mg every 6 (six) hours as needed for indigestion For diarrhea      busPIRone (BUSPAR) 15 mg tablet Take 15 mg by mouth 2 (two) times a day      !! Calcium Carbonate-Vitamin D (OSCAL 500/200 D-3 PO) Take by mouth      !! calcium carbonate-vitamin D 500 mg-5 mcg per tablet Take 1 tablet by mouth daily with breakfast      cetirizine (ZyrTEC) 5 MG chewable tablet Chew 5 mg daily at bedtime One daily prn for allergies      chlorhexidine (PERIDEX) 0.12 % solution Apply 15 mL to the mouth or throat 2 (two) times a day      clonazePAM (KlonoPIN) 1 mg tablet Take 1 tablet (1 mg total) by mouth 2 (two) times a day  Qty: 60 tablet, Refills: 0    Associated Diagnoses: Agitation      Diclofenac Sodium (VOLTAREN) 1 % Apply 2 g topically 4 (four) times a day To knee      diphenhydrAMINE (BENADRYL) 25 mg capsule Take 25 mg by mouth daily at bedtime as needed for itching      divalproex sodium (DEPAKOTE) 500 mg DR tablet Take 500 mg by mouth 3 qhs      docusate sodium (COLACE) 100 mg capsule Take 100 mg by mouth 2 (two) times a day as needed for constipation      Emollient (cetaphil) cream Apply topically as needed for dry skin      fluticasone (FLONASE) 50 mcg/act nasal spray 2 sprays into each nostril daily  Qty: 9.9 mL, Refills: 0    Associated Diagnoses: Seasonal allergies      gabapentin (NEURONTIN) 100 mg capsule Take 2 capsules (200 mg total) by mouth 2 (two) times a day  Qty: 120 capsule, Refills: 0    Associated Diagnoses:  Agitation      guaiFENesin 400 mg Take 1 tablet (400 mg total) by mouth every 6 (six) hours as needed for cough  Qty: 45 tablet, Refills: 0    Associated Diagnoses: Chronic cough      Incontinence Supplies MISC Use if needed (incontinence) SIZE XL PULL UP DISPOSABLE BRIEFS  Qty: 100 each, Refills: 1    Associated Diagnoses: Other urinary incontinence      Incontinence Supply Disposable (Disposable Brief Large) MISC Use every 12 (twelve) hours  Qty: 36 each, Refills: 6    Associated Diagnoses: Stress incontinence of urine      L-Theanine 100 MG CAPS Take by mouth 2 (two) times a day      levothyroxine 100 mcg tablet Take 1 tablet (100 mcg total) by mouth daily  Qty: 30 tablet, Refills: 0    Associated Diagnoses: Acquired hypothyroidism      lidocaine (LIDODERM) 5 % Apply 1 patch topically daily Remove & Discard patch within 12 hours or as directed by MD for back prn      lidocaine (LMX) 4 % cream Apply topically as needed for mild pain      LORazepam (ATIVAN) 0.5 mg tablet Take 0.5 mg by mouth 2 (two) times a day as needed for anxiety      loxapine (LOXITANE) 50 MG capsule Take 50 mg by mouth in the morning      melatonin 3 mg Take 2 tablets (6 mg total) by mouth daily at bedtime  Qty: 60 tablet, Refills: 0    Associated Diagnoses: Insomnia      Menthol, Topical Analgesic, (Bengay Ultra Strength) 5 % PTCH Apply 1 patch topically in the morning  Qty: 30 patch, Refills: 2    Associated Diagnoses: Acute bilateral low back pain without sciatica      Menthol-Methyl Salicylate (MURIEL AGUDELO GREASELESS) 10-15 % greaseless cream Apply topically 2 (two) times a day  Qty: 57 g, Refills: 1    Associated Diagnoses: Chronic midline low back pain without sciatica      multivitamin (THERAGRAN) TABS Take 1 tablet by mouth daily      mupirocin (BACTROBAN) 2 % ointment Apply topically 2 (two) times a day as needed Left knee bid prn      OLANZapine (ZyPREXA) 20 MG tablet Take 1 tablet (20 mg total) by mouth daily at bedtime  Qty: 30 tablet,  Refills: 0    Associated Diagnoses: Schizoaffective disorder (HCC)      PHENobarbital 64.8 mg tablet Take 1 tablet (64.8 mg total) by mouth every 12 (twelve) hours  Qty: 62 tablet, Refills: 5    Associated Diagnoses: Seizure disorder (HCC)      !! polyethylene glycol (GLYCOLAX) 17 GM/SCOOP powder Take 17 g by mouth daily      !! polyethylene glycol (GLYCOLAX) 17 GM/SCOOP MIX 1 CAPFUL(17GM) IN 8OZ OF LIQUID AND DRINK DAILY @ 8AM(CONSTIPATION) *AHMAD  Qty: 510 g, Refills: 1    Associated Diagnoses: Other constipation      senna-docusate sodium (SENOKOT-S) 8.6-50 mg per tablet Take 1 tablet by mouth daily at bedtime  Qty: 30 tablet, Refills: 0    Associated Diagnoses: Constipation      sodium chloride (OCEAN) 0.65 % nasal spray 1 spray into each nostril as needed for congestion As directed up to 6x per day for nasal dryness      TiZANidine (Zanaflex) 2 MG capsule Take 2 mg by mouth in the morning Daily prn      traZODone (DESYREL) 150 mg tablet Take 1 tablet (150 mg total) by mouth daily at bedtime  Qty: 30 tablet, Refills: 0    Associated Diagnoses: Insomnia      trihexyphenidyl (ARTANE) 2 mg tablet Take 1 tablet (2 mg total) by mouth 2 (two) times a day  Qty: 60 tablet, Refills: 0    Associated Diagnoses: Parkinsonism (HCC)       !! - Potential duplicate medications found. Please discuss with provider.        No discharge procedures on file.  Prior to Admission Medications   Prescriptions Last Dose Informant Patient Reported? Taking?   Acetaminophen Extra Strength 500 MG TABS   No No   Sig: TAKE 2 TABLETS (1000MG) BY MOUTH 3X DAILY AS NEEDED FOR MILD PAIN/FEVER *AHMAD   Aspirin Low Dose 81 MG chewable tablet   No No   Sig: CHEW 1 TABLET BY MOUTH ONCE DAILY @8AM (CAD) *MIREYA   Calcium Carbonate-Vitamin D (OSCAL 500/200 D-3 PO)   Yes No   Sig: Take by mouth   Diclofenac Sodium (VOLTAREN) 1 %  Care Giver Yes No   Sig: Apply 2 g topically 4 (four) times a day To knee   Emollient (cetaphil) cream  Care Giver Yes No   Sig:  Apply topically as needed for dry skin   Incontinence Supplies MISC   No No   Sig: Use if needed (incontinence) SIZE XL PULL UP DISPOSABLE BRIEFS   Incontinence Supply Disposable (Disposable Brief Large) MISC   No No   Sig: Use every 12 (twelve) hours   L-Theanine 100 MG CAPS  Self Yes No   Sig: Take by mouth 2 (two) times a day   LORazepam (ATIVAN) 0.5 mg tablet  Self Yes No   Sig: Take 0.5 mg by mouth 2 (two) times a day as needed for anxiety   Menthol, Topical Analgesic, (Bengay Ultra Strength) 5 % PTCH  Care Giver, Outside Facility (Specify) No No   Sig: Apply 1 patch topically in the morning   Menthol-Methyl Salicylate (MURIEL AGUDELO GREASELESS) 10-15 % greaseless cream   No No   Sig: Apply topically 2 (two) times a day   OLANZapine (ZyPREXA) 20 MG tablet   No No   Sig: Take 1 tablet (20 mg total) by mouth daily at bedtime   PHENobarbital 64.8 mg tablet  Care Giver, Outside Facility (Specify) No No   Sig: Take 1 tablet (64.8 mg total) by mouth every 12 (twelve) hours   TiZANidine (Zanaflex) 2 MG capsule  Self Yes No   Sig: Take 2 mg by mouth in the morning Daily prn   alendronate (FOSAMAX) 70 mg tablet   No No   Sig: Take 1 tablet (70 mg total) by mouth every 7 days Every 7 days on Fridays   asenapine (Saphris) 10 mg SL tablet  Care Giver Yes No   Sig: Place 5 mg under the tongue 2 (two) times a day   atorvastatin (LIPITOR) 20 mg tablet   No No   Sig: Take 1 tablet (20 mg total) by mouth daily   benzonatate (TESSALON) 200 MG capsule  Self Yes No   Sig: Take 200 mg by mouth 2 (two) times a day as needed for cough   bismuth subsalicylate (PEPTO BISMOL) 262 MG chewable tablet  Care Giver Yes No   Sig: Chew 524 mg every 6 (six) hours as needed for indigestion For diarrhea   busPIRone (BUSPAR) 15 mg tablet  Care Giver Yes No   Sig: Take 15 mg by mouth 2 (two) times a day   calcium carbonate-vitamin D 500 mg-5 mcg per tablet  Care Giver, Outside Facility (Specify) Yes No   Sig: Take 1 tablet by mouth daily with breakfast    cetirizine (ZyrTEC) 5 MG chewable tablet   Yes No   Sig: Chew 5 mg daily at bedtime One daily prn for allergies   chlorhexidine (PERIDEX) 0.12 % solution  Care Giver Yes No   Sig: Apply 15 mL to the mouth or throat 2 (two) times a day   clonazePAM (KlonoPIN) 1 mg tablet  Care Giver, Outside Facility (Specify) No No   Sig: Take 1 tablet (1 mg total) by mouth 2 (two) times a day   Patient not taking: Reported on 2025   diphenhydrAMINE (BENADRYL) 25 mg capsule  Care Giver Yes No   Sig: Take 25 mg by mouth daily at bedtime as needed for itching   divalproex sodium (DEPAKOTE) 500 mg DR tablet  Self Yes No   Sig: Take 500 mg by mouth 3 qhs   docusate sodium (COLACE) 100 mg capsule  Care Giver Yes No   Sig: Take 100 mg by mouth 2 (two) times a day as needed for constipation   fluticasone (FLONASE) 50 mcg/act nasal spray  Care Giver, Outside Facility (Specify) No No   Si sprays into each nostril daily   gabapentin (NEURONTIN) 100 mg capsule   No No   Sig: Take 2 capsules (200 mg total) by mouth 2 (two) times a day   guaiFENesin 400 mg   No No   Sig: Take 1 tablet (400 mg total) by mouth every 6 (six) hours as needed for cough   levothyroxine 100 mcg tablet  Care Giver, Outside Facility (Specify) No No   Sig: Take 1 tablet (100 mcg total) by mouth daily   lidocaine (LIDODERM) 5 %  Care Giver Yes No   Sig: Apply 1 patch topically daily Remove & Discard patch within 12 hours or as directed by MD for back prn   lidocaine (LMX) 4 % cream   Yes No   Sig: Apply topically as needed for mild pain   loxapine (LOXITANE) 50 MG capsule   Yes No   Sig: Take 50 mg by mouth in the morning   melatonin 3 mg   No No   Sig: Take 2 tablets (6 mg total) by mouth daily at bedtime   multivitamin (THERAGRAN) TABS   Yes No   Sig: Take 1 tablet by mouth daily   mupirocin (BACTROBAN) 2 % ointment   Yes No   Sig: Apply topically 2 (two) times a day as needed Left knee bid prn   polyethylene glycol (GLYCOLAX) 17 GM/SCOOP  Outside Facility  "(Specify), Care Giver No No   Sig: MIX 1 CAPFUL(17GM) IN 8OZ OF LIQUID AND DRINK DAILY @ 8AM(CONSTIPATION) *AHMAD   polyethylene glycol (GLYCOLAX) 17 GM/SCOOP powder   Yes No   Sig: Take 17 g by mouth daily   senna-docusate sodium (SENOKOT-S) 8.6-50 mg per tablet  Care Giver, Outside Facility (Specify) No No   Sig: Take 1 tablet by mouth daily at bedtime   sodium chloride (OCEAN) 0.65 % nasal spray  Care Giver Yes No   Si spray into each nostril as needed for congestion As directed up to 6x per day for nasal dryness   traZODone (DESYREL) 150 mg tablet   No No   Sig: Take 1 tablet (150 mg total) by mouth daily at bedtime   trihexyphenidyl (ARTANE) 2 mg tablet   No No   Sig: Take 1 tablet (2 mg total) by mouth 2 (two) times a day      Facility-Administered Medications: None       Portions of the record may have been created with voice recognition software. Occasional wrong word or \"sound a like\" substitutions may have occurred due to the inherent limitations of voice recognition software. Read the chart carefully and recognize, using context, where substitutions have occurred.    Electronically signed by:  Andriy Drummond  "

## 2025-05-07 NOTE — ASSESSMENT & PLAN NOTE
Laine Tse is a 69 y.o. female with past medical history of hypothyroidism, seizure disorder, hypertension and past psychiatric history of schizoaffective disorder with longstanding paranoia and delusions.  Patient was admitted to the hospital secondary to jumping out of window due to paranoia.  Psychiatry says to consult secondary to patient's history of schizoaffective disorder and longstanding mental health challenges.    At this time patient appears paranoid and increasingly impulsive secondary to her paranoia and delusions.  As per collateral and I am able to review in the chart and discussion with provider that knows her well, these paranoia and delusions are chronic however her behaviors seem escalated to how she has presented prior.  Given the safety concerns I believe she meets criteria for inpatient psychiatric admission at this time.  Patient is willing to sign 201 to go to [facility].  I discussed with her that should she recant her 201 that I would be petitioning a 302 and primary team is aware of this.  At this time patient does not appear acutely delirious or otherwise encephalopathic.  Primary team should continue her current psychiatric medication regimen as confirmed with the group home.  Will defer further medication recommendations until patient is admitted to inpatient psych.    -Continue Klonopin 1 mg twice daily  -Continue Depakote DR 1500 mg at bedtime  -Continue gabapentin 300 mg twice daily  -Continue Zyprexa 20 mg at bedtime    -Patient is a 201 at this time.  Should she wish to leave AMA and rescind her 201 please reach out to psychiatry in 302 the patient.  -SW to f/u for documentation and bed search w/ preference for [facility] psych.

## 2025-05-07 NOTE — ASSESSMENT & PLAN NOTE
TSH 3.02 this visit  Patient takes levothyroxine 100 mcg daily    Plan:  - Continue levothyroxine 100 mcg daily

## 2025-05-07 NOTE — PROGRESS NOTES
Pastoral Care Progress Note          Chaplaincy Interventions Utilized:   Empowerment: Clarified, confirmed, or reviewed information from treatment team , Encouraged assertiveness, and Encouraged self-care    Exploration: Explored spiritual needs & resources    Collaboration: Consulted with interdisciplinary team and Encouraged adherence to treatment plan     Relationship Building: Cultivated a relationship of care and support, Listened empathically, and Hospitality    Ritual: Provided Pentec[employer]al resources     05/07/25 1000   Clinical Encounter Type   Visited With Patient   Referral From    Confucianism Encounters   Confucianism Needs Confucianism articles           Chaplaincy Outcomes Achieved:  Expressed gratitude     met with the patient in her room where she was in bed dozing.  Patient woke to voice and spoke with the  and requested a rosary.   provided the patient with a rosary and she seemed very content.   offered support.       Spiritual Coping Strategies Utilized:      remains available.

## 2025-05-07 NOTE — PROGRESS NOTES
"    INTERNAL MEDICINE RESIDENCY SENIOR ADMISSION NOTE     Name: Laine Tse   Age & Sex: 69 y.o. female   MRN: [id number]  Unit/Bed#: ED 29   Encounter: [id number]  Primary Care Provider: Adilson Yip MD    Admit to team: SOD Team C     Patient seen and examined. Reviewed H&P per Dr. Cardenas . Agree with the assessment and plan with any exception/addition as noted below:    69-year-old female with past medical history of hypothyroidism, schizoaffective disorder, chronic benzo use, seizure disorder, hypertension here for concerns for AMS and fall.  Presented from [facility] in [facility] where she lives in a group home facility/mental care facility.  Per ED, patient came in as a level C trauma after she was hallucinating at her facility where she stated she was being treated poorly since she was Sikh and wanted to jump out of the first floor window where she injured the left side of her face and eye.  Patient was initially combative to ED providers and refused to allow treatment of facial lacerations.  AMS workup was completed with negative Cts of head neck abdomen pelvis for trauma.  CBC, CMP, LA, TSH, magnesium phosphorus, UA unremarkable.  UDS positive for benzos which patient takes daily per PDMP review.    Patient was seen at bedside by Sondra sigala who helped calm down patient prior to encounter.  Patient states that she was having a disagreement with one of the workers at [facility] as she would not let her use her phone to call and move to a new facility as she would like to move to [facility].  States she does not remember or recall hurting her face but states she was trying to get away from healthcare provider who \"treated Catholics badly.\"  Patient made some antisematic remarks regarding careworker at her facility. Unknown baseline, per [facility] care worker at bedside/did not witness event.  Patient agrees to have her facial laceration treated at this time and " c-collar removed as negative for pathology/fracture.  ED resident made aware who agrees to treat facial laceration.    Principal Problem:    AMS (altered mental status)  Active Problems:    Hypothyroidism    Fall    Seizure disorder (HCC)    Benign essential hypertension    Schizoaffective disorder (HCC)    Facial laceration      Will need to call facility for further mental baseline/events leading up to fall/facial laceration  Pertinent AMS workup performed in ED, some concern for underlying mental health issues.  Psych consult in.    More likely worsening of patient's underlying schizoaffective disorder vs polypharmacy, we will continue PTA benzos for now    Will continue prior to admission medications as reported on facility facesheet.  Re-check depakote levels  Laceration repair per ED    Code Status: Level 3 - DNAR and DNI  Admission Status: INPATIENT   Disposition: Patient requires Med/Surg  Expected Length of Stay: 24-48hrs pending placement and psych eval

## 2025-05-07 NOTE — CONSULTS
Consultation - Behavioral Health   Laine Tse 69 y.o. female MRN: [id number]  [facility] Encounter: [id number]    Assessment & Plan  Schizoaffective disorder (HCC)  Laine Tse is a 69 y.o. female with past medical history of hypothyroidism, seizure disorder, hypertension and past psychiatric history of schizoaffective disorder with longstanding paranoia and delusions.  Patient was admitted to the hospital secondary to jumping out of window due to paranoia.  Psychiatry says to consult secondary to patient's history of schizoaffective disorder and longstanding mental health challenges.    At this time patient appears paranoid and increasingly impulsive secondary to her paranoia and delusions.  As per collateral and I am able to review in the chart and discussion with provider that knows her well, these paranoia and delusions are chronic however her behaviors seem escalated to how she has presented prior.  Given the safety concerns I believe she meets criteria for inpatient psychiatric admission at this time.  Patient is willing to sign 201 to go to [place].  I discussed with her that should she recant her 201 that I would be petitioning a 302 and primary team is aware of this.  At this time patient does not appear acutely delirious or otherwise encephalopathic.  Primary team should continue her current psychiatric medication regimen as confirmed with the group home.  Will defer further medication recommendations until patient is admitted to inpatient psych.    -Continue Klonopin 1 mg twice daily  -Continue Depakote DR 1500 mg at bedtime  -Continue gabapentin 300 mg twice daily  -Continue Zyprexa 20 mg at bedtime    -Patient is a 201 at this time.  Should she wish to leave [place] and rescind her 201 please reach out to psychiatry in 302 the patient.  -SW to f/u for documentation and bed search w/ preference for  [facility].  AMS (altered mental status)    Hypothyroidism    Fall    Seizure  "disorder (HCC)  Patient on Depakote and phenobarbital.  Please see above regarding Depakote, will defer phenobarbital to primary team or neurology.  Benign essential hypertension    Facial laceration         Diagnoses, available treatment options, alternatives to treatment, and risks vs. benefits of current psychiatric treatment plan were discussed with the patient.  Prior records were reviewed in [facility].  The case was discussed with the primary team.    Scheduled medications:  Current Facility-Administered Medications   Medication Dose Route Frequency Provider Last Rate    [Held by provider] alendronate  70 mg Oral Q7 Days [facility], DO      aspirin  81 mg Oral Daily [facility], DO      atorvastatin  20 mg Oral Daily [facility], DO      clonazePAM  1 mg Oral BID [facility], DO      divalproex sodium  1,500 mg Oral HS [facility], DO      enoxaparin  40 mg Subcutaneous Daily [facility], DO      fluticasone  2 spray Nasal Daily [facility], DO      gabapentin  300 mg Oral BID [facility], DO      levothyroxine  100 mcg Oral Early Morning [facility], DO      melatonin  6 mg Oral HS [facility], DO      OLANZapine  20 mg Oral HS [facility], DO      PHENobarbital  64.8 mg Oral Q12H [facility], DO      senna-docusate sodium  1 tablet Oral HS [facility], DO      traZODone  150 mg Oral HS [facility], DO      trihexyphenidyl  2 mg Oral BID [facility], DO       Facility-Administered Medications Ordered in Other Encounters   Medication Dose Route Frequency Provider Last Rate    lactated ringers   Intravenous Continuous PRN Celi Benson CRNA          PRN:      Chief Complaint: \"I don't like my group home. They're against me.\"    History of Present Illness   Physician Requesting Consult: Chavez Paul*  Reason for Consult / Principal Problem: pt jumped out of window 2nd to paranoia    Laine Tse is a 69 y.o. female with past medical history of hypothyroidism, " "seizure disorder, hypertension and past psychiatric history of schizoaffective disorder with longstanding paranoia and delusions.  Patient was admitted to the hospital secondary to jumping out of window due to paranoia.  Psychiatry says to consult secondary to patient's history of schizoaffective disorder and longstanding mental health challenges.    Patient was seen this morning in her room.  She is calm cooperative although slightly irritable and affect.  Patient states that she believes that she is being treated on well by her group home secondary to being the only Anabaptism individual there.  She states \"they are all against me\".  Of note I was able to talk with Dr. Louis who is well acquainted with the patient after seeing her on the consult service several times in the past.  As per her, patient has a longstanding history of Orthodoxy delusions and paranoia and often thinks that people are out to get her.  She however states that patient has never done anything quite so drastic in terms of self-harm/impulsivity secondary to her paranoia.    At this time patient denies any SI HI or AVH.  She appears alert and oriented and is aware that she is here in the hospital at [facility] that it is May that it is the middle of the week and that is 2025.  She is able to interact appropriately with nursing and order her lunch.  When I discuss disposition with the patient, she makes it very clear that she does not want to return to her group home.    I then discussed the possibility of inpatient psychiatric hospitalization secondary to her paranoia and significant impulsivity.  Patient states that she initially does not want to go to the hospital, however when I further discussed her concerns regarding disposition and her psychiatric comorbidities, patient becomes more amenable.  She is agreeable to go to [facility] as [facility] has a [facility] background and she feels comfortable there. "     Patient otherwise reports no acute complaints or concerns to me at this time.  Endorses some pain on the left side of her face where abrasion is quite visible.  Otherwise does not endorse any other acute changes in mental status.    Psychiatric Review Of Systems:  Medication side effects: none  Sleep: no change  Appetite: no change  Hygiene: able to tend to instrumental and basic ADLs  Anxiety Symptoms: denies  Psychotic Symptoms: endorses  Depression Symptoms: endoses  Manic Symptoms: denies  PTSD Symptoms: denies  Suicidal Thoughts: denies  Homicidal Thoughts: denies      Historical Information   Past Psychiatric History  Diagnoses: schizoaffective d/o  Medication History: multiple, most recently on Zyprexa, Klonopin, Depakote, gabapentin, phenobarbital (patient takes this for seizures)  Inpatient: Multiple past inpatient psychiatric admissions - most recently at [facility] in Feb/March 2025   Outpatient: via group home   Suicide Attempts: tried to stab self in the past    Substance Abuse History:    Social History     Substance and Sexual Activity   Alcohol Use Not Currently     Social History     Substance and Sexual Activity   Drug Use No       I discussed substance abuse with the patient and, if pertinent, discussed risks vs benefits of decreasing frequency of use.    Family Psychiatric History:   Family History   Problem Relation Age of Onset    No Known Problems Mother     No Known Problems Father     Lung disease Other         mesothelioma    No Known Problems Maternal Grandmother     No Known Problems Maternal Grandfather     No Known Problems Paternal Grandmother     No Known Problems Paternal Grandfather     No Known Problems Sister     No Known Problems Sister     Kidney failure Brother     No Known Problems Maternal Aunt     No Known Problems Maternal Aunt     No Known Problems Maternal Aunt     No Known Problems Maternal Aunt     No Known Problems Paternal Aunt     No Known Problems Paternal Aunt   "        Social History  Highest education: \"13th grade\"  Currently living: group home  Relationships: limited  Children: 2  Occupation: disability  Gun Ownership: none  \     Rest of social history as per below:  Social History     Socioeconomic History    Marital status: /Civil Union     Spouse name: Not on file    Number of children: Not on file    Years of education: Not on file    Highest education level: Not on file   Occupational History    Not on file   Tobacco Use    Smoking status: Former     Current packs/day: 0.00     Average packs/day: 0.5 packs/day for 21.0 years (10.5 ttl pk-yrs)     Types: Cigarettes     Start date:      Quit date:      Years since quittin.3    Smokeless tobacco: Never   Vaping Use    Vaping status: Never Used   Substance and Sexual Activity    Alcohol use: Not Currently    Drug use: No    Sexual activity: Never   Other Topics Concern    Not on file   Social History Narrative    Not on file     Social Drivers of Health     Financial Resource Strain: Low Risk  (2023)    Overall Financial Resource Strain (CARDIA)     Difficulty of Paying Living Expenses: Not hard at all   Food Insecurity: Patient Declined (3/26/2025)    Nursing - Inadequate Food Risk Classification     Worried About Running Out of Food in the Last Year: Never true     Ran Out of Food in the Last Year: Never true     Ran Out of Food in the Last Year: Patient declined   Transportation Needs: Patient Declined (3/26/2025)    Nursing - Transportation Risk Classification     Lack of Transportation: Not on file     Lack of Transportation: Patient declined   Physical Activity: Not on file   Stress: Not on file   Social Connections: Not on file   Intimate Partner Violence: Patient Unable To Answer (3/26/2025)    Nursing IPS     Feels Physically and Emotionally Safe: Not on file     Physically Hurt by Someone: Not on file     Humiliated or Emotionally Abused by Someone: Not on file     Physically Hurt by " Someone: Patient unable to answer     Hurt or Threatened by Someone: Patient unable to answer   Housing Stability: Patient Unable To Answer (3/26/2025)    Nursing: Inadequate Housing Risk Classification     Has Housing: Not on file     Worried About Losing Housing: Not on file     Unable to Get Utilities: Not on file     Unable to Pay for Housing in the Last Year: Patient unable to answer     Has Housin         Traumatic History:   deferred    Medical Review Of Systems:  Review of Systems -patient reports pain on left side of face secondary to fall and abrasion  All other systems were reviewed and are negative    Relevant PMH/PSH:   Past Medical History:   Diagnosis Date    Anxiety     Benign essential hypertension     resolved; 06/15/16    Depression     Depression with anxiety     Fracture of fifth metatarsal bone of right foot with delayed healing     last assessed 16; fracture of metatarsal bone, right, closed, initial encounter    Hyperlipidemia     last assessed 17    Hypothyroidism     last assessed 2017    Insomnia     Obesity     Schizoaffective disorder (HCC)     last assessed 2017    Seizure disorder (HCC)     last assessed 17    Seizures (HCC)      Past Surgical History:   Procedure Laterality Date    COLONOSCOPY      complete    UT COLONOSCOPY FLX DX W/COLLJ SPEC WHEN PFRMD N/A 2018    Procedure: COLONOSCOPY with polypectomies;  Surgeon: Andriy Lopez MD;  Location: [facility];  Service: Gastroenterology         Meds/Allergies   all current active meds have been reviewed, current meds:   Current Facility-Administered Medications:     [Held by provider] alendronate (FOSAMAX) tablet 70 mg, Q7 Days    aspirin chewable tablet 81 mg, Daily    atorvastatin (LIPITOR) tablet 20 mg, Daily    clonazePAM (KlonoPIN) tablet 1 mg, BID    divalproex sodium (DEPAKOTE) DR tablet 1,500 mg, HS    enoxaparin (LOVENOX) subcutaneous injection 40 mg, Daily    fluticasone (FLONASE) 50  mcg/act nasal spray 2 spray, Daily    gabapentin (NEURONTIN) capsule 300 mg, BID    levothyroxine tablet 100 mcg, Early Morning    melatonin tablet 6 mg, HS    OLANZapine (ZyPREXA) tablet 20 mg, HS    PHENobarbital tablet 64.8 mg, Q12H    senna-docusate sodium (SENOKOT S) 8.6-50 mg per tablet 1 tablet, HS    traZODone (DESYREL) tablet 150 mg, HS    trihexyphenidyl (ARTANE) tablet 2 mg, BID    Facility-Administered Medications Ordered in Other Encounters:     lactated ringers infusion, Continuous PRN, and PTA meds:   Prior to Admission Medications   Prescriptions Last Dose Informant Patient Reported? Taking?   Acetaminophen Extra Strength 500 MG TABS   No No   Sig: TAKE 2 TABLETS (1000MG) BY MOUTH 3X DAILY AS NEEDED FOR MILD PAIN/FEVER *AHMAD   Aspirin Low Dose 81 MG chewable tablet   No No   Sig: CHEW 1 TABLET BY MOUTH ONCE DAILY @8AM (CAD) *MIREYA   Calcium Carbonate-Vitamin D (OSCAL 500/200 D-3 PO)   Yes No   Sig: Take by mouth   Diclofenac Sodium (VOLTAREN) 1 %  Care Giver Yes No   Sig: Apply 2 g topically 4 (four) times a day To knee   Emollient (cetaphil) cream  Care Giver Yes No   Sig: Apply topically as needed for dry skin   Incontinence Supplies MISC   No No   Sig: Use if needed (incontinence) SIZE XL PULL UP DISPOSABLE BRIEFS   Incontinence Supply Disposable (Disposable Brief Large) MISC   No No   Sig: Use every 12 (twelve) hours   L-Theanine 100 MG CAPS  Self Yes No   Sig: Take by mouth 2 (two) times a day   LORazepam (ATIVAN) 0.5 mg tablet  Self Yes No   Sig: Take 0.5 mg by mouth 2 (two) times a day as needed for anxiety   Menthol, Topical Analgesic, (Bengay Ultra Strength) 5 % PTCH  Care Giver, Outside Facility (Specify) No No   Sig: Apply 1 patch topically in the morning   Menthol-Methyl Salicylate (MURIEL AGUDELO GREASELESS) 10-15 % greaseless cream   No No   Sig: Apply topically 2 (two) times a day   OLANZapine (ZyPREXA) 20 MG tablet   No No   Sig: Take 1 tablet (20 mg total) by mouth daily at bedtime    PHENobarbital 64.8 mg tablet  Care Giver, Outside Facility (Specify) No No   Sig: Take 1 tablet (64.8 mg total) by mouth every 12 (twelve) hours   TiZANidine (Zanaflex) 2 MG capsule  Self Yes No   Sig: Take 2 mg by mouth in the morning Daily prn   alendronate (FOSAMAX) 70 mg tablet   No No   Sig: Take 1 tablet (70 mg total) by mouth every 7 days Every 7 days on Fridays   asenapine (Saphris) 10 mg SL tablet  Care Giver Yes No   Sig: Place 5 mg under the tongue 2 (two) times a day   atorvastatin (LIPITOR) 20 mg tablet   No No   Sig: Take 1 tablet (20 mg total) by mouth daily   benzonatate (TESSALON) 200 MG capsule  Self Yes No   Sig: Take 200 mg by mouth 2 (two) times a day as needed for cough   bismuth subsalicylate (PEPTO BISMOL) 262 MG chewable tablet  Care Giver Yes No   Sig: Chew 524 mg every 6 (six) hours as needed for indigestion For diarrhea   busPIRone (BUSPAR) 15 mg tablet  Care Giver Yes No   Sig: Take 15 mg by mouth 2 (two) times a day   calcium carbonate-vitamin D 500 mg-5 mcg per tablet  Care Giver, Outside Facility (Specify) Yes No   Sig: Take 1 tablet by mouth daily with breakfast   cetirizine (ZyrTEC) 5 MG chewable tablet   Yes No   Sig: Chew 5 mg daily at bedtime One daily prn for allergies   chlorhexidine (PERIDEX) 0.12 % solution  Care Giver Yes No   Sig: Apply 15 mL to the mouth or throat 2 (two) times a day   clonazePAM (KlonoPIN) 1 mg tablet  Care Giver, Outside Facility (Specify) No No   Sig: Take 1 tablet (1 mg total) by mouth 2 (two) times a day   Patient not taking: Reported on 4/14/2025   diphenhydrAMINE (BENADRYL) 25 mg capsule  Care Giver Yes No   Sig: Take 25 mg by mouth daily at bedtime as needed for itching   divalproex sodium (DEPAKOTE) 500 mg DR tablet  Self Yes No   Sig: Take 500 mg by mouth 3 qhs   docusate sodium (COLACE) 100 mg capsule  Care Giver Yes No   Sig: Take 100 mg by mouth 2 (two) times a day as needed for constipation   fluticasone (FLONASE) 50 mcg/act nasal spray   Care Giver, Outside Facility (Specify) No No   Si sprays into each nostril daily   gabapentin (NEURONTIN) 100 mg capsule   No No   Sig: Take 2 capsules (200 mg total) by mouth 2 (two) times a day   guaiFENesin 400 mg   No No   Sig: Take 1 tablet (400 mg total) by mouth every 6 (six) hours as needed for cough   levothyroxine 100 mcg tablet  Care Giver, Outside Facility (Specify) No No   Sig: Take 1 tablet (100 mcg total) by mouth daily   lidocaine (LIDODERM) 5 %  Care Giver Yes No   Sig: Apply 1 patch topically daily Remove & Discard patch within 12 hours or as directed by MD for back prn   lidocaine (LMX) 4 % cream   Yes No   Sig: Apply topically as needed for mild pain   loxapine (LOXITANE) 50 MG capsule   Yes No   Sig: Take 50 mg by mouth in the morning   melatonin 3 mg   No No   Sig: Take 2 tablets (6 mg total) by mouth daily at bedtime   multivitamin (THERAGRAN) TABS   Yes No   Sig: Take 1 tablet by mouth daily   mupirocin (BACTROBAN) 2 % ointment   Yes No   Sig: Apply topically 2 (two) times a day as needed Left knee bid prn   polyethylene glycol (GLYCOLAX) 17 GM/SCOOP  Outside Facility (Specify), Care Giver No No   Sig: MIX 1 CAPFUL(17GM) IN 8OZ OF LIQUID AND DRINK DAILY @ 8AM(CONSTIPATION) *AHMAD   polyethylene glycol (GLYCOLAX) 17 GM/SCOOP powder   Yes No   Sig: Take 17 g by mouth daily   senna-docusate sodium (SENOKOT-S) 8.6-50 mg per tablet  Care Giver, Outside Facility (Specify) No No   Sig: Take 1 tablet by mouth daily at bedtime   sodium chloride (OCEAN) 0.65 % nasal spray  Care Giver Yes No   Si spray into each nostril as needed for congestion As directed up to 6x per day for nasal dryness   traZODone (DESYREL) 150 mg tablet   No No   Sig: Take 1 tablet (150 mg total) by mouth daily at bedtime   trihexyphenidyl (ARTANE) 2 mg tablet   No No   Sig: Take 1 tablet (2 mg total) by mouth 2 (two) times a day      Facility-Administered Medications: None     Allergies   Allergen Reactions    Clozapine  "Other (See Comments)     low white blood count  Other reaction(s): Other (See Comments)  low white blood count    Haloperidol Other (See Comments)     EPS  Other reaction(s): Other (See Comments)  EPS    Lithium Other (See Comments)     Toxicity    Prolixin [Fluphenazine] Hyperactivity and Other (See Comments)     EPS, Hyperactivity  EPS, Hyperactivity    Valproic Acid Confusion and Other (See Comments)     \"Mental confusion\"  \"Mental confusion\"       Objective   Vital signs in last 24 hours:  Temp:  [98 °F (36.7 °C)] 98 °F (36.7 °C)  HR:  [62-76] 64  BP: (134-178)/(67-88) 142/88  Resp:  [14-20] 14  SpO2:  [93 %-98 %] 98 %  O2 Device: None (Room air)    No intake or output data in the 24 hours ending 05/07/25 1147    Mental Status Evaluation:  Appearance: disheveled, appears consistent with stated age  Motor: no psychomotor disturbances  Behavior: superficially cooperative, paranoid  Speech: normal rate and rhythm  Mood: \"upset\"  Affect: blunted  Thought Process: concrete to circumstantial  Thought Content: endorses paranoia and delusions, religiously preocupied  Risk Potential: denies suicidal ideation, plan, or intent. Denies homicidal ideation  Perceptions: +IP/-RIS, denies auditory hallucinations, denies visual hallucinations  Sensorium: Oriented to person, place, time, and situation  Cognition: appears at baseline  Consciousness: alert and awake  Attention: intact  Insight: limited  Judgement: limited        Lab Results:    I have personally reviewed all pertinent laboratory/tests results.    CBC  Lab Results   Component Value Date    WBC 3.61 (L) 05/07/2025    RBC 4.03 05/07/2025    HGB 13.0 05/07/2025    HCT 39.9 05/07/2025    MCH 32.3 05/07/2025    MCV 99 (H) 05/07/2025     05/07/2025    RDW 12.9 05/07/2025       BMP  Lab Results   Component Value Date     10/05/2015    K 4.0 05/07/2025     05/07/2025    CO2 28 05/07/2025    BUN 11 05/07/2025       LFTs  Lab Results   Component Value Date " "   ALB 4.0 05/07/2025    TP 6.8 05/07/2025    TBILI 0.33 05/07/2025       TSH  Lab Results   Component Value Date    TSH 1.20 01/24/2025    TSH 1.2 01/24/2025       COVID-19  No results found for: \"SARSCOV2\"    UDS  Lab Results   Component Value Date    AMPMETHUR Negative 05/07/2025    BARBTUR Positive (A) 05/07/2025    BDZUR Negative 05/07/2025    THCUR Negative 05/07/2025    COCAINEUR Negative 05/07/2025    METHADONEUR Negative 05/07/2025    OPIATEUR Negative 05/07/2025    PCPUR Negative 05/07/2025       Medical Alcohol Level  Lab Results   Component Value Date    ETOH <10 05/07/2025       EKG    Encounter Date: 03/21/25   ECG 12 lead   Result Value    Ventricular Rate 80    Atrial Rate 80    MO Interval 164    QRSD Interval 74    QT Interval 368    QTC Interval 425    P Axis -5    QRS Axis 34    T Wave Axis 47    Narrative    Normal sinus rhythm  T wave abnormality, consider anterolateral ischemia  Abnormal ECG  Confirmed by Arlene Lancaster ([id number]) on 3/22/2025 3:29:46 PM       Code Status: Level 3 - DNAR and DNI    Counseling / Coordination of Care  I have spent a total time of 45 minutes on 05/07/25 in caring for this patient including reviewing the chart, interviewing the patient, documenting in the medical record and discussing with the primary team.    Yovani Duarte MD    "

## 2025-05-07 NOTE — ASSESSMENT & PLAN NOTE
Home regimen: phenobarbital 64.8 mg twice daily    No concern for seizures.  Electrolytes and LDH within normal limits    Plan:  - Continue home med as above

## 2025-05-07 NOTE — DISCHARGE INSTR - OTHER ORDERS
Skin Care Plan:  1-Preventative Hydraguard to bilateral sacro-buttocks and heels BID and PRN.   2-Turn/reposition q2h or when medically stable for pressure re-distribution on skin.  3-Elevate heels to offload pressure.  4-Moisturize skin daily with skin nourishing cream  5-Ehob cushion in chair when out of bed.

## 2025-05-07 NOTE — CHAPLAIN
responded to Lv C trauma alert while in ED with another family.     assisted with calming patient to allow medical staff to work.    Patient jumped out of the window at her group home, unclear as to where she wanted to go.    Spiritual Care department remains available for addition support

## 2025-05-07 NOTE — WOUND OSTOMY CARE
Wound care consulted for facial abrasion sustained from patient's fall. Facial abrasion is intact and dry with pink scabbed tissue noted. No skin loss or drainage noted. Image below. Sacro-buttocks and heels are intact and blanchable.         Orders listed below and wound care will sign off, call or Secure Chat Message with questions.     Skin Care Plan:  1-Preventative Hydraguard to bilateral sacro-buttocks and heels BID and PRN.   2-Turn/reposition q2h or when medically stable for pressure re-distribution on skin.  3-Elevate heels to offload pressure.  4-Moisturize skin daily with skin nourishing cream  5-Ehob cushion in chair when out of bed.

## 2025-05-07 NOTE — CASE MANAGEMENT
Case Management Discharge Planning Note    Patient name Laine Tse  Location [facility] 725/[facility] 725-01 MRN [id number]  : 1955 Date 2025       Current Admission Date: 2025  Current Admission Diagnosis:AMS (altered mental status)   Patient Active Problem List    Diagnosis Date Noted Date Diagnosed    Facial laceration 2025     AMS (altered mental status) 2025     T12 compression fracture (HCC) 2025     Severe protein-calorie malnutrition (HCC) 2025     Skin lesion of left lower extremity 2024     Chronic cough 2023     T wave inversion on electrocardiogram 2023     Rash 2023     Dermatitis 2023     Chronic midline low back pain without sciatica 2021     Class 1 obesity with body mass index (BMI) of 32.0 to 32.9 in adult 2021     Decreased hearing of both ears 2019     Benign essential hypertension 2019     Pre-diabetes 2019     Hyperglycemia 2018     Fall 2018     Injury of right toe, initial encounter 2018     Unsteady gait 10/03/2017     Seizure disorder (HCC) 05/15/2017     Anemia 05/15/2017     Hyponatremia 2015     Folic acid deficiency 10/15/2012     Hyperlipidemia 10/05/2012     Hypothyroidism 10/05/2012     Schizoaffective disorder (HCC) 10/05/2012     Osteoporosis 10/05/2012       LOS (days): 0  Geometric Mean LOS (GMLOS) (days): 2.6  Days to GMLOS:2.5     OBJECTIVE:  Risk of Unplanned Readmission Score: 36.69         Current admission status: Inpatient   Preferred Pharmacy:   [facility] [address]  [address] [facility] [address]  Phone: [phone] Fax: [phone]    Primary Care Provider: Adilson Yip MD    Primary Insurance: MEDICARE  Secondary Insurance:     DISCHARGE DETAILS:      Additional Comments: CM notified patient is medically cleared and will be a 201. CM met with patient bedside to discuss. Patient verbalized awareness  of 201 and rights. Patient requested CM to call sister Kati prior to patient signing. TC to Kati ([phone]). CM reviewed 201 and patient current clinical course. Kati shares pt has been to  in the past and Kati agrees with current treatment plan for patient to DC to [facility]. CM returned bedside to pt to discussed. Patient signed 201. Psychiatry also signed 201.    Per [facility] Intake, there are no  OA beds available today. CM went to bedside to discuss with patient, patient not currently amenable to bed search at other locations, requested CM call Kati. CM called Kati, left  requesting call back. Copy of 201 given patient stickers and placed in Medical Records. Original is secured in CM Office. CM following

## 2025-05-07 NOTE — PLAN OF CARE
Problem: Potential for Falls  Goal: Patient will remain free of falls  Description: INTERVENTIONS:- Educate patient/family on patient safety including physical limitations- Instruct patient to call for assistance with activity - Consult OT/PT to assist with strengthening/mobility - Keep Call bell within reach- Keep bed low and locked with side rails adjusted as appropriate- Keep care items and personal belongings within reach- Initiate and maintain comfort rounds- Make Fall Risk Sign visible to staff- Offer Toileting every  Hours, in advance of need- Initiate/Maintain alarm- Obtain necessary fall risk management equipment: - Apply yellow socks and bracelet for high fall risk patients- Consider moving patient to room near nurses station  INTERVENTIONS:- Educate patient/family on patient safety including physical limitations- Instruct patient to call for assistance with activity - Consult OT/PT to assist with strengthening/mobility - Keep Call bell within reach- Keep bed low and locked with side rails adjusted as appropriate- Keep care items and personal belongings within reach- Initiate and maintain comfort rounds- Make Fall Risk Sign visible to staff- Offer Toileting every  Hours, in advance of need- Initiate/Maintain alarm- Obtain necessary fall risk management equipment: - Apply yellow socks and bracelet for high fall risk patients- Consider moving patient to room near nurses station  Outcome: Progressing     Problem: PAIN - ADULT  Goal: Verbalizes/displays adequate comfort level or baseline comfort level  Description: Interventions:- Encourage patient to monitor pain and request assistance- Assess pain using appropriate pain scale- Administer analgesics based on type and severity of pain and evaluate response- Implement non-pharmacological measures as appropriate and evaluate response- Consider cultural and social influences on pain and pain management- Notify physician/advanced practitioner if interventions  unsuccessful or patient reports new pain  Outcome: Progressing     Problem: INFECTION - ADULT  Goal: Absence or prevention of progression during hospitalization  Description: INTERVENTIONS:- Assess and monitor for signs and symptoms of infection- Monitor lab/diagnostic results- Monitor all insertion sites, i.e. indwelling lines, tubes, and drains- Monitor endotracheal if appropriate and nasal secretions for changes in amount and color- [place] appropriate cooling/warming therapies per order- Administer medications as ordered- Instruct and encourage patient and family to use good hand hygiene technique- Identify and instruct in appropriate isolation precautions for identified infection/condition  Outcome: Progressing  Goal: Absence of fever/infection during neutropenic period  Description: INTERVENTIONS:- Monitor WBC  Outcome: Progressing     Problem: SAFETY ADULT  Goal: Patient will remain free of falls  Description: INTERVENTIONS:- Educate patient/family on patient safety including physical limitations- Instruct patient to call for assistance with activity - Consult OT/PT to assist with strengthening/mobility - Keep Call bell within reach- Keep bed low and locked with side rails adjusted as appropriate- Keep care items and personal belongings within reach- Initiate and maintain comfort rounds- Make Fall Risk Sign visible to staff- Offer Toileting every  Hours, in advance of need- Initiate/Maintain alarm- Obtain necessary fall risk management equipment: - Apply yellow socks and bracelet for high fall risk patients- Consider moving patient to room near nurses station  INTERVENTIONS:- Educate patient/family on patient safety including physical limitations- Instruct patient to call for assistance with activity - Consult OT/PT to assist with strengthening/mobility - Keep Call bell within reach- Keep bed low and locked with side rails adjusted as appropriate- Keep care items and personal belongings within reach-  Initiate and maintain comfort rounds- Make Fall Risk Sign visible to staff- Offer Toileting every  Hours, in advance of need- Initiate/Maintain alarm- Obtain necessary fall risk management equipment: - Apply yellow socks and bracelet for high fall risk patients- Consider moving patient to room near nurses station  Outcome: Progressing  Goal: Maintain or return to baseline ADL function  Description: INTERVENTIONS:-  Assess patient's ability to carry out ADLs; assess patient's baseline for ADL function and identify physical deficits which impact ability to perform ADLs (bathing, care of mouth/teeth, toileting, grooming, dressing, etc.)- Assess/evaluate cause of self-care deficits - Assess range of motion- Assess patient's mobility; develop plan if impaired- Assess patient's need for assistive devices and provide as appropriate- Encourage maximum independence but intervene and supervise when necessary- Involve family in performance of ADLs- Assess for home care needs following discharge - Consider OT consult to assist with ADL evaluation and planning for discharge- Provide patient education as appropriate  Outcome: Progressing  Goal: Maintains/Returns to pre admission functional level  Description: INTERVENTIONS:- Perform AM-PAC 6 Click Basic Mobility/ Daily Activity assessment daily.- Set and communicate daily mobility goal to care team and patient/family/caregiver. - Collaborate with rehabilitation services on mobility goals if consulted- Perform Range of Motion  times a day.- Reposition patient every  hours.- Dangle patient  times a day- Stand patient  times a day- Ambulate patient  times a day- Out of bed to chair  times a day - Out of bed for meals  times a day- Out of bed for toileting- Record patient progress and toleration of activity level   Outcome: Progressing     Problem: DISCHARGE PLANNING  Goal: Discharge to home or other facility with appropriate resources  Description: INTERVENTIONS:- Identify barriers  to discharge w/patient and caregiver- Arrange for needed discharge resources and transportation as appropriate- Identify discharge learning needs (meds, wound care, etc.)- Arrange for interpretive services to assist at discharge as needed- Refer to Case Management Department for coordinating discharge planning if the patient needs post-hospital services based on physician/advanced practitioner order or complex needs related to functional status, cognitive ability, or social support system  Outcome: Progressing     Problem: Knowledge Deficit  Goal: Patient/family/caregiver demonstrates understanding of disease process, treatment plan, medications, and discharge instructions  Description: Complete learning assessment and assess knowledge base.Interventions:- Provide teaching at level of understanding- Provide teaching via preferred learning methods  Outcome: Progressing

## 2025-05-07 NOTE — ASSESSMENT & PLAN NOTE
"PMHx of hypertension, hypothyroidism, hyperlipidemia, seizure disorder, compression fracture of T12, schizoaffective disorder, and chronic back pain without sciatica presents to ED as a Level C trauma after she began hallucinating that the facility she was currently in and was remarks of \"eating Jews\" so she jumped out of a first story window and broke the fall with her face.     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 Though she makes remarks about her facility being \"torture camp for the [employer].\"    CMP and CBC unremarkable. Ethanol, acetaminophen and salicylate levels negative.  Lactic acid 1.6.  UA negative.  TSH 3.02.  . UDS positive for barbiturate for which she is on phenobarb for seizure disorder.     Trauma workup negative.  Patient is on quite a few of psych meds at bedtime.    AMS 2/2 polypharmacy vs acute psychosis vs worsening of schizoaffective disorder    Plan:  - Psych consulted appreciate recs: no change in psych meds for now  - Patient is medically cleared form primary standpoint   - Virtual 1:1  "

## 2025-05-08 ENCOUNTER — HOSPITAL ENCOUNTER (INPATIENT)
Facility: HOSPITAL | Age: 70
LOS: 25 days | Discharge: NURSING, REHAB OR LONG-TERM CARE | DRG: 885 | End: 2025-06-02
Attending: PSYCHIATRY & NEUROLOGY | Admitting: PSYCHIATRY & NEUROLOGY
Payer: MEDICARE

## 2025-05-08 VITALS
OXYGEN SATURATION: 96 % | HEART RATE: 72 BPM | TEMPERATURE: 97.3 F | DIASTOLIC BLOOD PRESSURE: 77 MMHG | RESPIRATION RATE: 18 BRPM | SYSTOLIC BLOOD PRESSURE: 104 MMHG

## 2025-05-08 DIAGNOSIS — E78.2 MIXED HYPERLIPIDEMIA: ICD-10-CM

## 2025-05-08 DIAGNOSIS — E43 SEVERE PROTEIN-CALORIE MALNUTRITION (HCC): ICD-10-CM

## 2025-05-08 DIAGNOSIS — R05.3 CHRONIC COUGH: ICD-10-CM

## 2025-05-08 DIAGNOSIS — K59.00 CONSTIPATION, UNSPECIFIED CONSTIPATION TYPE: ICD-10-CM

## 2025-05-08 DIAGNOSIS — F25.0 SCHIZOAFFECTIVE DISORDER, BIPOLAR TYPE (HCC): Primary | ICD-10-CM

## 2025-05-08 DIAGNOSIS — R45.1 AGITATION: ICD-10-CM

## 2025-05-08 DIAGNOSIS — M81.0 OSTEOPOROSIS, UNSPECIFIED OSTEOPOROSIS TYPE, UNSPECIFIED PATHOLOGICAL FRACTURE PRESENCE: ICD-10-CM

## 2025-05-08 DIAGNOSIS — Z00.8 MEDICAL CLEARANCE FOR PSYCHIATRIC ADMISSION: ICD-10-CM

## 2025-05-08 LAB
ANION GAP SERPL CALCULATED.3IONS-SCNC: 8 MMOL/L (ref 4–13)
BUN SERPL-MCNC: 20 MG/DL (ref 5–25)
CALCIUM SERPL-MCNC: 8.7 MG/DL (ref 8.4–10.2)
CHLORIDE SERPL-SCNC: 106 MMOL/L (ref 96–108)
CO2 SERPL-SCNC: 26 MMOL/L (ref 21–32)
CREAT SERPL-MCNC: 0.65 MG/DL (ref 0.6–1.3)
ERYTHROCYTE [DISTWIDTH] IN BLOOD BY AUTOMATED COUNT: 13.2 % (ref 11.6–15.1)
GFR SERPL CREATININE-BSD FRML MDRD: 89 ML/MIN/1.73SQ M
GLUCOSE SERPL-MCNC: 97 MG/DL (ref 65–140)
HCT VFR BLD AUTO: 36.2 % (ref 34.8–46.1)
HGB BLD-MCNC: 12 G/DL (ref 11.5–15.4)
MCH RBC QN AUTO: 32.6 PG (ref 26.8–34.3)
MCHC RBC AUTO-ENTMCNC: 33.1 G/DL (ref 31.4–37.4)
MCV RBC AUTO: 98 FL (ref 82–98)
MRSA NOSE QL CULT: NORMAL
PLATELET # BLD AUTO: 242 THOUSANDS/UL (ref 149–390)
PMV BLD AUTO: 9.8 FL (ref 8.9–12.7)
POTASSIUM SERPL-SCNC: 4.1 MMOL/L (ref 3.5–5.3)
RBC # BLD AUTO: 3.68 MILLION/UL (ref 3.81–5.12)
SODIUM SERPL-SCNC: 140 MMOL/L (ref 135–147)
WBC # BLD AUTO: 4.02 THOUSAND/UL (ref 4.31–10.16)

## 2025-05-08 PROCEDURE — 99239 HOSP IP/OBS DSCHRG MGMT >30: CPT | Performed by: INTERNAL MEDICINE

## 2025-05-08 PROCEDURE — NC001 PR NO CHARGE: Performed by: INTERNAL MEDICINE

## 2025-05-08 PROCEDURE — 85027 COMPLETE CBC AUTOMATED: CPT

## 2025-05-08 PROCEDURE — 80048 BASIC METABOLIC PNL TOTAL CA: CPT

## 2025-05-08 RX ORDER — ASPIRIN 81 MG/1
81 TABLET, CHEWABLE ORAL DAILY
Status: CANCELLED | OUTPATIENT
Start: 2025-05-09

## 2025-05-08 RX ORDER — ACETAMINOPHEN 325 MG/1
975 TABLET ORAL EVERY 6 HOURS PRN
Status: CANCELLED | OUTPATIENT
Start: 2025-05-08

## 2025-05-08 RX ORDER — LORAZEPAM 2 MG/ML
1 INJECTION INTRAMUSCULAR
Status: CANCELLED | OUTPATIENT
Start: 2025-05-08

## 2025-05-08 RX ORDER — DIVALPROEX SODIUM 500 MG/1
1500 TABLET, DELAYED RELEASE ORAL
Status: CANCELLED | OUTPATIENT
Start: 2025-05-08

## 2025-05-08 RX ORDER — LORAZEPAM 2 MG/ML
1 INJECTION INTRAMUSCULAR
Status: DISCONTINUED | OUTPATIENT
Start: 2025-05-08 | End: 2025-06-02 | Stop reason: HOSPADM

## 2025-05-08 RX ORDER — AMOXICILLIN 250 MG
1 CAPSULE ORAL
Status: CANCELLED | OUTPATIENT
Start: 2025-05-08

## 2025-05-08 RX ORDER — LORAZEPAM 1 MG/1
1 TABLET ORAL
Status: CANCELLED | OUTPATIENT
Start: 2025-05-08

## 2025-05-08 RX ORDER — CLONAZEPAM 1 MG/1
1 TABLET ORAL 2 TIMES DAILY
Status: CANCELLED | OUTPATIENT
Start: 2025-05-08

## 2025-05-08 RX ORDER — OLANZAPINE 10 MG/1
20 TABLET, FILM COATED ORAL
Status: CANCELLED | OUTPATIENT
Start: 2025-05-08

## 2025-05-08 RX ORDER — GABAPENTIN 300 MG/1
300 CAPSULE ORAL 2 TIMES DAILY
Status: CANCELLED | OUTPATIENT
Start: 2025-05-08

## 2025-05-08 RX ORDER — OLANZAPINE 5 MG/1
5 TABLET, FILM COATED ORAL
Status: CANCELLED | OUTPATIENT
Start: 2025-05-08

## 2025-05-08 RX ORDER — FLUTICASONE PROPIONATE 50 MCG
2 SPRAY, SUSPENSION (ML) NASAL DAILY
Status: DISCONTINUED | OUTPATIENT
Start: 2025-05-09 | End: 2025-06-02 | Stop reason: HOSPADM

## 2025-05-08 RX ORDER — HYDROXYZINE HYDROCHLORIDE 25 MG/1
25 TABLET, FILM COATED ORAL
Status: DISCONTINUED | OUTPATIENT
Start: 2025-05-08 | End: 2025-06-02 | Stop reason: HOSPADM

## 2025-05-08 RX ORDER — PHENOBARBITAL 64.8 MG/1
64.8 TABLET ORAL EVERY 12 HOURS
Status: DISCONTINUED | OUTPATIENT
Start: 2025-05-08 | End: 2025-06-02 | Stop reason: HOSPADM

## 2025-05-08 RX ORDER — CLONAZEPAM 1 MG/1
1 TABLET ORAL 2 TIMES DAILY
Status: DISCONTINUED | OUTPATIENT
Start: 2025-05-08 | End: 2025-06-02 | Stop reason: HOSPADM

## 2025-05-08 RX ORDER — TRIHEXYPHENIDYL HYDROCHLORIDE 2 MG/1
2 TABLET ORAL 2 TIMES DAILY
Status: CANCELLED | OUTPATIENT
Start: 2025-05-08

## 2025-05-08 RX ORDER — GABAPENTIN 300 MG/1
300 CAPSULE ORAL 2 TIMES DAILY
Status: DISCONTINUED | OUTPATIENT
Start: 2025-05-08 | End: 2025-05-08 | Stop reason: HOSPADM

## 2025-05-08 RX ORDER — HYDROXYZINE HYDROCHLORIDE 25 MG/1
25 TABLET, FILM COATED ORAL
Status: CANCELLED | OUTPATIENT
Start: 2025-05-08

## 2025-05-08 RX ORDER — ACETAMINOPHEN 325 MG/1
975 TABLET ORAL EVERY 6 HOURS PRN
Status: DISCONTINUED | OUTPATIENT
Start: 2025-05-08 | End: 2025-06-02 | Stop reason: HOSPADM

## 2025-05-08 RX ORDER — OLANZAPINE 5 MG/1
5 TABLET, FILM COATED ORAL
Status: DISCONTINUED | OUTPATIENT
Start: 2025-05-08 | End: 2025-06-02 | Stop reason: HOSPADM

## 2025-05-08 RX ORDER — TRAZODONE HYDROCHLORIDE 50 MG/1
50 TABLET ORAL
Status: CANCELLED | OUTPATIENT
Start: 2025-05-08

## 2025-05-08 RX ORDER — ACETAMINOPHEN 325 MG/1
650 TABLET ORAL EVERY 4 HOURS PRN
Status: DISCONTINUED | OUTPATIENT
Start: 2025-05-08 | End: 2025-06-02 | Stop reason: HOSPADM

## 2025-05-08 RX ORDER — LEVOTHYROXINE SODIUM 100 UG/1
100 TABLET ORAL
Status: DISCONTINUED | OUTPATIENT
Start: 2025-05-09 | End: 2025-06-02 | Stop reason: HOSPADM

## 2025-05-08 RX ORDER — HYDROXYZINE HYDROCHLORIDE 50 MG/1
50 TABLET, FILM COATED ORAL
Status: DISCONTINUED | OUTPATIENT
Start: 2025-05-08 | End: 2025-06-02 | Stop reason: HOSPADM

## 2025-05-08 RX ORDER — AMOXICILLIN 250 MG
1 CAPSULE ORAL DAILY PRN
Status: DISCONTINUED | OUTPATIENT
Start: 2025-05-08 | End: 2025-06-02 | Stop reason: HOSPADM

## 2025-05-08 RX ORDER — OLANZAPINE 2.5 MG/1
2.5 TABLET, FILM COATED ORAL
Status: DISCONTINUED | OUTPATIENT
Start: 2025-05-08 | End: 2025-06-02 | Stop reason: HOSPADM

## 2025-05-08 RX ORDER — AMOXICILLIN 250 MG
1 CAPSULE ORAL
Status: DISCONTINUED | OUTPATIENT
Start: 2025-05-08 | End: 2025-05-09

## 2025-05-08 RX ORDER — ENOXAPARIN SODIUM 100 MG/ML
40 INJECTION SUBCUTANEOUS DAILY
Status: CANCELLED | OUTPATIENT
Start: 2025-05-09

## 2025-05-08 RX ORDER — LORAZEPAM 1 MG/1
1 TABLET ORAL
Status: DISCONTINUED | OUTPATIENT
Start: 2025-05-08 | End: 2025-06-02 | Stop reason: HOSPADM

## 2025-05-08 RX ORDER — POLYETHYLENE GLYCOL 3350 17 G/17G
17 POWDER, FOR SOLUTION ORAL DAILY PRN
Status: CANCELLED | OUTPATIENT
Start: 2025-05-08

## 2025-05-08 RX ORDER — ALENDRONATE SODIUM 70 MG/1
70 TABLET ORAL
Status: DISCONTINUED | OUTPATIENT
Start: 2025-05-14 | End: 2025-06-02 | Stop reason: HOSPADM

## 2025-05-08 RX ORDER — POLYETHYLENE GLYCOL 3350 17 G/17G
17 POWDER, FOR SOLUTION ORAL DAILY PRN
Status: DISCONTINUED | OUTPATIENT
Start: 2025-05-08 | End: 2025-05-09

## 2025-05-08 RX ORDER — OLANZAPINE 10 MG/2ML
5 INJECTION, POWDER, FOR SOLUTION INTRAMUSCULAR
Status: DISCONTINUED | OUTPATIENT
Start: 2025-05-08 | End: 2025-06-02 | Stop reason: HOSPADM

## 2025-05-08 RX ORDER — GABAPENTIN 300 MG/1
300 CAPSULE ORAL 2 TIMES DAILY
Status: DISCONTINUED | OUTPATIENT
Start: 2025-05-09 | End: 2025-05-29

## 2025-05-08 RX ORDER — DIVALPROEX SODIUM 500 MG/1
1500 TABLET, DELAYED RELEASE ORAL
Status: DISCONTINUED | OUTPATIENT
Start: 2025-05-08 | End: 2025-05-14

## 2025-05-08 RX ORDER — FLUTICASONE PROPIONATE 50 MCG
2 SPRAY, SUSPENSION (ML) NASAL DAILY
Status: CANCELLED | OUTPATIENT
Start: 2025-05-09

## 2025-05-08 RX ORDER — ALENDRONATE SODIUM 70 MG/1
70 TABLET ORAL
Status: CANCELLED | OUTPATIENT
Start: 2025-05-14

## 2025-05-08 RX ORDER — OLANZAPINE 10 MG/1
20 TABLET, FILM COATED ORAL
Status: DISCONTINUED | OUTPATIENT
Start: 2025-05-08 | End: 2025-05-16

## 2025-05-08 RX ORDER — ATORVASTATIN CALCIUM 20 MG/1
20 TABLET, FILM COATED ORAL DAILY
Status: DISCONTINUED | OUTPATIENT
Start: 2025-05-09 | End: 2025-06-02

## 2025-05-08 RX ORDER — OLANZAPINE 2.5 MG/1
2.5 TABLET, FILM COATED ORAL
Status: CANCELLED | OUTPATIENT
Start: 2025-05-08

## 2025-05-08 RX ORDER — OLANZAPINE 10 MG/2ML
5 INJECTION, POWDER, FOR SOLUTION INTRAMUSCULAR
Status: CANCELLED | OUTPATIENT
Start: 2025-05-08

## 2025-05-08 RX ORDER — ASPIRIN 81 MG/1
81 TABLET, CHEWABLE ORAL DAILY
Status: DISCONTINUED | OUTPATIENT
Start: 2025-05-09 | End: 2025-06-02 | Stop reason: HOSPADM

## 2025-05-08 RX ORDER — LEVOTHYROXINE SODIUM 100 UG/1
100 TABLET ORAL
Status: CANCELLED | OUTPATIENT
Start: 2025-05-09

## 2025-05-08 RX ORDER — PHENOBARBITAL 64.8 MG/1
64.8 TABLET ORAL EVERY 12 HOURS
Status: CANCELLED | OUTPATIENT
Start: 2025-05-08

## 2025-05-08 RX ORDER — TRAZODONE HYDROCHLORIDE 50 MG/1
50 TABLET ORAL
Status: DISCONTINUED | OUTPATIENT
Start: 2025-05-08 | End: 2025-06-02 | Stop reason: HOSPADM

## 2025-05-08 RX ORDER — ACETAMINOPHEN 325 MG/1
650 TABLET ORAL EVERY 4 HOURS PRN
Status: CANCELLED | OUTPATIENT
Start: 2025-05-08

## 2025-05-08 RX ORDER — ENOXAPARIN SODIUM 100 MG/ML
40 INJECTION SUBCUTANEOUS DAILY
Status: DISCONTINUED | OUTPATIENT
Start: 2025-05-09 | End: 2025-05-09

## 2025-05-08 RX ORDER — HYDROXYZINE HYDROCHLORIDE 50 MG/1
50 TABLET, FILM COATED ORAL
Status: CANCELLED | OUTPATIENT
Start: 2025-05-08

## 2025-05-08 RX ORDER — ATORVASTATIN CALCIUM 20 MG/1
20 TABLET, FILM COATED ORAL DAILY
Status: CANCELLED | OUTPATIENT
Start: 2025-05-09

## 2025-05-08 RX ORDER — AMOXICILLIN 250 MG
1 CAPSULE ORAL DAILY PRN
Status: CANCELLED | OUTPATIENT
Start: 2025-05-08

## 2025-05-08 RX ORDER — TRIHEXYPHENIDYL HYDROCHLORIDE 2 MG/1
2 TABLET ORAL 2 TIMES DAILY
Status: DISCONTINUED | OUTPATIENT
Start: 2025-05-08 | End: 2025-06-02 | Stop reason: HOSPADM

## 2025-05-08 RX ADMIN — PHENOBARBITAL 64.8 MG: 64.8 TABLET ORAL at 13:04

## 2025-05-08 RX ADMIN — CLONAZEPAM 1 MG: 1 TABLET ORAL at 08:42

## 2025-05-08 RX ADMIN — FLUTICASONE PROPIONATE 2 SPRAY: 50 SPRAY, METERED NASAL at 13:05

## 2025-05-08 RX ADMIN — GABAPENTIN 300 MG: 300 CAPSULE ORAL at 08:42

## 2025-05-08 RX ADMIN — TRIHEXYPHENIDYL HYDROCHLORIDE 2 MG: 2 TABLET ORAL at 08:42

## 2025-05-08 RX ADMIN — LEVOTHYROXINE SODIUM 100 MCG: 0.1 TABLET ORAL at 05:41

## 2025-05-08 RX ADMIN — ATORVASTATIN CALCIUM 20 MG: 20 TABLET, FILM COATED ORAL at 08:42

## 2025-05-08 RX ADMIN — TRIHEXYPHENIDYL HYDROCHLORIDE 2 MG: 2 TABLET ORAL at 20:38

## 2025-05-08 RX ADMIN — ASPIRIN 81 MG CHEWABLE TABLET 81 MG: 81 TABLET CHEWABLE at 08:42

## 2025-05-08 RX ADMIN — GABAPENTIN 300 MG: 300 CAPSULE ORAL at 13:04

## 2025-05-08 RX ADMIN — OLANZAPINE 20 MG: 10 TABLET, FILM COATED ORAL at 20:39

## 2025-05-08 RX ADMIN — Medication 3 MG: at 20:41

## 2025-05-08 RX ADMIN — CLONAZEPAM 1 MG: 1 TABLET ORAL at 17:28

## 2025-05-08 RX ADMIN — SENNOSIDES AND DOCUSATE SODIUM 1 TABLET: 50; 8.6 TABLET ORAL at 20:40

## 2025-05-08 RX ADMIN — PHENOBARBITAL 64.8 MG: 64.8 TABLET ORAL at 22:01

## 2025-05-08 RX ADMIN — DIVALPROEX SODIUM 1500 MG: 500 TABLET, DELAYED RELEASE ORAL at 20:40

## 2025-05-08 RX ADMIN — OLANZAPINE 5 MG: 5 TABLET, FILM COATED ORAL at 19:55

## 2025-05-08 RX ADMIN — TRAZODONE HYDROCHLORIDE 150 MG: 100 TABLET ORAL at 20:40

## 2025-05-08 NOTE — PLAN OF CARE
Problem: Alteration in Thoughts and Perception  Goal: Treatment Goal: Gain control of psychotic behaviors/thinking, reduce/eliminate presenting symptoms and demonstrate improved reality functioning upon discharge  Outcome: Progressing  Goal: Refrain from acting on delusional thinking/internal stimuli  Description: Interventions:- Monitor patient closely, per order - Utilize least restrictive measures - Set reasonable limits, give positive feedback for acceptable - Administer medications as ordered and monitor of potential side effects  Outcome: Progressing  Goal: Agree to be compliant with medication regime, as prescribed and report medication side effects  Description: Interventions:- Offer appropriate PRN medication and supervise ingestion; conduct AIMS, as needed   Outcome: Progressing  Goal: Recognize dysfunctional thoughts, communicate reality-based thoughts at the time of discharge  Description: Interventions:- Provide medication and psycho-education to assist patient in compliance and developing insight into his/her illness   Outcome: Progressing     Problem: Ineffective Coping  Goal: Cooperates with admission process  Description: Interventions: - Complete admission process  Outcome: Progressing  Goal: Identifies ineffective coping skills  Outcome: Progressing  Goal: Identifies healthy coping skills  Outcome: Progressing  Goal: Demonstrates healthy coping skills  Outcome: Progressing  Goal: Understands least restrictive measures  Description: Interventions:- Utilize least restrictive behavior  Outcome: Progressing  Goal: Free from restraint events  Description: - Utilize least restrictive measures - Provide behavioral interventions - Redirect inappropriate behaviors   Outcome: Progressing     Problem: Risk for Self Injury/Neglect  Goal: Verbalize thoughts and feelings  Description: Interventions:- Assess and re-assess patient's lethality and potential for self-injury- Engage patient in 1:1 interactions,  daily, for a minimum of 15 minutes- Encourage patient to express feelings, fears, frustrations, hopes- Establish rapport/trust with patient   Outcome: Progressing  Goal: Refrain from harming self  Description: Interventions:- Monitor patient closely, per order- Develop a trusting relationship- Supervise medication ingestion, monitor effects and side effects   Outcome: Progressing  Goal: Attend and participate in unit activities, including therapeutic, recreational, and educational groups  Description: Interventions:- Provide therapeutic and educational activities daily, encourage attendance and participation, and document same in the medical record- Obtain collateral information, encourage visitation and family involvement in care   Outcome: Progressing  Goal: Recognize maladaptive responses and adopt new coping mechanisms  Outcome: Progressing  Goal: Complete daily ADLs, including personal hygiene independently, as able  Description: Interventions:- Observe, teach, and assist patient with ADLS- Monitor and promote a balance of rest/activity, with adequate nutrition and elimination  Outcome: Progressing     Problem: Depression  Goal: Treatment Goal: Demonstrate behavioral control of depressive symptoms, verbalize feelings of improved mood/affect, and adopt new coping skills prior to discharge  Outcome: Progressing     Problem: Anxiety  Goal: Anxiety is at manageable level  Description: Interventions:- Assess and monitor patient's anxiety level. - Monitor for signs and symptoms (heart palpitations, chest pain, shortness of breath, headaches, nausea, feeling jumpy, restlessness, irritable, apprehensive). - Collaborate with interdisciplinary team and initiate plan and interventions as ordered.- [place] patient to unit/surroundings- Explain treatment plan- Encourage participation in care- Encourage verbalization of concerns/fears- Identify coping mechanisms- Assist in developing anxiety-reducing skills- Administer/offer  alternative therapies- Limit or eliminate stimulants  Outcome: Progressing     Problem: Risk for Violence/Aggression Toward Others  Goal: Refrain from harming others  Outcome: Progressing  Goal: Refrain from destructive acts on the environment or property  Outcome: Progressing  Goal: Control angry outbursts  Description: Interventions:- Monitor patient closely, per order- Ensure early verbal de-escalation- Monitor prn medication needs- Set reasonable/therapeutic limits, outline behavioral expectations, and consequences - Provide a non-threatening milieu, utilizing the least restrictive interventions   Outcome: Progressing     Problem: SAFETY ADULT  Goal: Patient will remain free of falls  Description: INTERVENTIONS:- Educate patient/family on patient safety including physical limitations- Instruct patient to call for assistance with activity - Consult OT/PT to assist with strengthening/mobility - Keep Call bell within reach- Keep bed low and locked with side rails adjusted as appropriate- Keep care items and personal belongings within reach- Initiate and maintain comfort rounds- Make Fall Risk Sign visible to staff- Offer Toileting every 2 Hours, in advance of need- Initiate/Maintain bed/chair alarm- Obtain necessary fall risk management equipment: - Apply yellow socks and bracelet for high fall risk patients- Consider moving patient to room near nurses station  Outcome: Progressing  Goal: Maintain or return to baseline ADL function  Description: INTERVENTIONS:-  Assess patient's ability to carry out ADLs; assess patient's baseline for ADL function and identify physical deficits which impact ability to perform ADLs (bathing, care of mouth/teeth, toileting, grooming, dressing, etc.)- Assess/evaluate cause of self-care deficits - Assess range of motion- Assess patient's mobility; develop plan if impaired- Assess patient's need for assistive devices and provide as appropriate- Encourage maximum independence but  intervene and supervise when necessary- Involve family in performance of ADLs- Assess for home care needs following discharge - Consider OT consult to assist with ADL evaluation and planning for discharge- Provide patient education as appropriate  Outcome: Progressing  Goal: Maintains/Returns to pre admission functional level  Description: INTERVENTIONS:- Perform AM-PAC 6 Click Basic Mobility/ Daily Activity assessment daily.- Set and communicate daily mobility goal to care team and patient/family/caregiver. - Collaborate with rehabilitation services on mobility goals if consulted- Perform Range of Motion 3 times a day.-   Problem: DISCHARGE PLANNING  Goal: Discharge to home or other facility with appropriate resources  Description: INTERVENTIONS:- Identify barriers to discharge w/patient and caregiver- Arrange for needed discharge resources and transportation as appropriate- Identify discharge learning needs (meds, wound care, etc.)- Arrange for interpretive services to assist at discharge as needed- Refer to Case Management Department for coordinating discharge planning if the patient needs post-hospital services based on physician/advanced practitioner order or complex needs related to functional status, cognitive ability, or social support system  Outcome: Progressing     Problem: Knowledge Deficit  Goal: Patient/family/caregiver demonstrates understanding of disease process, treatment plan, medications, and discharge instructions  Description: Complete learning assessment and assess knowledge base.Interventions:- Provide teaching at level of understanding- Provide teaching via preferred learning methods  Outcome: Progressing   progress and toleration of activity level   Outcome: Progressing

## 2025-05-08 NOTE — HOSPITAL COURSE
"Patient is a 69-year-old female with a PMH of HTN, HLD, hypothyroidism, seizure disorder, schizoaffective disorder with longstanding paranoia and delusions who presented from [facility] in [facility] as a Level C trauma after jumping out of a first story window due to paranoia. Per reports, pt was hallucinating that employees at her group home facility/mental care facility were \"eating Jews\" and \"treating Catholics badly\" so she jumped out the window to escape. Patient was initially combative with ED providers and refused treatment of facial lacerations. Lab work including CBC, CMP, TSH, coma panel, UA were unremarkable. UDS was positive for barbiturates (however, patient takes phenobarb for seizure disorder). Trauma workup negative.  AMS was deemed likely due to psychiatric condition and patient was evaluated by behavioral health who recommended inpatient psychiatric admission.  Patient signed 201 for inpatient admission at [place].  Medically stable for transfer.  "

## 2025-05-08 NOTE — PLAN OF CARE
Problem: Potential for Falls  Goal: Patient will remain free of falls  Description: INTERVENTIONS:- Educate patient/family on patient safety including physical limitations- Instruct patient to call for assistance with activity - Consult OT/PT to assist with strengthening/mobility - Keep Call bell within reach- Keep bed low and locked with side rails adjusted as appropriate- Keep care items and personal belongings within reach- Initiate and maintain comfort rounds- Make Fall Risk Sign visible to staff  Outcome: Adequate for Discharge     Problem: PAIN - ADULT  Goal: Verbalizes/displays adequate comfort level or baseline comfort level  Description: Interventions:- Encourage patient to monitor pain and request assistance- Assess pain using appropriate pain scale- Administer analgesics based on type and severity of pain and evaluate response- Implement non-pharmacological measures as appropriate and evaluate response- Consider cultural and social influences on pain and pain management- Notify physician/advanced practitioner if interventions unsuccessful or patient reports new pain  Outcome: Adequate for Discharge     Problem: INFECTION - ADULT  Goal: Absence or prevention of progression during hospitalization  Description: INTERVENTIONS:- Assess and monitor for signs and symptoms of infection- Monitor lab/diagnostic results- Monitor all insertion sites, i.e. indwelling lines, tubes, and drains- Monitor endotracheal if appropriate and nasal secretions for changes in amount and color- [place] appropriate cooling/warming therapies per order- Administer medications as ordered- Instruct and encourage patient and family to use good hand hygiene technique- Identify and instruct in appropriate isolation precautions for identified infection/condition  Outcome: Adequate for Discharge  Goal: Absence of fever/infection during neutropenic period  Description: INTERVENTIONS:- Monitor WBC  Outcome: Adequate for Discharge      Problem: SAFETY ADULT  Goal: Patient will remain free of falls  Description: INTERVENTIONS:- Educate patient/family on patient safety including physical limitations- Instruct patient to call for assistance with activity - Consult OT/PT to assist with strengthening/mobility - Keep Call bell within reach- Keep bed low and locked with side rails adjusted as appropriate- Keep care items and personal belongings within reach- Initiate and maintain comfort rounds- Make Fall Risk Sign visible to staff  Outcome: Adequate for Discharge  Goal: Maintain or return to baseline ADL function  Description: INTERVENTIONS:-  Assess patient's ability to carry out ADLs; assess patient's baseline for ADL function and identify physical deficits which impact ability to perform ADLs (bathing, care of mouth/teeth, toileting, grooming, dressing, etc.)- Assess/evaluate cause of self-care deficits - Assess range of motion- Assess patient's mobility; develop plan if impaired- Assess patient's need for assistive devices and provide as appropriate- Encourage maximum independence but intervene and supervise when necessary- Involve family in performance of ADLs- Assess for home care needs following discharge - Consider OT consult to assist with ADL evaluation and planning for discharge- Provide patient education as appropriate  Outcome: Adequate for Discharge  Goal: Maintains/Returns to pre admission functional level  Description: INTERVENTIONS:- Perform AM-PAC 6 Click Basic Mobility/ Daily Activity assessment daily.- Set and communicate daily mobility goal to care team and patient/family/caregiver. - Collaborate with rehabilitation services on mobility goals if consulted     Problem: DISCHARGE PLANNING  Goal: Discharge to home or other facility with appropriate resources  Description: INTERVENTIONS:- Identify barriers to discharge w/patient and caregiver- Arrange for needed discharge resources and transportation as appropriate- Identify discharge  learning needs (meds, wound care, etc.)- Arrange for interpretive services to assist at discharge as needed- Refer to Case Management Department for coordinating discharge planning if the patient needs post-hospital services based on physician/advanced practitioner order or complex needs related to functional status, cognitive ability, or social support system  Outcome: Adequate for Discharge     Problem: Knowledge Deficit  Goal: Patient/family/caregiver demonstrates understanding of disease process, treatment plan, medications, and discharge instructions  Description: Complete learning assessment and assess knowledge base.Interventions:- Provide teaching at level of understanding- Provide teaching via preferred learning methods  Outcome: Adequate for Discharge     Problem: Prexisting or High Potential for Compromised Skin Integrity  Goal: Skin integrity is maintained or improved  Description: INTERVENTIONS:- Identify patients at risk for skin breakdown- Assess and monitor skin integrity- Assess and monitor nutrition and hydration status- Monitor labs - Assess for incontinence - Turn and reposition patient- Assist with mobility/ambulation- Relieve pressure over bony prominences- Avoid friction and shearing- Provide appropriate hygiene as needed including keeping skin clean and dry- Evaluate need for skin moisturizer/barrier cream- Collaborate with interdisciplinary team - Patient/family teaching- Consider wound care consult   Outcome: Adequate for Discharge

## 2025-05-08 NOTE — ASSESSMENT & PLAN NOTE
Facial laceration on left side of face due to fall. Iinitially combative to ED providers and refused to allow treatment of facial lacerations.  Wound care consulted, recommendations appreciated

## 2025-05-08 NOTE — ASSESSMENT & PLAN NOTE
Last seen by behvaioral health on 3/22 via telemedicine  Home regimen:  Klonopin 1 mg twice daily, Depakote DR 1500 mg at bedtime, gabapentin 300 mg twice daily, and Zyprexa 20 mg at bedtime    Plan:  Psych consulted, appreciate recs  Continue home meds as above
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
## 2025-05-08 NOTE — ASSESSMENT & PLAN NOTE
"Hx of seizure disorder, schizoaffective disorder with longstanding paranoia and delusions   Presented from group home facility/mental care facility as a Level C trauma after jumping out of a first story window onto her face due to paranoia. Per reports, pt was hallucinating that employees at her facility were \"eating Jews\" and \"treating Catholics badly\" so she jumped out the window to escape.    Lab work including CBC, CMP, TSH, coma panel, UA unremarkable. UDS positive for barbiturates (however, patient takes phenobarb for seizure disorder)  Trauma workup negative    AMS likely 2/2 acute psychosis vs worsening of schizoaffective disorder    Plan:  Psych consulted, recommendations appreciated  Per behavioral health, patient has agreed to inpatient psychiatric admission at [facility].  Patient is 201 at this time.  Should she request to leave AMA and resend her 201, will need to petition a 302  Patient is medically cleared from primary standpoint   Continue virtual 1:1  "

## 2025-05-08 NOTE — ASSESSMENT & PLAN NOTE
History of HTN but not currently on any meds.  Continue to monitor for now, consider starting amlodipine 5 mg

## 2025-05-08 NOTE — TELEPHONE ENCOUNTER
James called to check on Fax, He did not receive yet and is requesting it to be refaxed to [phone]

## 2025-05-08 NOTE — CASE MANAGEMENT
Case Management Discharge Planning Note    Patient name Laine Tse  Location [facility] 730/[facility] 730-01 MRN [id number]  : 1955 Date 2025       Current Admission Date: 2025  Current Admission Diagnosis:AMS (altered mental status)   Patient Active Problem List    Diagnosis Date Noted Date Diagnosed    Facial laceration 2025     AMS (altered mental status) 2025     T12 compression fracture (HCC) 2025     Severe protein-calorie malnutrition (HCC) 2025     Skin lesion of left lower extremity 2024     Chronic cough 2023     T wave inversion on electrocardiogram 2023     Rash 2023     Dermatitis 2023     Chronic midline low back pain without sciatica 2021     Class 1 obesity with body mass index (BMI) of 32.0 to 32.9 in adult 2021     Decreased hearing of both ears 2019     Benign essential hypertension 2019     Pre-diabetes 2019     Hyperglycemia 2018     Fall 2018     Injury of right toe, initial encounter 2018     Unsteady gait 10/03/2017     Seizure disorder (HCC) 05/15/2017     Anemia 05/15/2017     Hyponatremia 2015     Folic acid deficiency 10/15/2012     Hyperlipidemia 10/05/2012     Hypothyroidism 10/05/2012     Schizoaffective disorder (HCC) 10/05/2012     Osteoporosis 10/05/2012       LOS (days): 1  Geometric Mean LOS (GMLOS) (days): 2.6  Days to GMLOS:1.5     OBJECTIVE:  Risk of Unplanned Readmission Score: 36.81         Current admission status: Inpatient   Preferred Pharmacy:   [facility] [place], [place] - [address]  [address] [facility] [address]  Phone: [phone] Fax: [phone]    Primary Care Provider: Adilson Yip MD    Primary Insurance: MEDICARE  Secondary Insurance:     DISCHARGE DETAILS:  Accepting Facility Name, City & State : [facility]  Receiving Facility/Agency Phone Number:  Nursing [phone]     DALE notified by [facility] Intake that there is bed at [facility] today for patient  CM went bedside to discuss with patient who is agreeable  CM called patient sister Kati to notify of plan, in agreement  CM scheduled BLS transport. Patient has history of elopement: attempted elopement from group home leading to this admission and 1 past attempt of elopement while in .   Transport confirmed for 2:55PM  Bedside RN given DC envelope with original 201 inside

## 2025-05-08 NOTE — TELEPHONE ENCOUNTER
James called back asking to make sure it is checked for nursing home care before being sent. Form  this morning was checked for the wrong box. Please advise if it can be emailed. Confirmed email is secured.     Email at  [email or web address]  Call back @ [phone]
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
## 2025-05-08 NOTE — ASSESSMENT & PLAN NOTE
Home regimen: phenobarbital 64.8 mg twice daily  No concern for seizures.  Electrolytes and LDH within normal limits  Continue home med as above

## 2025-05-08 NOTE — ASSESSMENT & PLAN NOTE
"Hx of seizure disorder, schizoaffective disorder with longstanding paranoia and delusions   Presented from group home facility/mental care facility as a Level C trauma after jumping out of a first story window onto her face due to paranoia. Per reports, pt was hallucinating that employees at her facility were \"eating Jews\" and \"treating Catholics badly\" so she jumped out the window to escape.    Lab work including CBC, CMP, TSH, coma panel, UA unremarkable. UDS positive for barbiturates (however, patient takes phenobarb for seizure disorder)  Trauma workup negative    AMS likely 2/2 acute psychosis vs worsening of schizoaffective disorder    Plan:  Psych consulted, recommendations appreciated  Per behavioral health, patient has agreed to inpatient psychiatric admission at \[facility]\"".    Patient is medically cleared from primary standpoint   Continue virtual 1:1  "

## 2025-05-08 NOTE — ASSESSMENT & PLAN NOTE
CT CAP w/ contrast showed no  findings of acute traumatic injury in the chest, abdomen or pelvis.    CT C-spine: no cervical spine fracture or traumatic malalignment.   CTH no intracranial hemorrhage or calvarial fracture    Plan:  Fall precautions

## 2025-05-08 NOTE — PROGRESS NOTES
"Progress Note - Internal Medicine   Name: Laine Tse 69 y.o. female I MRN: [id number]  [facility] Date of Admission: 5/7/2025   Date of Service: 5/8/2025 I Hospital Day: 1    Assessment & Plan  AMS (altered mental status)  Hx of seizure disorder, schizoaffective disorder with longstanding paranoia and delusions   Presented from group home facility/mental care facility as a Level C trauma after jumping out of a first story window onto her face due to paranoia. Per reports, pt was hallucinating that employees at her facility were \"eating Jews\" and \"treating Catholics badly\" so she jumped out the window to escape.    Lab work including CBC, CMP, TSH, coma panel, UA unremarkable. UDS positive for barbiturates (however, patient takes phenobarb for seizure disorder)  Trauma workup negative    AMS likely 2/2 acute psychosis vs worsening of schizoaffective disorder    Plan:  Psych consulted, recommendations appreciated  Per behavioral health, patient has agreed to inpatient psychiatric admission at [facility].  Patient is 201 at this time.  Should she request to leave AMA and resend her 201, will need to petition a 302  Patient is medically cleared from primary standpoint   Continue virtual 1:1  Fall  CT CAP w/ contrast showed no  findings of acute traumatic injury in the chest, abdomen or pelvis.    CT C-spine: no cervical spine fracture or traumatic malalignment.   CTH no intracranial hemorrhage or calvarial fracture    Plan:  Fall precautions   Schizoaffective disorder (HCC)  Last seen by behvaioral health on 3/22 via telemedicine  Home regimen:  Klonopin 1 mg twice daily, Depakote DR 1500 mg at bedtime, gabapentin 300 mg twice daily, and Zyprexa 20 mg at bedtime    Plan:  Psych consulted, appreciate recs  Continue home meds as above  Seizure disorder (HCC)  Home regimen: phenobarbital 64.8 mg twice daily  No concern for seizures.  Electrolytes and LDH within normal limits  Continue home med as " above  Hypothyroidism  POA TSH 3.02   Continue home levothyroxine 100 mcg daily  Benign essential hypertension  History of HTN but not currently on any meds.  Continue to monitor for now, consider starting amlodipine 5 mg   Facial laceration  Facial laceration on left side of face due to fall. Iinitially combative to ED providers and refused to allow treatment of facial lacerations.  Wound care consulted, recommendations appreciated    Disposition: Pending transfer to     Team: SOD TEAM C    Subjective   Patient seen and examined. No acute events overnight.  AAO x 3.  Reports feeling more calm today.  No concerns or complaints    Objective :  Temp:  [97.3 °F (36.3 °C)-98.4 °F (36.9 °C)] 97.3 °F (36.3 °C)  HR:  [62-83] 72  BP: (104-157)/() 104/77  Resp:  [14-18] 18  SpO2:  [93 %-98 %] 96 %  O2 Device: None (Room air)    I/O         05/06 0701  05/07 0700 05/07 0701  05/08 0700 05/08 0701  05/09 0700    P.O.  462     Total Intake  462     Net  +462            Unmeasured Urine Occurrence  2 x           Weights:        There is no height or weight on file to calculate BMI.  Weight (last 2 days)       None            Physical Exam  Vitals and nursing note reviewed.   Constitutional:       General: She is not in acute distress.     Appearance: She is well-developed.   HENT:      Head: Normocephalic.      Comments: Lacerations with bruises noted left frontal region     Mouth/Throat:      Pharynx: Oropharynx is clear.   Eyes:      Conjunctiva/sclera: Conjunctivae normal.   Cardiovascular:      Rate and Rhythm: Normal rate and regular rhythm.      Heart sounds: No murmur heard.  Pulmonary:      Effort: Pulmonary effort is normal. No respiratory distress.      Breath sounds: Normal breath sounds.   Abdominal:      General: Bowel sounds are normal. There is no distension.      Palpations: Abdomen is soft.      Tenderness: There is no abdominal tenderness. There is no guarding.   Musculoskeletal:         General: No  swelling.      Cervical back: Neck supple.      Right lower leg: No edema.      Left lower leg: No edema.   Skin:     General: Skin is warm and dry.      Capillary Refill: Capillary refill takes less than 2 seconds.      Findings: Bruising present.   Neurological:      Mental Status: She is alert and oriented to person, place, and time.   Psychiatric:         Mood and Affect: Mood normal.           Lab Results: I have reviewed the following results:  Recent Labs     05/07/25  0805 05/08/25  0519   WBC 3.61* 4.02*   HGB 13.0 12.0   HCT 39.9 36.2    242   SODIUM 136 140   K 4.0 4.1    106   CO2 28 26   BUN 11 20   CREATININE 0.71 0.65   GLUC 95 97   MG 1.9  --    PHOS 3.9  --    AST 12*  --    ALT 8  --    ALB 4.0  --    TBILI 0.33  --    ALKPHOS 70  --    PTT 25  --    INR 0.92  --    LACTICACID 1.6  --              Currently Ordered Meds:   Current Facility-Administered Medications:     [Held by provider] alendronate (FOSAMAX) tablet 70 mg, Q7 Days    aspirin chewable tablet 81 mg, Daily    atorvastatin (LIPITOR) tablet 20 mg, Daily    clonazePAM (KlonoPIN) tablet 1 mg, BID    divalproex sodium (DEPAKOTE) DR tablet 1,500 mg, HS    enoxaparin (LOVENOX) subcutaneous injection 40 mg, Daily    fluticasone (FLONASE) 50 mcg/act nasal spray 2 spray, Daily    gabapentin (NEURONTIN) capsule 300 mg, BID    levothyroxine tablet 100 mcg, Early Morning    melatonin tablet 6 mg, HS    OLANZapine (ZyPREXA) tablet 20 mg, HS    PHENobarbital tablet 64.8 mg, Q12H    senna-docusate sodium (SENOKOT S) 8.6-50 mg per tablet 1 tablet, HS    traZODone (DESYREL) tablet 150 mg, HS    trihexyphenidyl (ARTANE) tablet 2 mg, BID    Facility-Administered Medications Ordered in Other Encounters:     lactated ringers infusion, Continuous PRN  VTE Pharmacologic Prophylaxis: VTE covered by:  enoxaparin, Subcutaneous, 40 mg at 05/07/25 1153     VTE Mechanical Prophylaxis: sequential compression device    Administrative Statements      Portions of the record may have been created with voice recognition software.

## 2025-05-09 PROBLEM — Z00.8 MEDICAL CLEARANCE FOR PSYCHIATRIC ADMISSION: Status: ACTIVE | Noted: 2018-07-10

## 2025-05-09 PROBLEM — F51.05 MOOD INSOMNIA (HCC): Status: ACTIVE | Noted: 2017-05-15

## 2025-05-09 PROBLEM — F39 MOOD INSOMNIA (HCC): Status: ACTIVE | Noted: 2017-05-15

## 2025-05-09 PROBLEM — E83.42 HYPOMAGNESEMIA: Status: ACTIVE | Noted: 2025-05-09

## 2025-05-09 PROBLEM — R25.1 TREMORS OF NERVOUS SYSTEM: Status: ACTIVE | Noted: 2025-05-09

## 2025-05-09 LAB
25(OH)D3 SERPL-MCNC: 34.3 NG/ML (ref 30–100)
ALBUMIN SERPL BCG-MCNC: 3.7 G/DL (ref 3.5–5)
ALP SERPL-CCNC: 58 U/L (ref 34–104)
ALT SERPL W P-5'-P-CCNC: 6 U/L (ref 7–52)
ANION GAP SERPL CALCULATED.3IONS-SCNC: 9 MMOL/L (ref 4–13)
AST SERPL W P-5'-P-CCNC: 10 U/L (ref 13–39)
ATRIAL RATE: 70 BPM
ATRIAL RATE: 71 BPM
BASOPHILS # BLD AUTO: 0 THOUSANDS/ÂΜL (ref 0–0.1)
BASOPHILS NFR BLD AUTO: 0 % (ref 0–1)
BILIRUB SERPL-MCNC: 0.27 MG/DL (ref 0.2–1)
BUN SERPL-MCNC: 14 MG/DL (ref 5–25)
CALCIUM SERPL-MCNC: 8.8 MG/DL (ref 8.4–10.2)
CHLORIDE SERPL-SCNC: 101 MMOL/L (ref 96–108)
CO2 SERPL-SCNC: 27 MMOL/L (ref 21–32)
CREAT SERPL-MCNC: 0.64 MG/DL (ref 0.6–1.3)
EOSINOPHIL # BLD AUTO: 0 THOUSAND/ÂΜL (ref 0–0.61)
EOSINOPHIL NFR BLD AUTO: 0 % (ref 0–6)
ERYTHROCYTE [DISTWIDTH] IN BLOOD BY AUTOMATED COUNT: 12.8 % (ref 11.6–15.1)
FOLATE SERPL-MCNC: 9.4 NG/ML
GFR SERPL CREATININE-BSD FRML MDRD: 90 ML/MIN/1.73SQ M
GLUCOSE P FAST SERPL-MCNC: 86 MG/DL (ref 65–99)
GLUCOSE SERPL-MCNC: 86 MG/DL (ref 65–140)
HCT VFR BLD AUTO: 40 % (ref 34.8–46.1)
HGB BLD-MCNC: 13.1 G/DL (ref 11.5–15.4)
IMM GRANULOCYTES # BLD AUTO: 0.02 THOUSAND/UL (ref 0–0.2)
IMM GRANULOCYTES NFR BLD AUTO: 1 % (ref 0–2)
LYMPHOCYTES # BLD AUTO: 1.45 THOUSANDS/ÂΜL (ref 0.6–4.47)
LYMPHOCYTES NFR BLD AUTO: 43 % (ref 14–44)
MAGNESIUM SERPL-MCNC: 1.8 MG/DL (ref 1.9–2.7)
MCH RBC QN AUTO: 32.6 PG (ref 26.8–34.3)
MCHC RBC AUTO-ENTMCNC: 32.8 G/DL (ref 31.4–37.4)
MCV RBC AUTO: 100 FL (ref 82–98)
MONOCYTES # BLD AUTO: 0.3 THOUSAND/ÂΜL (ref 0.17–1.22)
MONOCYTES NFR BLD AUTO: 9 % (ref 4–12)
NEUTROPHILS # BLD AUTO: 1.6 THOUSANDS/ÂΜL (ref 1.85–7.62)
NEUTS SEG NFR BLD AUTO: 47 % (ref 43–75)
NRBC BLD AUTO-RTO: 0 /100 WBCS
P AXIS: 56 DEGREES
P AXIS: 59 DEGREES
PHOSPHATE SERPL-MCNC: 3.6 MG/DL (ref 2.3–4.1)
PLATELET # BLD AUTO: 232 THOUSANDS/UL (ref 149–390)
PMV BLD AUTO: 9.6 FL (ref 8.9–12.7)
POTASSIUM SERPL-SCNC: 4 MMOL/L (ref 3.5–5.3)
PR INTERVAL: 162 MS
PR INTERVAL: 166 MS
PROT SERPL-MCNC: 6.6 G/DL (ref 6.4–8.4)
QRS AXIS: 48 DEGREES
QRS AXIS: 48 DEGREES
QRSD INTERVAL: 80 MS
QRSD INTERVAL: 80 MS
QT INTERVAL: 420 MS
QT INTERVAL: 426 MS
QTC INTERVAL: 454 MS
QTC INTERVAL: 463 MS
RBC # BLD AUTO: 4.02 MILLION/UL (ref 3.81–5.12)
SODIUM SERPL-SCNC: 137 MMOL/L (ref 135–147)
T WAVE AXIS: 129 DEGREES
T WAVE AXIS: 131 DEGREES
TSH SERPL DL<=0.05 MIU/L-ACNC: 1.83 UIU/ML (ref 0.45–4.5)
VALPROATE SERPL-MCNC: 23 UG/ML (ref 50–125)
VENTRICULAR RATE: 70 BPM
VENTRICULAR RATE: 71 BPM
VIT B12 SERPL-MCNC: 466 PG/ML (ref 180–914)
WBC # BLD AUTO: 3.37 THOUSAND/UL (ref 4.31–10.16)

## 2025-05-09 PROCEDURE — 99223 1ST HOSP IP/OBS HIGH 75: CPT | Performed by: PSYCHIATRY & NEUROLOGY

## 2025-05-09 PROCEDURE — 82306 VITAMIN D 25 HYDROXY: CPT

## 2025-05-09 PROCEDURE — 80164 ASSAY DIPROPYLACETIC ACD TOT: CPT | Performed by: PSYCHIATRY & NEUROLOGY

## 2025-05-09 PROCEDURE — 99222 1ST HOSP IP/OBS MODERATE 55: CPT | Performed by: NURSE PRACTITIONER

## 2025-05-09 PROCEDURE — 83735 ASSAY OF MAGNESIUM: CPT

## 2025-05-09 PROCEDURE — 85025 COMPLETE CBC W/AUTO DIFF WBC: CPT

## 2025-05-09 PROCEDURE — 84443 ASSAY THYROID STIM HORMONE: CPT

## 2025-05-09 PROCEDURE — 80053 COMPREHEN METABOLIC PANEL: CPT

## 2025-05-09 PROCEDURE — 84100 ASSAY OF PHOSPHORUS: CPT

## 2025-05-09 PROCEDURE — 97116 GAIT TRAINING THERAPY: CPT

## 2025-05-09 PROCEDURE — 97163 PT EVAL HIGH COMPLEX 45 MIN: CPT

## 2025-05-09 PROCEDURE — 82746 ASSAY OF FOLIC ACID SERUM: CPT

## 2025-05-09 PROCEDURE — 82607 VITAMIN B-12: CPT

## 2025-05-09 PROCEDURE — 93010 ELECTROCARDIOGRAM REPORT: CPT | Performed by: STUDENT IN AN ORGANIZED HEALTH CARE EDUCATION/TRAINING PROGRAM

## 2025-05-09 PROCEDURE — 93005 ELECTROCARDIOGRAM TRACING: CPT

## 2025-05-09 RX ORDER — GINSENG 100 MG
1 CAPSULE ORAL 2 TIMES DAILY
Status: COMPLETED | OUTPATIENT
Start: 2025-05-09 | End: 2025-05-13

## 2025-05-09 RX ORDER — FOLIC ACID 1 MG/1
1 TABLET ORAL DAILY
Status: DISCONTINUED | OUTPATIENT
Start: 2025-05-10 | End: 2025-06-01

## 2025-05-09 RX ORDER — DOCUSATE SODIUM 100 MG/1
100 CAPSULE, LIQUID FILLED ORAL 2 TIMES DAILY
Status: DISCONTINUED | OUTPATIENT
Start: 2025-05-09 | End: 2025-05-17

## 2025-05-09 RX ORDER — LANOLIN ALCOHOL/MO/W.PET/CERES
400 CREAM (GRAM) TOPICAL 2 TIMES DAILY
Status: DISCONTINUED | OUTPATIENT
Start: 2025-05-09 | End: 2025-05-17

## 2025-05-09 RX ORDER — POLYETHYLENE GLYCOL 3350 17 G/17G
17 POWDER, FOR SOLUTION ORAL DAILY PRN
Status: DISCONTINUED | OUTPATIENT
Start: 2025-05-09 | End: 2025-06-02 | Stop reason: HOSPADM

## 2025-05-09 RX ORDER — SENNOSIDES 8.6 MG
2 TABLET ORAL
Status: DISCONTINUED | OUTPATIENT
Start: 2025-05-09 | End: 2025-05-17

## 2025-05-09 RX ORDER — OLANZAPINE 5 MG/1
5 TABLET, FILM COATED ORAL DAILY
Status: DISCONTINUED | OUTPATIENT
Start: 2025-05-10 | End: 2025-05-12

## 2025-05-09 RX ADMIN — TRAZODONE HYDROCHLORIDE 150 MG: 100 TABLET ORAL at 22:08

## 2025-05-09 RX ADMIN — ATORVASTATIN CALCIUM 20 MG: 20 TABLET, FILM COATED ORAL at 08:46

## 2025-05-09 RX ADMIN — BACITRACIN 1 SMALL APPLICATION: 500 OINTMENT TOPICAL at 17:20

## 2025-05-09 RX ADMIN — LEVOTHYROXINE SODIUM 100 MCG: 0.1 TABLET ORAL at 05:31

## 2025-05-09 RX ADMIN — MAGNESIUM OXIDE TAB 400 MG (241.3 MG ELEMENTAL MG) 400 MG: 400 (241.3 MG) TAB at 17:19

## 2025-05-09 RX ADMIN — ASPIRIN 81 MG CHEWABLE TABLET 81 MG: 81 TABLET CHEWABLE at 08:46

## 2025-05-09 RX ADMIN — DOCUSATE SODIUM 100 MG: 100 CAPSULE, LIQUID FILLED ORAL at 17:20

## 2025-05-09 RX ADMIN — OLANZAPINE 20 MG: 10 TABLET, FILM COATED ORAL at 22:07

## 2025-05-09 RX ADMIN — POLYETHYLENE GLYCOL 3350 17 G: 17 POWDER, FOR SOLUTION ORAL at 10:42

## 2025-05-09 RX ADMIN — GABAPENTIN 300 MG: 300 CAPSULE ORAL at 08:46

## 2025-05-09 RX ADMIN — SENNOSIDES 17.2 MG: 8.6 TABLET, FILM COATED ORAL at 22:08

## 2025-05-09 RX ADMIN — PHENOBARBITAL 64.8 MG: 64.8 TABLET ORAL at 22:11

## 2025-05-09 RX ADMIN — TRIHEXYPHENIDYL HYDROCHLORIDE 2 MG: 2 TABLET ORAL at 22:08

## 2025-05-09 RX ADMIN — GABAPENTIN 300 MG: 300 CAPSULE ORAL at 14:54

## 2025-05-09 RX ADMIN — DIVALPROEX SODIUM 1500 MG: 500 TABLET, DELAYED RELEASE ORAL at 22:08

## 2025-05-09 RX ADMIN — CLONAZEPAM 1 MG: 1 TABLET ORAL at 08:46

## 2025-05-09 RX ADMIN — MAGNESIUM OXIDE TAB 400 MG (241.3 MG ELEMENTAL MG) 400 MG: 400 (241.3 MG) TAB at 10:42

## 2025-05-09 RX ADMIN — TRIHEXYPHENIDYL HYDROCHLORIDE 2 MG: 2 TABLET ORAL at 08:47

## 2025-05-09 RX ADMIN — OLANZAPINE 5 MG: 5 TABLET, FILM COATED ORAL at 12:14

## 2025-05-09 RX ADMIN — FLUTICASONE PROPIONATE 2 SPRAY: 50 SPRAY, METERED NASAL at 08:51

## 2025-05-09 RX ADMIN — Medication 3 MG: at 22:11

## 2025-05-09 RX ADMIN — BACITRACIN 1 SMALL APPLICATION: 500 OINTMENT TOPICAL at 13:15

## 2025-05-09 RX ADMIN — PHENOBARBITAL 64.8 MG: 64.8 TABLET ORAL at 10:42

## 2025-05-09 RX ADMIN — DOCUSATE SODIUM 100 MG: 100 CAPSULE, LIQUID FILLED ORAL at 10:42

## 2025-05-09 RX ADMIN — ACETAMINOPHEN 650 MG: 325 TABLET, FILM COATED ORAL at 13:16

## 2025-05-09 RX ADMIN — CLONAZEPAM 1 MG: 1 TABLET ORAL at 17:19

## 2025-05-09 NOTE — CASE MANAGEMENT
Cm called and left  for patient home [facility] regarding collateral and medication list.     [facility]   [phone]    CM also reached out to Benitez Escobar via email to see if patient is still with the [facility] team and if he may be aware of who her team is. Will await response.     [email or web address]

## 2025-05-09 NOTE — TREATMENT PLAN
TREATMENT PLAN REVIEW - Behavioral Health Laine Tse 69 y.o. 1955 female MRN: [id number]    [address] [facility] Encounter: [id number]          Admit Date/Time:  2025  4:06 PM    Treatment Team:   Jerica Gerena, DO Kimberly Deangelis Sharrinia Clayton Ketsia Elie, RN Michael Apgar Evelyn Renjifo    Diagnosis: Principal Problem:    Schizoaffective disorder (HCC)  Active Problems:    Seizure disorder (HCC)      Patient Strengths/Assets: compliant with medication, good past treatment response, good support system, patient is on a voluntary commitment, special hobby/interest, strong keyona, support at group home    Patient Barriers/Limitations: chronic mental illness, impaired cognition, poor insight, poor self-care, resistance to treatment, unstable housing situation    Short Term Goals: decrease in paranoid thoughts, decrease in psychotic symptoms, decrease in level of agitation, ability to stay safe on the unit, improvement in insight, improvement in reality testing, improvement in self care, improvement in appetite, increase in socialization with peers on the unit    Long Term Goals: stabilization of mood, resolution of psychotic symptoms, improvement in reality testing, improvement in reasoning ability, improved insight, adequate self care, appropriate interaction with peers, appropriate interaction with family, stable living arrangements upon discharge    Progress Towards Goals: starting psychiatric medications as prescribed, continue psychiatric medications as prescribed    Recommended Treatment: medication management, patient medication education, group therapy, milieu therapy, continued Behavioral Health psychiatric evaluation/assessment process    Treatment Frequency: daily medication monitoring, group and milieu therapy daily, monitoring through interdisciplinary rounds, monitoring through weekly patient care  conferences    Expected Discharge Date:  TBD    Discharge Plan: referrals as indicated    Treatment Plan Created/Updated By: Brigitte Middleton DO

## 2025-05-09 NOTE — CASE MANAGEMENT
"Patient was recently on a hospital stay and many of the medications were changed and she became a \"hot mess\" after. She stated  that she had seen her before coming into the hospital and that she is actually at her baseline. She states that at her baseline patient is confused, religiously preoccupied, tearful, and labile at times. According to Dinorah, [facility] was trying to get her into a nursing home as the home that she is in is usually a stepping stone with the expected stay only a year to two at the most. It is independent living and she has not appropriate for the setting for quite some time. They have not had any success placing her and [facility] has been the only one to consider taking her due to behaviors. She was previously sent to a physical rehab unit and she threw something and had been described as aggressive and no agencies have taken her due to that. She states that [facility] is a trigger for her and a big part of the reason she jumped through the window as she thought they were going to take her then and there.     Dinorah will email over a copy of patient medication list.       Dinorah - Nurse - [facility]   [phone]   "

## 2025-05-09 NOTE — ASSESSMENT & PLAN NOTE
Patient will continue on her home dose of enteric-coated aspirin 81 mg p.o. daily  Her blood pressure is currently stable now there is no need to add an agent at this time  We will continue to monitor her blood pressure on an ongoing basis and additively agents as needed

## 2025-05-09 NOTE — PROGRESS NOTES
05/09/25 0915   Team Meeting   Meeting Type Tx Team Meeting   Initial Conference Date 05/09/25   Team Members Present   Team Members Present Physician;Nurse;   Physician Team Member Emi   Nursing Team Member Heather   Care Management Team Member Yonny   Patient/Family Present   Patient Present Yes   Patient's Family Present No     Tx plan was reviewed and discussed with Pt. Pt was encouraged to attend groups. Medication was discussed with Pt.  Unable to obtain as patient is confused.

## 2025-05-09 NOTE — ASSESSMENT & PLAN NOTE
Continue Depakote 1500 mg nightly for mood stability and seizure disorder  Continue Phenobarbital 64.8 mg every 12 hours  Continue Klonopin 1 mg every 12 hours

## 2025-05-09 NOTE — ASSESSMENT & PLAN NOTE
Patient has a left eye facial laceration from falling out of a window  For now we will put bacitracin on her laceration twice daily x 5 days  Wound consult

## 2025-05-09 NOTE — CASE MANAGEMENT
DALE received call back from Christian from [facility]. She was willing to accept a verbal release for patient.     Christian - [facility]   [email or web address]    CM obtained verbal release to receive information from [employer] as well as [facility].
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
## 2025-05-09 NOTE — PROGRESS NOTES
"   05/09/25 0800   Team Meeting   Meeting Type Daily Rounds   Team Members Present   Team Members Present Other (Discipline and Name);Occupational Therapist;;Nurse;Physician   Physician Team Member Emi/Alonso/Moisés   Nursing Team Member Joan/Awais/Shantel   Care Management Team Member Yonny RANDALL Team Member Irasema   Other (Discipline and Name) Erin - Pharmacy   Patient/Family Present   Patient Present No   Patient's Family Present No     Patient is a 201 who was brought to the ED after jumping out of her first story window. Patient stated that she is the only Latter day in the building and that her facility is eating \"jews.\" She is child-like and loud. She is unsteady and has no safety awareness. She has urinary incontinence. Patient discharge is pending stabilization of mood and medications.   "

## 2025-05-09 NOTE — NURSING NOTE
No behavioral issues observed at the moment, patient is calm, shower, resting in bed. PRN Zyprexa effective.

## 2025-05-09 NOTE — PLAN OF CARE
Problem: Alteration in Thoughts and Perception  Goal: Refrain from acting on delusional thinking/internal stimuli  Description: Interventions:- Monitor patient closely, per order - Utilize least restrictive measures - Set reasonable limits, give positive feedback for acceptable - Administer medications as ordered and monitor of potential side effects  Outcome: Not Progressing  Goal: Agree to be compliant with medication regime, as prescribed and report medication side effects  Description: Interventions:- Offer appropriate PRN medication and supervise ingestion; conduct AIMS, as needed   Outcome: Progressing     Problem: Ineffective Coping  Goal: Identifies ineffective coping skills  Outcome: Progressing  Goal: Identifies healthy coping skills  Outcome: Progressing  Goal: Demonstrates healthy coping skills  Outcome: Not Progressing  Goal: Understands least restrictive measures  Description: Interventions:- Utilize least restrictive behavior  Outcome: Progressing  Goal: Free from restraint events  Description: - Utilize least restrictive measures - Provide behavioral interventions - Redirect inappropriate behaviors   Outcome: Progressing

## 2025-05-09 NOTE — ASSESSMENT & PLAN NOTE
Restart Depakote 1500 mg nightly for mood stability and seizure disorder  Restart phenobarbital 64.8 mg every 12 hours  Restart Klonopin 1 mg every 12 hours

## 2025-05-09 NOTE — TREATMENT TEAM
Pt attended group with 1:1.  Pt needed redirection at times.  Another peer had to move seats.  Pt did mention she needs to take a shower/wash hair as a goal today.  Pt did acknowledge how she came to hospital and face although impulsive and lacks safety awareness/poor judgement.     05/09/25 1100   Activity/Group Checklist   Group Other (Comment)  (Mental health recovery (poem))   Attendance Attended  (with 1:1)   Attendance Duration (min) 31-45   Interactions Other (Comment)  (restless, needed redirection)   Affect/Mood Incongruent  (impulsive)   Goals Achieved Identified feelings;Identified triggers;Identified relapse prevention strategies;Discussed coping strategies;Discussed self-esteem issues;Able to listen to others;Able to engage in interactions
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
## 2025-05-09 NOTE — CONSULTS
Consultation - Hospitalist   Name: Laine Tse 69 y.o. female I MRN: [id number]  Unit/Bed#: [facility] 605-01 I Date of Admission: 5/8/2025   Date of Service: 5/9/2025 I Hospital Day: 1   Inpatient consult for Medical Clearance for  patient  Consult performed by: CHRISTOPHER Flores  Consult ordered by: CHRISTOPHER Mcneil        Physician Requesting Evaluation: Brigitte Middleton DO   Reason for Evaluation / Principal Problem: Medical clearance for psychiatric admission    Assessment & Plan  Medical clearance for psychiatric admission  Vital signs stable afebrile patient seen and examined by myself at the bedside labs from today were reviewed by myself sodium 137 potassium 4.0 creatinine 0.64  Patient medically stable for admit  Please reach out to [facility] with any medical questions or concerns  Hyperlipidemia  Patient will continue on her home dose of Lipitor 20 mg p.o. daily  Hypothyroidism  Patient will continue on her home dose of levothyroxine 100 mcg p.o. daily  Seizure disorder (HCC)  Patient will continue on her home dose of female barbital 64.8 mg p.o. twice daily  Hypertension  Patient will continue on her home dose of enteric-coated aspirin 81 mg p.o. daily  Her blood pressure is currently stable now there is no need to add an agent at this time  We will continue to monitor her blood pressure on an ongoing basis and additively agents as needed  Facial laceration  Patient has a left eye facial laceration from falling out of a window  For now we will put bacitracin on her laceration twice daily x 5 days  Wound consult  Hypomagnesemia  Magnesium today 1.8  Start Mag-Ox 400 mg p.o. twice daily  Tremors of nervous system  Patient will continue on her home dose of Artane 2 mg p.o. twice daily        Collaboration of Care: Were Recommendations Directly Discussed with Primary Treatment Team? - Yes     History of Present Illness   Laine Tse is a 69 y.o. female who is originally admitted to the psychiatry  "service due to delusional and paranoid. We are consulted for medical clearance for admission to Behavioral Health Unit and treatment of underlying psychiatric illness.    Patient presents to [facility] on 5/7/2025.  After she jumped out of a first story window at her group home and fell onto her face secondary to being paranoid.  Patient reports that she was hallucinating that an employee at the facility was \"eating juice\" and \"treating Keflex badly\" so she jumped out the window to escape.  She was admitted to [facility] with altered mental status.  Her labs were unremarkable.  Her UDS was positive for barbiturates (however, patient takes phenobarbital for seizure disorder).  Trauma workup was negative.  Altered mental status was deemed most likely due to psychiatric condition.  Patient is now transferred to the behavioral health unit for stabilization of her mental health issues.  Patient has a longstanding history of schizoaffective disorder.  Resides in a group home.  She has a past medical history of hypertension, hyperlipidemia, hypothyroidism, seizure disorder.      Review of Systems   Constitutional:  Negative for chills and fever.   HENT:  Negative for ear pain and sore throat.    Eyes:  Negative for pain and visual disturbance.   Respiratory:  Negative for cough and shortness of breath.    Cardiovascular:  Negative for chest pain and palpitations.   Gastrointestinal:  Negative for abdominal pain and vomiting.   Genitourinary:  Negative for dysuria and hematuria.   Musculoskeletal:  Negative for arthralgias and back pain.   Skin:  Negative for color change and rash.   Neurological:  Negative for seizures and syncope.   Psychiatric/Behavioral:  Positive for decreased concentration.    All other systems reviewed and are negative.      Historical Information   Past Medical History:   Diagnosis Date    Anxiety     Benign essential hypertension     resolved; 06/15/16    Depression     " Depression with anxiety     Fracture of fifth metatarsal bone of right foot with delayed healing     last assessed 16; fracture of metatarsal bone, right, closed, initial encounter    Hyperlipidemia     last assessed 17    Hypothyroidism     last assessed 2017    Insomnia     Obesity     Schizoaffective disorder (HCC)     last assessed 2017    Seizure disorder (HCC)     last assessed 17    Seizures (HCC)      Past Surgical History:   Procedure Laterality Date    COLONOSCOPY      complete    ID COLONOSCOPY FLX DX W/COLLJ SPEC WHEN PFRMD N/A 2018    Procedure: COLONOSCOPY with polypectomies;  Surgeon: Andriy Lopez MD;  Location: [facility];  Service: Gastroenterology     Social History     Tobacco Use    Smoking status: Former     Current packs/day: 0.00     Average packs/day: 0.5 packs/day for 21.0 years (10.5 ttl pk-yrs)     Types: Cigarettes     Start date:      Quit date:      Years since quittin.3    Smokeless tobacco: Never   Vaping Use    Vaping status: Never Used   Substance and Sexual Activity    Alcohol use: Not Currently    Drug use: No    Sexual activity: Never     E-Cigarette/Vaping    E-Cigarette Use Never User      E-Cigarette/Vaping Substances    Nicotine No     THC No     CBD No     Flavoring No     Other No     Unknown No        Marital Status: /Civil Union    Meds/Allergies   I have reviewed home medications using recent Epic encounter.  Prior to Admission medications    Medication Sig Start Date End Date Taking? Authorizing Provider   Acetaminophen Extra Strength 500 MG TABS TAKE 2 TABLETS (1000MG) BY MOUTH 3X DAILY AS NEEDED FOR MILD PAIN/FEVER *MICAH 25   Adilson Yip MD   alendronate (FOSAMAX) 70 mg tablet Take 1 tablet (70 mg total) by mouth every 7 days Every 7 days on 25   Adilson Yip MD   asenapine (Saphris) 10 mg SL tablet Place 5 mg under the tongue 2 (two) times a day    Historical Provider, MD   Aspirin Low  Dose 81 MG chewable tablet CHEW 1 TABLET BY MOUTH ONCE DAILY @8AM (CAD) *MIREYA 4/24/25   Adilson Yip MD   atorvastatin (LIPITOR) 20 mg tablet Take 1 tablet (20 mg total) by mouth daily 4/24/25   Adilson Yip MD   benzonatate (TESSALON) 200 MG capsule Take 200 mg by mouth 2 (two) times a day as needed for cough    Historical Provider, MD   bismuth subsalicylate (PEPTO BISMOL) 262 MG chewable tablet Chew 524 mg every 6 (six) hours as needed for indigestion For diarrhea    Historical Provider, MD   busPIRone (BUSPAR) 15 mg tablet Take 15 mg by mouth 2 (two) times a day    Historical Provider, MD   Calcium Carbonate-Vitamin D (OSCAL 500/200 D-3 PO) Take by mouth    Historical Provider, MD   calcium carbonate-vitamin D 500 mg-5 mcg per tablet Take 1 tablet by mouth daily with breakfast    Historical Provider, MD   cetirizine (ZyrTEC) 5 MG chewable tablet Chew 5 mg daily at bedtime One daily prn for allergies    Historical Provider, MD   chlorhexidine (PERIDEX) 0.12 % solution Apply 15 mL to the mouth or throat 2 (two) times a day    Historical Provider, MD   clonazePAM (KlonoPIN) 1 mg tablet Take 1 tablet (1 mg total) by mouth 2 (two) times a day  Patient not taking: Reported on 4/14/2025 3/10/25 4/9/25  CHRISTOPHER Alexander   Diclofenac Sodium (VOLTAREN) 1 % Apply 2 g topically 4 (four) times a day To knee    Historical Provider, MD   diphenhydrAMINE (BENADRYL) 25 mg capsule Take 25 mg by mouth daily at bedtime as needed for itching    Historical Provider, MD   divalproex sodium (DEPAKOTE) 500 mg DR tablet Take 500 mg by mouth 3 qhs    Historical Provider, MD   docusate sodium (COLACE) 100 mg capsule Take 100 mg by mouth 2 (two) times a day as needed for constipation    Historical Provider, MD   Emollient (cetaphil) cream Apply topically as needed for dry skin    Historical Provider, MD   fluticasone (FLONASE) 50 mcg/act nasal spray 2 sprays into each nostril daily 3/9/25   Marion Javier PA-C    gabapentin (NEURONTIN) 100 mg capsule Take 2 capsules (200 mg total) by mouth 2 (two) times a day 3/28/25   Alaina Powell MD   guaiFENesin 400 mg Take 1 tablet (400 mg total) by mouth every 6 (six) hours as needed for cough 3/13/25   Erin Caldera PA-C   Incontinence Supplies MISC Use if needed (incontinence) SIZE XL PULL UP DISPOSABLE BRIEFS 4/24/25   Adilson Yip MD   Incontinence Supply Disposable (Disposable Brief Large) MISC Use every 12 (twelve) hours 4/24/25   Adilson Yip MD   L-Theanine 100 MG CAPS Take by mouth 2 (two) times a day    Historical Provider, MD   levothyroxine 100 mcg tablet Take 1 tablet (100 mcg total) by mouth daily 3/9/25   Marion Javier PA-C   lidocaine (LIDODERM) 5 % Apply 1 patch topically daily Remove & Discard patch within 12 hours or as directed by MD for back prn    Historical Provider, MD   lidocaine (LMX) 4 % cream Apply topically as needed for mild pain    Historical Provider, MD   LORazepam (ATIVAN) 0.5 mg tablet Take 0.5 mg by mouth 2 (two) times a day as needed for anxiety    Historical Provider, MD   loxapine (LOXITANE) 50 MG capsule Take 50 mg by mouth in the morning    Historical Provider, MD   melatonin 3 mg Take 2 tablets (6 mg total) by mouth daily at bedtime 4/28/25   Adilson Yip MD   Menthol, Topical Analgesic, (Bengay Ultra Strength) 5 % PTCH Apply 1 patch topically in the morning 11/21/24   Erin Caldera PA-C   Menthol-Methyl Salicylate (MURIEL AGUDELO GREASELESS) 10-15 % greaseless cream Apply topically 2 (two) times a day 4/14/25   Adilson Yip MD   multivitamin (THERAGRAN) TABS Take 1 tablet by mouth daily    Historical Provider, MD   mupirocin (BACTROBAN) 2 % ointment Apply topically 2 (two) times a day as needed Left knee bid prn    Historical Provider, MD   OLANZapine (ZyPREXA) 20 MG tablet Take 1 tablet (20 mg total) by mouth daily at bedtime 3/28/25   Alaina Powell MD   PHENobarbital 64.8 mg tablet Take 1 tablet (64.8 mg total) by mouth  "every 12 (twelve) hours 2/11/25   Jack Donovan MD   polyethylene glycol (GLYCOLAX) 17 GM/SCOOP powder Take 17 g by mouth daily    Historical Provider, MD   polyethylene glycol (GLYCOLAX) 17 GM/SCOOP MIX 1 CAPFUL(17GM) IN 8OZ OF LIQUID AND DRINK DAILY @ 8AM(CONSTIPATION) *MICAH 9/17/24   Adilson Yip MD   senna-docusate sodium (SENOKOT-S) 8.6-50 mg per tablet Take 1 tablet by mouth daily at bedtime 3/9/25   Marion Javier PA-C   sodium chloride (OCEAN) 0.65 % nasal spray 1 spray into each nostril as needed for congestion As directed up to 6x per day for nasal dryness    Historical Provider, MD   TiZANidine (Zanaflex) 2 MG capsule Take 2 mg by mouth in the morning Daily prn    Historical Provider, MD   traZODone (DESYREL) 150 mg tablet Take 1 tablet (150 mg total) by mouth daily at bedtime 3/28/25   Alaina Powell MD   trihexyphenidyl (ARTANE) 2 mg tablet Take 1 tablet (2 mg total) by mouth 2 (two) times a day 3/28/25   Alaina Powell MD     Allergies   Allergen Reactions    Clozapine Other (See Comments)     low white blood count  Other reaction(s): Other (See Comments)  low white blood count    Haloperidol Other (See Comments)     EPS  Other reaction(s): Other (See Comments)  EPS    Lithium Other (See Comments)     Toxicity    Prolixin [Fluphenazine] Hyperactivity and Other (See Comments)     EPS, Hyperactivity  EPS, Hyperactivity    Valproic Acid Confusion and Other (See Comments)     \"Mental confusion\"  \"Mental confusion\"       Objective :  Temp:  [97.5 °F (36.4 °C)-98 °F (36.7 °C)] 97.7 °F (36.5 °C)  HR:  [70-77] 77  BP: (103-132)/(64-75) 103/64  Resp:  [16-17] 17  SpO2:  [92 %-94 %] 92 %  O2 Device: None (Room air)    Height: 5' 4\" (162.6 cm) (05/08/25 1631)  Weight - Scale: 77.7 kg (171 lb 3.2 oz) (05/08/25 1631)  Physical Exam  Constitutional:       Appearance: Normal appearance.   HENT:      Head: Normocephalic and atraumatic.      Nose: Nose normal.      Mouth/Throat:      Mouth: Mucous " membranes are moist.      Pharynx: Oropharynx is clear.   Eyes:      Extraocular Movements: Extraocular movements intact.      Pupils: Pupils are equal, round, and reactive to light.   Cardiovascular:      Rate and Rhythm: Normal rate and regular rhythm.      Pulses: Normal pulses.      Heart sounds: Normal heart sounds.   Pulmonary:      Effort: Pulmonary effort is normal.      Breath sounds: Normal breath sounds.   Abdominal:      General: Abdomen is flat. Bowel sounds are normal.      Palpations: Abdomen is soft.   Musculoskeletal:         General: Normal range of motion.      Cervical back: Normal range of motion and neck supple.   Skin:     General: Skin is warm and dry.   Neurological:      Mental Status: She is alert and oriented to person, place, and time.   Psychiatric:         Attention and Perception: Attention normal.         Mood and Affect: Affect is labile.         Speech: Speech is slurred.         Behavior: Behavior is agitated. Behavior is cooperative.         Thought Content: Thought content is paranoid and delusional.         Cognition and Memory: Cognition is impaired. Memory is impaired.         Judgment: Judgment is impulsive.           Lab Results: I have reviewed the following results:  Results from last 7 days   Lab Units 05/09/25  0520   WBC Thousand/uL 3.37*   HEMOGLOBIN g/dL 13.1   HEMATOCRIT % 40.0   PLATELETS Thousands/uL 232   SEGS PCT % 47   LYMPHO PCT % 43   MONO PCT % 9   EOS PCT % 0     Results from last 7 days   Lab Units 05/09/25  0520   SODIUM mmol/L 137   POTASSIUM mmol/L 4.0   CHLORIDE mmol/L 101   CO2 mmol/L 27   BUN mg/dL 14   CREATININE mg/dL 0.64   ANION GAP mmol/L 9   CALCIUM mg/dL 8.8   ALBUMIN g/dL 3.7   TOTAL BILIRUBIN mg/dL 0.27   ALK PHOS U/L 58   ALT U/L 6*   AST U/L 10*   GLUCOSE RANDOM mg/dL 86     Results from last 7 days   Lab Units 05/07/25  0805   INR  0.92         Lab Results   Component Value Date/Time    HGBA1C 5.5 04/26/2024 08:16 AM    HGBA1C 5.7 (H)  10/27/2023 08:47 AM    HGBA1C 5.8 (H) 09/07/2023 09:34 AM    HGBA1C 5.7 (H) 06/19/2023 11:46 AM     Results from last 7 days   Lab Units 05/07/25  0809   POC GLUCOSE mg/dl 87       Imaging Results Review: No pertinent imaging studies reviewed.  Other Study Results Review: EKG was reviewed.   5/9/2025 twelve-lead EKG was reviewed by myself as well as interpreted by myself ventricular rate 70 QT interval 420 QTc 454 normal sinus rhythm with T wave abnormalities when compared with an EKG from 3/22/2025 there are no new acute EKG findings noted in this EKG.  Administrative Statements   I have spent a total time of 60 minutes in caring for this patient on the day of the visit/encounter including Diagnostic results, Prognosis, Risks and benefits of tx options, Instructions for management, Patient and family education, Importance of tx compliance, Risk factor reductions, Impressions, Counseling / Coordination of care, Documenting in the medical record, Reviewing/placing orders in the medical record (including tests, medications, and/or procedures), Obtaining or reviewing history  , and Communicating with other healthcare professionals .  ** Please Note: This note has been constructed using a voice recognition system. **
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
## 2025-05-09 NOTE — TREATMENT TEAM
"   05/08/25 1952   Broset Violence Checklist   Assessment type Shift   Irritability 1   Confusion 1   Boisterousness 1   Threatening physical violence 0   Verbal threats 0   Violence 0   Broset score 3     Responded to patient's chair alarm, patient standing and screaming in day room for nurses to print picture of our lady Radha to put up around the entire unit so we could all be protected. Patient also yelling \"only the catholics will survive.\" Not able to verbally redirect patient PRN Zyprexa 5 mg given with encouragement.  Monitor for safety and support.   "

## 2025-05-09 NOTE — TREATMENT TEAM
05/09/25 1213   Broset Violence Checklist   Assessment type Shift   Irritability 1   Confusion 1   Boisterousness 0   Threatening physical violence 0   Verbal threats 0   Violence 0   Broset score 2     Patient became increasingly restless and agitated wanted staff to print picture of Radha. PRN Zyprexa given, will monitor for effectiveness.
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
## 2025-05-09 NOTE — ASSESSMENT & PLAN NOTE
Restart Zyprexa 20 mg nightly and add 5 mg every morning  Restart Depakote 1500 mg nightly for mood stability and seizure disorder

## 2025-05-09 NOTE — WOUND OSTOMY CARE
Virtual  Wound Consult Note   Laine Tse 69 y.o. female MRN: [id number]  Unit/Bed#: [facility] 602-02 Encounter: [id number]        History and Present Illness:  Wound care consulted for 70 yo female admitted to [facility] with schizoaffective disorder. Wound care performed consult virtually using secure chat communication with primary nurse and viewing admission photos in media. Wound care plans to see patient next time wound care staff is present on campus.    Wounds:    Left eye- partial thickness wound with surrounding bruising, bacitracin is ordered.           Plan:   Bacitracin to left eye lac BID          Geneva ESCOBEDON, RN, CWOCN

## 2025-05-09 NOTE — ASSESSMENT & PLAN NOTE
Vital signs stable afebrile patient seen and examined by myself at the bedside labs from today were reviewed by myself sodium 137 potassium 4.0 creatinine 0.64  Patient medically stable for admit  Please reach out to [facility] with any medical questions or concerns

## 2025-05-10 PROCEDURE — 99232 SBSQ HOSP IP/OBS MODERATE 35: CPT

## 2025-05-10 RX ADMIN — TRIHEXYPHENIDYL HYDROCHLORIDE 2 MG: 2 TABLET ORAL at 21:30

## 2025-05-10 RX ADMIN — DOCUSATE SODIUM 100 MG: 100 CAPSULE, LIQUID FILLED ORAL at 08:26

## 2025-05-10 RX ADMIN — OLANZAPINE 20 MG: 10 TABLET, FILM COATED ORAL at 21:30

## 2025-05-10 RX ADMIN — ATORVASTATIN CALCIUM 20 MG: 20 TABLET, FILM COATED ORAL at 08:26

## 2025-05-10 RX ADMIN — ASPIRIN 81 MG CHEWABLE TABLET 81 MG: 81 TABLET CHEWABLE at 08:26

## 2025-05-10 RX ADMIN — CLONAZEPAM 1 MG: 1 TABLET ORAL at 17:12

## 2025-05-10 RX ADMIN — OLANZAPINE 5 MG: 5 TABLET, FILM COATED ORAL at 08:26

## 2025-05-10 RX ADMIN — GABAPENTIN 300 MG: 300 CAPSULE ORAL at 08:26

## 2025-05-10 RX ADMIN — FLUTICASONE PROPIONATE 2 SPRAY: 50 SPRAY, METERED NASAL at 08:30

## 2025-05-10 RX ADMIN — Medication 6 MG: at 21:30

## 2025-05-10 RX ADMIN — TRIHEXYPHENIDYL HYDROCHLORIDE 2 MG: 2 TABLET ORAL at 08:25

## 2025-05-10 RX ADMIN — CLONAZEPAM 1 MG: 1 TABLET ORAL at 08:25

## 2025-05-10 RX ADMIN — TRAZODONE HYDROCHLORIDE 150 MG: 100 TABLET ORAL at 21:30

## 2025-05-10 RX ADMIN — FOLIC ACID 1 MG: 1 TABLET ORAL at 08:26

## 2025-05-10 RX ADMIN — SENNOSIDES 17.2 MG: 8.6 TABLET, FILM COATED ORAL at 21:30

## 2025-05-10 RX ADMIN — DOCUSATE SODIUM 100 MG: 100 CAPSULE, LIQUID FILLED ORAL at 17:12

## 2025-05-10 RX ADMIN — GABAPENTIN 300 MG: 300 CAPSULE ORAL at 14:17

## 2025-05-10 RX ADMIN — PHENOBARBITAL 64.8 MG: 64.8 TABLET ORAL at 22:21

## 2025-05-10 RX ADMIN — Medication 1000 UNITS: at 08:25

## 2025-05-10 RX ADMIN — MAGNESIUM OXIDE TAB 400 MG (241.3 MG ELEMENTAL MG) 400 MG: 400 (241.3 MG) TAB at 17:12

## 2025-05-10 RX ADMIN — PHENOBARBITAL 64.8 MG: 64.8 TABLET ORAL at 10:44

## 2025-05-10 RX ADMIN — DIVALPROEX SODIUM 1500 MG: 500 TABLET, DELAYED RELEASE ORAL at 21:30

## 2025-05-10 RX ADMIN — BACITRACIN 1 SMALL APPLICATION: 500 OINTMENT TOPICAL at 08:29

## 2025-05-10 RX ADMIN — MAGNESIUM OXIDE TAB 400 MG (241.3 MG ELEMENTAL MG) 400 MG: 400 (241.3 MG) TAB at 08:26

## 2025-05-10 RX ADMIN — LEVOTHYROXINE SODIUM 100 MCG: 0.1 TABLET ORAL at 06:22

## 2025-05-10 RX ADMIN — BACITRACIN 1 SMALL APPLICATION: 500 OINTMENT TOPICAL at 17:12

## 2025-05-10 RX ADMIN — POLYETHYLENE GLYCOL 3350 17 G: 17 POWDER, FOR SOLUTION ORAL at 12:20

## 2025-05-10 RX ADMIN — Medication 1000 MCG: at 08:26

## 2025-05-10 NOTE — NURSING NOTE
Pt disorganized during shift, speaking loudly in dayroom. Pt perseverative on kneeling on floor to pray, unable to follow prompts to remain in bed. Pt appears religiously preoccupied, holding rosary in hand during meal. Pt able to communicate needs and express feeling to staff. Pt denies SI or HI at current time. Good intake at meals. Pt visible in dayroom during shift. Pt accepted miralax prn for constipation, will monitor. Monitored for safety during shift.

## 2025-05-10 NOTE — PLAN OF CARE
Problem: Alteration in Thoughts and Perception  Goal: Treatment Goal: Gain control of psychotic behaviors/thinking, reduce/eliminate presenting symptoms and demonstrate improved reality functioning upon discharge  Outcome: Progressing  Goal: Refrain from acting on delusional thinking/internal stimuli  Description: Interventions:- Monitor patient closely, per order - Utilize least restrictive measures - Set reasonable limits, give positive feedback for acceptable - Administer medications as ordered and monitor of potential side effects  Outcome: Progressing  Goal: Agree to be compliant with medication regime, as prescribed and report medication side effects  Description: Interventions:- Offer appropriate PRN medication and supervise ingestion; conduct AIMS, as needed   Outcome: Progressing  Goal: Recognize dysfunctional thoughts, communicate reality-based thoughts at the time of discharge  Description: Interventions:- Provide medication and psycho-education to assist patient in compliance and developing insight into his/her illness   Outcome: Progressing     Problem: Ineffective Coping  Goal: Cooperates with admission process  Description: Interventions: - Complete admission process  Outcome: Progressing  Goal: Identifies ineffective coping skills  Outcome: Progressing  Goal: Identifies healthy coping skills  Outcome: Progressing  Goal: Demonstrates healthy coping skills  Outcome: Progressing  Goal: Understands least restrictive measures  Description: Interventions:- Utilize least restrictive behavior  Outcome: Progressing  Goal: Free from restraint events  Description: - Utilize least restrictive measures - Provide behavioral interventions - Redirect inappropriate behaviors   Outcome: Progressing     Problem: Risk for Self Injury/Neglect  Goal: Verbalize thoughts and feelings  Description: Interventions:- Assess and re-assess patient's lethality and potential for self-injury- Engage patient in 1:1 interactions,  daily, for a minimum of 15 minutes- Encourage patient to express feelings, fears, frustrations, hopes- Establish rapport/trust with patient   Outcome: Progressing  Goal: Refrain from harming self  Description: Interventions:- Monitor patient closely, per order- Develop a trusting relationship- Supervise medication ingestion, monitor effects and side effects   Outcome: Progressing  Goal: Attend and participate in unit activities, including therapeutic, recreational, and educational groups  Description: Interventions:- Provide therapeutic and educational activities daily, encourage attendance and participation, and document same in the medical record- Obtain collateral information, encourage visitation and family involvement in care   Outcome: Progressing  Goal: Recognize maladaptive responses and adopt new coping mechanisms  Outcome: Progressing  Goal: Complete daily ADLs, including personal hygiene independently, as able  Description: Interventions:- Observe, teach, and assist patient with ADLS- Monitor and promote a balance of rest/activity, with adequate nutrition and elimination  Outcome: Progressing     Problem: Depression  Goal: Treatment Goal: Demonstrate behavioral control of depressive symptoms, verbalize feelings of improved mood/affect, and adopt new coping skills prior to discharge  Outcome: Progressing     Problem: Anxiety  Goal: Anxiety is at manageable level  Description: Interventions:- Assess and monitor patient's anxiety level. - Monitor for signs and symptoms (heart palpitations, chest pain, shortness of breath, headaches, nausea, feeling jumpy, restlessness, irritable, apprehensive). - Collaborate with interdisciplinary team and initiate plan and interventions as ordered.- [place] patient to unit/surroundings- Explain treatment plan- Encourage participation in care- Encourage verbalization of concerns/fears- Identify coping mechanisms- Assist in developing anxiety-reducing skills- Administer/offer  alternative therapies- Limit or eliminate stimulants  Outcome: Progressing     Problem: Risk for Violence/Aggression Toward Others  Goal: Refrain from harming others  Outcome: Progressing  Goal: Refrain from destructive acts on the environment or property  Outcome: Progressing  Goal: Control angry outbursts  Description: Interventions:- Monitor patient closely, per order- Ensure early verbal de-escalation- Monitor prn medication needs- Set reasonable/therapeutic limits, outline behavioral expectations, and consequences - Provide a non-threatening milieu, utilizing the least restrictive interventions   Outcome: Progressing     Problem: SAFETY ADULT  Goal: Patient will remain free of falls  Description: INTERVENTIONS:- Educate patient/family on patient safety including physical limitations- Instruct patient to call for assistance with activity - Consult OT/PT to assist with strengthening/mobility - Keep Call bell within reach- Keep bed low and locked with side rails adjusted as appropriate- Keep care items and personal belongings within reach- Initiate and maintain comfort rounds- Make Fall Risk Sign visible to staff- Offer Toileting every 2 Hours, in advance of need- Initiate/Maintain bed/chair alarm- Obtain necessary fall risk management equipment: walker- Apply yellow socks and bracelet for high fall risk patients- Consider moving patient to room near nurses station  Outcome: Progressing  Goal: Maintain or return to baseline ADL function  Description: INTERVENTIONS:-  Assess patient's ability to carry out ADLs; assess patient's baseline for ADL function and identify physical deficits which impact ability to perform ADLs (bathing, care of mouth/teeth, toileting, grooming, dressing, etc.)- Assess/evaluate cause of self-care deficits - Assess range of motion- Assess patient's mobility; develop plan if impaired- Assess patient's need for assistive devices and provide as appropriate- Encourage maximum independence but  intervene and supervise when necessary- Involve family in performance of ADLs- Assess for home care needs following discharge - Consider OT consult to assist with ADL evaluation and planning for discharge- Provide patient education as appropriate  Outcome: Progressing  Goal: Maintains/Returns to pre admission functional level  Description: INTERVENTIONS:- Perform AM-PAC 6 Click Basic Mobility/ Daily Activity assessment daily.- Set and communicate daily mobility goal to care team and patient/family/caregiver. - Collaborate with rehabilitation services on mobility goals if consulted- Perform Range of Motion 2 times a day.- Reposition patient every 2 hours.- Ambulate patient 2 times a day- Out of bed to chair 2 times a day - Out of bed for meals 3 times a day- Out of bed for toileting- Record patient progress and toleration of activity level   Outcome: Progressing     Problem: DISCHARGE PLANNING  Goal: Discharge to home or other facility with appropriate resources  Description: INTERVENTIONS:- Identify barriers to discharge w/patient and caregiver- Arrange for needed discharge resources and transportation as appropriate- Identify discharge learning needs (meds, wound care, etc.)- Arrange for interpretive services to assist at discharge as needed- Refer to Case Management Department for coordinating discharge planning if the patient needs post-hospital services based on physician/advanced practitioner order or complex needs related to functional status, cognitive ability, or social support system  Outcome: Progressing     Problem: Knowledge Deficit  Goal: Patient/family/caregiver demonstrates understanding of disease process, treatment plan, medications, and discharge instructions  Description: Complete learning assessment and assess knowledge base.Interventions:- Provide teaching at level of understanding- Provide teaching via preferred learning methods  Outcome: Progressing

## 2025-05-10 NOTE — NURSING NOTE
Patient is medication compliant, PRN Miralax given for constipation along with scheduled colace, ineffective. Patient is cooperative with care, endorses depression, denies SI, AH, VH. Patient remains religiously preoccupied. No further distress noted, in bed resting comfortably in bed with 1:1 present.

## 2025-05-11 LAB — VALPROATE FREE SERPL-MCNC: NORMAL UG/ML (ref 6–22)

## 2025-05-11 PROCEDURE — 99232 SBSQ HOSP IP/OBS MODERATE 35: CPT

## 2025-05-11 RX ADMIN — TRIHEXYPHENIDYL HYDROCHLORIDE 2 MG: 2 TABLET ORAL at 08:10

## 2025-05-11 RX ADMIN — DIVALPROEX SODIUM 1500 MG: 500 TABLET, DELAYED RELEASE ORAL at 21:19

## 2025-05-11 RX ADMIN — GABAPENTIN 300 MG: 300 CAPSULE ORAL at 08:11

## 2025-05-11 RX ADMIN — BACITRACIN 1 SMALL APPLICATION: 500 OINTMENT TOPICAL at 17:13

## 2025-05-11 RX ADMIN — PHENOBARBITAL 64.8 MG: 64.8 TABLET ORAL at 22:12

## 2025-05-11 RX ADMIN — TRIHEXYPHENIDYL HYDROCHLORIDE 2 MG: 2 TABLET ORAL at 21:19

## 2025-05-11 RX ADMIN — Medication 1000 MCG: at 08:10

## 2025-05-11 RX ADMIN — ASPIRIN 81 MG CHEWABLE TABLET 81 MG: 81 TABLET CHEWABLE at 08:11

## 2025-05-11 RX ADMIN — CLONAZEPAM 1 MG: 1 TABLET ORAL at 08:11

## 2025-05-11 RX ADMIN — OLANZAPINE 5 MG: 5 TABLET, FILM COATED ORAL at 09:32

## 2025-05-11 RX ADMIN — LEVOTHYROXINE SODIUM 100 MCG: 0.1 TABLET ORAL at 05:57

## 2025-05-11 RX ADMIN — Medication 1000 UNITS: at 08:11

## 2025-05-11 RX ADMIN — SENNOSIDES AND DOCUSATE SODIUM 1 TABLET: 50; 8.6 TABLET ORAL at 17:12

## 2025-05-11 RX ADMIN — GABAPENTIN 300 MG: 300 CAPSULE ORAL at 14:20

## 2025-05-11 RX ADMIN — MAGNESIUM OXIDE TAB 400 MG (241.3 MG ELEMENTAL MG) 400 MG: 400 (241.3 MG) TAB at 08:10

## 2025-05-11 RX ADMIN — FLUTICASONE PROPIONATE 2 SPRAY: 50 SPRAY, METERED NASAL at 08:11

## 2025-05-11 RX ADMIN — SENNOSIDES 17.2 MG: 8.6 TABLET, FILM COATED ORAL at 21:18

## 2025-05-11 RX ADMIN — ATORVASTATIN CALCIUM 20 MG: 20 TABLET, FILM COATED ORAL at 08:11

## 2025-05-11 RX ADMIN — MAGNESIUM OXIDE TAB 400 MG (241.3 MG ELEMENTAL MG) 400 MG: 400 (241.3 MG) TAB at 17:13

## 2025-05-11 RX ADMIN — OLANZAPINE 5 MG: 5 TABLET, FILM COATED ORAL at 08:11

## 2025-05-11 RX ADMIN — OLANZAPINE 20 MG: 10 TABLET, FILM COATED ORAL at 21:18

## 2025-05-11 RX ADMIN — Medication 6 MG: at 21:19

## 2025-05-11 RX ADMIN — POLYETHYLENE GLYCOL 3350 17 G: 17 POWDER, FOR SOLUTION ORAL at 12:15

## 2025-05-11 RX ADMIN — FOLIC ACID 1 MG: 1 TABLET ORAL at 08:11

## 2025-05-11 RX ADMIN — TRAZODONE HYDROCHLORIDE 150 MG: 100 TABLET ORAL at 21:18

## 2025-05-11 RX ADMIN — CLONAZEPAM 1 MG: 1 TABLET ORAL at 17:12

## 2025-05-11 RX ADMIN — PHENOBARBITAL 64.8 MG: 64.8 TABLET ORAL at 11:36

## 2025-05-11 RX ADMIN — BACITRACIN 1 SMALL APPLICATION: 500 OINTMENT TOPICAL at 08:10
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
## 2025-05-11 NOTE — NURSING NOTE
Pt exhibited disruptive behavior on unit, yelling loudly at staff, blaming staff, refusing to sit on chair alarm. Pt impulsive and attempting ambulation but appears unsteady. Pt encouraged to leave dining area, but non cooperative. Pt placed on 1:1 staff monitoring for safety, will continue to monitor.

## 2025-05-11 NOTE — NURSING NOTE
"Pt appeared labile and irritable, blaming staff and unable to follow prompts. Pt perseverative on \"I need to be in a trauma vasquez, not the nut vasquez!\" and that a saint told her this. Pt denies hearing voices when questioned. Pt refused scheduled colace, repeatedly stating that she had BM overnight, which was not verified by staff. Pt loud and disruptive in dayroom. Pt administered zyprexa po for moderate agitation. Will monitor.   "

## 2025-05-11 NOTE — PLAN OF CARE
Problem: Risk for Self Injury/Neglect  Goal: Verbalize thoughts and feelings  Description: Interventions:- Assess and re-assess patient's lethality and potential for self-injury- Engage patient in 1:1 interactions, daily, for a minimum of 15 minutes- Encourage patient to express feelings, fears, frustrations, hopes- Establish rapport/trust with patient   5/11/2025 0051 by Yovani Ramirez RN  Outcome: Progressing  5/11/2025 0049 by Yovani Ramirez RN  Outcome: Progressing  Goal: Refrain from harming self  Description: Interventions:- Monitor patient closely, per order- Develop a trusting relationship- Supervise medication ingestion, monitor effects and side effects   5/11/2025 0051 by Yovani Ramirez RN  Outcome: Progressing  5/11/2025 0049 by Yovani Ramirez RN  Outcome: Progressing  Goal: Attend and participate in unit activities, including therapeutic, recreational, and educational groups  Description: Interventions:- Provide therapeutic and educational activities daily, encourage attendance and participation, and document same in the medical record- Obtain collateral information, encourage visitation and family involvement in care   5/11/2025 0051 by Yovani Ramirez RN  Outcome: Progressing  5/11/2025 0049 by Yovani Ramriez RN  Outcome: Progressing  Goal: Recognize maladaptive responses and adopt new coping mechanisms  5/11/2025 0051 by Yovani Ramirez RN  Outcome: Progressing  5/11/2025 0049 by Yovani Ramirez RN  Outcome: Progressing  Goal: Complete daily ADLs, including personal hygiene independently, as able  Description: Interventions:- Observe, teach, and assist patient with ADLS- Monitor and promote a balance of rest/activity, with adequate nutrition and elimination  5/11/2025 0051 by Yovani Ramirez RN  Outcome: Progressing  5/11/2025 0049 by Yovani Ramirez RN  Outcome: Progressing     Problem: Risk for Violence/Aggression Toward Others  Goal: Refrain from harming others  5/11/2025  0051 by Yovani Ramirez RN  Outcome: Progressing  5/11/2025 0049 by Yovani Ramirez RN  Outcome: Progressing  Goal: Refrain from destructive acts on the environment or property  5/11/2025 0051 by Yovani Ramirez RN  Outcome: Progressing  5/11/2025 0049 by Yovani Ramirez RN  Outcome: Progressing  Goal: Control angry outbursts  Description: Interventions:- Monitor patient closely, per order- Ensure early verbal de-escalation- Monitor prn medication needs- Set reasonable/therapeutic limits, outline behavioral expectations, and consequences - Provide a non-threatening milieu, utilizing the least restrictive interventions   5/11/2025 0051 by Yovani Ramirez RN  Outcome: Progressing  5/11/2025 0049 by Yovani Ramirez RN  Outcome: Progressing     Problem: SAFETY ADULT  Goal: Patient will remain free of falls  Description: INTERVENTIONS:- Educate patient on patient safety including physical limitations- Instruct patient to call for assistance with activity - Consult OT/PT to assist with strengthening/mobility - Keep Call bell within reach- Keep bed low and locked with side rails adjusted as appropriate- Keep care items and personal belongings within reach- Initiate and maintain comfort rounds- Offer Toileting every 2 Hours, in advance of need- Initiate/Maintain bed/chair alarm- Obtain necessary fall risk management equipment: walker- Apply yellow socks and bracelet for high fall risk patients- Consider moving patient to room near nurses station  5/11/2025 0051 by Yovani Ramirez RN  Outcome: Progressing  5/11/2025 0049 by Yovani Ramirez RN  Outcome: Progressing

## 2025-05-11 NOTE — NURSING NOTE
Pt declined scheduled colace but accepted prn senekot. Pt also accepted prune juice, will monitor.

## 2025-05-11 NOTE — NURSING NOTE
Patient was visible in the milieu intermittently with her 1:1 staff. Denies all psych s/s. No behaviors noted. No c/o pain. Requested Miralax but prune juice was given plus routine senokot 17.2 mg and no result yet. Will continue to monitor. Took her HS medications. Maintained 1:1 for safety. Safety checks ongoing.
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
## 2025-05-12 PROCEDURE — 99232 SBSQ HOSP IP/OBS MODERATE 35: CPT | Performed by: PSYCHIATRY & NEUROLOGY

## 2025-05-12 PROCEDURE — 92610 EVALUATE SWALLOWING FUNCTION: CPT

## 2025-05-12 RX ORDER — LACTULOSE 10 G/15ML
20 SOLUTION ORAL 3 TIMES DAILY PRN
Status: DISCONTINUED | OUTPATIENT
Start: 2025-05-12 | End: 2025-05-12

## 2025-05-12 RX ORDER — OLANZAPINE 10 MG/1
10 TABLET, FILM COATED ORAL DAILY
Status: DISCONTINUED | OUTPATIENT
Start: 2025-05-13 | End: 2025-05-16

## 2025-05-12 RX ORDER — LACTULOSE 10 G/15ML
30 SOLUTION ORAL EVERY 4 HOURS
Status: DISPENSED | OUTPATIENT
Start: 2025-05-12 | End: 2025-05-12

## 2025-05-12 RX ADMIN — CLONAZEPAM 1 MG: 1 TABLET ORAL at 17:13

## 2025-05-12 RX ADMIN — Medication 1000 UNITS: at 09:08

## 2025-05-12 RX ADMIN — GABAPENTIN 300 MG: 300 CAPSULE ORAL at 09:00

## 2025-05-12 RX ADMIN — MAGNESIUM OXIDE TAB 400 MG (241.3 MG ELEMENTAL MG) 400 MG: 400 (241.3 MG) TAB at 09:08

## 2025-05-12 RX ADMIN — BACITRACIN 1 SMALL APPLICATION: 500 OINTMENT TOPICAL at 09:09

## 2025-05-12 RX ADMIN — PHENOBARBITAL 64.8 MG: 64.8 TABLET ORAL at 11:54

## 2025-05-12 RX ADMIN — ASPIRIN 81 MG CHEWABLE TABLET 81 MG: 81 TABLET CHEWABLE at 09:09

## 2025-05-12 RX ADMIN — LEVOTHYROXINE SODIUM 100 MCG: 0.1 TABLET ORAL at 05:38

## 2025-05-12 RX ADMIN — FLUTICASONE PROPIONATE 2 SPRAY: 50 SPRAY, METERED NASAL at 09:09

## 2025-05-12 RX ADMIN — Medication 1000 MCG: at 09:08

## 2025-05-12 RX ADMIN — DIVALPROEX SODIUM 1500 MG: 500 TABLET, DELAYED RELEASE ORAL at 21:16

## 2025-05-12 RX ADMIN — OLANZAPINE 5 MG: 5 TABLET, FILM COATED ORAL at 09:08

## 2025-05-12 RX ADMIN — TRIHEXYPHENIDYL HYDROCHLORIDE 2 MG: 2 TABLET ORAL at 21:15

## 2025-05-12 RX ADMIN — SENNOSIDES 17.2 MG: 8.6 TABLET, FILM COATED ORAL at 21:16

## 2025-05-12 RX ADMIN — POLYETHYLENE GLYCOL 3350 17 G: 17 POWDER, FOR SOLUTION ORAL at 09:41

## 2025-05-12 RX ADMIN — FOLIC ACID 1 MG: 1 TABLET ORAL at 09:08

## 2025-05-12 RX ADMIN — CLONAZEPAM 1 MG: 1 TABLET ORAL at 09:08

## 2025-05-12 RX ADMIN — ATORVASTATIN CALCIUM 20 MG: 20 TABLET, FILM COATED ORAL at 09:08

## 2025-05-12 RX ADMIN — PHENOBARBITAL 64.8 MG: 64.8 TABLET ORAL at 22:13

## 2025-05-12 RX ADMIN — DOCUSATE SODIUM 100 MG: 100 CAPSULE, LIQUID FILLED ORAL at 09:08

## 2025-05-12 RX ADMIN — GABAPENTIN 300 MG: 300 CAPSULE ORAL at 14:53

## 2025-05-12 RX ADMIN — Medication 6 MG: at 21:16

## 2025-05-12 RX ADMIN — MAGNESIUM OXIDE TAB 400 MG (241.3 MG ELEMENTAL MG) 400 MG: 400 (241.3 MG) TAB at 17:14

## 2025-05-12 RX ADMIN — TRIHEXYPHENIDYL HYDROCHLORIDE 2 MG: 2 TABLET ORAL at 09:08

## 2025-05-12 RX ADMIN — LACTULOSE 30 G: 20 SOLUTION ORAL at 13:17

## 2025-05-12 RX ADMIN — OLANZAPINE 20 MG: 10 TABLET, FILM COATED ORAL at 21:16

## 2025-05-12 RX ADMIN — TRAZODONE HYDROCHLORIDE 150 MG: 100 TABLET ORAL at 21:16

## 2025-05-12 RX ADMIN — BACITRACIN 1 SMALL APPLICATION: 500 OINTMENT TOPICAL at 17:13

## 2025-05-12 NOTE — NURSING NOTE
"Patient was visible in the milieu with her 1:1 staff with no peer interaction. Denies all psych s/s. Remained loud and disorganized. Kept addressing this writer \"your excellency\" and kept inquiring if your excellency will get  to her. Patient counseled. No c/o pain. No needs expressed. Took her HS medications. Safety checks ongoing.  "

## 2025-05-12 NOTE — NURSING NOTE
"Pt declined scheduled colace and lactulose at dinner, stating \"I'll poop on my own!\" Pt became loud in dayroom, pt irritable and blaming staff. Pt apologetic afterwards.   "
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
## 2025-05-12 NOTE — CONSULTS
Pt does not meet 2 criteria for malnutrition, See full nutrition assessment dated 5/12/25,  reports good appetite. % meal completion consistently documented in flowsheet. Will continue to monitor po intake

## 2025-05-12 NOTE — NURSING NOTE
"Pt oppositional, insisting she had BM yesterday despite being monitored by staff. Pt irritable and labile, refused scheduled lactulose when offered multiple times. Pt stating, \"Dr Ramirez is my only doctor, he told me not to take it\". Pt insulting staff, redirected for appropriate language in dayroom. Pt did accept prn miralax.   "

## 2025-05-12 NOTE — TREATMENT TEAM
05/12/25 0900   Provider Notification   Reason for Communication Review case  (no BM since 5/7)   Provider Name Lisbeth   Provider Role Hospitalist   Method of Communication Other (Comment)  (epic)   Response See orders  (new order for lactulose)   Notification Time 0937

## 2025-05-12 NOTE — PROGRESS NOTES
"   05/12/25 1015   Activity/Group Checklist   Group Other (Comment)  (Reminisce)   Attendance Attended   Attendance Duration (min) 0-15   Interactions Disorganized interaction   Affect/Mood Appropriate  (Confused, pt stated \"you look like an vishnu,\" religiously p/o, left early to go to room)   Goals Achieved Identified feelings;Able to engage in interactions       "

## 2025-05-12 NOTE — PHYSICAL THERAPY NOTE
Physical Therapy Evaluation    Patient's Name: Laine Tse    Admitting Diagnosis  Major depressive disorder, single episode, unspecified [F32.9]    Problem List  Patient Active Problem List   Diagnosis    Hyperlipidemia    Hyponatremia    Hypothyroidism    Medical clearance for psychiatric admission    Fall    Injury of right toe, initial encounter    Seizure disorder (HCC)    Hyperglycemia    Hypertension    Schizoaffective disorder (HCC)    Unsteady gait    Osteoporosis    Mood insomnia (HCC)    Folic acid deficiency    Anemia    Pre-diabetes    Decreased hearing of both ears    Class 1 obesity with body mass index (BMI) of 32.0 to 32.9 in adult    Chronic midline low back pain without sciatica    Dermatitis    Chronic cough    T wave inversion on electrocardiogram    Rash    Skin lesion of left lower extremity    Severe protein-calorie malnutrition (HCC)    T12 compression fracture (HCC)    Facial laceration    AMS (altered mental status)    Hypomagnesemia    Tremors of nervous system       Past Medical History  Past Medical History:   Diagnosis Date    Anxiety     Benign essential hypertension     resolved; 06/15/16    Depression     Depression with anxiety     Fracture of fifth metatarsal bone of right foot with delayed healing     last assessed 04/12/16; fracture of metatarsal bone, right, closed, initial encounter    Hyperlipidemia     last assessed 11/28/17    Hypothyroidism     last assessed 11/28/2017    Insomnia     Obesity     Schizoaffective disorder (HCC)     last assessed 05/18/2017    Seizure disorder (HCC)     last assessed 03/28/17    Seizures (HCC)        Past Surgical History  Past Surgical History:   Procedure Laterality Date    COLONOSCOPY      complete    ND COLONOSCOPY FLX DX W/COLLJ SPEC WHEN PFRMD N/A 8/30/2018    Procedure: COLONOSCOPY with polypectomies;  Surgeon: Andriy Lopez MD;  Location: [facility];  Service: Gastroenterology       Recent Imaging  No orders to display        Recent Vital Signs  Vitals:    05/11/25 0724 05/11/25 0801 05/11/25 1549 05/11/25 2024   BP: 105/59  101/55 117/69   BP Location: Left arm  Right arm Right arm   Pulse: 71  74 76   Resp: 16  16 16   Temp: 97.6 °F (36.4 °C)  (!) 97.2 °F (36.2 °C) (!) 96.9 °F (36.1 °C)   TempSrc: Temporal  Temporal Temporal   SpO2: 95%  96% 94%   Weight:  79 kg (174 lb 3.2 oz)     Height:            05/09/25 1430   PT Last Visit   PT Visit Date 05/09/25   Note Type   Note type Evaluation   Pain Assessment   Pain Assessment Tool 0-10   Pain Score 5   Pain Location/Orientation Location: Head   Restrictions/Precautions   Other Precautions Cognitive;Chair Alarm;Bed Alarm;1:1;Fall Risk   Home Living   Type of Home Group Home   Prior Function   Level of [place] Needs assistance with ADLs;Needs assistance with IADLS   Lives With Facility staff   Falls in the last 6 months 1 to 4   Cognition   Overall Cognitive Status Impaired   Arousal/Participation Alert   Orientation Level Disoriented to situation;Disoriented to time;Oriented to person   Memory Decreased short term memory;Decreased recall of recent events   Following Commands Follows one step commands with increased time or repetition   RLE Assessment   RLE Assessment   (3+/5)   LLE Assessment   LLE Assessment   (3+/5)   Coordination   Movements are Fluid and Coordinated 0   Coordination and Movement Description decreased gross motor coodination   Sensation X   Light Touch   RLE Light Touch Impaired   LLE Light Touch Impaired   Transfers   Sit to Stand 5  Supervision   Additional items Increased time required   Stand to Sit 5  Supervision   Additional items Increased time required   Ambulation/Elevation   Gait pattern Step through pattern;Decreased toe off;Decreased heel strike;Excessively slow;Short stride;Decreased foot clearance   Gait Assistance 5  Supervision   Additional items Verbal cues   Assistive Device Rolling walker   Distance 100ft, 50ft, 100ft   Balance   Static  Sitting Fair +   Dynamic Sitting Fair +   Static Standing Fair   Dynamic Standing Fair -   Ambulatory Fair -   Endurance Deficit   Endurance Deficit Yes   Endurance Deficit Description reduced from baseline   Activity Tolerance   Activity Tolerance Patient limited by fatigue   Medical Staff Made Aware spoke to CM   Nurse Made Aware spoke to RN   Assessment   Prognosis Good   Problem List Decreased strength;Decreased range of motion;Decreased endurance;Impaired balance;Decreased coordination;Decreased mobility;Decreased cognition;Impaired judgement;Decreased safety awareness;Impaired sensation;Pain   Barriers to Discharge Inaccessible home environment;Decreased caregiver support   Goals   Patient Goals not stating appropriate goals   STG Expiration Date 05/21/25   Short Term Goal #1 see eval note   PT Treatment Day 1   Plan   Treatment/Interventions ADL retraining;Functional transfer training;LE strengthening/ROM;Elevations;Therapeutic exercise;Endurance training;Patient/family training;Equipment eval/education;Bed mobility;Gait training;Spoke to nursing;Spoke to case management;OT   PT Frequency 2-3x/wk   Discharge Recommendation   Rehab Resource Intensity Level, PT II (Moderate Resource Intensity)   Equipment Recommended Walker   Walker Package Recommended Wheeled walker   AM-PAC Basic Mobility Inpatient   Turning in Flat Bed Without Bedrails 3   Lying on Back to Sitting on Edge of Flat Bed Without Bedrails 3   Moving Bed to Chair 3   Standing Up From Chair Using Arms 3   Walk in Room 3   Climb 3-5 Stairs With Railing 2   Basic Mobility Inpatient Raw Score 17   Basic Mobility Standardized Score 39.67   [facility] Highest Level Of Mobility   -HLM Goal 5: Stand one or more mins   -HLM Achieved 7: Walk 25 feet or more   End of Consult   Patient Position at End of Consult Bedside chair;All needs within reach         ASSESSMENT                                                                                                                      Laine Tse is a 69 y.o. female admitted to hospitals on 5/8/2025 for Schizoaffective disorder (HCC). Pt  has a past medical history of Anxiety, Benign essential hypertension, Depression, Depression with anxiety, Fracture of fifth metatarsal bone of right foot with delayed healing, Hyperlipidemia, Hypothyroidism, Insomnia, Obesity, Schizoaffective disorder (HCC), Seizure disorder (HCC), and Seizures (HCC).. PT was consulted and pt was seen on 5/11/2025 for mobility assessment and d/c planning.  Impairments limiting pt at this time include decreased ROM, impaired balance, decreased endurance, decreased coordination, increased fall risk, new onset of impairment of functional mobility, decreased ADLS, decreased IADLS, pain, decreased activity tolerance, decreased safety awareness, impaired judgement, decreased cognition, decreased sensation, and decreased strength. Pt is currently functioning at a supervision assistance x1 level for bed mobility, supervision assistance x1 level for transfers, supervision assistance x1 level for ambulation with Rolling Walker. The patient's AM-[facility] Basic Mobility Inpatient Short Form Raw Score is 17. A Raw score of greater than 16 suggests the patient may benefit from discharge to home. Please also refer to the recommendation of the Physical Therapist for safe discharge planning.    Goals                                                                                                                                    1) Bed mobility skills with modified independent assistance to facilitate safe return to previous living environment 2) Functional transfers with modified independent assistance to facilitate safe return to previous living environment  3) Ambulation with least restrictive AD modified independent assistance without LOB and stable vitals for safe ambulation home/ community distances. 4) Stair training up/down flight 12 step/s with appropriate rail/s   and modified independent assistance for safe access to previous living environment. 5) Improve balance grades to fair + to reduce risk of falls. 6)Improve LE strength grades by 1 to increase independence w/ transfers and gait.  7) PT for ongoing pt and family education; DME needs and D/C planning to promote highest level of function in least restrictive environment.     Recommendations                                                                                                              Pt will benefit from continued skilled IP PT to address the above mentioned impairments in order to maximize recovery and increase functional independence when completing mobility and ADLs. See flow sheet for goals and POC.     DME: Rolling Walker    Discharge Disposition:  Post Acute Rehab Services      Zachary Valencia PT, DPT

## 2025-05-12 NOTE — ASSESSMENT & PLAN NOTE
Continue Trazodone 150mg PO QHS  Continue Melatonin 6mg PO QHS for insomnia   Subjective     Patient ID: Monserrat Lee is a 37 y.o. female.    Chief Complaint: Exposure to STD (Patient is here today for an std testing, she states she has a feeling she has been exposed and his having discharge. )    Exposure to STD       Review of Systems   Genitourinary:  Positive for vaginal discharge.          Objective     Physical Exam  Vitals and nursing note reviewed.   Constitutional:       Appearance: Normal appearance.   HENT:      Head: Normocephalic.   Eyes:      Pupils: Pupils are equal, round, and reactive to light.   Cardiovascular:      Rate and Rhythm: Normal rate and regular rhythm.      Pulses: Normal pulses.      Heart sounds: Normal heart sounds.   Pulmonary:      Effort: Pulmonary effort is normal.      Breath sounds: Normal breath sounds.   Abdominal:      Palpations: Abdomen is soft.   Genitourinary:     Vagina: Vaginal discharge (per pt, gray discharge) present.   Musculoskeletal:         General: Normal range of motion.      Cervical back: Normal range of motion.   Skin:     General: Skin is warm.      Capillary Refill: Capillary refill takes less than 2 seconds.   Neurological:      General: No focal deficit present.      Mental Status: She is alert.   Psychiatric:         Mood and Affect: Mood normal.            Assessment and Plan     1. High risk heterosexual behavior  -     Chlamydia/GC, PCR  -     Trichomonas vaginalis by PCR; Future; Expected date: 01/03/2025  -     HIV 1/2 Ag/Ab (4th Gen); Future; Expected date: 01/03/2025  -     Syphilis Antibody with reflex to RPR; Future; Expected date: 01/03/2025    2. Vaginal discharge  -     Chlamydia/GC, PCR  -     Trichomonas vaginalis by PCR; Future; Expected date: 01/03/2025  -     fluconazole (DIFLUCAN) 150 MG Tab; Take 1 tablet (150 mg total) by mouth once daily. for 1 day  Dispense: 1 tablet; Refill: 0        Pt advised to abstain from sex pending results         No follow-ups on file.

## 2025-05-12 NOTE — SPEECH THERAPY NOTE
Speech Pathology Bedside Swallow Evaluation:                    SLP RECOMMENDATIONS:         Diet: Regular consistency         Liquids: Thin liquids         Medications: as best tolerated         Strategies: upright, slow rate, small bites/sips       Summary:  Pt seen for clinical swallow evaluation. Nursing reports pt has had some coughing with PO intake. Pt seen in bed with 1:1 present. Pt limited in answering questions and irritable, but denies any difficulty chewing/swallowing. Pt reports she wears partial at home and does not have them here. Pt reports despite missing teeth she feels she is chewing fine. Pt has been eating between % of meals per chart.   Pt's oral mech/CN exam notable for excessive muscle movements consistent with tardive dyskinesia. Speech is tangential and requires frequent redirection to task. Pt observed with sips of thin liquid via straw with no overt s/s aspiration and adequate seal. Pt declined all solids despite encouragement.   Pt is currently on RA, has remained afebrile, and recent Chest CT showed clear lungs. Recommend continuing Regular/thin diet at this time. Evaluation somewhat limited d/t behaviors and refusal of solids. SLP to follow for ongoing assessment.         Therapy Prognosis: fair   Prognosis considerations: limited carryover   Frequency: 1-2x/week       Vitals:    05/11/25 0801 05/11/25 1549 05/11/25 2024 05/12/25 0753   BP:  101/55 117/69 139/74   BP Location:  Right arm Right arm Left arm   Pulse:  74 76 68   Resp:  16 16 16   Temp:  (!) 97.2 °F (36.2 °C) (!) 96.9 °F (36.1 °C) (!) 97 °F (36.1 °C)   TempSrc:  Temporal Temporal Temporal   SpO2:  96% 94% 95%   Weight: 79 kg (174 lb 3.2 oz)      Height:         Lab Results   Component Value Date    WBC 3.37 (L) 05/09/2025    HGB 13.1 05/09/2025    HCT 40.0 05/09/2025     (H) 05/09/2025     05/09/2025         Goal(s):  Pt will tolerate least restrictive diet w/out s/s aspiration or oral/pharyngeal  "difficulties.     H&P/Admit info/ pertinent provider notes: (PMH noted above)  Laine Tse is a 69 y.o. female admitted to [facility] on 5/8/2025 for Schizoaffective disorder ([facility]). Pt  has a past medical history of Anxiety, Benign essential hypertension, Depression, Depression with anxiety, Fracture of fifth metatarsal bone of right foot with delayed healing, Hyperlipidemia, Hypothyroidism, Insomnia, Obesity, Schizoaffective disorder (HCC), Seizure disorder (HCC), and Seizures (HCC).       Special Studies:  Chest CT:  FINDINGS:    LUNGS: Mild bilateral dependent atelectasis. No consolidation. Central airways are patent.    Previous VBS:  None in chart     Patient's goal: none stated     Did the pt report pain? No   If yes, was nursing notified/was it addressed?    Reason for consult:  Determine safest and least restrictive diet  Coughing with PO     Precautions:  Aspiration  Fall      Food allergies:  Allergies   Allergen Reactions    Clozapine Other (See Comments)     low white blood count  Other reaction(s): Other (See Comments)  low white blood count    Haloperidol Other (See Comments)     EPS  Other reaction(s): Other (See Comments)  EPS    Lithium Other (See Comments)     Toxicity    Prolixin [Fluphenazine] Hyperactivity and Other (See Comments)     EPS, Hyperactivity  EPS, Hyperactivity    Valproic Acid Confusion and Other (See Comments)     \"Mental confusion\"  \"Mental confusion\"        Current diet:  Regular/thin      Premorbid diet:  Regular/thin    O2 requirements:  RA   Voice/Speech:  WFL    Social:  Needs assist    Follows commands:  Variable    Cognitive status:  Impaired        Results d/w:  Pt, nursing,               "
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
## 2025-05-12 NOTE — NURSING NOTE
Pt irritable and labile during shift. Pt oppositional and arguing with staff at times. Pt required redirections to avoid yelling in dayroom. Pt able to understand and follow redirections for appropriate behaviors. No symptoms reported at current time. Monitored by staff for safety.

## 2025-05-12 NOTE — PLAN OF CARE
Problem: PHYSICAL THERAPY ADULT  Goal: Performs mobility at highest level of function for planned discharge setting.  See evaluation for individualized goals.  Description: Treatment/Interventions: ADL retraining, Functional transfer training, LE strengthening/ROM, Elevations, Therapeutic exercise, Endurance training, Patient/family training, Equipment eval/education, Bed mobility, Gait training, Spoke to nursing, Spoke to case management, OT  Equipment Recommended: Walker       See flowsheet documentation for full assessment, interventions and recommendations.  Outcome: Progressing  Note: Prognosis: Good  Problem List: Decreased strength, Decreased range of motion, Decreased endurance, Impaired balance, Decreased coordination, Decreased mobility, Decreased cognition, Impaired judgement, Decreased safety awareness, Impaired sensation, Pain     Barriers to Discharge: Inaccessible home environment, Decreased caregiver support     Rehab Resource Intensity Level, PT: II (Moderate Resource Intensity)    See flowsheet documentation for full assessment.

## 2025-05-12 NOTE — PLAN OF CARE
Problem: Risk for Self Injury/Neglect  Goal: Verbalize thoughts and feelings  Description: Interventions:- Assess and re-assess patient's lethality and potential for self-injury- Engage patient in 1:1 interactions, daily, for a minimum of 15 minutes- Encourage patient to express feelings, fears, frustrations, hopes- Establish rapport/trust with patient   Outcome: Progressing  Goal: Refrain from harming self  Description: Interventions:- Monitor patient closely, per order- Develop a trusting relationship- Supervise medication ingestion, monitor effects and side effects   Outcome: Progressing  Goal: Attend and participate in unit activities, including therapeutic, recreational, and educational groups  Description: Interventions:- Provide therapeutic and educational activities daily, encourage attendance and participation, and document same in the medical record- Obtain collateral information, encourage visitation and family involvement in care   Outcome: Progressing  Goal: Recognize maladaptive responses and adopt new coping mechanisms  Outcome: Progressing  Goal: Complete daily ADLs, including personal hygiene independently, as able  Description: Interventions:- Observe, teach, and assist patient with ADLS- Monitor and promote a balance of rest/activity, with adequate nutrition and elimination  Outcome: Progressing     Problem: Risk for Violence/Aggression Toward Others  Goal: Refrain from harming others  Outcome: Progressing  Goal: Refrain from destructive acts on the environment or property  Outcome: Progressing  Goal: Control angry outbursts  Description: Interventions:- Monitor patient closely, per order- Ensure early verbal de-escalation- Monitor prn medication needs- Set reasonable/therapeutic limits, outline behavioral expectations, and consequences - Provide a non-threatening milieu, utilizing the least restrictive interventions   Outcome: Progressing     Problem: SAFETY ADULT  Goal: Patient will remain  free of falls  Description: INTERVENTIONS:- Educate patient on patient safety including physical limitations- Instruct patient to call for assistance with activity - Consult OT/PT to assist with strengthening/mobility - Keep Call bell within reach- Keep bed low and locked with side rails adjusted as appropriate- Keep care items and personal belongings within reach- Initiate and maintain comfort rounds- Offer Toileting every 2 Hours, in advance of need- Initiate/Maintain bed/chair alarm- Obtain necessary fall risk management equipment: walker- Apply yellow socks and bracelet for high fall risk patients- Consider moving patient to room near nurses station  Outcome: Progressing

## 2025-05-13 LAB — VALPROATE SERPL-MCNC: 37 UG/ML (ref 50–125)

## 2025-05-13 PROCEDURE — 99232 SBSQ HOSP IP/OBS MODERATE 35: CPT | Performed by: PSYCHIATRY & NEUROLOGY

## 2025-05-13 PROCEDURE — 92526 ORAL FUNCTION THERAPY: CPT

## 2025-05-13 PROCEDURE — 80164 ASSAY DIPROPYLACETIC ACD TOT: CPT | Performed by: PSYCHIATRY & NEUROLOGY

## 2025-05-13 PROCEDURE — 87081 CULTURE SCREEN ONLY: CPT | Performed by: PSYCHIATRY & NEUROLOGY

## 2025-05-13 RX ORDER — BISACODYL 10 MG
10 SUPPOSITORY, RECTAL RECTAL DAILY PRN
Status: DISCONTINUED | OUTPATIENT
Start: 2025-05-13 | End: 2025-06-02 | Stop reason: HOSPADM

## 2025-05-13 RX ORDER — BISACODYL 5 MG/1
10 TABLET, DELAYED RELEASE ORAL DAILY PRN
Status: DISCONTINUED | OUTPATIENT
Start: 2025-05-13 | End: 2025-05-13

## 2025-05-13 RX ORDER — BISACODYL 5 MG/1
10 TABLET, DELAYED RELEASE ORAL DAILY PRN
Status: DISCONTINUED | OUTPATIENT
Start: 2025-05-13 | End: 2025-06-02 | Stop reason: HOSPADM

## 2025-05-13 RX ADMIN — CLONAZEPAM 1 MG: 1 TABLET ORAL at 09:50

## 2025-05-13 RX ADMIN — SENNOSIDES 17.2 MG: 8.6 TABLET, FILM COATED ORAL at 21:06

## 2025-05-13 RX ADMIN — DOCUSATE SODIUM 100 MG: 100 CAPSULE, LIQUID FILLED ORAL at 09:50

## 2025-05-13 RX ADMIN — TRAZODONE HYDROCHLORIDE 150 MG: 100 TABLET ORAL at 21:05

## 2025-05-13 RX ADMIN — OLANZAPINE 20 MG: 10 TABLET, FILM COATED ORAL at 21:05

## 2025-05-13 RX ADMIN — MAGNESIUM OXIDE TAB 400 MG (241.3 MG ELEMENTAL MG) 400 MG: 400 (241.3 MG) TAB at 09:50

## 2025-05-13 RX ADMIN — DIVALPROEX SODIUM 1500 MG: 500 TABLET, DELAYED RELEASE ORAL at 21:06

## 2025-05-13 RX ADMIN — BISACODYL 10 MG: 5 TABLET, COATED ORAL at 17:43

## 2025-05-13 RX ADMIN — OLANZAPINE 10 MG: 10 TABLET, FILM COATED ORAL at 09:50

## 2025-05-13 RX ADMIN — GABAPENTIN 300 MG: 300 CAPSULE ORAL at 15:05

## 2025-05-13 RX ADMIN — BACITRACIN 1 SMALL APPLICATION: 500 OINTMENT TOPICAL at 09:50

## 2025-05-13 RX ADMIN — PHENOBARBITAL 64.8 MG: 64.8 TABLET ORAL at 12:29

## 2025-05-13 RX ADMIN — CLONAZEPAM 1 MG: 1 TABLET ORAL at 17:43

## 2025-05-13 RX ADMIN — Medication 1000 MCG: at 09:50

## 2025-05-13 RX ADMIN — FLUTICASONE PROPIONATE 2 SPRAY: 50 SPRAY, METERED NASAL at 09:51

## 2025-05-13 RX ADMIN — TRIHEXYPHENIDYL HYDROCHLORIDE 2 MG: 2 TABLET ORAL at 09:50

## 2025-05-13 RX ADMIN — ASPIRIN 81 MG CHEWABLE TABLET 81 MG: 81 TABLET CHEWABLE at 09:50

## 2025-05-13 RX ADMIN — DOCUSATE SODIUM 100 MG: 100 CAPSULE, LIQUID FILLED ORAL at 17:43

## 2025-05-13 RX ADMIN — TRIHEXYPHENIDYL HYDROCHLORIDE 2 MG: 2 TABLET ORAL at 21:06

## 2025-05-13 RX ADMIN — MAGNESIUM OXIDE TAB 400 MG (241.3 MG ELEMENTAL MG) 400 MG: 400 (241.3 MG) TAB at 17:43

## 2025-05-13 RX ADMIN — LEVOTHYROXINE SODIUM 100 MCG: 0.1 TABLET ORAL at 05:17

## 2025-05-13 RX ADMIN — FOLIC ACID 1 MG: 1 TABLET ORAL at 09:50

## 2025-05-13 RX ADMIN — BACITRACIN 1 SMALL APPLICATION: 500 OINTMENT TOPICAL at 17:44

## 2025-05-13 RX ADMIN — Medication 1000 UNITS: at 09:50

## 2025-05-13 RX ADMIN — ATORVASTATIN CALCIUM 20 MG: 20 TABLET, FILM COATED ORAL at 09:50

## 2025-05-13 RX ADMIN — PHENOBARBITAL 64.8 MG: 64.8 TABLET ORAL at 22:13

## 2025-05-13 RX ADMIN — GABAPENTIN 300 MG: 300 CAPSULE ORAL at 09:51

## 2025-05-13 RX ADMIN — Medication 6 MG: at 21:05

## 2025-05-13 NOTE — SPEECH THERAPY NOTE
Speech Language/Pathology    Speech/Language Pathology Progress Note    Patient Name: Laine Tse  Today's Date: 5/13/2025       Summary:  Pt seen for follow up. Pt in bed upon arrival with 1:1 present. Pt reports she feels she ate well for lunch today and denies difficulty chewing/swallowing. Pt observed with sips of thin liquid with no overt s/s aspiration. Mastication with regular solids is slightly disorganized, suspect r/t missing dentition, but overall functional. SLP reviewed general swallow precautions. Trials limited this session as pt declined most PO intake.   Recommend continuing current diet. Given staff report of pt previously having coughing with PO intake, SLP to follow with meal observation to ensure diet tolerance. SLP to follow.     Assessment:  Slow, but functional mastication, no overt s/s aspiration     Plan/Recommendations:  Regular/thin   General swallow precautions  SLP to follow         Lab Results   Component Value Date    WBC 3.37 (L) 05/09/2025    HGB 13.1 05/09/2025    HCT 40.0 05/09/2025     (H) 05/09/2025     05/09/2025           Problem List  Principal Problem:    Schizoaffective disorder (HCC)  Active Problems:    Hyperlipidemia    Hypothyroidism    Medical clearance for psychiatric admission    Seizure disorder ([facility])    Hypertension    Mood insomnia ([facility])    Facial laceration    Hypomagnesemia    Tremors of nervous system       Past Medical History  Past Medical History:   Diagnosis Date    Anxiety     Benign essential hypertension     resolved; 06/15/16    Depression     Depression with anxiety     Fracture of fifth metatarsal bone of right foot with delayed healing     last assessed 04/12/16; fracture of metatarsal bone, right, closed, initial encounter    Hyperlipidemia     last assessed 11/28/17    Hypothyroidism     last assessed 11/28/2017    Insomnia     Obesity     Schizoaffective disorder ([facility])     last assessed 05/18/2017    Seizure disorder ([facility])      last assessed 03/28/17    Seizures (HCC)         Past Surgical History  Past Surgical History:   Procedure Laterality Date    COLONOSCOPY      complete    [facility] COLONOSCOPY FLX DX W/COLLJ SPEC WHEN PFRMD N/A 8/30/2018    Procedure: COLONOSCOPY with polypectomies;  Surgeon: Andriy Lopez MD;  Location: [facility];  Service: Gastroenterology

## 2025-05-13 NOTE — PLAN OF CARE
Problem: Risk for Self Injury/Neglect  Goal: Verbalize thoughts and feelings  Description: Interventions:- Assess and re-assess patient's lethality and potential for self-injury- Engage patient in 1:1 interactions, daily, for a minimum of 15 minutes- Encourage patient to express feelings, fears, frustrations, hopes- Establish rapport/trust with patient   Outcome: Progressing  Goal: Refrain from harming self  Description: Interventions:- Monitor patient closely, per order- Develop a trusting relationship- Supervise medication ingestion, monitor effects and side effects   Outcome: Progressing  Goal: Attend and participate in unit activities, including therapeutic, recreational, and educational groups  Description: Interventions:- Provide therapeutic and educational activities daily, encourage attendance and participation, and document same in the medical record- Obtain collateral information, encourage visitation and family involvement in care   Outcome: Progressing  Goal: Recognize maladaptive responses and adopt new coping mechanisms  Outcome: Progressing  Goal: Complete daily ADLs, including personal hygiene independently, as able  Description: Interventions:- Observe, teach, and assist patient with ADLS- Monitor and promote a balance of rest/activity, with adequate nutrition and elimination  Outcome: Progressing     Problem: SAFETY ADULT  Goal: Patient will remain free of falls  Description: INTERVENTIONS:- Educate patient on patient safety including physical limitations- Instruct patient to call for assistance with activity - Consult OT/PT to assist with strengthening/mobility - Keep Call bell within reach- Keep bed low and locked with side rails adjusted as appropriate- Keep care items and personal belongings within reach- Initiate and maintain comfort rounds- Offer Toileting every 2 Hours, in advance of need- Initiate/Maintain bed/chair alarm- Obtain necessary fall risk management equipment: walker- Apply  yellow socks and bracelet for high fall risk patients- Consider moving patient to room near nurses station  Outcome: Progressing

## 2025-05-13 NOTE — PROGRESS NOTES
"PT disorganized, confused. Left group early, stating \"I need to change my brief.\"   05/13/25 1015   Activity/Group Checklist   Group Other (Comment)  (Intro Song Cognitive / Reminisce game)   Attendance Attended   Attendance Duration (min) 16-30  (Pt left early, stating \"I need to change my brief\")   Interactions Disorganized interaction   Affect/Mood Wide   Goals Achieved Able to engage in interactions       "

## 2025-05-13 NOTE — TREATMENT TEAM
Pt with 1:1 and able to stay of duration of group with prompting.  Pt tangential at times although more aware and engaged in discussion with elements of impulsive comments.     05/13/25 1100   Activity/Group Checklist   Group Other (Comment)  ([facility] Recovery: self improvement and affirmation)   Attendance Attended  (with 1:1)   Attendance Duration (min) 31-45   Interactions Disorganized interaction   Affect/Mood Other (Comment)  (restless at times)   Goals Achieved Identified feelings;Identified relapse prevention strategies;Discussed coping strategies;Able to listen to others;Able to engage in interactions
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
## 2025-05-13 NOTE — NURSING NOTE
"Pt alert and visible on the unit. Maintained on 1:1 for safety. Poor safety awareness. Denies depression but acknowledges anxiety. Pt was to be visited by  today. When pt was informed she became angry stating, \"I don't like him the Voodoo Rabbi. If he comes I'll throw my walker at him. I had to jump out a window to get away. You would have done that too\". Pt visited by other staff but then became paranoid stating, \"I think Domenic followed them and is here\". Pt impulsive, irritable and preoccupied. Medication compliant. Asked pt if she had a BM today and she stated, \"what is wrong with you. Why do you care? I don't want to tell you\". Ambulates with assistance of walker. Appetite 25% for breakfast and 100% for lunch. Support provided.   "
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
## 2025-05-13 NOTE — CASE MANAGEMENT
"DALE reached out to Domenic Jones to provide update on patient status. Domenic requested an update on medications and status. He will come in to visit this afternoon around 2:15pm and will make CM aware. DALE will notify nursing staff.     Domenic Karen ([facility])   [phone]    CM emailed patient medication list and H and P to [facility].     DALE notifed Clayton that Laine does not want to see him. She said she doesn't like the \"Sabian[employer] rabbi\". She said she'll \"throw her walker at him.\"    Pastora from nursing had then asked about the possibility of seeing the  Marion or her nurse Werner instead and patient indicated that she would only be willing to see nurse werner at this time.   "

## 2025-05-13 NOTE — CASE MANAGEMENT
Cm had a meeting with nurse Copeland and became very agitated throughout. Staff attempted to get her to sign an MA51 for patient in order to start a placement search as their facility is able to take her back but has to transfer her soon due to the regulations of the program. CM will complete an Aidin Referral and complete the search for potential SNF from the facility.    3

## 2025-05-13 NOTE — CASE MANAGEMENT
CM completed and faxed patient MA51 and PASSR to the [facility] to assess for SNF level of care.

## 2025-05-13 NOTE — PROGRESS NOTES
05/13/25 0800   Team Meeting   Meeting Type Daily Rounds   Team Members Present   Team Members Present Physician;Nurse;   Physician Team Member Alonso/Gaye/Emi   Nursing Team Member Joan/Awais/Shantel   Care Management Team Member Yonny   OT Team Member Irasema   Patient/Family Present   Patient Present No   Patient's Family Present No     Patient is oppositional, irritable, labile, preoccupied with staff. She is taking her medications but is not taking her lactulose and her last BM was on the 7th. She os not complaining of pain or discomfort. She is attending groups and she showered on the 10th. She has a visit with the home with Domenic karimi at 2pm today. Zyprexa to be continued. She is not following the bed alarms and is on a one to one. Patient discharge is pending stabilization of mood and medications.

## 2025-05-13 NOTE — NURSING NOTE
Patient was visible in the milieu with her 1:1 staff with no peer interaction. Denies all psych s/s. No behaviors noted but remained loud and kept asking if this writer will officiate the marriage between her and one Bryn and if this writer has a brown wedding ring. Patient counseled. No c/o pain. Took her HS medications. Maintained on 1:1 for safety. Safety checks ongoing.

## 2025-05-14 LAB — MRSA NOSE QL CULT: NORMAL

## 2025-05-14 RX ORDER — GUAIFENESIN/DEXTROMETHORPHAN 100-10MG/5
10 SYRUP ORAL EVERY 4 HOURS PRN
Status: DISCONTINUED | OUTPATIENT
Start: 2025-05-14 | End: 2025-06-02 | Stop reason: HOSPADM

## 2025-05-14 RX ORDER — DIVALPROEX SODIUM 500 MG/1
1500 TABLET, FILM COATED, EXTENDED RELEASE ORAL
Status: DISCONTINUED | OUTPATIENT
Start: 2025-05-14 | End: 2025-05-21

## 2025-05-14 RX ADMIN — GABAPENTIN 300 MG: 300 CAPSULE ORAL at 08:57

## 2025-05-14 RX ADMIN — PHENOBARBITAL 64.8 MG: 64.8 TABLET ORAL at 12:14

## 2025-05-14 RX ADMIN — OLANZAPINE 10 MG: 10 TABLET, FILM COATED ORAL at 08:56

## 2025-05-14 RX ADMIN — SENNOSIDES 17.2 MG: 8.6 TABLET, FILM COATED ORAL at 21:04

## 2025-05-14 RX ADMIN — Medication 1000 MCG: at 08:56

## 2025-05-14 RX ADMIN — Medication 1000 UNITS: at 08:56

## 2025-05-14 RX ADMIN — MAGNESIUM OXIDE TAB 400 MG (241.3 MG ELEMENTAL MG) 400 MG: 400 (241.3 MG) TAB at 17:15

## 2025-05-14 RX ADMIN — POLYETHYLENE GLYCOL 3350 17 G: 17 POWDER, FOR SOLUTION ORAL at 06:03

## 2025-05-14 RX ADMIN — TRIHEXYPHENIDYL HYDROCHLORIDE 2 MG: 2 TABLET ORAL at 21:04

## 2025-05-14 RX ADMIN — DIVALPROEX SODIUM 1500 MG: 500 TABLET, EXTENDED RELEASE ORAL at 21:05

## 2025-05-14 RX ADMIN — TRIHEXYPHENIDYL HYDROCHLORIDE 2 MG: 2 TABLET ORAL at 08:56

## 2025-05-14 RX ADMIN — OLANZAPINE 20 MG: 10 TABLET, FILM COATED ORAL at 21:05

## 2025-05-14 RX ADMIN — FLUTICASONE PROPIONATE 2 SPRAY: 50 SPRAY, METERED NASAL at 08:57

## 2025-05-14 RX ADMIN — CLONAZEPAM 1 MG: 1 TABLET ORAL at 08:56

## 2025-05-14 RX ADMIN — TRAZODONE HYDROCHLORIDE 150 MG: 100 TABLET ORAL at 21:05

## 2025-05-14 RX ADMIN — FOLIC ACID 1 MG: 1 TABLET ORAL at 08:57

## 2025-05-14 RX ADMIN — ATORVASTATIN CALCIUM 20 MG: 20 TABLET, FILM COATED ORAL at 08:57

## 2025-05-14 RX ADMIN — GABAPENTIN 300 MG: 300 CAPSULE ORAL at 14:12

## 2025-05-14 RX ADMIN — MAGNESIUM OXIDE TAB 400 MG (241.3 MG ELEMENTAL MG) 400 MG: 400 (241.3 MG) TAB at 08:56

## 2025-05-14 RX ADMIN — Medication 6 MG: at 21:04

## 2025-05-14 RX ADMIN — LEVOTHYROXINE SODIUM 100 MCG: 0.1 TABLET ORAL at 06:03

## 2025-05-14 RX ADMIN — CLONAZEPAM 1 MG: 1 TABLET ORAL at 17:15

## 2025-05-14 RX ADMIN — ASPIRIN 81 MG CHEWABLE TABLET 81 MG: 81 TABLET CHEWABLE at 08:57

## 2025-05-14 RX ADMIN — PHENOBARBITAL 64.8 MG: 64.8 TABLET ORAL at 22:05

## 2025-05-14 NOTE — WOUND OSTOMY CARE
Consult Note - Wound   Laine Tse 69 y.o. female MRN: [id number]  [facility]: [id number]        History and Present Illness:  Patient is a 70yo male that was admitted to [facility]  for treatment of Schizoaffective disorder  Patient has a PMH of Hypomagnesemia tremors of nervous system seizure disorder.  Wound Care was consulted for abrasion near left eye     Assessment Findings:   B/L heels are dry intact and margaret with no skin loss or wounds present. Recommend preventative Hydraguard Cream and proper offloading/ repositioning.      B/L sacro-buttocks is dry, intact, pink in color and blanches. No skin loss or wounds present. Recommend preventative hydragaurd to area.     1.Partial thickness skin loss wound is now dry intact with ecchymosis has bacitracin ordered.    No induration, fluctuance, odor, warmth/temperature differences, redness, or purulence noted to the above noted wounds and skin areas assessed. New dressings applied per orders listed below. Patient tolerated well- no s/s of non-verbal pain or discomfort observed during the encounter. Bedside nurse aware of plan of care. See flow sheets for more detailed assessment findings.      Orders listed below and wound care will continue to follow, call or Secure Chat with questions.     Skin care plans:  1-Hydraguard to bilateral sacrum, buttock and heels BID and PRN  2-Elevate heels to offload pressure.  3-Ehob cushion in chair when out of bed.  4-Moisturize skin daily with skin nourishing cream.  5-Turn/reposition q2h for pressure re-distribution on skin.    Wounds:  Wound 05/07/25 Face Left (Active)   Wound Image   05/14/25 1306   Wound Description Dry;Beefy red;Intact 05/14/25 1306   Drainage Amount None 05/14/25 1306   Treatments Site care 05/14/25 1306   Dressing Open to air 05/14/25 1306   Patient Tolerance Tolerated well 05/14/25 1306     Wound Care will sign off  Joan ESCOBEDON RN CWON

## 2025-05-14 NOTE — NURSING NOTE
"Pt visible in day room this evening, calm on approach. Currently denies s/s. Showered this evening. Pt exhibiting paranoia, states \"will someone be watching the doors so I don't get murdered?\". Reassurance provided, pt responds \"thank you big daddy\". Labile, loud and inappropriate at times. Exhibits poor safety awareness.    Pt resistive to blood work, questions \"why do you have to do this all the time, have you no mercy?\". Educated on what blood work was for, pt accepting with encouragement. VPA resulted 37.     At HS medication pass, pt irritable, states \"do not come in here in the morning and wake me up and ask me a question when I only have one eye open\".     Compliant with HS medications, will maintain q15min checks.  "

## 2025-05-14 NOTE — ASSESSMENT & PLAN NOTE
Continue Depakote ER 1500 mg nightly for mood stability and seizure disorder  Continue Phenobarbital 64.8 mg every 12 hours  Continue Klonopin 1 mg every 12 hours

## 2025-05-14 NOTE — ASSESSMENT & PLAN NOTE
Change Depakote  to Depakote ER 1500 mg nightly for mood stability and seizure disorder  Continue Phenobarbital 64.8 mg every 12 hours  Continue Klonopin 1 mg every 12 hours

## 2025-05-14 NOTE — NURSING NOTE
"Pt visible on the unit, exhibits disorganized and labile. Pt preoccupied with becoming a \"sister\". Pt requests letter of recommendation from , but states she is unable to elaborate because \"it is a secret\". Remains religiously preoccupied.    Pt exhibits paranoia, initially refusing medications, yelling that they are \"poison\". Pt believes the pharmacist is someone who \"snuck in and is poisoning the medications. Encouragement provided, pt questioned 1:1 staff \"what should I do?\", when staff encouraged her to take medications, pt states \"when you believe in [place] you can do anything\". Ultimately accepted HS medications.    Pt refuses sennacot, became briefly agitated, yelling \"you and your stupid shit pills. I have been shitting all day\". Pt then heard yelling from room, \"nurse, I'm sorry\".     Compliant with HS medications. Will maintain q15min checks.  "

## 2025-05-14 NOTE — CASE MANAGEMENT
[facility] Department of Aging was here to see patient this morning. Patient was not cooperative so nursing and CM provided answers to questions. Will await response.
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
## 2025-05-14 NOTE — PLAN OF CARE
Problem: Alteration in Thoughts and Perception  Goal: Agree to be compliant with medication regime, as prescribed and report medication side effects  Description: Interventions:- Offer appropriate PRN medication and supervise ingestion; conduct AIMS, as needed   Outcome: Progressing     Problem: Risk for Self Injury/Neglect  Goal: Verbalize thoughts and feelings  Description: Interventions:- Assess and re-assess patient's lethality and potential for self-injury- Engage patient in 1:1 interactions, daily, for a minimum of 15 minutes- Encourage patient to express feelings, fears, frustrations, hopes- Establish rapport/trust with patient   Outcome: Progressing  Goal: Refrain from harming self  Description: Interventions:- Monitor patient closely, per order- Develop a trusting relationship- Supervise medication ingestion, monitor effects and side effects   Outcome: Progressing     Problem: Depression  Goal: Treatment Goal: Demonstrate behavioral control of depressive symptoms, verbalize feelings of improved mood/affect, and adopt new coping skills prior to discharge  Outcome: Progressing     Problem: Anxiety  Goal: Anxiety is at manageable level  Description: Interventions:- Assess and monitor patient's anxiety level. - Monitor for signs and symptoms (heart palpitations, chest pain, shortness of breath, headaches, nausea, feeling jumpy, restlessness, irritable, apprehensive). - Collaborate with interdisciplinary team and initiate plan and interventions as ordered.- [place] patient to unit/surroundings- Explain treatment plan- Encourage participation in care- Encourage verbalization of concerns/fears- Identify coping mechanisms- Assist in developing anxiety-reducing skills- Administer/offer alternative therapies- Limit or eliminate stimulants  Outcome: Progressing     Problem: Risk for Violence/Aggression Toward Others  Goal: Refrain from harming others  Outcome: Progressing  Goal: Control angry outbursts  Description:  Interventions:- Monitor patient closely, per order- Ensure early verbal de-escalation- Monitor prn medication needs- Set reasonable/therapeutic limits, outline behavioral expectations, and consequences - Provide a non-threatening milieu, utilizing the least restrictive interventions   Outcome: Progressing     Problem: SAFETY ADULT  Goal: Patient will remain free of falls  Description: INTERVENTIONS:- Educate patient on patient safety including physical limitations- Instruct patient to call for assistance with activity - Consult OT/PT to assist with strengthening/mobility - Keep Call bell within reach- Keep bed low and locked with side rails adjusted as appropriate- Keep care items and personal belongings within reach- Initiate and maintain comfort rounds- Offer Toileting every 2 Hours, in advance of need- Initiate/Maintain bed/chair alarm- Obtain necessary fall risk management equipment: walker- Apply yellow socks and bracelet for high fall risk patients- Consider moving patient to room near nurses station  Outcome: Progressing
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
## 2025-05-14 NOTE — PROGRESS NOTES
Pt attended group with 1:1.  Pt impulsive and disorganized needing redirection.  Pt was able to stay for duration and needed prompting. Pt also stating she needed glasses or magnifying glasses for activity or elaborate cognitively with comprehension.  Pt also stating she does not belong on unit and that she belongs on another unit for her medication management needs.       05/14/25 1100   Activity/Group Checklist   Group Other (Comment)  ( Recovery: Focus)   Attendance Attended   Attendance Duration (min) 46-60   Interactions Disorganized interaction   Affect/Mood Wide;Incongruent   Goals Achieved Identified feelings;Identified triggers;Identified relapse prevention strategies;Discussed coping strategies;Able to listen to others;Able to engage in interactions

## 2025-05-14 NOTE — PROGRESS NOTES
"   05/14/25 0900   Team Meeting   Meeting Type Daily Rounds   Team Members Present   Team Members Present Other (Discipline and Name);Occupational Therapist;;Nurse;Physician   Physician Team Member Emi/Alonso/Moisés   Nursing Team Member Joan/Awais/Shantel   Care Management Team Member Yonny RANDALL Team Member Irasema   Other (Discipline and Name) Erin - Pharmacy   Patient/Family Present   Patient Present No   Patient's Family Present No     Aging was out to see patient today. She is still on a one to one due to safety concerns. She had a visit with her former home yesterday and she was not willing to discuss returning. She is very focused on the \"jews and getting murdered.\" Depakote level was 37. She is loud, bizarre, labile, and impulsive. Patient discharge is pending stabilization of mood and medications.   "

## 2025-05-14 NOTE — DISCHARGE INSTR - OTHER ORDERS
Skin care plans:  1-Hydraguard to bilateral sacrum, buttock and heels BID and PRN  2-Elevate heels to offload pressure.  3-Ehob cushion in chair when out of bed.  4-Moisturize skin daily with skin nourishing cream.  5-Turn/reposition q2h for pressure re-distribution on skin.    When you need someone to listen, the Warmline is available for 16 hours a day, 7 days a week, from the time of 7-10am and 2pm-2am.  It is not available from the hours of 2am-6am and 10am-2pm. A representative can be reached at [phone].     If you are experiencing a mental health emergency, you may call the [facility] Crisis Intervention Office 24 hours a day, 7 days per week at [phone].     In [facility], call [phone].

## 2025-05-14 NOTE — NURSING NOTE
"Pt alert and visible on the unit. Remains labile, loud and disruptive. Difficult to redirect. Religiously preoccupied. Stated, \"there was a murder here last night. The blessed mother was murdered\".  Continues to state that she is \"the only Latter-day\" and the she \"had to get away from everyone\" at the group home. Requested to see  today to \"bless\" her rosary. Pt seen by  today. Impulsive. Maintained on 1:1 for safety. Poor safety awareness. Needs reminders to use walker properly and assistance getting into/out of bed. Ambulates with assistance of walker. Gait unsteady at times. Appetite 100%-75% and 100% for meals. Limited peer interactions. Support provided.   "
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
## 2025-05-15 PROCEDURE — NC001 PR NO CHARGE: Performed by: PSYCHIATRY & NEUROLOGY

## 2025-05-15 RX ADMIN — TRIHEXYPHENIDYL HYDROCHLORIDE 2 MG: 2 TABLET ORAL at 08:38

## 2025-05-15 RX ADMIN — TRAZODONE HYDROCHLORIDE 150 MG: 100 TABLET ORAL at 21:33

## 2025-05-15 RX ADMIN — MAGNESIUM OXIDE TAB 400 MG (241.3 MG ELEMENTAL MG) 400 MG: 400 (241.3 MG) TAB at 17:05

## 2025-05-15 RX ADMIN — ATORVASTATIN CALCIUM 20 MG: 20 TABLET, FILM COATED ORAL at 08:38

## 2025-05-15 RX ADMIN — CLONAZEPAM 1 MG: 1 TABLET ORAL at 08:39

## 2025-05-15 RX ADMIN — ASPIRIN 81 MG CHEWABLE TABLET 81 MG: 81 TABLET CHEWABLE at 08:38

## 2025-05-15 RX ADMIN — TRIHEXYPHENIDYL HYDROCHLORIDE 2 MG: 2 TABLET ORAL at 21:33

## 2025-05-15 RX ADMIN — OLANZAPINE 10 MG: 10 TABLET, FILM COATED ORAL at 08:39

## 2025-05-15 RX ADMIN — GABAPENTIN 300 MG: 300 CAPSULE ORAL at 08:39

## 2025-05-15 RX ADMIN — OLANZAPINE 20 MG: 10 TABLET, FILM COATED ORAL at 21:33

## 2025-05-15 RX ADMIN — MAGNESIUM OXIDE TAB 400 MG (241.3 MG ELEMENTAL MG) 400 MG: 400 (241.3 MG) TAB at 08:38

## 2025-05-15 RX ADMIN — CLONAZEPAM 1 MG: 1 TABLET ORAL at 17:05

## 2025-05-15 RX ADMIN — DOCUSATE SODIUM 100 MG: 100 CAPSULE, LIQUID FILLED ORAL at 08:39

## 2025-05-15 RX ADMIN — GUAIFENESIN AND DEXTROMETHORPHAN 10 ML: 20; 200 SYRUP ORAL at 17:43

## 2025-05-15 RX ADMIN — Medication 1000 MCG: at 08:39

## 2025-05-15 RX ADMIN — Medication 6 MG: at 21:33

## 2025-05-15 RX ADMIN — Medication 1000 UNITS: at 08:38

## 2025-05-15 RX ADMIN — FLUTICASONE PROPIONATE 2 SPRAY: 50 SPRAY, METERED NASAL at 08:40

## 2025-05-15 RX ADMIN — FOLIC ACID 1 MG: 1 TABLET ORAL at 08:38

## 2025-05-15 RX ADMIN — LEVOTHYROXINE SODIUM 100 MCG: 0.1 TABLET ORAL at 06:01

## 2025-05-15 RX ADMIN — PHENOBARBITAL 64.8 MG: 64.8 TABLET ORAL at 22:32

## 2025-05-15 RX ADMIN — GABAPENTIN 300 MG: 300 CAPSULE ORAL at 14:39

## 2025-05-15 RX ADMIN — PHENOBARBITAL 64.8 MG: 64.8 TABLET ORAL at 12:55

## 2025-05-15 RX ADMIN — DIVALPROEX SODIUM 1500 MG: 500 TABLET, EXTENDED RELEASE ORAL at 21:33

## 2025-05-15 NOTE — NURSING NOTE
"Pt alert and visible on the unit at intervals. Remains psychotic and disorganized. While in dining room this am stated, \"excuse me but I have to say to everyone that I have a veneral disease\". Religiously preoccupied. Stated, \"the Blessed Mother came to me in a dream and told me I will become a sister because I am dead\". Difficult to redirect. Maintained on 1:1 for safety. Poor safety awareness. Gait unsteady with assistance of walker. Needs assistance getting out of bed. BM today. Appetite 100% for meals. Support provided.   "

## 2025-05-15 NOTE — PLAN OF CARE
Problem: Alteration in Thoughts and Perception  Goal: Agree to be compliant with medication regime, as prescribed and report medication side effects  Description: Interventions:- Offer appropriate PRN medication and supervise ingestion; conduct AIMS, as needed   Outcome: Progressing     Problem: Risk for Self Injury/Neglect  Goal: Verbalize thoughts and feelings  Description: Interventions:- Assess and re-assess patient's lethality and potential for self-injury- Engage patient in 1:1 interactions, daily, for a minimum of 15 minutes- Encourage patient to express feelings, fears, frustrations, hopes- Establish rapport/trust with patient   Outcome: Progressing  Goal: Refrain from harming self  Description: Interventions:- Monitor patient closely, per order- Develop a trusting relationship- Supervise medication ingestion, monitor effects and side effects   Outcome: Progressing     Problem: Depression  Goal: Treatment Goal: Demonstrate behavioral control of depressive symptoms, verbalize feelings of improved mood/affect, and adopt new coping skills prior to discharge  Outcome: Progressing     Problem: Anxiety  Goal: Anxiety is at manageable level  Description: Interventions:- Assess and monitor patient's anxiety level. - Monitor for signs and symptoms (heart palpitations, chest pain, shortness of breath, headaches, nausea, feeling jumpy, restlessness, irritable, apprehensive). - Collaborate with interdisciplinary team and initiate plan and interventions as ordered.- [place] patient to unit/surroundings- Explain treatment plan- Encourage participation in care- Encourage verbalization of concerns/fears- Identify coping mechanisms- Assist in developing anxiety-reducing skills- Administer/offer alternative therapies- Limit or eliminate stimulants  Outcome: Progressing     Problem: Risk for Violence/Aggression Toward Others  Goal: Refrain from harming others  Outcome: Progressing     Problem: SAFETY ADULT  Goal: Patient will  remain free of falls  Description: INTERVENTIONS:- Educate patient on patient safety including physical limitations- Instruct patient to call for assistance with activity - Consult OT/PT to assist with strengthening/mobility - Keep Call bell within reach- Keep bed low and locked with side rails adjusted as appropriate- Keep care items and personal belongings within reach- Initiate and maintain comfort rounds- Offer Toileting every 2 Hours, in advance of need- Initiate/Maintain bed/chair alarm- Obtain necessary fall risk management equipment: walker- Apply yellow socks and bracelet for high fall risk patients- Consider moving patient to room near nurses station  Outcome: Progressing

## 2025-05-15 NOTE — SPEECH THERAPY NOTE
Speech Language/Pathology    Speech/Language Pathology Progress Note    Patient Name: Laine Tse  Today's Date: 5/15/2025       Summary:  Pt seen for follow up. Pt in day room with 1:1 upon arrival. Pt reports she feels she ate well for breakfast and denies difficulty chewing/swallowing. Pt observed with sips of thin liquid with no overt s/s aspiration. Pt declined  any solid food trials as she had just eaten. Pt preoccupied and vague in answering most questions from SLP. SLP reviewed general swallow precautions.   Recommend continuing current diet. SLP to follow for diet tolerance x1 and then likely d/c from services.      Assessment:  No overt s/s aspiration with thin liquids      Plan/Recommendations:  Regular/thin   General swallow precautions  SLP to follow            Lab Results   Component Value Date    WBC 3.37 (L) 05/09/2025    HGB 13.1 05/09/2025    HCT 40.0 05/09/2025     (H) 05/09/2025     05/09/2025           Problem List  Principal Problem:    Schizoaffective disorder (HCC)  Active Problems:    Hyperlipidemia    Hypothyroidism    Medical clearance for psychiatric admission    Seizure disorder (HCC)    Hypertension    Mood insomnia (HCC)    Facial laceration    Hypomagnesemia    Tremors of nervous system       Past Medical History  Past Medical History:   Diagnosis Date    Anxiety     Benign essential hypertension     resolved; 06/15/16    Depression     Depression with anxiety     Fracture of fifth metatarsal bone of right foot with delayed healing     last assessed 04/12/16; fracture of metatarsal bone, right, closed, initial encounter    Hyperlipidemia     last assessed 11/28/17    Hypothyroidism     last assessed 11/28/2017    Insomnia     Obesity     Schizoaffective disorder (HCC)     last assessed 05/18/2017    Seizure disorder (HCC)     last assessed 03/28/17    Seizures (HCC)         Past Surgical History  Past Surgical History:   Procedure Laterality Date    COLONOSCOPY       complete    DE COLONOSCOPY FLX DX W/COLLJ SPEC WHEN PFRMD N/A 8/30/2018    Procedure: COLONOSCOPY with polypectomies;  Surgeon: Andriy Lopez MD;  Location: [facility];  Service: Gastroenterology

## 2025-05-15 NOTE — NURSING NOTE
"Patient seen wandering in milieu accompanied by continual 1:1 staff. Brightens on approach. Loud mumbled speech. Religiously preoccupied. States, \"I want to become a sister, but I need the  to give me a recommendation first.\" Suspicious and paranoid delusions with poor insight for admission. States, \"They sent me here just because I have a history of taking psychiatric medications.\" Currently endorses depression but denies all other psychiatric s/s. Denies pain. Denies any additional needs at this time.   "

## 2025-05-15 NOTE — TREATMENT TEAM
Pt attended group and able to stay for duration.  Pt with 1:1 and mainly sleepy.  Did not engage in discussion.      05/15/25 1330   Activity/Group Checklist   Group Other (Comment)  (Coping skills and leisure)   Attendance Attended  (with 1:1)   Attendance Duration (min) 46-60   Interactions Disorganized interaction  (initially irritable, then sleepy, on chair alarm)   Affect/Mood Other (Comment)  (sleepy at end)   Goals Achieved Identified feelings;Identified triggers;Discussed coping strategies;Discussed self-esteem issues;Able to listen to others;Able to engage in interactions

## 2025-05-15 NOTE — PROGRESS NOTES
05/15/25 0800   Team Meeting   Meeting Type Daily Rounds   Team Members Present   Team Members Present Physician;Nurse;   Physician Team Member Alonso/Gaye/Emi   Nursing Team Member Joan/Awais/Shantel   Care Management Team Member Yonny   OT Team Member Irasema   Patient/Family Present   Patient Present No   Patient's Family Present No     Patient had an aging referral completed yesterday and staff is still awaiting confirmation of SNF placement. She is on a one to one. She is still religiously preoccupied. She had a BM on the 14th and shower on the 13th. She attended three groups. She has a CMP on the 17th. She is eating and sleeping well. Patient discharge is pending stabilization of mood and medications, as well as placement.

## 2025-05-15 NOTE — PLAN OF CARE
Problem: DISCHARGE PLANNING  Goal: Discharge to home or other facility with appropriate resources  Description: INTERVENTIONS:- Identify barriers to discharge w/patient and caregiver- Arrange for needed discharge resources and transportation as appropriate- Identify discharge learning needs (meds, wound care, etc.)- Arrange for interpretive services to assist at discharge as needed- Refer to Case Management Department for coordinating discharge planning if the patient needs post-hospital services based on physician/advanced practitioner order or complex needs related to functional status, cognitive ability, or social support system    Outcome: Not Progressing  Patient recently seen by aging for SNF placement. She is still unable to be redirected at times and has been delusional and paranoid. Patient discharge is pending stabilization of mood and medications, as well as placement.

## 2025-05-15 NOTE — QUICK NOTE
After discussing with nursing staff and following the safety/fall risk CO protocol it was determined that patient will remain on one-to-one.  Patient has fears about being restrained by [place], current safety awareness remains poor, Nat score is 99, without restraints patient would fall or accidentally harm herself.  While the patient has not demonstrated any behavior that poses an imminent risk to herself or others, placing her in a Posey belt may actually provoke agitation and possibly escalate the situation.  Restraints may be viewed as punitive or threatening to the patient, especially because patient is not able to comprehend at this time why we would be using a Posey belt.  The loss of autonomy may trigger fear, confusion or anger in the situation.  At this time a 1:1 is considered the least restrictive intervention, as patient is able to freely roam with the assistance of a staff member and has not fallen since admission.

## 2025-05-16 PROCEDURE — 92526 ORAL FUNCTION THERAPY: CPT

## 2025-05-16 RX ORDER — RISPERIDONE 1 MG/1
1 TABLET ORAL
Status: DISCONTINUED | OUTPATIENT
Start: 2025-05-16 | End: 2025-05-19

## 2025-05-16 RX ORDER — OLANZAPINE 10 MG/1
10 TABLET, FILM COATED ORAL 2 TIMES DAILY
Status: DISCONTINUED | OUTPATIENT
Start: 2025-05-16 | End: 2025-05-16

## 2025-05-16 RX ORDER — OLANZAPINE 10 MG/1
10 TABLET, FILM COATED ORAL 2 TIMES DAILY
Status: DISCONTINUED | OUTPATIENT
Start: 2025-05-16 | End: 2025-05-19

## 2025-05-16 RX ADMIN — OLANZAPINE 10 MG: 10 TABLET, FILM COATED ORAL at 21:17

## 2025-05-16 RX ADMIN — GABAPENTIN 300 MG: 300 CAPSULE ORAL at 09:02

## 2025-05-16 RX ADMIN — OLANZAPINE 10 MG: 10 TABLET, FILM COATED ORAL at 09:02

## 2025-05-16 RX ADMIN — ASPIRIN 81 MG CHEWABLE TABLET 81 MG: 81 TABLET CHEWABLE at 09:02

## 2025-05-16 RX ADMIN — TRIHEXYPHENIDYL HYDROCHLORIDE 2 MG: 2 TABLET ORAL at 21:17

## 2025-05-16 RX ADMIN — CLONAZEPAM 1 MG: 1 TABLET ORAL at 17:12

## 2025-05-16 RX ADMIN — Medication 1000 UNITS: at 09:02

## 2025-05-16 RX ADMIN — PHENOBARBITAL 64.8 MG: 64.8 TABLET ORAL at 22:59

## 2025-05-16 RX ADMIN — GUAIFENESIN AND DEXTROMETHORPHAN 10 ML: 20; 200 SYRUP ORAL at 21:46

## 2025-05-16 RX ADMIN — FOLIC ACID 1 MG: 1 TABLET ORAL at 09:03

## 2025-05-16 RX ADMIN — GABAPENTIN 300 MG: 300 CAPSULE ORAL at 14:30

## 2025-05-16 RX ADMIN — Medication 1000 MCG: at 09:04

## 2025-05-16 RX ADMIN — TRAZODONE HYDROCHLORIDE 150 MG: 100 TABLET ORAL at 21:16

## 2025-05-16 RX ADMIN — CLONAZEPAM 1 MG: 1 TABLET ORAL at 09:03

## 2025-05-16 RX ADMIN — ATORVASTATIN CALCIUM 20 MG: 20 TABLET, FILM COATED ORAL at 09:02

## 2025-05-16 RX ADMIN — TRIHEXYPHENIDYL HYDROCHLORIDE 2 MG: 2 TABLET ORAL at 09:03

## 2025-05-16 RX ADMIN — FLUTICASONE PROPIONATE 2 SPRAY: 50 SPRAY, METERED NASAL at 09:04

## 2025-05-16 RX ADMIN — Medication 6 MG: at 21:16

## 2025-05-16 RX ADMIN — MAGNESIUM OXIDE TAB 400 MG (241.3 MG ELEMENTAL MG) 400 MG: 400 (241.3 MG) TAB at 09:02

## 2025-05-16 RX ADMIN — PHENOBARBITAL 64.8 MG: 64.8 TABLET ORAL at 12:53

## 2025-05-16 RX ADMIN — DIVALPROEX SODIUM 1500 MG: 500 TABLET, EXTENDED RELEASE ORAL at 21:17

## 2025-05-16 RX ADMIN — LEVOTHYROXINE SODIUM 100 MCG: 0.1 TABLET ORAL at 05:48

## 2025-05-16 RX ADMIN — MAGNESIUM OXIDE TAB 400 MG (241.3 MG ELEMENTAL MG) 400 MG: 400 (241.3 MG) TAB at 17:12

## 2025-05-16 RX ADMIN — RISPERIDONE 1 MG: 1 TABLET, FILM COATED ORAL at 21:17

## 2025-05-16 NOTE — PROGRESS NOTES
05/16/25 0800   Team Meeting   Meeting Type Daily Rounds   Team Members Present   Team Members Present Other (Discipline and Name);Occupational Therapist;;Nurse;Physician   Physician Team Member Emi/Alonso/Moisés   Nursing Team Member Joan/Awais/Shantel   Care Management Team Member Yonny RANDALL Team Member Irasema   Other (Discipline and Name) [facility] - Pharmacy   Patient/Family Present   Patient Present No   Patient's Family Present No     Patient is not nursing home eligible. Patient one to one to be discontinued. She is eating and sleeping. She is delusional and focused on discharge. Patient religiously focused and preoccupied. Patient discharge is pending stabilization of mood and medications.

## 2025-05-16 NOTE — NURSING NOTE
"Pt alert and visible on the unit at intervals. Pt remains restless and paranoid. Religiously preoccupied and disorganized. Stated, \"I am marrying Syed Kohli. Where is my sister? She wants to put me in [place]\". Also questioning staff heritage calling some \"Jews and Chinese\". Loud. Medication compliant. Needs frequent safety reminders. Impulsive. Appetite 100% for breakfast and lunch. Ambulates with assistance of walker. Poor safety awareness.   "

## 2025-05-16 NOTE — TREATMENT TEAM
Pt did not engage in discussion in am.  Disorganized and slept.      05/16/25 1100 05/16/25 1400   Activity/Group Checklist   Group Community meeting Other (Comment)  (Positive reflection and music)   Attendance Attended Did not attend   Attendance Duration (min) 31-45  --    Interactions Disorganized interaction  --    Affect/Mood Other (Comment)  (slept, did not engage)  --    Goals Achieved Identified feelings;Discussed coping strategies;Discussed self-esteem issues  --
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
## 2025-05-16 NOTE — PLAN OF CARE
Problem: Alteration in Thoughts and Perception  Goal: Treatment Goal: Gain control of psychotic behaviors/thinking, reduce/eliminate presenting symptoms and demonstrate improved reality functioning upon discharge  Outcome: Progressing  Goal: Refrain from acting on delusional thinking/internal stimuli  Description: Interventions:- Monitor patient closely, per order - Utilize least restrictive measures - Set reasonable limits, give positive feedback for acceptable - Administer medications as ordered and monitor of potential side effects  Outcome: Progressing  Goal: Agree to be compliant with medication regime, as prescribed and report medication side effects  Description: Interventions:- Offer appropriate PRN medication and supervise ingestion; conduct AIMS, as needed   Outcome: Progressing     Problem: Ineffective Coping  Goal: Identifies ineffective coping skills  Outcome: Progressing  Goal: Identifies healthy coping skills  Outcome: Progressing  Goal: Understands least restrictive measures  Description: Interventions:- Utilize least restrictive behavior  Outcome: Progressing  Goal: Free from restraint events  Description: - Utilize least restrictive measures - Provide behavioral interventions - Redirect inappropriate behaviors   Outcome: Progressing     Problem: Risk for Self Injury/Neglect  Goal: Verbalize thoughts and feelings  Description: Interventions:- Assess and re-assess patient's lethality and potential for self-injury- Engage patient in 1:1 interactions, daily, for a minimum of 15 minutes- Encourage patient to express feelings, fears, frustrations, hopes- Establish rapport/trust with patient   Outcome: Progressing  Goal: Refrain from harming self  Description: Interventions:- Monitor patient closely, per order- Develop a trusting relationship- Supervise medication ingestion, monitor effects and side effects   Outcome: Progressing     Problem: Depression  Goal: Treatment Goal: Demonstrate behavioral  control of depressive symptoms, verbalize feelings of improved mood/affect, and adopt new coping skills prior to discharge  Outcome: Progressing     Problem: Anxiety  Goal: Anxiety is at manageable level  Description: Interventions:- Assess and monitor patient's anxiety level. - Monitor for signs and symptoms (heart palpitations, chest pain, shortness of breath, headaches, nausea, feeling jumpy, restlessness, irritable, apprehensive). - Collaborate with interdisciplinary team and initiate plan and interventions as ordered.- [place] patient to unit/surroundings- Explain treatment plan- Encourage participation in care- Encourage verbalization of concerns/fears- Identify coping mechanisms- Assist in developing anxiety-reducing skills- Administer/offer alternative therapies- Limit or eliminate stimulants  Outcome: Not Progressing

## 2025-05-16 NOTE — SPEECH THERAPY NOTE
Speech Language/Pathology    Speech/Language Pathology Progress Note    Patient Name: Laine Tse  Today's Date: 5/16/2025         Summary:  Pt seen for follow up. Pt sleeping in bed, but alert to stim. Pt reports she feels she ate well for breakfast and lunch. Pt denies difficulty chewing/swallowing. Pt observed with sips of thin liquid with no overt s/s aspiration. Pt demonstrated functional mastication with regular solids despite missing dentition. SLP reviewed general swallow precautions. Pt is on RA and has remained afebrile.   Recommend continuing current diet. Pt has met all ST goals at this time. Please re-consult with any new concerns.   Assessment:  No overt s/s aspiration with thin liquids, functional mastication      Plan/Recommendations:  Regular/thin   General swallow precautions ( slow rate, upright, small bites/sips)  D/c ST services           Lab Results   Component Value Date    WBC 3.37 (L) 05/09/2025    HGB 13.1 05/09/2025    HCT 40.0 05/09/2025     (H) 05/09/2025     05/09/2025           Problem List  Principal Problem:    Schizoaffective disorder (HCC)  Active Problems:    Hyperlipidemia    Hypothyroidism    Medical clearance for psychiatric admission    Seizure disorder ([facility])    Hypertension    Mood insomnia ([facility])    Facial laceration    Hypomagnesemia    Tremors of nervous system       Past Medical History  Past Medical History:   Diagnosis Date    Anxiety     Benign essential hypertension     resolved; 06/15/16    Depression     Depression with anxiety     Fracture of fifth metatarsal bone of right foot with delayed healing     last assessed 04/12/16; fracture of metatarsal bone, right, closed, initial encounter    Hyperlipidemia     last assessed 11/28/17    Hypothyroidism     last assessed 11/28/2017    Insomnia     Obesity     Schizoaffective disorder (HCC)     last assessed 05/18/2017    Seizure disorder (HCC)     last assessed 03/28/17    Seizures ([facility])         Past  Surgical History  Past Surgical History:   Procedure Laterality Date    COLONOSCOPY      complete    MD COLONOSCOPY FLX DX W/COLLJ SPEC WHEN PFRMD N/A 8/30/2018    Procedure: COLONOSCOPY with polypectomies;  Surgeon: Andriy Lopez MD;  Location: [facility];  Service: Gastroenterology

## 2025-05-16 NOTE — PROGRESS NOTES
Pastoral Care Progress Note          Chaplaincy Interventions Utilized:   Empowerment: Encouraged focus on present, Encouraged self-care, Facilitated group experience, and Normalized experience of patient/family    Exploration: Explored hope, Explored emotional needs & resources, Explored relational needs & resources, and Explored spiritual needs & resources    Collaboration: Encouraged adherence to treatment plan     Relationship Building: Cultivated a relationship of care and support, Listened empathically, and Hospitality    The pt was in and out of group today. At one point she was upset because she believed the  was Jew. The pt returned for the end of group and listened respectfully.
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
## 2025-05-16 NOTE — NURSING NOTE
"Patient seen wandering in the milieu. Restless, speaking in a loud voice, disorganized, and religiously preoccupied. States, \"I should have never come here because I'm not Confucianism, there's only Jews here.\" Unable to be reoriented to current situation. Anxious appearing. Currently denies SI or pain. Denies any additional needs at this time.   "

## 2025-05-17 LAB
ALBUMIN SERPL BCG-MCNC: 3.4 G/DL (ref 3.5–5)
ALP SERPL-CCNC: 56 U/L (ref 34–104)
ALT SERPL W P-5'-P-CCNC: 9 U/L (ref 7–52)
ANION GAP SERPL CALCULATED.3IONS-SCNC: 7 MMOL/L (ref 4–13)
AST SERPL W P-5'-P-CCNC: 9 U/L (ref 13–39)
BILIRUB SERPL-MCNC: 0.18 MG/DL (ref 0.2–1)
BUN SERPL-MCNC: 16 MG/DL (ref 5–25)
CALCIUM ALBUM COR SERPL-MCNC: 8.9 MG/DL (ref 8.3–10.1)
CALCIUM SERPL-MCNC: 8.4 MG/DL (ref 8.4–10.2)
CHLORIDE SERPL-SCNC: 101 MMOL/L (ref 96–108)
CO2 SERPL-SCNC: 28 MMOL/L (ref 21–32)
CREAT SERPL-MCNC: 0.76 MG/DL (ref 0.6–1.3)
GFR SERPL CREATININE-BSD FRML MDRD: 79 ML/MIN/1.73SQ M
GLUCOSE SERPL-MCNC: 101 MG/DL (ref 65–140)
POTASSIUM SERPL-SCNC: 3.9 MMOL/L (ref 3.5–5.3)
PROT SERPL-MCNC: 5.9 G/DL (ref 6.4–8.4)
SODIUM SERPL-SCNC: 136 MMOL/L (ref 135–147)
VALPROATE SERPL-MCNC: 32 UG/ML (ref 50–125)

## 2025-05-17 PROCEDURE — 80164 ASSAY DIPROPYLACETIC ACD TOT: CPT

## 2025-05-17 PROCEDURE — 80053 COMPREHEN METABOLIC PANEL: CPT

## 2025-05-17 PROCEDURE — 99232 SBSQ HOSP IP/OBS MODERATE 35: CPT | Performed by: PSYCHIATRY & NEUROLOGY

## 2025-05-17 RX ORDER — SENNOSIDES 8.6 MG
1 TABLET ORAL
Status: DISCONTINUED | OUTPATIENT
Start: 2025-05-18 | End: 2025-05-18

## 2025-05-17 RX ORDER — NYSTATIN 100000 [USP'U]/G
POWDER TOPICAL 2 TIMES DAILY
Status: DISCONTINUED | OUTPATIENT
Start: 2025-05-17 | End: 2025-06-02 | Stop reason: HOSPADM

## 2025-05-17 RX ADMIN — GABAPENTIN 300 MG: 300 CAPSULE ORAL at 08:39

## 2025-05-17 RX ADMIN — LEVOTHYROXINE SODIUM 100 MCG: 0.1 TABLET ORAL at 05:59

## 2025-05-17 RX ADMIN — FOLIC ACID 1 MG: 1 TABLET ORAL at 08:38

## 2025-05-17 RX ADMIN — PHENOBARBITAL 64.8 MG: 64.8 TABLET ORAL at 22:42

## 2025-05-17 RX ADMIN — FLUTICASONE PROPIONATE 2 SPRAY: 50 SPRAY, METERED NASAL at 09:19

## 2025-05-17 RX ADMIN — CLONAZEPAM 1 MG: 1 TABLET ORAL at 18:16

## 2025-05-17 RX ADMIN — PHENOBARBITAL 64.8 MG: 64.8 TABLET ORAL at 11:52

## 2025-05-17 RX ADMIN — DIVALPROEX SODIUM 1500 MG: 500 TABLET, EXTENDED RELEASE ORAL at 21:20

## 2025-05-17 RX ADMIN — RISPERIDONE 1 MG: 1 TABLET, FILM COATED ORAL at 21:21

## 2025-05-17 RX ADMIN — TRIHEXYPHENIDYL HYDROCHLORIDE 2 MG: 2 TABLET ORAL at 08:38

## 2025-05-17 RX ADMIN — MAGNESIUM OXIDE TAB 400 MG (241.3 MG ELEMENTAL MG) 400 MG: 400 (241.3 MG) TAB at 08:38

## 2025-05-17 RX ADMIN — Medication 1000 UNITS: at 08:38

## 2025-05-17 RX ADMIN — ASPIRIN 81 MG CHEWABLE TABLET 81 MG: 81 TABLET CHEWABLE at 08:37

## 2025-05-17 RX ADMIN — GUAIFENESIN AND DEXTROMETHORPHAN 10 ML: 20; 200 SYRUP ORAL at 21:28

## 2025-05-17 RX ADMIN — OLANZAPINE 10 MG: 10 TABLET, FILM COATED ORAL at 21:20

## 2025-05-17 RX ADMIN — CLONAZEPAM 1 MG: 1 TABLET ORAL at 08:38

## 2025-05-17 RX ADMIN — ATORVASTATIN CALCIUM 20 MG: 20 TABLET, FILM COATED ORAL at 08:38

## 2025-05-17 RX ADMIN — NYSTATIN: 100000 POWDER TOPICAL at 14:48

## 2025-05-17 RX ADMIN — Medication 6 MG: at 21:20

## 2025-05-17 RX ADMIN — OLANZAPINE 10 MG: 10 TABLET, FILM COATED ORAL at 08:37

## 2025-05-17 RX ADMIN — NYSTATIN: 100000 POWDER TOPICAL at 18:16

## 2025-05-17 RX ADMIN — GABAPENTIN 300 MG: 300 CAPSULE ORAL at 14:24

## 2025-05-17 RX ADMIN — Medication 1000 MCG: at 08:38

## 2025-05-17 RX ADMIN — TRIHEXYPHENIDYL HYDROCHLORIDE 2 MG: 2 TABLET ORAL at 21:20

## 2025-05-17 RX ADMIN — TRAZODONE HYDROCHLORIDE 150 MG: 100 TABLET ORAL at 21:20

## 2025-05-17 NOTE — NURSING NOTE
"Patient found kneeling next to her bed, \"praying the rosary\" while bed alarm went off. Denied having a fall. Patient states, \"I'm on the third mystery.\" Patient redirected to bed, but was later found crawling over the bedside rails. Loudly states, \"I can't believe you put me on a posey, that's an insult!\" Placed on continual 1:1 close proximity d/t having an Routt fall risk score of 99, impulsivity and poor safety awareness.  "
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
## 2025-05-17 NOTE — NURSING NOTE
Patient loud, calm and cooperative, able to make needs known. 1:1 remains in effective, medications compliant. Is visible on the module, social with peers, attends groups. Denies SI/HI/AVH, anxiety or depression. Medication compliant. Support provided.

## 2025-05-17 NOTE — NURSING NOTE
Patient is awake and alert able to make her needs known. She is visible in the milieu with limited peer interactions. She is loud and labile. She has poor insight and concentration. She continues on 1:1 observation for safety. Patient has poor safety awareness. She is medication and meal compliant. Will cont to monitor. Safety checks maintained.

## 2025-05-17 NOTE — PLAN OF CARE
Problem: Alteration in Thoughts and Perception  Goal: Treatment Goal: Gain control of psychotic behaviors/thinking, reduce/eliminate presenting symptoms and demonstrate improved reality functioning upon discharge  Outcome: Progressing  Goal: Refrain from acting on delusional thinking/internal stimuli  Description: Interventions:- Monitor patient closely, per order - Utilize least restrictive measures - Set reasonable limits, give positive feedback for acceptable - Administer medications as ordered and monitor of potential side effects  Outcome: Progressing  Goal: Agree to be compliant with medication regime, as prescribed and report medication side effects  Description: Interventions:- Offer appropriate PRN medication and supervise ingestion; conduct AIMS, as needed   Outcome: Progressing  Goal: Recognize dysfunctional thoughts, communicate reality-based thoughts at the time of discharge  Description: Interventions:- Provide medication and psycho-education to assist patient in compliance and developing insight into his/her illness   Outcome: Progressing     Problem: Ineffective Coping  Goal: Cooperates with admission process  Description: Interventions: - Complete admission process  Outcome: Progressing  Goal: Identifies ineffective coping skills  Outcome: Progressing  Goal: Identifies healthy coping skills  Outcome: Progressing  Goal: Demonstrates healthy coping skills  Outcome: Progressing  Goal: Understands least restrictive measures  Description: Interventions:- Utilize least restrictive behavior  Outcome: Progressing  Goal: Free from restraint events  Description: - Utilize least restrictive measures - Provide behavioral interventions - Redirect inappropriate behaviors   Outcome: Progressing     Problem: Risk for Self Injury/Neglect  Goal: Verbalize thoughts and feelings  Description: Interventions:- Assess and re-assess patient's lethality and potential for self-injury- Engage patient in 1:1 interactions,  daily, for a minimum of 15 minutes- Encourage patient to express feelings, fears, frustrations, hopes- Establish rapport/trust with patient   Outcome: Progressing  Goal: Refrain from harming self  Description: Interventions:- Monitor patient closely, per order- Develop a trusting relationship- Supervise medication ingestion, monitor effects and side effects   Outcome: Progressing  Goal: Attend and participate in unit activities, including therapeutic, recreational, and educational groups  Description: Interventions:- Provide therapeutic and educational activities daily, encourage attendance and participation, and document same in the medical record- Obtain collateral information, encourage visitation and family involvement in care   Outcome: Progressing  Goal: Recognize maladaptive responses and adopt new coping mechanisms  Outcome: Progressing  Goal: Complete daily ADLs, including personal hygiene independently, as able  Description: Interventions:- Observe, teach, and assist patient with ADLS- Monitor and promote a balance of rest/activity, with adequate nutrition and elimination  Outcome: Progressing     Problem: Depression  Goal: Treatment Goal: Demonstrate behavioral control of depressive symptoms, verbalize feelings of improved mood/affect, and adopt new coping skills prior to discharge  Outcome: Progressing     Problem: Anxiety  Goal: Anxiety is at manageable level  Description: Interventions:- Assess and monitor patient's anxiety level. - Monitor for signs and symptoms (heart palpitations, chest pain, shortness of breath, headaches, nausea, feeling jumpy, restlessness, irritable, apprehensive). - Collaborate with interdisciplinary team and initiate plan and interventions as ordered.- Deputy patient to unit/surroundings- Explain treatment plan- Encourage participation in care- Encourage verbalization of concerns/fears- Identify coping mechanisms- Assist in developing anxiety-reducing skills- Administer/offer  alternative therapies- Limit or eliminate stimulants  Outcome: Progressing     Problem: Risk for Violence/Aggression Toward Others  Goal: Refrain from harming others  Outcome: Progressing  Goal: Refrain from destructive acts on the environment or property  Outcome: Progressing  Goal: Control angry outbursts  Description: Interventions:- Monitor patient closely, per order- Ensure early verbal de-escalation- Monitor prn medication needs- Set reasonable/therapeutic limits, outline behavioral expectations, and consequences - Provide a non-threatening milieu, utilizing the least restrictive interventions   Outcome: Progressing     Problem: SAFETY ADULT  Goal: Patient will remain free of falls  Description: INTERVENTIONS:- Educate patient on patient safety including physical limitations- Instruct patient to call for assistance with activity - Consult OT/PT to assist with strengthening/mobility - Keep Call bell within reach- Keep bed low and locked with side rails adjusted as appropriate- Keep care items and personal belongings within reach- Initiate and maintain comfort rounds- Offer Toileting every 2 Hours, in advance of need- Initiate/Maintain bed/chair alarm- Obtain necessary fall risk management equipment: walker- Apply yellow socks and bracelet for high fall risk patients- Consider moving patient to room near nurses station  Outcome: Progressing  Goal: Maintain or return to baseline ADL function  Description: INTERVENTIONS:-  Assess patient's ability to carry out ADLs; assess patient's baseline for ADL function and identify physical deficits which impact ability to perform ADLs (bathing, care of mouth/teeth, toileting, grooming, dressing, etc.)- Assess/evaluate cause of self-care deficits - Assess range of motion- Assess patient's mobility; develop plan if impaired- Assess patient's need for assistive devices and provide as appropriate- Encourage maximum independence but intervene and supervise when necessary-  Involve family in performance of ADLs- Assess for home care needs following discharge - Consider OT consult to assist with ADL evaluation and planning for discharge- Provide patient education as appropriate  Outcome: Progressing  Goal: Maintains/Returns to pre admission functional level  Description: INTERVENTIONS: Set and communicate daily mobility goal to care team and patient/family/caregiver. - Collaborate with rehabilitation services on mobility goals if consulted. Ambulate patient 3 times a day- Out of bed to chair 3 times a day - Out of bed for meals 3 times a day- Out of bed for toileting-   Outcome: Progressing     Problem: DISCHARGE PLANNING  Goal: Discharge to home or other facility with appropriate resources  Description: INTERVENTIONS:- Identify barriers to discharge w/patient and caregiver- Arrange for needed discharge resources and transportation as appropriate- Identify discharge learning needs (meds, wound care, etc.)- Arrange for interpretive services to assist at discharge as needed- Refer to Case Management Department for coordinating discharge planning if the patient needs post-hospital services based on physician/advanced practitioner order or complex needs related to functional status, cognitive ability, or social support system  Outcome: Progressing     Problem: Knowledge Deficit  Goal: Patient/family/caregiver demonstrates understanding of disease process, treatment plan, medications, and discharge instructions  Description: Complete learning assessment and assess knowledge base.Interventions:- Provide teaching at level of understanding- Provide teaching via preferred learning methods  Outcome: Progressing     Problem: DISCHARGE PLANNING - CARE MANAGEMENT  Goal: Discharge to post-acute care or home with appropriate resources  Description: INTERVENTIONS:- Conduct assessment to determine patient/family and health care team treatment goals, and need for post-acute services based on payer  coverage, community resources, and patient preferences, and barriers to discharge- Address psychosocial, clinical, and financial barriers to discharge as identified in assessment in conjunction with the patient/family and health care team- Arrange appropriate level of post-acute services according to patient’s   needs and preference and payer coverage in collaboration with the physician and health care team- Communicate with and update the patient/family, physician, and health care team regarding progress on the discharge plan- Arrange appropriate transportation to post-acute venues  Outcome: Progressing     Problem: Prexisting or High Potential for Compromised Skin Integrity  Goal: Skin integrity is maintained or improved  Description: INTERVENTIONS:  - Identify patients at risk for skin breakdown  - Assess and monitor skin integrity including under and around medical devices   - Assess and monitor nutrition and hydration status  - Monitor labs  - Assess for incontinence   - Turn and reposition patient  - Assist with mobility/ambulation  - Relieve pressure over duncan prominences   - Avoid friction and shearing  - Provide appropriate hygiene as needed including keeping skin clean and dry  - Evaluate need for skin moisturizer/barrier cream  - Collaborate with interdisciplinary team  - Patient/family teaching  - Consider wound care consult    Assess:  - Review Misael scale daily  - Clean and moisturize skin every day  - Inspect skin when repositioning, toileting, and assisting with ADLS  - Assess extremities for adequate circulation and sensation   Bed Management:  - Have minimal linens on bed & keep smooth, unwrinkled  - Change linens as needed when moist or perspiring  - Avoid sitting or lying in one position for more than 2 hours while in bed?Keep HOB at 25 degrees   - Toileting:  - Offer bedside commode  - Assess for incontinence every 2 hours    Activity:  - Mobilize patient 3 times a day  - Encourage activity  and walks on unit  - Encourage or provide ROM exercises   - Instruct/ Assist with weight shifting every 2 hours when out of bed in chair  - Consider limitation of chair time 2 hours intervals    Skin Care:  - Avoid use of baby powder, tape, friction and shearing, hot water or constrictive clothing  - Do not massage red bony areas    Outcome: Progressing

## 2025-05-18 PROCEDURE — 99232 SBSQ HOSP IP/OBS MODERATE 35: CPT | Performed by: PSYCHIATRY & NEUROLOGY

## 2025-05-18 PROCEDURE — 87505 NFCT AGENT DETECTION GI: CPT | Performed by: NURSE PRACTITIONER

## 2025-05-18 RX ORDER — LOPERAMIDE HYDROCHLORIDE 2 MG/1
2 CAPSULE ORAL ONCE
Status: COMPLETED | OUTPATIENT
Start: 2025-05-18 | End: 2025-05-18

## 2025-05-18 RX ADMIN — GABAPENTIN 300 MG: 300 CAPSULE ORAL at 08:36

## 2025-05-18 RX ADMIN — PHENOBARBITAL 64.8 MG: 64.8 TABLET ORAL at 22:04

## 2025-05-18 RX ADMIN — Medication 1000 UNITS: at 08:36

## 2025-05-18 RX ADMIN — TRAZODONE HYDROCHLORIDE 150 MG: 100 TABLET ORAL at 21:48

## 2025-05-18 RX ADMIN — RISPERIDONE 1 MG: 1 TABLET, FILM COATED ORAL at 21:48

## 2025-05-18 RX ADMIN — GABAPENTIN 300 MG: 300 CAPSULE ORAL at 13:51

## 2025-05-18 RX ADMIN — ATORVASTATIN CALCIUM 20 MG: 20 TABLET, FILM COATED ORAL at 08:36

## 2025-05-18 RX ADMIN — FOLIC ACID 1 MG: 1 TABLET ORAL at 08:37

## 2025-05-18 RX ADMIN — TRIHEXYPHENIDYL HYDROCHLORIDE 2 MG: 2 TABLET ORAL at 08:37

## 2025-05-18 RX ADMIN — TRIHEXYPHENIDYL HYDROCHLORIDE 2 MG: 2 TABLET ORAL at 21:48

## 2025-05-18 RX ADMIN — CLONAZEPAM 1 MG: 1 TABLET ORAL at 17:08

## 2025-05-18 RX ADMIN — OLANZAPINE 10 MG: 10 TABLET, FILM COATED ORAL at 21:48

## 2025-05-18 RX ADMIN — DIVALPROEX SODIUM 1500 MG: 500 TABLET, EXTENDED RELEASE ORAL at 21:48

## 2025-05-18 RX ADMIN — PHENOBARBITAL 64.8 MG: 64.8 TABLET ORAL at 11:32

## 2025-05-18 RX ADMIN — CLONAZEPAM 1 MG: 1 TABLET ORAL at 08:36

## 2025-05-18 RX ADMIN — Medication 6 MG: at 21:48

## 2025-05-18 RX ADMIN — LOPERAMIDE HYDROCHLORIDE 2 MG: 2 CAPSULE ORAL at 11:32

## 2025-05-18 RX ADMIN — LOPERAMIDE HYDROCHLORIDE 2 MG: 2 CAPSULE ORAL at 08:40

## 2025-05-18 RX ADMIN — ASPIRIN 81 MG CHEWABLE TABLET 81 MG: 81 TABLET CHEWABLE at 08:36

## 2025-05-18 RX ADMIN — FLUTICASONE PROPIONATE 2 SPRAY: 50 SPRAY, METERED NASAL at 08:37

## 2025-05-18 RX ADMIN — Medication 1000 MCG: at 08:37

## 2025-05-18 RX ADMIN — LEVOTHYROXINE SODIUM 100 MCG: 0.1 TABLET ORAL at 06:35

## 2025-05-18 RX ADMIN — OLANZAPINE 10 MG: 10 TABLET, FILM COATED ORAL at 08:37
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
## 2025-05-18 NOTE — NURSING NOTE
"Patient is visible in the milieu. She is compliant with medications. She denied anxiety and depression. She is fixated on what antipsychotic medication. Writer reviewed medications a few times. She is forgetful. She has been having frequent stools and is incontinent of stool. Medical provider aware. New orders given and followed. Patient had two doses of Imodium. She believes \"God is punishing me and making me go to the bathroom. The doctors down stairs made me go to the bathroom.\" The second dose of Imodium was effective. She continues on 1:1 for safety.   "

## 2025-05-18 NOTE — NURSING NOTE
Pt refusing blood work but negotiates that a picture of the [facility] be provided she will comply for the lab draw. Picture provided, pt complied with blood draw.
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
## 2025-05-18 NOTE — PLAN OF CARE
Problem: Alteration in Thoughts and Perception  Goal: Treatment Goal: Gain control of psychotic behaviors/thinking, reduce/eliminate presenting symptoms and demonstrate improved reality functioning upon discharge  Outcome: Progressing  Goal: Refrain from acting on delusional thinking/internal stimuli  Description: Interventions:- Monitor patient closely, per order - Utilize least restrictive measures - Set reasonable limits, give positive feedback for acceptable - Administer medications as ordered and monitor of potential side effects  Outcome: Progressing  Goal: Agree to be compliant with medication regime, as prescribed and report medication side effects  Description: Interventions:- Offer appropriate PRN medication and supervise ingestion; conduct AIMS, as needed   Outcome: Progressing  Goal: Recognize dysfunctional thoughts, communicate reality-based thoughts at the time of discharge  Description: Interventions:- Provide medication and psycho-education to assist patient in compliance and developing insight into his/her illness   Outcome: Progressing     Problem: Ineffective Coping  Goal: Cooperates with admission process  Description: Interventions: - Complete admission process  Outcome: Progressing  Goal: Identifies ineffective coping skills  Outcome: Progressing  Goal: Identifies healthy coping skills  Outcome: Progressing  Goal: Demonstrates healthy coping skills  Outcome: Progressing  Goal: Understands least restrictive measures  Description: Interventions:- Utilize least restrictive behavior  Outcome: Progressing  Goal: Free from restraint events  Description: - Utilize least restrictive measures - Provide behavioral interventions - Redirect inappropriate behaviors   Outcome: Progressing     Problem: Risk for Self Injury/Neglect  Goal: Verbalize thoughts and feelings  Description: Interventions:- Assess and re-assess patient's lethality and potential for self-injury- Engage patient in 1:1 interactions,  daily, for a minimum of 15 minutes- Encourage patient to express feelings, fears, frustrations, hopes- Establish rapport/trust with patient   Outcome: Progressing  Goal: Refrain from harming self  Description: Interventions:- Monitor patient closely, per order- Develop a trusting relationship- Supervise medication ingestion, monitor effects and side effects   Outcome: Progressing  Goal: Attend and participate in unit activities, including therapeutic, recreational, and educational groups  Description: Interventions:- Provide therapeutic and educational activities daily, encourage attendance and participation, and document same in the medical record- Obtain collateral information, encourage visitation and family involvement in care   Outcome: Progressing  Goal: Recognize maladaptive responses and adopt new coping mechanisms  Outcome: Progressing  Goal: Complete daily ADLs, including personal hygiene independently, as able  Description: Interventions:- Observe, teach, and assist patient with ADLS- Monitor and promote a balance of rest/activity, with adequate nutrition and elimination  Outcome: Progressing     Problem: Depression  Goal: Treatment Goal: Demonstrate behavioral control of depressive symptoms, verbalize feelings of improved mood/affect, and adopt new coping skills prior to discharge  Outcome: Progressing     Problem: Anxiety  Goal: Anxiety is at manageable level  Description: Interventions:- Assess and monitor patient's anxiety level. - Monitor for signs and symptoms (heart palpitations, chest pain, shortness of breath, headaches, nausea, feeling jumpy, restlessness, irritable, apprehensive). - Collaborate with interdisciplinary team and initiate plan and interventions as ordered.- [place] patient to unit/surroundings- Explain treatment plan- Encourage participation in care- Encourage verbalization of concerns/fears- Identify coping mechanisms- Assist in developing anxiety-reducing skills- Administer/offer  alternative therapies- Limit or eliminate stimulants  Outcome: Progressing     Problem: Risk for Violence/Aggression Toward Others  Goal: Refrain from harming others  Outcome: Progressing  Goal: Refrain from destructive acts on the environment or property  Outcome: Progressing  Goal: Control angry outbursts  Description: Interventions:- Monitor patient closely, per order- Ensure early verbal de-escalation- Monitor prn medication needs- Set reasonable/therapeutic limits, outline behavioral expectations, and consequences - Provide a non-threatening milieu, utilizing the least restrictive interventions   Outcome: Progressing     Problem: SAFETY ADULT  Goal: Patient will remain free of falls  Description: INTERVENTIONS:- Educate patient on patient safety including physical limitations- Instruct patient to call for assistance with activity - Consult OT/PT to assist with strengthening/mobility - Keep Call bell within reach- Keep bed low and locked with side rails adjusted as appropriate- Keep care items and personal belongings within reach- Initiate and maintain comfort rounds- Offer Toileting every 2 Hours, in advance of need- Initiate/Maintain bed/chair alarm- Obtain necessary fall risk management equipment: walker- Apply yellow socks and bracelet for high fall risk patients- Consider moving patient to room near nurses station  Outcome: Progressing  Goal: Maintain or return to baseline ADL function  Description: INTERVENTIONS:-  Assess patient's ability to carry out ADLs; assess patient's baseline for ADL function and identify physical deficits which impact ability to perform ADLs (bathing, care of mouth/teeth, toileting, grooming, dressing, etc.)- Assess/evaluate cause of self-care deficits - Assess range of motion- Assess patient's mobility; develop plan if impaired- Assess patient's need for assistive devices and provide as appropriate- Encourage maximum independence but intervene and supervise when necessary-  Involve family in performance of ADLs- Assess for home care needs following discharge - Consider OT consult to assist with ADL evaluation and planning for discharge- Provide patient education as appropriate  Outcome: Progressing     Problem: DISCHARGE PLANNING  Goal: Discharge to home or other facility with appropriate resources  Description: INTERVENTIONS:- Identify barriers to discharge w/patient and caregiver- Arrange for needed discharge resources and transportation as appropriate- Identify discharge learning needs (meds, wound care, etc.)- Arrange for interpretive services to assist at discharge as needed- Refer to Case Management Department for coordinating discharge planning if the patient needs post-hospital services based on physician/advanced practitioner order or complex needs related to functional status, cognitive ability, or social support system  Outcome: Progressing     Problem: Knowledge Deficit  Goal: Patient/family/caregiver demonstrates understanding of disease process, treatment plan, medications, and discharge instructions  Description: Complete learning assessment and assess knowledge base.Interventions:- Provide teaching at level of understanding- Provide teaching via preferred learning methods  Outcome: Progressing     Problem: DISCHARGE PLANNING - CARE MANAGEMENT  Goal: Discharge to post-acute care or home with appropriate resources  Description: INTERVENTIONS:- Conduct assessment to determine patient/family and health care team treatment goals, and need for post-acute services based on payer coverage, community resources, and patient preferences, and barriers to discharge- Address psychosocial, clinical, and financial barriers to discharge as identified in assessment in conjunction with the patient/family and health care team- Arrange appropriate level of post-acute services according to patient’s   needs and preference and payer coverage in collaboration with the physician and health care  team- Communicate with and update the patient/family, physician, and health care team regarding progress on the discharge plan- Arrange appropriate transportation to post-acute venues  Outcome: Progressing     Problem: Prexisting or High Potential for Compromised Skin Integrity  Goal: Skin integrity is maintained or improved  Description: INTERVENTIONS:  - Identify patients at risk for skin breakdown  - Assess and monitor skin integrity including under and around medical devices   - Assess and monitor nutrition and hydration status  - Monitor labs  - Assess for incontinence   - Turn and reposition patient  - Assist with mobility/ambulation  - Relieve pressure over duncan prominences   - Avoid friction and shearing  - Provide appropriate hygiene as needed including keeping skin clean and dry  - Evaluate need for skin moisturizer/barrier cream  - Collaborate with interdisciplinary team  - Patient/family teaching  - Consider wound care consult    Assess:  - Review Misael scale daily  - Clean and moisturize skin as needed  - Inspect skin when repositioning, toileting, and assisting with ADLS  - Assess extremities for adequate circulation and sensation     Bed Management:  - Have minimal linens on bed & keep smooth, unwrinkled  - Change linens as needed when moist or perspiring  - Avoid sitting or lying in one position for more than 2 hours while in bed?Keep HOB at 35 degrees   - Toileting:  - Assess for incontinence every 2 hours      Activity:  - Encourage activity and walks on unit  - Encourage or provide ROM exercises   - Instruct/ Assist with weight shifting every 2 hours when out of bed in chair  - Consider limitation of chair time 2 hour intervals    Skin Care:  - Avoid use of baby powder, tape, friction and shearing, hot water or constrictive clothing  - Relieve pressure over bony prominences using pillows  - Do not massage red bony areas  Outcome: Progressing

## 2025-05-19 LAB
ANION GAP SERPL CALCULATED.3IONS-SCNC: 6 MMOL/L (ref 4–13)
BUN SERPL-MCNC: 14 MG/DL (ref 5–25)
C COLI+JEJUNI TUF STL QL NAA+PROBE: NEGATIVE
CALCIUM SERPL-MCNC: 8.6 MG/DL (ref 8.4–10.2)
CHLORIDE SERPL-SCNC: 105 MMOL/L (ref 96–108)
CO2 SERPL-SCNC: 28 MMOL/L (ref 21–32)
CREAT SERPL-MCNC: 0.6 MG/DL (ref 0.6–1.3)
EC STX1+STX2 GENES STL QL NAA+PROBE: NEGATIVE
GFR SERPL CREATININE-BSD FRML MDRD: 91 ML/MIN/1.73SQ M
GLUCOSE P FAST SERPL-MCNC: 87 MG/DL (ref 65–99)
GLUCOSE SERPL-MCNC: 87 MG/DL (ref 65–140)
MAGNESIUM SERPL-MCNC: 1.8 MG/DL (ref 1.9–2.7)
POTASSIUM SERPL-SCNC: 4.4 MMOL/L (ref 3.5–5.3)
SALMONELLA SP SPAO STL QL NAA+PROBE: NEGATIVE
SHIGELLA SP+EIEC IPAH STL QL NAA+PROBE: NEGATIVE
SODIUM SERPL-SCNC: 139 MMOL/L (ref 135–147)

## 2025-05-19 PROCEDURE — 80048 BASIC METABOLIC PNL TOTAL CA: CPT | Performed by: NURSE PRACTITIONER

## 2025-05-19 PROCEDURE — 83735 ASSAY OF MAGNESIUM: CPT | Performed by: NURSE PRACTITIONER

## 2025-05-19 RX ORDER — OLANZAPINE 5 MG/1
5 TABLET, FILM COATED ORAL
Status: DISCONTINUED | OUTPATIENT
Start: 2025-05-19 | End: 2025-05-22

## 2025-05-19 RX ORDER — RISPERIDONE 2 MG/1
2 TABLET ORAL
Status: DISCONTINUED | OUTPATIENT
Start: 2025-05-19 | End: 2025-05-22

## 2025-05-19 RX ORDER — LANOLIN ALCOHOL/MO/W.PET/CERES
400 CREAM (GRAM) TOPICAL 2 TIMES DAILY
Status: DISCONTINUED | OUTPATIENT
Start: 2025-05-19 | End: 2025-06-01

## 2025-05-19 RX ORDER — OLANZAPINE 10 MG/1
10 TABLET, FILM COATED ORAL DAILY
Status: DISCONTINUED | OUTPATIENT
Start: 2025-05-20 | End: 2025-05-22

## 2025-05-19 RX ADMIN — MAGNESIUM OXIDE TAB 400 MG (241.3 MG ELEMENTAL MG) 400 MG: 400 (241.3 MG) TAB at 17:04

## 2025-05-19 RX ADMIN — CLONAZEPAM 1 MG: 1 TABLET ORAL at 17:04

## 2025-05-19 RX ADMIN — CLONAZEPAM 1 MG: 1 TABLET ORAL at 09:17

## 2025-05-19 RX ADMIN — DIVALPROEX SODIUM 1500 MG: 500 TABLET, EXTENDED RELEASE ORAL at 21:10

## 2025-05-19 RX ADMIN — NYSTATIN: 100000 POWDER TOPICAL at 09:20

## 2025-05-19 RX ADMIN — FOLIC ACID 1 MG: 1 TABLET ORAL at 09:17

## 2025-05-19 RX ADMIN — TRIHEXYPHENIDYL HYDROCHLORIDE 2 MG: 2 TABLET ORAL at 09:18

## 2025-05-19 RX ADMIN — Medication 1000 UNITS: at 09:17

## 2025-05-19 RX ADMIN — Medication 1000 MCG: at 09:18

## 2025-05-19 RX ADMIN — PHENOBARBITAL 64.8 MG: 64.8 TABLET ORAL at 23:43

## 2025-05-19 RX ADMIN — RISPERIDONE 2 MG: 2 TABLET, FILM COATED ORAL at 21:10

## 2025-05-19 RX ADMIN — Medication 6 MG: at 21:10

## 2025-05-19 RX ADMIN — FLUTICASONE PROPIONATE 2 SPRAY: 50 SPRAY, METERED NASAL at 09:20

## 2025-05-19 RX ADMIN — PHENOBARBITAL 64.8 MG: 64.8 TABLET ORAL at 12:00

## 2025-05-19 RX ADMIN — LEVOTHYROXINE SODIUM 100 MCG: 0.1 TABLET ORAL at 05:36

## 2025-05-19 RX ADMIN — NYSTATIN: 100000 POWDER TOPICAL at 17:04

## 2025-05-19 RX ADMIN — GABAPENTIN 300 MG: 300 CAPSULE ORAL at 09:00

## 2025-05-19 RX ADMIN — OLANZAPINE 5 MG: 5 TABLET, FILM COATED ORAL at 21:10

## 2025-05-19 RX ADMIN — OLANZAPINE 10 MG: 10 TABLET, FILM COATED ORAL at 09:17

## 2025-05-19 RX ADMIN — GABAPENTIN 300 MG: 300 CAPSULE ORAL at 13:49

## 2025-05-19 RX ADMIN — TRAZODONE HYDROCHLORIDE 150 MG: 100 TABLET ORAL at 21:10

## 2025-05-19 RX ADMIN — MAGNESIUM OXIDE TAB 400 MG (241.3 MG ELEMENTAL MG) 400 MG: 400 (241.3 MG) TAB at 09:21

## 2025-05-19 RX ADMIN — ASPIRIN 81 MG CHEWABLE TABLET 81 MG: 81 TABLET CHEWABLE at 09:23

## 2025-05-19 RX ADMIN — TRIHEXYPHENIDYL HYDROCHLORIDE 2 MG: 2 TABLET ORAL at 21:11
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
## 2025-05-19 NOTE — NURSING NOTE
Patient is awake and alert. She is visible in the milieu with no peer interactions. Patient is loud on approach. She is medication and meal compliant. She has poor insight and concentration. Patient was tearful at start of shift, she states we had a party last night and she was not invited. Patient denies all psych s/s. Will cont to monitor. Safety checks ongoing.

## 2025-05-19 NOTE — PLAN OF CARE
Problem: Alteration in Thoughts and Perception  Goal: Treatment Goal: Gain control of psychotic behaviors/thinking, reduce/eliminate presenting symptoms and demonstrate improved reality functioning upon discharge  Outcome: Progressing  Goal: Refrain from acting on delusional thinking/internal stimuli  Description: Interventions:- Monitor patient closely, per order - Utilize least restrictive measures - Set reasonable limits, give positive feedback for acceptable - Administer medications as ordered and monitor of potential side effects  Outcome: Progressing  Goal: Agree to be compliant with medication regime, as prescribed and report medication side effects  Description: Interventions:- Offer appropriate PRN medication and supervise ingestion; conduct AIMS, as needed   Outcome: Progressing  Goal: Recognize dysfunctional thoughts, communicate reality-based thoughts at the time of discharge  Description: Interventions:- Provide medication and psycho-education to assist patient in compliance and developing insight into his/her illness   Outcome: Progressing     Problem: Ineffective Coping  Goal: Cooperates with admission process  Description: Interventions: - Complete admission process  Outcome: Progressing  Goal: Identifies ineffective coping skills  Outcome: Progressing  Goal: Identifies healthy coping skills  Outcome: Progressing  Goal: Demonstrates healthy coping skills  Outcome: Progressing  Goal: Understands least restrictive measures  Description: Interventions:- Utilize least restrictive behavior  Outcome: Progressing  Goal: Free from restraint events  Description: - Utilize least restrictive measures - Provide behavioral interventions - Redirect inappropriate behaviors   Outcome: Progressing     Problem: Risk for Self Injury/Neglect  Goal: Verbalize thoughts and feelings  Description: Interventions:- Assess and re-assess patient's lethality and potential for self-injury- Engage patient in 1:1 interactions,  daily, for a minimum of 15 minutes- Encourage patient to express feelings, fears, frustrations, hopes- Establish rapport/trust with patient   Outcome: Progressing  Goal: Refrain from harming self  Description: Interventions:- Monitor patient closely, per order- Develop a trusting relationship- Supervise medication ingestion, monitor effects and side effects   Outcome: Progressing  Goal: Attend and participate in unit activities, including therapeutic, recreational, and educational groups  Description: Interventions:- Provide therapeutic and educational activities daily, encourage attendance and participation, and document same in the medical record- Obtain collateral information, encourage visitation and family involvement in care   Outcome: Progressing  Goal: Recognize maladaptive responses and adopt new coping mechanisms  Outcome: Progressing  Goal: Complete daily ADLs, including personal hygiene independently, as able  Description: Interventions:- Observe, teach, and assist patient with ADLS- Monitor and promote a balance of rest/activity, with adequate nutrition and elimination  Outcome: Progressing     Problem: Depression  Goal: Treatment Goal: Demonstrate behavioral control of depressive symptoms, verbalize feelings of improved mood/affect, and adopt new coping skills prior to discharge  Outcome: Progressing     Problem: Anxiety  Goal: Anxiety is at manageable level  Description: Interventions:- Assess and monitor patient's anxiety level. - Monitor for signs and symptoms (heart palpitations, chest pain, shortness of breath, headaches, nausea, feeling jumpy, restlessness, irritable, apprehensive). - Collaborate with interdisciplinary team and initiate plan and interventions as ordered.- Hood patient to unit/surroundings- Explain treatment plan- Encourage participation in care- Encourage verbalization of concerns/fears- Identify coping mechanisms- Assist in developing anxiety-reducing skills- Administer/offer  alternative therapies- Limit or eliminate stimulants  Outcome: Progressing     Problem: Risk for Violence/Aggression Toward Others  Goal: Refrain from harming others  Outcome: Progressing  Goal: Refrain from destructive acts on the environment or property  Outcome: Progressing  Goal: Control angry outbursts  Description: Interventions:- Monitor patient closely, per order- Ensure early verbal de-escalation- Monitor prn medication needs- Set reasonable/therapeutic limits, outline behavioral expectations, and consequences - Provide a non-threatening milieu, utilizing the least restrictive interventions   Outcome: Progressing     Problem: SAFETY ADULT  Goal: Patient will remain free of falls  Description: INTERVENTIONS:- Educate patient on patient safety including physical limitations- Instruct patient to call for assistance with activity - Consult OT/PT to assist with strengthening/mobility - Keep Call bell within reach- Keep bed low and locked with side rails adjusted as appropriate- Keep care items and personal belongings within reach- Initiate and maintain comfort rounds- Offer Toileting every 2 Hours, in advance of need- Initiate/Maintain bed/chair alarm- Obtain necessary fall risk management equipment: walker- Apply yellow socks and bracelet for high fall risk patients- Consider moving patient to room near nurses station  Outcome: Progressing  Goal: Maintain or return to baseline ADL function  Description: INTERVENTIONS:-  Assess patient's ability to carry out ADLs; assess patient's baseline for ADL function and identify physical deficits which impact ability to perform ADLs (bathing, care of mouth/teeth, toileting, grooming, dressing, etc.)- Assess/evaluate cause of self-care deficits - Assess range of motion- Assess patient's mobility; develop plan if impaired- Assess patient's need for assistive devices and provide as appropriate- Encourage maximum independence but intervene and supervise when necessary-  Involve family in performance of ADLs- Assess for home care needs following discharge - Consider OT consult to assist with ADL evaluation and planning for discharge- Provide patient education as appropriate  Outcome: Progressing     Problem: DISCHARGE PLANNING  Goal: Discharge to home or other facility with appropriate resources  Description: INTERVENTIONS:- Identify barriers to discharge w/patient and caregiver- Arrange for needed discharge resources and transportation as appropriate- Identify discharge learning needs (meds, wound care, etc.)- Arrange for interpretive services to assist at discharge as needed- Refer to Case Management Department for coordinating discharge planning if the patient needs post-hospital services based on physician/advanced practitioner order or complex needs related to functional status, cognitive ability, or social support system  Outcome: Progressing     Problem: Knowledge Deficit  Goal: Patient/family/caregiver demonstrates understanding of disease process, treatment plan, medications, and discharge instructions  Description: Complete learning assessment and assess knowledge base.Interventions:- Provide teaching at level of understanding- Provide teaching via preferred learning methods  Outcome: Progressing     Problem: DISCHARGE PLANNING - CARE MANAGEMENT  Goal: Discharge to post-acute care or home with appropriate resources  Description: INTERVENTIONS:- Conduct assessment to determine patient/family and health care team treatment goals, and need for post-acute services based on payer coverage, community resources, and patient preferences, and barriers to discharge- Address psychosocial, clinical, and financial barriers to discharge as identified in assessment in conjunction with the patient/family and health care team- Arrange appropriate level of post-acute services according to patient’s   needs and preference and payer coverage in collaboration with the physician and health care  team- Communicate with and update the patient/family, physician, and health care team regarding progress on the discharge plan- Arrange appropriate transportation to post-acute venues  Outcome: Progressing     Problem: Prexisting or High Potential for Compromised Skin Integrity  Goal: Skin integrity is maintained or improved  Description: INTERVENTIONS:  - Identify patients at risk for skin breakdown  - Assess and monitor skin integrity including under and around medical devices   - Assess and monitor nutrition and hydration status  - Monitor labs  - Assess for incontinence   - Turn and reposition patient  - Assist with mobility/ambulation  - Relieve pressure over duncan prominences   - Avoid friction and shearing  - Provide appropriate hygiene as needed including keeping skin clean and dry  - Evaluate need for skin moisturizer/barrier cream  - Collaborate with interdisciplinary team  - Patient/family teaching  - Consider wound care consult    Assess:  - Review Misael scale daily  - Clean and moisturize skin every as needed  - Inspect skin when repositioning, toileting, and assisting with ADLS  - Assess extremities for adequate circulation and sensation     Bed Management:  - Have minimal linens on bed & keep smooth, unwrinkled  - Change linens as needed when moist or perspiring  - Avoid sitting or lying in one position for more than 2 hours while in bed?Keep HOB at 35 degrees   - Toileting:  - Offer bedside commode  - Assess for incontinence every 2 hours      Outcome: Progressing

## 2025-05-19 NOTE — CASE MANAGEMENT
CM reached out to patient  from [facility] ([facility]) to schedule a visit with patient.   She will be here in about an hour to visit with patient.     [facility] - [facility]   [phone]

## 2025-05-19 NOTE — PROGRESS NOTES
"   05/19/25 1015   Activity/Group Checklist   Group Other (Comment)  (Jane)   Attendance Attended   Attendance Duration (min) 16-30  (late)   Interactions Disorganized interaction  (Pt attended group, late, states \"I need to do something good.\" Pt joined group activity, shared appropriate advice, then became tearful stating \"I am an orphan.\")   Affect/Mood Wide   Goals Achieved Identified feelings;Able to listen to others;Able to engage in interactions;Able to self-disclose;Able to recieve feedback;Able to give feedback to another       "

## 2025-05-19 NOTE — NURSING NOTE
Pt is awake, visible, continues to be labile and impulsive, able to follow commands. Endorses depression, denies all other psych s/s. Safety checks continued.

## 2025-05-19 NOTE — NURSING NOTE
Patient visible in the milieu with 1:1 staff. Loud and disorganized at times, pleasant during assessment. No episode of loose stools at this times. Denies all psych s/s. No negative behavior noted. No PRN needed. Medication compliant. Safety and fall precautions maintained.

## 2025-05-19 NOTE — PROGRESS NOTES
"   05/19/25 0800   Team Meeting   Meeting Type Daily Rounds   Team Members Present   Team Members Present Physician;Nurse;   Physician Team Member Alonso/Gaye/Emi   Nursing Team Member Joan/Awais/Shantel   Care Management Team Member Yonny   OT Team Member Irasema   Patient/Family Present   Patient Present No   Patient's Family Present No     Patient is disorganized and impulsive. She is having loud out burst and screaming that she was \"not invited to the party.\" She had some loose stools over the weekend. She was also incontinent of stool last night. Patient is back on a one to one but a posey belt is a possibility. Her Depakote level was 32. Patient discharge is pending stabilization of mood and medications.  "

## 2025-05-19 NOTE — CASE MANAGEMENT
CM completed new referral for aging and emailed the requested paperwork as requested by aging after patient was denied.

## 2025-05-19 NOTE — ASSESSMENT & PLAN NOTE
Continue Depakote ER 1500 mg nightly for mood stability and seizure disorder  VPA level on 5/17/25 was still subtherapeutic at 32  Continue Phenobarbital 64.8 mg every 12 hours  Continue Klonopin 1 mg every 12 hours
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
## 2025-05-20 PROCEDURE — 99232 SBSQ HOSP IP/OBS MODERATE 35: CPT | Performed by: PSYCHIATRY & NEUROLOGY

## 2025-05-20 RX ADMIN — MAGNESIUM OXIDE TAB 400 MG (241.3 MG ELEMENTAL MG) 400 MG: 400 (241.3 MG) TAB at 08:33

## 2025-05-20 RX ADMIN — TRIHEXYPHENIDYL HYDROCHLORIDE 2 MG: 2 TABLET ORAL at 08:33

## 2025-05-20 RX ADMIN — TRIHEXYPHENIDYL HYDROCHLORIDE 2 MG: 2 TABLET ORAL at 21:04

## 2025-05-20 RX ADMIN — PHENOBARBITAL 64.8 MG: 64.8 TABLET ORAL at 11:16

## 2025-05-20 RX ADMIN — OLANZAPINE 10 MG: 10 TABLET, FILM COATED ORAL at 08:33

## 2025-05-20 RX ADMIN — Medication 6 MG: at 21:04

## 2025-05-20 RX ADMIN — Medication 1000 UNITS: at 08:33

## 2025-05-20 RX ADMIN — LEVOTHYROXINE SODIUM 100 MCG: 0.1 TABLET ORAL at 05:38

## 2025-05-20 RX ADMIN — Medication 1000 MCG: at 08:33

## 2025-05-20 RX ADMIN — NYSTATIN: 100000 POWDER TOPICAL at 08:36

## 2025-05-20 RX ADMIN — GABAPENTIN 300 MG: 300 CAPSULE ORAL at 08:33

## 2025-05-20 RX ADMIN — ASPIRIN 81 MG CHEWABLE TABLET 81 MG: 81 TABLET CHEWABLE at 08:33

## 2025-05-20 RX ADMIN — PHENOBARBITAL 64.8 MG: 64.8 TABLET ORAL at 22:14

## 2025-05-20 RX ADMIN — CLONAZEPAM 1 MG: 1 TABLET ORAL at 17:14

## 2025-05-20 RX ADMIN — GABAPENTIN 300 MG: 300 CAPSULE ORAL at 14:16

## 2025-05-20 RX ADMIN — RISPERIDONE 2 MG: 2 TABLET, FILM COATED ORAL at 21:04

## 2025-05-20 RX ADMIN — FLUTICASONE PROPIONATE 2 SPRAY: 50 SPRAY, METERED NASAL at 08:37

## 2025-05-20 RX ADMIN — CLONAZEPAM 1 MG: 1 TABLET ORAL at 08:33

## 2025-05-20 RX ADMIN — NYSTATIN 1 APPLICATION: 100000 POWDER TOPICAL at 17:15

## 2025-05-20 RX ADMIN — FOLIC ACID 1 MG: 1 TABLET ORAL at 08:33

## 2025-05-20 RX ADMIN — MAGNESIUM OXIDE TAB 400 MG (241.3 MG ELEMENTAL MG) 400 MG: 400 (241.3 MG) TAB at 17:14

## 2025-05-20 RX ADMIN — TRAZODONE HYDROCHLORIDE 150 MG: 100 TABLET ORAL at 21:03

## 2025-05-20 RX ADMIN — DIVALPROEX SODIUM 1500 MG: 500 TABLET, EXTENDED RELEASE ORAL at 21:04

## 2025-05-20 RX ADMIN — OLANZAPINE 5 MG: 5 TABLET, FILM COATED ORAL at 21:04

## 2025-05-20 RX ADMIN — TRAZODONE HYDROCHLORIDE 50 MG: 50 TABLET ORAL at 00:00

## 2025-05-20 NOTE — PROGRESS NOTES
05/20/25 1330   Activity/Group Checklist   Group Pet therapy   Attendance Attended   Attendance Duration (min) 16-30  (Pt came to group late, was sleeping)   Interactions Disorganized interaction  (Pt told volunteer her dogs name is ugly, told peers the dog is sitting in front of her to protect her from them. Otherwise appropriate)   Affect/Mood Wide   Goals Achieved Able to engage in interactions
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
## 2025-05-20 NOTE — CASE MANAGEMENT
CM received follow up from the department of aging and patient is now approved for SNF level of services. CM to complete Aidin search for SNF level of services.

## 2025-05-20 NOTE — WOUND OSTOMY CARE
Progress Note - Wound   Laine Tse 69 y.o. female MRN: [id number]  Unit/Bed#: [facility] 602-01 Encounter: [id number]        Assessment:   Patient is seen for wound care follow-up.     Findings:  Facial laceration resolved wound care will sign off       WOUNDS:  Wound 05/07/25 Face Left (Active)   Wound Image   05/20/25 1109   Wound Description     Wound Length (cm) 0 cm 05/20/25 1109   Wound Width (cm) 0 cm 05/20/25 1109   Wound Depth (cm) 0 cm 05/20/25 1109   Wound Surface Area (cm^2) 0 cm^2 05/20/25 1109   Wound Volume (cm^3) 0 cm^3 05/20/25 1109   Calculated Wound Volume (cm^3) 0 cm^3 05/20/25 1109   Dressing Open to air 05/20/25 1109   Dressing Status Removed 05/20/25 1109      Wound Care will sign off  Joan ESCOBEDON RN [facility]ON

## 2025-05-20 NOTE — PROGRESS NOTES
05/20/25 1015   Activity/Group Checklist   Group Other (Comment)  (Trivia)   Attendance Attended   Attendance Duration (min) 31-45   Interactions Interacted appropriately  (at first loud and disruptive, pt was redirectable and appropriate for remainder of group)   Affect/Mood Calm  (sleeping at times)   Goals Achieved Able to listen to others;Able to engage in interactions;Able to recieve feedback

## 2025-05-20 NOTE — PROGRESS NOTES
05/20/25 0800   Team Meeting   Meeting Type Daily Rounds   Team Members Present   Team Members Present Physician;Nurse;   Physician Team Member Alonso/Gaye/Emi   Nursing Team Member Joan/Awais/Shantel   Care Management Team Member Yonny   OT Team Member Irasema   Patient/Family Present   Patient Present No   Patient's Family Present No     Patient is off of the one to one. She did not eat breakfast but ate 75% of lunch and dinner. She is disorganized, loud, incontinent, and has poor sleep. Risperdal was increased today and Zyprexa is being decreased.  Patient discharge is pending stabilization of mood and medications.

## 2025-05-20 NOTE — TREATMENT TEAM
"Pt initially sleepy during group.  Pt disruptive and needed BHT to assist and then was trying to throw drink.  Pt arguing with another peer and also restless with walker moving around room.  Pt at end attention seeking stating \"you don't caret about me\" when grou was over.  Pt did not engage in group discussion.       05/20/25 1100   Activity/Group Checklist   Group Other (Comment)  ([facility]: self care do's and don't)   Attendance Attended   Attendance Duration (min) 46-60   Interactions Disorganized interaction   Affect/Mood Wide   Goals Achieved Identified feelings;Identified triggers;Identified relapse prevention strategies;Discussed coping strategies;Able to listen to others       "

## 2025-05-20 NOTE — PLAN OF CARE
Problem: Alteration in Thoughts and Perception  Goal: Agree to be compliant with medication regime, as prescribed and report medication side effects  Description: Interventions:- Offer appropriate PRN medication and supervise ingestion; conduct AIMS, as needed   Outcome: Progressing     Problem: Risk for Self Injury/Neglect  Goal: Verbalize thoughts and feelings  Description: Interventions:- Assess and re-assess patient's lethality and potential for self-injury- Engage patient in 1:1 interactions, daily, for a minimum of 15 minutes- Encourage patient to express feelings, fears, frustrations, hopes- Establish rapport/trust with patient   Outcome: Progressing  Goal: Refrain from harming self  Description: Interventions:- Monitor patient closely, per order- Develop a trusting relationship- Supervise medication ingestion, monitor effects and side effects   Outcome: Progressing     Problem: Depression  Goal: Treatment Goal: Demonstrate behavioral control of depressive symptoms, verbalize feelings of improved mood/affect, and adopt new coping skills prior to discharge  Outcome: Progressing     Problem: Anxiety  Goal: Anxiety is at manageable level  Description: Interventions:- Assess and monitor patient's anxiety level. - Monitor for signs and symptoms (heart palpitations, chest pain, shortness of breath, headaches, nausea, feeling jumpy, restlessness, irritable, apprehensive). - Collaborate with interdisciplinary team and initiate plan and interventions as ordered.- [place] patient to unit/surroundings- Explain treatment plan- Encourage participation in care- Encourage verbalization of concerns/fears- Identify coping mechanisms- Assist in developing anxiety-reducing skills- Administer/offer alternative therapies- Limit or eliminate stimulants  Outcome: Progressing     Problem: SAFETY ADULT  Goal: Patient will remain free of falls  Description: INTERVENTIONS:- Educate patient on patient safety including physical  limitations- Instruct patient to call for assistance with activity - Consult OT/PT to assist with strengthening/mobility - Keep Call bell within reach- Keep bed low and locked with side rails adjusted as appropriate- Keep care items and personal belongings within reach- Initiate and maintain comfort rounds- Offer Toileting every 2 Hours, in advance of need- Initiate/Maintain bed/chair alarm- Obtain necessary fall risk management equipment: walker- Apply yellow socks and bracelet for high fall risk patients- Consider moving patient to room near nurses station  Outcome: Progressing     Problem: Alteration in Thoughts and Perception  Goal: Treatment Goal: Gain control of psychotic behaviors/thinking, reduce/eliminate presenting symptoms and demonstrate improved reality functioning upon discharge  Outcome: Not Progressing  Goal: Refrain from acting on delusional thinking/internal stimuli  Description: Interventions:- Monitor patient closely, per order - Utilize least restrictive measures - Set reasonable limits, give positive feedback for acceptable - Administer medications as ordered and monitor of potential side effects  Outcome: Not Progressing  Goal: Recognize dysfunctional thoughts, communicate reality-based thoughts at the time of discharge  Description: Interventions:- Provide medication and psycho-education to assist patient in compliance and developing insight into his/her illness   Outcome: Not Progressing     Problem: Ineffective Coping  Goal: Identifies ineffective coping skills  Outcome: Not Progressing  Goal: Identifies healthy coping skills  Outcome: Not Progressing

## 2025-05-20 NOTE — NURSING NOTE
Patient was visible in the milieu with minimal peer interaction. Endorses depression, denies all other psych s/s. No behaviors noted but remained disorganized and loud. Patient stated the Puente told her not to take a particular medication and God told her if she takes the medication she will poop in her pants. Patient later on took the medication and apologized to staff. No c/o pain. No needs expressed. Had 100% for dinner. Took her medications. Safety checks ongoing.

## 2025-05-20 NOTE — NURSING NOTE
Patient is irritable at times, needs constant verbal redirection. Patient took her meds this AM with encouragement, stated the [place]ente discouraged her from taking the meds. No behaviors observed, calm at the moment. Safety checks ongoing.
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
## 2025-05-20 NOTE — CASE MANAGEMENT
CM completed email to [facility] with patients last five days of notes and update medication list at their request. CM was updated that additional referral was again denied from yesterday stating that there was not enough to get her SNF approved.

## 2025-05-21 ENCOUNTER — APPOINTMENT (INPATIENT)
Dept: RADIOLOGY | Facility: HOSPITAL | Age: 70
DRG: 885 | End: 2025-05-21
Payer: MEDICARE

## 2025-05-21 PROCEDURE — 71045 X-RAY EXAM CHEST 1 VIEW: CPT

## 2025-05-21 RX ORDER — GUAIFENESIN 600 MG/1
600 TABLET, EXTENDED RELEASE ORAL EVERY 12 HOURS SCHEDULED
Status: DISCONTINUED | OUTPATIENT
Start: 2025-05-21 | End: 2025-05-23

## 2025-05-21 RX ORDER — DIVALPROEX SODIUM 500 MG/1
2000 TABLET, FILM COATED, EXTENDED RELEASE ORAL
Status: DISCONTINUED | OUTPATIENT
Start: 2025-05-21 | End: 2025-06-02 | Stop reason: HOSPADM

## 2025-05-21 RX ADMIN — MAGNESIUM OXIDE TAB 400 MG (241.3 MG ELEMENTAL MG) 400 MG: 400 (241.3 MG) TAB at 17:44

## 2025-05-21 RX ADMIN — CLONAZEPAM 1 MG: 1 TABLET ORAL at 17:44

## 2025-05-21 RX ADMIN — GUAIFENESIN 600 MG: 600 TABLET, EXTENDED RELEASE ORAL at 11:08

## 2025-05-21 RX ADMIN — NYSTATIN: 100000 POWDER TOPICAL at 08:56

## 2025-05-21 RX ADMIN — GABAPENTIN 300 MG: 300 CAPSULE ORAL at 14:24

## 2025-05-21 RX ADMIN — TRIHEXYPHENIDYL HYDROCHLORIDE 2 MG: 2 TABLET ORAL at 21:05

## 2025-05-21 RX ADMIN — FLUTICASONE PROPIONATE 2 SPRAY: 50 SPRAY, METERED NASAL at 08:56

## 2025-05-21 RX ADMIN — GUAIFENESIN AND DEXTROMETHORPHAN 10 ML: 20; 200 SYRUP ORAL at 11:07

## 2025-05-21 RX ADMIN — GUAIFENESIN AND DEXTROMETHORPHAN 10 ML: 20; 200 SYRUP ORAL at 22:09

## 2025-05-21 RX ADMIN — MAGNESIUM OXIDE TAB 400 MG (241.3 MG ELEMENTAL MG) 400 MG: 400 (241.3 MG) TAB at 08:50

## 2025-05-21 RX ADMIN — GUAIFENESIN 600 MG: 600 TABLET, EXTENDED RELEASE ORAL at 21:07

## 2025-05-21 RX ADMIN — DIVALPROEX SODIUM 2000 MG: 500 TABLET, EXTENDED RELEASE ORAL at 21:05

## 2025-05-21 RX ADMIN — NYSTATIN: 100000 POWDER TOPICAL at 17:44

## 2025-05-21 RX ADMIN — CLONAZEPAM 1 MG: 1 TABLET ORAL at 08:51

## 2025-05-21 RX ADMIN — TRIHEXYPHENIDYL HYDROCHLORIDE 2 MG: 2 TABLET ORAL at 08:51

## 2025-05-21 RX ADMIN — FOLIC ACID 1 MG: 1 TABLET ORAL at 08:51

## 2025-05-21 RX ADMIN — GABAPENTIN 300 MG: 300 CAPSULE ORAL at 08:51

## 2025-05-21 RX ADMIN — Medication 1000 MCG: at 08:50

## 2025-05-21 RX ADMIN — PHENOBARBITAL 64.8 MG: 64.8 TABLET ORAL at 11:07

## 2025-05-21 RX ADMIN — ASPIRIN 81 MG CHEWABLE TABLET 81 MG: 81 TABLET CHEWABLE at 08:50

## 2025-05-21 RX ADMIN — Medication 6 MG: at 21:05

## 2025-05-21 RX ADMIN — Medication 1000 UNITS: at 08:51

## 2025-05-21 RX ADMIN — RISPERIDONE 2 MG: 2 TABLET, FILM COATED ORAL at 21:05

## 2025-05-21 RX ADMIN — PHENOBARBITAL 64.8 MG: 64.8 TABLET ORAL at 22:09

## 2025-05-21 RX ADMIN — OLANZAPINE 10 MG: 10 TABLET, FILM COATED ORAL at 08:51

## 2025-05-21 RX ADMIN — TRAZODONE HYDROCHLORIDE 150 MG: 100 TABLET ORAL at 21:05

## 2025-05-21 RX ADMIN — LEVOTHYROXINE SODIUM 100 MCG: 0.1 TABLET ORAL at 05:43

## 2025-05-21 RX ADMIN — OLANZAPINE 5 MG: 5 TABLET, FILM COATED ORAL at 21:05

## 2025-05-21 NOTE — ASSESSMENT & PLAN NOTE
Increase Depakote ER to 2000 mg nightly for mood stability and seizure disorder  VPA level on 5/17/25 was still subtherapeutic at 32  Reorder VPA level and CMP for 5/24 in the evening  Continue Phenobarbital 64.8 mg every 12 hours  Continue Klonopin 1 mg every 12 hours

## 2025-05-21 NOTE — PROGRESS NOTES
"   05/21/25 0800   Team Meeting   Meeting Type Daily Rounds   Team Members Present   Team Members Present Physician;Nurse;   Physician Team Member Alonso/Gaye/Emi   Nursing Team Member Joan/Awais/Shantel   Care Management Team Member Yonny   OT Team Member Irasema   Patient/Family Present   Patient Present No   Patient's Family Present No     Patient is irritable and loud. She is religiously preoccupied and states that the \"wong told her not to take her medications because they make her s**t her pants.\" Patient was switched to Risperdal and it is being raised. Depakote level continues to be low. She continues to be cooperative with her mouth checks. Depakote is to be increased. She does appear to be brighter and said that she will \"have a positive day.\" She is eating and sleeping well. Patient discharge is pending stabilization of mood and medications.   "
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
## 2025-05-21 NOTE — NURSING NOTE
Patient needs constant reminder to use her walker. Patient is visible in the milieu, remains disorganized, and religiously preoccupied. Patient is medication compliant, and cooperative with shower. Will continue to monitor and maintain safety.

## 2025-05-21 NOTE — PLAN OF CARE
Problem: Alteration in Thoughts and Perception  Goal: Refrain from acting on delusional thinking/internal stimuli  Description: Interventions:- Monitor patient closely, per order - Utilize least restrictive measures - Set reasonable limits, give positive feedback for acceptable - Administer medications as ordered and monitor of potential side effects  Outcome: Progressing     Problem: Ineffective Coping  Goal: Identifies ineffective coping skills  Outcome: Progressing  Goal: Identifies healthy coping skills  Outcome: Progressing  Goal: Understands least restrictive measures  Description: Interventions:- Utilize least restrictive behavior  Outcome: Progressing  Goal: Free from restraint events  Description: - Utilize least restrictive measures - Provide behavioral interventions - Redirect inappropriate behaviors   Outcome: Progressing     Problem: Risk for Self Injury/Neglect  Goal: Verbalize thoughts and feelings  Description: Interventions:- Assess and re-assess patient's lethality and potential for self-injury- Engage patient in 1:1 interactions, daily, for a minimum of 15 minutes- Encourage patient to express feelings, fears, frustrations, hopes- Establish rapport/trust with patient   Outcome: Progressing  Goal: Refrain from harming self  Description: Interventions:- Monitor patient closely, per order- Develop a trusting relationship- Supervise medication ingestion, monitor effects and side effects   Outcome: Progressing     Problem: Anxiety  Goal: Anxiety is at manageable level  Description: Interventions:- Assess and monitor patient's anxiety level. - Monitor for signs and symptoms (heart palpitations, chest pain, shortness of breath, headaches, nausea, feeling jumpy, restlessness, irritable, apprehensive). - Collaborate with interdisciplinary team and initiate plan and interventions as ordered.- [place] patient to unit/surroundings- Explain treatment plan- Encourage participation in care- Encourage  verbalization of concerns/fears- Identify coping mechanisms- Assist in developing anxiety-reducing skills- Administer/offer alternative therapies- Limit or eliminate stimulants  Outcome: Progressing     Problem: Risk for Violence/Aggression Toward Others  Goal: Control angry outbursts  Description: Interventions:- Monitor patient closely, per order- Ensure early verbal de-escalation- Monitor prn medication needs- Set reasonable/therapeutic limits, outline behavioral expectations, and consequences - Provide a non-threatening milieu, utilizing the least restrictive interventions   Outcome: Progressing     Problem: SAFETY ADULT  Goal: Patient will remain free of falls  Description: INTERVENTIONS:- Educate patient on patient safety including physical limitations- Instruct patient to call for assistance with activity - Consult OT/PT to assist with strengthening/mobility - Keep Call bell within reach- Keep bed low and locked with side rails adjusted as appropriate- Keep care items and personal belongings within reach- Initiate and maintain comfort rounds- Offer Toileting every 2 Hours, in advance of need- Initiate/Maintain bed/chair alarm- Obtain necessary fall risk management equipment: walker- Apply yellow socks and bracelet for high fall risk patients- Consider moving patient to room near nurses station  Outcome: Progressing  Goal: Maintain or return to baseline ADL function  Description: INTERVENTIONS:-  Assess patient's ability to carry out ADLs; assess patient's baseline for ADL function and identify physical deficits which impact ability to perform ADLs (bathing, care of mouth/teeth, toileting, grooming, dressing, etc.)- Assess/evaluate cause of self-care deficits - Assess range of motion- Assess patient's mobility; develop plan if impaired- Assess patient's need for assistive devices and provide as appropriate- Encourage maximum independence but intervene and supervise when necessary- Involve family in performance  of ADLs- Assess for home care needs following discharge - Consider OT consult to assist with ADL evaluation and planning for discharge- Provide patient education as appropriate  Outcome: Progressing  Goal: Maintains/Returns to pre admission functional level  Description: INTERVENTIONS:- Perform AM-PAC 6 Click Basic Mobility/ Daily Activity assessment daily.- Set and communicate daily mobility goal to care team and patient/family/caregiver. - Collaborate with rehabilitation services on mobility goals if consulted- Perform Range of Motion 3 times a day.- Reposition patient every 2 hours- Ambulate patient 3 times a day- Out of bed to chair 3 times a day - Out of bed for meals 3 times a day- Out of bed for toileting- Record patient progress and toleration of activity level   Outcome: Progressing

## 2025-05-21 NOTE — TREATMENT TEAM
Pt in and out of group.  Using walker and restless.  Pt defiant and attention seeking.  Pt contradictory stating she does not need a routine and agrees that she does like to have meals at a routine time.  Pt stated that she would like to live with family member although compromise, agreement in habits/behaviors has not occurred.      05/21/25 1100   Activity/Group Checklist   Group Other (Comment)  (Mental Health and routine)   Attendance Attended  (in and out)   Attendance Duration (min) 16-30   Interactions Disorganized interaction   Affect/Mood Wide;Incongruent   Goals Achieved Identified feelings;Identified triggers;Identified relapse prevention strategies;Discussed coping strategies;Discussed self-esteem issues;Able to listen to others;Able to engage in interactions

## 2025-05-22 PROCEDURE — 97116 GAIT TRAINING THERAPY: CPT

## 2025-05-22 RX ORDER — OLANZAPINE 5 MG/1
5 TABLET, FILM COATED ORAL 2 TIMES DAILY
Status: DISCONTINUED | OUTPATIENT
Start: 2025-05-23 | End: 2025-05-23

## 2025-05-22 RX ADMIN — NYSTATIN 1 APPLICATION: 100000 POWDER TOPICAL at 19:28

## 2025-05-22 RX ADMIN — TRIHEXYPHENIDYL HYDROCHLORIDE 2 MG: 2 TABLET ORAL at 21:21

## 2025-05-22 RX ADMIN — MAGNESIUM OXIDE TAB 400 MG (241.3 MG ELEMENTAL MG) 400 MG: 400 (241.3 MG) TAB at 17:30

## 2025-05-22 RX ADMIN — Medication 1000 MCG: at 08:45

## 2025-05-22 RX ADMIN — TRIHEXYPHENIDYL HYDROCHLORIDE 2 MG: 2 TABLET ORAL at 08:45

## 2025-05-22 RX ADMIN — GUAIFENESIN 600 MG: 600 TABLET, EXTENDED RELEASE ORAL at 08:45

## 2025-05-22 RX ADMIN — OLANZAPINE 10 MG: 10 TABLET, FILM COATED ORAL at 08:45

## 2025-05-22 RX ADMIN — FOLIC ACID 1 MG: 1 TABLET ORAL at 08:45

## 2025-05-22 RX ADMIN — CLONAZEPAM 1 MG: 1 TABLET ORAL at 17:30

## 2025-05-22 RX ADMIN — RISPERIDONE 3 MG: 1 TABLET, FILM COATED ORAL at 21:20

## 2025-05-22 RX ADMIN — PHENOBARBITAL 64.8 MG: 64.8 TABLET ORAL at 11:10

## 2025-05-22 RX ADMIN — LEVOTHYROXINE SODIUM 100 MCG: 0.1 TABLET ORAL at 05:39

## 2025-05-22 RX ADMIN — Medication 6 MG: at 21:20

## 2025-05-22 RX ADMIN — DIVALPROEX SODIUM 2000 MG: 500 TABLET, EXTENDED RELEASE ORAL at 21:20

## 2025-05-22 RX ADMIN — GABAPENTIN 300 MG: 300 CAPSULE ORAL at 15:43

## 2025-05-22 RX ADMIN — GUAIFENESIN 600 MG: 600 TABLET, EXTENDED RELEASE ORAL at 21:20

## 2025-05-22 RX ADMIN — GABAPENTIN 300 MG: 300 CAPSULE ORAL at 08:44

## 2025-05-22 RX ADMIN — FLUTICASONE PROPIONATE 2 SPRAY: 50 SPRAY, METERED NASAL at 08:47

## 2025-05-22 RX ADMIN — Medication 1000 UNITS: at 08:45

## 2025-05-22 RX ADMIN — MAGNESIUM OXIDE TAB 400 MG (241.3 MG ELEMENTAL MG) 400 MG: 400 (241.3 MG) TAB at 08:45

## 2025-05-22 RX ADMIN — NYSTATIN: 100000 POWDER TOPICAL at 08:46

## 2025-05-22 RX ADMIN — PHENOBARBITAL 64.8 MG: 64.8 TABLET ORAL at 22:28

## 2025-05-22 RX ADMIN — ASPIRIN 81 MG CHEWABLE TABLET 81 MG: 81 TABLET CHEWABLE at 08:44

## 2025-05-22 RX ADMIN — CLONAZEPAM 1 MG: 1 TABLET ORAL at 08:45

## 2025-05-22 RX ADMIN — TRAZODONE HYDROCHLORIDE 150 MG: 100 TABLET ORAL at 21:20

## 2025-05-22 NOTE — PROGRESS NOTES
05/22/25 0900   Team Meeting   Meeting Type Daily Rounds   Team Members Present   Team Members Present Physician;Nurse;   Physician Team Member Alonso/Gaye/Emi   Nursing Team Member Joan/Awais/Shantel   Care Management Team Member Yonny   OT Team Member Irasema   Patient/Family Present   Patient Present No   Patient's Family Present No     Patient is loud and religiously preoccupied. She has a loud and moist cough. She had an xray but it was negative. She slid from her bed to her butt and it was witnessed. She had no injuries. She had a shower on the 21st and a BM on the 21st. She is attending groups and sleeping well. Patient discharge is pending stabilization of mood and medications.

## 2025-05-22 NOTE — NURSING NOTE
Patient agitated, and irritable this AM, but was able to calm herself down. Patient is medication compliant, and cooperative with care. No behaviors observed at the moment. Safety checks ongoing.

## 2025-05-22 NOTE — NURSING NOTE
Patient in bed most of the evening because she states she feels tired. Denies all s/s  including SI/HI. Calm, cooperative, and  medication compliant.  Monitor for safety and support.

## 2025-05-22 NOTE — PLAN OF CARE
Problem: DISCHARGE PLANNING  Goal: Discharge to home or other facility with appropriate resources  Description: INTERVENTIONS:- Identify barriers to discharge w/patient and caregiver- Arrange for needed discharge resources and transportation as appropriate- Identify discharge learning needs (meds, wound care, etc.)- Arrange for interpretive services to assist at discharge as needed- Refer to Case Management Department for coordinating discharge planning if the patient needs post-hospital services based on physician/advanced practitioner order or complex needs related to functional status, cognitive ability, or social support system    Outcome: Not Progressing  Patient continues to be labile, delusional, and difficult to redirect. CM is awaiting patient improvement before putting her information into [facility]. Patient discharge is pending stabilization of mood and medications.

## 2025-05-22 NOTE — QUICK NOTE
Alerted by nursing that patient had a witnessed fall when attempting to get back in bed.  When attempting to sit down on the bed patient slid off and landed on the floor.  Patient landed on buttocks.  Nursing at bedside denies any prodromal symptoms prior to falling.  Patient currently without any complaints of pain.  Nursing denies any signs of trauma, bruising, redness.  Denies any head strike or loss of consciousness.  Her vital signs are stable she is without any complaints.  Will continue to monitor patient on fall precautions.  No need for CT imaging at this time
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
## 2025-05-22 NOTE — PLAN OF CARE
Problem: Alteration in Thoughts and Perception  Goal: Treatment Goal: Gain control of psychotic behaviors/thinking, reduce/eliminate presenting symptoms and demonstrate improved reality functioning upon discharge  Outcome: Progressing  Goal: Refrain from acting on delusional thinking/internal stimuli  Description: Interventions:- Monitor patient closely, per order - Utilize least restrictive measures - Set reasonable limits, give positive feedback for acceptable - Administer medications as ordered and monitor of potential side effects  Outcome: Progressing  Goal: Agree to be compliant with medication regime, as prescribed and report medication side effects  Description: Interventions:- Offer appropriate PRN medication and supervise ingestion; conduct AIMS, as needed   Outcome: Progressing  Goal: Recognize dysfunctional thoughts, communicate reality-based thoughts at the time of discharge  Description: Interventions:- Provide medication and psycho-education to assist patient in compliance and developing insight into his/her illness   Outcome: Progressing     Problem: Ineffective Coping  Goal: Cooperates with admission process  Description: Interventions: - Complete admission process  Outcome: Progressing  Goal: Identifies ineffective coping skills  Outcome: Progressing  Goal: Identifies healthy coping skills  Outcome: Progressing  Goal: Demonstrates healthy coping skills  Outcome: Progressing  Goal: Understands least restrictive measures  Description: Interventions:- Utilize least restrictive behavior  Outcome: Progressing  Goal: Free from restraint events  Description: - Utilize least restrictive measures - Provide behavioral interventions - Redirect inappropriate behaviors   Outcome: Progressing     Problem: Risk for Self Injury/Neglect  Goal: Verbalize thoughts and feelings  Description: Interventions:- Assess and re-assess patient's lethality and potential for self-injury- Engage patient in 1:1 interactions,  daily, for a minimum of 15 minutes- Encourage patient to express feelings, fears, frustrations, hopes- Establish rapport/trust with patient   Outcome: Progressing  Goal: Refrain from harming self  Description: Interventions:- Monitor patient closely, per order- Develop a trusting relationship- Supervise medication ingestion, monitor effects and side effects   Outcome: Progressing  Goal: Attend and participate in unit activities, including therapeutic, recreational, and educational groups  Description: Interventions:- Provide therapeutic and educational activities daily, encourage attendance and participation, and document same in the medical record- Obtain collateral information, encourage visitation and family involvement in care   Outcome: Progressing  Goal: Recognize maladaptive responses and adopt new coping mechanisms  Outcome: Progressing  Goal: Complete daily ADLs, including personal hygiene independently, as able  Description: Interventions:- Observe, teach, and assist patient with ADLS- Monitor and promote a balance of rest/activity, with adequate nutrition and elimination  Outcome: Progressing     Problem: Depression  Goal: Treatment Goal: Demonstrate behavioral control of depressive symptoms, verbalize feelings of improved mood/affect, and adopt new coping skills prior to discharge  Outcome: Progressing     Problem: Anxiety  Goal: Anxiety is at manageable level  Description: Interventions:- Assess and monitor patient's anxiety level. - Monitor for signs and symptoms (heart palpitations, chest pain, shortness of breath, headaches, nausea, feeling jumpy, restlessness, irritable, apprehensive). - Collaborate with interdisciplinary team and initiate plan and interventions as ordered.- [place] patient to unit/surroundings- Explain treatment plan- Encourage participation in care- Encourage verbalization of concerns/fears- Identify coping mechanisms- Assist in developing anxiety-reducing skills- Administer/offer  alternative therapies- Limit or eliminate stimulants  Outcome: Progressing     Problem: Risk for Violence/Aggression Toward Others  Goal: Refrain from harming others  Outcome: Progressing  Goal: Refrain from destructive acts on the environment or property  Outcome: Progressing  Goal: Control angry outbursts  Description: Interventions:- Monitor patient closely, per order- Ensure early verbal de-escalation- Monitor prn medication needs- Set reasonable/therapeutic limits, outline behavioral expectations, and consequences - Provide a non-threatening milieu, utilizing the least restrictive interventions   Outcome: Progressing     Problem: DISCHARGE PLANNING  Goal: Discharge to home or other facility with appropriate resources  Description: INTERVENTIONS:- Identify barriers to discharge w/patient and caregiver- Arrange for needed discharge resources and transportation as appropriate- Identify discharge learning needs (meds, wound care, etc.)- Arrange for interpretive services to assist at discharge as needed- Refer to Case Management Department for coordinating discharge planning if the patient needs post-hospital services based on physician/advanced practitioner order or complex needs related to functional status, cognitive ability, or social support system  Outcome: Progressing     Problem: Knowledge Deficit  Goal: Patient/family/caregiver demonstrates understanding of disease process, treatment plan, medications, and discharge instructions  Description: Complete learning assessment and assess knowledge base.Interventions:- Provide teaching at level of understanding- Provide teaching via preferred learning methods  Outcome: Progressing     Problem: DISCHARGE PLANNING - CARE MANAGEMENT  Goal: Discharge to post-acute care or home with appropriate resources  Description: INTERVENTIONS:- Conduct assessment to determine patient/family and health care team treatment goals, and need for post-acute services based on payer  coverage, community resources, and patient preferences, and barriers to discharge- Address psychosocial, clinical, and financial barriers to discharge as identified in assessment in conjunction with the patient/family and health care team- Arrange appropriate level of post-acute services according to patient’s   needs and preference and payer coverage in collaboration with the physician and health care team- Communicate with and update the patient/family, physician, and health care team regarding progress on the discharge plan- Arrange appropriate transportation to post-acute venues  Outcome: Progressing
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
## 2025-05-22 NOTE — TREATMENT TEAM
05/21/25 2152   Post Fall   Date of Fall 05/21/25   Observer/Reported time of Fall 2152   Name of Provider Notified JOSÉ WHITE   Time Provider Notified 2150   Assessment of Patient Injury No injury noted   Location Patient room   Fall Type Witnessed   Witnessed/Unwitnessed   (pt with BHT)   Nature of Fall From Bed/Chair   Post Fall Interventions Circumstances of fall reviewed and documented   Family/Next of kin notified? No     Patient ambulating back from the bathroom accompanied by BHT and sat at edge of the bed and slid down to the floor. Did not strike head and no visible injuries noted and patient has no complaints of pain at this time. Vital signs within normal limits.  Patient placed back in bed with bed alarm activated.

## 2025-05-22 NOTE — PROGRESS NOTES
"Pt restless, disruptive and did not engage in group discussion  Pt initially stating if she does participate she will gain something in her treatment.  Pt sleepy at end of group.   Pt uses walker.      05/22/25 1330   Activity/Group Checklist   Group Other (Comment)  ( Recovery: ([facility]) \"Emotional wellness checklist\")   Attendance Attended  (in and out)   Attendance Duration (min) 16-30   Interactions Disorganized interaction   Affect/Mood Wide  (restless, slept)   Goals Achieved Identified triggers;Identified feelings;Identified relapse prevention strategies;Able to listen to others           "

## 2025-05-22 NOTE — ASSESSMENT & PLAN NOTE
Continue Depakote ER 2000 mg nightly for mood stability and seizure disorder  VPA level on 5/17/25 was still subtherapeutic at 32  Reorder VPA level and CMP for 5/24 in the evening  Continue Phenobarbital 64.8 mg every 12 hours  Continue Klonopin 1 mg every 12 hours

## 2025-05-23 LAB
ATRIAL RATE: 71 BPM
EST. AVERAGE GLUCOSE BLD GHB EST-MCNC: 117 MG/DL
HBA1C MFR BLD: 5.7 %
P AXIS: 49 DEGREES
PR INTERVAL: 168 MS
QRS AXIS: 23 DEGREES
QRSD INTERVAL: 76 MS
QT INTERVAL: 414 MS
QTC INTERVAL: 450 MS
T WAVE AXIS: 198 DEGREES
VENTRICULAR RATE: 71 BPM

## 2025-05-23 PROCEDURE — 93005 ELECTROCARDIOGRAM TRACING: CPT

## 2025-05-23 PROCEDURE — 83036 HEMOGLOBIN GLYCOSYLATED A1C: CPT

## 2025-05-23 PROCEDURE — 93010 ELECTROCARDIOGRAM REPORT: CPT | Performed by: STUDENT IN AN ORGANIZED HEALTH CARE EDUCATION/TRAINING PROGRAM

## 2025-05-23 RX ORDER — GUAIFENESIN 600 MG/1
600 TABLET, EXTENDED RELEASE ORAL EVERY 12 HOURS PRN
Status: DISCONTINUED | OUTPATIENT
Start: 2025-05-23 | End: 2025-06-02 | Stop reason: HOSPADM

## 2025-05-23 RX ORDER — OLANZAPINE 5 MG/1
5 TABLET, FILM COATED ORAL 2 TIMES DAILY
Status: COMPLETED | OUTPATIENT
Start: 2025-05-23 | End: 2025-05-25

## 2025-05-23 RX ORDER — OLANZAPINE 2.5 MG/1
2.5 TABLET, FILM COATED ORAL 2 TIMES DAILY
Status: DISCONTINUED | OUTPATIENT
Start: 2025-05-26 | End: 2025-05-27

## 2025-05-23 RX ORDER — RISPERIDONE 2 MG/1
2 TABLET ORAL 2 TIMES DAILY
Status: DISCONTINUED | OUTPATIENT
Start: 2025-05-26 | End: 2025-06-02 | Stop reason: HOSPADM

## 2025-05-23 RX ADMIN — Medication 1000 MCG: at 08:26

## 2025-05-23 RX ADMIN — GABAPENTIN 300 MG: 300 CAPSULE ORAL at 08:26

## 2025-05-23 RX ADMIN — MAGNESIUM OXIDE TAB 400 MG (241.3 MG ELEMENTAL MG) 400 MG: 400 (241.3 MG) TAB at 08:26

## 2025-05-23 RX ADMIN — TRAZODONE HYDROCHLORIDE 150 MG: 100 TABLET ORAL at 21:17

## 2025-05-23 RX ADMIN — RISPERIDONE 3 MG: 1 TABLET, FILM COATED ORAL at 21:17

## 2025-05-23 RX ADMIN — TRIHEXYPHENIDYL HYDROCHLORIDE 2 MG: 2 TABLET ORAL at 08:26

## 2025-05-23 RX ADMIN — NYSTATIN: 100000 POWDER TOPICAL at 08:27

## 2025-05-23 RX ADMIN — Medication 6 MG: at 21:21

## 2025-05-23 RX ADMIN — ASPIRIN 81 MG CHEWABLE TABLET 81 MG: 81 TABLET CHEWABLE at 08:26

## 2025-05-23 RX ADMIN — PHENOBARBITAL 64.8 MG: 64.8 TABLET ORAL at 11:21

## 2025-05-23 RX ADMIN — FLUTICASONE PROPIONATE 2 SPRAY: 50 SPRAY, METERED NASAL at 08:27

## 2025-05-23 RX ADMIN — DIVALPROEX SODIUM 2000 MG: 500 TABLET, EXTENDED RELEASE ORAL at 21:17

## 2025-05-23 RX ADMIN — CLONAZEPAM 1 MG: 1 TABLET ORAL at 08:26

## 2025-05-23 RX ADMIN — OLANZAPINE 5 MG: 5 TABLET, FILM COATED ORAL at 08:26

## 2025-05-23 RX ADMIN — GUAIFENESIN 600 MG: 600 TABLET, EXTENDED RELEASE ORAL at 08:26

## 2025-05-23 RX ADMIN — TRIHEXYPHENIDYL HYDROCHLORIDE 2 MG: 2 TABLET ORAL at 21:18

## 2025-05-23 RX ADMIN — LEVOTHYROXINE SODIUM 100 MCG: 0.1 TABLET ORAL at 05:32

## 2025-05-23 RX ADMIN — Medication 1000 UNITS: at 08:26

## 2025-05-23 RX ADMIN — NYSTATIN: 100000 POWDER TOPICAL at 17:06

## 2025-05-23 RX ADMIN — PHENOBARBITAL 64.8 MG: 64.8 TABLET ORAL at 22:14

## 2025-05-23 RX ADMIN — MAGNESIUM OXIDE TAB 400 MG (241.3 MG ELEMENTAL MG) 400 MG: 400 (241.3 MG) TAB at 17:06

## 2025-05-23 RX ADMIN — GABAPENTIN 300 MG: 300 CAPSULE ORAL at 13:50

## 2025-05-23 RX ADMIN — OLANZAPINE 5 MG: 5 TABLET, FILM COATED ORAL at 21:17

## 2025-05-23 RX ADMIN — CLONAZEPAM 1 MG: 1 TABLET ORAL at 17:06
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
## 2025-05-23 NOTE — PLAN OF CARE
Problem: Alteration in Thoughts and Perception  Goal: Treatment Goal: Gain control of psychotic behaviors/thinking, reduce/eliminate presenting symptoms and demonstrate improved reality functioning upon discharge  Outcome: Progressing  Goal: Refrain from acting on delusional thinking/internal stimuli  Description: Interventions:- Monitor patient closely, per order - Utilize least restrictive measures - Set reasonable limits, give positive feedback for acceptable - Administer medications as ordered and monitor of potential side effects  Outcome: Progressing  Goal: Agree to be compliant with medication regime, as prescribed and report medication side effects  Description: Interventions:- Offer appropriate PRN medication and supervise ingestion; conduct AIMS, as needed   Outcome: Progressing  Goal: Recognize dysfunctional thoughts, communicate reality-based thoughts at the time of discharge  Description: Interventions:- Provide medication and psycho-education to assist patient in compliance and developing insight into his/her illness   Outcome: Progressing     Problem: Ineffective Coping  Goal: Cooperates with admission process  Description: Interventions: - Complete admission process  Outcome: Progressing  Goal: Identifies ineffective coping skills  Outcome: Progressing  Goal: Identifies healthy coping skills  Outcome: Progressing  Goal: Demonstrates healthy coping skills  Outcome: Progressing  Goal: Understands least restrictive measures  Description: Interventions:- Utilize least restrictive behavior  Outcome: Progressing  Goal: Free from restraint events  Description: - Utilize least restrictive measures - Provide behavioral interventions - Redirect inappropriate behaviors   Outcome: Progressing     Problem: Risk for Self Injury/Neglect  Goal: Verbalize thoughts and feelings  Description: Interventions:- Assess and re-assess patient's lethality and potential for self-injury- Engage patient in 1:1 interactions,  daily, for a minimum of 15 minutes- Encourage patient to express feelings, fears, frustrations, hopes- Establish rapport/trust with patient   Outcome: Progressing  Goal: Refrain from harming self  Description: Interventions:- Monitor patient closely, per order- Develop a trusting relationship- Supervise medication ingestion, monitor effects and side effects   Outcome: Progressing  Goal: Attend and participate in unit activities, including therapeutic, recreational, and educational groups  Description: Interventions:- Provide therapeutic and educational activities daily, encourage attendance and participation, and document same in the medical record- Obtain collateral information, encourage visitation and family involvement in care   Outcome: Progressing  Goal: Recognize maladaptive responses and adopt new coping mechanisms  Outcome: Progressing  Goal: Complete daily ADLs, including personal hygiene independently, as able  Description: Interventions:- Observe, teach, and assist patient with ADLS- Monitor and promote a balance of rest/activity, with adequate nutrition and elimination  Outcome: Progressing     Problem: Depression  Goal: Treatment Goal: Demonstrate behavioral control of depressive symptoms, verbalize feelings of improved mood/affect, and adopt new coping skills prior to discharge  Outcome: Progressing     Problem: Risk for Violence/Aggression Toward Others  Goal: Refrain from harming others  Outcome: Progressing  Goal: Refrain from destructive acts on the environment or property  Outcome: Progressing  Goal: Control angry outbursts  Description: Interventions:- Monitor patient closely, per order- Ensure early verbal de-escalation- Monitor prn medication needs- Set reasonable/therapeutic limits, outline behavioral expectations, and consequences - Provide a non-threatening milieu, utilizing the least restrictive interventions   Outcome: Progressing     Problem: Knowledge Deficit  Goal:  Patient/family/caregiver demonstrates understanding of disease process, treatment plan, medications, and discharge instructions  Description: Complete learning assessment and assess knowledge base.Interventions:- Provide teaching at level of understanding- Provide teaching via preferred learning methods  Outcome: Progressing     Problem: DISCHARGE PLANNING - CARE MANAGEMENT  Goal: Discharge to post-acute care or home with appropriate resources  Description: INTERVENTIONS:- Conduct assessment to determine patient/family and health care team treatment goals, and need for post-acute services based on payer coverage, community resources, and patient preferences, and barriers to discharge- Address psychosocial, clinical, and financial barriers to discharge as identified in assessment in conjunction with the patient/family and health care team- Arrange appropriate level of post-acute services according to patient’s   needs and preference and payer coverage in collaboration with the physician and health care team- Communicate with and update the patient/family, physician, and health care team regarding progress on the discharge plan- Arrange appropriate transportation to post-acute venues  Outcome: Progressing     Problem: Prexisting or High Potential for Compromised Skin Integrity  Goal: Skin integrity is maintained or improved  Description: INTERVENTIONS:  - Identify patients at risk for skin breakdown  - Assess and monitor skin integrity including under and around medical devices   - Assess and monitor nutrition and hydration status  - Monitor labs  - Assess for incontinence   - Turn and reposition patient  - Assist with mobility/ambulation  - Relieve pressure over duncan prominences   - Avoid friction and shearing  - Provide appropriate hygiene as needed including keeping skin clean and dry  - Evaluate need for skin moisturizer/barrier cream  - Collaborate with interdisciplinary team  - Patient/family teaching  -  Consider wound care consult

## 2025-05-23 NOTE — NURSING NOTE
"Patient took all his meds except for the folic acid stated \"I don't want the shit pills\". Patient is medication compliant, and cooperative with care. Patients behaviors remain the same, loud, disorganized, irritable at times. Safety checks ongoing.   "

## 2025-05-23 NOTE — NURSING NOTE
"Pt present on the unit and observed with a mild irritable mood on approach. Had a shower today. Reported multiple loose stools and blames them on staff giving her a pill that made her \"poop out\".Attempted refusing HS Risperidone saying it will make her poop. Medication reviewed and counseling provided and pt was compliant with her HS medication. Denies Psych Sx. Safety checks maintained.   "
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
## 2025-05-23 NOTE — ASSESSMENT & PLAN NOTE
Continue Depakote ER 2000 mg nightly for mood stability and seizure disorder  Continue Phenobarbital 64.8 mg every 12 hours  Continue Klonopin 1 mg every 12 hours

## 2025-05-23 NOTE — PROGRESS NOTES
25 0800   Team Meeting   Meeting Type Daily Rounds   Team Members Present   Team Members Present Physician;Nurse;;Occupational Therapist;Other (Discipline and Name)   Physician Team Member Chandrika/Ellen/Alonso/Emi   Nursing Team Member Yonny/Farhad/Joan/Marion   Care Management Team Member Mike Apgar   OT Team Member Lavelle   Other (Discipline and Name) Peter - Pharmacy     Patient is loud, labile, and preoccupied with medications. She had a BM yesterday and now needs encouragement to take any medications that are orange or pink. She showered on the  and is eating all of her meals. She is attending groups and is sleeping well. She is on a taper from Zyperxa to Risperdal. Patient discharge is pending stabilization of mood and medications.

## 2025-05-24 LAB
ALBUMIN SERPL BCG-MCNC: 3.7 G/DL (ref 3.5–5)
ALP SERPL-CCNC: 61 U/L (ref 34–104)
ALT SERPL W P-5'-P-CCNC: 7 U/L (ref 7–52)
ANION GAP SERPL CALCULATED.3IONS-SCNC: 7 MMOL/L (ref 4–13)
AST SERPL W P-5'-P-CCNC: 10 U/L (ref 13–39)
BILIRUB SERPL-MCNC: 0.17 MG/DL (ref 0.2–1)
BUN SERPL-MCNC: 13 MG/DL (ref 5–25)
CALCIUM SERPL-MCNC: 8.9 MG/DL (ref 8.4–10.2)
CHLORIDE SERPL-SCNC: 100 MMOL/L (ref 96–108)
CO2 SERPL-SCNC: 30 MMOL/L (ref 21–32)
CREAT SERPL-MCNC: 0.7 MG/DL (ref 0.6–1.3)
GFR SERPL CREATININE-BSD FRML MDRD: 87 ML/MIN/1.73SQ M
GLUCOSE SERPL-MCNC: 97 MG/DL (ref 65–140)
POTASSIUM SERPL-SCNC: 4.6 MMOL/L (ref 3.5–5.3)
PROT SERPL-MCNC: 6.5 G/DL (ref 6.4–8.4)
SODIUM SERPL-SCNC: 137 MMOL/L (ref 135–147)
VALPROATE SERPL-MCNC: 72 UG/ML (ref 50–125)

## 2025-05-24 PROCEDURE — 80164 ASSAY DIPROPYLACETIC ACD TOT: CPT

## 2025-05-24 PROCEDURE — 80053 COMPREHEN METABOLIC PANEL: CPT

## 2025-05-24 PROCEDURE — 99232 SBSQ HOSP IP/OBS MODERATE 35: CPT

## 2025-05-24 RX ADMIN — NYSTATIN: 100000 POWDER TOPICAL at 08:23

## 2025-05-24 RX ADMIN — Medication 6 MG: at 21:58

## 2025-05-24 RX ADMIN — GABAPENTIN 300 MG: 300 CAPSULE ORAL at 13:42

## 2025-05-24 RX ADMIN — CLONAZEPAM 1 MG: 1 TABLET ORAL at 08:22

## 2025-05-24 RX ADMIN — NYSTATIN: 100000 POWDER TOPICAL at 17:47

## 2025-05-24 RX ADMIN — OLANZAPINE 5 MG: 5 TABLET, FILM COATED ORAL at 21:59

## 2025-05-24 RX ADMIN — TRAZODONE HYDROCHLORIDE 150 MG: 100 TABLET ORAL at 21:58

## 2025-05-24 RX ADMIN — TRIHEXYPHENIDYL HYDROCHLORIDE 2 MG: 2 TABLET ORAL at 21:58

## 2025-05-24 RX ADMIN — FOLIC ACID 1 MG: 1 TABLET ORAL at 08:21

## 2025-05-24 RX ADMIN — PHENOBARBITAL 64.8 MG: 64.8 TABLET ORAL at 11:50

## 2025-05-24 RX ADMIN — MAGNESIUM OXIDE TAB 400 MG (241.3 MG ELEMENTAL MG) 400 MG: 400 (241.3 MG) TAB at 17:46

## 2025-05-24 RX ADMIN — DIVALPROEX SODIUM 2000 MG: 500 TABLET, EXTENDED RELEASE ORAL at 21:58

## 2025-05-24 RX ADMIN — Medication 1000 UNITS: at 08:21

## 2025-05-24 RX ADMIN — Medication 1000 MCG: at 08:22

## 2025-05-24 RX ADMIN — ASPIRIN 81 MG CHEWABLE TABLET 81 MG: 81 TABLET CHEWABLE at 08:22

## 2025-05-24 RX ADMIN — FLUTICASONE PROPIONATE 2 SPRAY: 50 SPRAY, METERED NASAL at 08:23

## 2025-05-24 RX ADMIN — TRIHEXYPHENIDYL HYDROCHLORIDE 2 MG: 2 TABLET ORAL at 08:22

## 2025-05-24 RX ADMIN — CLONAZEPAM 1 MG: 1 TABLET ORAL at 17:46

## 2025-05-24 RX ADMIN — RISPERIDONE 3 MG: 1 TABLET, FILM COATED ORAL at 21:58

## 2025-05-24 RX ADMIN — GABAPENTIN 300 MG: 300 CAPSULE ORAL at 08:22

## 2025-05-24 RX ADMIN — PHENOBARBITAL 64.8 MG: 64.8 TABLET ORAL at 22:00

## 2025-05-24 RX ADMIN — MAGNESIUM OXIDE TAB 400 MG (241.3 MG ELEMENTAL MG) 400 MG: 400 (241.3 MG) TAB at 08:22

## 2025-05-24 RX ADMIN — POLYETHYLENE GLYCOL 3350 17 G: 17 POWDER, FOR SOLUTION ORAL at 14:10

## 2025-05-24 RX ADMIN — LEVOTHYROXINE SODIUM 100 MCG: 0.1 TABLET ORAL at 06:05

## 2025-05-24 RX ADMIN — OLANZAPINE 5 MG: 5 TABLET, FILM COATED ORAL at 08:21

## 2025-05-24 NOTE — NURSING NOTE
Patient is labile on approach. Disorganized and bizarre in conversation at times. Remains religiously preoccupied often seen praying on the floor in various areas of the unit. Patient denies any SI/HI/AVH. Compliant with scheduled medications and meals. Patient requested/received PRN Miralax at 1410 for no BM since 5/22 per patient request. Patient remains noncompliant with bed/chair alarm. Nurse educated patient on importance of compliance with fall precautions. Understanding by patient not verbalized. Patient denies any pain or unmet needs at this time. Q 15 minute safety checks ongoing.

## 2025-05-24 NOTE — PLAN OF CARE
Problem: Alteration in Thoughts and Perception  Goal: Treatment Goal: Gain control of psychotic behaviors/thinking, reduce/eliminate presenting symptoms and demonstrate improved reality functioning upon discharge  Outcome: Progressing  Goal: Refrain from acting on delusional thinking/internal stimuli  Description: Interventions:- Monitor patient closely, per order - Utilize least restrictive measures - Set reasonable limits, give positive feedback for acceptable - Administer medications as ordered and monitor of potential side effects  Outcome: Not Progressing  Goal: Agree to be compliant with medication regime, as prescribed and report medication side effects  Description: Interventions:- Offer appropriate PRN medication and supervise ingestion; conduct AIMS, as needed   Outcome: Not Progressing  Goal: Recognize dysfunctional thoughts, communicate reality-based thoughts at the time of discharge  Description: Interventions:- Provide medication and psycho-education to assist patient in compliance and developing insight into his/her illness   Outcome: Not Progressing     Problem: Ineffective Coping  Goal: Identifies healthy coping skills  Outcome: Not Progressing  Goal: Demonstrates healthy coping skills  Outcome: Not Progressing  Goal: Free from restraint events  Description: - Utilize least restrictive measures - Provide behavioral interventions - Redirect inappropriate behaviors   Outcome: Progressing     Problem: Risk for Self Injury/Neglect  Goal: Verbalize thoughts and feelings  Description: Interventions:- Assess and re-assess patient's lethality and potential for self-injury- Engage patient in 1:1 interactions, daily, for a minimum of 15 minutes- Encourage patient to express feelings, fears, frustrations, hopes- Establish rapport/trust with patient   Outcome: Progressing  Goal: Refrain from harming self  Description: Interventions:- Monitor patient closely, per order- Develop a trusting relationship-  Supervise medication ingestion, monitor effects and side effects   Outcome: Progressing  Goal: Attend and participate in unit activities, including therapeutic, recreational, and educational groups  Description: Interventions:- Provide therapeutic and educational activities daily, encourage attendance and participation, and document same in the medical record- Obtain collateral information, encourage visitation and family involvement in care   Outcome: Progressing  Goal: Recognize maladaptive responses and adopt new coping mechanisms  Outcome: Progressing  Goal: Complete daily ADLs, including personal hygiene independently, as able  Description: Interventions:- Observe, teach, and assist patient with ADLS- Monitor and promote a balance of rest/activity, with adequate nutrition and elimination  Outcome: Progressing     Problem: Depression  Goal: Treatment Goal: Demonstrate behavioral control of depressive symptoms, verbalize feelings of improved mood/affect, and adopt new coping skills prior to discharge  Outcome: Progressing     Problem: Anxiety  Goal: Anxiety is at manageable level  Description: Interventions:- Assess and monitor patient's anxiety level. - Monitor for signs and symptoms (heart palpitations, chest pain, shortness of breath, headaches, nausea, feeling jumpy, restlessness, irritable, apprehensive). - Collaborate with interdisciplinary team and initiate plan and interventions as ordered.- [place] patient to unit/surroundings- Explain treatment plan- Encourage participation in care- Encourage verbalization of concerns/fears- Identify coping mechanisms- Assist in developing anxiety-reducing skills- Administer/offer alternative therapies- Limit or eliminate stimulants  Outcome: Progressing     Problem: Risk for Violence/Aggression Toward Others  Goal: Refrain from harming others  Outcome: Progressing  Goal: Refrain from destructive acts on the environment or property  Outcome: Progressing  Goal: Control  angry outbursts  Description: Interventions:- Monitor patient closely, per order- Ensure early verbal de-escalation- Monitor prn medication needs- Set reasonable/therapeutic limits, outline behavioral expectations, and consequences - Provide a non-threatening milieu, utilizing the least restrictive interventions   Outcome: Progressing     Problem: SAFETY ADULT  Goal: Patient will remain free of falls  Description: INTERVENTIONS:- Educate patient on patient safety including physical limitations- Instruct patient to call for assistance with activity - Consult OT/PT to assist with strengthening/mobility - Keep Call bell within reach- Keep bed low and locked with side rails adjusted as appropriate- Keep care items and personal belongings within reach- Initiate and maintain comfort rounds- Offer Toileting every 2 Hours, in advance of need- Initiate/Maintain bed/chair alarm- Obtain necessary fall risk management equipment: walker- Apply yellow socks and bracelet for high fall risk patients- Consider moving patient to room near nurses station  Outcome: Progressing  Goal: Maintain or return to baseline ADL function  Description: INTERVENTIONS:-  Assess patient's ability to carry out ADLs; assess patient's baseline for ADL function and identify physical deficits which impact ability to perform ADLs (bathing, care of mouth/teeth, toileting, grooming, dressing, etc.)- Assess/evaluate cause of self-care deficits - Assess range of motion- Assess patient's mobility; develop plan if impaired- Assess patient's need for assistive devices and provide as appropriate- Encourage maximum independence but intervene and supervise when necessary- Involve family in performance of ADLs- Assess for home care needs following discharge - Consider OT consult to assist with ADL evaluation and planning for discharge- Provide patient education as appropriate  Outcome: Progressing  Goal: Maintains/Returns to pre admission functional  level  Description: INTERVENTIONS:- Perform AM-PAC 6 Click Basic Mobility/ Daily Activity assessment daily.- Set and communicate daily mobility goal to care team and patient/family/caregiver. - Collaborate with rehabilitation services on mobility goals if consulted- Perform Range of Motion 3 times a day.- Reposition patient every 2 hours- Ambulate patient 3 times a day- Out of bed to chair 3 times a day - Out of bed for meals 3 times a day- Out of bed for toileting- Record patient progress and toleration of activity level   Outcome: Progressing
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
## 2025-05-24 NOTE — NURSING NOTE
Pt present on the unit and often observed with an irritable but cooperative mood throughout the evening hours. Pt is scheduled  for Risperidone 3 mg at HS in 1 & 2 mg tabs. Pt took only partial dose of risperidone and was refusing risperidone 2 mg tab. Pt blames the color of the pill for her multiple bowel movements  the previous day. Attempted to educate pt by differentiating the colors of Senna-S Vs Risperidone 2 mg was unsuccessful. Half an hour later, pt requested the remainder of her QHS medication and same was given. Pt noted to be compliant with all her  HS meds with extensive encouragement, medication counseling and education. Appears to be at baseline in speech and no deviations in thoughts content from her baseline.

## 2025-05-25 PROCEDURE — 99232 SBSQ HOSP IP/OBS MODERATE 35: CPT | Performed by: PSYCHIATRY & NEUROLOGY

## 2025-05-25 RX ADMIN — GABAPENTIN 300 MG: 300 CAPSULE ORAL at 08:28

## 2025-05-25 RX ADMIN — CLONAZEPAM 1 MG: 1 TABLET ORAL at 17:28

## 2025-05-25 RX ADMIN — PHENOBARBITAL 64.8 MG: 64.8 TABLET ORAL at 11:45

## 2025-05-25 RX ADMIN — NYSTATIN: 100000 POWDER TOPICAL at 08:28

## 2025-05-25 RX ADMIN — OLANZAPINE 5 MG: 5 TABLET, FILM COATED ORAL at 08:28

## 2025-05-25 RX ADMIN — GABAPENTIN 300 MG: 300 CAPSULE ORAL at 15:11

## 2025-05-25 RX ADMIN — Medication 1000 MCG: at 08:28

## 2025-05-25 RX ADMIN — TRAZODONE HYDROCHLORIDE 150 MG: 100 TABLET ORAL at 21:18

## 2025-05-25 RX ADMIN — MAGNESIUM OXIDE TAB 400 MG (241.3 MG ELEMENTAL MG) 400 MG: 400 (241.3 MG) TAB at 08:28

## 2025-05-25 RX ADMIN — FLUTICASONE PROPIONATE 2 SPRAY: 50 SPRAY, METERED NASAL at 08:28

## 2025-05-25 RX ADMIN — DIVALPROEX SODIUM 2000 MG: 500 TABLET, EXTENDED RELEASE ORAL at 21:18

## 2025-05-25 RX ADMIN — CLONAZEPAM 1 MG: 1 TABLET ORAL at 08:28

## 2025-05-25 RX ADMIN — ASPIRIN 81 MG CHEWABLE TABLET 81 MG: 81 TABLET CHEWABLE at 08:28

## 2025-05-25 RX ADMIN — RISPERIDONE 3 MG: 1 TABLET, FILM COATED ORAL at 21:18

## 2025-05-25 RX ADMIN — FOLIC ACID 1 MG: 1 TABLET ORAL at 08:28

## 2025-05-25 RX ADMIN — Medication 1000 UNITS: at 08:28

## 2025-05-25 RX ADMIN — TRIHEXYPHENIDYL HYDROCHLORIDE 2 MG: 2 TABLET ORAL at 08:28

## 2025-05-25 RX ADMIN — NYSTATIN: 100000 POWDER TOPICAL at 17:30

## 2025-05-25 RX ADMIN — PHENOBARBITAL 64.8 MG: 64.8 TABLET ORAL at 22:00

## 2025-05-25 RX ADMIN — LEVOTHYROXINE SODIUM 100 MCG: 0.1 TABLET ORAL at 06:07

## 2025-05-25 RX ADMIN — OLANZAPINE 5 MG: 5 TABLET, FILM COATED ORAL at 21:18

## 2025-05-25 RX ADMIN — MAGNESIUM OXIDE TAB 400 MG (241.3 MG ELEMENTAL MG) 400 MG: 400 (241.3 MG) TAB at 17:28

## 2025-05-25 RX ADMIN — TRIHEXYPHENIDYL HYDROCHLORIDE 2 MG: 2 TABLET ORAL at 21:18

## 2025-05-25 RX ADMIN — Medication 6 MG: at 21:18

## 2025-05-25 NOTE — NURSING NOTE
Patient labile and irritable on approach. Disorganized in conversation. Pleasant and cooperative with care. Patient denies SI/HI/AVH. Compliant with scheduled medications and meals. Patient denies any pain or unmet needs at this time. Q 15 minute safety checks ongoing.

## 2025-05-25 NOTE — PLAN OF CARE
Problem: Alteration in Thoughts and Perception  Goal: Treatment Goal: Gain control of psychotic behaviors/thinking, reduce/eliminate presenting symptoms and demonstrate improved reality functioning upon discharge  Outcome: Progressing  Goal: Refrain from acting on delusional thinking/internal stimuli  Description: Interventions:- Monitor patient closely, per order - Utilize least restrictive measures - Set reasonable limits, give positive feedback for acceptable - Administer medications as ordered and monitor of potential side effects  Outcome: Progressing  Goal: Agree to be compliant with medication regime, as prescribed and report medication side effects  Description: Interventions:- Offer appropriate PRN medication and supervise ingestion; conduct AIMS, as needed   Outcome: Progressing  Goal: Recognize dysfunctional thoughts, communicate reality-based thoughts at the time of discharge  Description: Interventions:- Provide medication and psycho-education to assist patient in compliance and developing insight into his/her illness   Outcome: Progressing     Problem: Ineffective Coping  Goal: Cooperates with admission process  Description: Interventions: - Complete admission process  Outcome: Progressing  Goal: Identifies ineffective coping skills  Outcome: Progressing  Goal: Identifies healthy coping skills  Outcome: Progressing  Goal: Demonstrates healthy coping skills  Outcome: Progressing  Goal: Understands least restrictive measures  Description: Interventions:- Utilize least restrictive behavior  Outcome: Progressing  Goal: Free from restraint events  Description: - Utilize least restrictive measures - Provide behavioral interventions - Redirect inappropriate behaviors   Outcome: Progressing     Problem: Risk for Self Injury/Neglect  Goal: Verbalize thoughts and feelings  Description: Interventions:- Assess and re-assess patient's lethality and potential for self-injury- Engage patient in 1:1 interactions,  daily, for a minimum of 15 minutes- Encourage patient to express feelings, fears, frustrations, hopes- Establish rapport/trust with patient   Outcome: Progressing  Goal: Refrain from harming self  Description: Interventions:- Monitor patient closely, per order- Develop a trusting relationship- Supervise medication ingestion, monitor effects and side effects   Outcome: Progressing  Goal: Attend and participate in unit activities, including therapeutic, recreational, and educational groups  Description: Interventions:- Provide therapeutic and educational activities daily, encourage attendance and participation, and document same in the medical record- Obtain collateral information, encourage visitation and family involvement in care   Outcome: Progressing  Goal: Recognize maladaptive responses and adopt new coping mechanisms  Outcome: Progressing  Goal: Complete daily ADLs, including personal hygiene independently, as able  Description: Interventions:- Observe, teach, and assist patient with ADLS- Monitor and promote a balance of rest/activity, with adequate nutrition and elimination  Outcome: Progressing     Problem: Depression  Goal: Treatment Goal: Demonstrate behavioral control of depressive symptoms, verbalize feelings of improved mood/affect, and adopt new coping skills prior to discharge  Outcome: Progressing     Problem: Anxiety  Goal: Anxiety is at manageable level  Description: Interventions:- Assess and monitor patient's anxiety level. - Monitor for signs and symptoms (heart palpitations, chest pain, shortness of breath, headaches, nausea, feeling jumpy, restlessness, irritable, apprehensive). - Collaborate with interdisciplinary team and initiate plan and interventions as ordered.- [place] patient to unit/surroundings- Explain treatment plan- Encourage participation in care- Encourage verbalization of concerns/fears- Identify coping mechanisms- Assist in developing anxiety-reducing skills- Administer/offer  alternative therapies- Limit or eliminate stimulants  Outcome: Progressing     Problem: Risk for Violence/Aggression Toward Others  Goal: Refrain from harming others  Outcome: Progressing  Goal: Refrain from destructive acts on the environment or property  Outcome: Progressing  Goal: Control angry outbursts  Description: Interventions:- Monitor patient closely, per order- Ensure early verbal de-escalation- Monitor prn medication needs- Set reasonable/therapeutic limits, outline behavioral expectations, and consequences - Provide a non-threatening milieu, utilizing the least restrictive interventions   Outcome: Progressing     Problem: SAFETY ADULT  Goal: Patient will remain free of falls  Description: INTERVENTIONS:- Educate patient on patient safety including physical limitations- Instruct patient to call for assistance with activity - Consult OT/PT to assist with strengthening/mobility - Keep Call bell within reach- Keep bed low and locked with side rails adjusted as appropriate- Keep care items and personal belongings within reach- Initiate and maintain comfort rounds- Offer Toileting every 2 Hours, in advance of need- Initiate/Maintain bed/chair alarm- Obtain necessary fall risk management equipment: walker- Apply yellow socks and bracelet for high fall risk patients- Consider moving patient to room near nurses station  Outcome: Progressing  Goal: Maintain or return to baseline ADL function  Description: INTERVENTIONS:-  Assess patient's ability to carry out ADLs; assess patient's baseline for ADL function and identify physical deficits which impact ability to perform ADLs (bathing, care of mouth/teeth, toileting, grooming, dressing, etc.)- Assess/evaluate cause of self-care deficits - Assess range of motion- Assess patient's mobility; develop plan if impaired- Assess patient's need for assistive devices and provide as appropriate- Encourage maximum independence but intervene and supervise when necessary-  Involve family in performance of ADLs- Assess for home care needs following discharge - Consider OT consult to assist with ADL evaluation and planning for discharge- Provide patient education as appropriate  Outcome: Progressing  Goal: Maintains/Returns to pre admission functional level  Description: INTERVENTIONS:- Perform AM-PAC 6 Click Basic Mobility/ Daily Activity assessment daily.- Set and communicate daily mobility goal to care team and patient/family/caregiver. - Collaborate with rehabilitation services on mobility goals if consulted- Perform Range of Motion 3 times a day.- Reposition patient every 2 hours- Ambulate patient 3 times a day- Out of bed to chair 3 times a day - Out of bed for meals 3 times a day- Out of bed for toileting- Record patient progress and toleration of activity level   Outcome: Progressing     Problem: DISCHARGE PLANNING  Goal: Discharge to home or other facility with appropriate resources  Description: INTERVENTIONS:- Identify barriers to discharge w/patient and caregiver- Arrange for needed discharge resources and transportation as appropriate- Identify discharge learning needs (meds, wound care, etc.)- Arrange for interpretive services to assist at discharge as needed- Refer to Case Management Department for coordinating discharge planning if the patient needs post-hospital services based on physician/advanced practitioner order or complex needs related to functional status, cognitive ability, or social support system  Outcome: Progressing     Problem: Knowledge Deficit  Goal: Patient/family/caregiver demonstrates understanding of disease process, treatment plan, medications, and discharge instructions  Description: Complete learning assessment and assess knowledge base.Interventions:- Provide teaching at level of understanding- Provide teaching via preferred learning methods  Outcome: Progressing     Problem: DISCHARGE PLANNING - CARE MANAGEMENT  Goal: Discharge to post-acute care  or home with appropriate resources  Description: INTERVENTIONS:- Conduct assessment to determine patient/family and health care team treatment goals, and need for post-acute services based on payer coverage, community resources, and patient preferences, and barriers to discharge- Address psychosocial, clinical, and financial barriers to discharge as identified in assessment in conjunction with the patient/family and health care team- Arrange appropriate level of post-acute services according to patient’s   needs and preference and payer coverage in collaboration with the physician and health care team- Communicate with and update the patient/family, physician, and health care team regarding progress on the discharge plan- Arrange appropriate transportation to post-acute venues  Outcome: Progressing     Problem: Prexisting or High Potential for Compromised Skin Integrity  Goal: Skin integrity is maintained or improved  Description: INTERVENTIONS:  - Identify patients at risk for skin breakdown  - Assess and monitor skin integrity including under and around medical devices   - Assess and monitor nutrition and hydration status  - Monitor labs  - Assess for incontinence   - Turn and reposition patient  - Assist with mobility/ambulation  - Relieve pressure over duncan prominences   - Avoid friction and shearing  - Provide appropriate hygiene as needed including keeping skin clean and dry  - Evaluate need for skin moisturizer/barrier cream  - Collaborate with interdisciplinary team  - Patient/family teaching  - Consider wound care consult    Assess:  - Review Misael scale daily  - Clean and moisturize skin as needed  - Inspect skin when repositioning, toileting, and assisting with ADLS  - Assess extremities for adequate circulation and sensation     Bed Management:  - Have minimal linens on bed & keep smooth, unwrinkled  - Change linens as needed when moist or perspiring  - Avoid sitting or lying in one position for more  than 2 hours while in bed?Keep HOB at 350 degrees   - Toileting:  - Assess for incontinence every 2 hours      Activity:  - Encourage activity and walks on unit  - Encourage or provide ROM exercises   - Instruct/ Assist with weight shifting every 2 hours when out of bed in chair    Skin Care:  - Avoid use of baby powder, tape, friction and shearing, hot water or constrictive clothing  - Relieve pressure over bony prominences using pillows  - Do not massage red bony areas    Outcome: Progressing

## 2025-05-25 NOTE — NURSING NOTE
Received Pt at 1900. Pt is bright and pleasant. Visible and social. Often talking and joking w/ peers. + meals and meds. Good appetite. Compliant w/ mouth checks. Disorganized in conversation w/ flight of ideas. Becomes preoccupied on a specific topic; including Worship and marriage. Needs reminders to utilize walker. Chair/bed alarms maintained for safety. Pt denies all psych S+S's at this time. Denies unmet needs. Q15 safety checks maintained.
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
## 2025-05-26 PROCEDURE — 99232 SBSQ HOSP IP/OBS MODERATE 35: CPT | Performed by: PSYCHIATRY & NEUROLOGY

## 2025-05-26 RX ADMIN — TRAZODONE HYDROCHLORIDE 150 MG: 100 TABLET ORAL at 21:08

## 2025-05-26 RX ADMIN — MAGNESIUM OXIDE TAB 400 MG (241.3 MG ELEMENTAL MG) 400 MG: 400 (241.3 MG) TAB at 08:53

## 2025-05-26 RX ADMIN — NYSTATIN: 100000 POWDER TOPICAL at 08:56

## 2025-05-26 RX ADMIN — RISPERIDONE 2 MG: 2 TABLET, FILM COATED ORAL at 17:20

## 2025-05-26 RX ADMIN — LEVOTHYROXINE SODIUM 100 MCG: 0.1 TABLET ORAL at 05:23

## 2025-05-26 RX ADMIN — FOLIC ACID 1 MG: 1 TABLET ORAL at 08:53

## 2025-05-26 RX ADMIN — ASPIRIN 81 MG CHEWABLE TABLET 81 MG: 81 TABLET CHEWABLE at 08:53

## 2025-05-26 RX ADMIN — CLONAZEPAM 1 MG: 1 TABLET ORAL at 08:53

## 2025-05-26 RX ADMIN — GABAPENTIN 300 MG: 300 CAPSULE ORAL at 08:53

## 2025-05-26 RX ADMIN — TRIHEXYPHENIDYL HYDROCHLORIDE 2 MG: 2 TABLET ORAL at 21:08

## 2025-05-26 RX ADMIN — OLANZAPINE 2.5 MG: 2.5 TABLET, FILM COATED ORAL at 08:55

## 2025-05-26 RX ADMIN — MAGNESIUM OXIDE TAB 400 MG (241.3 MG ELEMENTAL MG) 400 MG: 400 (241.3 MG) TAB at 17:20

## 2025-05-26 RX ADMIN — Medication 1000 UNITS: at 08:53

## 2025-05-26 RX ADMIN — NYSTATIN: 100000 POWDER TOPICAL at 17:20

## 2025-05-26 RX ADMIN — RISPERIDONE 2 MG: 2 TABLET, FILM COATED ORAL at 08:52

## 2025-05-26 RX ADMIN — GUAIFENESIN AND DEXTROMETHORPHAN 10 ML: 20; 200 SYRUP ORAL at 13:43

## 2025-05-26 RX ADMIN — Medication 1000 MCG: at 08:53

## 2025-05-26 RX ADMIN — PHENOBARBITAL 64.8 MG: 64.8 TABLET ORAL at 22:16

## 2025-05-26 RX ADMIN — Medication 6 MG: at 21:08

## 2025-05-26 RX ADMIN — CLONAZEPAM 1 MG: 1 TABLET ORAL at 17:20

## 2025-05-26 RX ADMIN — FLUTICASONE PROPIONATE 2 SPRAY: 50 SPRAY, METERED NASAL at 08:57

## 2025-05-26 RX ADMIN — PHENOBARBITAL 64.8 MG: 64.8 TABLET ORAL at 11:59

## 2025-05-26 RX ADMIN — GABAPENTIN 300 MG: 300 CAPSULE ORAL at 13:44

## 2025-05-26 RX ADMIN — DIVALPROEX SODIUM 2000 MG: 500 TABLET, EXTENDED RELEASE ORAL at 21:08

## 2025-05-26 RX ADMIN — TRIHEXYPHENIDYL HYDROCHLORIDE 2 MG: 2 TABLET ORAL at 08:53

## 2025-05-26 RX ADMIN — OLANZAPINE 2.5 MG: 2.5 TABLET, FILM COATED ORAL at 17:20
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
## 2025-05-26 NOTE — NURSING NOTE
"Patient is awake and alert. Labile at times with rapid loud speech at times. Patient denies SI/HI/AVH. Remains religiously preoccupied carrying rosary and praying throughout the day at various locations on the unit. Compliant with scheduled medications and meals/unit routine. Endorses ongoing preoccupation with \"finding a new \". Often asking staff throughout the day if they can assist her in finding a . Patient requested/received PRN Robitussin oral syrup 10mg PO at 1343 for c/o cough symptoms. Patient denies any unmet needs or pain at this time. Resting in dayroom currently denies any pain or unmet needs at this time. Q 15 minute safety checks ongoing.    "
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
## 2025-05-26 NOTE — PLAN OF CARE
Problem: Alteration in Thoughts and Perception  Goal: Treatment Goal: Gain control of psychotic behaviors/thinking, reduce/eliminate presenting symptoms and demonstrate improved reality functioning upon discharge  Outcome: Progressing  Goal: Refrain from acting on delusional thinking/internal stimuli  Description: Interventions:- Monitor patient closely, per order - Utilize least restrictive measures - Set reasonable limits, give positive feedback for acceptable - Administer medications as ordered and monitor of potential side effects  Outcome: Progressing  Goal: Agree to be compliant with medication regime, as prescribed and report medication side effects  Description: Interventions:- Offer appropriate PRN medication and supervise ingestion; conduct AIMS, as needed   Outcome: Progressing  Goal: Recognize dysfunctional thoughts, communicate reality-based thoughts at the time of discharge  Description: Interventions:- Provide medication and psycho-education to assist patient in compliance and developing insight into his/her illness   Outcome: Progressing     Problem: Ineffective Coping  Goal: Cooperates with admission process  Description: Interventions: - Complete admission process  Outcome: Progressing  Goal: Identifies ineffective coping skills  Outcome: Progressing  Goal: Identifies healthy coping skills  Outcome: Progressing  Goal: Demonstrates healthy coping skills  Outcome: Progressing  Goal: Understands least restrictive measures  Description: Interventions:- Utilize least restrictive behavior  Outcome: Progressing  Goal: Free from restraint events  Description: - Utilize least restrictive measures - Provide behavioral interventions - Redirect inappropriate behaviors   Outcome: Progressing     Problem: Risk for Self Injury/Neglect  Goal: Verbalize thoughts and feelings  Description: Interventions:- Assess and re-assess patient's lethality and potential for self-injury- Engage patient in 1:1 interactions,  daily, for a minimum of 15 minutes- Encourage patient to express feelings, fears, frustrations, hopes- Establish rapport/trust with patient   Outcome: Progressing  Goal: Refrain from harming self  Description: Interventions:- Monitor patient closely, per order- Develop a trusting relationship- Supervise medication ingestion, monitor effects and side effects   Outcome: Progressing  Goal: Recognize maladaptive responses and adopt new coping mechanisms  Outcome: Progressing  Goal: Complete daily ADLs, including personal hygiene independently, as able  Description: Interventions:- Observe, teach, and assist patient with ADLS- Monitor and promote a balance of rest/activity, with adequate nutrition and elimination  Outcome: Progressing     Problem: Depression  Goal: Treatment Goal: Demonstrate behavioral control of depressive symptoms, verbalize feelings of improved mood/affect, and adopt new coping skills prior to discharge  Outcome: Progressing     Problem: Anxiety  Goal: Anxiety is at manageable level  Description: Interventions:- Assess and monitor patient's anxiety level. - Monitor for signs and symptoms (heart palpitations, chest pain, shortness of breath, headaches, nausea, feeling jumpy, restlessness, irritable, apprehensive). - Collaborate with interdisciplinary team and initiate plan and interventions as ordered.- [place] patient to unit/surroundings- Explain treatment plan- Encourage participation in care- Encourage verbalization of concerns/fears- Identify coping mechanisms- Assist in developing anxiety-reducing skills- Administer/offer alternative therapies- Limit or eliminate stimulants  Outcome: Progressing     Problem: Risk for Violence/Aggression Toward Others  Goal: Refrain from harming others  Outcome: Progressing  Goal: Refrain from destructive acts on the environment or property  Outcome: Progressing  Goal: Control angry outbursts  Description: Interventions:- Monitor patient closely, per order- Ensure  early verbal de-escalation- Monitor prn medication needs- Set reasonable/therapeutic limits, outline behavioral expectations, and consequences - Provide a non-threatening milieu, utilizing the least restrictive interventions   Outcome: Progressing     Problem: SAFETY ADULT  Goal: Patient will remain free of falls  Description: INTERVENTIONS:- Educate patient on patient safety including physical limitations- Instruct patient to call for assistance with activity - Consult OT/PT to assist with strengthening/mobility - Keep Call bell within reach- Keep bed low and locked with side rails adjusted as appropriate- Keep care items and personal belongings within reach- Initiate and maintain comfort rounds- Offer Toileting every 2 Hours, in advance of need- Initiate/Maintain bed/chair alarm- Obtain necessary fall risk management equipment: walker- Apply yellow socks and bracelet for high fall risk patients- Consider moving patient to room near nurses station  Outcome: Progressing  Goal: Maintain or return to baseline ADL function  Description: INTERVENTIONS:-  Assess patient's ability to carry out ADLs; assess patient's baseline for ADL function and identify physical deficits which impact ability to perform ADLs (bathing, care of mouth/teeth, toileting, grooming, dressing, etc.)- Assess/evaluate cause of self-care deficits - Assess range of motion- Assess patient's mobility; develop plan if impaired- Assess patient's need for assistive devices and provide as appropriate- Encourage maximum independence but intervene and supervise when necessary- Involve family in performance of ADLs- Assess for home care needs following discharge - Consider OT consult to assist with ADL evaluation and planning for discharge- Provide patient education as appropriate  Outcome: Progressing  Goal: Maintains/Returns to pre admission functional level  Description: INTERVENTIONS:- Perform AM-PAC 6 Click Basic Mobility/ Daily Activity assessment  daily.- Set and communicate daily mobility goal to care team and patient/family/caregiver. - Collaborate with rehabilitation services on mobility goals if consulted- Perform Range of Motion 3 times a day.- Reposition patient every 2 hours- Ambulate patient 3 times a day- Out of bed to chair 3 times a day - Out of bed for meals 3 times a day- Out of bed for toileting- Record patient progress and toleration of activity level   Outcome: Progressing     Problem: DISCHARGE PLANNING  Goal: Discharge to home or other facility with appropriate resources  Description: INTERVENTIONS:- Identify barriers to discharge w/patient and caregiver- Arrange for needed discharge resources and transportation as appropriate- Identify discharge learning needs (meds, wound care, etc.)- Arrange for interpretive services to assist at discharge as needed- Refer to Case Management Department for coordinating discharge planning if the patient needs post-hospital services based on physician/advanced practitioner order or complex needs related to functional status, cognitive ability, or social support system  Outcome: Progressing     Problem: Knowledge Deficit  Goal: Patient/family/caregiver demonstrates understanding of disease process, treatment plan, medications, and discharge instructions  Description: Complete learning assessment and assess knowledge base.Interventions:- Provide teaching at level of understanding- Provide teaching via preferred learning methods  Outcome: Progressing     Problem: DISCHARGE PLANNING - CARE MANAGEMENT  Goal: Discharge to post-acute care or home with appropriate resources  Description: INTERVENTIONS:- Conduct assessment to determine patient/family and health care team treatment goals, and need for post-acute services based on payer coverage, community resources, and patient preferences, and barriers to discharge- Address psychosocial, clinical, and financial barriers to discharge as identified in assessment in  conjunction with the patient/family and health care team- Arrange appropriate level of post-acute services according to patient’s   needs and preference and payer coverage in collaboration with the physician and health care team- Communicate with and update the patient/family, physician, and health care team regarding progress on the discharge plan- Arrange appropriate transportation to post-acute venues  Outcome: Progressing     Problem: Prexisting or High Potential for Compromised Skin Integrity  Goal: Skin integrity is maintained or improved  Description: INTERVENTIONS:  - Identify patients at risk for skin breakdown  - Assess and monitor skin integrity including under and around medical devices   - Assess and monitor nutrition and hydration status  - Monitor labs  - Assess for incontinence   - Turn and reposition patient  - Assist with mobility/ambulation  - Relieve pressure over duncan prominences   - Avoid friction and shearing  - Provide appropriate hygiene as needed including keeping skin clean and dry  - Evaluate need for skin moisturizer/barrier cream  - Collaborate with interdisciplinary team  - Patient/family teaching  - Consider wound care consult    Assess:  - Review Misael scale daily  - Clean and moisturize skin every day  - Inspect skin when repositioning, toileting, and assisting with ADLS    Skin Care:  - Avoid use of baby powder, tape, friction and shearing, hot water or constrictive clothing  -Outcome: Progressing
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
## 2025-05-27 PROCEDURE — 99232 SBSQ HOSP IP/OBS MODERATE 35: CPT | Performed by: PSYCHIATRY & NEUROLOGY

## 2025-05-27 RX ORDER — OLANZAPINE 2.5 MG/1
2.5 TABLET, FILM COATED ORAL DAILY
Status: DISCONTINUED | OUTPATIENT
Start: 2025-05-28 | End: 2025-05-27

## 2025-05-27 RX ADMIN — CLONAZEPAM 1 MG: 1 TABLET ORAL at 08:22

## 2025-05-27 RX ADMIN — OLANZAPINE 2.5 MG: 2.5 TABLET, FILM COATED ORAL at 08:21

## 2025-05-27 RX ADMIN — RISPERIDONE 2 MG: 2 TABLET, FILM COATED ORAL at 08:20

## 2025-05-27 RX ADMIN — TRIHEXYPHENIDYL HYDROCHLORIDE 2 MG: 2 TABLET ORAL at 21:12

## 2025-05-27 RX ADMIN — Medication 1000 MCG: at 08:20

## 2025-05-27 RX ADMIN — FOLIC ACID 1 MG: 1 TABLET ORAL at 08:22

## 2025-05-27 RX ADMIN — DIVALPROEX SODIUM 2000 MG: 500 TABLET, EXTENDED RELEASE ORAL at 21:12

## 2025-05-27 RX ADMIN — MAGNESIUM OXIDE TAB 400 MG (241.3 MG ELEMENTAL MG) 400 MG: 400 (241.3 MG) TAB at 08:21

## 2025-05-27 RX ADMIN — Medication 1000 UNITS: at 08:20

## 2025-05-27 RX ADMIN — NYSTATIN 1 APPLICATION: 100000 POWDER TOPICAL at 08:23

## 2025-05-27 RX ADMIN — FLUTICASONE PROPIONATE 2 SPRAY: 50 SPRAY, METERED NASAL at 08:23

## 2025-05-27 RX ADMIN — LEVOTHYROXINE SODIUM 100 MCG: 0.1 TABLET ORAL at 05:50

## 2025-05-27 RX ADMIN — GABAPENTIN 300 MG: 300 CAPSULE ORAL at 15:00

## 2025-05-27 RX ADMIN — Medication 6 MG: at 21:13

## 2025-05-27 RX ADMIN — NYSTATIN 1 APPLICATION: 100000 POWDER TOPICAL at 17:02

## 2025-05-27 RX ADMIN — PHENOBARBITAL 64.8 MG: 64.8 TABLET ORAL at 22:05

## 2025-05-27 RX ADMIN — GABAPENTIN 300 MG: 300 CAPSULE ORAL at 08:21

## 2025-05-27 RX ADMIN — OLANZAPINE 2.5 MG: 2.5 TABLET, FILM COATED ORAL at 17:02

## 2025-05-27 RX ADMIN — MAGNESIUM OXIDE TAB 400 MG (241.3 MG ELEMENTAL MG) 400 MG: 400 (241.3 MG) TAB at 17:02

## 2025-05-27 RX ADMIN — RISPERIDONE 2 MG: 2 TABLET, FILM COATED ORAL at 17:02

## 2025-05-27 RX ADMIN — CLONAZEPAM 1 MG: 1 TABLET ORAL at 17:02

## 2025-05-27 RX ADMIN — ASPIRIN 81 MG CHEWABLE TABLET 81 MG: 81 TABLET CHEWABLE at 08:21

## 2025-05-27 RX ADMIN — TRAZODONE HYDROCHLORIDE 150 MG: 100 TABLET ORAL at 21:12

## 2025-05-27 RX ADMIN — PHENOBARBITAL 64.8 MG: 64.8 TABLET ORAL at 11:07

## 2025-05-27 RX ADMIN — TRIHEXYPHENIDYL HYDROCHLORIDE 2 MG: 2 TABLET ORAL at 08:20

## 2025-05-27 NOTE — PLAN OF CARE
Problem: Alteration in Thoughts and Perception  Goal: Treatment Goal: Gain control of psychotic behaviors/thinking, reduce/eliminate presenting symptoms and demonstrate improved reality functioning upon discharge  Outcome: Progressing  Goal: Refrain from acting on delusional thinking/internal stimuli  Description: Interventions:- Monitor patient closely, per order - Utilize least restrictive measures - Set reasonable limits, give positive feedback for acceptable - Administer medications as ordered and monitor of potential side effects  Outcome: Progressing  Goal: Agree to be compliant with medication regime, as prescribed and report medication side effects  Description: Interventions:- Offer appropriate PRN medication and supervise ingestion; conduct AIMS, as needed   Outcome: Progressing  Goal: Recognize dysfunctional thoughts, communicate reality-based thoughts at the time of discharge  Description: Interventions:- Provide medication and psycho-education to assist patient in compliance and developing insight into his/her illness   Outcome: Progressing     Problem: Ineffective Coping  Goal: Cooperates with admission process  Description: Interventions: - Complete admission process  Outcome: Progressing  Goal: Identifies ineffective coping skills  Outcome: Progressing  Goal: Identifies healthy coping skills  Outcome: Progressing  Goal: Demonstrates healthy coping skills  Outcome: Progressing  Goal: Understands least restrictive measures  Description: Interventions:- Utilize least restrictive behavior  Outcome: Progressing  Goal: Free from restraint events  Description: - Utilize least restrictive measures - Provide behavioral interventions - Redirect inappropriate behaviors   Outcome: Progressing     Problem: Risk for Self Injury/Neglect  Goal: Verbalize thoughts and feelings  Description: Interventions:- Assess and re-assess patient's lethality and potential for self-injury- Engage patient in 1:1 interactions,  daily, for a minimum of 15 minutes- Encourage patient to express feelings, fears, frustrations, hopes- Establish rapport/trust with patient   Outcome: Progressing  Goal: Refrain from harming self  Description: Interventions:- Monitor patient closely, per order- Develop a trusting relationship- Supervise medication ingestion, monitor effects and side effects   Outcome: Progressing  Goal: Attend and participate in unit activities, including therapeutic, recreational, and educational groups  Description: Interventions:- Provide therapeutic and educational activities daily, encourage attendance and participation, and document same in the medical record- Obtain collateral information, encourage visitation and family involvement in care   Outcome: Progressing  Goal: Recognize maladaptive responses and adopt new coping mechanisms  Outcome: Progressing  Goal: Complete daily ADLs, including personal hygiene independently, as able  Description: Interventions:- Observe, teach, and assist patient with ADLS- Monitor and promote a balance of rest/activity, with adequate nutrition and elimination  Outcome: Progressing     Problem: Depression  Goal: Treatment Goal: Demonstrate behavioral control of depressive symptoms, verbalize feelings of improved mood/affect, and adopt new coping skills prior to discharge  Outcome: Progressing     Problem: Anxiety  Goal: Anxiety is at manageable level  Description: Interventions:- Assess and monitor patient's anxiety level. - Monitor for signs and symptoms (heart palpitations, chest pain, shortness of breath, headaches, nausea, feeling jumpy, restlessness, irritable, apprehensive). - Collaborate with interdisciplinary team and initiate plan and interventions as ordered.- [place] patient to unit/surroundings- Explain treatment plan- Encourage participation in care- Encourage verbalization of concerns/fears- Identify coping mechanisms- Assist in developing anxiety-reducing skills- Administer/offer  alternative therapies- Limit or eliminate stimulants  Outcome: Progressing     Problem: Risk for Violence/Aggression Toward Others  Goal: Refrain from harming others  Outcome: Progressing  Goal: Refrain from destructive acts on the environment or property  Outcome: Progressing  Goal: Control angry outbursts  Description: Interventions:- Monitor patient closely, per order- Ensure early verbal de-escalation- Monitor prn medication needs- Set reasonable/therapeutic limits, outline behavioral expectations, and consequences - Provide a non-threatening milieu, utilizing the least restrictive interventions   Outcome: Progressing     Problem: SAFETY ADULT  Goal: Patient will remain free of falls  Description: INTERVENTIONS:- Educate patient on patient safety including physical limitations- Instruct patient to call for assistance with activity - Consult OT/PT to assist with strengthening/mobility - Keep Call bell within reach- Keep bed low and locked with side rails adjusted as appropriate- Keep care items and personal belongings within reach- Initiate and maintain comfort rounds- Offer Toileting every 2 Hours, in advance of need- Initiate/Maintain bed/chair alarm- Obtain necessary fall risk management equipment: walker- Apply yellow socks and bracelet for high fall risk patients- Consider moving patient to room near nurses station  Outcome: Progressing  Goal: Maintain or return to baseline ADL function  Description: INTERVENTIONS:-  Assess patient's ability to carry out ADLs; assess patient's baseline for ADL function and identify physical deficits which impact ability to perform ADLs (bathing, care of mouth/teeth, toileting, grooming, dressing, etc.)- Assess/evaluate cause of self-care deficits - Assess range of motion- Assess patient's mobility; develop plan if impaired- Assess patient's need for assistive devices and provide as appropriate- Encourage maximum independence but intervene and supervise when necessary-  Involve family in performance of ADLs- Assess for home care needs following discharge - Consider OT consult to assist with ADL evaluation and planning for discharge- Provide patient education as appropriate  Outcome: Progressing  Goal: Maintains/Returns to pre admission functional level  Description: INTERVENTIONS:- Perform AM-PAC 6 Click Basic Mobility/ Daily Activity assessment daily.- Set and communicate daily mobility goal to care team and patient/family/caregiver. - Collaborate with rehabilitation services on mobility goals if consulted- Perform Range of Motion 3 times a day.- Reposition patient every 2 hours- Ambulate patient 3 times a day- Out of bed to chair 3 times a day - Out of bed for meals 3 times a day- Out of bed for toileting- Record patient progress and toleration of activity level   Outcome: Progressing     Problem: DISCHARGE PLANNING  Goal: Discharge to home or other facility with appropriate resources  Description: INTERVENTIONS:- Identify barriers to discharge w/patient and caregiver- Arrange for needed discharge resources and transportation as appropriate- Identify discharge learning needs (meds, wound care, etc.)- Arrange for interpretive services to assist at discharge as needed- Refer to Case Management Department for coordinating discharge planning if the patient needs post-hospital services based on physician/advanced practitioner order or complex needs related to functional status, cognitive ability, or social support system  Outcome: Progressing     Problem: Knowledge Deficit  Goal: Patient/family/caregiver demonstrates understanding of disease process, treatment plan, medications, and discharge instructions  Description: Complete learning assessment and assess knowledge base.Interventions:- Provide teaching at level of understanding- Provide teaching via preferred learning methods  Outcome: Progressing     Problem: DISCHARGE PLANNING - CARE MANAGEMENT  Goal: Discharge to post-acute care  or home with appropriate resources  Description: INTERVENTIONS:- Conduct assessment to determine patient/family and health care team treatment goals, and need for post-acute services based on payer coverage, community resources, and patient preferences, and barriers to discharge- Address psychosocial, clinical, and financial barriers to discharge as identified in assessment in conjunction with the patient/family and health care team- Arrange appropriate level of post-acute services according to patient’s   needs and preference and payer coverage in collaboration with the physician and health care team- Communicate with and update the patient/family, physician, and health care team regarding progress on the discharge plan- Arrange appropriate transportation to post-acute venues  Outcome: Progressing     Problem: Prexisting or High Potential for Compromised Skin Integrity  Goal: Skin integrity is maintained or improved  Description: INTERVENTIONS:  - Identify patients at risk for skin breakdown  - Assess and monitor skin integrity including under and around medical devices   - Assess and monitor nutrition and hydration status  - Monitor labs  - Assess for incontinence   - Turn and reposition patient  - Assist with mobility/ambulation  - Relieve pressure over duncan prominences   - Avoid friction and shearing  - Provide appropriate hygiene as needed including keeping skin clean and dry  - Evaluate need for skin moisturizer/barrier cream  - Collaborate with interdisciplinary team  - Patient/family teaching  - Consider wound care consult    Assess:  - Review Misael scale daily  - Clean and moisturize skin as needed  - Inspect skin when repositioning, toileting, and assisting with ADLS  - Assess extremities for adequate circulation and sensation     Bed Management:  - Have minimal linens on bed & keep smooth, unwrinkled  - Change linens as needed when moist or perspiring  - Avoid sitting or lying in one position for more  than 2 hours while in bed?Keep HOB at 35 degrees   - Toileting:  - Assess for incontinence every 2 hours    Activity:  - Encourage activity and walks on unit  - Encourage or provide ROM exercises   - Instruct/ Assist with weight shifting every 2 hours when out of bed in chair  - Consider limitation of chair time 2 hours intervals    Skin Care:  - Avoid use of baby powder, tape, friction and shearing, hot water or constrictive clothing  - Do not massage red bony areas  Outcome: Progressing

## 2025-05-27 NOTE — PROGRESS NOTES
05/27/25 0800   Team Meeting   Meeting Type Daily Rounds   Team Members Present   Team Members Present Physician;Nurse;   Physician Team Member Alonso/Gaye/Emi   Nursing Team Member Joan/Awais/Shantel   Care Management Team Member Yonny   OT Team Member Irasema   Patient/Family Present   Patient Present No   Patient's Family Present No     Patient is sexually preoccupied and disorganized. She is eating 100%. She had a cough over the weekend that she received robitussin for. Here Depakote level was 72. Patient discharge is pending stabilization of and placement. Patient Aging Referral is completed, but team is waiting for her to clear up more before submitting to [facility].
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
## 2025-05-27 NOTE — NURSING NOTE
Patient calm and cooperative, disorganized and labile at times, redirectable. Remains religiously and sexually preoccupied. Denies all psych s/s. Denies pain and unmet needs. Medication compliant. Fall and safety precautions maintained.

## 2025-05-27 NOTE — PROGRESS NOTES
"   05/27/25 0900   Activity/Group Checklist   Group Community meeting   Attendance Attended   Attendance Duration (min) 16-30   Interactions Disorganized interaction  (Pt stated \"A woman's virginity is the best gift that you can give a man,\" and \"I am being murdered\" when asked why she was in room by herself.)   Affect/Mood Appropriate   Goals Achieved Identified feelings;Able to listen to others;Able to engage in interactions;Able to self-disclose;Able to recieve feedback       "

## 2025-05-27 NOTE — NURSING NOTE
Pt disorganized and impulsive during interactions. Pt requires prompts for safe ambulation with walker. Pt napping in room and visible in dayroom during shift. Appears restless, going from room to dayroom frequently and unable to follow redirections. Pt placed on bed alarm in room, non cooperative with utilizing chair alarm due to impulsive behaviors. Pt declined incentive spirometry when encouraged. Pt appeared disorganized and labile while speaking to sister on phone. Pt denies all symptoms at current time.
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
## 2025-05-28 ENCOUNTER — APPOINTMENT (EMERGENCY)
Dept: RADIOLOGY | Facility: HOSPITAL | Age: 70
End: 2025-05-28
Payer: MEDICARE

## 2025-05-28 ENCOUNTER — HOSPITAL ENCOUNTER (EMERGENCY)
Facility: HOSPITAL | Age: 70
Discharge: STILL ADMITTED OR ADMINISTRATIVE | End: 2025-05-28
Attending: EMERGENCY MEDICINE | Admitting: EMERGENCY MEDICINE
Payer: MEDICARE

## 2025-05-28 ENCOUNTER — APPOINTMENT (EMERGENCY)
Dept: CT IMAGING | Facility: HOSPITAL | Age: 70
End: 2025-05-28
Payer: MEDICARE

## 2025-05-28 VITALS
HEART RATE: 87 BPM | RESPIRATION RATE: 18 BRPM | TEMPERATURE: 97.7 F | OXYGEN SATURATION: 100 % | SYSTOLIC BLOOD PRESSURE: 149 MMHG | DIASTOLIC BLOOD PRESSURE: 87 MMHG

## 2025-05-28 DIAGNOSIS — E86.0 DEHYDRATION: ICD-10-CM

## 2025-05-28 DIAGNOSIS — I95.9 HYPOTENSION: Primary | ICD-10-CM

## 2025-05-28 LAB
2HR DELTA HS TROPONIN: 0 NG/L
ALBUMIN SERPL BCG-MCNC: 3.7 G/DL (ref 3.5–5)
ALP SERPL-CCNC: 55 U/L (ref 34–104)
ALT SERPL W P-5'-P-CCNC: 7 U/L (ref 7–52)
ANION GAP SERPL CALCULATED.3IONS-SCNC: 8 MMOL/L (ref 4–13)
APTT PPP: 25 SECONDS (ref 23–34)
AST SERPL W P-5'-P-CCNC: 9 U/L (ref 13–39)
ATRIAL RATE: 69 BPM
BASE EX.OXY STD BLDV CALC-SCNC: 76.2 % (ref 60–80)
BASE EXCESS BLDV CALC-SCNC: -0.4 MMOL/L
BASOPHILS # BLD AUTO: 0 THOUSANDS/ÂΜL (ref 0–0.1)
BASOPHILS NFR BLD AUTO: 0 % (ref 0–1)
BILIRUB SERPL-MCNC: 0.26 MG/DL (ref 0.2–1)
BUN SERPL-MCNC: 14 MG/DL (ref 5–25)
CALCIUM SERPL-MCNC: 8.9 MG/DL (ref 8.4–10.2)
CARDIAC TROPONIN I PNL SERPL HS: 20 NG/L (ref ?–50)
CARDIAC TROPONIN I PNL SERPL HS: 20 NG/L (ref ?–50)
CHLORIDE SERPL-SCNC: 100 MMOL/L (ref 96–108)
CO2 SERPL-SCNC: 29 MMOL/L (ref 21–32)
CREAT SERPL-MCNC: 0.75 MG/DL (ref 0.6–1.3)
EOSINOPHIL # BLD AUTO: 0 THOUSAND/ÂΜL (ref 0–0.61)
EOSINOPHIL NFR BLD AUTO: 0 % (ref 0–6)
ERYTHROCYTE [DISTWIDTH] IN BLOOD BY AUTOMATED COUNT: 12.7 % (ref 11.6–15.1)
FLUAV AG UPPER RESP QL IA.RAPID: NEGATIVE
FLUBV AG UPPER RESP QL IA.RAPID: NEGATIVE
GFR SERPL CREATININE-BSD FRML MDRD: 80 ML/MIN/1.73SQ M
GLUCOSE SERPL-MCNC: 142 MG/DL (ref 65–140)
GLUCOSE SERPL-MCNC: 80 MG/DL (ref 65–140)
HCO3 BLDV-SCNC: 26.7 MMOL/L (ref 24–30)
HCT VFR BLD AUTO: 37.1 % (ref 34.8–46.1)
HGB BLD-MCNC: 12.3 G/DL (ref 11.5–15.4)
IMM GRANULOCYTES # BLD AUTO: 0.01 THOUSAND/UL (ref 0–0.2)
IMM GRANULOCYTES NFR BLD AUTO: 0 % (ref 0–2)
INR PPP: 0.96 (ref 0.85–1.19)
LACTATE SERPL-SCNC: 1.6 MMOL/L (ref 0.5–2)
LACTATE SERPL-SCNC: 2.5 MMOL/L (ref 0.5–2)
LYMPHOCYTES # BLD AUTO: 1.03 THOUSANDS/ÂΜL (ref 0.6–4.47)
LYMPHOCYTES NFR BLD AUTO: 33 % (ref 14–44)
MAGNESIUM SERPL-MCNC: 2.1 MG/DL (ref 1.9–2.7)
MCH RBC QN AUTO: 32.3 PG (ref 26.8–34.3)
MCHC RBC AUTO-ENTMCNC: 33.2 G/DL (ref 31.4–37.4)
MCV RBC AUTO: 97 FL (ref 82–98)
MONOCYTES # BLD AUTO: 0.3 THOUSAND/ÂΜL (ref 0.17–1.22)
MONOCYTES NFR BLD AUTO: 10 % (ref 4–12)
NEUTROPHILS # BLD AUTO: 1.8 THOUSANDS/ÂΜL (ref 1.85–7.62)
NEUTS SEG NFR BLD AUTO: 57 % (ref 43–75)
NRBC BLD AUTO-RTO: 0 /100 WBCS
O2 CT BLDV-SCNC: 14.3 ML/DL
P AXIS: 41 DEGREES
P AXIS: 62 DEGREES
P AXIS: 62 DEGREES
PCO2 BLDV: 54.2 MM HG (ref 42–50)
PH BLDV: 7.31 [PH] (ref 7.3–7.4)
PLATELET # BLD AUTO: 207 THOUSANDS/UL (ref 149–390)
PMV BLD AUTO: 10.2 FL (ref 8.9–12.7)
PO2 BLDV: 45.2 MM HG (ref 35–45)
POTASSIUM SERPL-SCNC: 4.3 MMOL/L (ref 3.5–5.3)
PR INTERVAL: 166 MS
PROCALCITONIN SERPL-MCNC: <0.05 NG/ML
PROT SERPL-MCNC: 6.2 G/DL (ref 6.4–8.4)
PROTHROMBIN TIME: 13.1 SECONDS (ref 12.3–15)
QRS AXIS: 46 DEGREES
QRS AXIS: 46 DEGREES
QRS AXIS: 5 DEGREES
QRSD INTERVAL: 70 MS
QRSD INTERVAL: 76 MS
QRSD INTERVAL: 76 MS
QT INTERVAL: 400 MS
QT INTERVAL: 400 MS
QT INTERVAL: 420 MS
QTC INTERVAL: 429 MS
QTC INTERVAL: 429 MS
QTC INTERVAL: 450 MS
RBC # BLD AUTO: 3.81 MILLION/UL (ref 3.81–5.12)
SARS-COV+SARS-COV-2 AG RESP QL IA.RAPID: NEGATIVE
SODIUM SERPL-SCNC: 137 MMOL/L (ref 135–147)
T WAVE AXIS: 159 DEGREES
T WAVE AXIS: 210 DEGREES
T WAVE AXIS: 210 DEGREES
VENTRICULAR RATE: 69 BPM
WBC # BLD AUTO: 3.14 THOUSAND/UL (ref 4.31–10.16)

## 2025-05-28 PROCEDURE — 36415 COLL VENOUS BLD VENIPUNCTURE: CPT

## 2025-05-28 PROCEDURE — 80053 COMPREHEN METABOLIC PANEL: CPT

## 2025-05-28 PROCEDURE — 84145 PROCALCITONIN (PCT): CPT

## 2025-05-28 PROCEDURE — 93010 ELECTROCARDIOGRAM REPORT: CPT | Performed by: INTERNAL MEDICINE

## 2025-05-28 PROCEDURE — 99285 EMERGENCY DEPT VISIT HI MDM: CPT

## 2025-05-28 PROCEDURE — 87040 BLOOD CULTURE FOR BACTERIA: CPT

## 2025-05-28 PROCEDURE — 93005 ELECTROCARDIOGRAM TRACING: CPT

## 2025-05-28 PROCEDURE — 87804 INFLUENZA ASSAY W/OPTIC: CPT

## 2025-05-28 PROCEDURE — 99233 SBSQ HOSP IP/OBS HIGH 50: CPT

## 2025-05-28 PROCEDURE — 83605 ASSAY OF LACTIC ACID: CPT

## 2025-05-28 PROCEDURE — 87811 SARS-COV-2 COVID19 W/OPTIC: CPT

## 2025-05-28 PROCEDURE — 70487 CT MAXILLOFACIAL W/DYE: CPT

## 2025-05-28 PROCEDURE — 71045 X-RAY EXAM CHEST 1 VIEW: CPT

## 2025-05-28 PROCEDURE — 82805 BLOOD GASES W/O2 SATURATION: CPT

## 2025-05-28 PROCEDURE — 85610 PROTHROMBIN TIME: CPT

## 2025-05-28 PROCEDURE — 70496 CT ANGIOGRAPHY HEAD: CPT

## 2025-05-28 PROCEDURE — 96360 HYDRATION IV INFUSION INIT: CPT

## 2025-05-28 PROCEDURE — 85730 THROMBOPLASTIN TIME PARTIAL: CPT

## 2025-05-28 PROCEDURE — 84484 ASSAY OF TROPONIN QUANT: CPT

## 2025-05-28 PROCEDURE — 82948 REAGENT STRIP/BLOOD GLUCOSE: CPT

## 2025-05-28 PROCEDURE — 85025 COMPLETE CBC W/AUTO DIFF WBC: CPT

## 2025-05-28 PROCEDURE — 83735 ASSAY OF MAGNESIUM: CPT

## 2025-05-28 PROCEDURE — 70498 CT ANGIOGRAPHY NECK: CPT

## 2025-05-28 RX ADMIN — IOHEXOL 85 ML: 350 INJECTION, SOLUTION INTRAVENOUS at 10:56

## 2025-05-28 RX ADMIN — FOLIC ACID 1 MG: 1 TABLET ORAL at 08:38

## 2025-05-28 RX ADMIN — NYSTATIN: 100000 POWDER TOPICAL at 08:38

## 2025-05-28 RX ADMIN — DIVALPROEX SODIUM 2000 MG: 500 TABLET, EXTENDED RELEASE ORAL at 21:08

## 2025-05-28 RX ADMIN — SODIUM CHLORIDE 1000 ML: 0.9 INJECTION, SOLUTION INTRAVENOUS at 11:14

## 2025-05-28 RX ADMIN — MAGNESIUM OXIDE TAB 400 MG (241.3 MG ELEMENTAL MG) 400 MG: 400 (241.3 MG) TAB at 08:38

## 2025-05-28 RX ADMIN — GABAPENTIN 300 MG: 300 CAPSULE ORAL at 08:38

## 2025-05-28 RX ADMIN — ASPIRIN 81 MG CHEWABLE TABLET 81 MG: 81 TABLET CHEWABLE at 08:38

## 2025-05-28 RX ADMIN — PHENOBARBITAL 64.8 MG: 64.8 TABLET ORAL at 22:19

## 2025-05-28 RX ADMIN — RISPERIDONE 2 MG: 2 TABLET, FILM COATED ORAL at 08:38

## 2025-05-28 RX ADMIN — NYSTATIN: 100000 POWDER TOPICAL at 17:17

## 2025-05-28 RX ADMIN — Medication 6 MG: at 21:08

## 2025-05-28 RX ADMIN — GUAIFENESIN AND DEXTROMETHORPHAN 10 ML: 20; 200 SYRUP ORAL at 17:26

## 2025-05-28 RX ADMIN — CLONAZEPAM 1 MG: 1 TABLET ORAL at 17:17

## 2025-05-28 RX ADMIN — TRIHEXYPHENIDYL HYDROCHLORIDE 2 MG: 2 TABLET ORAL at 21:08

## 2025-05-28 RX ADMIN — CLONAZEPAM 1 MG: 1 TABLET ORAL at 08:38

## 2025-05-28 RX ADMIN — Medication 1000 UNITS: at 08:38

## 2025-05-28 RX ADMIN — TRIHEXYPHENIDYL HYDROCHLORIDE 2 MG: 2 TABLET ORAL at 08:38

## 2025-05-28 RX ADMIN — Medication 1000 MCG: at 08:38

## 2025-05-28 RX ADMIN — LEVOTHYROXINE SODIUM 100 MCG: 0.1 TABLET ORAL at 06:22

## 2025-05-28 RX ADMIN — RISPERIDONE 2 MG: 2 TABLET, FILM COATED ORAL at 17:16

## 2025-05-28 RX ADMIN — MAGNESIUM OXIDE TAB 400 MG (241.3 MG ELEMENTAL MG) 400 MG: 400 (241.3 MG) TAB at 17:17

## 2025-05-28 RX ADMIN — TRAZODONE HYDROCHLORIDE 150 MG: 100 TABLET ORAL at 21:08

## 2025-05-28 RX ADMIN — FLUTICASONE PROPIONATE 2 SPRAY: 50 SPRAY, METERED NASAL at 08:38

## 2025-05-28 NOTE — PHYSICAL THERAPY NOTE
Physical TherapyTreatment Note    Patient's Name: Laine Tse    Admitting Diagnosis  Major depressive disorder, single episode, unspecified [F32.9]    Problem List  Problem List[1]    Past Medical History  Past Medical History[2]    Past Surgical History  Past Surgical History[3]    Recent Imaging  XR chest portable   Final Result by Princess Bean MD (05/21 1322)      No acute cardiopulmonary disease.            Workstation performed: [id number]             Recent Vital Signs  Vitals:    05/27/25 0813 05/27/25 1535 05/27/25 2018 05/28/25 0747   BP: 124/69 126/67 127/76 115/62   BP Location: Right arm Left arm Left arm Left arm   Pulse: 68 79 75 73   Resp: 18 17 18 17   Temp: 97.5 °F (36.4 °C) 98 °F (36.7 °C) (!) 97 °F (36.1 °C) 97.6 °F (36.4 °C)   TempSrc: Temporal Temporal Temporal Tympanic   SpO2: 97% 95% 97% 96%   Weight:       Height:            05/22/25 1505   PT Last Visit   PT Visit Date 05/22/25   Note Type   Note Type Treatment   Pain Assessment   Pain Assessment Tool 0-10   Pain Score No Pain   Bed Mobility   Supine to Sit 5  Supervision   Additional items Increased time required   Sit to Supine 5  Supervision   Additional items Increased time required   Transfers   Sit to Stand 5  Supervision   Additional items Increased time required   Stand to Sit 5  Supervision   Additional items Increased time required   Ambulation/Elevation   Gait pattern Step through pattern   Gait Assistance 5  Supervision   Additional items Verbal cues   Assistive Device Rolling walker   Distance 100ft, 150ft, 100ft   Balance   Static Sitting Fair +   Dynamic Sitting Fair +   Static Standing Fair   Dynamic Standing Fair -   Ambulatory Fair -   Endurance Deficit   Endurance Deficit No   Activity Tolerance   Activity Tolerance Patient tolerated treatment well   Medical Staff Made Aware spoke to CM   Nurse Made Aware spoke to RN   Assessment   Prognosis Good   Problem List Decreased strength;Decreased range of  motion;Decreased endurance;Impaired balance;Decreased coordination;Decreased mobility;Decreased cognition;Impaired judgement;Decreased safety awareness;Impaired sensation;Pain   Assessment Pt continues to ambulate with supervision using RW. No LOB. Does leave walker behind at times. Endurance is improved from last visit. Pt with poor safety awareness increasing fall risk.   Barriers to Discharge Inaccessible home environment;Decreased caregiver support   Goals   Patient Goals pt not able to state appropriate goals   STG Expiration Date 06/07/25   Short Term Goal #1 see eval note   PT Treatment Day 2   Plan   Treatment/Interventions ADL retraining;Functional transfer training;LE strengthening/ROM;Elevations;Therapeutic exercise;Endurance training;Patient/family training;Equipment eval/education;Bed mobility;Gait training;Spoke to nursing;Spoke to case management;OT   Progress Progressing toward goals   PT Frequency 2-3x/wk   Discharge Recommendation   Rehab Resource Intensity Level, PT II (Moderate Resource Intensity)   Equipment Recommended Walker   Walker Package Recommended Wheeled walker   AM-PAC Basic Mobility Inpatient   Turning in Flat Bed Without Bedrails 3   Lying on Back to Sitting on Edge of Flat Bed Without Bedrails 3   Moving Bed to Chair 3   Standing Up From Chair Using Arms 3   Walk in Room 3   Climb 3-5 Stairs With Railing 3   Basic Mobility Inpatient Raw Score 18   Basic Mobility Standardized Score 41.05   [facility] Highest Level Of Mobility   -HLM Goal 6: Walk 10 steps or more   -HLM Achieved 8: Walk 250 feet ot more   Education   Education Provided Mobility training;Home exercise program;Assistive device   Patient Explanation/teachback used;Demonstrates verbal understanding   End of Consult   Patient Position at End of Consult Bedside chair;All needs within reach         Zachary Valencia PT, DPT         [1]   Patient Active Problem List  Diagnosis    Hyperlipidemia    Hyponatremia     Hypothyroidism    Medical clearance for psychiatric admission    Fall    Injury of right toe, initial encounter    Seizure disorder (HCC)    Hyperglycemia    Hypertension    Schizoaffective disorder (HCC)    Unsteady gait    Osteoporosis    Mood insomnia (HCC)    Folic acid deficiency    Anemia    Pre-diabetes    Decreased hearing of both ears    Class 1 obesity with body mass index (BMI) of 32.0 to 32.9 in adult    Chronic midline low back pain without sciatica    Dermatitis    Chronic cough    T wave inversion on electrocardiogram    Rash    Skin lesion of left lower extremity    Severe protein-calorie malnutrition (HCC)    T12 compression fracture (HCC)    Facial laceration    AMS (altered mental status)    Hypomagnesemia    Tremors of nervous system   [2]   Past Medical History:  Diagnosis Date    Anxiety     Benign essential hypertension     resolved; 06/15/16    Depression     Depression with anxiety     Fracture of fifth metatarsal bone of right foot with delayed healing     last assessed 04/12/16; fracture of metatarsal bone, right, closed, initial encounter    Hyperlipidemia     last assessed 11/28/17    Hypothyroidism     last assessed 11/28/2017    Insomnia     Obesity     Schizoaffective disorder (HCC)     last assessed 05/18/2017    Seizure disorder (HCC)     last assessed 03/28/17    Seizures (HCC)    [3]   Past Surgical History:  Procedure Laterality Date    COLONOSCOPY      complete    IA COLONOSCOPY FLX DX W/COLLJ SPEC WHEN PFRMD N/A 8/30/2018    Procedure: COLONOSCOPY with polypectomies;  Surgeon: Andriy Lopez MD;  Location: [facility];  Service: Gastroenterology

## 2025-05-28 NOTE — NURSING NOTE
Patient returned from the unit denies all psych s/s. Patient is calm, visible, and social. No sign of distress noted, patient is medication compliant. Patient remains disorganized, irritable at times, poor safety awareness. No further distress noted. Safety checks maintained.
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
## 2025-05-28 NOTE — CODE DOCUMENTATION
Patient appeared unsteady and increase lethargy. BP low at 79/43. RRT called. Slurred speech noticed by psych provider. Patient placed on ANNA to be assessed.

## 2025-05-28 NOTE — ED PROVIDER NOTES
Time reflects when diagnosis was documented in both MDM as applicable and the Disposition within this note       Time User Action Codes Description Comment    5/28/2025  2:03 PM Jorge Walls Add [I95.9] Hypotension     5/28/2025  2:03 PM Jorge Walls Add [E86.0] Dehydration           ED Disposition       ED Disposition   Discharge    Condition   Stable    Date/Time   Wed May 28, 2025  2:03 PM    Comment   Laine Tse discharge to home/self care.                   Assessment & Plan       Medical Decision Making  Patient is a 69-year-old female coming in for evaluation of a hypotension episode while in the psych floor.  Patient initially did have an elevated lactic of 2.5, as well as a troponin of 20.  Patient is afebrile, blood pressure did improve with 2 L of normal saline.  Repeat lactic was within normal limits.  White blood cells are in line with previous.  Workup was otherwise benign.  Patient was feeling better, and mentating appropriately by time of arrival to the emergency department.  At this time, suspect that patient had a hypotension episode secondary to potentially polypharmacy, versus dehydration.  As patient has no medical need for medical admission at this time, and blood pressures within normal is, will admit back to psychiatry for further evaluation and treatment    Amount and/or Complexity of Data Reviewed  Labs: ordered. Decision-making details documented in ED Course.  Radiology: ordered and independent interpretation performed. Decision-making details documented in ED Course.    Risk  Prescription drug management.        ED Course as of 05/28/25 2055   Wed May 28, 2025   1115 WBC(!): 3.14  In line with previous   1133 LACTIC ACID(!): 2.5   1133 Per CT facial prior to coming down to emergency room from rapid response:    FINDINGS:     SOFT TISSUES: No discrete soft tissue mass.  No signs of soft tissue infection.  No abscess formation.  No hematoma.     DENTITION: Normal dentition.    "  FACIAL BONES: There is no facial bone fracture identified.  No discrete lytic or blastic lesion.  No destructive lesions.     ORBITS: Normal globes.  Preseptal and retrobulbar soft tissues are unremarkable.     SINUSES: The paranasal sinuses are clear.     IMPRESSION:     Normal CT of the facial soft tissues.  No facial fracture identified.     1148 Per CTA head and neck, Part 1, ordered by me at [facility], and patient received prior to coming down to the ER:    \"Narrative & Impression  CTA NECK AND BRAIN WITH AND WITHOUT CONTRAST     INDICATION: hypotension, slur speech     COMPARISON:   None.     TECHNIQUE:  Routine CT imaging of the Brain without contrast.Post contrast imaging was performed after administration of iodinated contrast through the neck and brain. Post contrast axial 0.625 mm images timed to opacify the arterial system.  3D   rendering was performed on an independent workstation.   MIP reconstructions performed. Coronal and sagittal reconstructions were performed of the non contrast portion of the brain.     Radiation dose length product (DLP) for this visit:  1402 mGy-cm. .  This examination, like all CT scans performed in the [facility], was performed utilizing techniques to minimize radiation dose exposure, including the use of iterative   reconstruction and automated exposure control.     IV Contrast:  85 mL of iohexol (OMNIPAQUE)     IMAGE QUALITY:   Diagnostic     FINDINGS:  NONCONTRAST BRAIN  PARENCHYMA: Decreased attenuation is noted in periventricular and subcortical white matter demonstrating an appearance that is statistically most likely to represent mild microangiopathic change.     No CT signs of acute infarction.  No intracranial mass, mass effect or midline shift.  No acute parenchymal hemorrhage.     VENTRICLES AND EXTRA-AXIAL SPACES:Normal for the patient's age.     VISUALIZED ORBITS: Normal.     PARANASAL SINUSES: Normal.     CTA NECK     ARCH AND GREAT VESSELS: Mild " "atherosclerotic changes with no severe stenosis.  VERTEBRAL ARTERIES: Patent extracranial segments. 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  RIGHT CAROTID: No stenosis.    No dissection.\"           1148 Part 2 of CTA:    \"LEFT CAROTID: No stenosis.    No dissection.  NASCET criteria was used to determine the degree of internal carotid artery diameter stenosis.        CTA BRAIN:  INTERNAL CAROTID ARTERIES: No stenosis or occlusion.  ANTERIOR CEREBRAL ARTERY CIRCULATION:  No stenosis or occlusion.  MIDDLE CEREBRAL ARTERY CIRCULATION:  No stenosis or occlusion.  DISTAL VERTEBRAL ARTERIES:  No stenosis or occlusion.BASILAR ARTERY:  No stenosis or occlusion.  POSTERIOR CEREBRAL ARTERIES: No stenosis or occlusion.  VENOUS STRUCTURES:  Normal.     NON VASCULAR ANATOMY  BONY STRUCTURES:  No acute osseous abnormality.     SOFT TISSUES OF THE NECK:  Normal.     THORACIC INLET:  Unremarkable.        IMPRESSION:     CT Brain:  No acute intracranial abnormality.     CT Angiography:  Unremarkable CTA neck and brain.\"     1357 Delta 2hr hsTnI: 0   1357 LACTIC ACID: 1.6   1403 Patient is medically clear for psychiatric admission   1404 XR chest portable  No acute cardiopulmonary disease.       Medications   sodium chloride 0.9 % bolus 1,000 mL (0 mL Intravenous Stopped 5/28/25 1214)   iohexol (OMNIPAQUE) 350 MG/ML injection (MULTI-DOSE) 85 mL (85 mL Intravenous Given 5/28/25 1056)       ED Risk Strat Scores                    No data recorded                            History of Present Illness       Chief Complaint   Patient presents with    Weakness - Generalized     Pt brought down to ED as a rapid response from 6th floor psych. Per 6th floor staff pt had an episode where she was not responding normally and also had low blood pressure. Per staff, pt was acting more like herself while rapid response was underway       Past Medical History[1]   Past Surgical History[2]   Family History[3]   Social History[4] "   E-Cigarette/Vaping    E-Cigarette Use Never User       E-Cigarette/Vaping Substances    Nicotine No     THC No     CBD No     Flavoring No     Other No     Unknown No       I have reviewed and agree with the history as documented.     Patient is a 69-year-old female who was on the sixth floor of [facility], and the older adult behavioral health unit, for schizophrenia.  Patient was in the day room, when she was found to be slumped over in the chair, brought to the bed, where she found to be hypotensive into the 60s and 70s systolically.  Patient was diaphoretic, and altered, with alleged slurring by staff who knew her.  By the time I arrived, patient had returned to her baseline, was getting IV fluids, as well as laying down, her feet elevated.  Patient is psychotic and disoriented at baseline, with Tenriism and sexual fixations.  Unable to get clear history from patient.  Per discussions with staff, as well as chart review, patient received her medications approximate an hour and a half prior to the incident, no new medications.  However patient is on small doses of phenobarb for her seizure history, as well as Depakote.  Patient is on Klonopin daily, but no changes in his medications at this time.  Patient by my time arrival,          Review of Systems   Unable to perform ROS: Psychiatric disorder (Patient complaining of psych floor, psychotic at baseline)           Objective       ED Triage Vitals   Temperature Pulse Blood Pressure Respirations SpO2 Patient Position - Orthostatic VS   05/28/25 1126 05/28/25 1126 05/28/25 1126 05/28/25 1126 05/28/25 1126 05/28/25 1126   97.7 °F (36.5 °C) 68 110/62 18 100 % Lying      Temp Source Heart Rate Source BP Location FiO2 (%) Pain Score    05/28/25 1126 05/28/25 1126 05/28/25 1126 -- 05/28/25 1315    Oral Monitor Left arm  No Pain      Vitals      Date and Time Temp Pulse SpO2 Resp BP Pain Score FACES Pain Rating User   05/28/25 1421 -- 87 100 % 18 149/87 -- -- MN    05/28/25 1315 -- 68 100 % 16 133/83 No Pain --    05/28/25 1245 -- 68 100 % 16 127/72 -- --    05/28/25 1221 -- 69 100 % 16 112/73 -- --    05/28/25 1126 97.7 °F (36.5 °C) 68 100 % 18 110/62 -- --             Physical Exam  Vitals reviewed.   Constitutional:       General: She is in acute distress.      Appearance: Normal appearance. She is obese. She is ill-appearing and diaphoretic.   HENT:      Head: Normocephalic and atraumatic.      Right Ear: External ear normal.      Left Ear: External ear normal.      Nose: Nose normal.      Mouth/Throat:      Mouth: Mucous membranes are moist.      Pharynx: No oropharyngeal exudate or posterior oropharyngeal erythema.     Eyes:      Conjunctiva/sclera: Conjunctivae normal.       Cardiovascular:      Rate and Rhythm: Normal rate.      Comments: 2+ bilateral posterior tibial pulses  Pulmonary:      Effort: Pulmonary effort is normal.   Abdominal:      Palpations: Abdomen is soft.      Tenderness: There is no abdominal tenderness. There is no guarding.     Musculoskeletal:         General: Normal range of motion.      Cervical back: Normal range of motion.      Right lower leg: No edema.      Left lower leg: No edema.     Skin:     General: Skin is warm.     Neurological:      Mental Status: She is alert.         Results Reviewed       Procedure Component Value Units Date/Time    Blood culture #1 [[id number]] Collected: 05/28/25 1227    Lab Status: Preliminary result Specimen: Blood from Arm, Left Updated: 05/28/25 2001     Blood Culture Received in Microbiology Lab. Culture in Progress.    Blood culture #2 [[id number]] Collected: 05/28/25 1227    Lab Status: Preliminary result Specimen: Blood from Arm, Left Updated: 05/28/25 2001     Blood Culture Received in Microbiology Lab. Culture in Progress.    HS Troponin I 2hr [[id number]]  (Normal) Collected: 05/28/25 1324    Lab Status: Final result Specimen: Blood from Arm, Left Updated: 05/28/25 1354     hs TnI 2hr 20 ng/L       Delta 2hr hsTnI 0 ng/L     Lactic acid 2 Hours [[id number]]  (Normal) Collected: 05/28/25 1324    Lab Status: Final result Specimen: Blood from Arm, Left Updated: 05/28/25 1346     LACTIC ACID 1.6 mmol/L     Narrative:      Result may be elevated if tourniquet was used during collection.    Procalcitonin [[id number]]  (Normal) Collected: 05/28/25 1105    Lab Status: Final result Specimen: Blood from Arm, Right Updated: 05/28/25 1139     Procalcitonin <0.05 ng/ml     HS Troponin 0hr (reflex protocol) [[id number]]  (Normal) Collected: 05/28/25 1105    Lab Status: Final result Specimen: Blood from Arm, Right Updated: 05/28/25 1137     hs TnI 0hr 20 ng/L     Protime-INR [[id number]]  (Normal) Collected: 05/28/25 1105    Lab Status: Final result Specimen: Blood from Arm, Right Updated: 05/28/25 1137     Protime 13.1 seconds      INR 0.96    Narrative:      INR Therapeutic Range    Indication                                             INR Range      Atrial Fibrillation                                               2.0-3.0  Hypercoagulable State                                    2.0.2.3  Left Ventricular Asist Device                            2.0-3.0  Mechanical Heart Valve                                  -    Aortic(with afib, MI, embolism, HF, LA enlargement,    and/or coagulopathy)                                     2.0-3.0 (2.5-3.5)     Mitral                                                             2.5-3.5  Prosthetic/Bioprosthetic Heart Valve               2.0-3.0  Venous thromboembolism (VTE: VT, PE        2.0-3.0    APTT [[id number]]  (Normal) Collected: 05/28/25 1105    Lab Status: Final result Specimen: Blood from Arm, Right Updated: 05/28/25 1137     PTT 25 seconds     Comprehensive metabolic panel [[id number]]  (Abnormal) Collected: 05/28/25 1105    Lab Status: Final result Specimen: Blood from Arm, Right Updated: 05/28/25 1131     Sodium 137 mmol/L      Potassium 4.3 mmol/L      Chloride 100 mmol/L       CO2 29 mmol/L      ANION GAP 8 mmol/L      BUN 14 mg/dL      Creatinine 0.75 mg/dL      Glucose 80 mg/dL      Calcium 8.9 mg/dL      AST 9 U/L      ALT 7 U/L      Alkaline Phosphatase 55 U/L      Total Protein 6.2 g/dL      Albumin 3.7 g/dL      Total Bilirubin 0.26 mg/dL      eGFR 80 ml/min/1.73sq m     Narrative:      National Kidney Disease Foundation guidelines for Chronic Kidney Disease (CKD):     Stage 1 with normal or high GFR (GFR > 90 mL/min/1.73 square meters)    Stage 2 Mild CKD (GFR = 60-89 mL/min/1.73 square meters)    Stage 3A Moderate CKD (GFR = 45-59 mL/min/1.73 square meters)    Stage 3B Moderate CKD (GFR = 30-44 mL/min/1.73 square meters)    Stage 4 Severe CKD (GFR = 15-29 mL/min/1.73 square meters)    Stage 5 End Stage CKD (GFR <15 mL/min/1.73 square meters)  Note: GFR calculation is accurate only with a steady state creatinine    FLU/COVID Rapid Antigen (30 min. TAT) - Preferred screening test in ED [[id number]]  (Normal) Collected: 05/28/25 1104    Lab Status: Final result Specimen: Nares from Nose Updated: 05/28/25 1131     SARS COV Rapid Antigen Negative     Influenza A Rapid Antigen Negative     Influenza B Rapid Antigen Negative    Narrative:      This test has been performed using the Quidel Sangeeta 2 FLU+SARS Antigen test under the Emergency Use Authorization (EUA). This test has been validated by the  and verified by the performing laboratory. The Sangeeta uses lateral flow immunofluorescent sandwich assay to detect SARS-COV, Influenza A and Influenza B Antigen.     The Quidel Sangeeta 2 SARS Antigen test does not differentiate between SARS-CoV and SARS-CoV-2.     Negative results are presumptive and may be confirmed with a molecular assay, if necessary, for patient management. Negative results do not rule out SARS-CoV-2 or influenza infection and should not be used as the sole basis for treatment or patient management decisions. A negative test result may occur if the level of antigen  in a sample is below the limit of detection of this test.     Positive results are indicative of the presence of viral antigens, but do not rule out bacterial infection or co-infection with other viruses.     All test results should be used as an adjunct to clinical observations and other information available to the provider.    FOR PEDIATRIC PATIENTS - copy/paste COVID Guidelines URL to browser: [email or web address]    Magnesium [[id number]]  (Normal) Collected: 05/28/25 1105    Lab Status: Final result Specimen: Blood from Arm, Right Updated: 05/28/25 1131     Magnesium 2.1 mg/dL     Lactic acid [[id number]]  (Abnormal) Collected: 05/28/25 1105    Lab Status: Final result Specimen: Blood from Arm, Right Updated: 05/28/25 1129     LACTIC ACID 2.5 mmol/L     Narrative:      Result may be elevated if tourniquet was used during collection.    Blood gas, Venous [[id number]]  (Abnormal) Collected: 05/28/25 1105    Lab Status: Final result Specimen: Blood from Arm, Right Updated: 05/28/25 1117     pH, Maninder 7.311     pCO2, Maninder 54.2 mm Hg      pO2, Maninder 45.2 mm Hg      HCO3, Maninder 26.7 mmol/L      Base Excess, Maninder -0.4 mmol/L      O2 Content, Maninder 14.3 ml/dL      O2 HGB, VENOUS 76.2 %     CBC and differential [[id number]]  (Abnormal) Collected: 05/28/25 1105    Lab Status: Final result Specimen: Blood from Arm, Right Updated: 05/28/25 1114     WBC 3.14 Thousand/uL      RBC 3.81 Million/uL      Hemoglobin 12.3 g/dL      Hematocrit 37.1 %      MCV 97 fL      MCH 32.3 pg      MCHC 33.2 g/dL      RDW 12.7 %      MPV 10.2 fL      Platelets 207 Thousands/uL      nRBC 0 /100 WBCs      Segmented % 57 %      Immature Grans % 0 %      Lymphocytes % 33 %      Monocytes % 10 %      Eosinophils Relative 0 %      Basophils Relative 0 %      Absolute Neutrophils 1.80 Thousands/µL      Absolute Immature Grans 0.01 Thousand/uL      Absolute Lymphocytes 1.03 Thousands/µL      Absolute Monocytes  0.30 Thousand/µL      Eosinophils Absolute 0.00 Thousand/µL      Basophils Absolute 0.00 Thousands/µL             XR chest portable   ED Interpretation by Jorge Walls PA-C (05/28 [id number])   Similar to previous. No acute consolidation      Final Interpretation by Robbie Long MD (05/28 4779)      No acute cardiopulmonary disease.            Workstation performed: [id number]             Procedures    ED Medication and Procedure Management   Prior to Admission Medications   Prescriptions Last Dose Informant Patient Reported? Taking?   Acetaminophen Extra Strength 500 MG TABS   No No   Sig: TAKE 2 TABLETS (1000MG) BY MOUTH 3X DAILY AS NEEDED FOR MILD PAIN/FEVER *AHMAD   Aspirin Low Dose 81 MG chewable tablet   No No   Sig: CHEW 1 TABLET BY MOUTH ONCE DAILY @8AM (CAD) *MIREYA   Calcium Carbonate-Vitamin D (OSCAL 500/200 D-3 PO)   Yes No   Sig: Take by mouth   Diclofenac Sodium (VOLTAREN) 1 %  Care Giver Yes No   Sig: Apply 2 g topically 4 (four) times a day To knee   Emollient (cetaphil) cream  Care Giver Yes No   Sig: Apply topically as needed for dry skin   Incontinence Supplies MISC   No No   Sig: Use if needed (incontinence) SIZE XL PULL UP DISPOSABLE BRIEFS   Incontinence Supply Disposable (Disposable Brief Large) MISC   No No   Sig: Use every 12 (twelve) hours   L-Theanine 100 MG CAPS  Self Yes No   Sig: Take by mouth 2 (two) times a day   LORazepam (ATIVAN) 0.5 mg tablet  Self Yes No   Sig: Take 0.5 mg by mouth 2 (two) times a day as needed for anxiety   Menthol, Topical Analgesic, (Bengay Ultra Strength) 5 % PTCH  Care Giver, Outside Facility (Specify) No No   Sig: Apply 1 patch topically in the morning   Menthol-Methyl Salicylate (MURIEL AGUDELO GREASELESS) 10-15 % greaseless cream   No No   Sig: Apply topically 2 (two) times a day   OLANZapine (ZyPREXA) 20 MG tablet   No No   Sig: Take 1 tablet (20 mg total) by mouth daily at bedtime   PHENobarbital 64.8 mg tablet  Care Giver, Outside Facility (Specify)  No No   Sig: Take 1 tablet (64.8 mg total) by mouth every 12 (twelve) hours   TiZANidine (Zanaflex) 2 MG capsule  Self Yes No   Sig: Take 2 mg by mouth in the morning Daily prn   alendronate (FOSAMAX) 70 mg tablet   No No   Sig: Take 1 tablet (70 mg total) by mouth every 7 days Every 7 days on    asenapine (Saphris) 10 mg SL tablet  Care Giver Yes No   Sig: Place 5 mg under the tongue 2 (two) times a day   atorvastatin (LIPITOR) 20 mg tablet   No No   Sig: Take 1 tablet (20 mg total) by mouth daily   benzonatate (TESSALON) 200 MG capsule  Self Yes No   Sig: Take 200 mg by mouth 2 (two) times a day as needed for cough   bismuth subsalicylate (PEPTO BISMOL) 262 MG chewable tablet  Care Giver Yes No   Sig: Chew 524 mg every 6 (six) hours as needed for indigestion For diarrhea   busPIRone (BUSPAR) 15 mg tablet  Care Giver Yes No   Sig: Take 15 mg by mouth 2 (two) times a day   calcium carbonate-vitamin D 500 mg-5 mcg per tablet  Care Giver, Outside Facility (Specify) Yes No   Sig: Take 1 tablet by mouth daily with breakfast   cetirizine (ZyrTEC) 5 MG chewable tablet   Yes No   Sig: Chew 5 mg daily at bedtime One daily prn for allergies   chlorhexidine (PERIDEX) 0.12 % solution  Care Giver Yes No   Sig: Apply 15 mL to the mouth or throat 2 (two) times a day   clonazePAM (KlonoPIN) 1 mg tablet  Care Giver, Outside Facility (Specify) No No   Sig: Take 1 tablet (1 mg total) by mouth 2 (two) times a day   Patient not taking: Reported on 2025   diphenhydrAMINE (BENADRYL) 25 mg capsule  Care Giver Yes No   Sig: Take 25 mg by mouth daily at bedtime as needed for itching   divalproex sodium (DEPAKOTE) 500 mg DR tablet  Self Yes No   Sig: Take 500 mg by mouth 3 qhs   docusate sodium (COLACE) 100 mg capsule  Care Giver Yes No   Sig: Take 100 mg by mouth 2 (two) times a day as needed for constipation   fluticasone (FLONASE) 50 mcg/act nasal spray  Care Giver, Outside Facility (Specify) No No   Si sprays into each  nostril daily   gabapentin (NEURONTIN) 100 mg capsule   No No   Sig: Take 2 capsules (200 mg total) by mouth 2 (two) times a day   guaiFENesin 400 mg   No No   Sig: Take 1 tablet (400 mg total) by mouth every 6 (six) hours as needed for cough   levothyroxine 100 mcg tablet  Care Giver, Outside Facility (Specify) No No   Sig: Take 1 tablet (100 mcg total) by mouth daily   lidocaine (LIDODERM) 5 %  Care Giver Yes No   Sig: Apply 1 patch topically daily Remove & Discard patch within 12 hours or as directed by MD for back prn   lidocaine (LMX) 4 % cream   Yes No   Sig: Apply topically as needed for mild pain   loxapine (LOXITANE) 50 MG capsule   Yes No   Sig: Take 50 mg by mouth in the morning   melatonin 3 mg   No No   Sig: Take 2 tablets (6 mg total) by mouth daily at bedtime   multivitamin (THERAGRAN) TABS   Yes No   Sig: Take 1 tablet by mouth daily   mupirocin (BACTROBAN) 2 % ointment   Yes No   Sig: Apply topically 2 (two) times a day as needed Left knee bid prn   polyethylene glycol (GLYCOLAX) 17 GM/SCOOP  Outside Facility (Specify), Care Giver No No   Sig: MIX 1 CAPFUL(17GM) IN 8OZ OF LIQUID AND DRINK DAILY @ 8AM(CONSTIPATION) *AHMAD   polyethylene glycol (GLYCOLAX) 17 GM/SCOOP powder   Yes No   Sig: Take 17 g by mouth daily   senna-docusate sodium (SENOKOT-S) 8.6-50 mg per tablet  Care Giver, Outside Facility (Specify) No No   Sig: Take 1 tablet by mouth daily at bedtime   sodium chloride (OCEAN) 0.65 % nasal spray  Care Giver Yes No   Si spray into each nostril as needed for congestion As directed up to 6x per day for nasal dryness   traZODone (DESYREL) 150 mg tablet   No No   Sig: Take 1 tablet (150 mg total) by mouth daily at bedtime   trihexyphenidyl (ARTANE) 2 mg tablet   No No   Sig: Take 1 tablet (2 mg total) by mouth 2 (two) times a day      Facility-Administered Medications: None     Discharge Medication List as of 2025  3:07 PM        CONTINUE these medications which have NOT CHANGED     Details   Acetaminophen Extra Strength 500 MG TABS TAKE 2 TABLETS (1000MG) BY MOUTH 3X DAILY AS NEEDED FOR MILD PAIN/FEVER *AHMAD, Normal      alendronate (FOSAMAX) 70 mg tablet Take 1 tablet (70 mg total) by mouth every 7 days Every 7 days on Fridays, Starting Mon 4/21/2025, Normal      asenapine (Saphris) 10 mg SL tablet Place 5 mg under the tongue 2 (two) times a day, Historical Med      Aspirin Low Dose 81 MG chewable tablet CHEW 1 TABLET BY MOUTH ONCE DAILY @8AM (CAD) *MIREYA, Normal      atorvastatin (LIPITOR) 20 mg tablet Take 1 tablet (20 mg total) by mouth daily, Starting Thu 4/24/2025, Normal      benzonatate (TESSALON) 200 MG capsule Take 200 mg by mouth 2 (two) times a day as needed for cough, Historical Med      bismuth subsalicylate (PEPTO BISMOL) 262 MG chewable tablet Chew 524 mg every 6 (six) hours as needed for indigestion For diarrhea, Historical Med      busPIRone (BUSPAR) 15 mg tablet Take 15 mg by mouth 2 (two) times a day, Historical Med      !! Calcium Carbonate-Vitamin D (OSCAL 500/200 D-3 PO) Take by mouth, Historical Med      !! calcium carbonate-vitamin D 500 mg-5 mcg per tablet Take 1 tablet by mouth daily with breakfast, Historical Med      cetirizine (ZyrTEC) 5 MG chewable tablet Chew 5 mg daily at bedtime One daily prn for allergies, Historical Med      chlorhexidine (PERIDEX) 0.12 % solution Apply 15 mL to the mouth or throat 2 (two) times a day, Historical Med      clonazePAM (KlonoPIN) 1 mg tablet Take 1 tablet (1 mg total) by mouth 2 (two) times a day, Starting Mon 3/10/2025, Until Wed 4/9/2025, Normal      Diclofenac Sodium (VOLTAREN) 1 % Apply 2 g topically 4 (four) times a day To knee, Historical Med      diphenhydrAMINE (BENADRYL) 25 mg capsule Take 25 mg by mouth daily at bedtime as needed for itching, Historical Med      divalproex sodium (DEPAKOTE) 500 mg DR tablet Take 500 mg by mouth 3 qhs, Historical Med      docusate sodium (COLACE) 100 mg capsule Take 100 mg by mouth 2  (two) times a day as needed for constipation, Historical Med      Emollient (cetaphil) cream Apply topically as needed for dry skin, Historical Med      fluticasone (FLONASE) 50 mcg/act nasal spray 2 sprays into each nostril daily, Starting Sun 3/9/2025, Normal      gabapentin (NEURONTIN) 100 mg capsule Take 2 capsules (200 mg total) by mouth 2 (two) times a day, Starting Fri 3/28/2025, Print      guaiFENesin 400 mg Take 1 tablet (400 mg total) by mouth every 6 (six) hours as needed for cough, Starting Thu 3/13/2025, Normal      Incontinence Supplies MISC Use if needed (incontinence) SIZE XL PULL UP DISPOSABLE BRIEFS, Starting Thu 4/24/2025, Normal      Incontinence Supply Disposable (Disposable Brief Large) MISC Use every 12 (twelve) hours, Starting Thu 4/24/2025, Normal      L-Theanine 100 MG CAPS Take by mouth 2 (two) times a day, Historical Med      levothyroxine 100 mcg tablet Take 1 tablet (100 mcg total) by mouth daily, Starting Sun 3/9/2025, Normal      lidocaine (LIDODERM) 5 % Apply 1 patch topically daily Remove & Discard patch within 12 hours or as directed by MD for back prn, Historical Med      lidocaine (LMX) 4 % cream Apply topically as needed for mild pain, Historical Med      LORazepam (ATIVAN) 0.5 mg tablet Take 0.5 mg by mouth 2 (two) times a day as needed for anxiety, Historical Med      loxapine (LOXITANE) 50 MG capsule Take 50 mg by mouth in the morning, Historical Med      melatonin 3 mg Take 2 tablets (6 mg total) by mouth daily at bedtime, Starting Mon 4/28/2025, Normal      Menthol, Topical Analgesic, (Bengay Ultra Strength) 5 % PTCH Apply 1 patch topically in the morning, Starting Thu 11/21/2024, Normal      Menthol-Methyl Salicylate (MURIEL AGUDELO GREASELESS) 10-15 % greaseless cream Apply topically 2 (two) times a day, Starting Mon 4/14/2025, Normal      multivitamin (THERAGRAN) TABS Take 1 tablet by mouth daily, Historical Med      mupirocin (BACTROBAN) 2 % ointment Apply topically 2 (two)  times a day as needed Left knee bid prn, Historical Med      OLANZapine (ZyPREXA) 20 MG tablet Take 1 tablet (20 mg total) by mouth daily at bedtime, Starting Fri 3/28/2025, Print      PHENobarbital 64.8 mg tablet Take 1 tablet (64.8 mg total) by mouth every 12 (twelve) hours, Starting Tue 2/11/2025, Normal      !! polyethylene glycol (GLYCOLAX) 17 GM/SCOOP powder Take 17 g by mouth daily, Historical Med      !! polyethylene glycol (GLYCOLAX) 17 GM/SCOOP MIX 1 CAPFUL(17GM) IN 8OZ OF LIQUID AND DRINK DAILY @ 8AM(CONSTIPATION) *AHMAD, Normal      senna-docusate sodium (SENOKOT-S) 8.6-50 mg per tablet Take 1 tablet by mouth daily at bedtime, Starting Sun 3/9/2025, Normal      sodium chloride (OCEAN) 0.65 % nasal spray 1 spray into each nostril as needed for congestion As directed up to 6x per day for nasal dryness, Historical Med      TiZANidine (Zanaflex) 2 MG capsule Take 2 mg by mouth in the morning Daily prn, Historical Med      traZODone (DESYREL) 150 mg tablet Take 1 tablet (150 mg total) by mouth daily at bedtime, Starting Fri 3/28/2025, Print      trihexyphenidyl (ARTANE) 2 mg tablet Take 1 tablet (2 mg total) by mouth 2 (two) times a day, Starting Fri 3/28/2025, Print       !! - Potential duplicate medications found. Please discuss with provider.        No discharge procedures on file.  ED SEPSIS DOCUMENTATION   Time reflects when diagnosis was documented in both MDM as applicable and the Disposition within this note       Time User Action Codes Description Comment    5/28/2025  2:03 PM Jorge Walls [I95.9] Hypotension     5/28/2025  2:03 PM oJrge Walls [E86.0] Dehydration                      [1]   Past Medical History:  Diagnosis Date    Anxiety     Benign essential hypertension     resolved; 06/15/16    Depression     Depression with anxiety     Fracture of fifth metatarsal bone of right foot with delayed healing     last assessed 04/12/16; fracture of metatarsal bone, right, closed,  initial encounter    Hyperlipidemia     last assessed 17    Hypothyroidism     last assessed 2017    Insomnia     Obesity     Schizoaffective disorder (HCC)     last assessed 2017    Seizure disorder (HCC)     last assessed 17    Seizures (HCC)    [2]   Past Surgical History:  Procedure Laterality Date    COLONOSCOPY      complete    IN COLONOSCOPY FLX DX W/COLLJ SPEC WHEN PFRMD N/A 2018    Procedure: COLONOSCOPY with polypectomies;  Surgeon: Andriy Lopez MD;  Location: [facility];  Service: Gastroenterology   [3]   Family History  Problem Relation Name Age of Onset    No Known Problems Mother      No Known Problems Father      Lung disease Other          mesothelioma    No Known Problems Maternal Grandmother      No Known Problems Maternal Grandfather      No Known Problems Paternal Grandmother      No Known Problems Paternal Grandfather      No Known Problems Sister sanaz     No Known Problems Sister keny     Kidney failure Brother      No Known Problems Maternal Aunt Lydia     No Known Problems Maternal Aunt Clarice     No Known Problems Maternal Aunt Livia     No Known Problems Maternal Aunt Nichole     No Known Problems Paternal Aunt Joselito     No Known Problems Paternal Aunt Anaya    [4]   Social History  Tobacco Use    Smoking status: Former     Current packs/day: 0.00     Average packs/day: 0.5 packs/day for 21.0 years (10.5 ttl pk-yrs)     Types: Cigarettes     Start date:      Quit date:      Years since quittin.4     Passive exposure: Never    Smokeless tobacco: Never   Vaping Use    Vaping status: Never Used   Substance Use Topics    Alcohol use: Not Currently    Drug use: No        Jorge Walls PA-C  25

## 2025-05-28 NOTE — PROGRESS NOTES
05/28/25 0900   Team Meeting   Meeting Type Daily Rounds   Team Members Present   Team Members Present Physician;Nurse;   Physician Team Member Alonso/Gaye/Emi   Nursing Team Member Joan/Awais/Shantel   Care Management Team Member Yonny   OT Team Member Irasema   Patient/Family Present   Patient Present No   Patient's Family Present No     Patient has poor safety awareness, is impulsive, irritable, and sexually preoccupied. Zyprexa was discontinued yesterday as she was sedated and more unstable with the walker. She placed herself on the floor to pray over the weekend and could not get up. She had a BM on the 26th. She is eating well. She needs verbal redirection often. Patient discharge is pending continued improvement and placement in SNF once cleared.

## 2025-05-28 NOTE — PLAN OF CARE
Problem: PHYSICAL THERAPY ADULT  Goal: Performs mobility at highest level of function for planned discharge setting.  See evaluation for individualized goals.  Description: Treatment/Interventions: ADL retraining, Functional transfer training, LE strengthening/ROM, Elevations, Therapeutic exercise, Endurance training, Patient/family training, Equipment eval/education, Bed mobility, Gait training, Spoke to nursing, Spoke to case management, OT  Equipment Recommended: Walker       See flowsheet documentation for full assessment, interventions and recommendations.  Outcome: Progressing  Note: Prognosis: Good  Problem List: Decreased strength, Decreased range of motion, Decreased endurance, Impaired balance, Decreased coordination, Decreased mobility, Decreased cognition, Impaired judgement, Decreased safety awareness, Impaired sensation, Pain  Assessment: Pt continues to ambulate with supervision using RW. No LOB. Does leave walker behind at times. Endurance is improved from last visit. Pt with poor safety awareness increasing fall risk.  Barriers to Discharge: Inaccessible home environment, Decreased caregiver support     Rehab Resource Intensity Level, PT: II (Moderate Resource Intensity)    See flowsheet documentation for full assessment.

## 2025-05-28 NOTE — PLAN OF CARE
Problem: Alteration in Thoughts and Perception  Goal: Treatment Goal: Gain control of psychotic behaviors/thinking, reduce/eliminate presenting symptoms and demonstrate improved reality functioning upon discharge  Outcome: Progressing  Goal: Refrain from acting on delusional thinking/internal stimuli  Description: Interventions:- Monitor patient closely, per order - Utilize least restrictive measures - Set reasonable limits, give positive feedback for acceptable - Administer medications as ordered and monitor of potential side effects  Outcome: Progressing  Goal: Agree to be compliant with medication regime, as prescribed and report medication side effects  Description: Interventions:- Offer appropriate PRN medication and supervise ingestion; conduct AIMS, as needed   Outcome: Progressing  Goal: Recognize dysfunctional thoughts, communicate reality-based thoughts at the time of discharge  Description: Interventions:- Provide medication and psycho-education to assist patient in compliance and developing insight into his/her illness   Outcome: Progressing     Problem: Ineffective Coping  Goal: Identifies ineffective coping skills  Outcome: Progressing  Goal: Identifies healthy coping skills  Outcome: Progressing     Problem: Risk for Self Injury/Neglect  Goal: Verbalize thoughts and feelings  Description: Interventions:- Assess and re-assess patient's lethality and potential for self-injury- Engage patient in 1:1 interactions, daily, for a minimum of 15 minutes- Encourage patient to express feelings, fears, frustrations, hopes- Establish rapport/trust with patient   Outcome: Progressing  Goal: Refrain from harming self  Description: Interventions:- Monitor patient closely, per order- Develop a trusting relationship- Supervise medication ingestion, monitor effects and side effects   Outcome: Progressing     Problem: Depression  Goal: Treatment Goal: Demonstrate behavioral control of depressive symptoms, verbalize  feelings of improved mood/affect, and adopt new coping skills prior to discharge  Outcome: Progressing     Problem: Anxiety  Goal: Anxiety is at manageable level  Description: Interventions:- Assess and monitor patient's anxiety level. - Monitor for signs and symptoms (heart palpitations, chest pain, shortness of breath, headaches, nausea, feeling jumpy, restlessness, irritable, apprehensive). - Collaborate with interdisciplinary team and initiate plan and interventions as ordered.- [place] patient to unit/surroundings- Explain treatment plan- Encourage participation in care- Encourage verbalization of concerns/fears- Identify coping mechanisms- Assist in developing anxiety-reducing skills- Administer/offer alternative therapies- Limit or eliminate stimulants  Outcome: Progressing     Problem: Risk for Violence/Aggression Toward Others  Goal: Refrain from harming others  Outcome: Progressing  Goal: Refrain from destructive acts on the environment or property  Outcome: Progressing  Goal: Control angry outbursts  Description: Interventions:- Monitor patient closely, per order- Ensure early verbal de-escalation- Monitor prn medication needs- Set reasonable/therapeutic limits, outline behavioral expectations, and consequences - Provide a non-threatening milieu, utilizing the least restrictive interventions   Outcome: Progressing

## 2025-05-28 NOTE — NURSING NOTE
Received patient at 7:00 PM. Patient was observed in the social area, sitting alone in a corner. Patient denied experiencing anxiety or depression and was loud during conversation. Patient was compliant with scheduled medication. No concerning behaviors noted during the evening.

## 2025-05-28 NOTE — PROGRESS NOTES
05/28/25 0900   Activity/Group Checklist   Group Community meeting   Attendance Attended   Attendance Duration (min) 16-30   Interactions Disorganized interaction  (Pt asking for a seat while seated, sleepy)   Affect/Mood Wide;Calm   Goals Achieved Identified feelings;Able to engage in interactions

## 2025-05-28 NOTE — Clinical Note
Laine Tse was seen and treated in our emergency department on 5/28/2025.    No restrictions            Diagnosis:     Laine  .    She may return on this date: 05/29/2025         If you have any questions or concerns, please don't hesitate to call.      Jorge Walls PA-C    ______________________________           _______________          _______________  Hospital Representative                              Date                                Time
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
## 2025-05-28 NOTE — RAPID RESPONSE
Rapid Response Note  Laine Tse 69 y.o. female MRN: [id number]  Unit/Bed#: ED 02 Encounter: [id number]    Rapid Response Notification(s):   Response called date/time:  5/28/2025 10:17 AM  Response team arrival date/time:  5/28/2025 10:19 AM  Response end date/time:  5/28/2025 10:48 AM  Rapid response location:  Psychiatric unit  Primary reason for rapid response call:  Acute change in BP and acute change in neuro status    Rapid Response Intervention(s):   Airway:  None  Breathing:  None  Fluids administered:  Normal saline  Medications administered:  None       Assessment:   Hypotension   Delirium, not answering questions appropriately, disoriented to place, not at baseline per primary team usually able to walk and talk.   Facial lesions, dry blood in left nostril, bruising surrounding left orbit         Plan:   1L of normal saline to increase BP   EKG, no ST elevation, normal sinus rhythm   Magnesium  Trops   Lactic acid, procal   CMP and CBC   Transferred to ED   CTA head and neck w wo contrast, to rule out lesions/masses/acute bleed.   CT facial bones w contrast, to rule out fractures      Rapid Response Outcome:   Transfer:  Other (comment) (ED)  Primary service notified of transfer: Yes    Code Status: Level 3 (DNAR and DNI)      Family notified: Primary team to notify Family Member       Background/Situation:   Laine Tse is a 69 y.o. female who had a rapid response called for hypotension and altered mental status. At baseline patient answers questions appropriately and walks. During rapid patient appeared confused and was not oriented to time. This was all in the setting of the patient having hypotension. Patient had medication adjustments yesterday per psychiatry team. Was not answering questions appropriately.     Review of Systems   Constitutional:  Negative for chills and fever.   HENT:  Negative for ear pain and sore throat.    Eyes:  Negative for pain and visual disturbance.   Respiratory:   Negative for cough and shortness of breath.    Cardiovascular:  Negative for chest pain and palpitations.   Gastrointestinal:  Negative for abdominal pain and vomiting.   Genitourinary:  Negative for dysuria and hematuria.   Musculoskeletal:  Negative for arthralgias and back pain.   Skin:  Negative for color change and rash.   Neurological:  Negative for seizures and syncope.   All other systems reviewed and are negative.      Objective:   There were no vitals filed for this visit.  Physical Exam  Vitals and nursing note reviewed.   Constitutional:       General: She is not in acute distress.     Appearance: She is well-developed.   HENT:      Head: Normocephalic and atraumatic.     Eyes:      Conjunctiva/sclera: Conjunctivae normal.       Cardiovascular:      Rate and Rhythm: Normal rate and regular rhythm.      Heart sounds: No murmur heard.  Pulmonary:      Effort: Pulmonary effort is normal. No respiratory distress.      Breath sounds: Normal breath sounds.   Abdominal:      Palpations: Abdomen is soft.      Tenderness: There is no abdominal tenderness.     Musculoskeletal:         General: No swelling.      Cervical back: Neck supple.     Skin:     General: Skin is warm and dry.      Capillary Refill: Capillary refill takes less than 2 seconds.     Neurological:      Mental Status: She is alert. She is disoriented.      Comments: Not oriented to time.   Psychiatric:      Comments: Frightened

## 2025-05-29 RX ORDER — GABAPENTIN 100 MG/1
100 CAPSULE ORAL 2 TIMES DAILY
Status: COMPLETED | OUTPATIENT
Start: 2025-05-30 | End: 2025-06-01

## 2025-05-29 RX ORDER — TRAZODONE HYDROCHLORIDE 100 MG/1
100 TABLET ORAL
Status: DISCONTINUED | OUTPATIENT
Start: 2025-05-29 | End: 2025-06-02 | Stop reason: HOSPADM

## 2025-05-29 RX ADMIN — FLUTICASONE PROPIONATE 2 SPRAY: 50 SPRAY, METERED NASAL at 08:51

## 2025-05-29 RX ADMIN — CLONAZEPAM 1 MG: 1 TABLET ORAL at 17:18

## 2025-05-29 RX ADMIN — ASPIRIN 81 MG CHEWABLE TABLET 81 MG: 81 TABLET CHEWABLE at 08:50

## 2025-05-29 RX ADMIN — RISPERIDONE 2 MG: 2 TABLET, FILM COATED ORAL at 08:51

## 2025-05-29 RX ADMIN — Medication 1000 MCG: at 08:50

## 2025-05-29 RX ADMIN — TRIHEXYPHENIDYL HYDROCHLORIDE 2 MG: 2 TABLET ORAL at 21:47

## 2025-05-29 RX ADMIN — PHENOBARBITAL 64.8 MG: 64.8 TABLET ORAL at 12:03

## 2025-05-29 RX ADMIN — Medication 6 MG: at 21:47

## 2025-05-29 RX ADMIN — TRAZODONE HYDROCHLORIDE 100 MG: 100 TABLET ORAL at 21:47

## 2025-05-29 RX ADMIN — RISPERIDONE 2 MG: 2 TABLET, FILM COATED ORAL at 17:18

## 2025-05-29 RX ADMIN — MAGNESIUM OXIDE TAB 400 MG (241.3 MG ELEMENTAL MG) 400 MG: 400 (241.3 MG) TAB at 08:50

## 2025-05-29 RX ADMIN — LEVOTHYROXINE SODIUM 100 MCG: 0.1 TABLET ORAL at 05:28

## 2025-05-29 RX ADMIN — NYSTATIN: 100000 POWDER TOPICAL at 17:18

## 2025-05-29 RX ADMIN — MAGNESIUM OXIDE TAB 400 MG (241.3 MG ELEMENTAL MG) 400 MG: 400 (241.3 MG) TAB at 17:18

## 2025-05-29 RX ADMIN — DIVALPROEX SODIUM 2000 MG: 500 TABLET, EXTENDED RELEASE ORAL at 21:47

## 2025-05-29 RX ADMIN — PHENOBARBITAL 64.8 MG: 64.8 TABLET ORAL at 23:09

## 2025-05-29 RX ADMIN — FOLIC ACID 1 MG: 1 TABLET ORAL at 08:50

## 2025-05-29 RX ADMIN — GUAIFENESIN AND DEXTROMETHORPHAN 10 ML: 20; 200 SYRUP ORAL at 16:35

## 2025-05-29 RX ADMIN — NYSTATIN: 100000 POWDER TOPICAL at 08:51

## 2025-05-29 RX ADMIN — TRIHEXYPHENIDYL HYDROCHLORIDE 2 MG: 2 TABLET ORAL at 08:50

## 2025-05-29 RX ADMIN — Medication 1000 UNITS: at 08:51

## 2025-05-29 RX ADMIN — CLONAZEPAM 1 MG: 1 TABLET ORAL at 08:50

## 2025-05-29 RX ADMIN — GABAPENTIN 300 MG: 300 CAPSULE ORAL at 08:50

## 2025-05-29 NOTE — NURSING NOTE
Patient is calm, visible in the milieu, medication compliant, and cooperative with shower. Patient remains disorganized, poor safety awareness, needs constant reminder to use the walker. Patient in on 1:1 for unsteady gait, and poor judgment. No further distress noted at the moment. Will continue to monitor and maintain safety.

## 2025-05-29 NOTE — ASSESSMENT & PLAN NOTE
Decrease trazodone to 100 mg nightly for sleep  in the setting of concern for daytime sedation   Continue Melatonin 6mg PO QHS for insomnia
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
## 2025-05-29 NOTE — NURSING NOTE
Patient was withdrawn to her room but visible intermittently in the milieu with no peer interaction. Denies all psych s/s. No behaviors noted but remained disorganized and loud in the milieu but redirectable. No c/o pain. No needs expressed but kept requesting this writer to officiate her marriage with a bride who is not here. Patient counseled. Took her HS medications. Safety checks ongoing.

## 2025-05-29 NOTE — ASSESSMENT & PLAN NOTE
Starting tomorrow 5/30/2025, decrease gabapentin to 100 mg twice daily in the setting of concern for daytime sedation

## 2025-05-29 NOTE — PROGRESS NOTES
25 0900   Team Meeting   Meeting Type Daily Rounds   Team Members Present   Team Members Present Physician;Nurse;;Occupational Therapist   Physician Team Member Alonso/Gaye/Emi   Nursing Team Member Awais/Farhad   Care Management Team Member Apgar   OT Team Member Binder   Patient/Family Present   Patient Present No   Patient's Family Present No     Patient was in the ED for a short period of time fopr dehydration. She is disorganized and irritable. She attended one group but is otherwise withdrawn. She continues to take her medications but is on mouth checks. Last BM was on the . Patient discharge is pending continued improvement of behavior before being added to the [facility] program for Long Term SNF placement.
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
## 2025-05-30 PROCEDURE — 99232 SBSQ HOSP IP/OBS MODERATE 35: CPT | Performed by: PSYCHIATRY & NEUROLOGY

## 2025-05-30 RX ADMIN — PHENOBARBITAL 64.8 MG: 64.8 TABLET ORAL at 22:04

## 2025-05-30 RX ADMIN — FLUTICASONE PROPIONATE 2 SPRAY: 50 SPRAY, METERED NASAL at 08:44

## 2025-05-30 RX ADMIN — ASPIRIN 81 MG CHEWABLE TABLET 81 MG: 81 TABLET CHEWABLE at 08:44

## 2025-05-30 RX ADMIN — NYSTATIN: 100000 POWDER TOPICAL at 17:22

## 2025-05-30 RX ADMIN — CLONAZEPAM 1 MG: 1 TABLET ORAL at 17:21

## 2025-05-30 RX ADMIN — GUAIFENESIN AND DEXTROMETHORPHAN 10 ML: 20; 200 SYRUP ORAL at 17:22

## 2025-05-30 RX ADMIN — NYSTATIN: 100000 POWDER TOPICAL at 08:44

## 2025-05-30 RX ADMIN — RISPERIDONE 2 MG: 2 TABLET, FILM COATED ORAL at 08:44

## 2025-05-30 RX ADMIN — Medication 1000 UNITS: at 08:44

## 2025-05-30 RX ADMIN — Medication 1000 MCG: at 08:44

## 2025-05-30 RX ADMIN — MAGNESIUM OXIDE TAB 400 MG (241.3 MG ELEMENTAL MG) 400 MG: 400 (241.3 MG) TAB at 08:44

## 2025-05-30 RX ADMIN — RISPERIDONE 2 MG: 2 TABLET, FILM COATED ORAL at 17:21

## 2025-05-30 RX ADMIN — Medication 6 MG: at 21:16

## 2025-05-30 RX ADMIN — LEVOTHYROXINE SODIUM 100 MCG: 0.1 TABLET ORAL at 05:31

## 2025-05-30 RX ADMIN — TRIHEXYPHENIDYL HYDROCHLORIDE 2 MG: 2 TABLET ORAL at 21:16

## 2025-05-30 RX ADMIN — DIVALPROEX SODIUM 2000 MG: 500 TABLET, EXTENDED RELEASE ORAL at 21:16

## 2025-05-30 RX ADMIN — PHENOBARBITAL 64.8 MG: 64.8 TABLET ORAL at 11:33

## 2025-05-30 RX ADMIN — GABAPENTIN 100 MG: 100 CAPSULE ORAL at 14:39

## 2025-05-30 RX ADMIN — TRIHEXYPHENIDYL HYDROCHLORIDE 2 MG: 2 TABLET ORAL at 08:44

## 2025-05-30 RX ADMIN — TRAZODONE HYDROCHLORIDE 100 MG: 100 TABLET ORAL at 21:16

## 2025-05-30 RX ADMIN — FOLIC ACID 1 MG: 1 TABLET ORAL at 08:44

## 2025-05-30 RX ADMIN — MAGNESIUM OXIDE TAB 400 MG (241.3 MG ELEMENTAL MG) 400 MG: 400 (241.3 MG) TAB at 17:21

## 2025-05-30 RX ADMIN — CLONAZEPAM 1 MG: 1 TABLET ORAL at 08:44

## 2025-05-30 RX ADMIN — GABAPENTIN 100 MG: 100 CAPSULE ORAL at 08:44

## 2025-05-30 NOTE — CASE MANAGEMENT
CM submitted patient aging referral to [employer] [facility] in order to begin nursing home search.

## 2025-05-30 NOTE — PLAN OF CARE
Problem: Risk for Self Injury/Neglect  Goal: Verbalize thoughts and feelings  Description: Interventions:- Assess and re-assess patient's lethality and potential for self-injury- Engage patient in 1:1 interactions, daily, for a minimum of 15 minutes- Encourage patient to express feelings, fears, frustrations, hopes- Establish rapport/trust with patient   Outcome: Progressing  Goal: Refrain from harming self  Description: Interventions:- Monitor patient closely, per order- Develop a trusting relationship- Supervise medication ingestion, monitor effects and side effects   Outcome: Progressing  Goal: Attend and participate in unit activities, including therapeutic, recreational, and educational groups  Description: Interventions:- Provide therapeutic and educational activities daily, encourage attendance and participation, and document same in the medical record- Obtain collateral information, encourage visitation and family involvement in care   Outcome: Progressing  Goal: Recognize maladaptive responses and adopt new coping mechanisms  Outcome: Progressing  Goal: Complete daily ADLs, including personal hygiene independently, as able  Description: Interventions:- Observe, teach, and assist patient with ADLS- Monitor and promote a balance of rest/activity, with adequate nutrition and elimination  Outcome: Progressing     Problem: Anxiety  Goal: Anxiety is at manageable level  Description: Interventions:- Assess and monitor patient's anxiety level. - Monitor for signs and symptoms (heart palpitations, chest pain, shortness of breath, headaches, nausea, feeling jumpy, restlessness, irritable, apprehensive). - Collaborate with interdisciplinary team and initiate plan and interventions as ordered.- [place] patient to unit/surroundings- Explain treatment plan- Encourage participation in care- Encourage verbalization of concerns/fears- Identify coping mechanisms- Assist in developing anxiety-reducing skills-  Administer/offer alternative therapies- Limit or eliminate stimulants  Outcome: Progressing     Problem: Risk for Violence/Aggression Toward Others  Goal: Refrain from harming others  Outcome: Progressing  Goal: Refrain from destructive acts on the environment or property  Outcome: Progressing  Goal: Control angry outbursts  Description: Interventions:- Monitor patient closely, per order- Ensure early verbal de-escalation- Monitor prn medication needs- Set reasonable/therapeutic limits, outline behavioral expectations, and consequences - Provide a non-threatening milieu, utilizing the least restrictive interventions   Outcome: Progressing

## 2025-05-30 NOTE — PROGRESS NOTES
"   05/30/25 0900   Activity/Group Checklist   Group Community meeting   Attendance Attended   Attendance Duration (min) 0-15   Interactions Disorganized interaction  (PT stating \"I want to run away\")   Affect/Mood Wide   Goals Achieved Identified feelings;Able to engage in interactions;Able to self-disclose       "
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
## 2025-05-30 NOTE — PROGRESS NOTES
05/30/25 1330   Activity/Group Checklist   Group Music   Attendance Attended   Attendance Duration (min) 46-60   Interactions Interacted appropriately   Affect/Mood Calm  (falling asleep)   Goals Achieved Able to listen to others;Able to engage in interactions

## 2025-05-30 NOTE — ASSESSMENT & PLAN NOTE
Continue gabapentin 100 mg twice daily in the setting of concern for daytime sedation.  Will automatically discontinue after 5 doses on Sunday morning.

## 2025-05-30 NOTE — NURSING NOTE
Patient is medication compliant, and cooperative with care. Patient is calm, visible, remains religiously preoccupied. No further distress noted, needs reminder to ambulate with her walker. Safety checks ongoing.

## 2025-05-30 NOTE — TREATMENT TEAM
Pt shook her head that she did not ant to engage in discussion on topic.   Pt did need redirection for speaking with peer during discussion.  Pt able to stay for duration.      05/30/25 1100   Activity/Group Checklist   Group Other (Comment)  ( Recovery: 10 components of recovery and self meaning)   Attendance Attended   Attendance Duration (min) 31-45   Interactions Other (Comment)  (needed redirection cross talk)   Affect/Mood Constricted   Goals Achieved Identified feelings;Identified triggers;Identified relapse prevention strategies;Discussed coping strategies;Able to listen to others;Able to engage in interactions;Discussed self-esteem issues
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
## 2025-05-30 NOTE — DISCHARGE INSTR - APPOINTMENTS
Behavioral Health Nurse Navigator, Soni or Pushpa will be calling you after your discharge, on the phone number that you provided.  They will be available as an additional support, if needed.   If you wish to speak with Soni, you may contact her at [phone].

## 2025-05-30 NOTE — PROGRESS NOTES
Pastoral Care Progress Note          Chaplaincy Interventions Utilized:   Empowerment: Encouraged focus on present, Encouraged self-care, Facilitated group experience, and Normalized experience of patient/family    Exploration: Explored hope, Explored emotional needs & resources, Explored relational needs & resources, and Explored spiritual needs & resources    Collaboration: Encouraged adherence to treatment plan     Relationship Building: Cultivated a relationship of care and support, Listened empathically, and Hospitality    The pt participated in spirituality group facilitated by the  today.

## 2025-05-30 NOTE — PROGRESS NOTES
25 0900   Team Meeting   Meeting Type Daily Rounds   Team Members Present   Team Members Present Physician;Nurse;;Occupational Therapist;Other (Discipline and Name)   Physician Team Member Emi/Alonso   Nursing Team Member Farhad/Awais   Care Management Team Member Apgar   OT Team Member Ramakrishna   Other (Discipline and Name) Peter - Pharmacy     Patient had a BM and shower on . She is on a one to one while ambulating as she is unsteady. Patient met with Dinorah from her home yesterday. Her Gabapentin and Trazodone was decreased. She is eating and sleeping well. Patient discharge is pending improvement of mood and SNF placement. Aidin search is to be completed today.

## 2025-05-31 PROCEDURE — 99232 SBSQ HOSP IP/OBS MODERATE 35: CPT | Performed by: PSYCHIATRY & NEUROLOGY

## 2025-05-31 RX ADMIN — TRIHEXYPHENIDYL HYDROCHLORIDE 2 MG: 2 TABLET ORAL at 08:21

## 2025-05-31 RX ADMIN — DIVALPROEX SODIUM 2000 MG: 500 TABLET, EXTENDED RELEASE ORAL at 21:05

## 2025-05-31 RX ADMIN — Medication 1000 UNITS: at 08:25

## 2025-05-31 RX ADMIN — PHENOBARBITAL 64.8 MG: 64.8 TABLET ORAL at 22:02

## 2025-05-31 RX ADMIN — MAGNESIUM OXIDE TAB 400 MG (241.3 MG ELEMENTAL MG) 400 MG: 400 (241.3 MG) TAB at 17:00

## 2025-05-31 RX ADMIN — Medication 1000 MCG: at 08:21

## 2025-05-31 RX ADMIN — GABAPENTIN 100 MG: 100 CAPSULE ORAL at 14:00

## 2025-05-31 RX ADMIN — PHENOBARBITAL 64.8 MG: 64.8 TABLET ORAL at 11:33

## 2025-05-31 RX ADMIN — CLONAZEPAM 1 MG: 1 TABLET ORAL at 17:00

## 2025-05-31 RX ADMIN — LEVOTHYROXINE SODIUM 100 MCG: 0.1 TABLET ORAL at 05:28

## 2025-05-31 RX ADMIN — ASPIRIN 81 MG CHEWABLE TABLET 81 MG: 81 TABLET CHEWABLE at 08:21

## 2025-05-31 RX ADMIN — TRIHEXYPHENIDYL HYDROCHLORIDE 2 MG: 2 TABLET ORAL at 21:06

## 2025-05-31 RX ADMIN — GABAPENTIN 100 MG: 100 CAPSULE ORAL at 08:21

## 2025-05-31 RX ADMIN — CLONAZEPAM 1 MG: 1 TABLET ORAL at 08:21

## 2025-05-31 RX ADMIN — FOLIC ACID 1 MG: 1 TABLET ORAL at 08:21

## 2025-05-31 RX ADMIN — FLUTICASONE PROPIONATE 2 SPRAY: 50 SPRAY, METERED NASAL at 08:24

## 2025-05-31 RX ADMIN — RISPERIDONE 2 MG: 2 TABLET, FILM COATED ORAL at 17:00

## 2025-05-31 RX ADMIN — MAGNESIUM OXIDE TAB 400 MG (241.3 MG ELEMENTAL MG) 400 MG: 400 (241.3 MG) TAB at 08:21

## 2025-05-31 RX ADMIN — RISPERIDONE 2 MG: 2 TABLET, FILM COATED ORAL at 08:21

## 2025-05-31 RX ADMIN — GUAIFENESIN AND DEXTROMETHORPHAN 10 ML: 20; 200 SYRUP ORAL at 21:07

## 2025-05-31 RX ADMIN — NYSTATIN 1 APPLICATION: 100000 POWDER TOPICAL at 08:25

## 2025-05-31 RX ADMIN — NYSTATIN 1 APPLICATION: 100000 POWDER TOPICAL at 17:00

## 2025-05-31 RX ADMIN — TRAZODONE HYDROCHLORIDE 100 MG: 100 TABLET ORAL at 21:05

## 2025-05-31 RX ADMIN — Medication 6 MG: at 21:05

## 2025-05-31 NOTE — NURSING NOTE
"Pt calm and cooperative, visible on unit. Cooperative with prompts. Pt tangential and focused on \"a party with ice cream cake.\" Pt disorganized, asking \"Did you see my sister?\" Visible in dayroom during shift. Good intake at meals. Pt denies all symptoms at current time.   "

## 2025-05-31 NOTE — NURSING NOTE
Patient was withdrawn to her room but visible in the milieu intermittently with minimal peer interaction. Denies all psych s/s. No behaviors noted. No c/o pain. No needs expressed but was preoccupied with getting . Took her HS medications. Safety checks ongoing.
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
## 2025-06-01 PROCEDURE — 99232 SBSQ HOSP IP/OBS MODERATE 35: CPT | Performed by: PSYCHIATRY & NEUROLOGY

## 2025-06-01 RX ADMIN — FLUTICASONE PROPIONATE 2 SPRAY: 50 SPRAY, METERED NASAL at 08:25

## 2025-06-01 RX ADMIN — TRAZODONE HYDROCHLORIDE 100 MG: 100 TABLET ORAL at 22:12

## 2025-06-01 RX ADMIN — Medication 6 MG: at 22:12

## 2025-06-01 RX ADMIN — RISPERIDONE 2 MG: 2 TABLET, FILM COATED ORAL at 08:25

## 2025-06-01 RX ADMIN — TRIHEXYPHENIDYL HYDROCHLORIDE 2 MG: 2 TABLET ORAL at 08:25

## 2025-06-01 RX ADMIN — GABAPENTIN 100 MG: 100 CAPSULE ORAL at 08:25

## 2025-06-01 RX ADMIN — Medication 1000 UNITS: at 08:24

## 2025-06-01 RX ADMIN — CLONAZEPAM 1 MG: 1 TABLET ORAL at 17:00

## 2025-06-01 RX ADMIN — ASPIRIN 81 MG CHEWABLE TABLET 81 MG: 81 TABLET CHEWABLE at 08:24

## 2025-06-01 RX ADMIN — NYSTATIN 1 APPLICATION: 100000 POWDER TOPICAL at 17:00

## 2025-06-01 RX ADMIN — FOLIC ACID 1 MG: 1 TABLET ORAL at 08:24

## 2025-06-01 RX ADMIN — PHENOBARBITAL 64.8 MG: 64.8 TABLET ORAL at 11:52

## 2025-06-01 RX ADMIN — GUAIFENESIN AND DEXTROMETHORPHAN 10 ML: 20; 200 SYRUP ORAL at 12:31

## 2025-06-01 RX ADMIN — TRIHEXYPHENIDYL HYDROCHLORIDE 2 MG: 2 TABLET ORAL at 22:12

## 2025-06-01 RX ADMIN — CLONAZEPAM 1 MG: 1 TABLET ORAL at 08:25

## 2025-06-01 RX ADMIN — PHENOBARBITAL 64.8 MG: 64.8 TABLET ORAL at 22:12

## 2025-06-01 RX ADMIN — DIVALPROEX SODIUM 2000 MG: 500 TABLET, EXTENDED RELEASE ORAL at 22:12

## 2025-06-01 RX ADMIN — MAGNESIUM OXIDE TAB 400 MG (241.3 MG ELEMENTAL MG) 400 MG: 400 (241.3 MG) TAB at 08:25

## 2025-06-01 RX ADMIN — Medication 1000 MCG: at 08:25

## 2025-06-01 RX ADMIN — TRAZODONE HYDROCHLORIDE 50 MG: 50 TABLET ORAL at 02:12

## 2025-06-01 RX ADMIN — HYDROXYZINE HYDROCHLORIDE 50 MG: 50 TABLET, FILM COATED ORAL at 02:12

## 2025-06-01 RX ADMIN — LEVOTHYROXINE SODIUM 100 MCG: 0.1 TABLET ORAL at 06:13

## 2025-06-01 RX ADMIN — RISPERIDONE 2 MG: 2 TABLET, FILM COATED ORAL at 17:00
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
## 2025-06-01 NOTE — NURSING NOTE
Pt visible on unit, disorganized and tangential during interactions. Pt distracted and requires multiple redirections at times. Pt wandering unit and refusing chair alarm. Pt reported depression in a.m., pt denies additional symptoms. Robitussin administered for cough, appeared effective. Good intake at meals. Will continue to monitor for safety.

## 2025-06-01 NOTE — NURSING NOTE
"Patient is awake and yelling about \"Geovany.\" Patient reports that she hears Geovany talking to her and tells staff \"You have to go find him.\" Patient continues to be preoccupied with hair ties and Geovany. She is unable to fall asleep at this time despite encouragement. PRN trazodone 50 mg and Atarax 50 mg administered at 0212.   "

## 2025-06-01 NOTE — NURSING NOTE
"Patient is visible on the unit. She is uncooperative with chair alarm and often wanders unit without waiting for staff. Patient is paranoid towards unit staff stating \"You're all trying to keep me here on purpose!\" She is fixated on receiving a brown hair tie this evening and accuses staff of lying to her. Laine is disorganized and accusatory during conversation. She told this writer \"I'm going to katie you for turning me into the terminator.\" When this was pursued further she stated \"Just watch the movie.\" Laine is medication compliant and cooperative with mouth checks. Encouraged to inform staff of any needs or concerns.    "

## 2025-06-01 NOTE — NURSING NOTE
PRN trazodone and Atarax effective. Patient currently resting in bed with no outward signs of distress. Respirations even and unlabored.

## 2025-06-01 NOTE — PLAN OF CARE
Problem: Ineffective Coping  Goal: Cooperates with admission process  Description: Interventions: - Complete admission process  Outcome: Completed  Goal: Identifies ineffective coping skills  Outcome: Not Progressing  Goal: Identifies healthy coping skills  Outcome: Not Progressing  Goal: Demonstrates healthy coping skills  Outcome: Not Progressing     Problem: Anxiety  Goal: Anxiety is at manageable level  Description: Interventions:- Assess and monitor patient's anxiety level. - Monitor for signs and symptoms (heart palpitations, chest pain, shortness of breath, headaches, nausea, feeling jumpy, restlessness, irritable, apprehensive). - Collaborate with interdisciplinary team and initiate plan and interventions as ordered.- [place] patient to unit/surroundings- Explain treatment plan- Encourage participation in care- Encourage verbalization of concerns/fears- Identify coping mechanisms- Assist in developing anxiety-reducing skills- Administer/offer alternative therapies- Limit or eliminate stimulants  Outcome: Progressing     Problem: Risk for Violence/Aggression Toward Others  Goal: Control angry outbursts  Description: Interventions:- Monitor patient closely, per order- Ensure early verbal de-escalation- Monitor prn medication needs- Set reasonable/therapeutic limits, outline behavioral expectations, and consequences - Provide a non-threatening milieu, utilizing the least restrictive interventions   Outcome: Not Progressing
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
## 2025-06-02 VITALS
SYSTOLIC BLOOD PRESSURE: 127 MMHG | HEART RATE: 69 BPM | DIASTOLIC BLOOD PRESSURE: 90 MMHG | HEIGHT: 64 IN | OXYGEN SATURATION: 100 % | TEMPERATURE: 96.8 F | WEIGHT: 174.4 LBS | RESPIRATION RATE: 17 BRPM | BODY MASS INDEX: 29.78 KG/M2

## 2025-06-02 DIAGNOSIS — G40.909 SEIZURE DISORDER (HCC): ICD-10-CM

## 2025-06-02 DIAGNOSIS — R45.1 AGITATION: ICD-10-CM

## 2025-06-02 PROBLEM — S01.81XA FACIAL LACERATION: Status: RESOLVED | Noted: 2025-05-07 | Resolved: 2025-06-02

## 2025-06-02 PROBLEM — Z00.8 MEDICAL CLEARANCE FOR PSYCHIATRIC ADMISSION: Status: RESOLVED | Noted: 2018-07-10 | Resolved: 2025-06-02

## 2025-06-02 PROBLEM — E83.42 HYPOMAGNESEMIA: Status: RESOLVED | Noted: 2025-05-09 | Resolved: 2025-06-02

## 2025-06-02 PROBLEM — K59.00 CONSTIPATION: Status: ACTIVE | Noted: 2025-06-02

## 2025-06-02 LAB
BACTERIA BLD CULT: NORMAL
BACTERIA BLD CULT: NORMAL

## 2025-06-02 PROCEDURE — 99238 HOSP IP/OBS DSCHRG MGMT 30/<: CPT | Performed by: PSYCHIATRY & NEUROLOGY

## 2025-06-02 RX ORDER — ATORVASTATIN CALCIUM 10 MG/1
10 TABLET, FILM COATED ORAL
Start: 2025-06-02

## 2025-06-02 RX ORDER — GUAIFENESIN/DEXTROMETHORPHAN 100-10MG/5
10 SYRUP ORAL EVERY 4 HOURS PRN
Start: 2025-06-02 | End: 2025-06-03

## 2025-06-02 RX ORDER — DIVALPROEX SODIUM 500 MG/1
2000 TABLET, FILM COATED, EXTENDED RELEASE ORAL
Start: 2025-06-02

## 2025-06-02 RX ORDER — ATORVASTATIN CALCIUM 10 MG/1
10 TABLET, FILM COATED ORAL
Status: DISCONTINUED | OUTPATIENT
Start: 2025-06-03 | End: 2025-06-02 | Stop reason: HOSPADM

## 2025-06-02 RX ORDER — AMOXICILLIN 250 MG
1 CAPSULE ORAL DAILY PRN
Start: 2025-06-02

## 2025-06-02 RX ORDER — CLONAZEPAM 1 MG/1
1 TABLET ORAL 2 TIMES DAILY
Qty: 60 TABLET | Refills: 0 | Status: SHIPPED | OUTPATIENT
Start: 2025-06-02 | End: 2025-06-02 | Stop reason: SDUPTHER

## 2025-06-02 RX ORDER — RISPERIDONE 2 MG/1
2 TABLET ORAL 2 TIMES DAILY
Start: 2025-06-02

## 2025-06-02 RX ORDER — PHENOBARBITAL 64.8 MG/1
64.8 TABLET ORAL EVERY 12 HOURS
Qty: 30 TABLET | Refills: 0 | Status: SHIPPED | OUTPATIENT
Start: 2025-06-02

## 2025-06-02 RX ORDER — GUAIFENESIN 600 MG/1
600 TABLET, EXTENDED RELEASE ORAL EVERY 12 HOURS PRN
Start: 2025-06-02

## 2025-06-02 RX ORDER — CLONAZEPAM 1 MG/1
1 TABLET ORAL 2 TIMES DAILY
Qty: 30 TABLET | Refills: 0 | Status: SHIPPED | OUTPATIENT
Start: 2025-06-02 | End: 2025-07-02

## 2025-06-02 RX ORDER — TRAZODONE HYDROCHLORIDE 100 MG/1
100 TABLET ORAL
Start: 2025-06-02

## 2025-06-02 RX ADMIN — Medication 1000 UNITS: at 08:50

## 2025-06-02 RX ADMIN — RISPERIDONE 2 MG: 2 TABLET, FILM COATED ORAL at 08:50

## 2025-06-02 RX ADMIN — OLANZAPINE 5 MG: 5 TABLET, FILM COATED ORAL at 12:35

## 2025-06-02 RX ADMIN — FLUTICASONE PROPIONATE 2 SPRAY: 50 SPRAY, METERED NASAL at 08:51

## 2025-06-02 RX ADMIN — CLONAZEPAM 1 MG: 1 TABLET ORAL at 08:50

## 2025-06-02 RX ADMIN — ASPIRIN 81 MG CHEWABLE TABLET 81 MG: 81 TABLET CHEWABLE at 08:50

## 2025-06-02 RX ADMIN — TRIHEXYPHENIDYL HYDROCHLORIDE 2 MG: 2 TABLET ORAL at 08:50

## 2025-06-02 RX ADMIN — PHENOBARBITAL 64.8 MG: 64.8 TABLET ORAL at 11:44

## 2025-06-02 RX ADMIN — LEVOTHYROXINE SODIUM 100 MCG: 0.1 TABLET ORAL at 05:58

## 2025-06-02 NOTE — NURSING NOTE
Patient became incontinent of urine this morning while ambulating to bathroom and was assisted with pericare.

## 2025-06-02 NOTE — PROGRESS NOTES
25 0800   Team Meeting   Meeting Type Daily Rounds   Team Members Present   Team Members Present Physician;Nurse;;Occupational Therapist   Physician Team Member Alonso/Gaye/Emi   Nursing Team Member Awais/Farhad   Care Management Team Member Apgar   OT Team Member Binder   Patient/Family Present   Patient Present No   Patient's Family Present No     Patient is taking her medications and is compliant with mouth checks. She is encouraged to drink more. She is irritable at times but redirectable. She is Patient discharge is pending placement in long term SNF.

## 2025-06-02 NOTE — NURSING NOTE
Pt calm and cooperative, transport team arrived on unit for transfer to facility. All personal belongings sent with pt at time of discharge. Report called to facility, no further questions at time of call.

## 2025-06-02 NOTE — NURSING NOTE
Pt calm and cooperative, visible on unit. Pt visible for meals with good intake. Pt denies all symptoms at current time. Pt expresses readiness for discharge.

## 2025-06-02 NOTE — NURSING NOTE
Pt irritable due to planned discharge, pt reassured. Zyprexa administered for moderate agitation, will monitor.

## 2025-06-02 NOTE — NURSING NOTE
Patient calm, cooperative, visible on millieu, and able to make needs known.  She has labile behavior but is redirectable.  She reports sleeping well and denies depression, anxiety, HI/SI/AVH and takes medications as scheduled.

## 2025-06-02 NOTE — PROGRESS NOTES
Pt completed relapse prevention plan. Pt was calm, pleasant, and cooperative. Pt was organized and easily identified strengths and reasons for living, warning signs, triggers, coping skills, and a support system. Pt is future-oriented and looking forward to discharge.    06/02/25 0830   Activity/Group Checklist   Group Admission/Discharge   Attendance Attended   Attendance Duration (min) 16-30   Interactions Interacted appropriately   Affect/Mood Appropriate   Goals Achieved Identified feelings;Identified triggers;Identified relapse prevention strategies;Identified medication adherence strategies;Discussed safety plan;Discussed coping strategies;Discussed discharge plans;Able to listen to others;Identified resources and support systems;Able to engage in interactions;Able to reflect/comment on own behavior;Able to self-disclose;Able to recieve feedback
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
## 2025-06-02 NOTE — PLAN OF CARE
Problem: DISCHARGE PLANNING - CARE MANAGEMENT  Goal: Discharge to post-acute care or home with appropriate resources  Description: INTERVENTIONS:- Conduct assessment to determine patient/family and health care team treatment goals, and need for post-acute services based on payer coverage, community resources, and patient preferences, and barriers to discharge- Address psychosocial, clinical, and financial barriers to discharge as identified in assessment in conjunction with the patient/family and health care team- Arrange appropriate level of post-acute services according to patient’s   needs and preference and payer coverage in collaboration with the physician and health care team- Communicate with and update the patient/family, physician, and health care team regarding progress on the discharge plan- Arrange appropriate transportation to post-acute venues    Outcome: Adequate for Discharge  Pt to D/C today. Pt denies SI/HI/AVH. Pt to d/c to [facility] and S will  upon discharge. Pt to follow up with Jewel and PCP. Scripts sent to preferred pharmacy.     Discharge Address: [facility] - [address] [place] [address], [place] [address]  Phone: [phone]

## 2025-06-03 ENCOUNTER — NURSING HOME VISIT (OUTPATIENT)
Dept: GERIATRICS | Facility: OTHER | Age: 70
End: 2025-06-03
Payer: MEDICARE

## 2025-06-03 DIAGNOSIS — F25.9 SCHIZOAFFECTIVE DISORDER, UNSPECIFIED TYPE (HCC): Primary | ICD-10-CM

## 2025-06-03 DIAGNOSIS — M54.50 CHRONIC MIDLINE LOW BACK PAIN WITHOUT SCIATICA: ICD-10-CM

## 2025-06-03 DIAGNOSIS — M81.0 OSTEOPOROSIS, UNSPECIFIED OSTEOPOROSIS TYPE, UNSPECIFIED PATHOLOGICAL FRACTURE PRESENCE: ICD-10-CM

## 2025-06-03 DIAGNOSIS — K59.04 CHRONIC IDIOPATHIC CONSTIPATION: ICD-10-CM

## 2025-06-03 DIAGNOSIS — R25.1 TREMORS OF NERVOUS SYSTEM: ICD-10-CM

## 2025-06-03 DIAGNOSIS — E03.9 ACQUIRED HYPOTHYROIDISM: ICD-10-CM

## 2025-06-03 DIAGNOSIS — E78.2 MIXED HYPERLIPIDEMIA: ICD-10-CM

## 2025-06-03 DIAGNOSIS — I10 PRIMARY HYPERTENSION: ICD-10-CM

## 2025-06-03 DIAGNOSIS — F39 MOOD INSOMNIA (HCC): ICD-10-CM
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
## 2025-06-03 DIAGNOSIS — G40.909 SEIZURE DISORDER (HCC): ICD-10-CM

## 2025-06-03 DIAGNOSIS — R26.81 UNSTEADY GAIT: ICD-10-CM

## 2025-06-03 DIAGNOSIS — G89.29 CHRONIC MIDLINE LOW BACK PAIN WITHOUT SCIATICA: ICD-10-CM

## 2025-06-03 DIAGNOSIS — F51.05 MOOD INSOMNIA (HCC): ICD-10-CM

## 2025-06-03 PROCEDURE — 99306 1ST NF CARE HIGH MDM 50: CPT | Performed by: INTERNAL MEDICINE

## 2025-06-03 NOTE — PROGRESS NOTES
[facility] and [facility] home notes  SHORT TERM REHAB       NAME: Laine Tse  AGE: 69 y.o. SEX: female    DATE OF ENCOUNTER: 6/3/2025    Assessment and Plan   Schizoaffective disorder (HCC)  Mood seems stable  Had medications adjusted at the hospital   Continue with depakote, trazodone, klonopin, risperdal and melatonin  Continue follow up with psych   Continue monitoring symptoms   Continue with supportive care     Chronic midline low back pain without sciatica  Seems stable  No reported issues  Continue with activity as tolerable   Continue with prn tylenol     Constipation  Seems stable   No reported issues  Continue with Senna S and miralax   Continue monitoring bowels     Fall  Hx of falls  Continue with safety measures  Continue with fall precautions   PT/OT eval and treat     Hyperlipidemia  Continue with lipitor   Continue monitoring lipid panel as out patient     Hypertension  BP reviewed from facility records, acceptable range  Not on meds   Continue monitoring BP        Hypothyroidism  Continue with levothyroxine  Continue monitoring TSH as out patient or prn     Mood insomnia (HCC)  Continue with melatonin and trazodone  Continue monitoring symptoms  Continue with good sleepy hygiene       Osteoporosis  Followed by rheumatology   Continue follow up with rheumatology as out patient   Continue with fosamax       Seizure disorder (HCC)  No recent seizure reported  Continue with depakote, phenobarbital and klonopin  Continue monitoring for seizures  Continue with seizure precautions     Unsteady gait  Hx of falls  Multifactorial  Continue with safety measures   Continue with fall precautions     Tremors of nervous system  Possibly from EPS  Continue with trihexyphenidyl   Continue monitoring symtpoms           Chief Complaint     No new complaints - wants to go back to her old facility     History of Present Illness     68 yo female seen for admission to [facility] after  hospitalization. Patient not great historian. She does report she was at facility where she was seeing something when she jumped out the window of 1st Floor. She then does not know rest of the details. Called and talked to the person at the facility she was at. He reported they are a short term facility for her psych condition but has been there 3 years. They have been working on getting her to a nursing home for long term care but before that got approved she had jumped out the 1st Floor window. She then went from there to a neighboring sight by the place as per the . From there she was at and sent to the hospital. Then was admitted to psych unit where she was managed for there psychiatric condition. After which she was then transferred to [facility]. Reviewed labs and imaging report from the hospital records.     PMHx     Past Medical History[1]  Past Surgical History[2]  Family History[3]  Social History[4]  Allergies[5]    Review of Systems     Denies any pain or shortness of breath  All other review of system negative but hx limited due to the cognition         Objective   Vital signs:  BP: 134/78  HR: 71  RR: 18  TEMP: 98F  SAT.: 97% on RA     PHYSICAL EXAM:  GENERAL: no acute distress, chronically ill appearing   SKIN: warm, dry, no rash, no cyanosis  HEENT: normocephalic, atraumatic, no JVD, no Thyromegaly, no lymphadenopathy  LUNGS: CTA, no wheezing, no rales, expanded equally, no chest tenderness   HEART: normal rhythm, normal rate, no murmur, no gallop  ABDOMEN: soft non tender non distended bs+, no guarding or rebound tenderness  :  no suprapubic tenderness  MUSCULOSKELETAL: strength about 4+/5 all extremities, ROM within normal, bilateral lower leg edema pitting +1, no calf tenderness  NEUROLOGY: awake, alert, Oriented to PA and person, able to recall 2/3 object. CN2-12 intact. Stuttered speech  PSYCH: cooperative, flat affect       Pertinent Laboratory/Diagnostic Studies:  Recent labs  and diagnostic tests reviewed in nursing home EMR    Current Medications   Medications reviewed and signed off on nursing home EMR.    I have spent a total time of 58 minutes on 06/03/25 in caring for this patient including Patient and family education, Counseling / Coordination of care, Documenting in the medical record, Reviewing/placing orders in the medical record (including tests, medications, and/or procedures), and Obtaining or reviewing history  .           [1]   Past Medical History:  Diagnosis Date    Anxiety     Benign essential hypertension     resolved; 06/15/16    Depression     Depression with anxiety     Fracture of fifth metatarsal bone of right foot with delayed healing     last assessed 04/12/16; fracture of metatarsal bone, right, closed, initial encounter    Hyperlipidemia     last assessed 11/28/17    Hypothyroidism     last assessed 11/28/2017    Insomnia     Obesity     Schizoaffective disorder (HCC)     last assessed 05/18/2017    Seizure disorder (HCC)     last assessed 03/28/17    Seizures (HCC)    [2]   Past Surgical History:  Procedure Laterality Date    COLONOSCOPY      complete    OR COLONOSCOPY FLX DX W/COLLJ SPEC WHEN PFRMD N/A 8/30/2018    Procedure: COLONOSCOPY with polypectomies;  Surgeon: Andriy Lopez MD;  Location: [facility];  Service: Gastroenterology   [3]   Family History  Problem Relation Name Age of Onset    No Known Problems Mother      No Known Problems Father      Lung disease Other          mesothelioma    No Known Problems Maternal Grandmother      No Known Problems Maternal Grandfather      No Known Problems Paternal Grandmother      No Known Problems Paternal Grandfather      No Known Problems Sister sanaz     No Known Problems Sister keny     Kidney failure Brother      No Known Problems Maternal Aunt Lydia     No Known Problems Maternal Aunt Clarice     No Known Problems Maternal Aunt Livia     No Known Problems Maternal Aunt Nichole     No Known Problems  Paternal Aunt Joselito     No Known Problems Paternal Aunt Anaya    [4]   Social History  Socioeconomic History    Marital status: /Civil Union   Tobacco Use    Smoking status: Former     Current packs/day: 0.00     Average packs/day: 0.5 packs/day for 21.0 years (10.5 ttl pk-yrs)     Types: Cigarettes     Start date:      Quit date:      Years since quittin.4     Passive exposure: Never    Smokeless tobacco: Never   Vaping Use    Vaping status: Never Used   Substance and Sexual Activity    Alcohol use: Not Currently    Drug use: No    Sexual activity: Never     Social Drivers of Health     Financial Resource Strain: Low Risk  (2023)    Overall Financial Resource Strain (CARDIA)     Difficulty of Paying Living Expenses: Not hard at all   Food Insecurity: Patient Unable To Answer (2025)    Nursing - Inadequate Food Risk Classification     Worried About Running Out of Food in the Last Year: Never true     Ran Out of Food in the Last Year: Never true     Ran Out of Food in the Last Year: Patient unable to answer   Transportation Needs: No Transportation Needs (2025)    PRAPARE - Transportation     Lack of Transportation (Medical): No     Lack of Transportation (Non-Medical): No   Intimate Partner Violence: Not At Risk (2025)    Humiliation, Afraid, Rape, and Kick questionnaire     Fear of Current or Ex-Partner: No     Emotionally Abused: No     Physically Abused: No     Sexually Abused: No   Housing Stability: Low Risk  (2025)    Housing Stability Vital Sign     Unable to Pay for Housing in the Last Year: No     Number of Times Moved in the Last Year: 0     Homeless in the Last Year: No   [5]   Allergies  Allergen Reactions    Clozapine Other (See Comments)     low white blood count  Other reaction(s): Other (See Comments)  low white blood count    Haloperidol Other (See Comments)     EPS  Other reaction(s): Other (See Comments)  EPS    Lithium Other (See Comments)     Toxicity     "Prolixin [Fluphenazine] Hyperactivity and Other (See Comments)     EPS, Hyperactivity  EPS, Hyperactivity    Valproic Acid Confusion and Other (See Comments)     \"Mental confusion\"  \"Mental confusion\"     "

## 2025-06-03 NOTE — ASSESSMENT & PLAN NOTE
Continue with melatonin and trazodone  Continue monitoring symptoms  Continue with good sleepy hygiene

## 2025-06-03 NOTE — ASSESSMENT & PLAN NOTE
Seems stable  No reported issues  Continue with activity as tolerable   Continue with prn tylenol

## 2025-06-03 NOTE — ASSESSMENT & PLAN NOTE
Mood seems stable  Had medications adjusted at the hospital   Continue with depakote, trazodone, klonopin, risperdal and melatonin  Continue follow up with psych   Continue monitoring symptoms   Continue with supportive care

## 2025-06-03 NOTE — ASSESSMENT & PLAN NOTE
Followed by rheumatology   Continue follow up with rheumatology as out patient   Continue with fosamax

## 2025-06-03 NOTE — ASSESSMENT & PLAN NOTE
Hx of falls  Continue with safety measures  Continue with fall precautions   PT/OT eval and treat

## 2025-06-03 NOTE — ASSESSMENT & PLAN NOTE
No recent seizure reported  Continue with depakote, phenobarbital and klonopin  Continue monitoring for seizures  Continue with seizure precautions

## 2025-06-03 NOTE — ASSESSMENT & PLAN NOTE
Seems stable   No reported issues  Continue with Senna S and miralax   Continue monitoring bowels

## 2025-06-06 ENCOUNTER — NURSING HOME VISIT (OUTPATIENT)
Dept: GERIATRICS | Facility: OTHER | Age: 70
End: 2025-06-06

## 2025-06-06 VITALS
HEART RATE: 68 BPM | WEIGHT: 173 LBS | TEMPERATURE: 97.6 F | BODY MASS INDEX: 29.7 KG/M2 | DIASTOLIC BLOOD PRESSURE: 76 MMHG | RESPIRATION RATE: 18 BRPM | SYSTOLIC BLOOD PRESSURE: 116 MMHG | OXYGEN SATURATION: 98 %

## 2025-06-06 DIAGNOSIS — F51.05 MOOD INSOMNIA (HCC): ICD-10-CM

## 2025-06-06 DIAGNOSIS — I10 PRIMARY HYPERTENSION: ICD-10-CM

## 2025-06-06 DIAGNOSIS — F39 MOOD INSOMNIA (HCC): ICD-10-CM

## 2025-06-06 DIAGNOSIS — F25.0 SCHIZOAFFECTIVE DISORDER, BIPOLAR TYPE (HCC): Primary | ICD-10-CM

## 2025-06-06 DIAGNOSIS — G40.909 SEIZURE DISORDER (HCC): ICD-10-CM

## 2025-06-06 DIAGNOSIS — G89.29 CHRONIC MIDLINE LOW BACK PAIN WITHOUT SCIATICA: ICD-10-CM

## 2025-06-06 DIAGNOSIS — R26.2 AMBULATORY DYSFUNCTION: ICD-10-CM

## 2025-06-06 DIAGNOSIS — M54.50 CHRONIC MIDLINE LOW BACK PAIN WITHOUT SCIATICA: ICD-10-CM

## 2025-06-06 PROBLEM — S99.921A INJURY OF RIGHT TOE, INITIAL ENCOUNTER: Status: RESOLVED | Noted: 2018-09-12 | Resolved: 2025-06-06

## 2025-06-06 RX ORDER — GABAPENTIN 100 MG/1
100 CAPSULE ORAL 2 TIMES DAILY
COMMUNITY

## 2025-06-06 NOTE — ASSESSMENT & PLAN NOTE
BP stable  No acute cardiac complaints  Avoid hypotension  Not presently on medication  Continue monitoring BP

## 2025-06-06 NOTE — PROGRESS NOTES
[facility]  [address] [address]  [phone]  FACILITY: [facility]   Code 31 (STR)  Follow up visit       NAME: Laine Tse  AGE: 69 y.o. SEX: female CODE STATUS: No CPR    DATE OF ENCOUNTER: 6/6/2025    Assessment and Plan     1. Schizoaffective disorder, bipolar type (HCC)  Assessment & Plan:  Had medications adjusted at the hospital   Per patient mood stable, no reported issues/concerns from nursing.   Continue with depakote, trazodone, clonazepam, risperdal and melatonin  Psych following  Continue supportive measures  Encourage activity and engagement  Continue to monitor for acute changes in condition     2. Primary hypertension  Assessment & Plan:  BP stable  No acute cardiac complaints  Avoid hypotension  Not presently on medication  Continue monitoring BP  3. Seizure disorder (HCC)  Assessment & Plan:  No recent seizures   Continue depakote, phenobarbital and clonazepam   Continue monitoring for seizures  Continue seizure precautions   4. Mood insomnia (HCC)  Assessment & Plan:  Per patient she is sleeping well, no reported issues or concerns  Continue melatonin and trazodone  Continue monitoring symptoms  Continue good sleep hygiene     5. Chronic midline low back pain without sciatica  Assessment & Plan:  Patient denies pain  No reported issues  Continue PT/OT  Continue activity as tolerable   Continue Tylenol PRN  Continue to monitor   6. Ambulatory dysfunction  Assessment & Plan:  Multifactorial- acute and chronic conditions  Continue PT/OT   Assist with ADLs  Encourage PO nutrition and hydration.   following for discharge planning.  Maintain fall and safety precautions.         All medications and routine orders were reviewed and updated as needed.    Chief Complaint     STR follow up visit    Past Medical and Surgica History      Past Medical History[1]  Past Surgical History[2]  Allergies   Allergen Reactions    Clozapine Other (See Comments)     low white  "blood count  Other reaction(s): Other (See Comments)  low white blood count    Haloperidol Other (See Comments)     EPS  Other reaction(s): Other (See Comments)  EPS    Lithium Other (See Comments)     Toxicity    Prolixin [Fluphenazine] Hyperactivity and Other (See Comments)     EPS, Hyperactivity  EPS, Hyperactivity    Valproic Acid Confusion and Other (See Comments)     \"Mental confusion\"  \"Mental confusion\"        History of Present Illness     Laine Tse is a 70 yo female with PMH of Seizure disorder, Schizoaffective disorder, bipolar type, HTN and Insomnia. Patient hospitalized from 5/8-6/2 after hallucinating and jumping out a 1st floor window. Patient managed in [facility], medications adjusted.  Patient was previously at a [facility] facility for psychiatric patients but has been there for 3 years, facility had been looking for alternate housing options.     Patient being seen and examined for follow up on acute and chronic medical conditions. Upon exam patient is resting in bed, she is in good spirits, she request going back on gabapentin. She denies any pain and offers no medical concerns at this time.   Patient appetite appears to be good, completing % of meals, she is having regular bowel movements, last BM 6/5. Patient is in no acute distress, denies CP, SOB, abdominal pain, N/V/C/D.   Nursing notes and labs reviewed. BW orders placed for 6/9.   Patient has a follow up appointment with Audiology on 7/7.    The patient's allergies, past medical, surgical, social and family history were reviewed and unchanged.    Review of Systems     Review of Systems   Reason unable to perform ROS: limited due to cognition.   Constitutional:  Negative for appetite change, chills, fatigue and fever.   HENT:  Negative for congestion, rhinorrhea, sore throat and trouble swallowing.    Respiratory:  Negative for cough and shortness of breath.    Cardiovascular:  Negative for chest pain and palpitations.   Gastrointestinal:  " Negative for abdominal distention, abdominal pain, blood in stool, constipation, diarrhea, nausea and vomiting.   Genitourinary:  Negative for difficulty urinating, dysuria and hematuria.   Musculoskeletal:  Positive for gait problem. Negative for arthralgias.   Neurological:  Positive for weakness. Negative for dizziness, light-headedness and headaches.   All other systems reviewed and are negative.      Objective     Vitals:   Vitals:    06/06/25 0806   BP: 116/76   Pulse: 68   Resp: 18   Temp: 97.6 °F (36.4 °C)   SpO2: 98%       Physical Exam  Vitals and nursing note reviewed.   Constitutional:       General: She is not in acute distress.     Appearance: Normal appearance. She is not ill-appearing.   HENT:      Head: Normocephalic and atraumatic.      Right Ear: External ear normal.      Left Ear: External ear normal.      Nose: Nose normal. No congestion or rhinorrhea.      Mouth/Throat:      Mouth: Mucous membranes are dry.     Eyes:      General: No scleral icterus.        Right eye: No discharge.         Left eye: No discharge.      Conjunctiva/sclera: Conjunctivae normal.       Cardiovascular:      Rate and Rhythm: Normal rate and regular rhythm.   Pulmonary:      Effort: No respiratory distress.      Breath sounds: No wheezing, rhonchi or rales.   Abdominal:      General: Bowel sounds are normal. There is no distension.      Tenderness: There is no abdominal tenderness. There is no guarding or rebound.     Musculoskeletal:      Right lower leg: Edema present.      Left lower leg: Edema present.      Comments: +1 BLE edema      Skin:     General: Skin is warm and dry.     Neurological:      Mental Status: She is alert. Mental status is at baseline.      Cranial Nerves: Dysarthria present.      Motor: Weakness present.      Coordination: Coordination abnormal.      Gait: Gait abnormal.     Psychiatric:         Mood and Affect: Mood normal.         Pertinent Laboratory/Diagnostic Studies:   Reviewed in  "facility chart-stable    Current Medications   Medications reviewed and updated see facility MAR for details.    Current Medications[3]       Please note:  Voice-recognition software may have been used in the preparation of this document.  Occasional wrong word or \"sound-alike\" substitutions may have occurred due to the inherent limitations of voice recognition software.  Interpretation should be guided by context.         CHRISTOPHER Little         [1]   Past Medical History:  Diagnosis Date    Anxiety     Benign essential hypertension     resolved; 06/15/16    Depression     Depression with anxiety     Fracture of fifth metatarsal bone of right foot with delayed healing     last assessed 04/12/16; fracture of metatarsal bone, right, closed, initial encounter    Hyperlipidemia     last assessed 11/28/17    Hypothyroidism     last assessed 11/28/2017    Injury of right toe, initial encounter 09/12/2018    Insomnia     Obesity     Schizoaffective disorder (HCC)     last assessed 05/18/2017    Seizure disorder (HCC)     last assessed 03/28/17    Seizures (HCC)    [2]   Past Surgical History:  Procedure Laterality Date    COLONOSCOPY      complete    [place] COLONOSCOPY FLX DX W/COLLJ SPEC WHEN PFRMD N/A 8/30/2018    Procedure: COLONOSCOPY with polypectomies;  Surgeon: Andriy Lopez MD;  Location: [facility];  Service: Gastroenterology   [3]   Current Outpatient Medications:     gabapentin (NEURONTIN) 100 mg capsule, Take 100 mg by mouth in the morning and 100 mg in the evening., Disp: , Rfl:     alendronate (FOSAMAX) 70 mg tablet, Take 1 tablet (70 mg total) by mouth every 7 days Every 7 days on Fridays, Disp: 12 tablet, Rfl: 1    Aspirin Low Dose 81 MG chewable tablet, CHEW 1 TABLET BY MOUTH ONCE DAILY @8AM (CAD) *MIREYA, Disp: 30 tablet, Rfl: 0    atorvastatin (LIPITOR) 10 mg tablet, Take 1 tablet (10 mg total) by mouth daily with dinner, Disp: , Rfl:     Cholecalciferol (VITAMIN D3) 1,000 units tablet, Take 1 tablet (1,000 " Units total) by mouth daily for 30 doses, Disp: , Rfl:     clonazePAM (KlonoPIN) 1 mg tablet, Take 1 tablet (1 mg total) by mouth 2 (two) times a day, Disp: 30 tablet, Rfl: 0    divalproex sodium (DEPAKOTE ER) 500 mg 24 hr tablet, Take 4 tablets (2,000 mg total) by mouth daily at bedtime, Disp: , Rfl:     fluticasone (FLONASE) 50 mcg/act nasal spray, 2 sprays into each nostril daily, Disp: 9.9 mL, Rfl: 0    guaiFENesin (MUCINEX) 600 mg 12 hr tablet, Take 1 tablet (600 mg total) by mouth every 12 (twelve) hours as needed for congestion, Disp: , Rfl:     Incontinence Supplies MISC, Use if needed (incontinence) SIZE XL PULL UP DISPOSABLE BRIEFS, Disp: 100 each, Rfl: 1    Incontinence Supply Disposable (Disposable Brief Large) MISC, Use every 12 (twelve) hours, Disp: 36 each, Rfl: 6    levothyroxine 100 mcg tablet, Take 1 tablet (100 mcg total) by mouth daily, Disp: 30 tablet, Rfl: 0    melatonin 3 mg, Take 2 tablets (6 mg total) by mouth daily at bedtime, Disp: 60 tablet, Rfl: 0    multivitamin (THERAGRAN) TABS, Take 1 tablet by mouth in the morning., Disp: , Rfl:     PHENobarbital 64.8 mg tablet, Take 1 tablet (64.8 mg total) by mouth every 12 (twelve) hours, Disp: 30 tablet, Rfl: 0    polyethylene glycol (GLYCOLAX) 17 GM/SCOOP powder, Take 17 g by mouth in the morning., Disp: , Rfl:     risperiDONE (RisperDAL) 2 mg tablet, Take 1 tablet (2 mg total) by mouth 2 (two) times a day, Disp: , Rfl:     senna-docusate sodium (SENOKOT S) 8.6-50 mg per tablet, Take 1 tablet by mouth daily as needed for constipation, Disp: , Rfl:     traZODone (DESYREL) 100 mg tablet, Take 1 tablet (100 mg total) by mouth daily at bedtime, Disp: , Rfl:     trihexyphenidyl (ARTANE) 2 mg tablet, Take 1 tablet (2 mg total) by mouth 2 (two) times a day, Disp: 60 tablet, Rfl: 0  No current facility-administered medications for this visit.    Facility-Administered Medications Ordered in Other Visits:     lactated ringers infusion, , Intravenous,  Continuous PRN, Celi Benson, CRNA, New Bag at 02/08/23 1400

## 2025-06-06 NOTE — ASSESSMENT & PLAN NOTE
Had medications adjusted at the hospital   Per patient mood stable, no reported issues/concerns from nursing.   Continue with depakote, trazodone, clonazepam, risperdal and melatonin  Psych following  Continue supportive measures  Encourage activity and engagement  Continue to monitor for acute changes in condition

## 2025-06-06 NOTE — ASSESSMENT & PLAN NOTE
Per patient she is sleeping well, no reported issues or concerns  Continue melatonin and trazodone  Continue monitoring symptoms  Continue good sleep hygiene

## 2025-06-06 NOTE — ASSESSMENT & PLAN NOTE
Multifactorial- acute and chronic conditions  Continue PT/OT   Assist with ADLs  Encourage PO nutrition and hydration.   following for discharge planning.  Maintain fall and safety precautions.

## 2025-06-06 NOTE — ASSESSMENT & PLAN NOTE
Patient denies pain  No reported issues  Continue PT/OT  Continue activity as tolerable   Continue Tylenol PRN  Continue to monitor

## 2025-06-06 NOTE — ASSESSMENT & PLAN NOTE
No recent seizures   Continue depakote, phenobarbital and clonazepam   Continue monitoring for seizures  Continue seizure precautions

## 2025-06-09 ENCOUNTER — NURSING HOME VISIT (OUTPATIENT)
Dept: GERIATRICS | Facility: OTHER | Age: 70
End: 2025-06-09
Payer: MEDICARE

## 2025-06-09 VITALS
TEMPERATURE: 97.5 F | WEIGHT: 173 LBS | HEART RATE: 64 BPM | SYSTOLIC BLOOD PRESSURE: 124 MMHG | RESPIRATION RATE: 18 BRPM | DIASTOLIC BLOOD PRESSURE: 64 MMHG | BODY MASS INDEX: 29.7 KG/M2 | OXYGEN SATURATION: 97 %

## 2025-06-09 DIAGNOSIS — F39 MOOD INSOMNIA (HCC): ICD-10-CM

## 2025-06-09 DIAGNOSIS — F25.0 SCHIZOAFFECTIVE DISORDER, BIPOLAR TYPE (HCC): ICD-10-CM

## 2025-06-09 DIAGNOSIS — R26.2 AMBULATORY DYSFUNCTION: ICD-10-CM

## 2025-06-09 DIAGNOSIS — R25.1 TREMORS OF NERVOUS SYSTEM: ICD-10-CM

## 2025-06-09 DIAGNOSIS — I10 PRIMARY HYPERTENSION: Primary | ICD-10-CM

## 2025-06-09 DIAGNOSIS — G40.909 SEIZURE DISORDER (HCC): ICD-10-CM

## 2025-06-09 DIAGNOSIS — F51.05 MOOD INSOMNIA (HCC): ICD-10-CM

## 2025-06-09 DIAGNOSIS — G89.29 CHRONIC MIDLINE LOW BACK PAIN WITHOUT SCIATICA: ICD-10-CM

## 2025-06-09 DIAGNOSIS — M54.50 CHRONIC MIDLINE LOW BACK PAIN WITHOUT SCIATICA: ICD-10-CM

## 2025-06-09 PROCEDURE — 99309 SBSQ NF CARE MODERATE MDM 30: CPT

## 2025-06-09 NOTE — ASSESSMENT & PLAN NOTE
BP stable, SBP 100s-130s  No acute cardiac complaints  Avoid hypotension  Not presently on medication  Continue monitoring BP

## 2025-06-09 NOTE — PROGRESS NOTES
[facility]  [address] [address]  [phone]  FACILITY: [facility]   Code 31 (STR)  Follow up visit       NAME: Laine Tse  AGE: 69 y.o. SEX: female CODE STATUS: No CPR    DATE OF ENCOUNTER: 6/9/2025    Assessment and Plan     1. Primary hypertension  Assessment & Plan:  BP stable, SBP 100s-130s  No acute cardiac complaints  Avoid hypotension  Not presently on medication  Continue monitoring BP  2. Seizure disorder (HCC)  Assessment & Plan:  No recent seizures   Continue depakote, phenobarbital and clonazepam   Continue monitoring for seizures  Continue seizure precautions   3. Schizoaffective disorder, bipolar type (HCC)  Assessment & Plan:  Had medications adjusted at the hospital   Per patient mood stable, no reported issues/concerns from nursing.   Continue with depakote, trazodone, clonazepam, risperdal and melatonin  Psych following  Continue supportive measures  Encourage activity and engagement  Continue to monitor for acute changes in condition   4. Mood insomnia (HCC)  Assessment & Plan:  Per patient she is sleeping well, no reported issues or concerns  Continue melatonin and trazodone  Continue monitoring symptoms  Continue good sleep hygiene   5. Ambulatory dysfunction  Assessment & Plan:  Multifactorial- acute and chronic conditions  Continue PT/OT   Assist with ADLs  Encourage PO nutrition and hydration.   following for discharge planning.  Maintain fall and safety precautions.  6. Chronic midline low back pain without sciatica  Assessment & Plan:  Patient denies pain  No reported issues  Continue PT/OT  Continue activity as tolerable   Continue Tylenol PRN  Continue to monitor   7. Tremors of nervous system  Assessment & Plan:  Possibly from EPS  Continue with trihexyphenidyl   Continue monitoring symtpoms        All medications and routine orders were reviewed and updated as needed.    Chief Complaint     STR follow up visit    Past Medical and Surgica  "History      Past Medical History[1]  Past Surgical History[2]  Allergies   Allergen Reactions    Clozapine Other (See Comments)     low white blood count  Other reaction(s): Other (See Comments)  low white blood count    Haloperidol Other (See Comments)     EPS  Other reaction(s): Other (See Comments)  EPS    Lithium Other (See Comments)     Toxicity    Prolixin [Fluphenazine] Hyperactivity and Other (See Comments)     EPS, Hyperactivity  EPS, Hyperactivity    Valproic Acid Confusion and Other (See Comments)     \"Mental confusion\"  \"Mental confusion\"        History of Present Illness     Laine Tse is a 70 yo female with PMH of Seizure disorder, Schizoaffective disorder, bipolar type, HTN and Insomnia. Patient hospitalized from 5/8-6/2 after hallucinating and jumping out a 1st floor window. Patient managed in [facility], medications adjusted.  Patient was previously at a [facility] facility for psychiatric patients but has been there for 3 years, facility had been looking for alternate housing options.     Patient being seen and examined for follow up on acute and chronic medical conditions. Upon exam patient is in the day room, socializing with other residents. Patient in good spirits, she states her mood is stable. She denies any pain and offers no medical concerns at this time. Patient appetite appears to be good, completing % of meals, last BM 6/7. Patient is in no acute distress, denies CP, SOB, abdominal pain, N/V/C/D.   Nursing notes and labs reviewed.   Patient has a follow up appointment with Audiology on 7/7.    The patient's allergies, past medical, surgical, social and family history were reviewed and unchanged.    Review of Systems     Review of Systems   Reason unable to perform ROS: limited due to cognition.   Constitutional:  Negative for appetite change, chills, fatigue and fever.   HENT:  Negative for congestion, rhinorrhea, sore throat and trouble swallowing.    Respiratory:  Negative for cough and " shortness of breath.    Cardiovascular:  Negative for chest pain and palpitations.   Gastrointestinal:  Negative for abdominal distention, abdominal pain, blood in stool, constipation, diarrhea, nausea and vomiting.   Genitourinary:  Negative for difficulty urinating, dysuria and hematuria.   Musculoskeletal:  Positive for gait problem. Negative for arthralgias.   Neurological:  Positive for weakness. Negative for dizziness, light-headedness and headaches.   All other systems reviewed and are negative.      Objective     Vitals:   Vitals:    06/09/25 1642   BP: 124/64   Pulse: 64   Resp: 18   Temp: 97.5 °F (36.4 °C)   SpO2: 97%       Physical Exam  Vitals and nursing note reviewed.   Constitutional:       General: She is not in acute distress.     Appearance: Normal appearance. She is not ill-appearing.   HENT:      Head: Normocephalic and atraumatic.      Right Ear: External ear normal.      Left Ear: External ear normal.      Nose: Nose normal. No congestion or rhinorrhea.      Mouth/Throat:      Mouth: Mucous membranes are dry.     Eyes:      General: No scleral icterus.        Right eye: No discharge.         Left eye: No discharge.      Conjunctiva/sclera: Conjunctivae normal.       Cardiovascular:      Rate and Rhythm: Normal rate and regular rhythm.   Pulmonary:      Effort: No respiratory distress.      Breath sounds: No wheezing, rhonchi or rales.   Abdominal:      General: Bowel sounds are normal. There is no distension.      Tenderness: There is no abdominal tenderness. There is no guarding or rebound.     Musculoskeletal:      Right lower leg: Edema present.      Left lower leg: Edema present.      Comments: +1 BLE edema      Skin:     General: Skin is warm and dry.     Neurological:      Mental Status: She is alert. Mental status is at baseline.      Cranial Nerves: Dysarthria present.      Motor: Weakness present.      Coordination: Coordination abnormal.      Gait: Gait abnormal.     Psychiatric:     "     Mood and Affect: Mood normal.         Pertinent Laboratory/Diagnostic Studies:   Reviewed in facility chart-stable    Current Medications   Medications reviewed and updated see facility MAR for details.    Current Medications[3]       Please note:  Voice-recognition software may have been used in the preparation of this document.  Occasional wrong word or \"sound-alike\" substitutions may have occurred due to the inherent limitations of voice recognition software.  Interpretation should be guided by context.         CHRISTOPHER Little         [1]   Past Medical History:  Diagnosis Date    Anxiety     Benign essential hypertension     resolved; 06/15/16    Depression     Depression with anxiety     Fracture of fifth metatarsal bone of right foot with delayed healing     last assessed 04/12/16; fracture of metatarsal bone, right, closed, initial encounter    Hyperlipidemia     last assessed 11/28/17    Hypothyroidism     last assessed 11/28/2017    Injury of right toe, initial encounter 09/12/2018    Insomnia     Obesity     Schizoaffective disorder (HCC)     last assessed 05/18/2017    Seizure disorder (HCC)     last assessed 03/28/17    Seizures (HCC)    [2]   Past Surgical History:  Procedure Laterality Date    COLONOSCOPY      complete    [place] COLONOSCOPY FLX DX W/COLLJ SPEC WHEN PFRMD N/A 8/30/2018    Procedure: COLONOSCOPY with polypectomies;  Surgeon: Andriy Lopez MD;  Location: [facility];  Service: Gastroenterology   [3]   Current Outpatient Medications:     alendronate (FOSAMAX) 70 mg tablet, Take 1 tablet (70 mg total) by mouth every 7 days Every 7 days on Fridays, Disp: 12 tablet, Rfl: 1    Aspirin Low Dose 81 MG chewable tablet, CHEW 1 TABLET BY MOUTH ONCE DAILY @8AM (CAD) *MIREYA, Disp: 30 tablet, Rfl: 0    atorvastatin (LIPITOR) 10 mg tablet, Take 1 tablet (10 mg total) by mouth daily with dinner, Disp: , Rfl:     Cholecalciferol (VITAMIN D3) 1,000 units tablet, Take 1 tablet (1,000 Units total) by mouth " daily for 30 doses, Disp: , Rfl:     clonazePAM (KlonoPIN) 1 mg tablet, Take 1 tablet (1 mg total) by mouth 2 (two) times a day, Disp: 30 tablet, Rfl: 0    divalproex sodium (DEPAKOTE ER) 500 mg 24 hr tablet, Take 4 tablets (2,000 mg total) by mouth daily at bedtime, Disp: , Rfl:     fluticasone (FLONASE) 50 mcg/act nasal spray, 2 sprays into each nostril daily, Disp: 9.9 mL, Rfl: 0    gabapentin (NEURONTIN) 100 mg capsule, Take 100 mg by mouth in the morning and 100 mg in the evening., Disp: , Rfl:     guaiFENesin (MUCINEX) 600 mg 12 hr tablet, Take 1 tablet (600 mg total) by mouth every 12 (twelve) hours as needed for congestion, Disp: , Rfl:     Incontinence Supplies MISC, Use if needed (incontinence) SIZE XL PULL UP DISPOSABLE BRIEFS, Disp: 100 each, Rfl: 1    Incontinence Supply Disposable (Disposable Brief Large) MISC, Use every 12 (twelve) hours, Disp: 36 each, Rfl: 6    levothyroxine 100 mcg tablet, Take 1 tablet (100 mcg total) by mouth daily, Disp: 30 tablet, Rfl: 0    melatonin 3 mg, Take 2 tablets (6 mg total) by mouth daily at bedtime, Disp: 60 tablet, Rfl: 0    multivitamin (THERAGRAN) TABS, Take 1 tablet by mouth in the morning., Disp: , Rfl:     PHENobarbital 64.8 mg tablet, Take 1 tablet (64.8 mg total) by mouth every 12 (twelve) hours, Disp: 30 tablet, Rfl: 0    polyethylene glycol (GLYCOLAX) 17 GM/SCOOP powder, Take 17 g by mouth in the morning., Disp: , Rfl:     risperiDONE (RisperDAL) 2 mg tablet, Take 1 tablet (2 mg total) by mouth 2 (two) times a day, Disp: , Rfl:     senna-docusate sodium (SENOKOT S) 8.6-50 mg per tablet, Take 1 tablet by mouth daily as needed for constipation, Disp: , Rfl:     traZODone (DESYREL) 100 mg tablet, Take 1 tablet (100 mg total) by mouth daily at bedtime, Disp: , Rfl:     trihexyphenidyl (ARTANE) 2 mg tablet, Take 1 tablet (2 mg total) by mouth 2 (two) times a day, Disp: 60 tablet, Rfl: 0  No current facility-administered medications for this  visit.    Facility-Administered Medications Ordered in Other Visits:     lactated ringers infusion, , Intravenous, Continuous PRN, Celi Benson CRNA, New Bag at 02/08/23 1400

## 2025-06-11 ENCOUNTER — NURSING HOME VISIT (OUTPATIENT)
Dept: GERIATRICS | Facility: OTHER | Age: 70
End: 2025-06-11
Payer: MEDICARE

## 2025-06-11 VITALS
RESPIRATION RATE: 18 BRPM | SYSTOLIC BLOOD PRESSURE: 136 MMHG | BODY MASS INDEX: 29.7 KG/M2 | HEART RATE: 71 BPM | OXYGEN SATURATION: 98 % | DIASTOLIC BLOOD PRESSURE: 84 MMHG | TEMPERATURE: 98.3 F | WEIGHT: 173 LBS

## 2025-06-11 DIAGNOSIS — I10 PRIMARY HYPERTENSION: ICD-10-CM

## 2025-06-11 DIAGNOSIS — R05.3 CHRONIC COUGH: ICD-10-CM

## 2025-06-11 DIAGNOSIS — R26.2 AMBULATORY DYSFUNCTION: ICD-10-CM

## 2025-06-11 DIAGNOSIS — G40.909 SEIZURE DISORDER (HCC): ICD-10-CM

## 2025-06-11 DIAGNOSIS — E03.9 ACQUIRED HYPOTHYROIDISM: ICD-10-CM

## 2025-06-11 DIAGNOSIS — F25.0 SCHIZOAFFECTIVE DISORDER, BIPOLAR TYPE (HCC): Primary | ICD-10-CM

## 2025-06-11 PROCEDURE — 99309 SBSQ NF CARE MODERATE MDM 30: CPT

## 2025-06-11 NOTE — PROGRESS NOTES
[facility]  [address] [address]  [phone]  FACILITY: [facility]   Code 31 (STR)  Follow up visit       NAME: Laine Tse  AGE: 69 y.o. SEX: female CODE STATUS: No CPR    DATE OF ENCOUNTER: 6/11/2025    Assessment and Plan     1. Schizoaffective disorder, bipolar type (HCC)  Assessment & Plan:  Had medications adjusted at the hospital   Per patient mood stable, no reported issues/concerns from nursing.   Continue with depakote, trazodone, clonazepam, risperdal and melatonin  Psych following  Continue supportive measures  Encourage activity and engagement  Continue to monitor for acute changes in condition   2. Primary hypertension  Assessment & Plan:  BP stable, SBP 100s-130s  No acute cardiac complaints  Avoid hypotension  Not presently on medication  Continue monitoring BP  3. Acquired hypothyroidism  Assessment & Plan:  5/9 TSH 1.828  Continue levothyroxine  Continue monitoring for acute changes   4. Seizure disorder (HCC)  Assessment & Plan:  No recent seizures   Continue depakote, phenobarbital and clonazepam   Continue monitoring for seizures  Continue seizure precautions   5. Ambulatory dysfunction  Assessment & Plan:  Multifactorial- acute and chronic conditions  Continue PT/OT   Assist with ADLs  Encourage PO nutrition and hydration.   following for discharge planning.  Maintain fall and safety precautions.  6. Chronic cough  Assessment & Plan:  Per patient she has a chronic cough which is worse at night.   Lungs clear, vitals stable, afebrile.   Denies acute respiratory symptoms.   Will order Robitussin PRN  Continue monitoring for acute changes        All medications and routine orders were reviewed and updated as needed.    Chief Complaint     STR follow up visit    Past Medical and Surgica History      Past Medical History[1]  Past Surgical History[2]  Allergies   Allergen Reactions    Clozapine Other (See Comments)     low white blood count  Other  "reaction(s): Other (See Comments)  low white blood count    Haloperidol Other (See Comments)     EPS  Other reaction(s): Other (See Comments)  EPS    Lithium Other (See Comments)     Toxicity    Prolixin [Fluphenazine] Hyperactivity and Other (See Comments)     EPS, Hyperactivity  EPS, Hyperactivity    Valproic Acid Confusion and Other (See Comments)     \"Mental confusion\"  \"Mental confusion\"        History of Present Illness     Laine Tse is a 70 yo female with PMH of Seizure disorder, Schizoaffective disorder, bipolar type, HTN and Insomnia. Patient hospitalized from 5/8-6/2 after hallucinating and jumping out a 1st floor window. Patient managed in [facility], medications adjusted.  Patient was previously at a [facility] facility for psychiatric patients but has been there for 3 years, facility had been looking for alternate housing options.     Patient being seen and examined for follow up on acute and chronic medical conditions. Upon exam patient is in the day room. Patient is in good spirits, she states her mood is stable. She denies any pain, she reports a chronic cough. Patient offers no further medical concerns at this time. Patient appetite appears to be good, completing % of meals, last BM 6/10. Patient is in no acute distress, denies CP, SOB, abdominal pain, N/V/C/D.   Nursing notes and labs reviewed.   Patient has a follow up appointment with Audiology on 7/7.    The patient's allergies, past medical, surgical, social and family history were reviewed and unchanged.    Review of Systems     Review of Systems   Reason unable to perform ROS: limited due to cognition.   Constitutional:  Negative for appetite change, chills, fatigue and fever.   HENT:  Negative for congestion, rhinorrhea, sore throat and trouble swallowing.    Respiratory:  Positive for cough. Negative for shortness of breath.    Cardiovascular:  Negative for chest pain and palpitations.   Gastrointestinal:  Negative for abdominal distention, " abdominal pain, blood in stool, constipation, diarrhea, nausea and vomiting.   Genitourinary:  Negative for difficulty urinating, dysuria and hematuria.   Musculoskeletal:  Positive for gait problem. Negative for arthralgias.   Neurological:  Positive for weakness. Negative for dizziness, light-headedness and headaches.   All other systems reviewed and are negative.      Objective     Vitals:   Vitals:    06/11/25 1402   BP: 136/84   Pulse: 71   Resp: 18   Temp: 98.3 °F (36.8 °C)   SpO2: 98%     Physical Exam  Vitals and nursing note reviewed.   Constitutional:       General: She is not in acute distress.     Appearance: Normal appearance. She is not ill-appearing.   HENT:      Head: Normocephalic and atraumatic.      Right Ear: External ear normal.      Left Ear: External ear normal.      Nose: Nose normal. No congestion or rhinorrhea.      Mouth/Throat:      Mouth: Mucous membranes are dry.     Eyes:      General: No scleral icterus.        Right eye: No discharge.         Left eye: No discharge.      Conjunctiva/sclera: Conjunctivae normal.       Cardiovascular:      Rate and Rhythm: Normal rate and regular rhythm.   Pulmonary:      Effort: No respiratory distress.      Breath sounds: No wheezing, rhonchi or rales.   Abdominal:      General: Bowel sounds are normal. There is no distension.      Tenderness: There is no abdominal tenderness. There is no guarding or rebound.     Musculoskeletal:      Right lower leg: Edema present.      Left lower leg: Edema present.      Comments: +1 BLE edema      Skin:     General: Skin is warm and dry.     Neurological:      Mental Status: She is alert. Mental status is at baseline.      Cranial Nerves: Dysarthria present.      Motor: Weakness present.      Coordination: Coordination abnormal.      Gait: Gait abnormal.     Psychiatric:         Mood and Affect: Mood normal.         Pertinent Laboratory/Diagnostic Studies:   Reviewed in facility chart-stable    Current Medications  "  Medications reviewed and updated see facility MAR for details.    Current Medications[3]       Please note:  Voice-recognition software may have been used in the preparation of this document.  Occasional wrong word or \"sound-alike\" substitutions may have occurred due to the inherent limitations of voice recognition software.  Interpretation should be guided by context.         CHRISTOPHER Little         [1]   Past Medical History:  Diagnosis Date    Anxiety     Benign essential hypertension     resolved; 06/15/16    Depression     Depression with anxiety     Fracture of fifth metatarsal bone of right foot with delayed healing     last assessed 04/12/16; fracture of metatarsal bone, right, closed, initial encounter    Hyperlipidemia     last assessed 11/28/17    Hypothyroidism     last assessed 11/28/2017    Injury of right toe, initial encounter 09/12/2018    Insomnia     Obesity     Schizoaffective disorder (HCC)     last assessed 05/18/2017    Seizure disorder (HCC)     last assessed 03/28/17    Seizures (HCC)    [2]   Past Surgical History:  Procedure Laterality Date    COLONOSCOPY      complete    [place] COLONOSCOPY FLX DX W/COLLJ SPEC WHEN PFRMD N/A 8/30/2018    Procedure: COLONOSCOPY with polypectomies;  Surgeon: Andriy Lopez MD;  Location: [facility];  Service: Gastroenterology   [3]   Current Outpatient Medications:     alendronate (FOSAMAX) 70 mg tablet, Take 1 tablet (70 mg total) by mouth every 7 days Every 7 days on Fridays, Disp: 12 tablet, Rfl: 1    Aspirin Low Dose 81 MG chewable tablet, CHEW 1 TABLET BY MOUTH ONCE DAILY @8AM (CAD) *MIREYA, Disp: 30 tablet, Rfl: 0    atorvastatin (LIPITOR) 10 mg tablet, Take 1 tablet (10 mg total) by mouth daily with dinner, Disp: , Rfl:     Cholecalciferol (VITAMIN D3) 1,000 units tablet, Take 1 tablet (1,000 Units total) by mouth daily for 30 doses, Disp: , Rfl:     clonazePAM (KlonoPIN) 1 mg tablet, Take 1 tablet (1 mg total) by mouth 2 (two) times a day, Disp: 30 " tablet, Rfl: 0    divalproex sodium (DEPAKOTE ER) 500 mg 24 hr tablet, Take 4 tablets (2,000 mg total) by mouth daily at bedtime, Disp: , Rfl:     fluticasone (FLONASE) 50 mcg/act nasal spray, 2 sprays into each nostril daily, Disp: 9.9 mL, Rfl: 0    gabapentin (NEURONTIN) 100 mg capsule, Take 100 mg by mouth in the morning and 100 mg in the evening., Disp: , Rfl:     guaiFENesin (MUCINEX) 600 mg 12 hr tablet, Take 1 tablet (600 mg total) by mouth every 12 (twelve) hours as needed for congestion, Disp: , Rfl:     Incontinence Supplies MISC, Use if needed (incontinence) SIZE XL PULL UP DISPOSABLE BRIEFS, Disp: 100 each, Rfl: 1    Incontinence Supply Disposable (Disposable Brief Large) MISC, Use every 12 (twelve) hours, Disp: 36 each, Rfl: 6    levothyroxine 100 mcg tablet, Take 1 tablet (100 mcg total) by mouth daily, Disp: 30 tablet, Rfl: 0    melatonin 3 mg, Take 2 tablets (6 mg total) by mouth daily at bedtime, Disp: 60 tablet, Rfl: 0    multivitamin (THERAGRAN) TABS, Take 1 tablet by mouth in the morning., Disp: , Rfl:     PHENobarbital 64.8 mg tablet, Take 1 tablet (64.8 mg total) by mouth every 12 (twelve) hours, Disp: 30 tablet, Rfl: 0    polyethylene glycol (GLYCOLAX) 17 GM/SCOOP powder, Take 17 g by mouth in the morning., Disp: , Rfl:     risperiDONE (RisperDAL) 2 mg tablet, Take 1 tablet (2 mg total) by mouth 2 (two) times a day, Disp: , Rfl:     senna-docusate sodium (SENOKOT S) 8.6-50 mg per tablet, Take 1 tablet by mouth daily as needed for constipation, Disp: , Rfl:     traZODone (DESYREL) 100 mg tablet, Take 1 tablet (100 mg total) by mouth daily at bedtime, Disp: , Rfl:     trihexyphenidyl (ARTANE) 2 mg tablet, Take 1 tablet (2 mg total) by mouth 2 (two) times a day, Disp: 60 tablet, Rfl: 0  No current facility-administered medications for this visit.    Facility-Administered Medications Ordered in Other Visits:     lactated ringers infusion, , Intravenous, Continuous PRN, Celi Benson CRNA,  New Bag at 02/08/23 1400

## 2025-06-11 NOTE — ASSESSMENT & PLAN NOTE
Per patient she has a chronic cough which is worse at night.   Lungs clear, vitals stable, afebrile.   Denies acute respiratory symptoms.   Will order Robitussin PRN  Continue monitoring for acute changes

## 2025-06-16 ENCOUNTER — NURSING HOME VISIT (OUTPATIENT)
Dept: GERIATRICS | Facility: OTHER | Age: 70
End: 2025-06-16
Payer: MEDICARE

## 2025-06-16 VITALS
HEART RATE: 76 BPM | DIASTOLIC BLOOD PRESSURE: 62 MMHG | SYSTOLIC BLOOD PRESSURE: 134 MMHG | TEMPERATURE: 98.3 F | BODY MASS INDEX: 29.7 KG/M2 | RESPIRATION RATE: 18 BRPM | WEIGHT: 173 LBS | OXYGEN SATURATION: 97 %

## 2025-06-16 DIAGNOSIS — G40.909 SEIZURE DISORDER (HCC): ICD-10-CM

## 2025-06-16 DIAGNOSIS — F51.05 MOOD INSOMNIA (HCC): ICD-10-CM

## 2025-06-16 DIAGNOSIS — R26.2 AMBULATORY DYSFUNCTION: ICD-10-CM

## 2025-06-16 DIAGNOSIS — M54.50 CHRONIC MIDLINE LOW BACK PAIN WITHOUT SCIATICA: ICD-10-CM

## 2025-06-16 DIAGNOSIS — F25.9 SCHIZOAFFECTIVE DISORDER, UNSPECIFIED TYPE (HCC): Primary | ICD-10-CM

## 2025-06-16 DIAGNOSIS — G89.29 CHRONIC MIDLINE LOW BACK PAIN WITHOUT SCIATICA: ICD-10-CM

## 2025-06-16 DIAGNOSIS — M81.8 OTHER OSTEOPOROSIS, UNSPECIFIED PATHOLOGICAL FRACTURE PRESENCE: ICD-10-CM

## 2025-06-16 DIAGNOSIS — F39 MOOD INSOMNIA (HCC): ICD-10-CM

## 2025-06-16 DIAGNOSIS — I10 PRIMARY HYPERTENSION: ICD-10-CM

## 2025-06-16 PROBLEM — L98.9 SKIN LESION OF LEFT LOWER EXTREMITY: Status: RESOLVED | Noted: 2024-02-26 | Resolved: 2025-06-16

## 2025-06-16 PROBLEM — R21 RASH: Status: RESOLVED | Noted: 2023-06-08 | Resolved: 2025-06-16

## 2025-06-16 PROBLEM — R73.9 HYPERGLYCEMIA: Status: RESOLVED | Noted: 2018-11-14 | Resolved: 2025-06-16

## 2025-06-16 PROCEDURE — 99309 SBSQ NF CARE MODERATE MDM 30: CPT

## 2025-06-16 RX ORDER — PHENOBARBITAL 64.8 MG/1
64.8 TABLET ORAL EVERY 12 HOURS
Qty: 30 TABLET | Refills: 4 | Status: SHIPPED | OUTPATIENT
Start: 2025-06-16

## 2025-06-16 RX ORDER — CLONAZEPAM 1 MG/1
1 TABLET ORAL 2 TIMES DAILY
Qty: 60 TABLET | Refills: 0 | Status: SHIPPED | OUTPATIENT
Start: 2025-06-16 | End: 2025-07-16

## 2025-06-16 NOTE — PROGRESS NOTES
[facility]  [address] [address]  [phone]  FACILITY: [facility]   Code 31 (STR)  Follow up visit       NAME: Laine Tse  AGE: 69 y.o. SEX: female CODE STATUS: CPR    DATE OF ENCOUNTER: 6/16/2025    Assessment and Plan     1. Schizoaffective disorder, unspecified type (HCC)  Assessment & Plan:  Had medications adjusted at the hospital   Per patient mood stable, no reported issues/concerns from nursing.   Continue with depakote, trazodone, clonazepam, risperdal and melatonin  Psych following  Continue supportive measures  Encourage activity and engagement  Continue to monitor for acute changes in condition   2. Seizure disorder (HCC)  Assessment & Plan:  No recent seizures   Continue depakote, phenobarbital and clonazepam   Continue monitoring for seizures  Continue seizure precautions   Orders:  -     PHENobarbital 64.8 mg tablet; Take 1 tablet (64.8 mg total) by mouth every 12 (twelve) hours  -     clonazePAM (KlonoPIN) 1 mg tablet; Take 1 tablet (1 mg total) by mouth 2 (two) times a day  3. Primary hypertension  Assessment & Plan:  BP stable, SBP 100s-140s  No acute cardiac complaints  Avoid hypotension  Not presently on medication  Continue monitoring BP  4. Other osteoporosis, unspecified pathological fracture presence  Assessment & Plan:  Continue fosamax   Followed by rheumatology   Continue follow up with rheumatology as out patient       5. Mood insomnia (HCC)  Assessment & Plan:  Per patient she is sleeping well, no reported issues or concerns  Continue melatonin and trazodone  Continue monitoring symptoms  Continue good sleep hygiene   6. Ambulatory dysfunction  Assessment & Plan:  Multifactorial- acute and chronic conditions  Continue PT/OT   Assist with ADLs  Encourage PO nutrition and hydration.   following for discharge planning.  Maintain fall and safety precautions.  7. Chronic midline low back pain without sciatica  Assessment & Plan:  Patient denies  "pain  No reported issues  Continue PT/OT  Continue activity as tolerable   Continue Tylenol PRN  Continue to monitor        All medications and routine orders were reviewed and updated as needed.    Chief Complaint     STR follow up visit    Past Medical and Surgica History      Past Medical History[1]  Past Surgical History[2]  Allergies   Allergen Reactions    Clozapine Other (See Comments)     low white blood count  Other reaction(s): Other (See Comments)  low white blood count    Haloperidol Other (See Comments)     EPS  Other reaction(s): Other (See Comments)  EPS    Lithium Other (See Comments)     Toxicity    Prolixin [Fluphenazine] Hyperactivity and Other (See Comments)     EPS, Hyperactivity  EPS, Hyperactivity    Valproic Acid Confusion and Other (See Comments)     \"Mental confusion\"  \"Mental confusion\"        History of Present Illness     Laine Tse is a 70 yo female with PMH of Seizure disorder, Schizoaffective disorder, bipolar type, HTN and Insomnia. Patient hospitalized from 5/8-6/2 after hallucinating and jumping out a 1st floor window. Patient managed in [facility], medications adjusted.  Patient was previously at a [facility] facility for psychiatric patients but has been there for 3 years, facility had been looking for alternate housing options.     Patient being seen and examined for follow up on acute and chronic medical conditions. Upon exam patient is in the day room. Patient is in good spirits, she states her mood is stable, she does report to feeling hyperactive. No reported issues or concerns from nursing. She denies any pain, and offers no further medical concerns at this time. Patient appetite appears to be good, completing % of meals, last reported BM 6/13. Patient is in no acute distress, denies CP, SOB, abdominal pain, N/V/C/D.   Nursing notes and labs reviewed.   Patient has a follow up appointment with Audiology on 7/7.    The patient's allergies, past medical, surgical, social and " family history were reviewed and unchanged.    Review of Systems     Review of Systems   Reason unable to perform ROS: limited due to cognition.   Constitutional:  Negative for appetite change, chills, fatigue and fever.   HENT:  Negative for congestion, rhinorrhea, sore throat and trouble swallowing.    Respiratory:  Positive for cough. Negative for shortness of breath.    Cardiovascular:  Negative for chest pain and palpitations.   Gastrointestinal:  Negative for abdominal distention, abdominal pain, blood in stool, constipation, diarrhea, nausea and vomiting.   Genitourinary:  Negative for difficulty urinating, dysuria and hematuria.   Musculoskeletal:  Positive for gait problem. Negative for arthralgias.   Neurological:  Positive for weakness. Negative for dizziness, light-headedness and headaches.   All other systems reviewed and are negative.      Objective     Vitals:   Vitals:    06/16/25 1321   BP: 134/62   Pulse: 76   Resp: 18   Temp: 98.3 °F (36.8 °C)   SpO2: 97%       Physical Exam  Vitals and nursing note reviewed.   Constitutional:       General: She is not in acute distress.     Appearance: Normal appearance. She is not ill-appearing.   HENT:      Head: Normocephalic and atraumatic.      Right Ear: External ear normal.      Left Ear: External ear normal.      Nose: Nose normal. No congestion or rhinorrhea.      Mouth/Throat:      Mouth: Mucous membranes are dry.     Eyes:      General: No scleral icterus.        Right eye: No discharge.         Left eye: No discharge.      Conjunctiva/sclera: Conjunctivae normal.       Cardiovascular:      Rate and Rhythm: Normal rate and regular rhythm.   Pulmonary:      Effort: No respiratory distress.      Breath sounds: No wheezing, rhonchi or rales.   Abdominal:      General: Bowel sounds are normal. There is no distension.      Tenderness: There is no abdominal tenderness. There is no guarding or rebound.     Musculoskeletal:      Right lower leg: Edema present.  "     Left lower leg: Edema present.      Comments: +1 BLE edema      Skin:     General: Skin is warm and dry.     Neurological:      Mental Status: She is alert. Mental status is at baseline.      Cranial Nerves: Dysarthria present.      Motor: Weakness present.      Coordination: Coordination abnormal.      Gait: Gait abnormal.     Psychiatric:         Mood and Affect: Mood normal.         Pertinent Laboratory/Diagnostic Studies:   Reviewed in facility chart-stable    Current Medications   Medications reviewed and updated see facility MAR for details.    Current Medications[3]       Please note:  Voice-recognition software may have been used in the preparation of this document.  Occasional wrong word or \"sound-alike\" substitutions may have occurred due to the inherent limitations of voice recognition software.  Interpretation should be guided by context.         CHRISTOPHER Little         [1]   Past Medical History:  Diagnosis Date    Anxiety     Benign essential hypertension     resolved; 06/15/16    Depression     Depression with anxiety     Fracture of fifth metatarsal bone of right foot with delayed healing     last assessed 04/12/16; fracture of metatarsal bone, right, closed, initial encounter    Hyperlipidemia     last assessed 11/28/17    Hypothyroidism     last assessed 11/28/2017    Injury of right toe, initial encounter 09/12/2018    Insomnia     Obesity     Schizoaffective disorder (HCC)     last assessed 05/18/2017    Seizure disorder (HCC)     last assessed 03/28/17    Seizures (HCC)     Skin lesion of left lower extremity 02/26/2024   [2]   Past Surgical History:  Procedure Laterality Date    COLONOSCOPY      complete    AL COLONOSCOPY FLX DX W/COLLJ SPEC WHEN PFRMD N/A 8/30/2018    Procedure: COLONOSCOPY with polypectomies;  Surgeon: Andriy Lopez MD;  Location: [facility];  Service: Gastroenterology   [3]   Current Outpatient Medications:     clonazePAM (KlonoPIN) 1 mg tablet, Take 1 tablet (1 mg " total) by mouth 2 (two) times a day, Disp: 60 tablet, Rfl: 0    PHENobarbital 64.8 mg tablet, Take 1 tablet (64.8 mg total) by mouth every 12 (twelve) hours, Disp: 30 tablet, Rfl: 4    alendronate (FOSAMAX) 70 mg tablet, Take 1 tablet (70 mg total) by mouth every 7 days Every 7 days on Fridays, Disp: 12 tablet, Rfl: 1    Aspirin Low Dose 81 MG chewable tablet, CHEW 1 TABLET BY MOUTH ONCE DAILY @8AM (CAD) *MIREYA, Disp: 30 tablet, Rfl: 0    atorvastatin (LIPITOR) 10 mg tablet, Take 1 tablet (10 mg total) by mouth daily with dinner, Disp: , Rfl:     Cholecalciferol (VITAMIN D3) 1,000 units tablet, Take 1 tablet (1,000 Units total) by mouth daily for 30 doses, Disp: , Rfl:     divalproex sodium (DEPAKOTE ER) 500 mg 24 hr tablet, Take 4 tablets (2,000 mg total) by mouth daily at bedtime, Disp: , Rfl:     fluticasone (FLONASE) 50 mcg/act nasal spray, 2 sprays into each nostril daily, Disp: 9.9 mL, Rfl: 0    gabapentin (NEURONTIN) 100 mg capsule, Take 100 mg by mouth in the morning and 100 mg in the evening., Disp: , Rfl:     guaiFENesin (MUCINEX) 600 mg 12 hr tablet, Take 1 tablet (600 mg total) by mouth every 12 (twelve) hours as needed for congestion, Disp: , Rfl:     Incontinence Supplies MISC, Use if needed (incontinence) SIZE XL PULL UP DISPOSABLE BRIEFS, Disp: 100 each, Rfl: 1    Incontinence Supply Disposable (Disposable Brief Large) MISC, Use every 12 (twelve) hours, Disp: 36 each, Rfl: 6    levothyroxine 100 mcg tablet, Take 1 tablet (100 mcg total) by mouth daily, Disp: 30 tablet, Rfl: 0    melatonin 3 mg, Take 2 tablets (6 mg total) by mouth daily at bedtime, Disp: 60 tablet, Rfl: 0    multivitamin (THERAGRAN) TABS, Take 1 tablet by mouth in the morning., Disp: , Rfl:     polyethylene glycol (GLYCOLAX) 17 GM/SCOOP powder, Take 17 g by mouth in the morning., Disp: , Rfl:     risperiDONE (RisperDAL) 2 mg tablet, Take 1 tablet (2 mg total) by mouth 2 (two) times a day, Disp: , Rfl:     senna-docusate sodium  (SENOKOT S) 8.6-50 mg per tablet, Take 1 tablet by mouth daily as needed for constipation, Disp: , Rfl:     traZODone (DESYREL) 100 mg tablet, Take 1 tablet (100 mg total) by mouth daily at bedtime, Disp: , Rfl:     trihexyphenidyl (ARTANE) 2 mg tablet, Take 1 tablet (2 mg total) by mouth 2 (two) times a day, Disp: 60 tablet, Rfl: 0  No current facility-administered medications for this visit.    Facility-Administered Medications Ordered in Other Visits:     lactated ringers infusion, , Intravenous, Continuous PRN, Celi Benson CRNA, New Bag at 02/08/23 1400

## 2025-06-16 NOTE — ASSESSMENT & PLAN NOTE
Continue fosamax   Followed by rheumatology   Continue follow up with rheumatology as out patient

## 2025-06-16 NOTE — ASSESSMENT & PLAN NOTE
BP stable, SBP 100s-140s  No acute cardiac complaints  Avoid hypotension  Not presently on medication  Continue monitoring BP

## 2025-06-18 ENCOUNTER — NURSING HOME VISIT (OUTPATIENT)
Dept: GERIATRICS | Facility: OTHER | Age: 70
End: 2025-06-18
Payer: MEDICARE

## 2025-06-18 VITALS
SYSTOLIC BLOOD PRESSURE: 132 MMHG | DIASTOLIC BLOOD PRESSURE: 80 MMHG | BODY MASS INDEX: 29.7 KG/M2 | HEART RATE: 71 BPM | OXYGEN SATURATION: 97 % | TEMPERATURE: 97.6 F | WEIGHT: 173 LBS | RESPIRATION RATE: 18 BRPM

## 2025-06-18 DIAGNOSIS — M54.50 CHRONIC MIDLINE LOW BACK PAIN WITHOUT SCIATICA: ICD-10-CM

## 2025-06-18 DIAGNOSIS — R26.2 AMBULATORY DYSFUNCTION: Primary | ICD-10-CM

## 2025-06-18 DIAGNOSIS — F09 COGNITIVE DYSFUNCTION: ICD-10-CM

## 2025-06-18 DIAGNOSIS — I10 PRIMARY HYPERTENSION: ICD-10-CM

## 2025-06-18 DIAGNOSIS — G89.29 CHRONIC MIDLINE LOW BACK PAIN WITHOUT SCIATICA: ICD-10-CM

## 2025-06-18 DIAGNOSIS — F25.0 SCHIZOAFFECTIVE DISORDER, BIPOLAR TYPE (HCC): ICD-10-CM

## 2025-06-18 DIAGNOSIS — G40.909 SEIZURE DISORDER (HCC): ICD-10-CM

## 2025-06-18 PROCEDURE — 99310 SBSQ NF CARE HIGH MDM 45: CPT

## 2025-06-18 NOTE — ASSESSMENT & PLAN NOTE
BP stable, SBP 100s-140s, at times higher  No acute cardiac complaints  Avoid hypotension  Not presently on medication  Continue monitoring BP

## 2025-06-18 NOTE — PROGRESS NOTES
[facility]  [address] [address]  [phone]  FACILITY: [facility]   Code 31 (STR)  Follow up visit       NAME: Laine Tse  AGE: 69 y.o. SEX: female CODE STATUS: CPR    DATE OF ENCOUNTER: 6/18/2025    Assessment and Plan     1. Ambulatory dysfunction  Assessment & Plan:  Multifactorial- acute and chronic conditions  Continue PT/OT   Assist with ADLs  Encourage PO nutrition and hydration.   following for discharge planning.  Maintain fall and safety precautions.  2. Chronic midline low back pain without sciatica  Assessment & Plan:  Patient denies pain  No reported issues  Continue PT/OT  Continue activity as tolerable   Continue Tylenol PRN  Continue to monitor   3. Primary hypertension  Assessment & Plan:  BP stable, SBP 100s-140s, at times higher  No acute cardiac complaints  Avoid hypotension  Not presently on medication  Continue monitoring BP  4. Seizure disorder (HCC)  Assessment & Plan:  No recent seizures   Continue depakote, phenobarbital and clonazepam   Continue monitoring for seizures  Continue seizure precautions   5. Schizoaffective disorder, bipolar type (HCC)  Assessment & Plan:  Had medications adjusted at the hospital   Per patient mood stable, no reported issues/concerns from nursing.   Continue with depakote, trazodone, clonazepam, risperdal and melatonin  Psych following  Continue supportive measures  Encourage activity and engagement  Continue to monitor for acute changes in condition   6. Cognitive dysfunction  Assessment & Plan:  MoCA done today 14/30  Moderate cognitive dysfunction.   Redirection, reorientation and distraction as appropriate   Fall/safety precautions  Supportive care, assistance with ADLs   Avoid deliriogenic medications   Encourage adequate hydration and nutrition   Maintain sleep/wake cycle         All medications and routine orders were reviewed and updated as needed.    Chief Complaint     STR follow up visit    Past Medical and  "Surgica History      Past Medical History[1]  Past Surgical History[2]  Allergies   Allergen Reactions    Clozapine Other (See Comments)     low white blood count  Other reaction(s): Other (See Comments)  low white blood count    Haloperidol Other (See Comments)     EPS  Other reaction(s): Other (See Comments)  EPS    Lithium Other (See Comments)     Toxicity    Prolixin [Fluphenazine] Hyperactivity and Other (See Comments)     EPS, Hyperactivity  EPS, Hyperactivity    Valproic Acid Confusion and Other (See Comments)     \"Mental confusion\"  \"Mental confusion\"        History of Present Illness     Laine Tse is a 68 yo female with PMH of Seizure disorder, Schizoaffective disorder, bipolar type, HTN and Insomnia. Patient hospitalized from 5/8-6/2 after hallucinating and jumping out a 1st floor window. [facility] managed in [facility], medications adjusted.  Patient was previously at a [facility] facility for psychiatric patients but has been there for 3 years, facility had been looking for alternate housing options.     Patient being seen and examined for follow up on acute and chronic medical conditions. Upon exam patient is in the day room. Patient is in good spirits, she states her mood is stable. MoCA done today 14/30.  No reported issues or concerns from nursing. She denies any pain, and offers no further medical concerns at this time. Patient appetite appears to be good, completing % of meals, last reported BM 6/17. Patient is in no acute distress, denies CP, SOB, abdominal pain, N/V/C/D.   Nursing notes and labs reviewed.   Patient has a follow up appointment with Audiology on 7/7.    The patient's allergies, past medical, surgical, social and family history were reviewed and unchanged.    Review of Systems     Review of Systems   Reason unable to perform ROS: limited due to cognition.   Constitutional:  Negative for appetite change, chills, fatigue and fever.   HENT:  Negative for congestion, rhinorrhea, sore throat " and trouble swallowing.    Respiratory:  Positive for cough. Negative for shortness of breath.    Cardiovascular:  Negative for chest pain and palpitations.   Gastrointestinal:  Negative for abdominal distention, abdominal pain, blood in stool, constipation, diarrhea, nausea and vomiting.   Genitourinary:  Negative for difficulty urinating, dysuria and hematuria.   Musculoskeletal:  Positive for gait problem. Negative for arthralgias.   Neurological:  Positive for weakness. Negative for dizziness, light-headedness and headaches.   All other systems reviewed and are negative.      Objective     Vitals:   Vitals:    06/18/25 1531   BP: 132/80   Pulse: 71   Resp: 18   Temp: 97.6 °F (36.4 °C)   SpO2: 97%       Physical Exam  Vitals and nursing note reviewed.   Constitutional:       General: She is not in acute distress.     Appearance: Normal appearance. She is not ill-appearing.   HENT:      Head: Normocephalic and atraumatic.      Right Ear: External ear normal.      Left Ear: External ear normal.      Nose: Nose normal. No congestion or rhinorrhea.      Mouth/Throat:      Mouth: Mucous membranes are dry.     Eyes:      General: No scleral icterus.        Right eye: No discharge.         Left eye: No discharge.      Conjunctiva/sclera: Conjunctivae normal.       Cardiovascular:      Rate and Rhythm: Normal rate and regular rhythm.   Pulmonary:      Effort: No respiratory distress.      Breath sounds: No wheezing, rhonchi or rales.   Abdominal:      General: Bowel sounds are normal. There is no distension.      Tenderness: There is no abdominal tenderness. There is no guarding or rebound.     Musculoskeletal:      Right lower leg: Edema present.      Left lower leg: Edema present.      Comments: +1 BLE edema      Skin:     General: Skin is warm and dry.     Neurological:      Mental Status: She is alert. Mental status is at baseline.      Cranial Nerves: Dysarthria present.      Motor: Weakness present.       "Coordination: Coordination abnormal.      Gait: Gait abnormal.     Psychiatric:         Mood and Affect: Mood normal.         Pertinent Laboratory/Diagnostic Studies:   Reviewed in facility chart-stable    Current Medications   Medications reviewed and updated see facility MAR for details.    Current Medications[3]       Please note:  Voice-recognition software may have been used in the preparation of this document.  Occasional wrong word or \"sound-alike\" substitutions may have occurred due to the inherent limitations of voice recognition software.  Interpretation should be guided by context.     I have spent a total time of 60 minutes in caring for this patient on the day of the visit/encounter including Diagnostic results, Prognosis, Risks and benefits of tx options, Instructions for management, Patient and family education, Risk factor reductions, Counseling / Coordination of care, Reviewing/placing orders in the medical record (including tests, medications, and/or procedures), Obtaining or reviewing history  , and Communicating with other healthcare professionals . MoCA testing done today and results discussed with Dr. Ferro.       CHRISTOPHER Little         [1]   Past Medical History:  Diagnosis Date    Anxiety     Benign essential hypertension     resolved; 06/15/16    Depression     Depression with anxiety     Fracture of fifth metatarsal bone of right foot with delayed healing     last assessed 04/12/16; fracture of metatarsal bone, right, closed, initial encounter    Hyperlipidemia     last assessed 11/28/17    Hypothyroidism     last assessed 11/28/2017    Injury of right toe, initial encounter 09/12/2018    Insomnia     Obesity     Schizoaffective disorder (HCC)     last assessed 05/18/2017    Seizure disorder (HCC)     last assessed 03/28/17    Seizures (HCC)     Skin lesion of left lower extremity 02/26/2024   [2]   Past Surgical History:  Procedure Laterality Date    COLONOSCOPY      complete    PA " COLONOSCOPY FLX DX W/COLLJ SPEC WHEN PFRMD N/A 8/30/2018    Procedure: COLONOSCOPY with polypectomies;  Surgeon: Andriy Lopez MD;  Location: [facility];  Service: Gastroenterology   [3]   Current Outpatient Medications:     alendronate (FOSAMAX) 70 mg tablet, Take 1 tablet (70 mg total) by mouth every 7 days Every 7 days on Fridays, Disp: 12 tablet, Rfl: 1    Aspirin Low Dose 81 MG chewable tablet, CHEW 1 TABLET BY MOUTH ONCE DAILY @8AM (CAD) *MIREYA, Disp: 30 tablet, Rfl: 0    atorvastatin (LIPITOR) 10 mg tablet, Take 1 tablet (10 mg total) by mouth daily with dinner, Disp: , Rfl:     Cholecalciferol (VITAMIN D3) 1,000 units tablet, Take 1 tablet (1,000 Units total) by mouth daily for 30 doses, Disp: , Rfl:     clonazePAM (KlonoPIN) 1 mg tablet, Take 1 tablet (1 mg total) by mouth 2 (two) times a day, Disp: 60 tablet, Rfl: 0    divalproex sodium (DEPAKOTE ER) 500 mg 24 hr tablet, Take 4 tablets (2,000 mg total) by mouth daily at bedtime, Disp: , Rfl:     fluticasone (FLONASE) 50 mcg/act nasal spray, 2 sprays into each nostril daily, Disp: 9.9 mL, Rfl: 0    gabapentin (NEURONTIN) 100 mg capsule, Take 100 mg by mouth in the morning and 100 mg in the evening., Disp: , Rfl:     guaiFENesin (MUCINEX) 600 mg 12 hr tablet, Take 1 tablet (600 mg total) by mouth every 12 (twelve) hours as needed for congestion, Disp: , Rfl:     Incontinence Supplies MISC, Use if needed (incontinence) SIZE XL PULL UP DISPOSABLE BRIEFS, Disp: 100 each, Rfl: 1    Incontinence Supply Disposable (Disposable Brief Large) MISC, Use every 12 (twelve) hours, Disp: 36 each, Rfl: 6    levothyroxine 100 mcg tablet, Take 1 tablet (100 mcg total) by mouth daily, Disp: 30 tablet, Rfl: 0    melatonin 3 mg, Take 2 tablets (6 mg total) by mouth daily at bedtime, Disp: 60 tablet, Rfl: 0    multivitamin (THERAGRAN) TABS, Take 1 tablet by mouth in the morning., Disp: , Rfl:     PHENobarbital 64.8 mg tablet, Take 1 tablet (64.8 mg total) by mouth every 12  (twelve) hours, Disp: 30 tablet, Rfl: 4    polyethylene glycol (GLYCOLAX) 17 GM/SCOOP powder, Take 17 g by mouth in the morning., Disp: , Rfl:     risperiDONE (RisperDAL) 2 mg tablet, Take 1 tablet (2 mg total) by mouth 2 (two) times a day, Disp: , Rfl:     senna-docusate sodium (SENOKOT S) 8.6-50 mg per tablet, Take 1 tablet by mouth daily as needed for constipation, Disp: , Rfl:     traZODone (DESYREL) 100 mg tablet, Take 1 tablet (100 mg total) by mouth daily at bedtime, Disp: , Rfl:     trihexyphenidyl (ARTANE) 2 mg tablet, Take 1 tablet (2 mg total) by mouth 2 (two) times a day, Disp: 60 tablet, Rfl: 0  No current facility-administered medications for this visit.    Facility-Administered Medications Ordered in Other Visits:     lactated ringers infusion, , Intravenous, Continuous PRN, Celi Benson CRNA, New Bag at 02/08/23 1400

## 2025-06-18 NOTE — ASSESSMENT & PLAN NOTE
MoCA done today 14/30  Moderate cognitive dysfunction.   Redirection, reorientation and distraction as appropriate   Fall/safety precautions  Supportive care, assistance with ADLs   Avoid deliriogenic medications   Encourage adequate hydration and nutrition   Maintain sleep/wake cycle

## 2025-06-21 NOTE — PROGRESS NOTES
[facility]  [address] [address]  [phone]  FACILITY: [facility]   Code 31 (STR)  Follow up visit       NAME: Laine Tse  AGE: 70 y.o. SEX: female CODE STATUS: CPR    DATE OF ENCOUNTER: 6/23/25    Assessment and Plan     1. Schizoaffective disorder, bipolar type (HCC)  Assessment & Plan:  Had medications adjusted at the hospital   Per patient mood stable, no reported issues/concerns from nursing.   Continue with depakote, trazodone, clonazepam, risperdal and melatonin  Psych following  Continue supportive measures  Encourage activity and engagement  Continue to monitor for acute changes in condition   2. Primary hypertension  Assessment & Plan:  BP stable, SBP 100s-140s, at times higher  No acute cardiac complaints  Avoid hypotension  Not presently on medication  Continue monitoring BP  3. Seizure disorder (HCC)  Assessment & Plan:  No recent seizures   Continue depakote, phenobarbital and clonazepam   Continue monitoring for seizures  Continue seizure precautions   4. Cognitive dysfunction  Assessment & Plan:  MoCA 14/30  Moderate cognitive dysfunction.   Redirection, reorientation and distraction as appropriate   Fall/safety precautions  Supportive care, assistance with ADLs   Avoid deliriogenic medications   Encourage adequate hydration and nutrition   Maintain sleep/wake cycle    5. Ambulatory dysfunction  Assessment & Plan:  Multifactorial- acute and chronic conditions  Continue PT/OT   Assist with ADLs  Encourage PO nutrition and hydration.   following for discharge planning.  Maintain fall and safety precautions.  6. Chronic cough  Assessment & Plan:  Per patient she has a chronic cough which is worse at night.   Lungs clear, vitals stable, afebrile.   COVID 19 (-)  Denies acute respiratory symptoms.   Continue Robitussin PRN  Continue monitoring for acute changes        All medications and routine orders were reviewed and updated as needed.    Chief Complaint  "    [facility] follow up visit    Past Medical and Surgica History      Past Medical History[1]  Past Surgical History[2]  Allergies   Allergen Reactions    Clozapine Other (See Comments)     low white blood count  Other reaction(s): Other (See Comments)  low white blood count    Haloperidol Other (See Comments)     EPS  Other reaction(s): Other (See Comments)  EPS    Lithium Other (See Comments)     Toxicity    Prolixin [Fluphenazine] Hyperactivity and Other (See Comments)     EPS, Hyperactivity  EPS, Hyperactivity    Valproic Acid Confusion and Other (See Comments)     \"Mental confusion\"  \"Mental confusion\"        History of Present Illness     Laine Tse is a 70 yo female with PMH of Seizure disorder, Schizoaffective disorder, bipolar type, HTN and Insomnia. Patient hospitalized from 5/8-6/2 after hallucinating and jumping out a 1st floor window. Patient managed in [facility], medications adjusted.  Patient was previously at a [facility] facility for [facility] patients but has been there for 3 years, facility had been looking for alternate housing options.     Patient being seen and examined for follow up on acute and chronic medical conditions. Upon exam patient is in the day room. Patient is in good spirits, she states her mood is stable. Per nursing patient with intermittent calling out and wanting to go home with her brother.  She denies any pain, and offers no further medical concerns at this time. Patient appetite appears to be good, completing % of meals, last reported BM today. Patient is in no acute distress, denies CP, SOB, abdominal pain, N/V/C/D.   Nursing notes and labs reviewed. BW ordered for 7/8.   Patient has a follow up appointment with Audiology on 7/7.    The patient's allergies, past medical, surgical, social and family history were reviewed and unchanged.    Review of Systems     Review of Systems   Reason unable to perform ROS: limited due to cognition.   Constitutional:  Negative for appetite " change, chills, fatigue and fever.   HENT:  Negative for congestion, rhinorrhea, sore throat and trouble swallowing.    Respiratory:  Positive for cough. Negative for shortness of breath.    Cardiovascular:  Negative for chest pain and palpitations.   Gastrointestinal:  Negative for abdominal distention, abdominal pain, blood in stool, constipation, diarrhea, nausea and vomiting.   Genitourinary:  Negative for difficulty urinating, dysuria and hematuria.   Musculoskeletal:  Positive for gait problem. Negative for arthralgias.   Neurological:  Positive for weakness. Negative for dizziness, light-headedness and headaches.   All other systems reviewed and are negative.      Objective     Vitals:   Vitals:    06/23/25 0551   BP: 142/64   Pulse: 76   Resp: 18   Temp: 97.6 °F (36.4 °C)   SpO2: 98%     Physical Exam  Vitals and nursing note reviewed.   Constitutional:       General: She is not in acute distress.     Appearance: Normal appearance. She is not ill-appearing.   HENT:      Head: Normocephalic and atraumatic.      Right Ear: External ear normal.      Left Ear: External ear normal.      Nose: Nose normal. No congestion or rhinorrhea.      Mouth/Throat:      Mouth: Mucous membranes are dry.     Eyes:      General: No scleral icterus.        Right eye: No discharge.         Left eye: No discharge.      Conjunctiva/sclera: Conjunctivae normal.       Cardiovascular:      Rate and Rhythm: Normal rate and regular rhythm.   Pulmonary:      Effort: No respiratory distress.      Breath sounds: No wheezing, rhonchi or rales.   Abdominal:      General: Bowel sounds are normal. There is no distension.      Tenderness: There is no abdominal tenderness. There is no guarding or rebound.     Musculoskeletal:      Right lower leg: Edema present.      Left lower leg: Edema present.      Comments: +1 BLE edema      Skin:     General: Skin is warm and dry.     Neurological:      Mental Status: She is alert. Mental status is at  "baseline.      Cranial Nerves: Dysarthria present.      Motor: Weakness present.      Coordination: Coordination abnormal.      Gait: Gait abnormal.     Psychiatric:         Mood and Affect: Mood normal.         Pertinent Laboratory/Diagnostic Studies:   Reviewed in facility chart-stable    Current Medications   Medications reviewed and updated see facility MAR for details.    Current Medications[3]       Please note:  Voice-recognition software may have been used in the preparation of this document.  Occasional wrong word or \"sound-alike\" substitutions may have occurred due to the inherent limitations of voice recognition software.  Interpretation should be guided by context.         CHRISTOPHER Little         [1]   Past Medical History:  Diagnosis Date    Anxiety     Benign essential hypertension     resolved; 06/15/16    Depression     Depression with anxiety     Fracture of fifth metatarsal bone of right foot with delayed healing     last assessed 04/12/16; fracture of metatarsal bone, right, closed, initial encounter    Hyperlipidemia     last assessed 11/28/17    Hypothyroidism     last assessed 11/28/2017    Injury of right toe, initial encounter 09/12/2018    Insomnia     Obesity     Schizoaffective disorder (HCC)     last assessed 05/18/2017    Seizure disorder (HCC)     last assessed 03/28/17    Seizures (HCC)     Skin lesion of left lower extremity 02/26/2024    T wave inversion on electrocardiogram 06/08/2023   [2]   Past Surgical History:  Procedure Laterality Date    COLONOSCOPY      complete    [place] COLONOSCOPY FLX DX W/COLLJ SPEC WHEN PFRMD N/A 8/30/2018    Procedure: COLONOSCOPY with polypectomies;  Surgeon: Andriy Lopez MD;  Location: [facility];  Service: Gastroenterology   [3]   Current Outpatient Medications:     alendronate (FOSAMAX) 70 mg tablet, Take 1 tablet (70 mg total) by mouth every 7 days Every 7 days on Fridays, Disp: 12 tablet, Rfl: 1    Aspirin Low Dose 81 MG chewable tablet, CHEW 1 " TABLET BY MOUTH ONCE DAILY @8AM (CAD) *MIREYA, Disp: 30 tablet, Rfl: 0    atorvastatin (LIPITOR) 10 mg tablet, Take 1 tablet (10 mg total) by mouth daily with dinner, Disp: , Rfl:     Cholecalciferol (VITAMIN D3) 1,000 units tablet, Take 1 tablet (1,000 Units total) by mouth daily for 30 doses, Disp: , Rfl:     clonazePAM (KlonoPIN) 1 mg tablet, Take 1 tablet (1 mg total) by mouth 2 (two) times a day, Disp: 60 tablet, Rfl: 0    divalproex sodium (DEPAKOTE ER) 500 mg 24 hr tablet, Take 4 tablets (2,000 mg total) by mouth daily at bedtime, Disp: , Rfl:     fluticasone (FLONASE) 50 mcg/act nasal spray, 2 sprays into each nostril daily, Disp: 9.9 mL, Rfl: 0    gabapentin (NEURONTIN) 100 mg capsule, Take 100 mg by mouth in the morning and 100 mg in the evening., Disp: , Rfl:     guaiFENesin (MUCINEX) 600 mg 12 hr tablet, Take 1 tablet (600 mg total) by mouth every 12 (twelve) hours as needed for congestion, Disp: , Rfl:     Incontinence Supplies MISC, Use if needed (incontinence) SIZE XL PULL UP DISPOSABLE BRIEFS, Disp: 100 each, Rfl: 1    Incontinence Supply Disposable (Disposable Brief Large) MISC, Use every 12 (twelve) hours, Disp: 36 each, Rfl: 6    levothyroxine 100 mcg tablet, Take 1 tablet (100 mcg total) by mouth daily, Disp: 30 tablet, Rfl: 0    melatonin 3 mg, Take 2 tablets (6 mg total) by mouth daily at bedtime, Disp: 60 tablet, Rfl: 0    multivitamin (THERAGRAN) TABS, Take 1 tablet by mouth in the morning., Disp: , Rfl:     PHENobarbital 64.8 mg tablet, Take 1 tablet (64.8 mg total) by mouth every 12 (twelve) hours, Disp: 30 tablet, Rfl: 4    polyethylene glycol (GLYCOLAX) 17 GM/SCOOP powder, Take 17 g by mouth in the morning., Disp: , Rfl:     risperiDONE (RisperDAL) 2 mg tablet, Take 1 tablet (2 mg total) by mouth 2 (two) times a day, Disp: , Rfl:     senna-docusate sodium (SENOKOT S) 8.6-50 mg per tablet, Take 1 tablet by mouth daily as needed for constipation, Disp: , Rfl:     traZODone (DESYREL) 100 mg  tablet, Take 1 tablet (100 mg total) by mouth daily at bedtime, Disp: , Rfl:     trihexyphenidyl (ARTANE) 2 mg tablet, Take 1 tablet (2 mg total) by mouth 2 (two) times a day, Disp: 60 tablet, Rfl: 0  No current facility-administered medications for this visit.    Facility-Administered Medications Ordered in Other Visits:     lactated ringers infusion, , Intravenous, Continuous PRN, Celi Benson CRNA, New Bag at 02/08/23 1400

## 2025-06-23 ENCOUNTER — NURSING HOME VISIT (OUTPATIENT)
Dept: GERIATRICS | Facility: OTHER | Age: 70
End: 2025-06-23
Payer: MEDICARE

## 2025-06-23 VITALS
OXYGEN SATURATION: 98 % | WEIGHT: 173 LBS | BODY MASS INDEX: 29.7 KG/M2 | SYSTOLIC BLOOD PRESSURE: 142 MMHG | HEART RATE: 76 BPM | RESPIRATION RATE: 18 BRPM | DIASTOLIC BLOOD PRESSURE: 64 MMHG | TEMPERATURE: 97.6 F

## 2025-06-23 DIAGNOSIS — F25.0 SCHIZOAFFECTIVE DISORDER, BIPOLAR TYPE (HCC): Primary | ICD-10-CM

## 2025-06-23 DIAGNOSIS — R05.3 CHRONIC COUGH: ICD-10-CM

## 2025-06-23 DIAGNOSIS — F09 COGNITIVE DYSFUNCTION: ICD-10-CM

## 2025-06-23 DIAGNOSIS — I10 PRIMARY HYPERTENSION: ICD-10-CM

## 2025-06-23 DIAGNOSIS — R26.2 AMBULATORY DYSFUNCTION: ICD-10-CM

## 2025-06-23 DIAGNOSIS — G40.909 SEIZURE DISORDER (HCC): ICD-10-CM

## 2025-06-23 PROBLEM — R94.31 T WAVE INVERSION ON ELECTROCARDIOGRAM: Status: RESOLVED | Noted: 2023-06-08 | Resolved: 2025-06-23

## 2025-06-23 PROCEDURE — 99309 SBSQ NF CARE MODERATE MDM 30: CPT

## 2025-06-23 NOTE — ASSESSMENT & PLAN NOTE
MoCA 14/30  Moderate cognitive dysfunction.   Redirection, reorientation and distraction as appropriate   Fall/safety precautions  Supportive care, assistance with ADLs   Avoid deliriogenic medications   Encourage adequate hydration and nutrition   Maintain sleep/wake cycle

## 2025-06-23 NOTE — ASSESSMENT & PLAN NOTE
Per patient she has a chronic cough which is worse at night.   Lungs clear, vitals stable, afebrile.   COVID 19 (-)  Denies acute respiratory symptoms.   Continue Robitussin PRN  Continue monitoring for acute changes

## 2025-07-02 ENCOUNTER — NURSING HOME VISIT (OUTPATIENT)
Dept: GERIATRICS | Facility: OTHER | Age: 70
End: 2025-07-02
Payer: MEDICARE

## 2025-07-02 VITALS
OXYGEN SATURATION: 98 % | HEART RATE: 87 BPM | SYSTOLIC BLOOD PRESSURE: 110 MMHG | TEMPERATURE: 98 F | RESPIRATION RATE: 18 BRPM | WEIGHT: 173 LBS | BODY MASS INDEX: 29.7 KG/M2 | DIASTOLIC BLOOD PRESSURE: 70 MMHG

## 2025-07-02 DIAGNOSIS — G40.909 SEIZURE DISORDER (HCC): ICD-10-CM

## 2025-07-02 DIAGNOSIS — F09 COGNITIVE DYSFUNCTION: ICD-10-CM

## 2025-07-02 DIAGNOSIS — I10 PRIMARY HYPERTENSION: ICD-10-CM

## 2025-07-02 DIAGNOSIS — R26.2 AMBULATORY DYSFUNCTION: ICD-10-CM

## 2025-07-02 DIAGNOSIS — F25.8 OTHER SCHIZOAFFECTIVE DISORDERS (HCC): Primary | ICD-10-CM

## 2025-07-02 PROCEDURE — 99309 SBSQ NF CARE MODERATE MDM 30: CPT

## 2025-07-02 NOTE — ASSESSMENT & PLAN NOTE
"Had medications adjusted at the hospital   Patient appears more anxious today, requesting to go to her \"home\".   Nursing reports intermittent calling out.   Continue with depakote, trazodone, clonazepam, risperdal and melatonin  Psych following  Continue supportive measures  Encourage activity and engagement  Continue to monitor for acute changes in condition   "

## 2025-07-02 NOTE — PROGRESS NOTES
"[facility]  [address] [address]  [phone]  FACILITY: [facility]   Code 31 (STR)  Follow up visit       NAME: Laine Tse  AGE: 70 y.o. SEX: female CODE STATUS: CPR    DATE OF ENCOUNTER: 7/2/2025    Assessment and Plan     1. Other schizoaffective disorders (HCC)  Assessment & Plan:  Had medications adjusted at the hospital   Patient appears more anxious today, requesting to go to her \"home\".   Nursing reports intermittent calling out.   Continue with depakote, trazodone, clonazepam, risperdal and melatonin  Psych following  Continue supportive measures  Encourage activity and engagement  Continue to monitor for acute changes in condition   2. Primary hypertension  Assessment & Plan:  BP stable, SBP 100s-140s, at times higher  No acute cardiac complaints  Avoid hypotension  Not presently on medication  Continue monitoring BP  3. Seizure disorder (HCC)  Assessment & Plan:  No recent seizures   Continue depakote, phenobarbital and clonazepam   Continue monitoring for seizures  Continue seizure precautions   4. Cognitive dysfunction  Assessment & Plan:  MoCA 14/30  Moderate cognitive dysfunction.   Redirection, reorientation and distraction as appropriate   Fall/safety precautions  Supportive care, assistance with ADLs   Avoid deliriogenic medications   Encourage adequate hydration and nutrition   Maintain sleep/wake cycle    5. Ambulatory dysfunction  Assessment & Plan:  Multifactorial- acute and chronic conditions  Continue PT/OT   Assist with ADLs  Encourage PO nutrition and hydration.   following for discharge planning.  Maintain fall and safety precautions.       All medications and routine orders were reviewed and updated as needed.    Chief Complaint     [facility] follow up visit    Past Medical and Surgica History      Past Medical History[1]  Past Surgical History[2]  Allergies   Allergen Reactions    Clozapine Other (See Comments)     low white blood count  Other " "reaction(s): Other (See Comments)  low white blood count    Haloperidol Other (See Comments)     EPS  Other reaction(s): Other (See Comments)  EPS    Lithium Other (See Comments)     Toxicity    Prolixin [Fluphenazine] Hyperactivity and Other (See Comments)     EPS, Hyperactivity  EPS, Hyperactivity    Valproic Acid Confusion and Other (See Comments)     \"Mental confusion\"  \"Mental confusion\"        History of Present Illness     Laine Tse is a 71 yo female with PMH of Seizure disorder, Schizoaffective disorder, bipolar type, HTN and Insomnia. Patient hospitalized from 5/8-6/2 after hallucinating and jumping out a 1st floor window. Patient managed in [facility], medications adjusted.  Patient was previously at a [facility] facility for psychiatric patients but has been there for 3 years, facility had been looking for alternate housing options.     Patient being seen and examined for follow up on acute and chronic medical conditions. Patient appears more anxious today, she is requesting to go back to her \"home\".  Per nursing patient with intermittent calling out and wanting to go home with her brother.  She denies any pain, and offers no further medical concerns at this time. Patient appetite appears to be good, completing % of meals, last reported BM today. Patient is in no acute distress, denies CP, SOB, abdominal pain, N/V/C/D.   Nursing notes and labs reviewed. BW ordered for 7/8.   Patient has a follow up appointment with Audiology on 7/7.    The patient's allergies, past medical, surgical, social and family history were reviewed and unchanged.    Review of Systems     Review of Systems   Reason unable to perform ROS: limited due to cognition.   Constitutional:  Negative for appetite change, chills, fatigue and fever.   HENT:  Negative for congestion, rhinorrhea, sore throat and trouble swallowing.    Respiratory:  Negative for shortness of breath.    Cardiovascular:  Negative for chest pain and palpitations. "   Gastrointestinal:  Negative for abdominal distention, abdominal pain, blood in stool, constipation, diarrhea, nausea and vomiting.   Genitourinary:  Negative for difficulty urinating, dysuria and hematuria.   Musculoskeletal:  Positive for gait problem. Negative for arthralgias.   Neurological:  Positive for weakness. Negative for dizziness, light-headedness and headaches.   Psychiatric/Behavioral:  The patient is nervous/anxious.    All other systems reviewed and are negative.      Objective     Vitals:   Vitals:    07/02/25 0753   BP: 110/70   Pulse: 87   Resp: 18   Temp: 98 °F (36.7 °C)   SpO2: 98%       Physical Exam  Vitals and nursing note reviewed.   Constitutional:       General: She is not in acute distress.     Appearance: Normal appearance. She is not ill-appearing.   HENT:      Head: Normocephalic and atraumatic.      Right Ear: External ear normal.      Left Ear: External ear normal.      Nose: Nose normal. No congestion or rhinorrhea.      Mouth/Throat:      Mouth: Mucous membranes are dry.     Eyes:      General: No scleral icterus.        Right eye: No discharge.         Left eye: No discharge.      Conjunctiva/sclera: Conjunctivae normal.       Cardiovascular:      Rate and Rhythm: Normal rate and regular rhythm.   Pulmonary:      Effort: No respiratory distress.      Breath sounds: No wheezing, rhonchi or rales.   Abdominal:      General: Bowel sounds are normal. There is no distension.      Tenderness: There is no abdominal tenderness. There is no guarding or rebound.     Musculoskeletal:      Right lower leg: Edema present.      Left lower leg: Edema present.      Comments: Trace/+1 BLE edema      Skin:     General: Skin is warm and dry.     Neurological:      Mental Status: She is alert. Mental status is at baseline.      Cranial Nerves: Dysarthria present.      Motor: Weakness present.      Coordination: Coordination abnormal.      Gait: Gait abnormal.     Psychiatric:         Mood and  "Affect: Mood normal.         Pertinent Laboratory/Diagnostic Studies:   Reviewed in facility chart-stable    Current Medications   Medications reviewed and updated see facility MAR for details.    Current Medications[3]       Please note:  Voice-recognition software may have been used in the preparation of this document.  Occasional wrong word or \"sound-alike\" substitutions may have occurred due to the inherent limitations of voice recognition software.  Interpretation should be guided by context.         CHRISTOPHER Little          [1]   Past Medical History:  Diagnosis Date    Anxiety     Benign essential hypertension     resolved; 06/15/16    Depression     Depression with anxiety     Fracture of fifth metatarsal bone of right foot with delayed healing     last assessed 04/12/16; fracture of metatarsal bone, right, closed, initial encounter    Hyperlipidemia     last assessed 11/28/17    Hypothyroidism     last assessed 11/28/2017    Injury of right toe, initial encounter 09/12/2018    Insomnia     Obesity     Schizoaffective disorder (HCC)     last assessed 05/18/2017    Seizure disorder (HCC)     last assessed 03/28/17    Seizures (HCC)     Skin lesion of left lower extremity 02/26/2024    T wave inversion on electrocardiogram 06/08/2023   [2]   Past Surgical History:  Procedure Laterality Date    COLONOSCOPY      complete    [place] COLONOSCOPY FLX DX W/COLLJ SPEC WHEN PFRMD N/A 8/30/2018    Procedure: COLONOSCOPY with polypectomies;  Surgeon: Andriy Lopez MD;  Location: [facility];  Service: Gastroenterology   [3]   Current Outpatient Medications:     alendronate (FOSAMAX) 70 mg tablet, Take 1 tablet (70 mg total) by mouth every 7 days Every 7 days on Fridays, Disp: 12 tablet, Rfl: 1    Aspirin Low Dose 81 MG chewable tablet, CHEW 1 TABLET BY MOUTH ONCE DAILY @8AM (CAD) *MIREYA, Disp: 30 tablet, Rfl: 0    atorvastatin (LIPITOR) 10 mg tablet, Take 1 tablet (10 mg total) by mouth daily with dinner, Disp: , Rfl:     " Cholecalciferol (VITAMIN D3) 1,000 units tablet, Take 1 tablet (1,000 Units total) by mouth daily for 30 doses, Disp: , Rfl:     clonazePAM (KlonoPIN) 1 mg tablet, Take 1 tablet (1 mg total) by mouth 2 (two) times a day, Disp: 60 tablet, Rfl: 0    divalproex sodium (DEPAKOTE ER) 500 mg 24 hr tablet, Take 4 tablets (2,000 mg total) by mouth daily at bedtime, Disp: , Rfl:     fluticasone (FLONASE) 50 mcg/act nasal spray, 2 sprays into each nostril daily, Disp: 9.9 mL, Rfl: 0    gabapentin (NEURONTIN) 100 mg capsule, Take 100 mg by mouth in the morning and 100 mg in the evening., Disp: , Rfl:     guaiFENesin (MUCINEX) 600 mg 12 hr tablet, Take 1 tablet (600 mg total) by mouth every 12 (twelve) hours as needed for congestion, Disp: , Rfl:     Incontinence Supplies MISC, Use if needed (incontinence) SIZE XL PULL UP DISPOSABLE BRIEFS, Disp: 100 each, Rfl: 1    Incontinence Supply Disposable (Disposable Brief Large) MISC, Use every 12 (twelve) hours, Disp: 36 each, Rfl: 6    levothyroxine 100 mcg tablet, Take 1 tablet (100 mcg total) by mouth daily, Disp: 30 tablet, Rfl: 0    melatonin 3 mg, Take 2 tablets (6 mg total) by mouth daily at bedtime, Disp: 60 tablet, Rfl: 0    multivitamin (THERAGRAN) TABS, Take 1 tablet by mouth in the morning., Disp: , Rfl:     PHENobarbital 64.8 mg tablet, Take 1 tablet (64.8 mg total) by mouth every 12 (twelve) hours, Disp: 30 tablet, Rfl: 4    polyethylene glycol (GLYCOLAX) 17 GM/SCOOP powder, Take 17 g by mouth in the morning., Disp: , Rfl:     risperiDONE (RisperDAL) 2 mg tablet, Take 1 tablet (2 mg total) by mouth 2 (two) times a day, Disp: , Rfl:     senna-docusate sodium (SENOKOT S) 8.6-50 mg per tablet, Take 1 tablet by mouth daily as needed for constipation, Disp: , Rfl:     traZODone (DESYREL) 100 mg tablet, Take 1 tablet (100 mg total) by mouth daily at bedtime, Disp: , Rfl:     trihexyphenidyl (ARTANE) 2 mg tablet, Take 1 tablet (2 mg total) by mouth 2 (two) times a day, Disp: 60  tablet, Rfl: 0  No current facility-administered medications for this visit.    Facility-Administered Medications Ordered in Other Visits:     lactated ringers infusion, , Intravenous, Continuous PRN, Celi Benson CRNA, New Bag at 02/08/23 1400

## 2025-07-09 ENCOUNTER — NURSING HOME VISIT (OUTPATIENT)
Dept: GERIATRICS | Facility: OTHER | Age: 70
End: 2025-07-09
Payer: MEDICARE

## 2025-07-09 DIAGNOSIS — F25.9 SCHIZOAFFECTIVE DISORDER, UNSPECIFIED TYPE (HCC): ICD-10-CM

## 2025-07-09 DIAGNOSIS — G40.909 SEIZURE DISORDER (HCC): ICD-10-CM

## 2025-07-09 DIAGNOSIS — F09 COGNITIVE DYSFUNCTION: ICD-10-CM

## 2025-07-09 DIAGNOSIS — I10 PRIMARY HYPERTENSION: Primary | ICD-10-CM

## 2025-07-09 DIAGNOSIS — R26.2 AMBULATORY DYSFUNCTION: ICD-10-CM

## 2025-07-09 DIAGNOSIS — E87.1 HYPONATREMIA: ICD-10-CM

## 2025-07-09 PROCEDURE — 99310 SBSQ NF CARE HIGH MDM 45: CPT

## 2025-07-10 VITALS
SYSTOLIC BLOOD PRESSURE: 136 MMHG | WEIGHT: 175.2 LBS | HEART RATE: 56 BPM | TEMPERATURE: 97.5 F | BODY MASS INDEX: 30.07 KG/M2 | RESPIRATION RATE: 18 BRPM | DIASTOLIC BLOOD PRESSURE: 76 MMHG | OXYGEN SATURATION: 99 %

## 2025-07-10 RX ORDER — OLANZAPINE 2.5 MG/1
2.5 TABLET, FILM COATED ORAL EVERY 8 HOURS PRN
COMMUNITY

## 2025-07-10 NOTE — ASSESSMENT & PLAN NOTE
"Had medications adjusted at the hospital   Patient significantly agitated today, she is screaming, she is paranoid thinking the \"Jews are coming for me\". She refused to allow me to assess her yesterday, today it took 3 attempts. Patient requesting to go \"home\", she is crying that she hates it here and the \"Jews are going to get her\".   Unable to redirect patient.   Continue with depakote, trazodone, clonazepam, risperdal and melatonin  Will add Zyprexa 2.5 PRN for agitation.   Psych following, discussed case with psych and he will see her this week.   Continue supportive measures  Encourage activity and engagement  Continue to monitor for acute changes in condition   "

## 2025-07-10 NOTE — PROGRESS NOTES
"[facility]  [address] [address]  [phone]  FACILITY: [facility]   Code 31 (STR)  Follow up visit       NAME: Laine Tse  AGE: 70 y.o. SEX: female CODE STATUS: CPR    DATE OF ENCOUNTER: 7/9/25    Assessment and Plan     1. Primary hypertension  Assessment & Plan:  BP stable, SBP 100s-140s, at times higher  No acute cardiac complaints  Avoid hypotension  Not presently on medication  Continue monitoring BP  2. Seizure disorder (HCC)  Assessment & Plan:  No recent seizures   Continue depakote, phenobarbital and clonazepam   Continue monitoring for seizures  Continue seizure precautions   3. Cognitive dysfunction  Assessment & Plan:  MoCA 14/30  Moderate cognitive dysfunction.   Redirection, reorientation and distraction as appropriate   Fall/safety precautions  Supportive care, assistance with ADLs   Avoid deliriogenic medications   Encourage adequate hydration and nutrition   Maintain sleep/wake cycle    4. Schizoaffective disorder, unspecified type (HCC)  Assessment & Plan:  Had medications adjusted at the hospital   Patient significantly agitated today, she is screaming, she is paranoid thinking the \"[facility] are coming for me\". She refused to allow me to assess her yesterday, today it took 3 attempts. Patient requesting to go \"home\", she is crying that she hates it here and the \"[facility]s are going to get her\".   Unable to redirect patient.   Continue with depakote, trazodone, clonazepam, risperdal and melatonin  Will add Zyprexa 2.5 PRN for agitation.   Psych following, discussed case with psych and he will see her this week.   Continue supportive measures  Encourage activity and engagement  Continue to monitor for acute changes in condition   5. Ambulatory dysfunction  Assessment & Plan:  Multifactorial- acute and chronic conditions  Continue PT/OT   Assist with ADLs  Encourage PO nutrition and hydration.   following for discharge planning.  Maintain fall and safety " "precautions.  6. Hyponatremia  Assessment & Plan:  Noted on lab review  Na 126<127<135  Possibly due to medication side effects.   Will add FR 2000  Will order serum osm, urine osm, urine sodium.   Once obtained will start on sodium chloride 1g BID  BMP 7/10  Continue to monitor        All medications and routine orders were reviewed and updated as needed.    Chief Complaint     [facility] follow up visit    Past Medical and Surgica History      Past Medical History[1]  Past Surgical History[2]  Allergies   Allergen Reactions    Clozapine Other (See Comments)     low white blood count  Other reaction(s): Other (See Comments)  low white blood count    Haloperidol Other (See Comments)     EPS  Other reaction(s): Other (See Comments)  EPS    Lithium Other (See Comments)     Toxicity    Prolixin [Fluphenazine] Hyperactivity and Other (See Comments)     EPS, Hyperactivity  EPS, Hyperactivity    Valproic Acid Confusion and Other (See Comments)     \"Mental confusion\"  \"Mental confusion\"        History of Present Illness     Laine Tse is a 69 yo female with PMH of Seizure disorder, Schizoaffective disorder, bipolar type, HTN and Insomnia. Patient hospitalized from 5/8-6/2 after hallucinating and jumping out a 1st floor window. Patient managed in [facility], medications adjusted.  Patient was previously at a [facility] facility for psychiatric patients but has been there for 3 years, facility had been looking for alternate housing options.     Patient being seen and examined for follow up on acute and chronic medical conditions. Patient significantly more agitated today, she is screaming, she is paranoid thinking the \"[facility]s are coming for me\". She refused to allow me to assess her yesterday, today it took 3 attempts. Patient requesting to go \"home\", she is crying that she hates it here and the \"[facility]s are going to get her\".   Unable to redirect patient. Discussed case with Psych provider, will add Zyprexa PRN. Patient not willing to " participate in ROS, she continues to scream and demand to leave. Patient appetite appears to be good, completing % of meals, last reported BM today. Patient is in no acute distress.  Nursing notes and labs reviewed. BW/Urine ordered for 7/8.       The patient's allergies, past medical, surgical, social and family history were reviewed and unchanged.    Review of Systems     Review of Systems   Reason unable to perform ROS: refused.       Objective     Vitals:   Vitals:    07/09/25 0943   BP: 136/76   Pulse: 56   Resp: 18   Temp: 97.5 °F (36.4 °C)   SpO2: 99%       Physical Exam  Vitals and nursing note reviewed.   Constitutional:       General: She is not in acute distress.     Appearance: Normal appearance. She is not ill-appearing.   HENT:      Head: Normocephalic and atraumatic.      Right Ear: External ear normal.      Left Ear: External ear normal.      Nose: Nose normal. No congestion or rhinorrhea.      Mouth/Throat:      Mouth: Mucous membranes are dry.     Eyes:      General: No scleral icterus.        Right eye: No discharge.         Left eye: No discharge.      Conjunctiva/sclera: Conjunctivae normal.       Cardiovascular:      Rate and Rhythm: Normal rate and regular rhythm.   Pulmonary:      Effort: No respiratory distress.      Breath sounds: No wheezing, rhonchi or rales.   Abdominal:      General: Bowel sounds are normal. There is no distension.      Tenderness: There is no abdominal tenderness. There is no guarding or rebound.     Musculoskeletal:      Right lower leg: Edema present.      Left lower leg: Edema present.      Comments: Trace/+1 BLE edema      Skin:     General: Skin is warm and dry.     Neurological:      Mental Status: She is alert. Mental status is at baseline.      Cranial Nerves: Dysarthria present.      Motor: Weakness present.      Coordination: Coordination abnormal.      Gait: Gait abnormal.     Psychiatric:         Attention and Perception: She is inattentive.          "Mood and Affect: Mood is anxious. Affect is angry, tearful and inappropriate.         Speech: Speech is rapid and pressured and tangential.         Behavior: Behavior is agitated, aggressive and hyperactive.         Thought Content: Thought content is paranoid. Thought content does not include homicidal or suicidal plan.         Judgment: Judgment is impulsive and inappropriate.         Pertinent Laboratory/Diagnostic Studies:   Reviewed in facility chart-stable    Current Medications   Medications reviewed and updated see facility MAR for details.    Current Medications[3]       Please note:  Voice-recognition software may have been used in the preparation of this document.  Occasional wrong word or \"sound-alike\" substitutions may have occurred due to the inherent limitations of voice recognition software.  Interpretation should be guided by context.     I have spent a total time of 55 minutes in caring for this patient on the day of the visit/encounter including Risks and benefits of tx options, Patient and family education, Importance of tx compliance, Risk factor reductions, Counseling / Coordination of care, Documenting in the medical record, Reviewing/placing orders in the medical record (including tests, medications, and/or procedures), Obtaining or reviewing history  , and Communicating with other healthcare professionals .      CHRISTOPHER Little         [1]   Past Medical History:  Diagnosis Date    Anxiety     Benign essential hypertension     resolved; 06/15/16    Depression     Depression with anxiety     Fracture of fifth metatarsal bone of right foot with delayed healing     last assessed 04/12/16; fracture of metatarsal bone, right, closed, initial encounter    Hyperlipidemia     last assessed 11/28/17    Hypothyroidism     last assessed 11/28/2017    Injury of right toe, initial encounter 09/12/2018    Insomnia     Obesity     Schizoaffective disorder (HCC)     last assessed 05/18/2017    Seizure " disorder (HCC)     last assessed 03/28/17    Seizures (HCC)     Skin lesion of left lower extremity 02/26/2024    T wave inversion on electrocardiogram 06/08/2023   [2]   Past Surgical History:  Procedure Laterality Date    COLONOSCOPY      complete    ID COLONOSCOPY FLX DX W/COLLJ SPEC WHEN PFRMD N/A 8/30/2018    Procedure: COLONOSCOPY with polypectomies;  Surgeon: Andriy Lopez MD;  Location: [facility];  Service: Gastroenterology   [3]   Current Outpatient Medications:     OLANZapine (ZyPREXA) 2.5 mg tablet, Take 2.5 mg by mouth every 8 (eight) hours as needed, Disp: , Rfl:     alendronate (FOSAMAX) 70 mg tablet, Take 1 tablet (70 mg total) by mouth every 7 days Every 7 days on Fridays, Disp: 12 tablet, Rfl: 1    Aspirin Low Dose 81 MG chewable tablet, CHEW 1 TABLET BY MOUTH ONCE DAILY @8AM (CAD) *MIREYA, Disp: 30 tablet, Rfl: 0    atorvastatin (LIPITOR) 10 mg tablet, Take 1 tablet (10 mg total) by mouth daily with dinner, Disp: , Rfl:     Cholecalciferol (VITAMIN D3) 1,000 units tablet, Take 1 tablet (1,000 Units total) by mouth daily for 30 doses, Disp: , Rfl:     clonazePAM (KlonoPIN) 1 mg tablet, Take 1 tablet (1 mg total) by mouth 2 (two) times a day, Disp: 60 tablet, Rfl: 0    divalproex sodium (DEPAKOTE ER) 500 mg 24 hr tablet, Take 4 tablets (2,000 mg total) by mouth daily at bedtime, Disp: , Rfl:     fluticasone (FLONASE) 50 mcg/act nasal spray, 2 sprays into each nostril daily, Disp: 9.9 mL, Rfl: 0    gabapentin (NEURONTIN) 100 mg capsule, Take 100 mg by mouth in the morning and 100 mg in the evening., Disp: , Rfl:     guaiFENesin (MUCINEX) 600 mg 12 hr tablet, Take 1 tablet (600 mg total) by mouth every 12 (twelve) hours as needed for congestion, Disp: , Rfl:     Incontinence Supplies MISC, Use if needed (incontinence) SIZE XL PULL UP DISPOSABLE BRIEFS, Disp: 100 each, Rfl: 1    Incontinence Supply Disposable (Disposable Brief Large) MISC, Use every 12 (twelve) hours, Disp: 36 each, Rfl: 6     levothyroxine 100 mcg tablet, Take 1 tablet (100 mcg total) by mouth daily, Disp: 30 tablet, Rfl: 0    melatonin 3 mg, Take 2 tablets (6 mg total) by mouth daily at bedtime, Disp: 60 tablet, Rfl: 0    multivitamin (THERAGRAN) TABS, Take 1 tablet by mouth in the morning., Disp: , Rfl:     PHENobarbital 64.8 mg tablet, Take 1 tablet (64.8 mg total) by mouth every 12 (twelve) hours, Disp: 30 tablet, Rfl: 4    polyethylene glycol (GLYCOLAX) 17 GM/SCOOP powder, Take 17 g by mouth in the morning., Disp: , Rfl:     risperiDONE (RisperDAL) 2 mg tablet, Take 1 tablet (2 mg total) by mouth 2 (two) times a day, Disp: , Rfl:     senna-docusate sodium (SENOKOT S) 8.6-50 mg per tablet, Take 1 tablet by mouth daily as needed for constipation, Disp: , Rfl:     traZODone (DESYREL) 100 mg tablet, Take 1 tablet (100 mg total) by mouth daily at bedtime, Disp: , Rfl:     trihexyphenidyl (ARTANE) 2 mg tablet, Take 1 tablet (2 mg total) by mouth 2 (two) times a day, Disp: 60 tablet, Rfl: 0  No current facility-administered medications for this visit.    Facility-Administered Medications Ordered in Other Visits:     lactated ringers infusion, , Intravenous, Continuous PRN, Celi Benson CRNA, New Bag at 02/08/23 1400

## 2025-07-10 NOTE — ASSESSMENT & PLAN NOTE
Noted on lab review  Na 126<127<135  Possibly due to medication side effects.   Will add FR 2000  Will order serum osm, urine osm, urine sodium.   Once obtained will start on sodium chloride 1g BID  BMP 7/10  Continue to monitor

## 2025-07-14 ENCOUNTER — NURSING HOME VISIT (OUTPATIENT)
Dept: GERIATRICS | Facility: OTHER | Age: 70
End: 2025-07-14
Payer: MEDICARE

## 2025-07-14 DIAGNOSIS — I10 PRIMARY HYPERTENSION: ICD-10-CM

## 2025-07-14 DIAGNOSIS — E87.1 HYPONATREMIA: ICD-10-CM

## 2025-07-14 DIAGNOSIS — F09 COGNITIVE DYSFUNCTION: ICD-10-CM

## 2025-07-14 DIAGNOSIS — M54.50 CHRONIC MIDLINE LOW BACK PAIN WITHOUT SCIATICA: ICD-10-CM

## 2025-07-14 DIAGNOSIS — G40.909 SEIZURE DISORDER (HCC): ICD-10-CM

## 2025-07-14 DIAGNOSIS — G89.29 CHRONIC MIDLINE LOW BACK PAIN WITHOUT SCIATICA: ICD-10-CM

## 2025-07-14 DIAGNOSIS — R26.2 AMBULATORY DYSFUNCTION: ICD-10-CM

## 2025-07-14 DIAGNOSIS — F25.9 SCHIZOAFFECTIVE DISORDER, UNSPECIFIED TYPE (HCC): Primary | ICD-10-CM

## 2025-07-14 PROCEDURE — 99310 SBSQ NF CARE HIGH MDM 45: CPT

## 2025-07-15 VITALS
TEMPERATURE: 97.5 F | OXYGEN SATURATION: 96 % | SYSTOLIC BLOOD PRESSURE: 122 MMHG | RESPIRATION RATE: 18 BRPM | WEIGHT: 175.2 LBS | DIASTOLIC BLOOD PRESSURE: 81 MMHG | BODY MASS INDEX: 30.07 KG/M2 | HEART RATE: 63 BPM

## 2025-07-15 RX ORDER — CLONAZEPAM 1 MG/1
1 TABLET ORAL 2 TIMES DAILY
Qty: 60 TABLET | Refills: 2 | Status: SHIPPED | OUTPATIENT
Start: 2025-07-15

## 2025-07-22 ENCOUNTER — NURSING HOME VISIT (OUTPATIENT)
Dept: GERIATRICS | Facility: OTHER | Age: 70
End: 2025-07-22
Payer: MEDICARE

## 2025-07-22 VITALS
WEIGHT: 175.2 LBS | RESPIRATION RATE: 18 BRPM | OXYGEN SATURATION: 98 % | BODY MASS INDEX: 30.07 KG/M2 | DIASTOLIC BLOOD PRESSURE: 68 MMHG | TEMPERATURE: 97.2 F | HEART RATE: 89 BPM | SYSTOLIC BLOOD PRESSURE: 122 MMHG

## 2025-07-22 DIAGNOSIS — F09 COGNITIVE DYSFUNCTION: ICD-10-CM

## 2025-07-22 DIAGNOSIS — F39 MOOD INSOMNIA (HCC): ICD-10-CM

## 2025-07-22 DIAGNOSIS — F25.9 SCHIZOAFFECTIVE DISORDER, UNSPECIFIED TYPE (HCC): Primary | ICD-10-CM

## 2025-07-22 DIAGNOSIS — G40.909 SEIZURE DISORDER (HCC): ICD-10-CM

## 2025-07-22 DIAGNOSIS — F51.05 MOOD INSOMNIA (HCC): ICD-10-CM

## 2025-07-22 DIAGNOSIS — E87.1 HYPONATREMIA: ICD-10-CM

## 2025-07-22 DIAGNOSIS — R26.2 AMBULATORY DYSFUNCTION: ICD-10-CM

## 2025-07-22 DIAGNOSIS — I10 PRIMARY HYPERTENSION: ICD-10-CM

## 2025-07-22 PROCEDURE — 99309 SBSQ NF CARE MODERATE MDM 30: CPT

## 2025-07-22 NOTE — ASSESSMENT & PLAN NOTE
No reported issues or concerns   Continue melatonin and trazodone  Continue monitoring symptoms  Continue good sleep hygiene

## 2025-07-22 NOTE — ASSESSMENT & PLAN NOTE
Noted on lab review  Na 131<133>126<127<135  Possibly due to medication side effects.   FR 2000  Sodium chloride 1g daily ordered   Repeat BMP 7/23  Continue to monitor

## 2025-07-22 NOTE — PROGRESS NOTES
[facility]  [address] [address]  [phone]  FACILITY: [facility]   Code 31 (STR)  Follow up visit       NAME: Laine Tse  AGE: 70 y.o. SEX: female CODE STATUS: CPR    DATE OF ENCOUNTER: 7/22/2025    Assessment and Plan     1. Schizoaffective disorder, unspecified type (HCC)  Assessment & Plan:  Had medications adjusted at the hospital   Patient continues to be agitated, paranoid, today she refused physical exam, would not allow me to touch her.   Difficult to redirect patient.   Continue with depakote, trazodone, clonazepam, risperdal and melatonin  Continue  Zyprexa 5mg BID, and 2.5mg q8hr PRN.   Psych following  Continue supportive measures  Encourage activity and engagement  Continue to monitor for acute changes in condition   2. Primary hypertension  Assessment & Plan:  BP stable, SBP 100s-140s, at times higher  No acute cardiac complaints  Avoid hypotension  Not presently on medication  Continue monitoring BP  3. Seizure disorder (HCC)  Assessment & Plan:  No recent seizures   Continue depakote, phenobarbital and clonazepam   Continue monitoring for seizures  Continue seizure precautions   4. Cognitive dysfunction  Assessment & Plan:  MoCA 14/30  Moderate cognitive dysfunction.   Redirection, reorientation and distraction as appropriate   Fall/safety precautions  Supportive care, assistance with ADLs   Avoid deliriogenic medications   Encourage adequate hydration and nutrition   Maintain sleep/wake cycle    5. Mood insomnia (HCC)  Assessment & Plan:  No reported issues or concerns   Continue melatonin and trazodone  Continue monitoring symptoms  Continue good sleep hygiene   6. Ambulatory dysfunction  Assessment & Plan:  Multifactorial- acute and chronic conditions  Continue PT/OT   Assist with ADLs  Encourage PO nutrition and hydration.   following for discharge planning.  Maintain fall and safety precautions.  7. Hyponatremia  Assessment & Plan:  Noted on lab  "review  Na 131<133>126<127<135  Possibly due to medication side effects.   FR 2000  Sodium chloride 1g daily ordered   Repeat BMP 7/23  Continue to monitor        All medications and routine orders were reviewed and updated as needed.    Chief Complaint     [facility] follow up visit    Past Medical and Surgica History      Past Medical History[1]  Past Surgical History[2]  Allergies   Allergen Reactions    Clozapine Other (See Comments)     low white blood count  Other reaction(s): Other (See Comments)  low white blood count    Haloperidol Other (See Comments)     EPS  Other reaction(s): Other (See Comments)  EPS    Lithium Other (See Comments)     Toxicity    Prolixin [Fluphenazine] Hyperactivity and Other (See Comments)     EPS, Hyperactivity  EPS, Hyperactivity    Valproic Acid Confusion and Other (See Comments)     \"Mental confusion\"  \"Mental confusion\"        History of Present Illness     Laine Tse is a 69 yo female with PMH of Seizure disorder, Schizoaffective disorder, bipolar type, HTN and Insomnia. Patient hospitalized from 5/8-6/2 after hallucinating and jumping out a 1st floor window. Patient managed in [facility], medications adjusted.  Patient was previously at a [facility] facility for psychiatric patients but has been there for 3 years, facility had been looking for alternate housing options.     Patient being seen and examined for follow up on acute and chronic medical conditions. Patient continues to be agitated, she is paranoid and refusing to allow me to do a physical exam. She offers no physical medical concerns at this time. Patient will need long term psychiatric care, post discharge from rehab. Patient appetite appears to be good, completing % of meals, last reported BM today. Patient is in no acute distress.  Nursing notes and labs reviewed. BW ordered for 7/23.     The patient's allergies, past medical, surgical, social and family history were reviewed and unchanged.    Review of Systems     Review " of Systems   Reason unable to perform ROS: limited due to cognition.   Constitutional:  Negative for appetite change, chills, fatigue and fever.   HENT:  Negative for congestion, rhinorrhea, sore throat and trouble swallowing.    Respiratory:  Negative for shortness of breath.    Cardiovascular:  Negative for chest pain and palpitations.   Gastrointestinal:  Negative for abdominal distention, abdominal pain, blood in stool, constipation, diarrhea, nausea and vomiting.   Genitourinary:  Negative for difficulty urinating, dysuria and hematuria.   Musculoskeletal:  Positive for gait problem. Negative for arthralgias.   Neurological:  Positive for weakness. Negative for dizziness, light-headedness and headaches.   Psychiatric/Behavioral:  Positive for agitation. The patient is nervous/anxious and is hyperactive.    All other systems reviewed and are negative.      Objective     Vitals:   Vitals:    07/22/25 1518   BP: 122/68   Pulse: 89   Resp: 18   Temp: (!) 97.2 °F (36.2 °C)   SpO2: 98%     Physical Exam  Vitals (Patient refused physical exam) and nursing note reviewed.   Constitutional:       General: She is not in acute distress.     Appearance: She is not ill-appearing.   HENT:      Head: Normocephalic and atraumatic.      Right Ear: External ear normal.      Nose: No congestion or rhinorrhea.      Mouth/Throat:      Mouth: Mucous membranes are dry.     Eyes:      General: No scleral icterus.        Right eye: No discharge.         Left eye: No discharge.      Conjunctiva/sclera: Conjunctivae normal.       Skin:     General: Skin is warm and dry.     Neurological:      Mental Status: She is alert. Mental status is at baseline.      Motor: Weakness present.      Gait: Gait abnormal.     Psychiatric:         Attention and Perception: She is inattentive.         Speech: Speech is rapid and pressured and tangential.         Behavior: Behavior is agitated.         Thought Content: Thought content is paranoid.          "Judgment: Judgment is impulsive.         Pertinent Laboratory/Diagnostic Studies:   Reviewed in facility chart-stable    Current Medications   Medications reviewed and updated see facility MAR for details.    Current Medications[3]       Please note:  Voice-recognition software may have been used in the preparation of this document.  Occasional wrong word or \"sound-alike\" substitutions may have occurred due to the inherent limitations of voice recognition software.  Interpretation should be guided by context.         CHRISTOPHER Little         [1]   Past Medical History:  Diagnosis Date    Anxiety     Benign essential hypertension     resolved; 06/15/16    Depression     Depression with anxiety     Fracture of fifth metatarsal bone of right foot with delayed healing     last assessed 04/12/16; fracture of metatarsal bone, right, closed, initial encounter    Hyperlipidemia     last assessed 11/28/17    Hypothyroidism     last assessed 11/28/2017    Injury of right toe, initial encounter 09/12/2018    Insomnia     Obesity     Schizoaffective disorder (HCC)     last assessed 05/18/2017    Seizure disorder (HCC)     last assessed 03/28/17    Seizures (HCC)     Skin lesion of left lower extremity 02/26/2024    T wave inversion on electrocardiogram 06/08/2023   [2]   Past Surgical History:  Procedure Laterality Date    COLONOSCOPY      complete    MI COLONOSCOPY FLX DX W/COLLJ SPEC WHEN PFRMD N/A 8/30/2018    Procedure: COLONOSCOPY with polypectomies;  Surgeon: Andriy Lopez MD;  Location: [facility];  Service: Gastroenterology   [3]   Current Outpatient Medications:     alendronate (FOSAMAX) 70 mg tablet, Take 1 tablet (70 mg total) by mouth every 7 days Every 7 days on Fridays, Disp: 12 tablet, Rfl: 1    Aspirin Low Dose 81 MG chewable tablet, CHEW 1 TABLET BY MOUTH ONCE DAILY @8AM (CAD) *MIREYA, Disp: 30 tablet, Rfl: 0    atorvastatin (LIPITOR) 10 mg tablet, Take 1 tablet (10 mg total) by mouth daily with dinner, Disp: , " Rfl:     Cholecalciferol (VITAMIN D3) 1,000 units tablet, Take 1 tablet (1,000 Units total) by mouth daily for 30 doses, Disp: , Rfl:     clonazePAM (KlonoPIN) 1 mg tablet, Take 1 tablet (1 mg total) by mouth 2 (two) times a day, Disp: 60 tablet, Rfl: 2    divalproex sodium (DEPAKOTE ER) 500 mg 24 hr tablet, Take 4 tablets (2,000 mg total) by mouth daily at bedtime, Disp: , Rfl:     fluticasone (FLONASE) 50 mcg/act nasal spray, 2 sprays into each nostril daily, Disp: 9.9 mL, Rfl: 0    gabapentin (NEURONTIN) 100 mg capsule, Take 100 mg by mouth in the morning and 100 mg in the evening., Disp: , Rfl:     guaiFENesin (MUCINEX) 600 mg 12 hr tablet, Take 1 tablet (600 mg total) by mouth every 12 (twelve) hours as needed for congestion, Disp: , Rfl:     Incontinence Supplies MISC, Use if needed (incontinence) SIZE XL PULL UP DISPOSABLE BRIEFS, Disp: 100 each, Rfl: 1    Incontinence Supply Disposable (Disposable Brief Large) MISC, Use every 12 (twelve) hours, Disp: 36 each, Rfl: 6    levothyroxine 100 mcg tablet, Take 1 tablet (100 mcg total) by mouth daily, Disp: 30 tablet, Rfl: 0    melatonin 3 mg, Take 2 tablets (6 mg total) by mouth daily at bedtime, Disp: 60 tablet, Rfl: 0    multivitamin (THERAGRAN) TABS, Take 1 tablet by mouth in the morning., Disp: , Rfl:     OLANZapine (ZyPREXA) 2.5 mg tablet, Take 2.5 mg by mouth every 8 (eight) hours as needed, Disp: , Rfl:     PHENobarbital 64.8 mg tablet, Take 1 tablet (64.8 mg total) by mouth every 12 (twelve) hours, Disp: 30 tablet, Rfl: 4    polyethylene glycol (GLYCOLAX) 17 GM/SCOOP powder, Take 17 g by mouth in the morning., Disp: , Rfl:     risperiDONE (RisperDAL) 2 mg tablet, Take 1 tablet (2 mg total) by mouth 2 (two) times a day, Disp: , Rfl:     senna-docusate sodium (SENOKOT S) 8.6-50 mg per tablet, Take 1 tablet by mouth daily as needed for constipation, Disp: , Rfl:     traZODone (DESYREL) 100 mg tablet, Take 1 tablet (100 mg total) by mouth daily at bedtime,  Disp: , Rfl:     trihexyphenidyl (ARTANE) 2 mg tablet, Take 1 tablet (2 mg total) by mouth 2 (two) times a day, Disp: 60 tablet, Rfl: 0  No current facility-administered medications for this visit.    Facility-Administered Medications Ordered in Other Visits:     lactated ringers infusion, , Intravenous, Continuous PRN, Celi Benson CRNA, New Bag at 02/08/23 1400

## 2025-07-22 NOTE — ASSESSMENT & PLAN NOTE
Had medications adjusted at the hospital   Patient continues to be agitated, paranoid, today she refused physical exam, would not allow me to touch her.   Difficult to redirect patient.   Continue with depakote, trazodone, clonazepam, risperdal and melatonin  Continue  Zyprexa 5mg BID, and 2.5mg q8hr PRN.   Psych following  Continue supportive measures  Encourage activity and engagement  Continue to monitor for acute changes in condition

## 2025-07-24 ENCOUNTER — NURSING HOME VISIT (OUTPATIENT)
Dept: GERIATRICS | Facility: OTHER | Age: 70
End: 2025-07-24
Payer: MEDICARE

## 2025-07-24 DIAGNOSIS — G40.909 SEIZURE DISORDER (HCC): ICD-10-CM

## 2025-07-24 DIAGNOSIS — R26.2 AMBULATORY DYSFUNCTION: ICD-10-CM

## 2025-07-24 DIAGNOSIS — F09 COGNITIVE DYSFUNCTION: ICD-10-CM

## 2025-07-24 DIAGNOSIS — I10 PRIMARY HYPERTENSION: ICD-10-CM

## 2025-07-24 DIAGNOSIS — F25.9 SCHIZOAFFECTIVE DISORDER, UNSPECIFIED TYPE (HCC): Primary | ICD-10-CM

## 2025-07-24 PROCEDURE — 99309 SBSQ NF CARE MODERATE MDM 30: CPT

## 2025-07-28 VITALS
HEART RATE: 71 BPM | RESPIRATION RATE: 18 BRPM | WEIGHT: 175.2 LBS | TEMPERATURE: 97.9 F | SYSTOLIC BLOOD PRESSURE: 156 MMHG | BODY MASS INDEX: 30.07 KG/M2 | DIASTOLIC BLOOD PRESSURE: 98 MMHG | OXYGEN SATURATION: 97 %

## 2025-07-29 ENCOUNTER — NURSING HOME VISIT (OUTPATIENT)
Dept: GERIATRICS | Facility: OTHER | Age: 70
End: 2025-07-29
Payer: MEDICARE

## 2025-07-29 VITALS
HEART RATE: 79 BPM | WEIGHT: 175.2 LBS | SYSTOLIC BLOOD PRESSURE: 131 MMHG | TEMPERATURE: 97.8 F | BODY MASS INDEX: 30.07 KG/M2 | RESPIRATION RATE: 16 BRPM | OXYGEN SATURATION: 96 % | DIASTOLIC BLOOD PRESSURE: 78 MMHG

## 2025-07-29 DIAGNOSIS — F25.9 SCHIZOAFFECTIVE DISORDER, UNSPECIFIED TYPE (HCC): Primary | ICD-10-CM

## 2025-07-29 DIAGNOSIS — G40.909 SEIZURE DISORDER (HCC): ICD-10-CM

## 2025-07-29 DIAGNOSIS — F09 COGNITIVE DYSFUNCTION: ICD-10-CM

## 2025-07-29 DIAGNOSIS — R26.2 AMBULATORY DYSFUNCTION: ICD-10-CM

## 2025-07-29 DIAGNOSIS — I10 PRIMARY HYPERTENSION: ICD-10-CM

## 2025-07-29 PROCEDURE — 99309 SBSQ NF CARE MODERATE MDM 30: CPT

## 2025-07-29 RX ORDER — PHENOBARBITAL 64.8 MG/1
64.8 TABLET ORAL EVERY 12 HOURS
Qty: 60 TABLET | Refills: 2 | Status: SHIPPED | OUTPATIENT
Start: 2025-07-29

## 2025-07-31 ENCOUNTER — NURSING HOME VISIT (OUTPATIENT)
Dept: GERIATRICS | Facility: OTHER | Age: 70
End: 2025-07-31
Payer: MEDICARE

## 2025-07-31 VITALS
OXYGEN SATURATION: 98 % | SYSTOLIC BLOOD PRESSURE: 134 MMHG | WEIGHT: 175.2 LBS | HEART RATE: 72 BPM | RESPIRATION RATE: 18 BRPM | DIASTOLIC BLOOD PRESSURE: 62 MMHG | TEMPERATURE: 97.7 F | BODY MASS INDEX: 30.07 KG/M2

## 2025-07-31 DIAGNOSIS — I10 PRIMARY HYPERTENSION: ICD-10-CM

## 2025-07-31 DIAGNOSIS — F09 COGNITIVE DYSFUNCTION: ICD-10-CM

## 2025-07-31 DIAGNOSIS — G40.909 SEIZURE DISORDER (HCC): ICD-10-CM

## 2025-07-31 DIAGNOSIS — R26.2 AMBULATORY DYSFUNCTION: ICD-10-CM

## 2025-07-31 DIAGNOSIS — E87.1 HYPONATREMIA: ICD-10-CM

## 2025-07-31 DIAGNOSIS — F25.9 SCHIZOAFFECTIVE DISORDER, UNSPECIFIED TYPE (HCC): Primary | ICD-10-CM

## 2025-07-31 PROCEDURE — 99309 SBSQ NF CARE MODERATE MDM 30: CPT

## 2025-08-02 ENCOUNTER — TELEPHONE (OUTPATIENT)
Dept: OTHER | Facility: OTHER | Age: 70
End: 2025-08-02

## 2025-08-02 ENCOUNTER — HOSPITAL ENCOUNTER (EMERGENCY)
Facility: HOSPITAL | Age: 70
Discharge: HOME | End: 2025-08-03
Attending: INTERNAL MEDICINE
Payer: MEDICARE

## 2025-08-02 DIAGNOSIS — F91.9 DISRUPTIVE BEHAVIOR: ICD-10-CM

## 2025-08-02 DIAGNOSIS — R45.1 AGITATION: Primary | ICD-10-CM

## 2025-08-02 LAB
ALBUMIN SERPL BCG-MCNC: 3.8 G/DL (ref 3.5–5)
ALP SERPL-CCNC: 55 U/L (ref 34–104)
ALT SERPL W P-5'-P-CCNC: 9 U/L (ref 7–52)
ANION GAP SERPL CALCULATED.3IONS-SCNC: 6 MMOL/L (ref 4–13)
AST SERPL W P-5'-P-CCNC: 12 U/L (ref 13–39)
BASOPHILS # BLD AUTO: 0 THOUSANDS/ÂΜL (ref 0–0.1)
BASOPHILS NFR BLD AUTO: 0 % (ref 0–1)
BILIRUB SERPL-MCNC: 0.15 MG/DL (ref 0.2–1)
BUN SERPL-MCNC: 18 MG/DL (ref 5–25)
CALCIUM SERPL-MCNC: 9 MG/DL (ref 8.4–10.2)
CHLORIDE SERPL-SCNC: 100 MMOL/L (ref 96–108)
CO2 SERPL-SCNC: 27 MMOL/L (ref 21–32)
CREAT SERPL-MCNC: 0.79 MG/DL (ref 0.6–1.3)
EOSINOPHIL # BLD AUTO: 0 THOUSAND/ÂΜL (ref 0–0.61)
EOSINOPHIL NFR BLD AUTO: 0 % (ref 0–6)
ERYTHROCYTE [DISTWIDTH] IN BLOOD BY AUTOMATED COUNT: 13.2 % (ref 11.6–15.1)
ETHANOL SERPL-MCNC: 15 MG/DL
GFR SERPL CREATININE-BSD FRML MDRD: 75 ML/MIN/1.73SQ M
GLUCOSE SERPL-MCNC: 94 MG/DL (ref 65–140)
HCT VFR BLD AUTO: 35.7 % (ref 34.8–46.1)
HGB BLD-MCNC: 12.1 G/DL (ref 11.5–15.4)
IMM GRANULOCYTES # BLD AUTO: 0.01 THOUSAND/UL (ref 0–0.2)
IMM GRANULOCYTES NFR BLD AUTO: 0 % (ref 0–2)
LYMPHOCYTES # BLD AUTO: 1.72 THOUSANDS/ÂΜL (ref 0.6–4.47)
LYMPHOCYTES NFR BLD AUTO: 43 % (ref 14–44)
MCH RBC QN AUTO: 32.9 PG (ref 26.8–34.3)
MCHC RBC AUTO-ENTMCNC: 33.9 G/DL (ref 31.4–37.4)
MCV RBC AUTO: 97 FL (ref 82–98)
MONOCYTES # BLD AUTO: 0.5 THOUSAND/ÂΜL (ref 0.17–1.22)
MONOCYTES NFR BLD AUTO: 12 % (ref 4–12)
NEUTROPHILS # BLD AUTO: 1.82 THOUSANDS/ÂΜL (ref 1.85–7.62)
NEUTS SEG NFR BLD AUTO: 45 % (ref 43–75)
NRBC BLD AUTO-RTO: 0 /100 WBCS
PLATELET # BLD AUTO: 202 THOUSANDS/UL (ref 149–390)
PMV BLD AUTO: 10.2 FL (ref 8.9–12.7)
POTASSIUM SERPL-SCNC: 4.2 MMOL/L (ref 3.5–5.3)
PROT SERPL-MCNC: 6.5 G/DL (ref 6.4–8.4)
RBC # BLD AUTO: 3.68 MILLION/UL (ref 3.81–5.12)
SODIUM SERPL-SCNC: 133 MMOL/L (ref 135–147)
TSH SERPL DL<=0.05 MIU/L-ACNC: 2.77 UIU/ML (ref 0.45–4.5)
WBC # BLD AUTO: 4.05 THOUSAND/UL (ref 4.31–10.16)

## 2025-08-02 PROCEDURE — 99285 EMERGENCY DEPT VISIT HI MDM: CPT

## 2025-08-02 PROCEDURE — 84443 ASSAY THYROID STIM HORMONE: CPT | Performed by: INTERNAL MEDICINE

## 2025-08-02 PROCEDURE — 85025 COMPLETE CBC W/AUTO DIFF WBC: CPT | Performed by: INTERNAL MEDICINE

## 2025-08-02 PROCEDURE — 80053 COMPREHEN METABOLIC PANEL: CPT | Performed by: INTERNAL MEDICINE

## 2025-08-02 PROCEDURE — 93005 ELECTROCARDIOGRAM TRACING: CPT

## 2025-08-02 PROCEDURE — 99284 EMERGENCY DEPT VISIT MOD MDM: CPT | Performed by: INTERNAL MEDICINE

## 2025-08-02 PROCEDURE — 82077 ASSAY SPEC XCP UR&BREATH IA: CPT | Performed by: INTERNAL MEDICINE

## 2025-08-02 PROCEDURE — 36415 COLL VENOUS BLD VENIPUNCTURE: CPT | Performed by: INTERNAL MEDICINE

## 2025-08-03 VITALS
WEIGHT: 181 LBS | DIASTOLIC BLOOD PRESSURE: 74 MMHG | HEART RATE: 65 BPM | SYSTOLIC BLOOD PRESSURE: 150 MMHG | BODY MASS INDEX: 31.07 KG/M2 | OXYGEN SATURATION: 97 % | TEMPERATURE: 97.6 F | RESPIRATION RATE: 16 BRPM

## 2025-08-03 LAB
AMPHETAMINES SERPL QL SCN: NEGATIVE
ATRIAL RATE: 71 BPM
BACTERIA UR QL AUTO: ABNORMAL /HPF
BARBITURATES UR QL: POSITIVE
BENZODIAZ UR QL: NEGATIVE
BILIRUB UR QL STRIP: NEGATIVE
CLARITY UR: CLEAR
COCAINE UR QL: NEGATIVE
COLOR UR: ABNORMAL
FENTANYL UR QL SCN: NEGATIVE
GLUCOSE UR STRIP-MCNC: NEGATIVE MG/DL
HGB UR QL STRIP.AUTO: NEGATIVE
HYDROCODONE UR QL SCN: NEGATIVE
KETONES UR STRIP-MCNC: NEGATIVE MG/DL
LEUKOCYTE ESTERASE UR QL STRIP: ABNORMAL
METHADONE UR QL: NEGATIVE
NITRITE UR QL STRIP: NEGATIVE
NON-SQ EPI CELLS URNS QL MICRO: ABNORMAL /HPF
OPIATES UR QL SCN: NEGATIVE
OXYCODONE+OXYMORPHONE UR QL SCN: NEGATIVE
P AXIS: 67 DEGREES
PCP UR QL: NEGATIVE
PH UR STRIP.AUTO: 6.5 [PH]
PR INTERVAL: 176 MS
PROT UR STRIP-MCNC: NEGATIVE MG/DL
QRS AXIS: 28 DEGREES
QRSD INTERVAL: 82 MS
QT INTERVAL: 408 MS
QTC INTERVAL: 443 MS
RBC #/AREA URNS AUTO: ABNORMAL /HPF
SP GR UR STRIP.AUTO: 1.02 (ref 1–1.03)
T WAVE AXIS: 259 DEGREES
THC UR QL: NEGATIVE
UROBILINOGEN UR STRIP-ACNC: <2 MG/DL
VENTRICULAR RATE: 71 BPM
WBC #/AREA URNS AUTO: ABNORMAL /HPF

## 2025-08-03 PROCEDURE — 93010 ELECTROCARDIOGRAM REPORT: CPT | Performed by: INTERNAL MEDICINE

## 2025-08-03 PROCEDURE — 81001 URINALYSIS AUTO W/SCOPE: CPT | Performed by: INTERNAL MEDICINE

## 2025-08-03 PROCEDURE — 80307 DRUG TEST PRSMV CHEM ANLYZR: CPT | Performed by: INTERNAL MEDICINE

## 2025-08-03 PROCEDURE — 99204 OFFICE O/P NEW MOD 45 MIN: CPT | Performed by: GENERAL PRACTICE

## 2025-08-03 RX ORDER — CLONAZEPAM 0.5 MG/1
1 TABLET ORAL 2 TIMES DAILY
Status: DISCONTINUED | OUTPATIENT
Start: 2025-08-03 | End: 2025-08-03 | Stop reason: HOSPADM

## 2025-08-03 RX ORDER — OLANZAPINE 5 MG/1
5 TABLET, FILM COATED ORAL
Status: DISCONTINUED | OUTPATIENT
Start: 2025-08-03 | End: 2025-08-03 | Stop reason: HOSPADM

## 2025-08-03 RX ORDER — PHENOBARBITAL 32.4 MG/1
64.8 TABLET ORAL EVERY 12 HOURS
Status: DISCONTINUED | OUTPATIENT
Start: 2025-08-03 | End: 2025-08-03 | Stop reason: HOSPADM

## 2025-08-03 RX ORDER — ASPIRIN 81 MG/1
81 TABLET, CHEWABLE ORAL DAILY
Status: DISCONTINUED | OUTPATIENT
Start: 2025-08-03 | End: 2025-08-03 | Stop reason: HOSPADM

## 2025-08-03 RX ORDER — GABAPENTIN 100 MG/1
100 CAPSULE ORAL 2 TIMES DAILY
Status: DISCONTINUED | OUTPATIENT
Start: 2025-08-03 | End: 2025-08-03 | Stop reason: HOSPADM

## 2025-08-03 RX ORDER — OLANZAPINE 5 MG/1
5 TABLET, FILM COATED ORAL 2 TIMES DAILY
Status: ON HOLD | COMMUNITY

## 2025-08-03 RX ORDER — DIVALPROEX SODIUM 500 MG/1
2000 TABLET, FILM COATED, EXTENDED RELEASE ORAL
Status: DISCONTINUED | OUTPATIENT
Start: 2025-08-03 | End: 2025-08-03 | Stop reason: HOSPADM

## 2025-08-03 RX ORDER — ACETAMINOPHEN 325 MG/1
650 TABLET ORAL EVERY 8 HOURS PRN
Status: DISCONTINUED | OUTPATIENT
Start: 2025-08-03 | End: 2025-08-03 | Stop reason: HOSPADM

## 2025-08-03 RX ORDER — ACETAMINOPHEN 325 MG/1
650 TABLET ORAL ONCE
Status: COMPLETED | OUTPATIENT
Start: 2025-08-03 | End: 2025-08-03

## 2025-08-03 RX ORDER — RISPERIDONE 1 MG/1
2 TABLET ORAL 2 TIMES DAILY
Status: DISCONTINUED | OUTPATIENT
Start: 2025-08-03 | End: 2025-08-03 | Stop reason: HOSPADM

## 2025-08-03 RX ORDER — FLUTICASONE PROPIONATE 50 MCG
2 SPRAY, SUSPENSION (ML) NASAL DAILY
Status: DISCONTINUED | OUTPATIENT
Start: 2025-08-03 | End: 2025-08-03 | Stop reason: HOSPADM

## 2025-08-03 RX ORDER — OLANZAPINE 5 MG/1
5 TABLET, FILM COATED ORAL AS NEEDED
COMMUNITY
End: 2025-08-04

## 2025-08-03 RX ORDER — ATORVASTATIN CALCIUM 10 MG/1
10 TABLET, FILM COATED ORAL
Status: DISCONTINUED | OUTPATIENT
Start: 2025-08-03 | End: 2025-08-03 | Stop reason: HOSPADM

## 2025-08-03 RX ORDER — LEVOTHYROXINE SODIUM 100 UG/1
100 TABLET ORAL
Status: DISCONTINUED | OUTPATIENT
Start: 2025-08-03 | End: 2025-08-03 | Stop reason: HOSPADM

## 2025-08-03 RX ORDER — TRAZODONE HYDROCHLORIDE 50 MG/1
100 TABLET ORAL
Status: DISCONTINUED | OUTPATIENT
Start: 2025-08-03 | End: 2025-08-03 | Stop reason: HOSPADM

## 2025-08-03 RX ORDER — TRIHEXYPHENIDYL HYDROCHLORIDE 2 MG/1
2 TABLET ORAL 2 TIMES DAILY
Status: DISCONTINUED | OUTPATIENT
Start: 2025-08-03 | End: 2025-08-03 | Stop reason: HOSPADM

## 2025-08-03 RX ORDER — OLANZAPINE 5 MG/1
2.5 TABLET, FILM COATED ORAL EVERY 8 HOURS PRN
Status: DISCONTINUED | OUTPATIENT
Start: 2025-08-03 | End: 2025-08-03 | Stop reason: HOSPADM

## 2025-08-03 RX ORDER — OLANZAPINE 5 MG/1
5 TABLET, FILM COATED ORAL 2 TIMES DAILY
Status: DISCONTINUED | OUTPATIENT
Start: 2025-08-03 | End: 2025-08-03 | Stop reason: HOSPADM

## 2025-08-03 RX ORDER — ACETAMINOPHEN 325 MG/1
650 TABLET ORAL EVERY 8 HOURS PRN
Status: ON HOLD | COMMUNITY

## 2025-08-03 RX ADMIN — FLUTICASONE PROPIONATE 2 SPRAY: 50 SPRAY, METERED NASAL at 10:27

## 2025-08-03 RX ADMIN — CLONAZEPAM 1 MG: 0.5 TABLET ORAL at 09:30

## 2025-08-03 RX ADMIN — ACETAMINOPHEN 650 MG: 325 TABLET ORAL at 09:31

## 2025-08-03 RX ADMIN — PHENOBARBITAL 64.8 MG: 32.4 TABLET ORAL at 09:27

## 2025-08-03 RX ADMIN — ASPIRIN 81 MG CHEWABLE TABLET 81 MG: 81 TABLET CHEWABLE at 09:33

## 2025-08-03 RX ADMIN — RISPERIDONE 2 MG: 1 TABLET, FILM COATED ORAL at 09:29

## 2025-08-03 RX ADMIN — Medication 1000 UNITS: at 09:33

## 2025-08-03 RX ADMIN — B-COMPLEX W/ C & FOLIC ACID TAB 1 TABLET: TAB at 09:33

## 2025-08-03 RX ADMIN — OLANZAPINE 5 MG: 5 TABLET, FILM COATED ORAL at 09:36

## 2025-08-03 RX ADMIN — LEVOTHYROXINE SODIUM 100 MCG: 0.1 TABLET ORAL at 09:28

## 2025-08-03 RX ADMIN — GABAPENTIN 100 MG: 100 CAPSULE ORAL at 09:37

## 2025-08-03 RX ADMIN — TRIHEXYPHENIDYL HYDROCHLORIDE 2 MG: 2 TABLET ORAL at 10:26

## 2025-08-03 RX ADMIN — OLANZAPINE 5 MG: 5 TABLET, FILM COATED ORAL at 14:19

## 2025-08-04 ENCOUNTER — NURSING HOME VISIT (OUTPATIENT)
Dept: GERIATRICS | Facility: OTHER | Age: 70
End: 2025-08-04
Payer: MEDICARE

## 2025-08-04 VITALS
TEMPERATURE: 98 F | DIASTOLIC BLOOD PRESSURE: 97 MMHG | BODY MASS INDEX: 30.9 KG/M2 | SYSTOLIC BLOOD PRESSURE: 131 MMHG | HEART RATE: 66 BPM | WEIGHT: 180 LBS | RESPIRATION RATE: 18 BRPM | OXYGEN SATURATION: 96 %

## 2025-08-04 DIAGNOSIS — F25.9 SCHIZOAFFECTIVE DISORDER, UNSPECIFIED TYPE (HCC): ICD-10-CM

## 2025-08-04 DIAGNOSIS — R26.2 AMBULATORY DYSFUNCTION: ICD-10-CM

## 2025-08-04 DIAGNOSIS — G40.909 SEIZURE DISORDER (HCC): ICD-10-CM

## 2025-08-04 DIAGNOSIS — F09 COGNITIVE DYSFUNCTION: ICD-10-CM

## 2025-08-04 DIAGNOSIS — F51.05 MOOD INSOMNIA (HCC): ICD-10-CM

## 2025-08-04 DIAGNOSIS — F39 MOOD INSOMNIA (HCC): ICD-10-CM

## 2025-08-04 DIAGNOSIS — I10 PRIMARY HYPERTENSION: Primary | ICD-10-CM

## 2025-08-04 PROCEDURE — 99309 SBSQ NF CARE MODERATE MDM 30: CPT

## 2025-08-06 ENCOUNTER — TELEPHONE (OUTPATIENT)
Age: 70
End: 2025-08-06

## 2025-08-08 ENCOUNTER — HOSPITAL ENCOUNTER (EMERGENCY)
Facility: HOSPITAL | Age: 70
End: 2025-08-11
Attending: EMERGENCY MEDICINE | Admitting: EMERGENCY MEDICINE
Payer: MEDICARE

## 2025-08-13 ENCOUNTER — TELEPHONE (OUTPATIENT)
Age: 70
End: 2025-08-13

## (undated) DEVICE — THE EXACTO COLD SNARE IS INTENDED TO BE USED WITHOUT DIATHERMIC ENERGY FOR THE ENDOSCOPIC RESECTION OF POLYP TISSUE IN THE GASTROINTESTINAL TRACT.: Brand: EXACTO